# Patient Record
Sex: MALE | Race: WHITE | NOT HISPANIC OR LATINO | Employment: OTHER | URBAN - METROPOLITAN AREA
[De-identification: names, ages, dates, MRNs, and addresses within clinical notes are randomized per-mention and may not be internally consistent; named-entity substitution may affect disease eponyms.]

---

## 2017-12-27 ENCOUNTER — OFFICE VISIT (OUTPATIENT)
Dept: CARDIOLOGY | Facility: CLINIC | Age: 67
End: 2017-12-27
Attending: INTERNAL MEDICINE
Payer: MEDICARE

## 2017-12-27 VITALS
HEART RATE: 62 BPM | WEIGHT: 280 LBS | BODY MASS INDEX: 37.11 KG/M2 | SYSTOLIC BLOOD PRESSURE: 149 MMHG | DIASTOLIC BLOOD PRESSURE: 81 MMHG | HEIGHT: 73 IN

## 2017-12-27 DIAGNOSIS — I48.0 PAF (PAROXYSMAL ATRIAL FIBRILLATION): ICD-10-CM

## 2017-12-27 DIAGNOSIS — R07.89 ATYPICAL CHEST PAIN: ICD-10-CM

## 2017-12-27 DIAGNOSIS — I63.9 CEREBROVASCULAR ACCIDENT (CVA), UNSPECIFIED MECHANISM: ICD-10-CM

## 2017-12-27 DIAGNOSIS — M47.896 OTHER OSTEOARTHRITIS OF SPINE, LUMBAR REGION: Primary | ICD-10-CM

## 2017-12-27 DIAGNOSIS — R07.9 RESTING CHEST PAIN: Primary | ICD-10-CM

## 2017-12-27 DIAGNOSIS — I10 ESSENTIAL HYPERTENSION: Chronic | ICD-10-CM

## 2017-12-27 DIAGNOSIS — E66.01 MORBID OBESITY: ICD-10-CM

## 2017-12-27 DIAGNOSIS — E11.9 NON-INSULIN DEPENDENT TYPE 2 DIABETES MELLITUS: Chronic | ICD-10-CM

## 2017-12-27 DIAGNOSIS — E78.00 HYPERCHOLESTEREMIA: Chronic | ICD-10-CM

## 2017-12-27 DIAGNOSIS — I25.110 CORONARY ARTERY DISEASE INVOLVING NATIVE CORONARY ARTERY OF NATIVE HEART WITH UNSTABLE ANGINA PECTORIS: ICD-10-CM

## 2017-12-27 PROCEDURE — 99214 OFFICE O/P EST MOD 30 MIN: CPT | Mod: S$GLB,,, | Performed by: INTERNAL MEDICINE

## 2017-12-27 NOTE — PROGRESS NOTES
Subjective:    Patient ID:  Zev Castano is a 67 y.o. male     HPI  Here for F/U of CAD, S/P MANJEET proximal LAD in 3/2016, DM, HBP, CVA, PAF.    2 weeks ago I had cramps in the chest x a few minutes. None, since. Sedentary.  I take all my meds once a day. Recent steroids for my shoulders, my sugars are > 350.  In the last year, creat ranges from 1.4 to 1.6. Drinks 8-10 glasses of water daily.    Current Outpatient Prescriptions   Medication Sig    amLODIPine (NORVASC) 10 MG tablet Take 1 tablet (10 mg total) by mouth once daily.    atorvastatin (LIPITOR) 40 MG tablet Take 1 tablet (40 mg total) by mouth once daily.    clopidogrel (PLAVIX) 75 mg tablet Take 1 tablet (75 mg total) by mouth once daily.    CONTOUR NEXT STRIPS Strp     dronedarone (MULTAQ) 400 mg Tab Take 1 tablet (400 mg total) by mouth 2 (two) times daily with meals.    guaifenesin-codeine 100-10 mg/5 ml (TUSSI-ORGANIDIN NR)  mg/5 mL syrup Take 5 mLs by mouth 2 (two) times daily as needed.    INVOKANA 300 mg Tab tablet TAKE 1 TABLET(300 MG) BY MOUTH EVERY DAY    irbesartan (AVAPRO) 300 MG tablet Take 1 tablet (300 mg total) by mouth every evening.    methylPREDNISolone (MEDROL DOSEPACK) 4 mg tablet Take 1 tablet (4 mg total) by mouth once daily. use as directed    metoprolol succinate (TOPROL-XL) 100 MG 24 hr tablet Take 1 tablet (100 mg total) by mouth once daily.    metoprolol succinate (TOPROL-XL) 100 MG 24 hr tablet Take 1 tablet (100 mg total) by mouth once daily.    neomycin-polymyxin-gramicidin (NEOSPORIN) ophthalmic solution Place 1 drop into both eyes 4 (four) times daily.    SITagliptan-metformin (JANUMET) 50-1,000 mg per tablet Take 1 tablet by mouth 2 (two) times daily with meals.     No current facility-administered medications for this visit.          Review of Systems   Constitution: Negative for chills, decreased appetite, fever, weight gain and weight loss.   HENT: Negative for congestion, hearing loss and sore  "throat.    Eyes: Negative for blurred vision, double vision and visual disturbance.   Cardiovascular: Positive for chest pain and leg swelling. Negative for claudication, dyspnea on exertion, palpitations and syncope.   Respiratory: Negative for cough, hemoptysis, shortness of breath, sputum production and wheezing.    Endocrine: Negative for cold intolerance and heat intolerance.   Hematologic/Lymphatic: Negative for bleeding problem. Does not bruise/bleed easily.   Skin: Negative for color change, dry skin, flushing and itching.   Musculoskeletal: Negative for back pain, joint pain and myalgias.   Gastrointestinal: Negative for abdominal pain, anorexia, constipation, diarrhea, dysphagia, nausea and vomiting.        No bleeding per rectum   Genitourinary: Negative for dysuria, flank pain, frequency, hematuria and nocturia.   Neurological: Positive for focal weakness. Negative for dizziness, headaches, light-headedness, loss of balance, seizures and tremors.        Left leg weakness.   Psychiatric/Behavioral: Negative for altered mental status and depression.         Vitals:    12/27/17 1526   BP: (!) 149/81   Pulse: 62   Weight: 127 kg (280 lb)   Height: 6' 1" (1.854 m)     Objective:    Physical Exam   Constitutional: He is oriented to person, place, and time. He appears well-developed and well-nourished.   HENT:   Head: Normocephalic and atraumatic.   Right Ear: External ear normal.   Left Ear: External ear normal.   Nose: Nose normal.   Eyes: Conjunctivae and EOM are normal. Pupils are equal, round, and reactive to light. No scleral icterus.   Neck: Normal range of motion. Neck supple. No JVD present. No tracheal deviation present. No thyromegaly present.   Cardiovascular: Normal rate, regular rhythm and normal heart sounds.  Exam reveals no gallop and no friction rub.    No murmur heard.  Pulmonary/Chest: Effort normal and breath sounds normal. No respiratory distress. He has no rales. He exhibits no " tenderness.   Abdominal: Soft. Bowel sounds are normal. He exhibits no distension and no mass. There is no tenderness.   Musculoskeletal: Normal range of motion. He exhibits edema. He exhibits no tenderness.   Lymphadenopathy:     He has no cervical adenopathy.   Neurological: He is alert and oriented to person, place, and time. He has normal reflexes. No cranial nerve deficit. Coordination normal.   Skin: Skin is warm and dry. No rash noted.   Psychiatric: He has a normal mood and affect. His behavior is normal.         Assessment:           2. Coronary artery disease involving native coronary artery of native heart with unstable angina pectoris    3. Essential hypertension    4. Cerebrovascular accident (CVA), unspecified mechanism    5. PAF (paroxysmal atrial fibrillation)    6. Non-insulin dependent type 2 diabetes mellitus    7. Morbid obesity    8. Hypercholesteremia    9. Atypical chest pain         Plan:       Stop HCTZ  Lexiscan CL test.  F/U BMP after 2 weeks off HCTZ  Continue to increase oral fluid intake.

## 2018-01-29 ENCOUNTER — OFFICE VISIT (OUTPATIENT)
Dept: CARDIOLOGY | Facility: CLINIC | Age: 68
End: 2018-01-29
Attending: INTERNAL MEDICINE
Payer: MEDICARE

## 2018-01-29 VITALS
WEIGHT: 270 LBS | HEIGHT: 73 IN | SYSTOLIC BLOOD PRESSURE: 146 MMHG | BODY MASS INDEX: 35.78 KG/M2 | DIASTOLIC BLOOD PRESSURE: 83 MMHG | HEART RATE: 59 BPM

## 2018-01-29 DIAGNOSIS — I10 ESSENTIAL HYPERTENSION: Chronic | ICD-10-CM

## 2018-01-29 DIAGNOSIS — I48.0 PAF (PAROXYSMAL ATRIAL FIBRILLATION): ICD-10-CM

## 2018-01-29 DIAGNOSIS — I25.110 CORONARY ARTERY DISEASE INVOLVING NATIVE CORONARY ARTERY OF NATIVE HEART WITH UNSTABLE ANGINA PECTORIS: Primary | ICD-10-CM

## 2018-01-29 DIAGNOSIS — E78.00 HYPERCHOLESTEREMIA: Chronic | ICD-10-CM

## 2018-01-29 DIAGNOSIS — E66.01 MORBID OBESITY: ICD-10-CM

## 2018-01-29 DIAGNOSIS — I63.9 CEREBROVASCULAR ACCIDENT (CVA), UNSPECIFIED MECHANISM: ICD-10-CM

## 2018-01-29 DIAGNOSIS — E11.9 NON-INSULIN DEPENDENT TYPE 2 DIABETES MELLITUS: Chronic | ICD-10-CM

## 2018-01-29 DIAGNOSIS — M47.12 OSTEOARTHRITIS OF CERVICAL SPINE WITH MYELOPATHY: ICD-10-CM

## 2018-01-29 PROCEDURE — 99214 OFFICE O/P EST MOD 30 MIN: CPT | Mod: S$GLB,,, | Performed by: INTERNAL MEDICINE

## 2018-01-29 PROCEDURE — 1159F MED LIST DOCD IN RCRD: CPT | Mod: S$GLB,,, | Performed by: INTERNAL MEDICINE

## 2018-02-04 PROBLEM — M47.12 OSTEOARTHRITIS OF CERVICAL SPINE WITH MYELOPATHY: Status: ACTIVE | Noted: 2018-02-04

## 2018-02-04 NOTE — PROGRESS NOTES
Subjective:    Patient ID:  Zev Castano is a 68 y.o. male     HPI  Here for F/U of DJD Cervical spine, DM, CAD, CVA, HBP, PAF    Recent lack of coordination of both hands, R>L, neck pain and pains going down each arm. Saw a neurologist who recommended a neurosurgeon.    Current Outpatient Prescriptions   Medication Sig    amLODIPine (NORVASC) 10 MG tablet Take 1 tablet (10 mg total) by mouth once daily.    atorvastatin (LIPITOR) 40 MG tablet TAKE 1 TABLET(40 MG) BY MOUTH EVERY DAY    clopidogrel (PLAVIX) 75 mg tablet TAKE 1 TABLET(75 MG) BY MOUTH EVERY DAY    CONTOUR NEXT STRIPS Strp     dronedarone (MULTAQ) 400 mg Tab Take 1 tablet (400 mg total) by mouth 2 (two) times daily with meals.    guaifenesin-codeine 100-10 mg/5 ml (TUSSI-ORGANIDIN NR)  mg/5 mL syrup Take 5 mLs by mouth 2 (two) times daily as needed.    INVOKANA 300 mg Tab tablet TAKE 1 TABLET(300 MG) BY MOUTH EVERY DAY    irbesartan (AVAPRO) 300 MG tablet Take 1 tablet (300 mg total) by mouth every evening.    methylPREDNISolone (MEDROL DOSEPACK) 4 mg tablet Take 1 tablet (4 mg total) by mouth once daily. use as directed    metoprolol succinate (TOPROL-XL) 100 MG 24 hr tablet Take 1 tablet (100 mg total) by mouth once daily.    metoprolol succinate (TOPROL-XL) 100 MG 24 hr tablet Take 1 tablet (100 mg total) by mouth once daily.    neomycin-polymyxin-gramicidin (NEOSPORIN) ophthalmic solution Place 1 drop into both eyes 4 (four) times daily.    SITagliptan-metformin (JANUMET) 50-1,000 mg per tablet Take 1 tablet by mouth 2 (two) times daily with meals.     No current facility-administered medications for this visit.          Review of Systems   Constitution: Negative for chills, decreased appetite, fever, weight gain and weight loss.   HENT: Negative for congestion, hearing loss and sore throat.    Eyes: Negative for blurred vision, double vision and visual disturbance.   Cardiovascular: Negative for chest pain, claudication,  "dyspnea on exertion, leg swelling, palpitations and syncope.   Respiratory: Negative for cough, hemoptysis, shortness of breath, sputum production and wheezing.    Endocrine: Negative for cold intolerance and heat intolerance.   Hematologic/Lymphatic: Negative for bleeding problem. Does not bruise/bleed easily.   Skin: Negative for color change, dry skin, flushing and itching.   Musculoskeletal: Positive for myalgias and neck pain. Negative for back pain and joint pain.   Gastrointestinal: Negative for abdominal pain, anorexia, constipation, diarrhea, dysphagia, nausea and vomiting.        No bleeding per rectum   Genitourinary: Negative for dysuria, flank pain, frequency, hematuria and nocturia.   Neurological: Positive for focal weakness. Negative for dizziness, headaches, light-headedness, loss of balance, seizures and tremors.   Psychiatric/Behavioral: Negative for altered mental status and depression.         Vitals:    01/29/18 1145   BP: (!) 146/83   Pulse: (!) 59   Weight: 122.5 kg (270 lb)   Height: 6' 1" (1.854 m)     Objective:    Physical Exam   Constitutional: He is oriented to person, place, and time. He appears well-developed and well-nourished.   HENT:   Head: Normocephalic and atraumatic.   Right Ear: External ear normal.   Left Ear: External ear normal.   Nose: Nose normal.   Eyes: Conjunctivae and EOM are normal. Pupils are equal, round, and reactive to light. No scleral icterus.   Neck: Normal range of motion. Neck supple. No JVD present. No tracheal deviation present. No thyromegaly present.   Cardiovascular: Normal rate, regular rhythm and normal heart sounds.  Exam reveals no gallop and no friction rub.    No murmur heard.  Pulmonary/Chest: Effort normal and breath sounds normal. No respiratory distress. He has no rales. He exhibits no tenderness.   Abdominal: Soft. Bowel sounds are normal. He exhibits no distension and no mass. There is no tenderness.   Musculoskeletal: Normal range of " motion. He exhibits no edema or tenderness.   Lymphadenopathy:     He has no cervical adenopathy.   Neurological: He is alert and oriented to person, place, and time. He has normal reflexes. No cranial nerve deficit. Coordination normal.   Skin: Skin is warm and dry. No rash noted.   Psychiatric: He has a normal mood and affect. His behavior is normal.       MRI C-Spine reviewed.      Assessment:       1. Coronary artery disease involving native coronary artery of native heart with unstable angina pectoris    2. Cerebrovascular accident (CVA), unspecified mechanism    3. Non-insulin dependent type 2 diabetes mellitus    4. Essential hypertension    5. PAF (paroxysmal atrial fibrillation)    6. Hypercholesteremia    7. Morbid obesity    8. Osteoarthritis of cervical spine with myelopathy         Plan:       See Dr Ish Norman

## 2018-02-14 PROBLEM — M51.36 DDD (DEGENERATIVE DISC DISEASE), LUMBAR: Status: ACTIVE | Noted: 2018-02-14

## 2018-02-14 PROBLEM — M51.36 BULGING LUMBAR DISC: Status: ACTIVE | Noted: 2018-02-14

## 2018-02-14 PROBLEM — M48.02 CERVICAL STENOSIS OF SPINAL CANAL: Status: ACTIVE | Noted: 2018-02-14

## 2018-02-14 PROBLEM — M47.816 LUMBAR SPONDYLOSIS: Status: ACTIVE | Noted: 2018-02-14

## 2018-02-14 PROBLEM — M48.061 LUMBAR SPINAL STENOSIS: Status: ACTIVE | Noted: 2018-02-14

## 2018-02-14 PROBLEM — M70.71 BILATERAL HIP BURSITIS: Status: ACTIVE | Noted: 2018-02-14

## 2018-02-14 PROBLEM — M47.812 CERVICAL SPONDYLOSIS: Status: ACTIVE | Noted: 2018-02-14

## 2018-02-14 PROBLEM — M75.52 ACUTE BURSITIS OF LEFT SHOULDER: Status: ACTIVE | Noted: 2018-02-14

## 2018-02-14 PROBLEM — M54.12 CERVICAL RADICULOPATHY: Status: ACTIVE | Noted: 2018-02-14

## 2018-02-14 PROBLEM — M54.16 LUMBAR RADICULOPATHY: Status: ACTIVE | Noted: 2018-02-14

## 2018-02-14 PROBLEM — Z98.890 HX OF REPAIR OF RIGHT ROTATOR CUFF: Status: ACTIVE | Noted: 2018-02-14

## 2018-02-14 PROBLEM — M70.72 BILATERAL HIP BURSITIS: Status: ACTIVE | Noted: 2018-02-14

## 2018-02-14 PROBLEM — M75.51 BILATERAL SHOULDER BURSITIS: Status: ACTIVE | Noted: 2018-02-14

## 2018-02-14 PROBLEM — M75.52 BILATERAL SHOULDER BURSITIS: Status: ACTIVE | Noted: 2018-02-14

## 2018-02-14 PROBLEM — M50.30 DDD (DEGENERATIVE DISC DISEASE), CERVICAL: Status: ACTIVE | Noted: 2018-02-14

## 2018-02-14 PROBLEM — M50.20 HERNIATED CERVICAL DISC: Status: ACTIVE | Noted: 2018-02-14

## 2018-02-16 ENCOUNTER — HOSPITAL ENCOUNTER (INPATIENT)
Facility: OTHER | Age: 68
LOS: 3 days | Discharge: HOME | DRG: 310 | End: 2018-02-19
Attending: EMERGENCY MEDICINE | Admitting: INTERNAL MEDICINE
Payer: MEDICARE

## 2018-02-16 ENCOUNTER — CLINICAL SUPPORT (OUTPATIENT)
Dept: CARDIOLOGY | Facility: CLINIC | Age: 68
End: 2018-02-16
Payer: MEDICARE

## 2018-02-16 DIAGNOSIS — I48.92 ATRIAL FLUTTER, UNSPECIFIED TYPE: Primary | ICD-10-CM

## 2018-02-16 DIAGNOSIS — R06.82 TACHYPNEA: ICD-10-CM

## 2018-02-16 DIAGNOSIS — I48.92 ATRIAL FLUTTER: ICD-10-CM

## 2018-02-16 DIAGNOSIS — I48.3 TYPICAL ATRIAL FLUTTER: Primary | ICD-10-CM

## 2018-02-16 LAB
ANION GAP SERPL CALC-SCNC: 15 MMOL/L
BASOPHILS # BLD AUTO: 0.02 K/UL
BASOPHILS NFR BLD: 0.2 %
BUN SERPL-MCNC: 23 MG/DL
CALCIUM SERPL-MCNC: 9.2 MG/DL
CHLORIDE SERPL-SCNC: 103 MMOL/L
CO2 SERPL-SCNC: 20 MMOL/L
CREAT SERPL-MCNC: 1.5 MG/DL
DIFFERENTIAL METHOD: ABNORMAL
EOSINOPHIL # BLD AUTO: 0.3 K/UL
EOSINOPHIL NFR BLD: 3.4 %
ERYTHROCYTE [DISTWIDTH] IN BLOOD BY AUTOMATED COUNT: 13.7 %
EST. GFR  (AFRICAN AMERICAN): 54 ML/MIN/1.73 M^2
EST. GFR  (NON AFRICAN AMERICAN): 47 ML/MIN/1.73 M^2
GLUCOSE SERPL-MCNC: 296 MG/DL
HCT VFR BLD AUTO: 40.7 %
HGB BLD-MCNC: 13.7 G/DL
LYMPHOCYTES # BLD AUTO: 1.6 K/UL
LYMPHOCYTES NFR BLD: 16.1 %
MCH RBC QN AUTO: 29.5 PG
MCHC RBC AUTO-ENTMCNC: 33.7 G/DL
MCV RBC AUTO: 88 FL
MONOCYTES # BLD AUTO: 0.9 K/UL
MONOCYTES NFR BLD: 9.1 %
NEUTROPHILS # BLD AUTO: 7 K/UL
NEUTROPHILS NFR BLD: 70.7 %
PLATELET # BLD AUTO: 372 K/UL
PMV BLD AUTO: 10.3 FL
POCT GLUCOSE: 150 MG/DL (ref 70–110)
POTASSIUM SERPL-SCNC: 3.9 MMOL/L
RBC # BLD AUTO: 4.64 M/UL
SODIUM SERPL-SCNC: 138 MMOL/L
WBC # BLD AUTO: 9.97 K/UL

## 2018-02-16 PROCEDURE — 96375 TX/PRO/DX INJ NEW DRUG ADDON: CPT

## 2018-02-16 PROCEDURE — 93306 TTE W/DOPPLER COMPLETE: CPT

## 2018-02-16 PROCEDURE — 96376 TX/PRO/DX INJ SAME DRUG ADON: CPT

## 2018-02-16 PROCEDURE — 96366 THER/PROPH/DIAG IV INF ADDON: CPT

## 2018-02-16 PROCEDURE — 93005 ELECTROCARDIOGRAM TRACING: CPT

## 2018-02-16 PROCEDURE — 99285 EMERGENCY DEPT VISIT HI MDM: CPT | Mod: 25

## 2018-02-16 PROCEDURE — 63600175 PHARM REV CODE 636 W HCPCS: Performed by: INTERNAL MEDICINE

## 2018-02-16 PROCEDURE — 93000 ELECTROCARDIOGRAM COMPLETE: CPT | Mod: S$GLB,,, | Performed by: INTERNAL MEDICINE

## 2018-02-16 PROCEDURE — 63600175 PHARM REV CODE 636 W HCPCS: Performed by: EMERGENCY MEDICINE

## 2018-02-16 PROCEDURE — 25000003 PHARM REV CODE 250: Performed by: INTERNAL MEDICINE

## 2018-02-16 PROCEDURE — 20000000 HC ICU ROOM

## 2018-02-16 PROCEDURE — 25000003 PHARM REV CODE 250: Performed by: EMERGENCY MEDICINE

## 2018-02-16 PROCEDURE — 80048 BASIC METABOLIC PNL TOTAL CA: CPT

## 2018-02-16 PROCEDURE — 96365 THER/PROPH/DIAG IV INF INIT: CPT

## 2018-02-16 PROCEDURE — 85025 COMPLETE CBC W/AUTO DIFF WBC: CPT

## 2018-02-16 PROCEDURE — 93010 ELECTROCARDIOGRAM REPORT: CPT | Mod: 76,,, | Performed by: INTERNAL MEDICINE

## 2018-02-16 RX ORDER — METOPROLOL TARTRATE 1 MG/ML
5 INJECTION, SOLUTION INTRAVENOUS
Status: COMPLETED | OUTPATIENT
Start: 2018-02-16 | End: 2018-02-16

## 2018-02-16 RX ORDER — ADENOSINE 3 MG/ML
6 INJECTION, SOLUTION INTRAVENOUS
Status: COMPLETED | OUTPATIENT
Start: 2018-02-16 | End: 2018-02-16

## 2018-02-16 RX ORDER — AMLODIPINE BESYLATE 5 MG/1
10 TABLET ORAL DAILY
Status: DISCONTINUED | OUTPATIENT
Start: 2018-02-16 | End: 2018-02-19 | Stop reason: HOSPADM

## 2018-02-16 RX ORDER — OXYCODONE AND ACETAMINOPHEN 10; 325 MG/1; MG/1
1 TABLET ORAL DAILY PRN
COMMUNITY
End: 2018-03-10 | Stop reason: ALTCHOICE

## 2018-02-16 RX ORDER — HEPARIN SODIUM 5000 [USP'U]/ML
5000 INJECTION, SOLUTION INTRAVENOUS; SUBCUTANEOUS EVERY 8 HOURS
Status: DISCONTINUED | OUTPATIENT
Start: 2018-02-16 | End: 2018-02-17

## 2018-02-16 RX ORDER — IRBESARTAN 150 MG/1
300 TABLET ORAL NIGHTLY
Status: DISCONTINUED | OUTPATIENT
Start: 2018-02-16 | End: 2018-02-19 | Stop reason: HOSPADM

## 2018-02-16 RX ORDER — ATORVASTATIN CALCIUM 20 MG/1
40 TABLET, FILM COATED ORAL DAILY
Status: DISCONTINUED | OUTPATIENT
Start: 2018-02-16 | End: 2018-02-19 | Stop reason: HOSPADM

## 2018-02-16 RX ORDER — IBUPROFEN 200 MG
400 TABLET ORAL EVERY 6 HOURS PRN
Status: ON HOLD | COMMUNITY
End: 2018-02-19 | Stop reason: HOSPADM

## 2018-02-16 RX ORDER — METOPROLOL SUCCINATE 50 MG/1
100 TABLET, EXTENDED RELEASE ORAL DAILY
Status: DISCONTINUED | OUTPATIENT
Start: 2018-02-16 | End: 2018-02-19 | Stop reason: HOSPADM

## 2018-02-16 RX ORDER — ADENOSINE 3 MG/ML
12 INJECTION, SOLUTION INTRAVENOUS
Status: COMPLETED | OUTPATIENT
Start: 2018-02-16 | End: 2018-02-16

## 2018-02-16 RX ORDER — AMIODARONE HYDROCHLORIDE 200 MG/1
400 TABLET ORAL DAILY
Status: DISCONTINUED | OUTPATIENT
Start: 2018-02-16 | End: 2018-02-19 | Stop reason: HOSPADM

## 2018-02-16 RX ORDER — METFORMIN HYDROCHLORIDE 500 MG/1
1000 TABLET ORAL 2 TIMES DAILY WITH MEALS
Status: DISCONTINUED | OUTPATIENT
Start: 2018-02-16 | End: 2018-02-19 | Stop reason: HOSPADM

## 2018-02-16 RX ADMIN — AMIODARONE HYDROCHLORIDE 0.5 MG/MIN: 1.8 INJECTION, SOLUTION INTRAVENOUS at 06:02

## 2018-02-16 RX ADMIN — ADENOSINE 12 MG: 3 INJECTION, SOLUTION INTRAVENOUS at 09:02

## 2018-02-16 RX ADMIN — ADENOSINE 6 MG: 3 INJECTION, SOLUTION INTRAVENOUS at 09:02

## 2018-02-16 RX ADMIN — ATORVASTATIN CALCIUM 40 MG: 20 TABLET, FILM COATED ORAL at 10:02

## 2018-02-16 RX ADMIN — METOPROLOL SUCCINATE 100 MG: 50 TABLET, EXTENDED RELEASE ORAL at 10:02

## 2018-02-16 RX ADMIN — METFORMIN HYDROCHLORIDE 1000 MG: 500 TABLET, FILM COATED ORAL at 10:02

## 2018-02-16 RX ADMIN — AMIODARONE HYDROCHLORIDE 1 MG/MIN: 1.8 INJECTION, SOLUTION INTRAVENOUS at 11:02

## 2018-02-16 RX ADMIN — AMLODIPINE BESYLATE 10 MG: 5 TABLET ORAL at 10:02

## 2018-02-16 RX ADMIN — AMIODARONE HYDROCHLORIDE 400 MG: 200 TABLET ORAL at 02:02

## 2018-02-16 RX ADMIN — METOPROLOL TARTRATE 5 MG: 5 INJECTION INTRAVENOUS at 08:02

## 2018-02-16 RX ADMIN — IRBESARTAN 300 MG: 150 TABLET ORAL at 09:02

## 2018-02-16 RX ADMIN — AMIODARONE HYDROCHLORIDE 150 MG: 1.5 INJECTION, SOLUTION INTRAVENOUS at 09:02

## 2018-02-16 RX ADMIN — HEPARIN SODIUM 5000 UNITS: 5000 INJECTION, SOLUTION INTRAVENOUS; SUBCUTANEOUS at 09:02

## 2018-02-16 RX ADMIN — SITAGLIPTIN 50 MG: 25 TABLET, FILM COATED ORAL at 10:02

## 2018-02-16 RX ADMIN — HEPARIN SODIUM 5000 UNITS: 5000 INJECTION, SOLUTION INTRAVENOUS; SUBCUTANEOUS at 01:02

## 2018-02-16 NOTE — ED NOTES
Patient is still alert and oriented x4. Able to stand and ambulate. Uses urinal at bedside for continuous monitoring. Current heart rate 101. Skin is warm, and non diaphoretic. Patient denies chest pain and states a relief from shortness of breath at this time.   Pulses equal and regular.     Call light in reach and educated on use. Wife is still at bedside.

## 2018-02-16 NOTE — ED TRIAGE NOTES
Patient came from clinic where he went for a clearance for sx exam. Upon ekg, they found patient to be in atrial flutter. Patient was sent here with complaints of shortness of breath. Denies chest pain or nausea. Skin is warm, dry, and pink.

## 2018-02-16 NOTE — ED PROVIDER NOTES
Encounter Date: 2/16/2018    SCRIBE #1 NOTE: Luis LOONEY, am scribing for, and in the presence of, Dr. Haque.       History     Chief Complaint   Patient presents with    Shortness of Breath     Was seen by a doctor for a bone spur earlier this week and that office did an EKG.  He followed up with Dr. Livingston this morning and was sent to ED secondary to a flutter.  Denies chest pain, + shortness of breath, denies n/v     8:33 AM    Patient is a 68 y.o. male who presents to the ED with complaint of shortness of breath. Patient was being seen in Dr. Livingston's office this morning to obtain medical clearance for neck surgery to have a bone spur removed, when he was found to have a heartrate in the 140's and was referred to the ED for further evaluation. Patient currently complains of shortness of breath, which began prior to his visit to the office. He denies chest pain, dizziness, lightheadedness, or any other pain besides his chronic neck pain. He reports taking his daily AM medications this morning, including multaq. Per wife, patient has been taking multaq for several years, and has a history of a rapid heart rate. She also reports pt has a history of poorly controlled Dm, with sugars running in the 240-250's. She also states the pt has kidney problems and is in the process of establishing care with nephrology.       The history is provided by the patient and the spouse.     Review of patient's allergies indicates:   Allergen Reactions    Doxycycline Rash     Past Medical History:   Diagnosis Date    Coronary artery disease     Diabetes mellitus type II     Hyperlipidemia     Hypertension     Paroxysmal atrial fibrillation     Stroke     nov 2012     Past Surgical History:   Procedure Laterality Date    KNEE SURGERY Left     SHOULDER SURGERY Right      Family History   Problem Relation Age of Onset    Hypertension Mother      Social History   Substance Use Topics    Smoking status: Former Smoker      Types: Cigars    Smokeless tobacco: Never Used      Comment: occasional, when at the [employer]    Alcohol use 0.6 oz/week     1 Cans of beer per week      Comment: occasional when at the [email or web address]     Review of Systems   Constitutional: Negative for chills, diaphoresis and fever.   HENT: Negative for congestion.    Respiratory: Positive for shortness of breath.    Cardiovascular: Negative for chest pain.   Gastrointestinal: Negative for abdominal pain and nausea.   Genitourinary: Negative for dysuria.   Musculoskeletal: Positive for neck pain.   Skin: Negative for rash.   Neurological: Negative for dizziness, weakness, light-headedness and headaches.   Psychiatric/Behavioral: Negative for confusion.       Physical Exam     Initial Vitals [02/16/18 0828]   BP Pulse Resp Temp SpO2   128/68 (!) 144 (!) 22 97.5 °F (36.4 °C) 97 %      MAP       88         Physical Exam    Nursing note and vitals reviewed.  Constitutional: He appears well-developed and well-nourished. He is not diaphoretic. No distress.   HENT:   Head: Normocephalic and atraumatic.   Eyes: Conjunctivae and EOM are normal.   Neck: Normal range of motion. Neck supple. JVD present.   Cardiovascular: Regular rhythm and normal heart sounds. Tachycardia present.    Pulmonary/Chest: Breath sounds normal. No respiratory distress. He has no wheezes. He has no rhonchi. He has no rales.   Abdominal: Soft. There is no tenderness.   Musculoskeletal: Normal range of motion. He exhibits edema (trace pitting edema).   Neurological: He is alert and oriented to person, place, and time.   Skin: Skin is warm and dry.   Psychiatric: He has a normal mood and affect. His behavior is normal. Judgment and thought content normal.         ED Course   Procedures  Labs Reviewed   CBC W/ AUTO DIFFERENTIAL - Abnormal; Notable for the following:        Result Value    Hemoglobin 13.7 (*)     Platelets 372 (*)     Lymph% 16.1 (*)     All other components within normal limits   BASIC  METABOLIC PANEL - Abnormal; Notable for the following:     CO2 20 (*)     Glucose 296 (*)     Creatinine 1.5 (*)     eGFR if  54 (*)     eGFR if non  47 (*)     All other components within normal limits     EKG Readings: (Independently Interpreted)   Sinus tachycardia at a rate of 143. Right bundle branch block. No STEMI.          Medical Decision Making:   Independently Interpreted Test(s):   I have ordered and independently interpreted EKG Reading(s) - see prior notes  Clinical Tests:   Lab Tests: Ordered and Reviewed  Medical Tests: Ordered and Reviewed  ED Management:  8:42 AM - Dr. Livingston made aware his patient is here in the ED. He would like the patient to receive 5 mg metoprolol and to be admitted.  9:00 AM - Dr. Livingston at bedside.    Additional MDM:   Comments: 68-year-old male sent from cardiology clinic by Dr. Livingston for evaluation of tachycardia.  The patient was there for medical clearance only.  He denied any symptoms except for dyspnea which is not a new complaint.  On exam he appeared in no respiratory distress.  He did arrive tachycardic with a heart rate in the 140s.  Dr. Livingston was consult immediate bleeding upon patient's arrival into the emergency department.  Further care was dictated by him.  He received metoprolol 5 mg in the emergency department and I did order labs which showed no significant abnormalities.  The patient was ultimately admitted to Dr. Livingston's service..          Scribe Attestation:   Scribe #1: I performed the above scribed service and the documentation accurately describes the services I performed. I attest to the accuracy of the note.    Attending Attestation:           Physician Attestation for Scribe:  Physician Attestation Statement for Scribe #1: I, Dr. Haque, reviewed documentation, as scribed by Luis Ennis in my presence, and it is both accurate and complete.                    Clinical Impression:     1. Typical atrial  flutter    2. Tachypnea    3. Atrial flutter                               Chloe Haque MD  02/16/18 4427

## 2018-02-16 NOTE — ED NOTES
Patient resting comfortably. He has been educated on admission to the ICU and has no complaints at this time. Wife is at bedside. Call light in reach. Warm blankets provided.

## 2018-02-17 LAB
ESTIMATED PA SYSTOLIC PRESSURE: 16
POCT GLUCOSE: 153 MG/DL (ref 70–110)
POCT GLUCOSE: 162 MG/DL (ref 70–110)
POCT GLUCOSE: 169 MG/DL (ref 70–110)
POCT GLUCOSE: 173 MG/DL (ref 70–110)
POCT GLUCOSE: 180 MG/DL (ref 70–110)
POCT GLUCOSE: 189 MG/DL (ref 70–110)
RETIRED EF AND QEF - SEE NOTES: 71 (ref 55–65)

## 2018-02-17 PROCEDURE — 25000003 PHARM REV CODE 250: Performed by: INTERNAL MEDICINE

## 2018-02-17 PROCEDURE — 63600175 PHARM REV CODE 636 W HCPCS: Performed by: INTERNAL MEDICINE

## 2018-02-17 PROCEDURE — 20000000 HC ICU ROOM

## 2018-02-17 RX ADMIN — METFORMIN HYDROCHLORIDE 1000 MG: 500 TABLET, FILM COATED ORAL at 06:02

## 2018-02-17 RX ADMIN — SITAGLIPTIN 50 MG: 25 TABLET, FILM COATED ORAL at 06:02

## 2018-02-17 RX ADMIN — LIRAGLUTIDE 1.2 MG: 6 INJECTION SUBCUTANEOUS at 11:02

## 2018-02-17 RX ADMIN — IRBESARTAN 300 MG: 150 TABLET ORAL at 08:02

## 2018-02-17 RX ADMIN — METOPROLOL SUCCINATE 100 MG: 50 TABLET, EXTENDED RELEASE ORAL at 08:02

## 2018-02-17 RX ADMIN — AMIODARONE HYDROCHLORIDE 0.5 MG/MIN: 1.8 INJECTION, SOLUTION INTRAVENOUS at 07:02

## 2018-02-17 RX ADMIN — ATORVASTATIN CALCIUM 40 MG: 20 TABLET, FILM COATED ORAL at 08:02

## 2018-02-17 RX ADMIN — AMLODIPINE BESYLATE 10 MG: 5 TABLET ORAL at 08:02

## 2018-02-17 RX ADMIN — AMIODARONE HYDROCHLORIDE 400 MG: 200 TABLET ORAL at 08:02

## 2018-02-17 RX ADMIN — APIXABAN 5 MG: 2.5 TABLET, FILM COATED ORAL at 11:02

## 2018-02-17 RX ADMIN — APIXABAN 5 MG: 2.5 TABLET, FILM COATED ORAL at 08:02

## 2018-02-17 RX ADMIN — METFORMIN HYDROCHLORIDE 1000 MG: 500 TABLET, FILM COATED ORAL at 07:02

## 2018-02-17 RX ADMIN — HEPARIN SODIUM 5000 UNITS: 5000 INJECTION, SOLUTION INTRAVENOUS; SUBCUTANEOUS at 05:02

## 2018-02-17 RX ADMIN — SITAGLIPTIN 50 MG: 25 TABLET, FILM COATED ORAL at 07:02

## 2018-02-17 NOTE — NURSING
Spoke with MD Livingston about pts BP being 160-170s/90s and general update on pt. MD Livingston not concerned with these numbers stating that sometimes the pt comes to the office with SBP in the 200s. Only order received if accuchecks ACHS.

## 2018-02-17 NOTE — PROGRESS NOTES
Feels better. Denies palpitation or breathlessness.    Vitals:    02/17/18 0615 02/17/18 0630 02/17/18 0705 02/17/18 0800   BP:    (!) 145/84   Pulse: 108 108  (!) 116   Resp: 11 14  (!) 23   Temp:   98.8 °F (37.1 °C)    TempSrc:   Axillary    SpO2: (!) 93% 95%  97%   Weight:       Height:         Chest clear  Cor: Irregular.  No gallop.  Neuro: intact.    Plan: Rx Eliquis  Stop heparin  Continue to load with Amiodarone  Resume Victoza  No NSAIDs  Will switch from Invokana to Jardiance  F/U BMP in am.  CV on Monday

## 2018-02-17 NOTE — PLAN OF CARE
Problem: Patient Care Overview  Goal: Plan of Care Review  Outcome: Ongoing (interventions implemented as appropriate)  Patient has had no NAEON. Patient BP has been as high as 180s/100s with improvement after no interventions. Dr. Livingston aware. Patient has been in Afib 90s-100s all night on Amio 0.5mg/min. Patient denies SOB or Cp. Patient has limited fine motor skill to BUE, this is prior to admit 2/2 bone spur that he needs sx for.

## 2018-02-17 NOTE — PLAN OF CARE
02/17/18 1746   Discharge Assessment   Assessment Type Discharge Planning Assessment   Assessment information obtained from? Patient;Caregiver;Medical Record   Prior to hospitilization cognitive status: Alert/Oriented   Prior to hospitalization functional status: Assistive Equipment;Independent   Current cognitive status: Alert/Oriented   Current Functional Status: Assistive Equipment;Needs Assistance   Able to Return to Prior Arrangements yes   Is patient able to care for self after discharge? Yes  (CV on Monday per Dr Livingston)   Patient currently being followed by outpatient case management? Unable to determine (comments)   Do you have any problems affording any of your prescribed medications? No   Is the patient taking medications as prescribed? yes   Does the patient have transportation home? Yes   Transportation Available family or friend will provide;car   Discharge Plan A Home   Patient/Family In Agreement With Plan yes

## 2018-02-18 LAB
ANION GAP SERPL CALC-SCNC: 13 MMOL/L
BUN SERPL-MCNC: 21 MG/DL
CALCIUM SERPL-MCNC: 8.8 MG/DL
CHLORIDE SERPL-SCNC: 104 MMOL/L
CO2 SERPL-SCNC: 21 MMOL/L
CREAT SERPL-MCNC: 1.1 MG/DL
EST. GFR  (AFRICAN AMERICAN): >60 ML/MIN/1.73 M^2
EST. GFR  (NON AFRICAN AMERICAN): >60 ML/MIN/1.73 M^2
GLUCOSE SERPL-MCNC: 144 MG/DL
POCT GLUCOSE: 133 MG/DL (ref 70–110)
POCT GLUCOSE: 135 MG/DL (ref 70–110)
POCT GLUCOSE: 154 MG/DL (ref 70–110)
POCT GLUCOSE: 171 MG/DL (ref 70–110)
POTASSIUM SERPL-SCNC: 3.9 MMOL/L
SODIUM SERPL-SCNC: 138 MMOL/L

## 2018-02-18 PROCEDURE — 63600175 PHARM REV CODE 636 W HCPCS: Performed by: INTERNAL MEDICINE

## 2018-02-18 PROCEDURE — 20000000 HC ICU ROOM

## 2018-02-18 PROCEDURE — 36415 COLL VENOUS BLD VENIPUNCTURE: CPT

## 2018-02-18 PROCEDURE — 25000003 PHARM REV CODE 250: Performed by: INTERNAL MEDICINE

## 2018-02-18 PROCEDURE — 93010 ELECTROCARDIOGRAM REPORT: CPT | Mod: ,,, | Performed by: INTERNAL MEDICINE

## 2018-02-18 PROCEDURE — 93005 ELECTROCARDIOGRAM TRACING: CPT

## 2018-02-18 PROCEDURE — 80048 BASIC METABOLIC PNL TOTAL CA: CPT

## 2018-02-18 RX ADMIN — LIRAGLUTIDE 1.2 MG: 6 INJECTION SUBCUTANEOUS at 08:02

## 2018-02-18 RX ADMIN — SITAGLIPTIN 50 MG: 25 TABLET, FILM COATED ORAL at 08:02

## 2018-02-18 RX ADMIN — AMIODARONE HYDROCHLORIDE 0.5 MG/MIN: 1.8 INJECTION, SOLUTION INTRAVENOUS at 06:02

## 2018-02-18 RX ADMIN — APIXABAN 5 MG: 2.5 TABLET, FILM COATED ORAL at 08:02

## 2018-02-18 RX ADMIN — IRBESARTAN 300 MG: 150 TABLET ORAL at 08:02

## 2018-02-18 RX ADMIN — AMIODARONE HYDROCHLORIDE 400 MG: 200 TABLET ORAL at 08:02

## 2018-02-18 RX ADMIN — METOPROLOL SUCCINATE 100 MG: 50 TABLET, EXTENDED RELEASE ORAL at 08:02

## 2018-02-18 RX ADMIN — AMLODIPINE BESYLATE 10 MG: 5 TABLET ORAL at 08:02

## 2018-02-18 RX ADMIN — METFORMIN HYDROCHLORIDE 1000 MG: 500 TABLET, FILM COATED ORAL at 05:02

## 2018-02-18 RX ADMIN — AMIODARONE HYDROCHLORIDE 0.5 MG/MIN: 1.8 INJECTION, SOLUTION INTRAVENOUS at 07:02

## 2018-02-18 RX ADMIN — ATORVASTATIN CALCIUM 40 MG: 20 TABLET, FILM COATED ORAL at 08:02

## 2018-02-18 RX ADMIN — METFORMIN HYDROCHLORIDE 1000 MG: 500 TABLET, FILM COATED ORAL at 08:02

## 2018-02-18 RX ADMIN — SITAGLIPTIN 50 MG: 25 TABLET, FILM COATED ORAL at 05:02

## 2018-02-18 NOTE — PROGRESS NOTES
""I feel weak"  Denies palpitations/ breathlessness.    Vitals:    02/18/18 0715 02/18/18 0800 02/18/18 0900 02/18/18 1000   BP: 139/82 (!) 153/91 (!) 161/100 (!) 175/94   Pulse:  (!) 126 (!) 130 (!) 128   Resp:  14 13 (!) 24   Temp: 98 °F (36.7 °C)      TempSrc: Oral      SpO2:  95% (!) 94% (!) 94%   Weight:       Height:         Chest clear  Cor: irregularly Irregular. No gallop  Neuro: intact.    Plan: CV in am.  "

## 2018-02-18 NOTE — H&P
"HISTORY OF PRESENT ILLNESS:  Mr. Castano is a 68-year-old gentleman who has   complained of feeling "bad" for the last 4 to 5 days.  He says he went to see   his primary care doctor, Dr. Arzola for a clearance for a cervical spine surgery   and had an electrocardiogram done, this was reportedly abnormal and showed an   irregular heart rhythm, was advised to come and see me for further evaluation.    He was seen in the office on the morning of admission, noted to be in atrial   flutter with a 2:1 block, and a ventricular rate of 140 beats per minute.  He   was sent to the Emergency Room for further evaluation and treatment, was   admitted to the hospital.    PAST HISTORY:  He has had longstanding history of diabetes mellitus and   hypertension.  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  Two years ago, he was hospitalized here for   unstable angina and had two stents placed in the left anterior descending   coronary artery in March 2016.  He has had paroxysmal atrial fibrillation, was   placed 3 or 4 years ago on Multaq and has been stable since then in a regular   sinus rhythm.  He has had morbid obesity, weighed over 300 pounds for the last   20+ years.  When placed on Invokana and in the past Victoza, he had a   significant weight loss.  He has had degenerative joint disease of his   lumbosacral spine, and of the cervical spine.  Over the last month or so, he   says he has been clumsy in both hands, and his recent MRI has suggested a   herniated disk in the neck for which he has been recommended surgery.    SOCIAL HISTORY:  He used to smoke cigars, he used to binge drink.  He now seldom   drinks and does not smoke cigars.    PRESENT MEDICATIONS:  His present home medications include ibuprofen, oxycodone,   amlodipine, atorvastatin, clopidogrel, Multaq, Invokana, irbesartan, metoprolol   succinate, Janumet.    PHYSICAL EXAMINATION:  GENERAL:  Reveals a " heavyset man, in no apparent acute distress.  VITAL SIGNS:  Blood pressure of 136/76 and a pulse of 140 beats per minute, HEENT:  The sclerae are nonicteric.  The conjunctivae are pink.  The ENT exam is   unremarkable.  NECK:  Supple.  There is no jugular venous distention.  The carotid upstroke is   brisk.  There is no carotid bruit.  CHEST:  Clear.  HEART:  Size is difficult to ascertain, although appears to be grossly normal.    S1 and S2 are normal.  There is no audible murmur or gallop.  ABDOMEN:  Soft and nontender.  The bowel sounds are normal.  EXTREMITIES:  There is no clubbing, cyanosis or edema of feet.  NEUROLOGIC:  Grossly, the neurological exam is intact.    IMPRESSION ON ADMISSION:  1.  Atrial flutter with a 2:1 block.  2.  Essential hypertension.  3.  Diabetes mellitus.  4.  Stable coronary artery disease.  5.  History of paroxysmal atrial fibrillation.  6.  Previous stroke.  7.  Degenerative joint disease of the cervical spine.  8.  Degenerative joint disease of the lumbosacral spine.      SB/HN  dd: 02/17/2018 09:45:18 (CST)  td: 02/17/2018 18:39:36 (CST)  Doc ID   #[id number]  Job ID #[id number]    CC:

## 2018-02-18 NOTE — PROGRESS NOTES
Informed consent placed in pt folder. House supervisor notified of AM procedure. Voice mail left on surgery answering service. Will pass along to next shift to call Anesthesiology 2/19 for AM CV.

## 2018-02-19 ENCOUNTER — ANESTHESIA EVENT (OUTPATIENT)
Dept: CARDIOLOGY | Facility: OTHER | Age: 68
DRG: 310 | End: 2018-02-19
Payer: MEDICARE

## 2018-02-19 ENCOUNTER — SURGERY (OUTPATIENT)
Age: 68
End: 2018-02-19

## 2018-02-19 ENCOUNTER — ANESTHESIA (OUTPATIENT)
Dept: CARDIOLOGY | Facility: OTHER | Age: 68
DRG: 310 | End: 2018-02-19
Payer: MEDICARE

## 2018-02-19 VITALS
WEIGHT: 260.81 LBS | HEART RATE: 82 BPM | BODY MASS INDEX: 34.57 KG/M2 | TEMPERATURE: 98 F | DIASTOLIC BLOOD PRESSURE: 86 MMHG | OXYGEN SATURATION: 94 % | SYSTOLIC BLOOD PRESSURE: 156 MMHG | HEIGHT: 73 IN | RESPIRATION RATE: 18 BRPM

## 2018-02-19 DIAGNOSIS — E11.9 DIABETES MELLITUS WITHOUT COMPLICATION: Primary | ICD-10-CM

## 2018-02-19 LAB
ANION GAP SERPL CALC-SCNC: 13 MMOL/L
BUN SERPL-MCNC: 24 MG/DL
CALCIUM SERPL-MCNC: 8.7 MG/DL
CHLORIDE SERPL-SCNC: 104 MMOL/L
CO2 SERPL-SCNC: 20 MMOL/L
CREAT SERPL-MCNC: 1.2 MG/DL
EST. GFR  (AFRICAN AMERICAN): >60 ML/MIN/1.73 M^2
EST. GFR  (NON AFRICAN AMERICAN): >60 ML/MIN/1.73 M^2
GLUCOSE SERPL-MCNC: 136 MG/DL
POCT GLUCOSE: 144 MG/DL (ref 70–110)
POTASSIUM SERPL-SCNC: 3.9 MMOL/L
SODIUM SERPL-SCNC: 137 MMOL/L

## 2018-02-19 PROCEDURE — 93010 ELECTROCARDIOGRAM REPORT: CPT | Mod: ,,, | Performed by: INTERNAL MEDICINE

## 2018-02-19 PROCEDURE — 63600175 PHARM REV CODE 636 W HCPCS: Performed by: INTERNAL MEDICINE

## 2018-02-19 PROCEDURE — 93005 ELECTROCARDIOGRAM TRACING: CPT

## 2018-02-19 PROCEDURE — 36415 COLL VENOUS BLD VENIPUNCTURE: CPT

## 2018-02-19 PROCEDURE — 80048 BASIC METABOLIC PNL TOTAL CA: CPT

## 2018-02-19 PROCEDURE — 63600175 PHARM REV CODE 636 W HCPCS: Performed by: NURSE ANESTHETIST, CERTIFIED REGISTERED

## 2018-02-19 PROCEDURE — 37000008 HC ANESTHESIA 1ST 15 MINUTES: Performed by: INTERNAL MEDICINE

## 2018-02-19 PROCEDURE — 25000003 PHARM REV CODE 250: Performed by: INTERNAL MEDICINE

## 2018-02-19 PROCEDURE — 5A2204Z RESTORATION OF CARDIAC RHYTHM, SINGLE: ICD-10-PCS | Performed by: INTERNAL MEDICINE

## 2018-02-19 RX ORDER — LIDOCAINE HCL/PF 100 MG/5ML
SYRINGE (ML) INTRAVENOUS
Status: DISCONTINUED | OUTPATIENT
Start: 2018-02-19 | End: 2018-02-19

## 2018-02-19 RX ORDER — METFORMIN HYDROCHLORIDE 1000 MG/1
1000 TABLET ORAL 2 TIMES DAILY WITH MEALS
Qty: 180 TABLET | Refills: 3 | Status: SHIPPED | OUTPATIENT
Start: 2018-02-19 | End: 2018-03-10 | Stop reason: ALTCHOICE

## 2018-02-19 RX ORDER — PROPOFOL 10 MG/ML
VIAL (ML) INTRAVENOUS
Status: DISCONTINUED | OUTPATIENT
Start: 2018-02-19 | End: 2018-02-19

## 2018-02-19 RX ORDER — AMIODARONE HYDROCHLORIDE 200 MG/1
200 TABLET ORAL 2 TIMES DAILY
Qty: 60 TABLET | Refills: 11 | Status: SHIPPED | OUTPATIENT
Start: 2018-02-19 | End: 2018-03-20

## 2018-02-19 RX ADMIN — AMLODIPINE BESYLATE 10 MG: 5 TABLET ORAL at 09:02

## 2018-02-19 RX ADMIN — LIDOCAINE HYDROCHLORIDE 100 MG: 20 INJECTION, SOLUTION INTRAVENOUS at 08:02

## 2018-02-19 RX ADMIN — AMIODARONE HYDROCHLORIDE 400 MG: 200 TABLET ORAL at 09:02

## 2018-02-19 RX ADMIN — ATORVASTATIN CALCIUM 40 MG: 20 TABLET, FILM COATED ORAL at 09:02

## 2018-02-19 RX ADMIN — SITAGLIPTIN 50 MG: 25 TABLET, FILM COATED ORAL at 09:02

## 2018-02-19 RX ADMIN — METFORMIN HYDROCHLORIDE 1000 MG: 500 TABLET, FILM COATED ORAL at 09:02

## 2018-02-19 RX ADMIN — APIXABAN 5 MG: 2.5 TABLET, FILM COATED ORAL at 09:02

## 2018-02-19 RX ADMIN — METOPROLOL SUCCINATE 100 MG: 50 TABLET, EXTENDED RELEASE ORAL at 09:02

## 2018-02-19 RX ADMIN — AMIODARONE HYDROCHLORIDE 0.5 MG/MIN: 1.8 INJECTION, SOLUTION INTRAVENOUS at 07:02

## 2018-02-19 RX ADMIN — PROPOFOL 40 MG: 10 INJECTION, EMULSION INTRAVENOUS at 08:02

## 2018-02-19 RX ADMIN — LIRAGLUTIDE 1.2 MG: 6 INJECTION SUBCUTANEOUS at 09:02

## 2018-02-19 NOTE — PHYSICIAN QUERY
PT Name: Zev Castano  MR #: [id number]  Physician Query Form - Renal Clarification     CDS/: Veronica Jain               Contact information: [email or web address]     This form is a permanent document in the medical record.     QueryDate: February 19, 2018    By submitting this query, we are merely seeking further clarification of documentation. Please utilize your independent clinical judgment when addressing the question(s) below.    The Medical record contains the following:   Indicator Supporting Clinical Findings Location in Medical Record    Kidney (Renal) Insufficiency     Xx Kidney (Renal) Failure / Injury  pt has kidney problems and is in the process of establishing care with nephrology.  ED Provider Note 2/16    Nephrotoxic Agents     X BUN/Creatinine GFR CR 1.5-1.2  BUN 23-21-24  GFR 47->60 Labs 2/16-2/19    Urine: Casts         Eosinophils      Dehydration      Nausea/Vomiting      Dialysis/CRRT      Treatment:      Other:          Provider, please specify the diagnosis or diagnoses associated with above clinical findings.    [ ] Unspecified Acute Kidney Failure/Injury    [ ] Acute on Chronic Renal Failure (please specify stage*): _____________    [ ] Chronic Kidney Disease (CKD) (please specify stage* below):      *[employer] Definitions:   Stage Description      eGFR (mL/min)   [ ] I  Slight kidney damage with normal or increased filtration  90+   [x ] II  Mildly reduced kidney function     60-89   [ ] III  Moderately reduced kidney function    30-59   [ ] IV  Severly reduced kidney function     15-29   [ ] V  Kidney failure, requiring transplant or dialysis   < 15    [ ] Other (please specify): _______________________________    [ ] Clinically Undetermined    Please document in your progress notes daily for the duration of treatment, until resolved, and include in your discharge summary.

## 2018-02-19 NOTE — NURSING TRANSFER
Nursing DISCHARGE Note      2/19/2018     Transfer To: Home    Transfer via wheelchair    Transported by RN    Notified: Significant other at bedside    Cardioversion performed at bedside with MD and anesthesiology present.  Pt required one shock to return to NSR.  EKG performed.  Pt without complaint, remained in stable condition.    Pt monitored for 2 hours post cardioversion, remained in NSR.  Discharge orders followed per MD.  Pt educated on treatment course, plan of care and medications.  All questions answered, pt remained safe and comfortable throughout shift.      Transported to private vehicle, driven by spouse.  All belongings sent with pt.

## 2018-02-19 NOTE — TRANSFER OF CARE
"Anesthesia Transfer of Care Note    Patient: Zev Castano    Procedure(s) Performed: Procedure(s) (LRB):  CARDIOVERSION (N/A)    Patient location: ICU    Anesthesia Type: general    Post pain: adequate analgesia    Post assessment: no apparent anesthetic complications and tolerated procedure well    Post vital signs: stable    Level of consciousness: awake, alert and oriented    Nausea/Vomiting: no nausea/vomiting    Complications: none    Transfer of care protocol was followed      Last vitals:   Visit Vitals  BP (!) 146/95   Pulse (!) 122   Temp 36.6 °C (97.8 °F) (Oral)   Resp 11   Ht 6' 1" (1.854 m)   Wt 118.3 kg (260 lb 12.9 oz)   SpO2 95%   BMI 34.41 kg/m²     "

## 2018-02-19 NOTE — ANESTHESIA PREPROCEDURE EVALUATION
02/19/2018  Zev Castano is a 68 y.o., male.    Anesthesia Evaluation    I have reviewed the Patient Summary Reports.    I have reviewed the Nursing Notes.   I have reviewed the Medications.     Review of Systems  Anesthesia Hx:  No problems with previous Anesthesia  Denies Family Hx of Anesthesia complications.   Denies Personal Hx of Anesthesia complications.   Social:  Non-Smoker, Former Smoker    Hematology/Oncology:  Hematology Normal   Oncology Normal     Cardiovascular:   Hypertension CAD asymptomatic Dysrhythmias atrial fibrillation    Pulmonary:  Pulmonary Normal    Renal/:  Renal/ Normal     Hepatic/GI:  Hepatic/GI Normal    Musculoskeletal:   Arthritis  Cervical spur with left arm and hand weakness. Denies neck pain and good ROM Spine Disorders: cervical    Neurological:   CVA, no residual symptoms Several CVAs in past more than 4 yr ago. Etiology unknown   Endocrine:   Diabetes, poorly controlled, type 2        Physical Exam  General:  Well nourished    Airway/Jaw/Neck:  Airway Findings: Mouth Opening: Normal Tongue: Normal  General Airway Assessment: Adult  Mallampati: I         Dental:  Dental Findings: In tact             Anesthesia Plan  Type of Anesthesia, risks & benefits discussed:  Anesthesia Type:  general  Patient's Preference:   Intra-op Monitoring Plan: standard ASA monitors  Intra-op Monitoring Plan Comments:   Post Op Pain Control Plan:   Post Op Pain Control Plan Comments:   Induction:    Beta Blocker:         Informed Consent: Patient understands risks and agrees with Anesthesia plan.  Questions answered. Anesthesia consent signed with patient.  ASA Score: 3     Day of Surgery Review of History & Physical:    H&P update referred to the surgeon.     Anesthesia Plan Notes: IV propofol for cardioversion        Ready For Surgery From Anesthesia Perspective.

## 2018-02-19 NOTE — PLAN OF CARE
02/19/18 1031   Medicare Message   Important Message from Medicare regarding Discharge Appeal Rights Given to patient/caregiver;Explained to patient/caregiver;Signed/date by patient/caregiver   Date IMM was signed 02/18/18   Time IMM was signed 1200

## 2018-02-19 NOTE — PLAN OF CARE
02/19/18 1018   Final Note   Assessment Type Final Discharge Note   Discharge Disposition Home   What phone number can be called within the next 1-3 days to see how you are doing after discharge? [phone]   Right Care Referral Info   Post Acute Recommendation No Care

## 2018-02-19 NOTE — ANESTHESIA POSTPROCEDURE EVALUATION
"Anesthesia Post Evaluation    Patient: Zev Castano    Procedure(s) Performed: Procedure(s) (LRB):  CARDIOVERSION (N/A)    Final Anesthesia Type: general  Patient location during evaluation: ICU  Patient participation: Yes- Able to Participate  Level of consciousness: awake and alert and oriented  Post-procedure vital signs: reviewed and stable  Pain management: adequate  Airway patency: patent  PONV status at discharge: No PONV  Anesthetic complications: no      Cardiovascular status: hemodynamically stable  Respiratory status: unassisted, spontaneous ventilation and room air  Hydration status: euvolemic  Follow-up not needed.        Visit Vitals  BP (!) 146/95   Pulse (!) 122   Temp 36.6 °C (97.8 °F) (Oral)   Resp 11   Ht 6' 1" (1.854 m)   Wt 118.3 kg (260 lb 12.9 oz)   SpO2 95%   BMI 34.41 kg/m²       Pain/Gerry Score: Pain Assessment Performed: Yes (2/19/2018  7:00 AM)  Presence of Pain: denies (2/19/2018  7:00 AM)      "

## 2018-02-19 NOTE — PLAN OF CARE
Problem: Patient Care Overview  Goal: Plan of Care Review  Outcome: Ongoing (interventions implemented as appropriate)  Pt awake and alert. No complaints of pain. VSS. Will continue to monitor.

## 2018-02-20 NOTE — DISCHARGE SUMMARY
"DATE OF DISCHARGE:  02/19/2018    HISTORY OF PRESENT ILLNESS:  Mr. Castano is a 68-year-old gentleman who presented   to the office with the complaints of "feeling bad for about 4 or 5 days.  When   he went to see his primary care doctor he was noted to have an irregular rapid   rhythm, was referred to my office for further evaluation.  In the office, he was   noted to be in atrial flutter with a 2:1 block with a ventricular rate of 140   beats per minute.  He was sent to the Emergency Room for further treatment and   was admitted to the Intensive Care Unit on IV amiodarone drip.    PAST HISTORY:  Longstanding history of hypertension and diabetes, he has been   morbidly obese, weighing in excess of 300 pounds.  He had two successive strokes   in 2012, had unstable angina and had placement of 2 stents in the proximal and   mid left anterior descending coronary artery in March 2016.  He has had   paroxysmal atrial fibrillation, has been on Multaq and in stable sinus rhythm   these last three or four years.  In the past, he was on Invokana and Victoza.    He had a dramatic weight loss, however, about a year ago, stopped taking   Victoza.  In the last month or so, he has had weakness in both arms, poor   coordination and neck and shoulder and arm pain.  He was seen by Dr. Monte and   he has been scheduled for a cervical disk laminectomy on 02/28.      His home medications were continued, with the exception of Invokana, which was   changed to Jardiance 25 mg daily.  Victoza was reinstated in the dose of 1.2 mg   daily.  The IV amiodarone drip was continued, and he was placed on oral   amiodarone 400 mg twice a day as well.  The Xarelto was discontinued and he was   placed on apixaban 5 mg twice a day and at the time of discharge was reduced to   2.5 mg twice a day.      His renal function, which on admission showed a BUN of 23 and a creatinine of   1.5 on followup showed a BUN of 21 with a creatinine of 1.1, and at the " day of   discharge, the blood sugar 136, a BUN of 24 and a creatinine of 1.2.  He has   been advised to discontinue his nonsteroidal anti-inflammatory medications and   his Invokana.  He has been advised to drink lots of water, the hydration, we   will ensure no Jardiance-induced nephropathy.  For 48 hours prior to his neck   surgery.  He will stop the apixaban.  The clopidogrel that he was on prior to   this admission has been discontinued.  He will continue his baby aspirin.  On   the morning of 02/19 under propofol anesthesia, a single countershock of 200   joules restored regular sinus rhythm.  At the time of discharge, he says he   feels better he can the rhythm is now in regular, followup electrocardiogram   showed stable sinus rhythm.  He was ambulated, and sent home to be seen in the   office for followup in a couple of weeks.      SUKHDEV/IN  dd: 02/19/2018 09:16:58 (CST)  td: 02/19/2018 23:52:35 (CST)  Doc ID   #[id number]  Job ID #[id number]    CC:

## 2018-02-20 NOTE — PROCEDURES
DATE OF PROCEDURE:  02/19/2018    NAME OF PROCEDURE:  Cardioversion.    PREPROCEDURE DIAGNOSIS:  Atrial flutter.    POSTPROCEDURE DIAGNOSES:  Atrial flutter.    TECHNIQUE:  Under propofol anesthesia, a single 200 joule countershock was   delivered.  This restored regular sinus rhythm.  Upon awakening, the patient was   stable and symptom free.    CONCLUSIONS:  Successful cardioversion from atrial flutter to a regular sinus   rhythm.      SUKHDEV/ED  dd: 02/19/2018 08:26:40 (CST)  td: 02/19/2018 18:16:32 (CST)  Doc ID   #[id number]  Job ID #[id number]    CC:

## 2018-03-10 ENCOUNTER — HOSPITAL ENCOUNTER (OUTPATIENT)
Facility: OTHER | Age: 68
Discharge: HOME | End: 2018-03-13
Attending: EMERGENCY MEDICINE | Admitting: EMERGENCY MEDICINE
Payer: MEDICARE

## 2018-03-10 DIAGNOSIS — I63.9 CEREBROVASCULAR ACCIDENT (CVA), UNSPECIFIED MECHANISM: ICD-10-CM

## 2018-03-10 DIAGNOSIS — I49.9 IRREGULAR HEART RHYTHM: ICD-10-CM

## 2018-03-10 DIAGNOSIS — R00.1 BRADYCARDIA: Primary | ICD-10-CM

## 2018-03-10 DIAGNOSIS — R06.09 EXERTIONAL DYSPNEA: ICD-10-CM

## 2018-03-10 DIAGNOSIS — M47.12 OSTEOARTHRITIS OF CERVICAL SPINE WITH MYELOPATHY: ICD-10-CM

## 2018-03-10 DIAGNOSIS — R06.02 SOB (SHORTNESS OF BREATH): ICD-10-CM

## 2018-03-10 DIAGNOSIS — I10 ESSENTIAL (PRIMARY) HYPERTENSION: ICD-10-CM

## 2018-03-10 DIAGNOSIS — R06.00 ACUTE DYSPNEA: ICD-10-CM

## 2018-03-10 LAB
ANION GAP SERPL CALC-SCNC: 12 MMOL/L
BASOPHILS # BLD AUTO: 0.02 K/UL
BASOPHILS NFR BLD: 0.2 %
BNP SERPL-MCNC: 140 PG/ML
BUN SERPL-MCNC: 26 MG/DL
CALCIUM SERPL-MCNC: 8.7 MG/DL
CHLORIDE SERPL-SCNC: 107 MMOL/L
CO2 SERPL-SCNC: 21 MMOL/L
CREAT SERPL-MCNC: 1.2 MG/DL
DIFFERENTIAL METHOD: ABNORMAL
EOSINOPHIL # BLD AUTO: 0.5 K/UL
EOSINOPHIL NFR BLD: 5.5 %
ERYTHROCYTE [DISTWIDTH] IN BLOOD BY AUTOMATED COUNT: 14.2 %
EST. GFR  (AFRICAN AMERICAN): >60 ML/MIN/1.73 M^2
EST. GFR  (NON AFRICAN AMERICAN): >60 ML/MIN/1.73 M^2
GLUCOSE SERPL-MCNC: 242 MG/DL
HCT VFR BLD AUTO: 37.5 %
HGB BLD-MCNC: 12.4 G/DL
LYMPHOCYTES # BLD AUTO: 1.6 K/UL
LYMPHOCYTES NFR BLD: 18.6 %
MCH RBC QN AUTO: 29.3 PG
MCHC RBC AUTO-ENTMCNC: 33.1 G/DL
MCV RBC AUTO: 89 FL
MONOCYTES # BLD AUTO: 1 K/UL
MONOCYTES NFR BLD: 11.1 %
NEUTROPHILS # BLD AUTO: 5.4 K/UL
NEUTROPHILS NFR BLD: 63.9 %
PLATELET # BLD AUTO: 292 K/UL
PMV BLD AUTO: 10.4 FL
POTASSIUM SERPL-SCNC: 4 MMOL/L
RBC # BLD AUTO: 4.23 M/UL
SODIUM SERPL-SCNC: 140 MMOL/L
WBC # BLD AUTO: 8.53 K/UL

## 2018-03-10 PROCEDURE — 80048 BASIC METABOLIC PNL TOTAL CA: CPT

## 2018-03-10 PROCEDURE — 93010 ELECTROCARDIOGRAM REPORT: CPT | Mod: ,,, | Performed by: INTERNAL MEDICINE

## 2018-03-10 PROCEDURE — 93005 ELECTROCARDIOGRAM TRACING: CPT

## 2018-03-10 PROCEDURE — 99285 EMERGENCY DEPT VISIT HI MDM: CPT | Mod: 25

## 2018-03-10 PROCEDURE — 85025 COMPLETE CBC W/AUTO DIFF WBC: CPT

## 2018-03-10 PROCEDURE — 84484 ASSAY OF TROPONIN QUANT: CPT

## 2018-03-10 PROCEDURE — 83880 ASSAY OF NATRIURETIC PEPTIDE: CPT

## 2018-03-11 PROBLEM — R06.09 EXERTIONAL DYSPNEA: Status: ACTIVE | Noted: 2018-03-11

## 2018-03-11 LAB
ANION GAP SERPL CALC-SCNC: 12 MMOL/L
BASOPHILS # BLD AUTO: 0.03 K/UL
BASOPHILS NFR BLD: 0.3 %
BUN SERPL-MCNC: 29 MG/DL
CALCIUM SERPL-MCNC: 8.6 MG/DL
CHLORIDE SERPL-SCNC: 106 MMOL/L
CO2 SERPL-SCNC: 23 MMOL/L
CREAT SERPL-MCNC: 1.2 MG/DL
D DIMER PPP IA.FEU-MCNC: 0.47 MG/L FEU
DIFFERENTIAL METHOD: ABNORMAL
EOSINOPHIL # BLD AUTO: 0.4 K/UL
EOSINOPHIL NFR BLD: 4.1 %
ERYTHROCYTE [DISTWIDTH] IN BLOOD BY AUTOMATED COUNT: 14.4 %
EST. GFR  (AFRICAN AMERICAN): >60 ML/MIN/1.73 M^2
EST. GFR  (NON AFRICAN AMERICAN): >60 ML/MIN/1.73 M^2
GLUCOSE SERPL-MCNC: 152 MG/DL
HCT VFR BLD AUTO: 33.8 %
HGB BLD-MCNC: 11.2 G/DL
LYMPHOCYTES # BLD AUTO: 1.8 K/UL
LYMPHOCYTES NFR BLD: 20.9 %
MCH RBC QN AUTO: 29.7 PG
MCHC RBC AUTO-ENTMCNC: 33.1 G/DL
MCV RBC AUTO: 90 FL
MONOCYTES # BLD AUTO: 0.9 K/UL
MONOCYTES NFR BLD: 10.6 %
NEUTROPHILS # BLD AUTO: 5.5 K/UL
NEUTROPHILS NFR BLD: 64.1 %
PLATELET # BLD AUTO: 272 K/UL
PMV BLD AUTO: 10.7 FL
POCT GLUCOSE: 131 MG/DL (ref 70–110)
POCT GLUCOSE: 142 MG/DL (ref 70–110)
POCT GLUCOSE: 152 MG/DL (ref 70–110)
POCT GLUCOSE: 166 MG/DL (ref 70–110)
POTASSIUM SERPL-SCNC: 3.8 MMOL/L
RBC # BLD AUTO: 3.77 M/UL
SODIUM SERPL-SCNC: 141 MMOL/L
TROPONIN I SERPL DL<=0.01 NG/ML-MCNC: 0.01 NG/ML
WBC # BLD AUTO: 8.61 K/UL

## 2018-03-11 PROCEDURE — 36415 COLL VENOUS BLD VENIPUNCTURE: CPT

## 2018-03-11 PROCEDURE — G0378 HOSPITAL OBSERVATION PER HR: HCPCS

## 2018-03-11 PROCEDURE — 93010 ELECTROCARDIOGRAM REPORT: CPT | Mod: ,,, | Performed by: INTERNAL MEDICINE

## 2018-03-11 PROCEDURE — 80048 BASIC METABOLIC PNL TOTAL CA: CPT

## 2018-03-11 PROCEDURE — 25000003 PHARM REV CODE 250: Performed by: INTERNAL MEDICINE

## 2018-03-11 PROCEDURE — 25000003 PHARM REV CODE 250: Performed by: EMERGENCY MEDICINE

## 2018-03-11 PROCEDURE — 27000221 HC OXYGEN, UP TO 24 HOURS

## 2018-03-11 PROCEDURE — 93005 ELECTROCARDIOGRAM TRACING: CPT

## 2018-03-11 PROCEDURE — 85379 FIBRIN DEGRADATION QUANT: CPT

## 2018-03-11 PROCEDURE — 85025 COMPLETE CBC W/AUTO DIFF WBC: CPT

## 2018-03-11 PROCEDURE — 99900035 HC TECH TIME PER 15 MIN (STAT)

## 2018-03-11 PROCEDURE — 94761 N-INVAS EAR/PLS OXIMETRY MLT: CPT

## 2018-03-11 PROCEDURE — 83036 HEMOGLOBIN GLYCOSYLATED A1C: CPT

## 2018-03-11 RX ORDER — AMIODARONE HYDROCHLORIDE 200 MG/1
200 TABLET ORAL DAILY
Status: DISCONTINUED | OUTPATIENT
Start: 2018-03-11 | End: 2018-03-13 | Stop reason: HOSPADM

## 2018-03-11 RX ORDER — IBUPROFEN 200 MG
24 TABLET ORAL
Status: DISCONTINUED | OUTPATIENT
Start: 2018-03-11 | End: 2018-03-13 | Stop reason: HOSPADM

## 2018-03-11 RX ORDER — GLUCAGON 1 MG
1 KIT INJECTION
Status: DISCONTINUED | OUTPATIENT
Start: 2018-03-11 | End: 2018-03-13 | Stop reason: HOSPADM

## 2018-03-11 RX ORDER — INSULIN ASPART 100 [IU]/ML
1-10 INJECTION, SOLUTION INTRAVENOUS; SUBCUTANEOUS
Status: DISCONTINUED | OUTPATIENT
Start: 2018-03-11 | End: 2018-03-13 | Stop reason: HOSPADM

## 2018-03-11 RX ORDER — METOPROLOL SUCCINATE 50 MG/1
50 TABLET, EXTENDED RELEASE ORAL DAILY
Status: DISCONTINUED | OUTPATIENT
Start: 2018-03-11 | End: 2018-03-12

## 2018-03-11 RX ORDER — ATORVASTATIN CALCIUM 20 MG/1
40 TABLET, FILM COATED ORAL DAILY
Status: DISCONTINUED | OUTPATIENT
Start: 2018-03-11 | End: 2018-03-13 | Stop reason: HOSPADM

## 2018-03-11 RX ORDER — IBUPROFEN 200 MG
16 TABLET ORAL
Status: DISCONTINUED | OUTPATIENT
Start: 2018-03-11 | End: 2018-03-13 | Stop reason: HOSPADM

## 2018-03-11 RX ORDER — IRBESARTAN 150 MG/1
300 TABLET ORAL NIGHTLY
Status: DISCONTINUED | OUTPATIENT
Start: 2018-03-11 | End: 2018-03-13 | Stop reason: HOSPADM

## 2018-03-11 RX ORDER — NAPROXEN SODIUM 220 MG/1
81 TABLET, FILM COATED ORAL DAILY
Status: DISCONTINUED | OUTPATIENT
Start: 2018-03-11 | End: 2018-03-13 | Stop reason: HOSPADM

## 2018-03-11 RX ORDER — AMLODIPINE BESYLATE 5 MG/1
10 TABLET ORAL DAILY
Status: DISCONTINUED | OUTPATIENT
Start: 2018-03-11 | End: 2018-03-13 | Stop reason: HOSPADM

## 2018-03-11 RX ADMIN — ATORVASTATIN CALCIUM 40 MG: 20 TABLET, FILM COATED ORAL at 11:03

## 2018-03-11 RX ADMIN — ASPIRIN 81 MG CHEWABLE TABLET 81 MG: 81 TABLET CHEWABLE at 04:03

## 2018-03-11 RX ADMIN — APIXABAN 2.5 MG: 2.5 TABLET, FILM COATED ORAL at 11:03

## 2018-03-11 RX ADMIN — LIRAGLUTIDE 1.2 MG: 6 INJECTION SUBCUTANEOUS at 05:03

## 2018-03-11 RX ADMIN — IRBESARTAN 300 MG: 150 TABLET ORAL at 08:03

## 2018-03-11 RX ADMIN — APIXABAN 2.5 MG: 2.5 TABLET, FILM COATED ORAL at 08:03

## 2018-03-11 NOTE — PLAN OF CARE
03/11/18 0922   MENDOZA Message   Medicare Outpatient and Observation Notification regarding financial responsibility Given to patient/caregiver;Explained to patient/caregiver;Signed/date by patient/caregiver   Date MENDOZA was signed 03/11/18   Time MENDOZA was signed 0845

## 2018-03-11 NOTE — ED NOTES
Pt ambulated to the restroom to have a BM. Wife was w/ him. Pt states he did not have SOB on the way to the restroom, but he did get SOB on the way back to the stretcher.

## 2018-03-11 NOTE — ED NOTES
Pt sitting up in bed resting w/ eyes closed and in no obvious distress. Wife remains at the BS, states he is resting right now. Respirations are even and unlabored. No nasal flaring and no use of accessory muscles. Skin is warm, dry and pink. VSS. Will continue to monitor closely.

## 2018-03-11 NOTE — CONSULTS
[facility] Neurology Consult Note    Reason for Consult - arm weakness      History of Present Illness -   68 y.o. male, with history of DM, stoke, Afib, and CAD here with SOB x 3 days. Neurology consulted for arm weakness. Patient endorses history of stroke in  that resulted in aphasia and bilateral UE/LE weakness that has since resolved. Patient states that he has LUE weakness and bilateral hand numbness for the past few weeks. On examination, he is limited in ROM of LUE at shoulder but otherwise displays no weakness. He later clarifies that he has pain on movement of his LUE and has been for the past few weeks.  He also has a history of cervical spur that is being followed by a spine surgeon.  He has no other neurological complaints. Denies dizziness, blurry vision, headache.      Past Medical History -  DM, stoke, Afib, and CAD  Allergies - NKDA    Vitals -     Vitals:    16 0815 16 1225 16 1227 16 1456   BP: 130/74 118/82     BP Location: Right arm Right arm     Patient Position: Lying Lying     BP Method: Automatic Automatic     Pulse: 60 60     Resp: 18 20     Temp: 99 °F (37.2 °C) 98.5 °F (36.9 °C)     TempSrc: Oral Oral     SpO2:   97% 97%   Weight:       Height:           Neurological Exam -     Gen: Alert and oriented to state/city/place name, year/month/date/day, full name/, situation    CN: PERRLA. EOMI. No hemianopsia/quandrantopia appreciated. Tactile sensation subjectively intact and equal bilaterally in V1-3 distribution.  Smile symmetric; no nasolabial fold flattening.  Uvual midline.  Testing of trapezius and sternocleidomastoid resulted with adequate strength to resistance.  Tongue protrusion midline.    Strength: flexion/extension at elbow and wrist and abduction of fingers 5/5 bilaterally but ROM at L shoulder limited due to pain. flexion/extension of hip/knee and plantarflexion/dorsiflexion 5/5 bilaterally.    Sensation: tactile sensation grossly and subjectively  intact in upper and lower extremities bilaterally.    Reflexes: triceps, biceps, brachioradialis, patellar 2+ and symmetric bilaterally.    Cerebellar: finger-nose nonconcerning bilaterally.      Labs -      Recent Labs  Lab 07/08/16  2103 07/09/16  0537 07/09/16  0538   WBC 13.68* 12.89*  --    HGB 16.8 15.5  --    HCT 47.8 44.7  --     253  --    * 133*  --    K 4.1 4.1  --     100  --    CO2 23 25  --    BUN 15 15  --    * 84  --    PROT 8.4 7.2  --    ALBUMIN 4.0 3.4*  --    BILITOT 0.9 0.7  --    AST 25 22  --    ALKPHOS 77 68  --    ALT 13 10  --    INR 1.1  --   --    TROPONINI <0.006  --   --    TSH 1.066  --  0.801   [id number]  --   --  637   FOLATE  --   --  7.5       Assessment and Plan -     68 y.o. male, with history of DM, stoke, Afib, and CAD with L shoulder limited ROM and numbness/tingling of bilateral hands  - do not appreciate arm weakness on examination; continue to pursue imaging of L shoulder for MSK issue  - numbness/tingling of bilateral hands to be assessed by outpatient EMG/NCS    Above discussed with attending provider Dr. Xuan Pina MD  Neurology PGY-III

## 2018-03-11 NOTE — ED NOTES
Pt sitting up in bed resting w/ his eyes closed and in no obvious respiratory distress. No nasal flaring and no use of accessory muscles. Pt is easily arousable and O x 3. Pt denies SOB. Respirations are even and unlabored. Skin is warm, dry and pink. VSS. Pain assessed and bathroom needs addressed. Will continue to monitor closely.

## 2018-03-11 NOTE — ED NOTES
Pt c/o sudden onset of SOB today, states he cannot lie flat. Pts wife states he was taken off lasix a couple of months ago by  for decreased kidney function. Pt is A & O x 3, states his SOB is worse w/ exertion. Respirations are even and unlabored. No nasal flaring and no use of accessory muscles. Skin is warm, dry and pink. VS. HR is SB @ 40 bpm. SHEREEN x 3mm. BBS- distant and CTA. Abd- SNT, pt is obese. Edema to the lower exts, +1 pitting edema. PSM x 4 exts. Pt is connected to the pulse ox, B/P cuff and EKG monitor. Bed is locked and in the low position w/ the side rails up and locked for pt safety. Call bell @ BS. Wife @ BS. Will continue to monitor closely.

## 2018-03-11 NOTE — PLAN OF CARE
Met with patient & significant other at bedside to complete discharge planning assessment. Patient is current with Dr Mehul Livingston PCP & his pharmacy of choice is [facility] in [place]. Patient was recently discharged from [facility] for similar symptoms. Patient requires minimal assist at times due to a bone spur in back limiting function of hands. Assist provided by significant other as needed. Patient denies any anticipated DC needs      03/11/18 1015   Discharge Assessment   Assessment Type Discharge Planning Assessment   Confirmed/corrected address and phone number on facesheet? Yes   Assessment information obtained from? Patient   Communicated expected length of stay with patient/caregiver no   Prior to hospitilization cognitive status: Alert/Oriented   Prior to hospitalization functional status: Needs Assistance   Current cognitive status: Alert/Oriented   Current Functional Status: Needs Assistance   Lives With significant other   Able to Return to Prior Arrangements yes   Is patient able to care for self after discharge? Yes   Patient's perception of discharge disposition home or selfcare   Readmission Within The Last 30 Days previous discharge plan unsuccessful   If yes, most recent facility name: [facility]   Patient currently being followed by outpatient case management? No   Patient currently receives any other outside agency services? No   Equipment Currently Used at Home none   Do you have any problems affording any of your prescribed medications? No   Is the patient taking medications as prescribed? yes   Does the patient have transportation home? Yes   Transportation Available family or friend will provide   Does the patient receive services at the [facility]? No   Discharge Plan A Home with family   Patient/Family In Agreement With Plan yes   Readmission Questionnaire   At the time of your discharge, did someone talk to you about what your health problems were? Yes   At the time of  discharge, did someone talk to you about what to watch out for regarding worsening of your health problem? Yes   At the time of discharge, did someone talk to you about what to do if you experienced worsening of your health problem? Yes   At the time of discharge, did someone talk to you about which medication to take when you left the hospital and which ones to stop taking? Yes   At the time of discharge, did someone talk to you about when and where to follow up with a doctor after you left the hospital? Yes   What do you believe caused you to be sick enough to be re-admitted? SOB   How often do you need to have someone help you when you read instructions, pamphlets, or other written material from your doctor or pharmacy? Rarely   Do you have problems taking your medications as prescribed? No   Do you have any problems affording any of  your prescribed medications? No   Do you have problems obtaining/receiving your medications? No   Does the patient have transportation to healthcare appointments? Yes   Living Arrangements house   Does the patient have family/friends to help with healtcare needs after discharge? yes   Does your caregiver provide all the help you need? Yes   Are you currently feeling confused? No   Are you currently having problems thinking? No   Are you currently having memory problems? No   Have you felt down, depressed, or hopeless? 0   Have you felt little interest or pleasure in doing things? 0   In the last 7 days, my sleep quality was: good

## 2018-03-11 NOTE — PLAN OF CARE
Problem: Diabetes, Type 2 (Adult)  Goal: Signs and Symptoms of Listed Potential Problems Will be Absent, Minimized or Managed (Diabetes, Type 2)  Signs and symptoms of listed potential problems will be absent, minimized or managed by discharge/transition of care (reference Diabetes, Type 2 (Adult) CPG).   Outcome: Ongoing (interventions implemented as appropriate)  Patient with a diagnosis of Diabetes, type 2.  Currently taking Jardiance PO but did not bring it with them.  Pharmacy does NOT carry this medication.  Will see if either family can bring in patient's dosage or will have the MD change the current order.    Problem: Fall Risk (Adult)  Goal: Absence of Falls  Patient will demonstrate the desired outcomes by discharge/transition of care.   Outcome: Ongoing (interventions implemented as appropriate)  Patient is a fall risk who wears O2 per nasal cannula.  Yellow bracelet and socks in place. Bed alarm not currently on per patient preference.  Instructed patient and his partner to call for any desires to get out of bed.  Both stated that they would.    Problem: Patient Care Overview  Goal: Plan of Care Review  Outcome: Ongoing (interventions implemented as appropriate)  Plan of care reviewed upon admission with both the patient and his significant other (girlfriend).  Medications reviewed and lab results noted.  Safety protocols also covered.  All questions answered to their satisfaction.

## 2018-03-11 NOTE — ED PROVIDER NOTES
Encounter Date: 3/10/2018    SCRIBE #1 NOTE: I, Desi Caicedo, am scribing for, and in the presence of, Dr. Haque.       History     Chief Complaint   Patient presents with    Shortness of Breath     upon exertion X 2-3 days, with bilateral leg swelling     Time seen by provider: 10:45 PM    This is a 68 y.o. male, with history of DM, stoke, Afib, and CAD, who presents with complaint of SOB with exertion that began 3 days ago. He initially experienced SOB after walking 15 to 20 ft. Symptom worsened tonight, and notes SOB occurs after walking across shorter distances. He denies experiencing SOB in the past. He reports leg swelling and dizziness approximately 2 hours ago. He denies chest pain, abdominal pain, nausea, vomiting, back pain, headache, numbness, or extremity weakness. Dr. Livingston told him the leg swelling is due to his most recent stroke, though he did not experience leg swelling after his other strokes. He takes Eliquis for stent placement. He took Metoprolol this morning. HR has been in the 70-80s, though it dropped to 40s tonight.  He sleeps on 2 pillows, and denies recently having to prop himself up more when sleeping.      The history is provided by the patient and the spouse.     Review of patient's allergies indicates:   Allergen Reactions    Doxycycline Rash     Past Medical History:   Diagnosis Date    Coronary artery disease     Diabetes mellitus type II     Hyperlipidemia     Hypertension     Paroxysmal atrial fibrillation     Stroke     nov 2012     Past Surgical History:   Procedure Laterality Date    KNEE SURGERY Left     SHOULDER SURGERY Right      Family History   Problem Relation Age of Onset    Hypertension Mother      Social History   Substance Use Topics    Smoking status: Former Smoker     Types: Cigars    Smokeless tobacco: Never Used      Comment: occasional, when at the casino    Alcohol use 0.6 oz/week     1 Cans of beer per week      Comment: occasional when at the  [employer]     Review of Systems   Constitutional: Negative for chills and fever.   HENT: Negative for sore throat.    Respiratory: Positive for shortness of breath.    Cardiovascular: Positive for leg swelling. Negative for chest pain and palpitations.   Gastrointestinal: Negative for abdominal pain, nausea and vomiting.   Genitourinary: Negative for dysuria.   Musculoskeletal: Negative for back pain.   Skin: Negative for rash.   Neurological: Positive for dizziness. Negative for weakness, numbness and headaches.   Hematological: Does not bruise/bleed easily.       Physical Exam     Initial Vitals [03/10/18 2207]   BP Pulse Resp Temp SpO2   (!) 146/71 (!) 50 20 97.6 °F (36.4 °C) 98 %      MAP       96         Physical Exam    Nursing note and vitals reviewed.  Constitutional: He appears well-developed and well-nourished. He is not diaphoretic. No distress.   HENT:   Head: Normocephalic and atraumatic.   Eyes: Conjunctivae and EOM are normal. Right eye exhibits no discharge. Left eye exhibits no discharge. No scleral icterus.   Neck: No JVD present.   Cardiovascular: Regular rhythm and normal heart sounds. Bradycardia present.  Exam reveals no gallop and no friction rub.    No murmur heard.  Pulses:       Dorsalis pedis pulses are 1+ on the right side, and 1+ on the left side.   Pulmonary/Chest: Breath sounds normal. No respiratory distress. He has no wheezes. He has no rhonchi. He has no rales.   Abdominal: Soft. Bowel sounds are normal. He exhibits no distension. There is no tenderness. There is no rebound and no guarding.   Musculoskeletal: Normal range of motion. He exhibits no edema or tenderness (calf bilaterally).   LLE: 1+ pitting edema below the knee.  RLE: Trace pitting edema below the knee.   Neurological: He is alert and oriented to person, place, and time. No sensory deficit.   Skin: Skin is warm and dry. No rash noted. No pallor.   Psychiatric: He has a normal mood and affect. His behavior is normal.          ED Course   Procedures  Labs Reviewed   CBC W/ AUTO DIFFERENTIAL - Abnormal; Notable for the following:        Result Value    RBC 4.23 (*)     Hemoglobin 12.4 (*)     Hematocrit 37.5 (*)     All other components within normal limits   B-TYPE NATRIURETIC PEPTIDE - Abnormal; Notable for the following:      (*)     All other components within normal limits   BASIC METABOLIC PANEL - Abnormal; Notable for the following:     CO2 21 (*)     Glucose 242 (*)     BUN, Bld 26 (*)     All other components within normal limits   TROPONIN I     Imaging Results          X-Ray Chest 1 View (Final result)  Result time 03/10/18 23:10:29    Final result by Ameya Villalobos MD (03/10/18 23:10:29)                 Impression:        Elevated left hemidiaphragm with compressive atelectasis of the adjacent lung base.      Electronically signed by: AMEYA VILLALOBOS MD  Date:     03/10/18  Time:    23:10              Narrative:    Technique: AP chest radiograph.    Comparison: Radiograph 10/15/2012.    Findings:    Mediastinal structures are midline.  There is calcification of the aortic arch.  There is an elevated left knee diaphragm.  Lung volumes are low but symmetric.  There is left basilar subsegmental atelectasis.  No pneumothorax or large pleural effusion.  No free air beneath the diaphragm.  Degenerative changes of the spine and shoulders noted.                            EKG Readings: (Independently Interpreted)   Initial Reading: No STEMI.   Sinus bradycardia at a rate of 46 bpm. Normal MS intervals. RBBB, which is present on previous EKG on 2/19. No ST/T wave changes compared to previous EKG.       X-Rays:   Independently Interpreted Readings:   Chest X-Ray: No large effusion or consolidation.     Medical Decision Making:   Clinical Tests:   Lab Tests: Ordered and Reviewed  Radiological Study: Ordered and Reviewed  Medical Tests: Ordered and Reviewed  ED Management:  12:31 AM - Paged Cardiology.    12:35 AM -  Discussed case with Dr. Livingston, and he would like to reduce Amiodarone dose to 200 mg qd and metoprolol to 50 mg qd, and for patient to be admitted to his service.    Additional MDM:   Comments: 68-year-old male with history of coronary artery disease and atrial flutter presents complaining of exertional dyspnea that has been present for the past 3 days and worsened this evening.  He denies any associated chest pain or other symptoms.  Vital signs significant for bradycardia on arrival.  Patient asymptomatic while sitting in bed.  However, he did become dyspneic when returning from the restroom.  Oxygen saturation upon returning was in the low 90s.  Chest x-ray shows no acute process.  Labs are significant for a normal troponin and a BNP of less than 200.  I have discussed the case with Dr. Livingston, the patient's cardiologist, who agrees with admission and would like his metoprolol and amiodarone dose is reduced.  Patient has been admitted to his service..          Scribe Attestation:   Scribe #1: I performed the above scribed service and the documentation accurately describes the services I performed. I attest to the accuracy of the note.    Attending Attestation:           Physician Attestation for Scribe:  Physician Attestation Statement for Scribe #1: I, Dr. Haque, reviewed documentation, as scribed by Desi Caicedo in my presence, and it is both accurate and complete.                    Clinical Impression:     1. Acute dyspnea    2. SOB (shortness of breath)                               Chloe Haque MD  03/11/18 0605

## 2018-03-11 NOTE — NURSING
Nursing states we do NOT carry the Jardiance that the patient takes PO for his Diabetic therapy.  Patient states his daughter will bring in the medication today.  Pharmacy informed that we will bring it to them to be scanned when it arrives.

## 2018-03-11 NOTE — PLAN OF CARE
Problem: Patient Care Overview  Goal: Plan of Care Review  Outcome: Ongoing (interventions implemented as appropriate)  Pt on 2LNC sat's 92%.

## 2018-03-11 NOTE — ED NOTES
Bed: 01  Expected date:   Expected time:   Means of arrival:   Comments:  Room is cleaned awaiting for bed from icu

## 2018-03-11 NOTE — PLAN OF CARE
Problem: Patient Care Overview  Goal: Plan of Care Review  Outcome: Ongoing (interventions implemented as appropriate)  Pt lying in bed. AAOx3. Denies pain/discomfort. POC followed and pt agrees with POC at this time. Call light in reach. All needs met. No s/s of distress noted at this time.

## 2018-03-12 LAB
ANION GAP SERPL CALC-SCNC: 8 MMOL/L
BASOPHILS # BLD AUTO: 0.02 K/UL
BASOPHILS NFR BLD: 0.3 %
BUN SERPL-MCNC: 19 MG/DL
CALCIUM SERPL-MCNC: 9 MG/DL
CHLORIDE SERPL-SCNC: 107 MMOL/L
CO2 SERPL-SCNC: 25 MMOL/L
CREAT SERPL-MCNC: 0.9 MG/DL
DIFFERENTIAL METHOD: ABNORMAL
EOSINOPHIL # BLD AUTO: 0.3 K/UL
EOSINOPHIL NFR BLD: 4.2 %
ERYTHROCYTE [DISTWIDTH] IN BLOOD BY AUTOMATED COUNT: 14 %
EST. GFR  (AFRICAN AMERICAN): >60 ML/MIN/1.73 M^2
EST. GFR  (NON AFRICAN AMERICAN): >60 ML/MIN/1.73 M^2
ESTIMATED AVG GLUCOSE: 154 MG/DL
GLUCOSE SERPL-MCNC: 126 MG/DL
HBA1C MFR BLD HPLC: 7 %
HCT VFR BLD AUTO: 39 %
HGB BLD-MCNC: 12.7 G/DL
LYMPHOCYTES # BLD AUTO: 1.1 K/UL
LYMPHOCYTES NFR BLD: 14.4 %
MCH RBC QN AUTO: 28.7 PG
MCHC RBC AUTO-ENTMCNC: 32.6 G/DL
MCV RBC AUTO: 88 FL
MONOCYTES # BLD AUTO: 0.7 K/UL
MONOCYTES NFR BLD: 8.5 %
NEUTROPHILS # BLD AUTO: 5.7 K/UL
NEUTROPHILS NFR BLD: 72.1 %
PLATELET # BLD AUTO: 238 K/UL
PMV BLD AUTO: 9.9 FL
POCT GLUCOSE: 114 MG/DL (ref 70–110)
POCT GLUCOSE: 119 MG/DL (ref 70–110)
POCT GLUCOSE: 131 MG/DL (ref 70–110)
POCT GLUCOSE: 157 MG/DL (ref 70–110)
POCT GLUCOSE: 170 MG/DL (ref 70–110)
POTASSIUM SERPL-SCNC: 3.7 MMOL/L
RBC # BLD AUTO: 4.43 M/UL
SODIUM SERPL-SCNC: 140 MMOL/L
WBC # BLD AUTO: 7.91 K/UL

## 2018-03-12 PROCEDURE — 80048 BASIC METABOLIC PNL TOTAL CA: CPT

## 2018-03-12 PROCEDURE — 27000221 HC OXYGEN, UP TO 24 HOURS

## 2018-03-12 PROCEDURE — 63600175 PHARM REV CODE 636 W HCPCS: Performed by: EMERGENCY MEDICINE

## 2018-03-12 PROCEDURE — 25500020 PHARM REV CODE 255

## 2018-03-12 PROCEDURE — 93458 L HRT ARTERY/VENTRICLE ANGIO: CPT

## 2018-03-12 PROCEDURE — 99900035 HC TECH TIME PER 15 MIN (STAT)

## 2018-03-12 PROCEDURE — 25000003 PHARM REV CODE 250

## 2018-03-12 PROCEDURE — 25000003 PHARM REV CODE 250: Performed by: EMERGENCY MEDICINE

## 2018-03-12 PROCEDURE — 27201224 CATH LAB PROCEDURE

## 2018-03-12 PROCEDURE — 36415 COLL VENOUS BLD VENIPUNCTURE: CPT

## 2018-03-12 PROCEDURE — 94761 N-INVAS EAR/PLS OXIMETRY MLT: CPT

## 2018-03-12 PROCEDURE — G0378 HOSPITAL OBSERVATION PER HR: HCPCS

## 2018-03-12 PROCEDURE — 25000003 PHARM REV CODE 250: Performed by: INTERNAL MEDICINE

## 2018-03-12 PROCEDURE — 63600175 PHARM REV CODE 636 W HCPCS

## 2018-03-12 PROCEDURE — 85025 COMPLETE CBC W/AUTO DIFF WBC: CPT

## 2018-03-12 RX ORDER — NITROGLYCERIN 0.4 MG/1
0.4 TABLET SUBLINGUAL EVERY 5 MIN PRN
Status: DISCONTINUED | OUTPATIENT
Start: 2018-03-12 | End: 2018-03-13 | Stop reason: HOSPADM

## 2018-03-12 RX ORDER — SODIUM CHLORIDE 9 MG/ML
50 INJECTION, SOLUTION INTRAVENOUS CONTINUOUS
Status: DISPENSED | OUTPATIENT
Start: 2018-03-12 | End: 2018-03-12

## 2018-03-12 RX ORDER — ACETAMINOPHEN 325 MG/1
650 TABLET ORAL EVERY 4 HOURS PRN
Status: DISCONTINUED | OUTPATIENT
Start: 2018-03-12 | End: 2018-03-13 | Stop reason: HOSPADM

## 2018-03-12 RX ORDER — HYDROCODONE BITARTRATE AND ACETAMINOPHEN 10; 325 MG/1; MG/1
1 TABLET ORAL EVERY 4 HOURS PRN
Status: DISCONTINUED | OUTPATIENT
Start: 2018-03-12 | End: 2018-03-13 | Stop reason: HOSPADM

## 2018-03-12 RX ORDER — HYDROCODONE BITARTRATE AND ACETAMINOPHEN 5; 325 MG/1; MG/1
1 TABLET ORAL EVERY 4 HOURS PRN
Status: DISCONTINUED | OUTPATIENT
Start: 2018-03-12 | End: 2018-03-13 | Stop reason: HOSPADM

## 2018-03-12 RX ORDER — DIPHENHYDRAMINE HYDROCHLORIDE 50 MG/ML
25 INJECTION INTRAMUSCULAR; INTRAVENOUS EVERY 6 HOURS PRN
Status: DISCONTINUED | OUTPATIENT
Start: 2018-03-12 | End: 2018-03-13 | Stop reason: HOSPADM

## 2018-03-12 RX ORDER — MAG HYDROX/ALUMINUM HYD/SIMETH 200-200-20
30 SUSPENSION, ORAL (FINAL DOSE FORM) ORAL
Status: DISCONTINUED | OUTPATIENT
Start: 2018-03-12 | End: 2018-03-13 | Stop reason: HOSPADM

## 2018-03-12 RX ORDER — CARVEDILOL 12.5 MG/1
12.5 TABLET ORAL 2 TIMES DAILY
Status: DISCONTINUED | OUTPATIENT
Start: 2018-03-12 | End: 2018-03-13 | Stop reason: HOSPADM

## 2018-03-12 RX ORDER — ONDANSETRON 2 MG/ML
4 INJECTION INTRAMUSCULAR; INTRAVENOUS EVERY 12 HOURS PRN
Status: DISCONTINUED | OUTPATIENT
Start: 2018-03-12 | End: 2018-03-13 | Stop reason: HOSPADM

## 2018-03-12 RX ADMIN — IRBESARTAN 300 MG: 150 TABLET ORAL at 08:03

## 2018-03-12 RX ADMIN — APIXABAN 2.5 MG: 2.5 TABLET, FILM COATED ORAL at 08:03

## 2018-03-12 RX ADMIN — AMIODARONE HYDROCHLORIDE 200 MG: 200 TABLET ORAL at 10:03

## 2018-03-12 RX ADMIN — METOPROLOL SUCCINATE 50 MG: 50 TABLET, EXTENDED RELEASE ORAL at 10:03

## 2018-03-12 RX ADMIN — AMLODIPINE BESYLATE 10 MG: 5 TABLET ORAL at 10:03

## 2018-03-12 RX ADMIN — INSULIN ASPART 1 UNITS: 100 INJECTION, SOLUTION INTRAVENOUS; SUBCUTANEOUS at 09:03

## 2018-03-12 RX ADMIN — LIRAGLUTIDE 1.2 MG: 6 INJECTION SUBCUTANEOUS at 10:03

## 2018-03-12 RX ADMIN — ASPIRIN 81 MG CHEWABLE TABLET 81 MG: 81 TABLET CHEWABLE at 10:03

## 2018-03-12 RX ADMIN — ATORVASTATIN CALCIUM 40 MG: 20 TABLET, FILM COATED ORAL at 10:03

## 2018-03-12 RX ADMIN — CARVEDILOL 12.5 MG: 12.5 TABLET, FILM COATED ORAL at 08:03

## 2018-03-12 RX ADMIN — APIXABAN 2.5 MG: 2.5 TABLET, FILM COATED ORAL at 10:03

## 2018-03-12 NOTE — PLAN OF CARE
Problem: Diabetes, Type 2 (Adult)  Goal: Signs and Symptoms of Listed Potential Problems Will be Absent, Minimized or Managed (Diabetes, Type 2)  Signs and symptoms of listed potential problems will be absent, minimized or managed by discharge/transition of care (reference Diabetes, Type 2 (Adult) CPG).   Outcome: Ongoing (interventions implemented as appropriate)  accu checks ac and hs

## 2018-03-12 NOTE — NURSING
Pt returned to unit from cath lab rt groin with transparent dsg dry intact. No bleeding or hematoma noted to site. Pedal pulses 2 plus and palpable. Pt and family instructed to keep rt leg straight and bedrest until 1430. Verbalized understanding. Ice pack to rt groin maintained.

## 2018-03-12 NOTE — PLAN OF CARE
Problem: Diabetes, Type 2 (Adult)  Goal: Signs and Symptoms of Listed Potential Problems Will be Absent, Minimized or Managed (Diabetes, Type 2)  Signs and symptoms of listed potential problems will be absent, minimized or managed by discharge/transition of care (reference Diabetes, Type 2 (Adult) CPG).   HS CBG noted at 152.  Zero sliding scale given.    Problem: Fall Risk (Adult)  Goal: Absence of Falls  Patient will demonstrate the desired outcomes by discharge/transition of care.   Outcome: Ongoing (interventions implemented as appropriate)  Patient is a fall risk.  He can become short of breath with any movement out of bed..so he is reluctant to get up.  O2 per nasal canula titrated down to 1Litre while maintaining sats of 96% or better.    Problem: Patient Care Overview  Goal: Plan of Care Review  Outcome: Ongoing (interventions implemented as appropriate)  Plan of care updated and reviewed with the patient.  Patient states Dr. Livingston to do an angiogram in the AM.  NPO status explained to patient so he will know what to expect.  #18 jelco to the right AC flushes without issue.    Problem: Pressure Ulcer Risk (Dudley Scale) (Adult,Obstetrics,Pediatric)  Goal: Skin Integrity  Patient will demonstrate the desired outcomes by discharge/transition of care.   Outcome: Ongoing (interventions implemented as appropriate)  No breakdown observed.  Instructed patient to remember to turn frequently in bed or get up to chair to prevent any breakdown.

## 2018-03-12 NOTE — INTERVAL H&P NOTE
The patient has been examined and the H&P has been reviewed:    I concur with the findings and no changes have occurred since H&P was written.    Anesthesia/Surgery risks, benefits and alternative options discussed and understood by patient/family.          Active Hospital Problems    Diagnosis  POA    Exertional dyspnea [R06.09]  Yes      Resolved Hospital Problems    Diagnosis Date Resolved POA   No resolved problems to display.

## 2018-03-12 NOTE — H&P
Dictation #1  MRN:[id number]  CSN:[id number]  Dictation #2  MRN:[id number]  CSN:[id number]  Dictation #3  MRN:[id number]  CSN:[id number]  Dictation #4  MRN:[id number]  CSN:[id number]

## 2018-03-13 VITALS
HEIGHT: 73 IN | WEIGHT: 263.88 LBS | BODY MASS INDEX: 34.97 KG/M2 | RESPIRATION RATE: 17 BRPM | HEART RATE: 78 BPM | DIASTOLIC BLOOD PRESSURE: 86 MMHG | OXYGEN SATURATION: 96 % | TEMPERATURE: 97 F | SYSTOLIC BLOOD PRESSURE: 181 MMHG

## 2018-03-13 LAB
ANION GAP SERPL CALC-SCNC: 10 MMOL/L
BASOPHILS # BLD AUTO: 0.02 K/UL
BASOPHILS NFR BLD: 0.3 %
BUN SERPL-MCNC: 17 MG/DL
CALCIUM SERPL-MCNC: 8.9 MG/DL
CHLORIDE SERPL-SCNC: 103 MMOL/L
CO2 SERPL-SCNC: 24 MMOL/L
CREAT SERPL-MCNC: 1 MG/DL
DIFFERENTIAL METHOD: ABNORMAL
EOSINOPHIL # BLD AUTO: 0.2 K/UL
EOSINOPHIL NFR BLD: 3.2 %
ERYTHROCYTE [DISTWIDTH] IN BLOOD BY AUTOMATED COUNT: 14.2 %
EST. GFR  (AFRICAN AMERICAN): >60 ML/MIN/1.73 M^2
EST. GFR  (NON AFRICAN AMERICAN): >60 ML/MIN/1.73 M^2
GLUCOSE SERPL-MCNC: 136 MG/DL
HCT VFR BLD AUTO: 38.4 %
HGB BLD-MCNC: 12.6 G/DL
LYMPHOCYTES # BLD AUTO: 1.1 K/UL
LYMPHOCYTES NFR BLD: 15.3 %
MCH RBC QN AUTO: 28.9 PG
MCHC RBC AUTO-ENTMCNC: 32.8 G/DL
MCV RBC AUTO: 88 FL
MONOCYTES # BLD AUTO: 0.9 K/UL
MONOCYTES NFR BLD: 12.9 %
NEUTROPHILS # BLD AUTO: 4.7 K/UL
NEUTROPHILS NFR BLD: 67.9 %
PLATELET # BLD AUTO: 263 K/UL
PMV BLD AUTO: 10.1 FL
POCT GLUCOSE: 173 MG/DL (ref 70–110)
POTASSIUM SERPL-SCNC: 3.7 MMOL/L
RBC # BLD AUTO: 4.36 M/UL
SODIUM SERPL-SCNC: 137 MMOL/L
WBC # BLD AUTO: 6.98 K/UL

## 2018-03-13 PROCEDURE — 36415 COLL VENOUS BLD VENIPUNCTURE: CPT

## 2018-03-13 PROCEDURE — 85025 COMPLETE CBC W/AUTO DIFF WBC: CPT

## 2018-03-13 PROCEDURE — 25000003 PHARM REV CODE 250: Performed by: INTERNAL MEDICINE

## 2018-03-13 PROCEDURE — 25000003 PHARM REV CODE 250: Performed by: EMERGENCY MEDICINE

## 2018-03-13 PROCEDURE — G0378 HOSPITAL OBSERVATION PER HR: HCPCS

## 2018-03-13 PROCEDURE — 80048 BASIC METABOLIC PNL TOTAL CA: CPT

## 2018-03-13 RX ORDER — CARVEDILOL 25 MG/1
25 TABLET ORAL 2 TIMES DAILY
Qty: 60 TABLET | Refills: 11 | Status: SHIPPED | OUTPATIENT
Start: 2018-03-13 | End: 2018-03-13

## 2018-03-13 RX ORDER — CARVEDILOL 25 MG/1
25 TABLET ORAL 2 TIMES DAILY
Qty: 60 TABLET | Refills: 11 | Status: ON HOLD | OUTPATIENT
Start: 2018-03-13 | End: 2018-05-17

## 2018-03-13 RX ORDER — NAPROXEN SODIUM 220 MG/1
81 TABLET, FILM COATED ORAL DAILY
Qty: 100 TABLET | Refills: 6 | Status: ON HOLD | OUTPATIENT
Start: 2018-03-14 | End: 2018-06-22 | Stop reason: HOSPADM

## 2018-03-13 RX ADMIN — ATORVASTATIN CALCIUM 40 MG: 20 TABLET, FILM COATED ORAL at 09:03

## 2018-03-13 RX ADMIN — LIRAGLUTIDE 1.2 MG: 6 INJECTION SUBCUTANEOUS at 09:03

## 2018-03-13 RX ADMIN — AMIODARONE HYDROCHLORIDE 200 MG: 200 TABLET ORAL at 09:03

## 2018-03-13 RX ADMIN — CARVEDILOL 12.5 MG: 12.5 TABLET, FILM COATED ORAL at 09:03

## 2018-03-13 RX ADMIN — AMLODIPINE BESYLATE 10 MG: 5 TABLET ORAL at 09:03

## 2018-03-13 RX ADMIN — APIXABAN 2.5 MG: 2.5 TABLET, FILM COATED ORAL at 09:03

## 2018-03-13 RX ADMIN — ASPIRIN 81 MG CHEWABLE TABLET 81 MG: 81 TABLET CHEWABLE at 09:03

## 2018-03-13 RX ADMIN — ACETAMINOPHEN 650 MG: 325 TABLET ORAL at 09:03

## 2018-03-13 NOTE — DISCHARGE SUMMARY
ADDENDUM    Upon discharge, the diet is an 1800-calorie ADA low-salt diet.  Activities is up   ad marly.      SB/ED  dd: 03/14/2018 17:16:39 (CDT)  td: 03/15/2018 05:28:37 (CDT)  Doc ID   #[id number]  Job ID #[id number]    CC:

## 2018-03-13 NOTE — H&P
HISTORY OF PRESENT ILLNESS:  Mr. Castano is a 68-year-old gentleman who presented   to the Emergency Room with the complaints of exertional breathlessness.  He   said about 3 days ago, he was doing quite well, getting around, feeling well.    He started noticing exertional breathlessness, that over the last couple of days   have been increasing in severity.  He says he now cannot walk 10 steps without   panting for breath.  He has not had any chest pains.  There has been no cough,   no sputum production.  No fever.  He had a similar exertional breathlessness   prior to placement of 2 stents in the left anterior descending coronary artery   for ulcerated stenosis.  He has a past history.  He has a longstanding history   of diabetes mellitus and hypertension.  He has had two strokes in 2012 in the   mid brain.  He was hospitalized here with unstable angina and had two stents   placed to the left anterior descending coronary artery in its proximal and mid   portion in March of 2016.  He has had paroxysmal atrial fibrillation, has been   on Multaq for the last three years or so.  Recently, he developed atrial   flutter, the Multaq was discontinued.  He was placed on amiodarone and he was   shocked back to a regular sinus rhythm.  He has had morbid obesity weighing in   the 350 pound range for the last 20+ years.  When placed on Invokana and   Victoza, he had a significant weight loss.  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  Over the last   couple of months, he says he has had pain in both shoulders and arms, has been   incoordinated, has been clumsy in both arms.  He also has not been able to make   a fist in both hands because the swelling in his hands.  Apparently, a recent   MRI suggested herniated disk disease in the neck for which he has been   recommended surgery.  He was told that he was to have a myelogram to make a   decision of whether or not he should  have surgery.  He is to be a cigar smoker   and used to binge drink.  He now seldom drinks and does not smoke cigars, he   says.    HOME MEDICATIONS:  Include Invokana, metoprolol succinate, Janumet, amiodarone,   amlodipine, apixaban, atorvastatin, Avapro and metoprolol succinate.  For other   details, refer to the notes of the recent admissions.    PHYSICAL EXAMINATION:  GENERAL:  Reveals an alert, pleasant, overweight man, in no acute distress.  VITAL SIGNS:  Blood pressure of 146/76 and a pulse of 44 beats per minute.  HEENT:  The sclerae are nonicteric.  The conjunctivae are pink.  The ENT exam is   unremarkable.  NECK:  Supple.  There is no jugular venous distention.  The carotid upstroke is   brisk.  There is no carotid bruit.  CHEST:  Clear.  HEART:  Size is grossly normal.  S1 and S2 are normal.  There is no audible   murmur or gallop.  ABDOMEN:  Soft and nontender.  Bowel sounds are normal.  EXTREMITIES:  There is no clubbing or cyanosis.  There is 2+ ankle edema of both   lower legs.  There is trace edema of both hands.    IMPRESSION ON ADMISSION:  1.  Exertional breathlessness, possibly angina pectoris.  2.  Diabetes mellitus.  3.  Essential hypertension.  4.  Coronary artery disease with previous stents placed in the left anterior   descending coronary artery.  5.  Previous midbrain strokes.  6.  Morbid obesity.  7.  Paroxysmal atrial flutter, presently in sinus bradycardia.  8.  Ankle edema from amlodipine.      SUKHDEV/ED  dd: 03/11/2018 13:29:03 (CDT)  td: 03/11/2018 22:04:49 (CDT)  Doc ID   #[id number]  Job ID #[id number]    CC:

## 2018-03-13 NOTE — PLAN OF CARE
Patient resting in no apparent distress. Rt groin incision site c/d/i. Bleeding risk assessed. Bilateral radial and pedal pulses 2+. Pt without complaints of pain. Safety measures maintained. Pt using call light for assistance. Will continue to monitor.

## 2018-03-14 NOTE — DISCHARGE SUMMARY
HISTORY OF PRESENT ILLNESS:  Mr. Castano is a 68-year-old gentleman who   complained of a three-day history of exertional breathlessness.  He says that he   was well until about 3 days ago, when he started noticing exertional   breathlessness.  Prior to admission, he says he could not walk 10 steps without   panting for breath, had no chest pains, no cough, no sputum production.  He had   a similar exertional breathlessness prior to placement of 2 stents in the left   anterior descending coronary artery a couple of years ago.  He has a   longstanding history of diabetes, hypertension, has had two strokes in 2012.  He   has had 2 stents placed in the proximal and mid left anterior descending   coronary artery in 2016, has had paroxysmal atrial fibrillation, was recently   admitted to the hospital with atrial flutter.  The Multaq was discontinued and   he was placed on amiodarone, and underwent cardioversion to restore regular   sinus rhythm.  In the past, he used to be 350+ pounds, and after regimen of   Invokana and Victoza, he has had a substantial weight loss, of in excess of 70   pounds.  He has advanced degenerative joint disease of the lumbosacral spine and   of the cervical spine.  Over the last few months, he has had intractable pain   in both shoulders and arm.  He says he has been clumsy in both hands, he is in   the process of being evaluated for possible surgery of his cervical spine.  He   says over the last couple of weeks; however, the arm strength and the pain seems   to have decreased.  His neurosurgeon wants to perform a myelography to look   into this further.  On examination, aside from trace edema of the ankles and the   edema of the lower legs, was unremarkable.    IMPRESSION ON ADMISSION:  1.  Exertional breathlessness  1.  Possible angina pectoris.  2.  Diabetes mellitus.  3.  Essential hypertension.  4.  Coronary artery disease with previous stents.  5.  Previous mid brain stroke.  6.  Morbid  obesity.  7.  Paroxysmal atrial flutter, presently in sinus bradycardia.  8.  Ankle edema from amlodipine.    His amiodarone dose was decreased from three times a day to twice a day, the   metoprolol was reduced to 50 mg daily.  He was noted to be considerably   hypertensive.  The metoprolol was changed to carvedilol in the dose to 12.5 mg   twice a day.  He was taken to the cardiac catheterization laboratory where   angiography showed patent stents.  There was mild in-stent restenosis of the   distal, mid portion stent.  There was also a 50% stenosis of the distal right   coronary artery, which was a large vessel.  It was felt that the coronary artery   disease did not warrant balloon catheter intervention, merely medical   treatment.  He started feeling much better, upon reduction on the amiodarone,   changing from metoprolol to carvedilol, had a dramatic diuresis of over 5 liters   on 03/12/2018 and 1-1/2 liters on 03/13/2018.  At the time of discharge, he was   able to tolerate a fair degree of ambulation without any symptoms of   breathlessness.  He was given instructions on his medications upon discharge, I   will see him back in the office for followup in 2 weeks.      SUKHDEV/ED  dd: 03/13/2018 12:30:12 (CDT)  td: 03/14/2018 01:54:47 (CDT)  Doc ID   #[id number]  Job ID #[id number]    CC:

## 2018-03-16 LAB — CORONARY STENOSIS: ABNORMAL

## 2018-03-18 ENCOUNTER — HOSPITAL ENCOUNTER (EMERGENCY)
Facility: OTHER | Age: 68
Discharge: HOME | End: 2018-03-18
Attending: EMERGENCY MEDICINE
Payer: MEDICARE

## 2018-03-18 VITALS
TEMPERATURE: 97 F | BODY MASS INDEX: 34.46 KG/M2 | RESPIRATION RATE: 19 BRPM | WEIGHT: 260 LBS | DIASTOLIC BLOOD PRESSURE: 76 MMHG | HEART RATE: 76 BPM | HEIGHT: 73 IN | OXYGEN SATURATION: 98 % | SYSTOLIC BLOOD PRESSURE: 166 MMHG

## 2018-03-18 DIAGNOSIS — N28.9 ACUTE RENAL INSUFFICIENCY: Primary | ICD-10-CM

## 2018-03-18 DIAGNOSIS — R06.00 DYSPNEA: ICD-10-CM

## 2018-03-18 DIAGNOSIS — R00.1 BRADYCARDIA: ICD-10-CM

## 2018-03-18 LAB
ANION GAP SERPL CALC-SCNC: 12 MMOL/L
BASOPHILS # BLD AUTO: 0.03 K/UL
BASOPHILS NFR BLD: 0.4 %
BNP SERPL-MCNC: 234 PG/ML
BUN SERPL-MCNC: 34 MG/DL
CALCIUM SERPL-MCNC: 9.1 MG/DL
CHLORIDE SERPL-SCNC: 103 MMOL/L
CO2 SERPL-SCNC: 23 MMOL/L
CREAT SERPL-MCNC: 1.6 MG/DL
DIFFERENTIAL METHOD: ABNORMAL
EOSINOPHIL # BLD AUTO: 0.4 K/UL
EOSINOPHIL NFR BLD: 5 %
ERYTHROCYTE [DISTWIDTH] IN BLOOD BY AUTOMATED COUNT: 14.4 %
EST. GFR  (AFRICAN AMERICAN): 50 ML/MIN/1.73 M^2
EST. GFR  (NON AFRICAN AMERICAN): 43 ML/MIN/1.73 M^2
GLUCOSE SERPL-MCNC: 195 MG/DL
HCT VFR BLD AUTO: 37.3 %
HGB BLD-MCNC: 12.3 G/DL
LYMPHOCYTES # BLD AUTO: 1.3 K/UL
LYMPHOCYTES NFR BLD: 16.6 %
MAGNESIUM SERPL-MCNC: 2.3 MG/DL
MCH RBC QN AUTO: 29.9 PG
MCHC RBC AUTO-ENTMCNC: 33 G/DL
MCV RBC AUTO: 91 FL
MONOCYTES # BLD AUTO: 0.5 K/UL
MONOCYTES NFR BLD: 6.5 %
NEUTROPHILS # BLD AUTO: 5.4 K/UL
NEUTROPHILS NFR BLD: 71 %
PLATELET # BLD AUTO: 301 K/UL
PMV BLD AUTO: 9.8 FL
POTASSIUM SERPL-SCNC: 4.8 MMOL/L
RBC # BLD AUTO: 4.12 M/UL
SODIUM SERPL-SCNC: 138 MMOL/L
TROPONIN I SERPL DL<=0.01 NG/ML-MCNC: 0.01 NG/ML
WBC # BLD AUTO: 7.65 K/UL

## 2018-03-18 PROCEDURE — 85025 COMPLETE CBC W/AUTO DIFF WBC: CPT

## 2018-03-18 PROCEDURE — 84484 ASSAY OF TROPONIN QUANT: CPT

## 2018-03-18 PROCEDURE — 99284 EMERGENCY DEPT VISIT MOD MDM: CPT | Mod: 25

## 2018-03-18 PROCEDURE — 83735 ASSAY OF MAGNESIUM: CPT

## 2018-03-18 PROCEDURE — 93010 ELECTROCARDIOGRAM REPORT: CPT | Mod: ,,, | Performed by: INTERNAL MEDICINE

## 2018-03-18 PROCEDURE — 83880 ASSAY OF NATRIURETIC PEPTIDE: CPT

## 2018-03-18 PROCEDURE — 80048 BASIC METABOLIC PNL TOTAL CA: CPT

## 2018-03-18 PROCEDURE — 93005 ELECTROCARDIOGRAM TRACING: CPT

## 2018-03-18 NOTE — ED TRIAGE NOTES
"Pt c/o " a HR of 44 and SOB that started last night" pt states I was here last week for the same thing. Pt does not appear to be in respiratory distress. MD at bedside. Pt changed into gown, connected to heart monitor. Pt denies n/v, abdominal pain, dizziness, cp. Pt states " they changed my medication. I think my amiodarone is making my heart rate drop"   "

## 2018-03-18 NOTE — ED PROVIDER NOTES
Encounter Date: 3/18/2018    SCRIBE #1 NOTE: Peyton LOONEY am scribing for, and in the presence of, Dr. Haque.       History     Chief Complaint   Patient presents with    Bradycardia     Pt wife reports HR of 44 at home, with associated SOB.      Time seen by provider: 12:24 AM    This is a 68 y.o. male with a hx of HTN, HLD, type II diabetes, Afib, and CAD who presents with complaint of bradycardia since yesterday evening. Heart rate was in the 40s at home. There has been associated SOB, increasing in severity today and exacerbated with exertion. Pt was admitted on 3/11-3/13 for similar symptoms, but current SOB is less severe. He had chest pains and palpitations yesterday around 2AM, since resolved. Pt has had change in medication with admission, decreased dosage of amiodarone (took one dose yesterday, but none today), and new medication carvedilol. Pt underwent a heart cath with admission. He reports resolution of SOB upon discharge. Pt denies any fever, chills, diaphoresis, N/V, cough, leg swelling, lightheadedness, weakness, and numbness. Pt is not on any fluid pills.      The history is provided by the patient and the spouse.     Review of patient's allergies indicates:   Allergen Reactions    Doxycycline Rash     Past Medical History:   Diagnosis Date    Coronary artery disease     Diabetes mellitus type II     Hyperlipidemia     Hypertension     Paroxysmal atrial fibrillation     Stroke     nov 2012     Past Surgical History:   Procedure Laterality Date    KNEE SURGERY Left     SHOULDER SURGERY Right      Family History   Problem Relation Age of Onset    Hypertension Mother      Social History   Substance Use Topics    Smoking status: Former Smoker     Types: Cigars    Smokeless tobacco: Never Used      Comment: occasional, when at the [employer]    Alcohol use 0.6 oz/week     1 Cans of beer per week      Comment: occasional when at the [employer]     Review of Systems   Constitutional:  Negative for chills, diaphoresis and fever.   HENT: Negative for congestion, rhinorrhea and sore throat.    Respiratory: Positive for shortness of breath. Negative for cough.    Cardiovascular: Negative for chest pain, palpitations and leg swelling.   Gastrointestinal: Negative for abdominal pain, diarrhea, nausea and vomiting.   Endocrine: Negative for polyuria.   Genitourinary: Negative for decreased urine volume and dysuria.   Musculoskeletal: Negative for back pain.   Skin: Negative for rash.   Allergic/Immunologic: Negative for immunocompromised state.   Neurological: Negative for dizziness, weakness, light-headedness, numbness and headaches.   Hematological: Does not bruise/bleed easily.   Psychiatric/Behavioral: Negative for confusion.       Physical Exam     Initial Vitals [03/18/18 0018]   BP Pulse Resp Temp SpO2   (!) 186/81 (!) 51 16 97.1 °F (36.2 °C) 98 %      MAP       116         Physical Exam    Nursing note and vitals reviewed.  Constitutional: He appears well-developed and well-nourished. No distress.   HENT:   Head: Normocephalic and atraumatic.   Eyes: Conjunctivae and EOM are normal. Pupils are equal, round, and reactive to light.   Neck: Normal range of motion. Neck supple. No JVD present.   Cardiovascular: Normal rate, regular rhythm and normal heart sounds.   Pulmonary/Chest: Effort normal and breath sounds normal. No respiratory distress. He has no wheezes. He has no rhonchi. He has no rales.   Abdominal: Soft. Normal appearance and bowel sounds are normal. There is no tenderness.   Musculoskeletal: Normal range of motion.   No BLE edema.   Neurological: He is alert and oriented to person, place, and time. He has normal strength. No cranial nerve deficit or sensory deficit.   Skin: Skin is warm and dry.   Psychiatric: He has a normal mood and affect. His behavior is normal. Thought content normal.         ED Course   Procedures  Labs Reviewed   CBC W/ AUTO DIFFERENTIAL - Abnormal; Notable for  the following:        Result Value    RBC 4.12 (*)     Hemoglobin 12.3 (*)     Hematocrit 37.3 (*)     Lymph% 16.6 (*)     All other components within normal limits   BASIC METABOLIC PANEL - Abnormal; Notable for the following:     Glucose 195 (*)     BUN, Bld 34 (*)     Creatinine 1.6 (*)     eGFR if  50 (*)     eGFR if non  43 (*)     All other components within normal limits   B-TYPE NATRIURETIC PEPTIDE - Abnormal; Notable for the following:      (*)     All other components within normal limits   MAGNESIUM   TROPONIN I     EKG Readings: (Independently Interpreted)   Initial Reading: No STEMI.   SB at a rate of 49. No ST changes compared to 3/11/18.       X-Rays:   Independently Interpreted Readings:   Chest X-Ray: Elevated L hemidiaphragm but no large effusion or consolidation.      Imaging Results          X-Ray Chest 1 View (Final result)  Result time 03/18/18 02:41:36    Final result by Kimber Sarkar MD (03/18/18 02:41:36)                 Impression:    Prominent elevation of the left hemidiaphragm resulting in volume loss and atelectasis within the left lower lobe.  Electronically signed by: Kimber Sarkar MD  Date:    03/18/2018  Time:    02:41             Narrative:    EXAMINATION:  XR CHEST 1 VIEW  CLINICAL HISTORY:  Dyspnea, unspecified  TECHNIQUE:  Single frontal view of the chest was performed.  COMPARISON:  03/10/2018.  FINDINGS:  Heart is upper limits of normal but stable in size.  There is prominent elevation of the left hemidiaphragm resulting in volume loss and atelectasis within the left lower lobe.  Left upper lobe and right lung are relatively clear.  No evidence of new focal consolidative process.  No evidence of pneumothorax or significant effusion.                              Medical Decision Making:   Independently Interpreted Test(s):   I have ordered and independently interpreted X-rays - see prior notes.  I have ordered and independently  interpreted EKG Reading(s) - see prior notes  Clinical Tests:   Lab Tests: Ordered and Reviewed  Radiological Study: Ordered and Reviewed  Medical Tests: Ordered and Reviewed  Other:   I have discussed this case with another health care provider.       <> Summary of the Discussion: 1:11 AM - Paged cardiology.    Additional MDM:   Comments: Dyspnea and bradycardia - these are the same symptoms patient presented with last week for which she was admitted.  Patient reports that the bradycardia has been present since yesterday.  He did not take his amiodarone today at the suggestion of Dr. Crisostomo.  Per the wife, she spoke with him in the morning and he recommended that he decrease his amiodarone from twice a day to daily.  Labs were significant for acute renal insufficiency compared to his previous and a mildly elevated BNP.  Chest x-ray showed no significant pulmonary edema.  While awaiting the results of his lab work the patient's heart rate increased to upper 70s.  The patient felt that this is related to the supplemental oxygen he was given, therefore, it was taken off.  His heart rate remained in the upper 70s despite being on room air.  We subsequently had him walk down the hallway to assess for any exertional dyspnea.  Patient reported only minimal dyspnea which he stated did not bother him.  His heart rate remained in the 70s to 80s while walking. He was d/c'ed home with plan to continue taking the amiodarone daily as instructed by dr. Crisostomo and to f/u with Dr. Livingston for further adjustment of his medications..          Scribe Attestation:   Scribe #1: I performed the above scribed service and the documentation accurately describes the services I performed. I attest to the accuracy of the note.    Attending Attestation:           Physician Attestation for Scribe:  Physician Attestation Statement for Scribe #1: I, Dr. Haque, reviewed documentation, as scribed by Peyton Chacko in my presence, and it is  both accurate and complete.                    Clinical Impression:     1. Acute renal insufficiency    2. Bradycardia    3. Dyspnea                               Chloe Haque MD  03/18/18 [id number]

## 2018-03-18 NOTE — ED NOTES
"Pt lying down, respirations even/unlabored. Pt states " I am feeling better with the oxygen on". Pt denies any other needs at this time. Side rails up x2, call bell within reach, will continue to monitor.   "

## 2018-03-18 NOTE — ED NOTES
"Pt ambulated independently in [place] on continuous pulse ox. Pt O2 sat stayed between 93-95% RA and HR 78-85. Pt states " I still feel SOB but I am feeling better". No respiratory distress noted upon exertion . MD aware  "

## 2018-03-20 RX ORDER — AMIODARONE HYDROCHLORIDE 200 MG/1
200 TABLET ORAL DAILY
Status: ON HOLD | COMMUNITY
End: 2018-05-11 | Stop reason: HOSPADM

## 2018-03-26 ENCOUNTER — OFFICE VISIT (OUTPATIENT)
Dept: CARDIOLOGY | Facility: CLINIC | Age: 68
End: 2018-03-26
Attending: INTERNAL MEDICINE
Payer: MEDICARE

## 2018-03-26 VITALS
HEART RATE: 69 BPM | BODY MASS INDEX: 35.52 KG/M2 | WEIGHT: 268 LBS | DIASTOLIC BLOOD PRESSURE: 76 MMHG | HEIGHT: 73 IN | SYSTOLIC BLOOD PRESSURE: 147 MMHG

## 2018-03-26 DIAGNOSIS — M47.12 OSTEOARTHRITIS OF CERVICAL SPINE WITH MYELOPATHY: ICD-10-CM

## 2018-03-26 DIAGNOSIS — R09.89 BRUIT OF LEFT CAROTID ARTERY: Primary | ICD-10-CM

## 2018-03-26 DIAGNOSIS — I48.0 PAF (PAROXYSMAL ATRIAL FIBRILLATION): ICD-10-CM

## 2018-03-26 DIAGNOSIS — E11.9 NON-INSULIN DEPENDENT TYPE 2 DIABETES MELLITUS: Chronic | ICD-10-CM

## 2018-03-26 DIAGNOSIS — I63.9 CEREBROVASCULAR ACCIDENT (CVA), UNSPECIFIED MECHANISM: ICD-10-CM

## 2018-03-26 DIAGNOSIS — I48.3 TYPICAL ATRIAL FLUTTER: ICD-10-CM

## 2018-03-26 DIAGNOSIS — I10 ESSENTIAL HYPERTENSION: Chronic | ICD-10-CM

## 2018-03-26 DIAGNOSIS — E66.01 MORBID OBESITY: ICD-10-CM

## 2018-03-26 DIAGNOSIS — I25.110 CORONARY ARTERY DISEASE INVOLVING NATIVE CORONARY ARTERY OF NATIVE HEART WITH UNSTABLE ANGINA PECTORIS: ICD-10-CM

## 2018-03-26 DIAGNOSIS — M54.12 CERVICAL RADICULOPATHY: ICD-10-CM

## 2018-03-26 PROCEDURE — 99214 OFFICE O/P EST MOD 30 MIN: CPT | Mod: S$GLB,,, | Performed by: INTERNAL MEDICINE

## 2018-04-02 NOTE — PROGRESS NOTES
Subjective:    Patient ID:  Zev Castano is a 68 y.o. male     HPI  Here for F/U of PAF, CAD, HBP, obesity, CVA, DM,    I'm doing well. I still can't make a fist with my right hand, but I think it's getting better.    Current Outpatient Prescriptions   Medication Sig    amiodarone (PACERONE) 200 MG Tab Take 200 mg by mouth once daily.    amLODIPine (NORVASC) 10 MG tablet Take 1 tablet (10 mg total) by mouth once daily.    apixaban 2.5 mg Tab Take 1 tablet (2.5 mg total) by mouth 2 (two) times daily.    aspirin 81 MG Chew Take 1 tablet (81 mg total) by mouth once daily.    atorvastatin (LIPITOR) 40 MG tablet TAKE 1 TABLET(40 MG) BY MOUTH EVERY DAY    carvedilol (COREG) 25 MG tablet Take 1 tablet (25 mg total) by mouth 2 (two) times daily.    CONTOUR NEXT STRIPS Strp Use one strip each time to check blood glucose daily as directed    empagliflozin 25 mg Tab Take 25 mg by mouth once daily.    irbesartan (AVAPRO) 300 MG tablet Take 1 tablet (300 mg total) by mouth every evening.    liraglutide 0.6 mg/0.1 mL, 18 mg/3 mL, subq PNIJ 0.6 mg/0.1 mL (18 mg/3 mL) PnIj Inject 1.2 mg into the skin once daily.    SITagliptan-metformin (JANUMET) 50-1,000 mg per tablet Take 1 tablet by mouth 2 (two) times daily with meals. (Patient taking differently: Take 1 tablet by mouth once daily. )     No current facility-administered medications for this visit.          Review of Systems   Constitution: Negative for chills, decreased appetite, fever, weight gain and weight loss.   HENT: Negative for congestion, hearing loss and sore throat.    Eyes: Negative for blurred vision, double vision and visual disturbance.   Cardiovascular: Negative for chest pain, claudication, dyspnea on exertion, leg swelling, palpitations and syncope.   Respiratory: Negative for cough, hemoptysis, shortness of breath, sputum production and wheezing.    Endocrine: Negative for cold intolerance and heat intolerance.   Hematologic/Lymphatic: Negative  "for bleeding problem. Does not bruise/bleed easily.   Skin: Negative for color change, dry skin, flushing and itching.   Musculoskeletal: Positive for muscle weakness and neck pain. Negative for back pain, joint pain and myalgias.   Gastrointestinal: Negative for abdominal pain, anorexia, constipation, diarrhea, dysphagia, nausea and vomiting.        No bleeding per rectum   Genitourinary: Negative for dysuria, flank pain, frequency, hematuria and nocturia.   Neurological: Negative for dizziness, headaches, light-headedness, loss of balance, seizures and tremors.   Psychiatric/Behavioral: Negative for altered mental status and depression.         Vitals:    03/26/18 1313   BP: (!) 147/76   Pulse: 69   Weight: 121.6 kg (268 lb)   Height: 6' 1" (1.854 m)     Objective:    Physical Exam   Constitutional: He is oriented to person, place, and time. He appears well-developed.   HENT:   Head: Normocephalic and atraumatic.   Right Ear: External ear normal.   Left Ear: External ear normal.   Nose: Nose normal.   Eyes: Conjunctivae and EOM are normal. Pupils are equal, round, and reactive to light. No scleral icterus.   Neck: Normal range of motion. Neck supple. No JVD present. No tracheal deviation present. No thyromegaly present.   Cardiovascular: Normal rate and regular rhythm.  Exam reveals no gallop and no friction rub.    Murmur heard.  Left carotid bruit.   Pulmonary/Chest: Effort normal and breath sounds normal. No respiratory distress. He has no rales. He exhibits no tenderness.   Abdominal: Soft. Bowel sounds are normal. He exhibits no distension and no mass. There is no tenderness.   Musculoskeletal: Normal range of motion. He exhibits no edema or tenderness.   Lymphadenopathy:     He has no cervical adenopathy.   Neurological: He is alert and oriented to person, place, and time. He has normal reflexes. No cranial nerve deficit. Coordination normal.   Skin: Skin is warm and dry. No rash noted.   Psychiatric: He has " a normal mood and affect. His behavior is normal.         Assessment:       1. Bruit of left carotid artery    2. PAF (paroxysmal atrial fibrillation)    3. Typical atrial flutter    4. Coronary artery disease involving native coronary artery of native heart with unstable angina pectoris    5. Essential hypertension    6. Non-insulin dependent type 2 diabetes mellitus    7. Morbid obesity    8. Cerebrovascular accident (CVA), unspecified mechanism    9. Osteoarthritis of cervical spine with myelopathy    10. Cervical radiculopathy         Plan:       Carotid U/S  Same meds.

## 2018-04-18 ENCOUNTER — CLINICAL SUPPORT (OUTPATIENT)
Dept: CARDIOLOGY | Facility: CLINIC | Age: 68
End: 2018-04-18
Payer: MEDICARE

## 2018-04-18 ENCOUNTER — OFFICE VISIT (OUTPATIENT)
Dept: CARDIOLOGY | Facility: CLINIC | Age: 68
End: 2018-04-18
Attending: INTERNAL MEDICINE
Payer: MEDICARE

## 2018-04-18 VITALS
WEIGHT: 274 LBS | BODY MASS INDEX: 36.31 KG/M2 | HEART RATE: 61 BPM | DIASTOLIC BLOOD PRESSURE: 69 MMHG | HEIGHT: 73 IN | SYSTOLIC BLOOD PRESSURE: 158 MMHG

## 2018-04-18 DIAGNOSIS — M54.16 LUMBAR RADICULOPATHY: ICD-10-CM

## 2018-04-18 DIAGNOSIS — M50.30 DDD (DEGENERATIVE DISC DISEASE), CERVICAL: ICD-10-CM

## 2018-04-18 DIAGNOSIS — I48.0 PAF (PAROXYSMAL ATRIAL FIBRILLATION): ICD-10-CM

## 2018-04-18 DIAGNOSIS — E11.9 NON-INSULIN DEPENDENT TYPE 2 DIABETES MELLITUS: Chronic | ICD-10-CM

## 2018-04-18 DIAGNOSIS — E66.01 MORBID OBESITY: ICD-10-CM

## 2018-04-18 DIAGNOSIS — R09.89 LEFT CAROTID BRUIT: ICD-10-CM

## 2018-04-18 DIAGNOSIS — I25.110 CORONARY ARTERY DISEASE INVOLVING NATIVE CORONARY ARTERY OF NATIVE HEART WITH UNSTABLE ANGINA PECTORIS: ICD-10-CM

## 2018-04-18 DIAGNOSIS — I63.9 CEREBROVASCULAR ACCIDENT (CVA), UNSPECIFIED MECHANISM: Primary | ICD-10-CM

## 2018-04-18 PROCEDURE — 93880 EXTRACRANIAL BILAT STUDY: CPT | Mod: S$GLB,,, | Performed by: INTERNAL MEDICINE

## 2018-04-18 PROCEDURE — 99213 OFFICE O/P EST LOW 20 MIN: CPT | Mod: S$GLB,,, | Performed by: INTERNAL MEDICINE

## 2018-04-22 NOTE — PROGRESS NOTES
Subjective:    Patient ID:  Zev Castnao is a 68 y.o. male     HPI  Here for F/U of CVA, PAF, CAD, HBP, DM, obesity, DJD os neck and lower back.    I feel well. My sugars are perfect! And I'm losing weight.    Current Outpatient Prescriptions   Medication Sig    amiodarone (PACERONE) 200 MG Tab Take 200 mg by mouth once daily.    apixaban 2.5 mg Tab Take 1 tablet (2.5 mg total) by mouth 2 (two) times daily.    aspirin 81 MG Chew Take 1 tablet (81 mg total) by mouth once daily.    atorvastatin (LIPITOR) 40 MG tablet TAKE 1 TABLET(40 MG) BY MOUTH EVERY DAY    carvedilol (COREG) 25 MG tablet Take 1 tablet (25 mg total) by mouth 2 (two) times daily.    CONTOUR NEXT STRIPS Strp Use one strip each time to check blood glucose daily as directed    empagliflozin 25 mg Tab Take 25 mg by mouth once daily.    irbesartan (AVAPRO) 300 MG tablet Take 1 tablet (300 mg total) by mouth every evening.    irbesartan (AVAPRO) 300 MG tablet TAKE 1 TABLET(300 MG) BY MOUTH EVERY EVENING    liraglutide 0.6 mg/0.1 mL, 18 mg/3 mL, subq PNIJ 0.6 mg/0.1 mL (18 mg/3 mL) PnIj Inject 1.2 mg into the skin once daily.    SITagliptan-metformin (JANUMET) 50-1,000 mg per tablet Take 1 tablet by mouth 2 (two) times daily with meals. (Patient taking differently: Take 1 tablet by mouth once daily. )     No current facility-administered medications for this visit.          Review of Systems   Constitution: Negative for chills, decreased appetite, fever, weight gain and weight loss.   HENT: Negative for congestion, hearing loss and sore throat.    Eyes: Negative for blurred vision, double vision and visual disturbance.   Cardiovascular: Negative for chest pain, claudication, dyspnea on exertion, leg swelling, palpitations and syncope.   Respiratory: Negative for cough, hemoptysis, shortness of breath, sputum production and wheezing.    Endocrine: Negative for cold intolerance and heat intolerance.   Hematologic/Lymphatic: Negative for bleeding  "problem. Does not bruise/bleed easily.   Skin: Negative for color change, dry skin, flushing and itching.   Musculoskeletal: Negative for back pain, joint pain and myalgias.   Gastrointestinal: Negative for abdominal pain, anorexia, constipation, diarrhea, dysphagia, nausea and vomiting.        No bleeding per rectum   Genitourinary: Negative for dysuria, flank pain, frequency, hematuria and nocturia.   Neurological: Negative for dizziness, headaches, light-headedness, loss of balance, seizures and tremors.   Psychiatric/Behavioral: Negative for altered mental status and depression.         Vitals:    04/18/18 1340   BP: (!) 158/69   Pulse: 61   Weight: 124.3 kg (274 lb)   Height: 6' 1" (1.854 m)     Objective:    Physical Exam   Constitutional: He is oriented to person, place, and time. He appears well-developed and well-nourished.   HENT:   Head: Normocephalic and atraumatic.   Right Ear: External ear normal.   Left Ear: External ear normal.   Nose: Nose normal.   Eyes: Conjunctivae and EOM are normal. Pupils are equal, round, and reactive to light. No scleral icterus.   Neck: Normal range of motion. Neck supple. No JVD present. No tracheal deviation present. No thyromegaly present.   Cardiovascular: Normal rate, regular rhythm and normal heart sounds.  Exam reveals no gallop and no friction rub.    No murmur heard.  Pulmonary/Chest: Effort normal and breath sounds normal. No respiratory distress. He has no rales. He exhibits no tenderness.   Abdominal: Soft. Bowel sounds are normal. He exhibits no distension and no mass. There is no tenderness.   Musculoskeletal: Normal range of motion. He exhibits no edema or tenderness.   Lymphadenopathy:     He has no cervical adenopathy.   Neurological: He is alert and oriented to person, place, and time. He has normal reflexes. No cranial nerve deficit. Coordination normal.   Skin: Skin is warm and dry. No rash noted.   Psychiatric: He has a normal mood and affect. His " behavior is normal.       Carotid U/S discussed with patient.    Assessment:       1. Cerebrovascular accident (CVA), unspecified mechanism    2. Coronary artery disease involving native coronary artery of native heart with unstable angina pectoris    3. PAF (paroxysmal atrial fibrillation)    4. DDD (degenerative disc disease), cervical    5. Lumbar radiculopathy    6. Non-insulin dependent type 2 diabetes mellitus    7. Morbid obesity         Plan:       Stable  Continue the same

## 2018-04-25 DIAGNOSIS — I10 ESSENTIAL HYPERTENSION: Primary | ICD-10-CM

## 2018-04-25 RX ORDER — AMLODIPINE BESYLATE 5 MG/1
TABLET ORAL
Qty: 90 TABLET | Refills: 0 | Status: SHIPPED | OUTPATIENT
Start: 2018-04-25 | End: 2018-05-09

## 2018-05-02 ENCOUNTER — HOSPITAL ENCOUNTER (OUTPATIENT)
Dept: RADIOLOGY | Facility: OTHER | Age: 68
Discharge: HOME | End: 2018-05-02
Attending: INTERNAL MEDICINE
Payer: MEDICARE

## 2018-05-02 ENCOUNTER — OFFICE VISIT (OUTPATIENT)
Dept: CARDIOLOGY | Facility: CLINIC | Age: 68
End: 2018-05-02
Attending: INTERNAL MEDICINE
Payer: MEDICARE

## 2018-05-02 VITALS
WEIGHT: 273 LBS | HEIGHT: 73 IN | HEART RATE: 47 BPM | BODY MASS INDEX: 36.18 KG/M2 | SYSTOLIC BLOOD PRESSURE: 145 MMHG | DIASTOLIC BLOOD PRESSURE: 72 MMHG

## 2018-05-02 DIAGNOSIS — I63.9 CEREBROVASCULAR ACCIDENT (CVA), UNSPECIFIED MECHANISM: ICD-10-CM

## 2018-05-02 DIAGNOSIS — E66.01 MORBID OBESITY: ICD-10-CM

## 2018-05-02 DIAGNOSIS — R06.02 SOB (SHORTNESS OF BREATH): Primary | ICD-10-CM

## 2018-05-02 DIAGNOSIS — I48.92 ATRIAL FLUTTER, UNSPECIFIED TYPE: ICD-10-CM

## 2018-05-02 DIAGNOSIS — I25.110 CORONARY ARTERY DISEASE INVOLVING NATIVE CORONARY ARTERY OF NATIVE HEART WITH UNSTABLE ANGINA PECTORIS: ICD-10-CM

## 2018-05-02 DIAGNOSIS — E11.9 NON-INSULIN DEPENDENT TYPE 2 DIABETES MELLITUS: Chronic | ICD-10-CM

## 2018-05-02 DIAGNOSIS — I10 ESSENTIAL HYPERTENSION: Chronic | ICD-10-CM

## 2018-05-02 DIAGNOSIS — M50.30 DDD (DEGENERATIVE DISC DISEASE), CERVICAL: ICD-10-CM

## 2018-05-02 DIAGNOSIS — M51.36 DDD (DEGENERATIVE DISC DISEASE), LUMBAR: ICD-10-CM

## 2018-05-02 DIAGNOSIS — R06.02 SHORTNESS OF BREATH: ICD-10-CM

## 2018-05-02 DIAGNOSIS — R06.02 SOB (SHORTNESS OF BREATH): ICD-10-CM

## 2018-05-02 PROCEDURE — 71046 X-RAY EXAM CHEST 2 VIEWS: CPT | Mod: 26,,, | Performed by: RADIOLOGY

## 2018-05-02 PROCEDURE — 93000 ELECTROCARDIOGRAM COMPLETE: CPT | Mod: S$GLB,,, | Performed by: INTERNAL MEDICINE

## 2018-05-02 PROCEDURE — 99214 OFFICE O/P EST MOD 30 MIN: CPT | Mod: S$GLB,,, | Performed by: INTERNAL MEDICINE

## 2018-05-02 PROCEDURE — 71046 X-RAY EXAM CHEST 2 VIEWS: CPT | Mod: TC,FY

## 2018-05-02 RX ORDER — POTASSIUM CHLORIDE 20 MEQ/1
20 TABLET, EXTENDED RELEASE ORAL 2 TIMES DAILY
Status: ON HOLD | COMMUNITY
End: 2018-05-11

## 2018-05-02 RX ORDER — FUROSEMIDE 40 MG/1
40 TABLET ORAL 2 TIMES DAILY
Status: ON HOLD | COMMUNITY
End: 2018-05-11

## 2018-05-09 ENCOUNTER — HOSPITAL ENCOUNTER (OUTPATIENT)
Facility: OTHER | Age: 68
Discharge: HOME | End: 2018-05-11
Attending: EMERGENCY MEDICINE | Admitting: EMERGENCY MEDICINE
Payer: MEDICARE

## 2018-05-09 DIAGNOSIS — R06.02 SHORTNESS OF BREATH: ICD-10-CM

## 2018-05-09 DIAGNOSIS — I10 ESSENTIAL HYPERTENSION: Primary | Chronic | ICD-10-CM

## 2018-05-09 LAB
ALBUMIN SERPL BCP-MCNC: 3.6 G/DL
ALLENS TEST: ABNORMAL
ALP SERPL-CCNC: 106 U/L
ALT SERPL W/O P-5'-P-CCNC: 16 U/L
ANION GAP SERPL CALC-SCNC: 10 MMOL/L
AST SERPL-CCNC: 16 U/L
BACTERIA #/AREA URNS HPF: NORMAL /HPF
BASOPHILS # BLD AUTO: 0.03 K/UL
BASOPHILS NFR BLD: 0.4 %
BILIRUB SERPL-MCNC: 0.5 MG/DL
BILIRUB UR QL STRIP: NEGATIVE
BNP SERPL-MCNC: 98 PG/ML
BUN SERPL-MCNC: 39 MG/DL
CALCIUM SERPL-MCNC: 9.1 MG/DL
CHLORIDE SERPL-SCNC: 101 MMOL/L
CLARITY UR: CLEAR
CO2 SERPL-SCNC: 28 MMOL/L
COLOR UR: YELLOW
CREAT SERPL-MCNC: 2.2 MG/DL
CREAT UR-MCNC: 15.5 MG/DL
CREAT UR-MCNC: 15.5 MG/DL
D DIMER PPP IA.FEU-MCNC: 1.09 MG/L FEU
DELSYS: ABNORMAL
DIFFERENTIAL METHOD: ABNORMAL
EOSINOPHIL # BLD AUTO: 0.4 K/UL
EOSINOPHIL NFR BLD: 5.8 %
ERYTHROCYTE [DISTWIDTH] IN BLOOD BY AUTOMATED COUNT: 15.1 %
EST. GFR  (AFRICAN AMERICAN): 34 ML/MIN/1.73 M^2
EST. GFR  (NON AFRICAN AMERICAN): 29 ML/MIN/1.73 M^2
GLUCOSE SERPL-MCNC: 208 MG/DL
GLUCOSE UR QL STRIP: ABNORMAL
HCO3 UR-SCNC: 30 MMOL/L (ref 24–28)
HCT VFR BLD AUTO: 38.8 %
HGB BLD-MCNC: 12.5 G/DL
HGB UR QL STRIP: NEGATIVE
HYALINE CASTS #/AREA URNS LPF: 1 /LPF
INR PPP: 0.9
KETONES UR QL STRIP: NEGATIVE
LEUKOCYTE ESTERASE UR QL STRIP: NEGATIVE
LYMPHOCYTES # BLD AUTO: 1.4 K/UL
LYMPHOCYTES NFR BLD: 20.1 %
MAGNESIUM SERPL-MCNC: 2.6 MG/DL
MCH RBC QN AUTO: 29.4 PG
MCHC RBC AUTO-ENTMCNC: 32.2 G/DL
MCV RBC AUTO: 91 FL
MICROSCOPIC COMMENT: NORMAL
MODE: ABNORMAL
MONOCYTES # BLD AUTO: 0.7 K/UL
MONOCYTES NFR BLD: 9.1 %
NEUTROPHILS # BLD AUTO: 4.6 K/UL
NEUTROPHILS NFR BLD: 64.2 %
NITRITE UR QL STRIP: NEGATIVE
PCO2 BLDA: 42.2 MMHG (ref 35–45)
PH SMN: 7.46 [PH] (ref 7.35–7.45)
PH UR STRIP: 6 [PH] (ref 5–8)
PHOSPHATE SERPL-MCNC: 5.2 MG/DL
PLATELET # BLD AUTO: 248 K/UL
PMV BLD AUTO: 10.5 FL
PO2 BLDA: 71 MMHG (ref 80–100)
POC BE: 6 MMOL/L
POC SATURATED O2: 95 % (ref 95–100)
POCT GLUCOSE: 173 MG/DL (ref 70–110)
POCT GLUCOSE: 196 MG/DL (ref 70–110)
POTASSIUM SERPL-SCNC: 4.5 MMOL/L
PROT SERPL-MCNC: 7 G/DL
PROT UR QL STRIP: ABNORMAL
PROT UR-MCNC: 44 MG/DL
PROT/CREAT RATIO, UR: 2.84
PROTHROMBIN TIME: 10.4 SEC
RBC # BLD AUTO: 4.25 M/UL
RBC #/AREA URNS HPF: 1 /HPF (ref 0–4)
SAMPLE: ABNORMAL
SITE: ABNORMAL
SODIUM SERPL-SCNC: 139 MMOL/L
SODIUM UR-SCNC: 126 MMOL/L
SP GR UR STRIP: 1.01 (ref 1–1.03)
SP02: 95
SQUAMOUS #/AREA URNS HPF: 3 /HPF
T4 FREE SERPL-MCNC: 1.31 NG/DL
TROPONIN I SERPL DL<=0.01 NG/ML-MCNC: 0.01 NG/ML
TSH SERPL DL<=0.005 MIU/L-ACNC: 5.13 UIU/ML
URATE SERPL-MCNC: 9.3 MG/DL
URN SPEC COLLECT METH UR: ABNORMAL
UROBILINOGEN UR STRIP-ACNC: NEGATIVE EU/DL
WBC # BLD AUTO: 7.13 K/UL
WBC #/AREA URNS HPF: 0 /HPF (ref 0–5)
YEAST URNS QL MICRO: NORMAL

## 2018-05-09 PROCEDURE — 84100 ASSAY OF PHOSPHORUS: CPT

## 2018-05-09 PROCEDURE — 84300 ASSAY OF URINE SODIUM: CPT

## 2018-05-09 PROCEDURE — 80053 COMPREHEN METABOLIC PANEL: CPT

## 2018-05-09 PROCEDURE — 25000003 PHARM REV CODE 250: Performed by: EMERGENCY MEDICINE

## 2018-05-09 PROCEDURE — 84484 ASSAY OF TROPONIN QUANT: CPT

## 2018-05-09 PROCEDURE — 84439 ASSAY OF FREE THYROXINE: CPT

## 2018-05-09 PROCEDURE — G0378 HOSPITAL OBSERVATION PER HR: HCPCS

## 2018-05-09 PROCEDURE — 83036 HEMOGLOBIN GLYCOSYLATED A1C: CPT

## 2018-05-09 PROCEDURE — 84443 ASSAY THYROID STIM HORMONE: CPT

## 2018-05-09 PROCEDURE — 82962 GLUCOSE BLOOD TEST: CPT

## 2018-05-09 PROCEDURE — 83735 ASSAY OF MAGNESIUM: CPT

## 2018-05-09 PROCEDURE — 99285 EMERGENCY DEPT VISIT HI MDM: CPT | Mod: 25

## 2018-05-09 PROCEDURE — 83880 ASSAY OF NATRIURETIC PEPTIDE: CPT

## 2018-05-09 PROCEDURE — 84156 ASSAY OF PROTEIN URINE: CPT

## 2018-05-09 PROCEDURE — 87205 SMEAR GRAM STAIN: CPT

## 2018-05-09 PROCEDURE — 85610 PROTHROMBIN TIME: CPT

## 2018-05-09 PROCEDURE — 36415 COLL VENOUS BLD VENIPUNCTURE: CPT

## 2018-05-09 PROCEDURE — 96374 THER/PROPH/DIAG INJ IV PUSH: CPT

## 2018-05-09 PROCEDURE — 99900035 HC TECH TIME PER 15 MIN (STAT)

## 2018-05-09 PROCEDURE — 96375 TX/PRO/DX INJ NEW DRUG ADDON: CPT

## 2018-05-09 PROCEDURE — 93010 ELECTROCARDIOGRAM REPORT: CPT | Mod: ,,, | Performed by: INTERNAL MEDICINE

## 2018-05-09 PROCEDURE — 84550 ASSAY OF BLOOD/URIC ACID: CPT

## 2018-05-09 PROCEDURE — 36600 WITHDRAWAL OF ARTERIAL BLOOD: CPT

## 2018-05-09 PROCEDURE — 63600175 PHARM REV CODE 636 W HCPCS: Performed by: EMERGENCY MEDICINE

## 2018-05-09 PROCEDURE — 94761 N-INVAS EAR/PLS OXIMETRY MLT: CPT

## 2018-05-09 PROCEDURE — 82803 BLOOD GASES ANY COMBINATION: CPT

## 2018-05-09 PROCEDURE — 85379 FIBRIN DEGRADATION QUANT: CPT

## 2018-05-09 PROCEDURE — 81000 URINALYSIS NONAUTO W/SCOPE: CPT

## 2018-05-09 PROCEDURE — 85025 COMPLETE CBC W/AUTO DIFF WBC: CPT

## 2018-05-09 RX ORDER — AMLODIPINE BESYLATE 5 MG/1
10 TABLET ORAL DAILY
Status: DISCONTINUED | OUTPATIENT
Start: 2018-05-10 | End: 2018-05-11 | Stop reason: HOSPADM

## 2018-05-09 RX ORDER — IBUPROFEN 200 MG
16 TABLET ORAL
Status: DISCONTINUED | OUTPATIENT
Start: 2018-05-09 | End: 2018-05-11 | Stop reason: HOSPADM

## 2018-05-09 RX ORDER — FUROSEMIDE 10 MG/ML
80 INJECTION INTRAMUSCULAR; INTRAVENOUS
Status: COMPLETED | OUTPATIENT
Start: 2018-05-09 | End: 2018-05-09

## 2018-05-09 RX ORDER — IBUPROFEN 200 MG
24 TABLET ORAL
Status: DISCONTINUED | OUTPATIENT
Start: 2018-05-09 | End: 2018-05-11 | Stop reason: HOSPADM

## 2018-05-09 RX ORDER — NAPROXEN SODIUM 220 MG/1
81 TABLET, FILM COATED ORAL
Status: COMPLETED | OUTPATIENT
Start: 2018-05-09 | End: 2018-05-09

## 2018-05-09 RX ORDER — SODIUM CHLORIDE 9 MG/ML
INJECTION, SOLUTION INTRAVENOUS CONTINUOUS
Status: DISCONTINUED | OUTPATIENT
Start: 2018-05-09 | End: 2018-05-11 | Stop reason: HOSPADM

## 2018-05-09 RX ORDER — CARVEDILOL 12.5 MG/1
25 TABLET ORAL
Status: COMPLETED | OUTPATIENT
Start: 2018-05-09 | End: 2018-05-09

## 2018-05-09 RX ORDER — AMIODARONE HYDROCHLORIDE 200 MG/1
200 TABLET ORAL DAILY
Status: DISCONTINUED | OUTPATIENT
Start: 2018-05-10 | End: 2018-05-10

## 2018-05-09 RX ORDER — CARVEDILOL 12.5 MG/1
25 TABLET ORAL 2 TIMES DAILY
Status: DISCONTINUED | OUTPATIENT
Start: 2018-05-09 | End: 2018-05-11 | Stop reason: HOSPADM

## 2018-05-09 RX ORDER — ATORVASTATIN CALCIUM 20 MG/1
40 TABLET, FILM COATED ORAL DAILY
Status: DISCONTINUED | OUTPATIENT
Start: 2018-05-10 | End: 2018-05-11 | Stop reason: HOSPADM

## 2018-05-09 RX ORDER — NAPROXEN SODIUM 220 MG/1
81 TABLET, FILM COATED ORAL DAILY
Status: DISCONTINUED | OUTPATIENT
Start: 2018-05-10 | End: 2018-05-11 | Stop reason: HOSPADM

## 2018-05-09 RX ORDER — AMLODIPINE BESYLATE 10 MG/1
10 TABLET ORAL DAILY
Status: ON HOLD | COMMUNITY
End: 2018-06-22 | Stop reason: HOSPADM

## 2018-05-09 RX ORDER — GLUCAGON 1 MG
1 KIT INJECTION
Status: DISCONTINUED | OUTPATIENT
Start: 2018-05-09 | End: 2018-05-11 | Stop reason: HOSPADM

## 2018-05-09 RX ORDER — AMIODARONE HYDROCHLORIDE 200 MG/1
200 TABLET ORAL
Status: COMPLETED | OUTPATIENT
Start: 2018-05-09 | End: 2018-05-09

## 2018-05-09 RX ORDER — ENALAPRILAT 1.25 MG/ML
1.25 INJECTION INTRAVENOUS
Status: COMPLETED | OUTPATIENT
Start: 2018-05-09 | End: 2018-05-09

## 2018-05-09 RX ORDER — INSULIN ASPART 100 [IU]/ML
0-5 INJECTION, SOLUTION INTRAVENOUS; SUBCUTANEOUS
Status: DISCONTINUED | OUTPATIENT
Start: 2018-05-09 | End: 2018-05-11 | Stop reason: HOSPADM

## 2018-05-09 RX ORDER — POTASSIUM CHLORIDE 20 MEQ/1
20 TABLET, EXTENDED RELEASE ORAL 2 TIMES DAILY
Status: DISCONTINUED | OUTPATIENT
Start: 2018-05-09 | End: 2018-05-10

## 2018-05-09 RX ADMIN — APIXABAN 2.5 MG: 2.5 TABLET, FILM COATED ORAL at 08:05

## 2018-05-09 RX ADMIN — POTASSIUM CHLORIDE 20 MEQ: 1500 TABLET, EXTENDED RELEASE ORAL at 08:05

## 2018-05-09 RX ADMIN — CARVEDILOL 12.5 MG: 12.5 TABLET, FILM COATED ORAL at 08:05

## 2018-05-09 RX ADMIN — ASPIRIN 81 MG CHEWABLE TABLET 81 MG: 81 TABLET CHEWABLE at 08:05

## 2018-05-09 RX ADMIN — CARVEDILOL 25 MG: 12.5 TABLET, FILM COATED ORAL at 08:05

## 2018-05-09 RX ADMIN — NITROGLYCERIN 1 INCH: 20 OINTMENT TOPICAL at 07:05

## 2018-05-09 RX ADMIN — FUROSEMIDE 80 MG: 10 INJECTION, SOLUTION INTRAVENOUS at 07:05

## 2018-05-09 RX ADMIN — ENALAPRILAT 1.25 MG: 1.25 INJECTION, SOLUTION INTRAVENOUS at 07:05

## 2018-05-09 RX ADMIN — AMIODARONE HYDROCHLORIDE 200 MG: 200 TABLET ORAL at 08:05

## 2018-05-09 RX ADMIN — SODIUM CHLORIDE: 0.9 INJECTION, SOLUTION INTRAVENOUS at 09:05

## 2018-05-09 NOTE — PLAN OF CARE
PCP:   Zafar Livingston treats him for everything    Pharmacy:  [facility]  [email or web address]    Significant other trey [phone] will pick patient up.    Report No mental health or substance abuse hx.      05/09/18 0914   Discharge Assessment   Assessment Type Discharge Planning Assessment   Confirmed/corrected address and phone number on facesheet? Yes   Assessment information obtained from? Patient   Prior to hospitilization cognitive status: Alert/Oriented   Prior to hospitalization functional status: Partially Dependent  (significant other  Trey  [phone])   Current cognitive status: Alert/Oriented   Current Functional Status: Partially Dependent  (needs help bathing and dressing and significant other does that. )   Lives With significant other   Able to Return to Prior Arrangements yes   Is patient able to care for self after discharge? No   Who are your caregiver(s) and their phone number(s)? Trey [phone]   Readmission Within The Last 30 Days no previous admission in last 30 days   Patient currently being followed by outpatient case management? No   Patient currently receives any other outside agency services? No   Equipment Currently Used at Home walker, rolling;bath bench   Do you have any problems affording any of your prescribed medications? No   Is the patient taking medications as prescribed? yes   Does the patient have transportation home? Yes   Transportation Available family or friend will provide   Discharge Plan A Home with family   Patient/Family In Agreement With Plan yes

## 2018-05-09 NOTE — ED NOTES
Pt rounding complete. Pt updated on POC and pt verbalized understanding. Pt denies any pain or needs at the moment. Pt does not appear to be in acute distress. Respirations are even and unlabored. VSS. Bed is locked and in lowest position with side rails up x2. Call light is within reach. Will continue to monitor.

## 2018-05-09 NOTE — PLAN OF CARE
Problem: Patient Care Overview  Goal: Plan of Care Review  Outcome: Ongoing (interventions implemented as appropriate)  Recieved patient on room air saturations 95% with complaints of SOB placed on 2L NC; ABG done results within limits.

## 2018-05-09 NOTE — ED NOTES
Informed by radiology tech that pt was not able to tolerate lying flat for the ct scan. Pt had c/op SOB. Pt returned to bed and placed back  on monitors. MD notified. CT will be cancelled.

## 2018-05-09 NOTE — ED PROVIDER NOTES
"Encounter Date: 5/9/2018    SCRIBE #1 NOTE: I, Valerie Williamson, am scribing for, and in the presence of, Dr. Perkins.       History     Chief Complaint   Patient presents with    Shortness of Breath     SOB and decrease urine output x 1 wk     Time seen by provider: 7:21 AM    This is a 68 y.o. male who presents with complaint of SOB that has been intermittent for the past week. The patient reports associated leg swelling for the past three days that is worse at night, non-productive cough, trouble swallowing, and abdomen distention. The patient denies chest pain, fever, chills, abdominal pain, nausea, or vomiting. The patient notes that his SOB is exacerbated by exertion and lying flat. He reports that he can only walk about ten feet before getting SOB. The patient's partner notes that his heart rate dropped from 50 bpm to 46 bpm last night, and is usually 65 bpm at baseline. She says he took a double dose of lasix last night but has not urinated since. The patient notes that this morning, he bent over to  his dog and "almost passed out." The patient notes that he saw Dr. Livingston last week and had an echocardiogram done four weeks ago that "looked okay." Two weeks ago he was seen in an ED in [place] due to SOB. He denies a history of CHF. He denies using anything to relieve his SOB at home. He has no additional complaints.     He notes that he has not taken his morning medications. Additional past medical, surgical, and social history as outlined in the nursing assessment was reviewed by me.        The history is provided by the patient and a significant other.     Review of patient's allergies indicates:   Allergen Reactions    Doxycycline Rash     Past Medical History:   Diagnosis Date    Coronary artery disease     Diabetes mellitus type II     Hyperlipidemia     Hypertension     Paroxysmal atrial fibrillation     Stroke     nov 2012     Past Surgical History:   Procedure Laterality Date    KNEE " SURGERY Left     SHOULDER SURGERY Right      Family History   Problem Relation Age of Onset    Hypertension Mother      Social History   Substance Use Topics    Smoking status: Former Smoker     Types: Cigars    Smokeless tobacco: Never Used      Comment: occasional, when at the [employer]    Alcohol use 0.6 oz/week     1 Cans of beer per week      Comment: occasional when at the [employer]     Review of Systems   Constitutional: Negative for chills and fever.   HENT: Positive for trouble swallowing. Negative for drooling and sore throat.    Respiratory: Positive for cough and shortness of breath.    Cardiovascular: Positive for leg swelling. Negative for chest pain.   Gastrointestinal: Positive for abdominal distention. Negative for abdominal pain and nausea.   Genitourinary: Positive for decreased urine volume. Negative for dysuria.   Musculoskeletal: Negative for back pain.   Skin: Negative for rash.   Allergic/Immunologic: Negative for immunocompromised state.   Neurological: Negative for weakness and headaches.   Hematological: Does not bruise/bleed easily.   Psychiatric/Behavioral: Negative for confusion.       Physical Exam     Initial Vitals [05/09/18 0702]   BP Pulse Resp Temp SpO2   (!) 182/88 (!) 52 20 98.5 °F (36.9 °C) 96 %      MAP       119.33         Physical Exam    Nursing note and vitals reviewed.  Constitutional: He appears well-developed and well-nourished. No distress.   Sitting in upright position.    HENT:   Head: Normocephalic and atraumatic.   Right Ear: External ear normal.   Left Ear: External ear normal.   Eyes: Conjunctivae and EOM are normal.   Neck: Normal range of motion. Neck supple. No tracheal deviation present.   Cardiovascular: Regular rhythm, normal heart sounds and intact distal pulses. Exam reveals no gallop and no friction rub.    No murmur heard.  Bradycardia.   Pulmonary/Chest: Effort normal. No stridor. No respiratory distress. He has no wheezes. He has no rhonchi. He has no  rales.   Decreased breath sounds in the lower lung fields, left worse than right.   Abdominal: Soft. Normal appearance and bowel sounds are normal. There is no tenderness. There is no rebound and no guarding.   Mild distention.   Musculoskeletal: Normal range of motion. He exhibits edema.   2+ pitting edema to the bilateral lower extremities.   Neurological: He is alert and oriented to person, place, and time. He has normal strength. No cranial nerve deficit or sensory deficit.   Skin: Skin is warm and dry. Capillary refill takes less than 2 seconds. No rash noted. No pallor.   Flaking skin to the bilateral lower extremities, but no skin breakdown.   Psychiatric: He has a normal mood and affect. His behavior is normal. Thought content normal.         ED Course   Procedures  Labs Reviewed   CBC W/ AUTO DIFFERENTIAL - Abnormal; Notable for the following:        Result Value    RBC 4.25 (*)     Hemoglobin 12.5 (*)     Hematocrit 38.8 (*)     RDW 15.1 (*)     All other components within normal limits   COMPREHENSIVE METABOLIC PANEL - Abnormal; Notable for the following:     Glucose 208 (*)     BUN, Bld 39 (*)     Creatinine 2.2 (*)     eGFR if  34 (*)     eGFR if non  29 (*)     All other components within normal limits   URINALYSIS - Abnormal; Notable for the following:     Protein, UA 1+ (*)     Glucose, UA 3+ (*)     All other components within normal limits   TSH - Abnormal; Notable for the following:     TSH 5.131 (*)     All other components within normal limits   PHOSPHORUS - Abnormal; Notable for the following:     Phosphorus 5.2 (*)     All other components within normal limits   B-TYPE NATRIURETIC PEPTIDE   PROTIME-INR   TROPONIN I   TSH   MAGNESIUM   PHOSPHORUS   MAGNESIUM   URINALYSIS MICROSCOPIC   T4, FREE     Imaging Results          CT Chest Without Contrast (No Result on File)                X-Ray Chest AP Portable (Final result)  Result time 05/09/18 07:49:05    Final  result by Johan Villalobos MD (05/09/18 07:49:05)                 Impression:      1. No interval detrimental change.      Electronically signed by: Johan Villalobos MD  Date:    05/09/2018  Time:    07:49             Narrative:    EXAMINATION:  XR CHEST AP PORTABLE    CLINICAL HISTORY:  Shortness of breath    TECHNIQUE:  Single frontal view of the chest was performed.    COMPARISON:  Radiograph 05/02/2018.    FINDINGS:  Mediastinal structures are midline.  Cardiac silhouette is stable in size.  There is an elevated left hemidiaphragm with persistent subsegmental atelectasis within the adjacent lung base.  No pneumothorax or pleural effusions.  No free air beneath the diaphragm.                              EKG Readings: (Independently Interpreted)   Sinus bradycardia at a rate of 53 bpm. P-mitrale. Atrioventricular block. No STEMI.       X-Rays:   Independently Interpreted Readings:   Chest X-Ray: Consolidation versus atelectasis in the left lung fields. Perihilar thickening bilaterally.     Medical Decision Making:   Initial Assessment:   Patient presents with swelling, dyspnea, and orthopnea. He clinically appears fluid overloaded but is in no acute respiratory distress. I will send a BNP and obtain a CXR. I will give enalaprilat, nitroglycerin, and lasix for relief. EKG shows no acute ischemia, but I will send a troponin given his new decompensation. I will discuss the case with his cardiologist, Dr. Livingston. I will reassess.  ED Management:  08:45 - patient's labs show a normal BNP. His CXR is unchanged from last week, with suspected atelectasis in LLL. I will obtain a CT of the chest to evaluate this area further. Patient more comfortable appearing and able to rely in a more reclined position. I will continue to monitor.     10:17 AM - Patient was unable to lie supine long enough to obtain a CT. Will admit for further workup and nephrology evaluation. Case discussed with Dr. Livingston who recommends gentle  hydration overnight, hold Lasix and Jardiance. He will assume care of patient at this time.             Scribe Attestation:   Scribe #1: I performed the above scribed service and the documentation accurately describes the services I performed. I attest to the accuracy of the note.    Attending Attestation:           Physician Attestation for Scribe:  Physician Attestation Statement for Scribe #1: I, Dr. Perkins, reviewed documentation, as scribed by Valerie Williamson in my presence, and it is both accurate and complete.                    Clinical Impression:     1. Shortness of breath                             Veronika Perkins MD  05/09/18 3750

## 2018-05-09 NOTE — ED NOTES
Report received from NEHAL Mcgraw. Pt updated on POC. Pt verbalized understanding. Pt is AAOx4. Pt assisted with elevation of head. RR are even and unlabored. Bed is locked and in lowest position with side rails up x2. Call light is within reach. Pt is visible from the nurses station. Will continue to monitor.

## 2018-05-09 NOTE — ED NOTES
"Patient arrived to the ED with girlfriend. He reports swelling to his lower extremities for 3 days. States he has been "short of breath when walking" for a week. Patient states when he lays down it is "so much worse". Reports minimal sleep last night and is unable to walk at home due to swelling and SOB.    Upon assessment patient has 3+ pitting edema bilaterally in lower extremities. Pt skin is red and dry, no breaks.   RR is even and labored when ambulating or laying down. O2 sat above 90%. Lungs sound clear.   Pt is bradycardic      "

## 2018-05-09 NOTE — ED NOTES
Pt rounding complete. Pt denies any pain or needs at the moment. Pt does not appear to be in acute distress. Respirations are even and unlabored. VSS. Bed is locked and in lowest position with side rails up x2. Call light is within reach. Will continue to monitor. Visitor at the bedside.

## 2018-05-09 NOTE — ED NOTES
Patient has been using bedside urinal with no difficulty.   Call light in reach and he is well educated on the use. Girlfriend has stepped out of the ED but states she will return shortly.

## 2018-05-09 NOTE — CONSULTS
Consult Note  Nephrology    Consult Requested By: Veronika Perkins MD  Reason for Consult: NATALIIA    SUBJECTIVE:     History of Present Illness:  Patient is a 68 y.o. male presents with worsening SOB/LE edema over the past couple of weeks.  Followed by Dr. Livingston as outpt.  Recently traveled to [place] to visit daughter and went to ED and Dx with CHF.  Has been self adjusting his lasix at home and recently was taking 80 mg with minimal UOP.  Baseline Creat 1.2 and ER found to have 2.2.  Was unable to lie flat for CT chest and given lasix by ER staff.  Being admitted for further evaluation.      Consulted for evaluation.  Pt seen and examined in ED.  Feeling better but still with some SOB.  No CP/N/V/D/F/C.  + Leg swelling.      EPIC reviewed.     Past Medical History:   Diagnosis Date    Coronary artery disease     Diabetes mellitus type II     Hyperlipidemia     Hypertension     Paroxysmal atrial fibrillation     Stroke     nov 2012     Past Surgical History:   Procedure Laterality Date    KNEE SURGERY Left     SHOULDER SURGERY Right      Family History   Problem Relation Age of Onset    Hypertension Mother      Social History   Substance Use Topics    Smoking status: Former Smoker     Types: Cigars    Smokeless tobacco: Never Used      Comment: occasional, when at the [employer]    Alcohol use 0.6 oz/week     1 Cans of beer per week      Comment: occasional when at the [employer]       Review of patient's allergies indicates:   Allergen Reactions    Doxycycline Rash        Review of Systems:  Constitutional: No fever or chills  Respiratory:  shortness of breath  Cardiovascular: No chest pain or palpitations  Gastrointestinal: No nausea or vomiting  Neurological: No confusion or weakness    OBJECTIVE:     Vital Signs (Most Recent)  Temp: 98.5 °F (36.9 °C) (05/09/18 0702)  Pulse: (!) 54 (05/09/18 1329)  Resp: 13 (05/09/18 1329)  BP: (!) 163/76 (05/09/18 1329)  SpO2: 95 % (05/09/18 1329)    Vital Signs Range  (Last 24H):  Temp:  [98.5 °F (36.9 °C)]   Pulse:  [52-58]   Resp:  [11-31]   BP: (150-194)/(69-92)   SpO2:  [93 %-97 %]       Intake/Output Summary (Last 24 hours) at 05/09/18 1435  Last data filed at 05/09/18 0940   Gross per 24 hour   Intake                0 ml   Output              400 ml   Net             -400 ml       Physical Exam:  General appearance: Well developed, well nourished, obese  Eyes:  Conjunctivae/corneas clear. PERRL.  Lungs: Normal respiratory effort,   clear to auscultation bilaterally   Heart: Ervin/Regular rate and rhythm, S1, S2 normal, no murmur, rub or pino.  Abdomen: Soft, non-tender non-distended; bowel sounds normal; no masses,  no organomegaly, obese  Extremities: No cyanosis or clubbing. 2+ edema.    Skin: Skin color, texture, turgor normal. No rashes or lesions  Neurologic: Normal strength and tone. No focal numbness or weakness       Laboratory:    Recent Labs  Lab 05/09/18 0718   WBC 7.13   RBC 4.25*   HGB 12.5*   HCT 38.8*      MCV 91   MCH 29.4   MCHC 32.2     BMP:   Recent Labs  Lab 05/09/18 0718   *      K 4.5      CO2 28   BUN 39*   CREATININE 2.2*   CALCIUM 9.1   MG 2.6     Lab Results   Component Value Date    CALCIUM 9.1 05/09/2018    PHOS 5.2 (H) 05/09/2018     BNP    Recent Labs  Lab 05/09/18  0718   BNP 98   No results found for: URICACIDNo results found for: IRON, TIBC, FERRITIN, SATURATEDIRO  Lab Results   Component Value Date    CALCIUM 9.1 05/09/2018    PHOS 5.2 (H) 05/09/2018       Diagnostic Results:  X-Ray Chest AP Portable   Final Result      1. No interval detrimental change.         Electronically signed by: Johan Villalobos MD   Date:    05/09/2018   Time:    07:49      CT Chest Without Contrast    (Results Pending)   US Retroperitoneal Complete (Kidney and    (Results Pending)       ASSESSMENT/PLAN:     1. NATALIIA on CKD III (Baseline Creat 1.2) secondary to over-diuresis, HTN,  plus other?  (N17.9, N18.3):  Agree with holding  ARB/lasix for now.  Gentle hydration as CXR/BNP wnl.  Check Renal U/S to r/o obstruction, uric acid, and urine lytes.  Follow trends Q12.  Await further w/up.  Renally dose meds, avoid nephrotoxins, and monitor I/O's closely.  2. SOB secondary to ?  (R06.02):  Bnp/cxr wnl.  Awaiting CT chest.  Check D-dimer and LE U/S.  Pt already on eliquis as outpt.  Amio vs other drug rxn?  3. DM (E11.65):  Recommend holding DDP4 while on GLP-1.  No Metformin or jardiance for now.  SSI.  Defer to PCP.   4. Normocytic anemia (D64.9):  Check B12/folate/iron.        Thanks for consult  See above  Will follow for renal needs.

## 2018-05-10 LAB
25(OH)D3+25(OH)D2 SERPL-MCNC: 8 NG/ML
ALLENS TEST: ABNORMAL
ANION GAP SERPL CALC-SCNC: 10 MMOL/L
ANION GAP SERPL CALC-SCNC: 11 MMOL/L
ANION GAP SERPL CALC-SCNC: 12 MMOL/L
BASOPHILS # BLD AUTO: 0.02 K/UL
BASOPHILS NFR BLD: 0.3 %
BUN SERPL-MCNC: 32 MG/DL
BUN SERPL-MCNC: 32 MG/DL
BUN SERPL-MCNC: 33 MG/DL
C3 SERPL-MCNC: 122 MG/DL
C4 SERPL-MCNC: 29 MG/DL
CALCIUM SERPL-MCNC: 8.7 MG/DL
CHLORIDE SERPL-SCNC: 103 MMOL/L
CHLORIDE SERPL-SCNC: 103 MMOL/L
CHLORIDE SERPL-SCNC: 104 MMOL/L
CK SERPL-CCNC: 42 U/L
CO2 SERPL-SCNC: 24 MMOL/L
CO2 SERPL-SCNC: 26 MMOL/L
CO2 SERPL-SCNC: 26 MMOL/L
CREAT SERPL-MCNC: 1.6 MG/DL
CREAT SERPL-MCNC: 1.8 MG/DL
CREAT SERPL-MCNC: 1.8 MG/DL
DELSYS: ABNORMAL
DIFFERENTIAL METHOD: ABNORMAL
EOSINOPHIL # BLD AUTO: 0.5 K/UL
EOSINOPHIL NFR BLD: 6.6 %
EOSINOPHIL URNS QL WRIGHT STN: NORMAL
ERYTHROCYTE [DISTWIDTH] IN BLOOD BY AUTOMATED COUNT: 14.8 %
ERYTHROCYTE [SEDIMENTATION RATE] IN BLOOD BY WESTERGREN METHOD: 20 MM/H
EST. GFR  (AFRICAN AMERICAN): 43 ML/MIN/1.73 M^2
EST. GFR  (AFRICAN AMERICAN): 43 ML/MIN/1.73 M^2
EST. GFR  (AFRICAN AMERICAN): 50 ML/MIN/1.73 M^2
EST. GFR  (NON AFRICAN AMERICAN): 38 ML/MIN/1.73 M^2
EST. GFR  (NON AFRICAN AMERICAN): 38 ML/MIN/1.73 M^2
EST. GFR  (NON AFRICAN AMERICAN): 43 ML/MIN/1.73 M^2
ESTIMATED AVG GLUCOSE: 137 MG/DL
FERRITIN SERPL-MCNC: 117 NG/ML
FIO2: 21
FLOW: 0
FOLATE SERPL-MCNC: 4.9 NG/ML
GLUCOSE SERPL-MCNC: 150 MG/DL
GLUCOSE SERPL-MCNC: 150 MG/DL
GLUCOSE SERPL-MCNC: 151 MG/DL
HBA1C MFR BLD HPLC: 6.4 %
HCO3 UR-SCNC: 26.8 MMOL/L (ref 24–28)
HCT VFR BLD AUTO: 37.6 %
HGB BLD-MCNC: 12.3 G/DL
IRON SERPL-MCNC: 51 UG/DL
LYMPHOCYTES # BLD AUTO: 1.5 K/UL
LYMPHOCYTES NFR BLD: 22 %
MCH RBC QN AUTO: 29.5 PG
MCHC RBC AUTO-ENTMCNC: 32.7 G/DL
MCV RBC AUTO: 90 FL
MODE: ABNORMAL
MONOCYTES # BLD AUTO: 0.6 K/UL
MONOCYTES NFR BLD: 9.2 %
NEUTROPHILS # BLD AUTO: 4.3 K/UL
NEUTROPHILS NFR BLD: 61.8 %
PCO2 BLDA: 43 MMHG (ref 35–45)
PH SMN: 7.4 [PH] (ref 7.35–7.45)
PHOSPHATE SERPL-MCNC: 4.6 MG/DL
PLATELET # BLD AUTO: 243 K/UL
PMV BLD AUTO: 10.6 FL
PO2 BLDA: 61 MMHG (ref 80–100)
POC BE: 2 MMOL/L
POC SATURATED O2: 91 % (ref 95–100)
POCT GLUCOSE: 140 MG/DL (ref 70–110)
POCT GLUCOSE: 165 MG/DL (ref 70–110)
POCT GLUCOSE: 165 MG/DL (ref 70–110)
POCT GLUCOSE: 175 MG/DL (ref 70–110)
POTASSIUM SERPL-SCNC: 4 MMOL/L
POTASSIUM SERPL-SCNC: 4 MMOL/L
POTASSIUM SERPL-SCNC: 4.1 MMOL/L
PTH-INTACT SERPL-MCNC: 64.7 PG/ML
RBC # BLD AUTO: 4.17 M/UL
RPR SER QL: NORMAL
SAMPLE: ABNORMAL
SATURATED IRON: 16 %
SITE: ABNORMAL
SODIUM SERPL-SCNC: 139 MMOL/L
SODIUM SERPL-SCNC: 140 MMOL/L
SODIUM SERPL-SCNC: 140 MMOL/L
SP02: 93
TOTAL IRON BINDING CAPACITY: 318 UG/DL
TRANSFERRIN SERPL-MCNC: 215 MG/DL
VIT B12 SERPL-MCNC: 226 PG/ML
WBC # BLD AUTO: 6.96 K/UL

## 2018-05-10 PROCEDURE — 86160 COMPLEMENT ANTIGEN: CPT

## 2018-05-10 PROCEDURE — 82728 ASSAY OF FERRITIN: CPT

## 2018-05-10 PROCEDURE — 25000003 PHARM REV CODE 250: Performed by: EMERGENCY MEDICINE

## 2018-05-10 PROCEDURE — 36415 COLL VENOUS BLD VENIPUNCTURE: CPT

## 2018-05-10 PROCEDURE — 86255 FLUORESCENT ANTIBODY SCREEN: CPT

## 2018-05-10 PROCEDURE — 82607 VITAMIN B-12: CPT

## 2018-05-10 PROCEDURE — 83970 ASSAY OF PARATHORMONE: CPT

## 2018-05-10 PROCEDURE — 86703 HIV-1/HIV-2 1 RESULT ANTBDY: CPT

## 2018-05-10 PROCEDURE — 86592 SYPHILIS TEST NON-TREP QUAL: CPT

## 2018-05-10 PROCEDURE — 86038 ANTINUCLEAR ANTIBODIES: CPT

## 2018-05-10 PROCEDURE — 80074 ACUTE HEPATITIS PANEL: CPT

## 2018-05-10 PROCEDURE — 84165 PROTEIN E-PHORESIS SERUM: CPT

## 2018-05-10 PROCEDURE — 82550 ASSAY OF CK (CPK): CPT

## 2018-05-10 PROCEDURE — 36600 WITHDRAWAL OF ARTERIAL BLOOD: CPT

## 2018-05-10 PROCEDURE — 80048 BASIC METABOLIC PNL TOTAL CA: CPT

## 2018-05-10 PROCEDURE — 80048 BASIC METABOLIC PNL TOTAL CA: CPT | Mod: 91

## 2018-05-10 PROCEDURE — 84165 PROTEIN E-PHORESIS SERUM: CPT | Mod: 26,,, | Performed by: PATHOLOGY

## 2018-05-10 PROCEDURE — 82746 ASSAY OF FOLIC ACID SERUM: CPT

## 2018-05-10 PROCEDURE — 84100 ASSAY OF PHOSPHORUS: CPT

## 2018-05-10 PROCEDURE — 94761 N-INVAS EAR/PLS OXIMETRY MLT: CPT

## 2018-05-10 PROCEDURE — 86160 COMPLEMENT ANTIGEN: CPT | Mod: 59

## 2018-05-10 PROCEDURE — 82803 BLOOD GASES ANY COMBINATION: CPT

## 2018-05-10 PROCEDURE — 25000003 PHARM REV CODE 250: Performed by: NURSE PRACTITIONER

## 2018-05-10 PROCEDURE — 86060 ANTISTREPTOLYSIN O TITER: CPT

## 2018-05-10 PROCEDURE — 82306 VITAMIN D 25 HYDROXY: CPT

## 2018-05-10 PROCEDURE — 25000003 PHARM REV CODE 250: Performed by: INTERNAL MEDICINE

## 2018-05-10 PROCEDURE — G0378 HOSPITAL OBSERVATION PER HR: HCPCS

## 2018-05-10 PROCEDURE — 83540 ASSAY OF IRON: CPT

## 2018-05-10 PROCEDURE — 85025 COMPLETE CBC W/AUTO DIFF WBC: CPT

## 2018-05-10 PROCEDURE — 83520 IMMUNOASSAY QUANT NOS NONAB: CPT | Mod: 59

## 2018-05-10 RX ORDER — IRBESARTAN 150 MG/1
150 TABLET ORAL DAILY
Status: DISCONTINUED | OUTPATIENT
Start: 2018-05-10 | End: 2018-05-10 | Stop reason: DRUGHIGH

## 2018-05-10 RX ORDER — IRBESARTAN 150 MG/1
300 TABLET ORAL NIGHTLY
Status: DISCONTINUED | OUTPATIENT
Start: 2018-05-10 | End: 2018-05-10

## 2018-05-10 RX ORDER — METFORMIN HYDROCHLORIDE 500 MG/1
1000 TABLET ORAL 2 TIMES DAILY WITH MEALS
Status: DISCONTINUED | OUTPATIENT
Start: 2018-05-10 | End: 2018-05-10

## 2018-05-10 RX ORDER — FERROUS SULFATE 325(65) MG
325 TABLET, DELAYED RELEASE (ENTERIC COATED) ORAL 2 TIMES DAILY
Status: DISCONTINUED | OUTPATIENT
Start: 2018-05-10 | End: 2018-05-11 | Stop reason: HOSPADM

## 2018-05-10 RX ADMIN — APIXABAN 2.5 MG: 2.5 TABLET, FILM COATED ORAL at 08:05

## 2018-05-10 RX ADMIN — FERROUS SULFATE TAB EC 325 MG (65 MG FE EQUIVALENT) 325 MG: 325 (65 FE) TABLET DELAYED RESPONSE at 08:05

## 2018-05-10 RX ADMIN — ASPIRIN 81 MG 81 MG: 81 TABLET ORAL at 09:05

## 2018-05-10 RX ADMIN — AMLODIPINE BESYLATE 10 MG: 5 TABLET ORAL at 09:05

## 2018-05-10 RX ADMIN — AMIODARONE HYDROCHLORIDE 200 MG: 200 TABLET ORAL at 09:05

## 2018-05-10 RX ADMIN — Medication 1 TABLET: at 01:05

## 2018-05-10 RX ADMIN — SITAGLIPTIN 50 MG: 25 TABLET, FILM COATED ORAL at 05:05

## 2018-05-10 RX ADMIN — FERROUS SULFATE TAB EC 325 MG (65 MG FE EQUIVALENT) 325 MG: 325 (65 FE) TABLET DELAYED RESPONSE at 01:05

## 2018-05-10 RX ADMIN — CARVEDILOL 12.5 MG: 12.5 TABLET, FILM COATED ORAL at 08:05

## 2018-05-10 RX ADMIN — CARVEDILOL 25 MG: 12.5 TABLET, FILM COATED ORAL at 09:05

## 2018-05-10 RX ADMIN — DRONEDARONE 400 MG: 400 TABLET, FILM COATED ORAL at 06:05

## 2018-05-10 RX ADMIN — ATORVASTATIN CALCIUM 40 MG: 20 TABLET, FILM COATED ORAL at 09:05

## 2018-05-10 RX ADMIN — APIXABAN 2.5 MG: 2.5 TABLET, FILM COATED ORAL at 09:05

## 2018-05-10 RX ADMIN — SODIUM CHLORIDE: 0.9 INJECTION, SOLUTION INTRAVENOUS at 05:05

## 2018-05-10 RX ADMIN — IRBESARTAN 150 MG: 150 TABLET ORAL at 01:05

## 2018-05-10 NOTE — PLAN OF CARE
Problem: Patient Care Overview  Goal: Individualization & Mutuality  Outcome: Ongoing (interventions implemented as appropriate)  Pt free of falls/trauma/injuries this shift.VSS. NS infusing, medication administered as perscribed. CBG :196 no SS coverage required. Patient tolerating POC well. Pt verbalizes understanding of care. Pt denies any chest pain, SOB, or any other discomforts at this time. Will continue to monitor.

## 2018-05-10 NOTE — H&P
Mr. Castano is a 68-year-old gentleman presented to the emergency room with complaints of breathlessness.  He was recently in [place] when a he became short of breath, and went to the emergency room, was given a prescription for Lasix, and was told that he had heart failure.  He recently increased his dose of Lasix from 40-80 mg daily because of persistent ankle swelling.  He now says that when he leti over he gets very short of breath.  He also gets short of breath when he tries to lie flat.  He has an occasional dry cough.  He has no chest pains, no fever or chills.    Past history of longstanding hypertension and diabetes mellitus.  He has had coronary artery disease having undergone placement of 2 intracoronary stents in the proximal left anterior descending coronary artery in 2016.  He has had paroxysmal atrial flutter, has been placed 1st on Multaq and then on amiodarone.  He has had previous strokes for which he had hospitalization at [facility], and spent a couple of months in the rehab unit at [facility].    His home medications include amlodipine, amiodarone, apixaban, aspirin, atorvastatin, carvedilol, Gloria ends, furosemide, Avapro, Victoza, potassium chloride, and Janumet.  Site of the details of the past history etc refer to the notes in the past admissions.      Physical examination reveals an alert pleasant heavy-set man in no apparent acute distress with a blood pressure of 140/70 and a pulse of 70 beats per min the sclera is nonicteric conjunctiva was pink the ENT exam is unremarkable neck is supple there is no jugular venous distention the carotid upstroke is brisk as no carotid bruits chest clear the heart size is grossly normal S1 and S2 are normal there is no audible murmur or gallop. The abdomen is soft and nontender the bowel sounds are normal.  Extremities there is 2+ ankle edema. Grossly the neurological exam is intact.  .   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    2. Azotemia, the result of intravascular volume depletion from Lasix, and from Jardiance.  3.  Essential hypertension  4.  Diabetes mellitus  5.  Stable coronary artery disease  6.  Morbid obesity  7.  Previous strokes  8.  Paroxysmal atrial flutter and atrial fibrillation

## 2018-05-10 NOTE — PLAN OF CARE
05/10/18 1246   MENDOZA Message   Medicare Outpatient and Observation Notification regarding financial responsibility Given to patient/caregiver;Explained to patient/caregiver;Signed/date by patient/caregiver   Date MENDOZA was signed 05/10/18   Time MENDOZA was signed [id number]

## 2018-05-10 NOTE — PROGRESS NOTES
"Nephrology  Progress Note    Admit Date: 5/9/2018   LOS: 0 days     SUBJECTIVE:     Follow-up For:  NATALIIA    Interval History:     Uneventful night.  No CP/SOB at present.  Still feels breathless when lying flat.  Renal fxn improved.  Extensive discussion with pt/significant other at bedside.      Review of Systems:  Constitutional: No fever or chills  Respiratory: No cough or shortness of breath  Cardiovascular: No chest pain or palpitations  Gastrointestinal: No nausea or vomiting  Neurological: No confusion or weakness    OBJECTIVE:     Vital Signs Range (Last 24H):  BP (!) 197/91   Pulse (!) 54   Temp 97.8 °F (36.6 °C)   Resp 18   Ht 6' 0.99" (1.854 m)   Wt 117.9 kg (260 lb)   SpO2 (!) 93%   BMI 34.31 kg/m²     Temp:  [97.6 °F (36.4 °C)-98 °F (36.7 °C)]   Pulse:  [53-85]   Resp:  [11-20]   BP: (152-197)/(70-91)   SpO2:  [93 %-98 %]     I & O (Last 24H):  Intake/Output Summary (Last 24 hours) at 05/10/18 1108  Last data filed at 05/10/18 0500   Gross per 24 hour   Intake                0 ml   Output             1500 ml   Net            -1500 ml       Physical Exam:  General appearance: Well developed, well nourished, obese  Eyes:  Conjunctivae/corneas clear. PERRL.  Lungs: Normal respiratory effort,   clear to auscultation bilaterally   Heart: Ervin/Regular rate and rhythm, S1, S2 normal, no murmur, rub or pino.  Abdomen: Soft, non-tender non-distended; bowel sounds normal; no masses,  no organomegaly, obese  Extremities: No cyanosis or clubbing. 1+ edema.    Skin: Skin color, texture, turgor normal. No rashes or lesions  Neurologic: Normal strength and tone. No focal numbness or weakness       Laboratory Data:    Recent Labs  Lab 05/10/18  0515   WBC 6.96   RBC 4.17*   HGB 12.3*   HCT 37.6*      MCV 90   MCH 29.5   MCHC 32.7       BMP:   Recent Labs  Lab 05/09/18  0718 05/10/18  0515   * 150*  150*    140  140   K 4.5 4.0  4.0    104  103   CO2 28 26  26   BUN 39* 32*  32* "   CREATININE 2.2* 1.8*  1.8*   CALCIUM 9.1 8.7  8.7   MG 2.6  --      Lab Results   Component Value Date    CALCIUM 8.7 05/10/2018    CALCIUM 8.7 05/10/2018    PHOS 4.6 (H) 05/10/2018       Lab Results   Component Value Date    CALCIUM 8.7 05/10/2018    CALCIUM 8.7 05/10/2018    PHOS 4.6 (H) 05/10/2018       Lab Results   Component Value Date    URICACID 9.3 (H) 05/09/2018       BNP    Recent Labs  Lab 05/09/18  0718   BNP 98       Medications:  Medication list was reviewed and changes noted under Assessment/Plan.    Diagnostic Results:    US Lower Extremity Veins Bilateral   Final Result      No evidence of deep venous thrombosis in either lower extremity.  Slow flow through the left greater saphenous vein which is a superficial vein.         Electronically signed by: Rivka Shipman MD   Date:    05/09/2018   Time:    17:32      US Retroperitoneal Complete (Kidney and   Final Result      Normal renal morphology bilaterally without hydronephrosis.         Electronically signed by: Fernando Bonner MD   Date:    05/09/2018   Time:    17:25      X-Ray Chest AP Portable   Final Result      1. No interval detrimental change.         Electronically signed by: Johan Villalobos MD   Date:    05/09/2018   Time:    07:49      CT Chest Without Contrast    (Results Pending)       ASSESSMENT/PLAN:     1. Resolving non-oliguric NATALIIA on CKD III (Baseline Creat 1.2) secondary to over-diuresis, HTN.  Now with non-nephrotic proteinuria (2.8gm)  (N17.9, N18.3, R80.9): Hold diuretics.  Gentle hydration as CXR/BNP wnl.  Renal U/S consistent with DM Neph.  With 2.8 gr/protein on spot will check proteinuric labs but likely consistent with DM/HTN Nephropathy.  restart low dose ARB.  Follow trends.  Will not be able to diurese edema due to proteinuria.  Place teds.  tolerateRenally dose meds, avoid nephrotoxins, and monitor I/O's closely.  2. SOB secondary to ?  (R06.02):  Bnp/cxr wnl.  Awaiting CT chest.  D-dimer/LE US negative.  Pt already  on eliquis as outpt.  Amio vs other.  Needs outpt sleep study as showing signs of RENU.   3. DM (E11.65): Recommend holding DDP4 while on GLP-1.  No Metformin or jardiance for now.  SSI.  Defer to PCP.   4. Normocytic anemia with mild SABRINA/Folate/B12 (D64.9, D50.9):  Start oral replacements.  No IV iron for now.           See above  Will need to f/u with us in clinic at [place].

## 2018-05-10 NOTE — PLAN OF CARE
Problem: Patient Care Overview  Goal: Plan of Care Review  Outcome: Ongoing (interventions implemented as appropriate)  Patient on room air saturations 96% with reports of mild SOB at rest.

## 2018-05-11 VITALS
HEART RATE: 54 BPM | TEMPERATURE: 98 F | WEIGHT: 260 LBS | BODY MASS INDEX: 34.46 KG/M2 | HEIGHT: 73 IN | OXYGEN SATURATION: 96 % | RESPIRATION RATE: 18 BRPM | DIASTOLIC BLOOD PRESSURE: 82 MMHG | SYSTOLIC BLOOD PRESSURE: 164 MMHG

## 2018-05-11 LAB
ALBUMIN SERPL ELPH-MCNC: 3.08 G/DL
ALPHA1 GLOB SERPL ELPH-MCNC: 0.3 G/DL
ALPHA2 GLOB SERPL ELPH-MCNC: 0.99 G/DL
ANA SER QL IF: NORMAL
ANION GAP SERPL CALC-SCNC: 11 MMOL/L
ANION GAP SERPL CALC-SCNC: 11 MMOL/L
B-GLOBULIN SERPL ELPH-MCNC: 0.66 G/DL
BASOPHILS # BLD AUTO: 0.04 K/UL
BASOPHILS NFR BLD: 0.5 %
BUN SERPL-MCNC: 26 MG/DL
BUN SERPL-MCNC: 27 MG/DL
CALCIUM SERPL-MCNC: 8.6 MG/DL
CALCIUM SERPL-MCNC: 8.6 MG/DL
CHLORIDE SERPL-SCNC: 105 MMOL/L
CHLORIDE SERPL-SCNC: 106 MMOL/L
CO2 SERPL-SCNC: 23 MMOL/L
CO2 SERPL-SCNC: 23 MMOL/L
CREAT SERPL-MCNC: 1.4 MG/DL
CREAT SERPL-MCNC: 1.4 MG/DL
DIFFERENTIAL METHOD: ABNORMAL
EOSINOPHIL # BLD AUTO: 0.4 K/UL
EOSINOPHIL NFR BLD: 6 %
ERYTHROCYTE [DISTWIDTH] IN BLOOD BY AUTOMATED COUNT: 14.6 %
EST. GFR  (AFRICAN AMERICAN): 59 ML/MIN/1.73 M^2
EST. GFR  (AFRICAN AMERICAN): 59 ML/MIN/1.73 M^2
EST. GFR  (NON AFRICAN AMERICAN): 51 ML/MIN/1.73 M^2
EST. GFR  (NON AFRICAN AMERICAN): 51 ML/MIN/1.73 M^2
GAMMA GLOB SERPL ELPH-MCNC: 0.88 G/DL
GLUCOSE SERPL-MCNC: 153 MG/DL
GLUCOSE SERPL-MCNC: 153 MG/DL
HAV IGM SERPL QL IA: NEGATIVE
HBV CORE IGM SERPL QL IA: NEGATIVE
HBV SURFACE AG SERPL QL IA: NEGATIVE
HCT VFR BLD AUTO: 38.5 %
HCV AB SERPL QL IA: NEGATIVE
HGB BLD-MCNC: 12.5 G/DL
HIV 1+2 AB+HIV1 P24 AG SERPL QL IA: NEGATIVE
KAPPA LC SER QL IA: 6.11 MG/DL
KAPPA LC/LAMBDA SER IA: 1.81
LAMBDA LC SER QL IA: 3.38 MG/DL
LYMPHOCYTES # BLD AUTO: 1.4 K/UL
LYMPHOCYTES NFR BLD: 19.1 %
MCH RBC QN AUTO: 29.5 PG
MCHC RBC AUTO-ENTMCNC: 32.5 G/DL
MCV RBC AUTO: 91 FL
MONOCYTES # BLD AUTO: 0.7 K/UL
MONOCYTES NFR BLD: 9.8 %
NEUTROPHILS # BLD AUTO: 4.7 K/UL
NEUTROPHILS NFR BLD: 64.3 %
PATHOLOGIST INTERPRETATION SPE: NORMAL
PHOSPHATE SERPL-MCNC: 3.9 MG/DL
PLATELET # BLD AUTO: 252 K/UL
PMV BLD AUTO: 11 FL
POCT GLUCOSE: 146 MG/DL (ref 70–110)
POCT GLUCOSE: 215 MG/DL (ref 70–110)
POTASSIUM SERPL-SCNC: 4.4 MMOL/L
POTASSIUM SERPL-SCNC: 4.4 MMOL/L
PROT SERPL-MCNC: 5.9 G/DL
RBC # BLD AUTO: 4.24 M/UL
SODIUM SERPL-SCNC: 139 MMOL/L
SODIUM SERPL-SCNC: 140 MMOL/L
WBC # BLD AUTO: 7.28 K/UL

## 2018-05-11 PROCEDURE — 94729 DIFFUSING CAPACITY: CPT

## 2018-05-11 PROCEDURE — 94761 N-INVAS EAR/PLS OXIMETRY MLT: CPT

## 2018-05-11 PROCEDURE — 36415 COLL VENOUS BLD VENIPUNCTURE: CPT

## 2018-05-11 PROCEDURE — 94618 PULMONARY STRESS TESTING: CPT

## 2018-05-11 PROCEDURE — 25000003 PHARM REV CODE 250: Performed by: NURSE PRACTITIONER

## 2018-05-11 PROCEDURE — 85025 COMPLETE CBC W/AUTO DIFF WBC: CPT

## 2018-05-11 PROCEDURE — 80048 BASIC METABOLIC PNL TOTAL CA: CPT | Mod: 91

## 2018-05-11 PROCEDURE — 25000003 PHARM REV CODE 250: Performed by: INTERNAL MEDICINE

## 2018-05-11 PROCEDURE — 80048 BASIC METABOLIC PNL TOTAL CA: CPT

## 2018-05-11 PROCEDURE — 94727 GAS DIL/WSHOT DETER LNG VOL: CPT

## 2018-05-11 PROCEDURE — 94060 EVALUATION OF WHEEZING: CPT

## 2018-05-11 PROCEDURE — 84100 ASSAY OF PHOSPHORUS: CPT

## 2018-05-11 PROCEDURE — 25000003 PHARM REV CODE 250: Performed by: EMERGENCY MEDICINE

## 2018-05-11 PROCEDURE — G0378 HOSPITAL OBSERVATION PER HR: HCPCS

## 2018-05-11 RX ORDER — POTASSIUM CHLORIDE 20 MEQ/1
20 TABLET, EXTENDED RELEASE ORAL EVERY OTHER DAY
Qty: 30 TABLET | Refills: 6 | Status: ON HOLD | OUTPATIENT
Start: 2018-05-11 | End: 2018-05-30 | Stop reason: HOSPADM

## 2018-05-11 RX ORDER — TIOTROPIUM BROMIDE 18 UG/1
1 CAPSULE ORAL; RESPIRATORY (INHALATION) DAILY
Status: DISCONTINUED | OUTPATIENT
Start: 2018-05-11 | End: 2018-05-11 | Stop reason: HOSPADM

## 2018-05-11 RX ORDER — IRBESARTAN 150 MG/1
300 TABLET ORAL DAILY
Status: DISCONTINUED | OUTPATIENT
Start: 2018-05-11 | End: 2018-05-11 | Stop reason: HOSPADM

## 2018-05-11 RX ORDER — TIOTROPIUM BROMIDE 18 UG/1
1 CAPSULE ORAL; RESPIRATORY (INHALATION) DAILY
Qty: 30 CAPSULE | Refills: 6 | Status: ON HOLD | OUTPATIENT
Start: 2018-05-12 | End: 2018-06-22 | Stop reason: HOSPADM

## 2018-05-11 RX ORDER — IRBESARTAN 300 MG/1
300 TABLET ORAL DAILY
Qty: 90 TABLET | Refills: 3 | Status: ON HOLD | OUTPATIENT
Start: 2018-05-12 | End: 2018-05-17 | Stop reason: HOSPADM

## 2018-05-11 RX ORDER — FUROSEMIDE 40 MG/1
40 TABLET ORAL EVERY OTHER DAY
Qty: 30 TABLET | Refills: 6 | Status: ON HOLD | OUTPATIENT
Start: 2018-05-11 | End: 2018-05-30 | Stop reason: HOSPADM

## 2018-05-11 RX ADMIN — Medication 1 TABLET: at 08:05

## 2018-05-11 RX ADMIN — IRBESARTAN 300 MG: 150 TABLET ORAL at 10:05

## 2018-05-11 RX ADMIN — DRONEDARONE 400 MG: 400 TABLET, FILM COATED ORAL at 10:05

## 2018-05-11 RX ADMIN — CARVEDILOL 25 MG: 12.5 TABLET, FILM COATED ORAL at 08:05

## 2018-05-11 RX ADMIN — APIXABAN 2.5 MG: 2.5 TABLET, FILM COATED ORAL at 08:05

## 2018-05-11 RX ADMIN — ASPIRIN 81 MG 81 MG: 81 TABLET ORAL at 08:05

## 2018-05-11 RX ADMIN — FERROUS SULFATE TAB EC 325 MG (65 MG FE EQUIVALENT) 325 MG: 325 (65 FE) TABLET DELAYED RESPONSE at 08:05

## 2018-05-11 RX ADMIN — AMLODIPINE BESYLATE 10 MG: 5 TABLET ORAL at 08:05

## 2018-05-11 RX ADMIN — ATORVASTATIN CALCIUM 40 MG: 20 TABLET, FILM COATED ORAL at 08:05

## 2018-05-11 NOTE — CONSULTS
Admit Date: 5/9/2018   LOS: 0 days     SUBJECTIVE:     Continuous Infusions:   sodium chloride 0.9% 50 mL/hr at 05/10/18 1738       Scheduled Meds:   amLODIPine  10 mg Oral Daily    apixaban  2.5 mg Oral BID    aspirin  81 mg Oral Daily    atorvastatin  40 mg Oral Daily    carvedilol  25 mg Oral BID    dronedarone  400 mg Oral BID WM    ferrous sulfate  325 mg Oral BID    folic acid-vit B6-vit B12 2.5-25-2 mg  1 tablet Oral Daily    liraglutide 0.6 mg/0.1 mL (18 mg/3 mL) subq PNIJ  1.2 mg Subcutaneous Daily    SITagliptin  50 mg Oral BID WM       Review of Systems:  Constitutional: negative  Eyes: negative  Ears, nose, mouth, throat, and face: negative  Respiratory: positive for dyspnea on exertion, negative for cough and hemoptysis  Cardiovascular: positive for dyspnea, fatigue, lower extremity edema, orthopnea and tachypnea  Gastrointestinal: negative  Genitourinary:negative  Integument/breast: negative  Hematologic/lymphatic: negative  Musculoskeletal:negative  Neurological: positive for gait problems  Behavioral/Psych: negative     OBJECTIVE:     Vital Signs Range (Last 24H):  Temp:  [97.3 °F (36.3 °C)-98.4 °F (36.9 °C)]   Pulse:  [52-85]   Resp:  [18]   BP: (141-197)/(69-91)   SpO2:  [93 %-98 %]     I & O (Last 24H):  Intake/Output Summary (Last 24 hours) at 05/10/18 1950  Last data filed at 05/10/18 0500   Gross per 24 hour   Intake                0 ml   Output             1500 ml   Net            -1500 ml       Physical Exam:  General: mild distress, appears stated age, fatigued, mildly obese  Neck: no jugular venous distention  Lungs:  diminished breath sounds bibasilar  Chest Wall: no tenderness  Heart: regular rate and rhythm  Abdomen: soft, non-tender non-distended; bowel sounds normal  Extremities: edema trace  Neurologic: alert, oriented, thought content appropriate  Motor:weak l leg muscle    Laboratory:  CBC:   Recent Labs  Lab 05/10/18  0515   WBC 6.96   RBC 4.17*   HGB 12.3*   HCT 37.6*       MCV 90   MCH 29.5   MCHC 32.7     CMP:   Recent Labs  Lab 05/09/18  0718  05/10/18  1310   *  < > 151*   CALCIUM 9.1  < > 8.7   ALBUMIN 3.6  --   --    PROT 7.0  --   --      < > 139   K 4.5  < > 4.1   CO2 28  < > 24     < > 103   BUN 39*  < > 33*   CREATININE 2.2*  < > 1.6*   ALKPHOS 106  --   --    ALT 16  --   --    AST 16  --   --    BILITOT 0.5  --   --    < > = values in this interval not displayed.  Coagulation:   Recent Labs  Lab 05/09/18 0718   LABPROT 10.4   INR 0.9     Cardiac markers:   Recent Labs  Lab 05/09/18 0718   TROPONINI 0.011     ABGs:   Recent Labs  Lab 05/09/18  1752   PH 7.461*   PCO2 42.2   PO2 71*   HCO3 30.0*   POCSATURATED 95   BE 6     Microbiology Results (last 7 days)     ** No results found for the last 168 hours. **        Past Surgical History:   Procedure Laterality Date    KNEE SURGERY Left     SHOULDER SURGERY Right      Family History   Problem Relation Age of Onset    Hypertension Mother      Past Medical History:   Diagnosis Date    Coronary artery disease     Diabetes mellitus type II     Hyperlipidemia     Hypertension     Paroxysmal atrial fibrillation     Stroke     nov 2012     Diagnostic Results:  CT: Reviewed  X-Ray: Reviewed  Social History     Social History    Marital status: Single     Spouse name: N/A    Number of children: N/A    Years of education: N/A     Occupational History    Not on file.     Social History Main Topics    Smoking status: Former Smoker     Types: Cigars    Smokeless tobacco: Never Used      Comment: occasional, when at the casino    Alcohol use 0.6 oz/week     1 Cans of beer per week      Comment: occasional when at the casino    Drug use: No    Sexual activity: Yes     Partners: Female     Birth control/ protection: None     Other Topics Concern    Not on file     Social History Narrative    No narrative on file     ASSESSMENT/PLAN:     Active Hospital Problems    Diagnosis  POA     Shortness of breath [R06.02]  Yes      Resolved Hospital Problems    Diagnosis Date Resolved POA   No resolved problems to display.   dyspnea- nonsmoker- neg venous doppler  Hyperdynamic heart-?diastolic dysfunction   hypertension severe  Acute on chronic renal failure  Pos d dimer may relate torenal insuf- would check vq due to multiple admits   elevated left aníbal-diaphraghm-check fluoro for function  Hx amio- check pft and ct    Case Discussed With:

## 2018-05-11 NOTE — PROGRESS NOTES
"Nephrology  Progress Note    Admit Date: 5/9/2018   LOS: 0 days     SUBJECTIVE:     Follow-up For:  NATALIIA    Interval History:     Creat/UOP improved.  Discussed with .  DC'ing home today.  No CP/SOB.       Review of Systems:  Constitutional: No fever or chills  Respiratory: No cough or shortness of breath. Still breathless when lying flat.   Cardiovascular: No chest pain or palpitations  Gastrointestinal: No nausea or vomiting  Neurological: No confusion or weakness    OBJECTIVE:     Vital Signs Range (Last 24H):  BP (!) 164/82   Pulse (!) 54   Temp 97.8 °F (36.6 °C) (Oral)   Resp 18   Ht 6' 0.99" (1.854 m)   Wt 117.9 kg (260 lb)   SpO2 96%   BMI 34.31 kg/m²     Temp:  [97.3 °F (36.3 °C)-98.4 °F (36.9 °C)]   Pulse:  [52-63]   Resp:  [18]   BP: (141-191)/(69-87)   SpO2:  [92 %-98 %]     I & O (Last 24H):    Intake/Output Summary (Last 24 hours) at 05/11/18 1044  Last data filed at 05/11/18 0545   Gross per 24 hour   Intake              750 ml   Output             1600 ml   Net             -850 ml       Physical Exam:  General appearance: Well developed, well nourished, obese  Eyes:  Conjunctivae/corneas clear. PERRL.  Lungs: Normal respiratory effort,   clear to auscultation bilaterally   Heart: Ervin/Regular rate and rhythm, S1, S2 normal, no murmur, rub or pino.  Abdomen: Soft, non-tender non-distended; bowel sounds normal; no masses,  no organomegaly, obese  Extremities: No cyanosis or clubbing. 1+ edema.    Skin: Skin color, texture, turgor normal. No rashes or lesions  Neurologic: Normal strength and tone. No focal numbness or weakness       Laboratory Data:    Recent Labs  Lab 05/11/18  0517   WBC 7.28   RBC 4.24*   HGB 12.5*   HCT 38.5*      MCV 91   MCH 29.5   MCHC 32.5       BMP:   Recent Labs  Lab 05/09/18  0718  05/11/18  0518   *  < > 153*     < > 139   K 4.5  < > 4.4     < > 105   CO2 28  < > 23   BUN 39*  < > 27*   CREATININE 2.2*  < > 1.4   CALCIUM 9.1  < > " 8.6*   MG 2.6  --   --    < > = values in this interval not displayed.  Lab Results   Component Value Date    CALCIUM 8.6 (L) 05/11/2018    PHOS 3.9 05/11/2018       Lab Results   Component Value Date    PTH 64.7 05/10/2018    CALCIUM 8.6 (L) 05/11/2018    PHOS 3.9 05/11/2018       Lab Results   Component Value Date    URICACID 9.3 (H) 05/09/2018       BNP    Recent Labs  Lab 05/09/18  0718   BNP 98       Medications:  Medication list was reviewed and changes noted under Assessment/Plan.    Diagnostic Results:    US Lower Extremity Veins Bilateral   Final Result      No evidence of deep venous thrombosis in either lower extremity.  Slow flow through the left greater saphenous vein which is a superficial vein.         Electronically signed by: Rivka Shipman MD   Date:    05/09/2018   Time:    17:32      US Retroperitoneal Complete (Kidney and   Final Result      Normal renal morphology bilaterally without hydronephrosis.         Electronically signed by: Fernando Bonner MD   Date:    05/09/2018   Time:    17:25      X-Ray Chest AP Portable   Final Result      1. No interval detrimental change.         Electronically signed by: Johan Villalobos MD   Date:    05/09/2018   Time:    07:49      CT Chest Without Contrast    (Results Pending)   NM Lung Ventilation Perfusion Imaging    (Results Pending)       ASSESSMENT/PLAN:     1. Resolving non-oliguric NATALIIA on CKD III (Baseline Creat 1.2) secondary to over-diuresis, HTN.  Now with non-nephrotic proteinuria (2.8gm)  (N17.9, N18.3, R80.9): Hold diuretics.  Gentle hydration as CXR/BNP wnl.  Renal U/S consistent with DM Neph.  With 2.8 gr/protein on spot checking proteinuric labs but likely consistent with DM/HTN Nephropathy.  Restart low dose ARB today since Creat down to 1.4.  Follow trends.  Will not be able to diurese edema due to proteinuria.  Placed teds. Considering renal biopsy as outpt depending serology.  Renally dose meds, avoid nephrotoxins, and monitor I/O's  closely.  2. SOB secondary to ?  (R06.02):  Bnp/cxr wnl.   D-dimer/LE US negative.  Pt already on eliquis as outpt.  Amio vs other.  Needs outpt sleep study as showing signs of RENU.   3. DM (E11.65): Recommend holding DDP4 while on GLP-1.  SSI.  Defer to PCP.   4. Normocytic anemia with mild SABRINA/Folate/B12 (D64.9, D50.9):  Started oral replacements.  No IV iron for now.           See above  Ok to DC from Renal  F/U with Dr. Ortiz 2 weeks.

## 2018-05-11 NOTE — PROGRESS NOTES
Progress Note  Pulmonology    Admit Date: 5/9/2018   LOS: 0 days     SUBJECTIVE:   Comfortable night- no cough- rr 24-unable to do ct due to tachypnea flat-was previously on amiodarone  Continuous Infusions:   sodium chloride 0.9% 50 mL/hr at 05/10/18 1738       Scheduled Meds:   amLODIPine  10 mg Oral Daily    apixaban  2.5 mg Oral BID    aspirin  81 mg Oral Daily    atorvastatin  40 mg Oral Daily    carvedilol  25 mg Oral BID    dronedarone  400 mg Oral BID WM    ferrous sulfate  325 mg Oral BID    folic acid-vit B6-vit B12 2.5-25-2 mg  1 tablet Oral Daily    liraglutide 0.6 mg/0.1 mL (18 mg/3 mL) subq PNIJ  1.2 mg Subcutaneous Daily    SITagliptin  50 mg Oral BID WM       Review of Systems:  Constitutional: no fever or chills  Respiratory: positive for dyspnea on exertion, negative for hemoptysis and pleurisy/chest pain  Cardiovascular: positive for dyspnea      OBJECTIVE:     Vital Signs Range (Last 24H):  Temp:  [97.3 °F (36.3 °C)-98.4 °F (36.9 °C)]   Pulse:  [52-63]   Resp:  [18]   BP: (141-197)/(69-91)   SpO2:  [92 %-98 %]     I & O (Last 24H):  Intake/Output Summary (Last 24 hours) at 05/11/18 0636  Last data filed at 05/11/18 0545   Gross per 24 hour   Intake              750 ml   Output             1600 ml   Net             -850 ml       Physical Exam:  General: mild distress, well developed, well nourished, appears stated age, moderately obese  Neck: jugular venous distention - 6 cm above sternal notch  Lungs:  diminished breath sounds bibasilar  Chest Wall: no tenderness  Heart: regular rate and rhythm-mild hjrAbdomen: normal findings: bowel sounds normal and no masses palpable  Extremities: edema 1 plus  Neurologic: alert, oriented, thought content appropriate  Motor:l hemiparesis    Laboratory:  CBC:   Recent Labs  Lab 05/10/18  0515   WBC 6.96   RBC 4.17*   HGB 12.3*   HCT 37.6*      MCV 90   MCH 29.5   MCHC 32.7     CMP:   Recent Labs  Lab 05/09/18  0718  05/10/18  1310   *   < > 151*   CALCIUM 9.1  < > 8.7   ALBUMIN 3.6  --   --    PROT 7.0  --   --      < > 139   K 4.5  < > 4.1   CO2 28  < > 24     < > 103   BUN 39*  < > 33*   CREATININE 2.2*  < > 1.6*   ALKPHOS 106  --   --    ALT 16  --   --    AST 16  --   --    BILITOT 0.5  --   --    < > = values in this interval not displayed.  Coagulation:   Recent Labs  Lab 05/09/18  0718   LABPROT 10.4   INR 0.9     ABGs:   Recent Labs  Lab 05/10/18  2213   PH 7.403   PCO2 43.0   PO2 61*   HCO3 26.8   POCSATURATED 91*   BE 2     Microbiology Results (last 7 days)     ** No results found for the last 168 hours. **              Diagnostic Results:  X-Ray: Reviewed    ASSESSMENT/PLAN:     Patient Active Problem List   Diagnosis    HTN (hypertension)    Hypercholesteremia    Non-insulin dependent type 2 diabetes mellitus    CVA (cerebral vascular accident)    Morbid obesity    PAF (paroxysmal atrial fibrillation)    Coronary artery disease involving native coronary artery of native heart with unstable angina pectoris    Atypical chest pain    Osteoarthritis of cervical spine with myelopathy    Cervical radiculopathy    Cervical spondylosis    DDD (degenerative disc disease), cervical    Bilateral shoulder bursitis    Hx of repair of right rotator cuff    Acute bursitis of left shoulder    Cervical stenosis of spinal canal    Lumbar spondylosis    DDD (degenerative disc disease), lumbar    Lumbar radiculopathy    Lumbar spinal stenosis    Bulging lumbar disc    Bilateral hip bursitis    Herniated cervical disc    Atrial flutter    Exertional dyspnea    Shortness of breath     .   dyspnea- nonsmoker- neg venous doppler  Hyperdynamic heart-?diastolic dysfunction   hypertension severe  Acute on chronic renal failure  Pos d dimer may relate to renal insuf- would check vq due to multiple admits but unlikely to have thromboembolic disease   elevated left aníbal-diaphraghm-check fluoro for function-  Suspect  hypoxemia--relates to decrecruiting through atelectatic base below elevATED HEMIDIAPHRAGHM  Hx amio- check pft and ct(unable to do )-do not appreciate any classic fibrotic crackles      Case Discussed With:    Discharge Needs and Plans: IS   will try bronchodilator to try to recruit l base after pft-add inhaled steroid if anything suggests amio effect but doubt  Assess for home o2

## 2018-05-11 NOTE — PLAN OF CARE
Problem: Patient Care Overview  Goal: Individualization & Mutuality  Outcome: Ongoing (interventions implemented as appropriate)  Pt free of falls/trauma/injuries this shift.VSS. NS infusing, medication administered as perscribed. BG controllled no SS needed. Pt on RA sating 93%. PFTs' VQ scan and CT scan to be completed tomorrow. Patient tolerating POC well. Pt verbalizes understanding of care. Pt denies any chest pain, SOB, or any other discomforts at this time. Will continue to monitor.

## 2018-05-12 LAB — ASO AB SERPL-ACNC: 17 IU/ML

## 2018-05-13 NOTE — PROGRESS NOTES
Subjective:    Patient ID:  Zev Castano is a 68 y.o. male     HPI  Here for F/U after recent trip to the ER in [place].    Was short of breath, and had a high BP when on his trip to [place]. Went to the ER and was placed on Amlodipine and Lasix 80 mg po qd. Now less breathless, less swelling of lower legs.    Current Outpatient Prescriptions   Medication Sig    amLODIPine (NORVASC) 10 MG tablet Take 10 mg by mouth once daily.    apixaban 2.5 mg Tab Take 1 tablet (2.5 mg total) by mouth 2 (two) times daily.    aspirin 81 MG Chew Take 1 tablet (81 mg total) by mouth once daily.    atorvastatin (LIPITOR) 40 MG tablet TAKE 1 TABLET(40 MG) BY MOUTH EVERY DAY    carvedilol (COREG) 25 MG tablet Take 1 tablet (25 mg total) by mouth 2 (two) times daily.    CONTOUR NEXT STRIPS Strp Use one strip each time to check blood glucose daily as directed    dronedarone (MULTAQ) 400 mg Tab Take 1 tablet (400 mg total) by mouth 2 (two) times daily with meals.    furosemide (LASIX) 40 MG tablet Take 1 tablet (40 mg total) by mouth every other day.    irbesartan (AVAPRO) 300 MG tablet Take 1 tablet (300 mg total) by mouth every evening.    irbesartan (AVAPRO) 300 MG tablet Take 1 tablet (300 mg total) by mouth once daily.    liraglutide 0.6 mg/0.1 mL, 18 mg/3 mL, subq PNIJ 0.6 mg/0.1 mL (18 mg/3 mL) PnIj Inject 1.2 mg into the skin once daily.    potassium chloride SA (K-DUR,KLOR-CON) 20 MEQ tablet Take 1 tablet (20 mEq total) by mouth every other day.    SITagliptan-metformin (JANUMET) 50-1,000 mg per tablet Take 1 tablet by mouth 2 (two) times daily with meals. (Patient taking differently: Take 1 tablet by mouth once daily. )    tiotropium (SPIRIVA) 18 mcg inhalation capsule Inhale 1 capsule (18 mcg total) into the lungs once daily. Controller     No current facility-administered medications for this visit.    (Taking Amiodarone 200 mg po tid)      Review of Systems   Constitution: Negative for chills, decreased  "appetite, fever, weight gain and weight loss.   HENT: Negative for congestion, hearing loss and sore throat.    Eyes: Negative for blurred vision, double vision and visual disturbance.   Cardiovascular: Positive for dyspnea on exertion and leg swelling. Negative for chest pain, claudication, palpitations and syncope.   Respiratory: Negative for cough, hemoptysis, shortness of breath, sputum production and wheezing.    Endocrine: Negative for cold intolerance and heat intolerance.   Hematologic/Lymphatic: Negative for bleeding problem. Does not bruise/bleed easily.   Skin: Negative for color change, dry skin, flushing and itching.   Musculoskeletal: Negative for back pain, joint pain and myalgias.   Gastrointestinal: Negative for abdominal pain, anorexia, constipation, diarrhea, dysphagia, nausea and vomiting.        No bleeding per rectum   Genitourinary: Negative for dysuria, flank pain, frequency, hematuria and nocturia.   Neurological: Negative for dizziness, headaches, light-headedness, loss of balance, seizures and tremors.   Psychiatric/Behavioral: Negative for altered mental status and depression.         Vitals:    05/02/18 1045   BP: (!) 145/72   Pulse: (!) 47   Weight: 123.8 kg (273 lb)   Height: 6' 1" (1.854 m)     Objective:    Physical Exam   Constitutional: He is oriented to person, place, and time. He appears well-developed and well-nourished.   HENT:   Head: Normocephalic and atraumatic.   Right Ear: External ear normal.   Left Ear: External ear normal.   Nose: Nose normal.   Eyes: Conjunctivae and EOM are normal. Pupils are equal, round, and reactive to light. No scleral icterus.   Neck: Normal range of motion. Neck supple. No JVD present. No tracheal deviation present. No thyromegaly present.   Cardiovascular: Normal rate, regular rhythm and normal heart sounds.  Exam reveals no gallop and no friction rub.    No murmur heard.  Pulmonary/Chest: Effort normal and breath sounds normal. No respiratory " distress. He has no rales. He exhibits no tenderness.   Abdominal: Soft. Bowel sounds are normal. He exhibits no distension and no mass. There is no tenderness.   Musculoskeletal: Normal range of motion. He exhibits edema. He exhibits no tenderness.   Lymphadenopathy:     He has no cervical adenopathy.   Neurological: He is alert and oriented to person, place, and time. He has normal reflexes. No cranial nerve deficit. Coordination normal.   Skin: Skin is warm and dry. No rash noted.   Psychiatric: He has a normal mood and affect. His behavior is normal.         Assessment:       1. SOB (shortness of breath)    2. Atrial flutter, unspecified type    3. Coronary artery disease involving native coronary artery of native heart with unstable angina pectoris    4. Essential hypertension    5. Non-insulin dependent type 2 diabetes mellitus    6. Morbid obesity    7. Cerebrovascular accident (CVA), unspecified mechanism    8. DDD (degenerative disc disease), cervical    9. DDD (degenerative disc disease), lumbar             Plan:       Reduce Amiodarone to 200 mg po qd  Check lab

## 2018-05-14 ENCOUNTER — HOSPITAL ENCOUNTER (INPATIENT)
Facility: OTHER | Age: 68
LOS: 3 days | Discharge: HOME | DRG: 309 | End: 2018-05-17
Attending: EMERGENCY MEDICINE | Admitting: INTERNAL MEDICINE
Payer: MEDICARE

## 2018-05-14 DIAGNOSIS — R00.1 SYMPTOMATIC BRADYCARDIA: Primary | ICD-10-CM

## 2018-05-14 DIAGNOSIS — E11.9 NON-INSULIN DEPENDENT TYPE 2 DIABETES MELLITUS: Chronic | ICD-10-CM

## 2018-05-14 DIAGNOSIS — R06.02 SHORTNESS OF BREATH: ICD-10-CM

## 2018-05-14 DIAGNOSIS — N28.9 RENAL INSUFFICIENCY: ICD-10-CM

## 2018-05-14 DIAGNOSIS — R06.02 SOB (SHORTNESS OF BREATH): ICD-10-CM

## 2018-05-14 LAB
ALBUMIN SERPL BCP-MCNC: 3.4 G/DL
ALP SERPL-CCNC: 103 U/L
ALT SERPL W/O P-5'-P-CCNC: 19 U/L
ANCA AB TITR SER IF: NORMAL TITER
ANION GAP SERPL CALC-SCNC: 7 MMOL/L
AST SERPL-CCNC: 17 U/L
BASOPHILS # BLD AUTO: 0.04 K/UL
BASOPHILS NFR BLD: 0.5 %
BILIRUB SERPL-MCNC: 0.4 MG/DL
BNP SERPL-MCNC: 74 PG/ML
BUN SERPL-MCNC: 34 MG/DL
CALCIUM SERPL-MCNC: 8.6 MG/DL
CHLORIDE SERPL-SCNC: 103 MMOL/L
CO2 SERPL-SCNC: 27 MMOL/L
CREAT SERPL-MCNC: 1.9 MG/DL
DIFFERENTIAL METHOD: ABNORMAL
EOSINOPHIL # BLD AUTO: 0.4 K/UL
EOSINOPHIL NFR BLD: 4.7 %
ERYTHROCYTE [DISTWIDTH] IN BLOOD BY AUTOMATED COUNT: 14.9 %
EST. GFR  (AFRICAN AMERICAN): 41 ML/MIN/1.73 M^2
EST. GFR  (NON AFRICAN AMERICAN): 35 ML/MIN/1.73 M^2
GLUCOSE SERPL-MCNC: 308 MG/DL
HCT VFR BLD AUTO: 39.4 %
HGB BLD-MCNC: 12.6 G/DL
LYMPHOCYTES # BLD AUTO: 1.4 K/UL
LYMPHOCYTES NFR BLD: 18.2 %
MAGNESIUM SERPL-MCNC: 2.8 MG/DL
MCH RBC QN AUTO: 29.4 PG
MCHC RBC AUTO-ENTMCNC: 32 G/DL
MCV RBC AUTO: 92 FL
MONOCYTES # BLD AUTO: 0.7 K/UL
MONOCYTES NFR BLD: 9.4 %
NEUTROPHILS # BLD AUTO: 5.1 K/UL
NEUTROPHILS NFR BLD: 66.8 %
P-ANCA TITR SER IF: NORMAL TITER
PLATELET # BLD AUTO: 245 K/UL
PMV BLD AUTO: 10.5 FL
POCT GLUCOSE: 210 MG/DL (ref 70–110)
POTASSIUM SERPL-SCNC: 4.7 MMOL/L
PROT SERPL-MCNC: 6.8 G/DL
RBC # BLD AUTO: 4.28 M/UL
SODIUM SERPL-SCNC: 137 MMOL/L
TROPONIN I SERPL DL<=0.01 NG/ML-MCNC: <0.006 NG/ML
WBC # BLD AUTO: 7.59 K/UL

## 2018-05-14 PROCEDURE — 80053 COMPREHEN METABOLIC PANEL: CPT

## 2018-05-14 PROCEDURE — 93010 ELECTROCARDIOGRAM REPORT: CPT | Mod: ,,, | Performed by: INTERNAL MEDICINE

## 2018-05-14 PROCEDURE — 20000000 HC ICU ROOM

## 2018-05-14 PROCEDURE — 93005 ELECTROCARDIOGRAM TRACING: CPT

## 2018-05-14 PROCEDURE — 25000003 PHARM REV CODE 250: Performed by: INTERNAL MEDICINE

## 2018-05-14 PROCEDURE — 25000003 PHARM REV CODE 250: Performed by: EMERGENCY MEDICINE

## 2018-05-14 PROCEDURE — 99285 EMERGENCY DEPT VISIT HI MDM: CPT | Mod: 25

## 2018-05-14 PROCEDURE — 85025 COMPLETE CBC W/AUTO DIFF WBC: CPT

## 2018-05-14 PROCEDURE — 99284 EMERGENCY DEPT VISIT MOD MDM: CPT

## 2018-05-14 PROCEDURE — 63600175 PHARM REV CODE 636 W HCPCS

## 2018-05-14 PROCEDURE — 83735 ASSAY OF MAGNESIUM: CPT

## 2018-05-14 PROCEDURE — 63600175 PHARM REV CODE 636 W HCPCS: Performed by: EMERGENCY MEDICINE

## 2018-05-14 PROCEDURE — 94761 N-INVAS EAR/PLS OXIMETRY MLT: CPT

## 2018-05-14 PROCEDURE — 83880 ASSAY OF NATRIURETIC PEPTIDE: CPT

## 2018-05-14 PROCEDURE — 84484 ASSAY OF TROPONIN QUANT: CPT

## 2018-05-14 RX ORDER — HYDRALAZINE HYDROCHLORIDE 10 MG/1
5 TABLET, FILM COATED ORAL EVERY 4 HOURS PRN
Status: DISCONTINUED | OUTPATIENT
Start: 2018-05-14 | End: 2018-05-14

## 2018-05-14 RX ORDER — IBUPROFEN 200 MG
16 TABLET ORAL
Status: DISCONTINUED | OUTPATIENT
Start: 2018-05-14 | End: 2018-05-17 | Stop reason: HOSPADM

## 2018-05-14 RX ORDER — ONDANSETRON 8 MG/1
8 TABLET, ORALLY DISINTEGRATING ORAL EVERY 8 HOURS PRN
Status: DISCONTINUED | OUTPATIENT
Start: 2018-05-14 | End: 2018-05-17 | Stop reason: HOSPADM

## 2018-05-14 RX ORDER — ATROPINE SULFATE 0.1 MG/ML
INJECTION INTRAVENOUS
Status: COMPLETED
Start: 2018-05-14 | End: 2018-05-14

## 2018-05-14 RX ORDER — GLUCAGON 1 MG
1 KIT INJECTION
Status: DISCONTINUED | OUTPATIENT
Start: 2018-05-14 | End: 2018-05-17 | Stop reason: HOSPADM

## 2018-05-14 RX ORDER — ATROPINE SULFATE 0.1 MG/ML
INJECTION INTRAVENOUS
Status: DISPENSED
Start: 2018-05-14 | End: 2018-05-15

## 2018-05-14 RX ORDER — ACETAMINOPHEN 325 MG/1
650 TABLET ORAL EVERY 8 HOURS PRN
Status: DISCONTINUED | OUTPATIENT
Start: 2018-05-14 | End: 2018-05-17 | Stop reason: HOSPADM

## 2018-05-14 RX ORDER — IBUPROFEN 200 MG
24 TABLET ORAL
Status: DISCONTINUED | OUTPATIENT
Start: 2018-05-14 | End: 2018-05-17 | Stop reason: HOSPADM

## 2018-05-14 RX ORDER — CARVEDILOL 12.5 MG/1
12.5 TABLET ORAL 2 TIMES DAILY
Status: DISCONTINUED | OUTPATIENT
Start: 2018-05-14 | End: 2018-05-15

## 2018-05-14 RX ORDER — INSULIN ASPART 100 [IU]/ML
0-5 INJECTION, SOLUTION INTRAVENOUS; SUBCUTANEOUS
Status: DISCONTINUED | OUTPATIENT
Start: 2018-05-14 | End: 2018-05-17 | Stop reason: HOSPADM

## 2018-05-14 RX ORDER — ASPIRIN 81 MG/1
81 TABLET ORAL DAILY
Status: DISCONTINUED | OUTPATIENT
Start: 2018-05-14 | End: 2018-05-17 | Stop reason: HOSPADM

## 2018-05-14 RX ORDER — HYDRALAZINE HYDROCHLORIDE 20 MG/ML
5 INJECTION INTRAMUSCULAR; INTRAVENOUS EVERY 4 HOURS PRN
Status: DISCONTINUED | OUTPATIENT
Start: 2018-05-15 | End: 2018-05-17 | Stop reason: HOSPADM

## 2018-05-14 RX ADMIN — ASPIRIN 81 MG: 81 TABLET, COATED ORAL at 11:05

## 2018-05-14 RX ADMIN — INSULIN ASPART 1 UNITS: 100 INJECTION, SOLUTION INTRAVENOUS; SUBCUTANEOUS at 10:05

## 2018-05-14 RX ADMIN — APIXABAN 2.5 MG: 2.5 TABLET, FILM COATED ORAL at 11:05

## 2018-05-14 RX ADMIN — DRONEDARONE 400 MG: 400 TABLET, FILM COATED ORAL at 11:05

## 2018-05-14 RX ADMIN — ATROPINE SULFATE 0.5 MG: 0.1 INJECTION PARENTERAL at 06:05

## 2018-05-14 RX ADMIN — CARVEDILOL 12.5 MG: 12.5 TABLET, FILM COATED ORAL at 11:05

## 2018-05-14 NOTE — ED PROVIDER NOTES
Encounter Date: 5/14/2018    SCRIBE #1 NOTE: I, Luis Ennis, am scribing for, and in the presence of, Dr. Sanchez.       History     Chief Complaint   Patient presents with    Shortness of Breath     pt with sob      Seen by provider: 6:15 PM    Patient is a 68 y.o. male with a history of CAD, DM, HTN, HLD, AFIB, and CHF who presents to the ED with complaint of shortness of breath, which began yesterday and has progressively worsened since. Shortness of breath is worse with exertion, even with walking short distances. He is currently short of breath. Patient was discharged from here three days ago and states he felt well two days ago, with onset of SOB yesterday. Patient reports his heart rate was 32 prior to arrival, and state he called Dr. Livingston who told him to come to the ED. He reports being taken off amiodarone, restarted on Multaq, and prescribed Spiriva when he was discharged three days ago. He reports using spiriva yesterday and today without relief to SOB. He reports chronic bilateral leg swelling which is unchanged from baseline. Per wife he is taking lasix 40 mg every other day. Patient denies chest pain or dizziness.       The history is provided by the patient.     Review of patient's allergies indicates:   Allergen Reactions    Doxycycline Rash     Past Medical History:   Diagnosis Date    Coronary artery disease     Diabetes mellitus type II     Hyperlipidemia     Hypertension     Paroxysmal atrial fibrillation     Stroke     nov 2012     Past Surgical History:   Procedure Laterality Date    KNEE SURGERY Left     SHOULDER SURGERY Right      Family History   Problem Relation Age of Onset    Hypertension Mother      Social History   Substance Use Topics    Smoking status: Former Smoker     Types: Cigars    Smokeless tobacco: Never Used      Comment: occasional, when at the [employer]    Alcohol use 0.6 oz/week     1 Cans of beer per week      Comment: occasional when at the [employer]      Review of Systems   Constitutional: Negative for fever.   HENT: Negative for congestion.    Respiratory: Positive for shortness of breath. Negative for cough.    Cardiovascular: Positive for leg swelling (bilateral, chronic). Negative for chest pain and palpitations.   Gastrointestinal: Negative for nausea and vomiting.   Genitourinary: Negative for difficulty urinating.   Musculoskeletal: Negative for back pain.   Skin: Negative for rash.   Neurological: Negative for dizziness.   Psychiatric/Behavioral: Negative for confusion.       Physical Exam     Initial Vitals   BP Pulse Resp Temp SpO2   05/14/18 1917 05/14/18 1809 05/14/18 1928 -- 05/14/18 1809   135/65 (!) 40 (!) 22  96 %      MAP       05/14/18 1917       88.33         Vitals:    05/14/18 1814 05/14/18 1817 05/14/18 1917 05/14/18 1928   BP:   135/65    Pulse:  (!) 38 (!) 40 (!) 40   Resp:    (!) 22   SpO2:  97% 98% 99%   Weight: 117.9 kg (259 lb 14.8 oz)      Height: 6' (1.829 m)       05/14/18 1947   BP: (!) 153/72   Pulse: (!) 40   Resp: (!) 24   SpO2: 98%   Weight:    Height:       Physical Exam    Nursing note and vitals reviewed.  Constitutional: He appears well-developed and well-nourished. He appears distressed.   HENT:   Head: Normocephalic and atraumatic.   Eyes: EOM are normal. Pupils are equal, round, and reactive to light.   Mild conjunctival pallor.   Neck: Normal range of motion. Neck supple.   Cardiovascular: Normal heart sounds. Bradycardia present.    Pulmonary/Chest: He has no wheezes.   Mild increased work of breathing.    Abdominal: Soft. There is no tenderness.   Musculoskeletal: Normal range of motion. He exhibits edema (dense 2+ pitting edema, symmetric to bilateral knees).   Neurological: He is alert and oriented to person, place, and time.   Skin: Skin is warm and dry.   Psychiatric: He has a normal mood and affect. His behavior is normal.         ED Course   Critical Care  Date/Time: 5/14/2018 7:50 PM  Performed by: KRISTIE  CLARISA  Authorized by: CLARISA FLOWERS   Direct patient critical care time: 25 minutes  Additional history critical care time: 5 minutes  Ordering / reviewing critical care time: 5 minutes  Documentation critical care time: 5 minutes  Consulting other physicians critical care time: 5 minutes  Total critical care time (exclusive of procedural time) : 45 minutes  Critical care was necessary to treat or prevent imminent or life-threatening deterioration of the following conditions: cardiac failure.        Labs Reviewed   CBC W/ AUTO DIFFERENTIAL - Abnormal; Notable for the following:        Result Value    RBC 4.28 (*)     Hemoglobin 12.6 (*)     Hematocrit 39.4 (*)     RDW 14.9 (*)     All other components within normal limits   COMPREHENSIVE METABOLIC PANEL - Abnormal; Notable for the following:     Glucose 308 (*)     BUN, Bld 34 (*)     Creatinine 1.9 (*)     Calcium 8.6 (*)     Albumin 3.4 (*)     Anion Gap 7 (*)     eGFR if  41 (*)     eGFR if non  35 (*)     All other components within normal limits   MAGNESIUM - Abnormal; Notable for the following:     Magnesium 2.8 (*)     All other components within normal limits   B-TYPE NATRIURETIC PEPTIDE   MAGNESIUM   TROPONIN I   TROPONIN I     Imaging Results          X-Ray Chest AP Portable (Final result)  Result time 05/14/18 19:10:57    Final result by Kimber Sarkar MD (05/14/18 19:10:57)                 Impression:      No significant change.      Electronically signed by: Kimber Sarkar MD  Date:    05/14/2018  Time:    19:10             Narrative:    EXAMINATION:  XR CHEST AP PORTABLE    CLINICAL HISTORY:  shortness of breath;    TECHNIQUE:  Single frontal view of the chest was performed.    COMPARISON:  05/09/2018.    FINDINGS:  Support device overlies the left chest wall.  Lungs are hypoinflated which accentuates cardiomediastinal silhouette and pulmonary vascular markings.  Heart appears stable in size.  Difficult to exclude  mild pulmonary edema.  There is mild elevation of the left hemidiaphragm with stable left basilar atelectasis.  No evidence of pneumothorax or large effusion.                                EKG Readings: (Independently Interpreted)   Junctional rhythm. Bradycardia at a rate of 37. Wide QRS complex. No STEMI. Unclear P waves.       X-Rays:   Independently Interpreted Readings:   Chest X-Ray: Pacer pads in place. Diffuse increased interstitial markings.     Medical Decision Making:   Initial Assessment:   Urgent evaluation of a 68-year-old gentleman with hypertension, diabetes here with complaint of shortness of breath.  The patient has new diagnosis of CHF, discharged 3 days ago after admission for same complaint, requiring diuresis for lower extremity swelling. Patient had left heart catheterization, which showed patent LAD stent with good EF, recently stopped amiodarone for paroxysmal AF flutter,- now on Multaq, Eliquis, Lasix, carvedilol. During admission, seen by renal and pulm- thought to have nephrotic syndrome and obstructive lung dz.   Independently Interpreted Test(s):   I have ordered and independently interpreted X-rays - see prior notes.  I have ordered and independently interpreted EKG Reading(s) - see prior notes  Clinical Tests:   Lab Tests: Reviewed and Ordered  Radiological Study: Reviewed and Ordered  Medical Tests: Reviewed and Ordered  ED Management:  Patient given atropine with improvement in heart rate.  Discussed case with Dr. Livingston, agrees the patient needs admission for likely definitive pacemaker placement given concern for sick sinus Syndrome.            Scribe Attestation:   Scribe #1: I performed the above scribed service and the documentation accurately describes the services I performed. I attest to the accuracy of the note.    Attending Attestation:           Physician Attestation for Scribe:  Physician Attestation Statement for Scribe #1: I, Dr. Sanchez, reviewed documentation,  as scribed by Luis Ennis in my presence, and it is both accurate and complete.                    Clinical Impression:     1. Symptomatic bradycardia    2. SOB (shortness of breath)    3. Non-insulin dependent type 2 diabetes mellitus    4. Renal insufficiency         Disposition:   Disposition: Admitted  Condition: Serious                        Vale Sanchez MD  05/14/18 7728

## 2018-05-14 NOTE — ED TRIAGE NOTES
"Patient presents with girlfriend to ED doors complaining of shortness of breath. Denies any pain or new symptoms. States he was discharged on Friday and "felt great" until this afternoon. Pt reports trying to walk from living room to bathroom which he states is about 12 ft and became increasingly short of breath and had to sit down.   "

## 2018-05-14 NOTE — DISCHARGE SUMMARY
Dictation #2  MRN:[id number]  [facility]:[id number]  Mr. Castano is a 68-year-old gentleman presented to the emergency room with complaints of progressively increasing breathlessness.  He was recently in [place] when he went to the emergency room there with complaints of shortness of breath.  He was told he had congestive heart failure, was given a prescription for Lasix.  He has been taking 80 mg of Lasix daily, and says that both the shortness of breath and the leg swelling has progressively increased.  Note that when in the emergency room in [place] his systolic blood pressure was greater than 200, and the amlodipine was resumed.  Use recently hospitalized at [facility] am for heart catheterization which showed patent left anterior descending coronary artery stents, and mild nonocclusive coronary artery disease.  He had good left ventricular systolic function echocardiographic evidence of diastolic left ventricular dysfunction.  He has a history of paroxysmally atrial of flutter, was initially on Multaq, and more recently Multaq was discontinued and he was placed on amiodarone.  He has a past history of having had previous strokes.  His home medications on admission included amlodipine amiodarone apixaban aspirin and atorvastatin carvedilol jardiance furosemide Avapro Victoza potassium chloride and Janumet.    The physical examination showed a heavy-set man in no apparent acute distress his blood pressure was on 40/70 with a pulse 70 beats per min neck out upstroke was brisk there was no carotid bruit the heart size was normal S1 and S2 are normal with no audible murmur or gallop there was 2+ ankle edema and grossly the neurological exam was intact.  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  He was seen by Nephrology, Dr. Sharon Ortiz so felt that he had a component of nephrotic syndrome with the azotemia and high-level of albumin in the urine.  She suggested consideration of a renal biopsy in future.    He was seen by Dr. Sam Perez in pulmonology, who felt that although amiodarone pulmonary toxicity with extremely unlikely, that he had a component of obstructive lung disease.  He recommended discharge on bronchodilator inhalers, and to have a pulmonary function study performed as an outpatient.  At the time of discharge the Jardiance and the amiodarone were discontinued, the remaining medications were continued and I will see him for follow-up in the office next week.

## 2018-05-14 NOTE — ED NOTES
Cardiac monitor applied, defib pads applied per bradycardia at 32 bpm,   Continuous pulse ox applied.    Bed in lowest position, call light in reach, GCS 15    Pt has pale skin but still warm and non diaphoretic. Capillary refill of less than 2 seconds.

## 2018-05-15 LAB
POCT GLUCOSE: 167 MG/DL (ref 70–110)
POCT GLUCOSE: 173 MG/DL (ref 70–110)
POCT GLUCOSE: 189 MG/DL (ref 70–110)
POCT GLUCOSE: 195 MG/DL (ref 70–110)

## 2018-05-15 PROCEDURE — 25000003 PHARM REV CODE 250: Performed by: INTERNAL MEDICINE

## 2018-05-15 PROCEDURE — 25000242 PHARM REV CODE 250 ALT 637 W/ HCPCS: Performed by: INTERNAL MEDICINE

## 2018-05-15 PROCEDURE — 63600175 PHARM REV CODE 636 W HCPCS: Performed by: INTERNAL MEDICINE

## 2018-05-15 PROCEDURE — 27000221 HC OXYGEN, UP TO 24 HOURS

## 2018-05-15 PROCEDURE — 97802 MEDICAL NUTRITION INDIV IN: CPT

## 2018-05-15 PROCEDURE — 25000003 PHARM REV CODE 250: Performed by: EMERGENCY MEDICINE

## 2018-05-15 PROCEDURE — 94761 N-INVAS EAR/PLS OXIMETRY MLT: CPT

## 2018-05-15 PROCEDURE — 94640 AIRWAY INHALATION TREATMENT: CPT

## 2018-05-15 PROCEDURE — 11000001 HC ACUTE MED/SURG PRIVATE ROOM

## 2018-05-15 RX ORDER — METFORMIN HYDROCHLORIDE 500 MG/1
1000 TABLET ORAL 2 TIMES DAILY WITH MEALS
Status: DISCONTINUED | OUTPATIENT
Start: 2018-05-15 | End: 2018-05-15

## 2018-05-15 RX ORDER — CARVEDILOL 12.5 MG/1
25 TABLET ORAL 2 TIMES DAILY
Status: DISCONTINUED | OUTPATIENT
Start: 2018-05-15 | End: 2018-05-16

## 2018-05-15 RX ORDER — AMLODIPINE BESYLATE 5 MG/1
10 TABLET ORAL DAILY
Status: DISCONTINUED | OUTPATIENT
Start: 2018-05-15 | End: 2018-05-17 | Stop reason: HOSPADM

## 2018-05-15 RX ORDER — TIOTROPIUM BROMIDE 18 UG/1
1 CAPSULE ORAL; RESPIRATORY (INHALATION) DAILY
Status: DISCONTINUED | OUTPATIENT
Start: 2018-05-15 | End: 2018-05-17 | Stop reason: HOSPADM

## 2018-05-15 RX ORDER — FUROSEMIDE 40 MG/1
40 TABLET ORAL EVERY OTHER DAY
Status: DISCONTINUED | OUTPATIENT
Start: 2018-05-16 | End: 2018-05-16

## 2018-05-15 RX ORDER — ATORVASTATIN CALCIUM 20 MG/1
40 TABLET, FILM COATED ORAL DAILY
Status: DISCONTINUED | OUTPATIENT
Start: 2018-05-15 | End: 2018-05-17 | Stop reason: HOSPADM

## 2018-05-15 RX ORDER — POTASSIUM CHLORIDE 20 MEQ/1
20 TABLET, EXTENDED RELEASE ORAL EVERY OTHER DAY
Status: DISCONTINUED | OUTPATIENT
Start: 2018-05-16 | End: 2018-05-16

## 2018-05-15 RX ORDER — METFORMIN HYDROCHLORIDE 500 MG/1
1000 TABLET ORAL
Status: DISCONTINUED | OUTPATIENT
Start: 2018-05-16 | End: 2018-05-17 | Stop reason: HOSPADM

## 2018-05-15 RX ORDER — IRBESARTAN 150 MG/1
300 TABLET ORAL NIGHTLY
Status: DISCONTINUED | OUTPATIENT
Start: 2018-05-15 | End: 2018-05-17 | Stop reason: HOSPADM

## 2018-05-15 RX ADMIN — APIXABAN 2.5 MG: 2.5 TABLET, FILM COATED ORAL at 09:05

## 2018-05-15 RX ADMIN — ASPIRIN 81 MG: 81 TABLET, COATED ORAL at 02:05

## 2018-05-15 RX ADMIN — DRONEDARONE 400 MG: 400 TABLET, FILM COATED ORAL at 09:05

## 2018-05-15 RX ADMIN — DRONEDARONE 400 MG: 400 TABLET, FILM COATED ORAL at 02:05

## 2018-05-15 RX ADMIN — CARVEDILOL 25 MG: 12.5 TABLET, FILM COATED ORAL at 09:05

## 2018-05-15 RX ADMIN — HYDRALAZINE HYDROCHLORIDE 5 MG: 20 INJECTION INTRAMUSCULAR; INTRAVENOUS at 11:05

## 2018-05-15 RX ADMIN — APIXABAN 2.5 MG: 2.5 TABLET, FILM COATED ORAL at 02:05

## 2018-05-15 RX ADMIN — CARVEDILOL 12.5 MG: 12.5 TABLET, FILM COATED ORAL at 12:05

## 2018-05-15 RX ADMIN — IRBESARTAN 300 MG: 150 TABLET ORAL at 09:05

## 2018-05-15 RX ADMIN — HYDRALAZINE HYDROCHLORIDE 5 MG: 20 INJECTION INTRAMUSCULAR; INTRAVENOUS at 10:05

## 2018-05-15 RX ADMIN — AMLODIPINE BESYLATE 10 MG: 5 TABLET ORAL at 12:05

## 2018-05-15 RX ADMIN — TIOTROPIUM BROMIDE 18 MCG: 18 CAPSULE ORAL; RESPIRATORY (INHALATION) at 04:05

## 2018-05-15 RX ADMIN — ATORVASTATIN CALCIUM 40 MG: 20 TABLET, FILM COATED ORAL at 09:05

## 2018-05-15 NOTE — ED NOTES
Per Dr Sanchez, only admin atropine if pt is symptomatic with low HR. Verbal orders to run EKG if pt becomes symptomatic.

## 2018-05-15 NOTE — PLAN OF CARE
Pt states his PCP is  and his pharmacy of choice is [facility] on [address].    Pt recently discharged. Returns now with low heart rate.    CM to follow for discharge plans.     05/15/18 1034   Discharge Assessment   Assessment Type Discharge Planning Assessment   Confirmed/corrected address and phone number on facesheet? Yes   Assessment information obtained from? Patient;Medical Record   Communicated expected length of stay with patient/caregiver yes   Prior to hospitilization cognitive status: Alert/Oriented   Prior to hospitalization functional status: Independent   Current cognitive status: Alert/Oriented   Current Functional Status: Independent   Lives With significant other   Is patient able to care for self after discharge? Yes   Patient's perception of discharge disposition home or selfcare   Patient currently being followed by outpatient case management? No   Patient currently receives any other outside agency services? No   Equipment Currently Used at Home walker, rolling;bath bench   Do you have any problems affording any of your prescribed medications? No   Is the patient taking medications as prescribed? yes   Does the patient have transportation home? Yes   Transportation Available family or friend will provide   Does the patient receive services at the [facility]? No   Discharge Plan A Home   Discharge Plan B Home   Patient/Family In Agreement With Plan yes

## 2018-05-15 NOTE — NURSING
Patient and wife waiting to speak with MD about plan of care  Heart rate remains in the 50-60 range some hypertension noted   hydralazine given to maintain parameters  Call bell within reach

## 2018-05-15 NOTE — NURSING
Called Dr Livingston office called to clarify medications since patient continues to be bradycardic and slightly hypertensive , spoke with Francoise awaiting on orders.

## 2018-05-15 NOTE — NURSING
Dr Livingston here and spoke with the patient and his wife   Medications clarified   Will transfer to the floor once bed available

## 2018-05-15 NOTE — ED NOTES
Pt updated on room assignment. pt states pain to right neck. MD informed, verbal order to run EKG prior to transport to ICU

## 2018-05-15 NOTE — NURSING
Pt has assigned tele bed 308. NEHAL Dale from [facility], received pt report. Will transfer pt.

## 2018-05-15 NOTE — PLAN OF CARE
Problem: Patient Care Overview  Goal: Plan of Care Review  Outcome: Ongoing (interventions implemented as appropriate)  Pt has tele orders; pt will remain in ICU until tele bed becomes available. Pt remains w even, unlabored resp, NAD w O2 sat 94% via rm air. PIVs remain intact and without redness or swelling. Pt denies any complaint at this time. HOB elevated, side rails up x3, bed in lowest position, and call bell at pt side. Will cont to monitor.

## 2018-05-15 NOTE — NURSING TRANSFER
Nursing Transfer Note      5/15/2018     Transfer To: TELE 308   From: ICU11    Transfer via wheelchair    Transfer with cardiac monitoring    Transported by AREN Metcalf RN     Medicines sent: yes     Chart send with patient: Yes    Notified: spouse    Patient reassessed at: 05/15/2018 5:45 PM     Upon arrival to floor: cardiac monitor applied, patient oriented to room, call bell in reach and bed in lowest position    Pt remains w even, unlabored resp, NAD. Pt AAOx3 PIVs remain intact and without redness or swelling. Pt denies any complaint at this time.

## 2018-05-15 NOTE — NURSING
Patient care assumed , patient lying in bed in no acute distress   Some shortness of breath with exertion oxygen on at 2l/nc  Lungs with few crackles and diminished   Abdomen soft   Voiding per urinal   Patient states some limitation to the left hand due to spurs in the neck but hand grasps are strong  Bilateral lower leg edema noted   Patient does state that he has chronic leg swelling   Feet warm to the touch and pulses present   In no acute distress

## 2018-05-15 NOTE — PLAN OF CARE
Problem: Patient Care Overview  Goal: Plan of Care Review  Outcome: Ongoing (interventions implemented as appropriate)  Recommendation/Intervention:   1. Recommend 2000kcal DM and cardiac diet to better meet needs   2. Assess diet education needs when pt transfers to floor     Goals:   1. Meet >85% EEN and EPN   2. Prevent wt loss of >2% per week   3. Promote nutrition related labs wnl

## 2018-05-15 NOTE — PROGRESS NOTES
" [facility]  Adult Nutrition  Progress Note     SUMMARY       Recommendations    Recommendation/Intervention:   1. Recommend 2000kcal DM and cardiac diet to better meet needs   2. Assess diet education needs when pt transfers to floor    Goals:   1. Meet >85% EEN and EPN   2. Prevent wt loss of >2% per week   3. Promote nutrition related labs wnl    Nutrition Goal Status: new  Communication of RD Recs: reviewed with RN    Comments: Pt endorses good appetite currently, but endorses unintentional wt loss of 45lbs over last 5-6 months. Per chart, -8.6% wt loss x 6 months, but noted with fluctuations likely due to fluid status. Pt states he is only eating 1-2 meals/day, limited by SOB. Per RN, breakfast held due to possible procedure later.    Reason for Assessment    Reason for Assessment: identified at risk by screening criteria (MST)    Diagnosis:   1. Symptomatic bradycardia    2. SOB (shortness of breath)    3. Non-insulin dependent type 2 diabetes mellitus    4. Renal insufficiency      Past Medical History:   Diagnosis Date    Coronary artery disease     Diabetes mellitus type II     Hyperlipidemia     Hypertension     Paroxysmal atrial fibrillation     Stroke     nov 2012      Interdisciplinary Rounds: attended  Nutrition Discharge Planning: d/c on DM and cardiac diet    Nutrition Risk Screen    Nutrition Risk Screen: no indicators present    Nutrition/Diet History    Do you have any cultural, spiritual, [employer]al conflicts, given your current situation?: no    Anthropometrics    Temp: 98.4 °F (36.9 °C)  Height: 6' 1" (185.4 cm)  Height (inches): 73 in  Weight Method: Bed Scale  Weight: 120.4 kg (265 lb 6.9 oz)  Weight (lb): 265.44 lb  Ideal Body Weight (IBW), Male: 184 lb  % Ideal Body Weight, Male (lb): 144.26 lb  BMI (Calculated): 35.1  BMI Grade: 35 - 39.9 - obesity - grade II     Lab/Procedures/Meds    Labs: Reviewed  Lab Results   Component Value Date     05/14/2018    K 4.7 " 05/14/2018     05/14/2018    CO2 27 05/14/2018    BUN 34 (H) 05/14/2018    CREATININE 1.9 (H) 05/14/2018    CALCIUM 8.6 (L) 05/14/2018    MG 2.8 (H) 05/14/2018    ESTGFRAFRICA 41 (A) 05/14/2018    EGFRNONAA 35 (A) 05/14/2018    ALBUMIN 3.4 (L) 05/14/2018   *corrected Ca = 9.08    POCT Glucose   Date Value Ref Range Status   05/15/2018 167 (H) 70 - 110 mg/dL Final   05/15/2018 189 (H) 70 - 110 mg/dL Final   05/14/2018 210 (H) 70 - 110 mg/dL Final     Lab Results   Component Value Date    HGBA1C 6.4 (H) 05/09/2018     Meds: Reviewed  Scheduled Meds:   amLODIPine  10 mg Oral Daily    apixaban  2.5 mg Oral BID    aspirin  81 mg Oral Daily    atorvastatin  40 mg Oral Daily    carvedilol  25 mg Oral BID    dronedarone  400 mg Oral BID WM    [START ON 5/16/2018] furosemide  40 mg Oral Every other day    irbesartan  300 mg Oral QHS    liraglutide 0.6 mg/0.1 mL (18 mg/3 mL) subq PNIJ  1.2 mg Subcutaneous Daily    metFORMIN  1,000 mg Oral BID WM    [START ON 5/16/2018] potassium chloride SA  20 mEq Oral Every other day    SITagliptin  50 mg Oral BID WM    tiotropium  1 capsule Inhalation Daily     Physical Findings/Assessment    Overall Physical Appearance: nourished, edematous  Oral/Mouth Cavity: tooth/teeth missing  Skin: intact    Estimated/Assessed Needs    Weight Used For Calorie Calculations: 120.4 kg (265 lb 6.9 oz)  Energy Calorie Requirements (kcal): 2530  Energy Need Method: [place]-St Jeor (stress factor 1.25)  Protein Requirements: 125-142 gm/d (1.5-1.7 gm/kg IBW)  Weight Used For Protein Calculations: 83.5 kg (184 lb) (IBW)  Fluid Requirements (mL): 2530 (or per team)    Nutrition Prescription Ordered    Current Diet Order: 1500kcal DM + cardiac    Evaluation of Received Nutrient/Fluid Intake in last 24h    Intake/Output Summary (Last 24 hours) at 05/15/18 1526  Last data filed at 05/15/18 1400   Gross per 24 hour   Intake              440 ml   Outpu             2050 ml   Net            -1610  ml     % Intake of Estimated Energy Needs: 50 - 75 %  % Meal Intake: 50 - 75 %    Nutrition Risk    Level of Risk/Frequency of Follow-up: low     Assessment and Plan    Nutrition Problem  Inadequate oral intake    Related to (etiology):   Decreased appetite in the setting of SOB    Signs and Symptoms (as evidenced by):   Pt report of decreased PO intake and recent wt loss     Interventions/Recommendations (treatment strategy):  See recs above    Nutrition Diagnosis Status:   New     Monitor and Evaluation    Food and Nutrient Intake: energy intake, food and beverage intake  Food and Nutrient Adminstration: diet order  Physical Activity and Function: nutrition-related ADLs and IADLs  Anthropometric Measurements: weight, weight change  Biochemical Data, Medical Tests and Procedures: electrolyte and renal panel, gastrointestinal profile, glucose/endocrine profile, inflammatory profile, lipid profile  Nutrition-Focused Physical Findings: overall appearance, extremities, muscles and bones, skin     Nutrition Follow-Up    RD Follow-up?: Yes    Tahmina Freeman, MS, RD, LDN   Dietitian, [facility]  [phone]

## 2018-05-15 NOTE — NURSING
Ana charge nurse called cath lab for nurse to see if Dr Livingston was there to clarify orders, not in cath lab

## 2018-05-16 DIAGNOSIS — R09.89 BRUIT OF LEFT CAROTID ARTERY: ICD-10-CM

## 2018-05-16 LAB
ANION GAP SERPL CALC-SCNC: 11 MMOL/L
BUN SERPL-MCNC: 28 MG/DL
CALCIUM SERPL-MCNC: 8.9 MG/DL
CHLORIDE SERPL-SCNC: 106 MMOL/L
CO2 SERPL-SCNC: 22 MMOL/L
CREAT SERPL-MCNC: 1.6 MG/DL
EST. GFR  (AFRICAN AMERICAN): 50 ML/MIN/1.73 M^2
EST. GFR  (NON AFRICAN AMERICAN): 43 ML/MIN/1.73 M^2
GLUCOSE SERPL-MCNC: 140 MG/DL
POCT GLUCOSE: 127 MG/DL (ref 70–110)
POCT GLUCOSE: 131 MG/DL (ref 70–110)
POCT GLUCOSE: 145 MG/DL (ref 70–110)
POCT GLUCOSE: 171 MG/DL (ref 70–110)
POTASSIUM SERPL-SCNC: 4.3 MMOL/L
SODIUM SERPL-SCNC: 139 MMOL/L

## 2018-05-16 PROCEDURE — 27000221 HC OXYGEN, UP TO 24 HOURS

## 2018-05-16 PROCEDURE — 94640 AIRWAY INHALATION TREATMENT: CPT

## 2018-05-16 PROCEDURE — 25000003 PHARM REV CODE 250: Performed by: INTERNAL MEDICINE

## 2018-05-16 PROCEDURE — 63600175 PHARM REV CODE 636 W HCPCS: Performed by: INTERNAL MEDICINE

## 2018-05-16 PROCEDURE — 11000001 HC ACUTE MED/SURG PRIVATE ROOM

## 2018-05-16 PROCEDURE — 25000242 PHARM REV CODE 250 ALT 637 W/ HCPCS: Performed by: INTERNAL MEDICINE

## 2018-05-16 PROCEDURE — 36415 COLL VENOUS BLD VENIPUNCTURE: CPT

## 2018-05-16 PROCEDURE — 94761 N-INVAS EAR/PLS OXIMETRY MLT: CPT

## 2018-05-16 PROCEDURE — 80048 BASIC METABOLIC PNL TOTAL CA: CPT

## 2018-05-16 PROCEDURE — 25000003 PHARM REV CODE 250: Performed by: EMERGENCY MEDICINE

## 2018-05-16 PROCEDURE — 99900035 HC TECH TIME PER 15 MIN (STAT)

## 2018-05-16 PROCEDURE — 94618 PULMONARY STRESS TESTING: CPT

## 2018-05-16 RX ORDER — HYDRALAZINE HYDROCHLORIDE 25 MG/1
25 TABLET, FILM COATED ORAL EVERY 8 HOURS
Status: DISCONTINUED | OUTPATIENT
Start: 2018-05-16 | End: 2018-05-17 | Stop reason: HOSPADM

## 2018-05-16 RX ORDER — CARVEDILOL 12.5 MG/1
12.5 TABLET ORAL 2 TIMES DAILY
Status: DISCONTINUED | OUTPATIENT
Start: 2018-05-16 | End: 2018-05-17 | Stop reason: HOSPADM

## 2018-05-16 RX ADMIN — CARVEDILOL 25 MG: 12.5 TABLET, FILM COATED ORAL at 09:05

## 2018-05-16 RX ADMIN — METFORMIN HYDROCHLORIDE 1000 MG: 500 TABLET, FILM COATED ORAL at 09:05

## 2018-05-16 RX ADMIN — TIOTROPIUM BROMIDE 18 MCG: 18 CAPSULE ORAL; RESPIRATORY (INHALATION) at 07:05

## 2018-05-16 RX ADMIN — AMLODIPINE BESYLATE 10 MG: 5 TABLET ORAL at 09:05

## 2018-05-16 RX ADMIN — SITAGLIPTIN 50 MG: 25 TABLET, FILM COATED ORAL at 09:05

## 2018-05-16 RX ADMIN — POTASSIUM CHLORIDE 20 MEQ: 1500 TABLET, EXTENDED RELEASE ORAL at 09:05

## 2018-05-16 RX ADMIN — DRONEDARONE 400 MG: 400 TABLET, FILM COATED ORAL at 09:05

## 2018-05-16 RX ADMIN — HYDRALAZINE HYDROCHLORIDE 5 MG: 20 INJECTION INTRAMUSCULAR; INTRAVENOUS at 04:05

## 2018-05-16 RX ADMIN — LIRAGLUTIDE 1.2 MG: 6 INJECTION SUBCUTANEOUS at 09:05

## 2018-05-16 RX ADMIN — APIXABAN 2.5 MG: 2.5 TABLET, FILM COATED ORAL at 09:05

## 2018-05-16 RX ADMIN — DRONEDARONE 400 MG: 400 TABLET, FILM COATED ORAL at 05:05

## 2018-05-16 RX ADMIN — FUROSEMIDE 40 MG: 40 TABLET ORAL at 09:05

## 2018-05-16 RX ADMIN — HYDRALAZINE HYDROCHLORIDE 25 MG: 25 TABLET, FILM COATED ORAL at 09:05

## 2018-05-16 RX ADMIN — ATORVASTATIN CALCIUM 40 MG: 20 TABLET, FILM COATED ORAL at 09:05

## 2018-05-16 RX ADMIN — ASPIRIN 81 MG: 81 TABLET, COATED ORAL at 09:05

## 2018-05-16 RX ADMIN — IRBESARTAN 300 MG: 150 TABLET ORAL at 09:05

## 2018-05-16 NOTE — CONSULTS
Admit Date: 5/14/2018   LOS: 2 days     SUBJECTIVE:      Patient is a 68 y.o. male with a history of CAD, DM, HTN, HLD, AFIB, and CHF who presents to the ED with complaint of shortness of breath, which began yesterday and has progressively worsened since. Shortness of breath is worse with exertion, even with walking short distances. He is currently short of breath. Patient was discharged from here three days ago and states he felt well two days ago, with onset of SOB yesterday. Patient reports his heart rate was 32 prior to arrival, and state he called Dr. Livingston who told him to come to the ED. He reports being taken off amiodarone, restarted on Multaq, and prescribed Spiriva when he was discharged three days ago. He reports using spiriva yesterday and today without relief to SOB. He reports chronic bilateral leg swelling which is unchanged from baseline. Per wife he is taking lasix 40 mg every other day. Patient denies chest pain or dizziness.     ekg showed rate 39- now 62 on exam-feels much better post diuresis-edema now down- felt well when left hospital but lasted 48 hours- had negative vq and lower extremity venous doppler- l leg alwys larger- echo had showed hyperdynamic ventricle- has chronic elevated l  Savage diaphraghm  Continuous Infusions:    Scheduled Meds:   amLODIPine  10 mg Oral Daily    apixaban  2.5 mg Oral BID    aspirin  81 mg Oral Daily    atorvastatin  40 mg Oral Daily    carvedilol  25 mg Oral BID    dronedarone  400 mg Oral BID WM    furosemide  40 mg Oral Every other day    irbesartan  300 mg Oral QHS    liraglutide 0.6 mg/0.1 mL (18 mg/3 mL) subq PNIJ  1.2 mg Subcutaneous Daily    metFORMIN  1,000 mg Oral Daily with breakfast    potassium chloride SA  20 mEq Oral Every other day    SITagliptin  50 mg Oral Daily    tiotropium  1 capsule Inhalation Daily        Review of Systems:  Constitutional: positive for malaise, negative for anorexia, chills, fatigue, fevers and  malaise  Eyes: negative  Ears, nose, mouth, throat, and face: negative  Respiratory: positive for dyspnea on exertion, negative for hemoptysis and sputum  Cardiovascular: positive for bradycardia, negative for chest pain  Gastrointestinal: negative  Genitourinary:negative  Integument/breast: negative  Hematologic/lymphatic: negative  Musculoskeletal:negative  Neurological: positive for gait problems and hx cva  Behavioral/Psych: negative  Endocrine: negative  Allergic/Immunologic: negative     OBJECTIVE:     Vital Signs Range (Last 24H):  Temp:  [97.7 °F (36.5 °C)-98.5 °F (36.9 °C)]   Pulse:  [56-61]   Resp:  [16-30]   BP: (164-185)/(73-86)   SpO2:  [91 %-95 %]     I & O (Last 24H):  Intake/Output Summary (Last 24 hours) at 05/16/18 1459  Last data filed at 05/16/18 0607   Gross per 24 hour   Intake              720 ml   Output              875 ml   Net             -155 ml       Physical Exam:  General: no distress, appears stated age  Neck: no jugular venous distention and thyroid not enlarged, symmetric, no tenderness/mass/nodules, trachea midline  Lungs:  diminished breath sounds base - left  Chest Wall: no tenderness, not examined  Heart: irregularly irregular rhythm  Abdomen: soft, non-tender non-distended; bowel sounds normal  Extremities: edema trace in ankles  Neurologic: alert, oriented, thought content appropriate  Motor:left hemiparesis    Laboratory:  CBC:   Recent Labs  Lab 05/14/18 1819   WBC 7.59   RBC 4.28*   HGB 12.6*   HCT 39.4*      MCV 92   MCH 29.4   MCHC 32.0     BMP:   Recent Labs  Lab 05/14/18 1819   *      K 4.7      CO2 27   BUN 34*   CREATININE 1.9*   CALCIUM 8.6*   MG 2.8*     CMP:   Recent Labs  Lab 05/14/18 1819   *   CALCIUM 8.6*   ALBUMIN 3.4*   PROT 6.8      K 4.7   CO2 27      BUN 34*   CREATININE 1.9*   ALKPHOS 103   ALT 19   AST 17   BILITOT 0.4     LFTs:   Recent Labs  Lab 05/14/18 1819   ALT 19   AST 17   ALKPHOS 103   BILITOT 0.4    PROT 6.8   ALBUMIN 3.4*     Coagulation: No results for input(s): LABPROT, INR, APTT in the last 168 hours.  Cardiac markers:   Recent Labs  Lab 05/14/18  1819   TROPONINI <0.006     ABGs:   Recent Labs  Lab 05/10/18  2213   PH 7.403   PCO2 43.0   PO2 61*   HCO3 26.8   POCSATURATED 91*   BE 2     Microbiology Results (last 7 days)     ** No results found for the last 168 hours. **        Specimen (12h ago through future)    None        No results for input(s): COLORU, CLARITYU, SPECGRAV, PHUR, PROTEINUA, GLUCOSEU, BILIRUBINCON, BLOODU, WBCU, RBCU, BACTERIA, MUCUS, NITRITE, LEUKOCYTESUR, UROBILINOGEN, HYALINECASTS in the last 168 hours.  Past Surgical History:   Procedure Laterality Date    KNEE SURGERY Left     SHOULDER SURGERY Right      Family History   Problem Relation Age of Onset    Hypertension Mother      Past Medical History:   Diagnosis Date    Coronary artery disease     Diabetes mellitus type II     Hyperlipidemia     Hypertension     Paroxysmal atrial fibrillation     Stroke     nov 2012     Diagnostic Results:  V:Q Scan: Reviewed  X-Ray: Reviewed  Social History     Social History    Marital status: Single     Spouse name: N/A    Number of children: N/A    Years of education: N/A     Occupational History    Not on file.     Social History Main Topics    Smoking status: Former Smoker     Types: Cigars    Smokeless tobacco: Never Used      Comment: occasional, when at the casino    Alcohol use 0.6 oz/week     1 Cans of beer per week      Comment: occasional when at the casino    Drug use: No    Sexual activity: Yes     Partners: Female     Birth control/ protection: None     Other Topics Concern    Not on file     Social History Narrative    No narrative on file     ASSESSMENT/PLAN:     Active Hospital Problems    Diagnosis  POA    Symptomatic bradycardia [R00.1]  Yes      Resolved Hospital Problems    Diagnosis Date Resolved POA   No resolved problems to display.     Patient Active  Problem List   Diagnosis    HTN (hypertension)    Hypercholesteremia    Non-insulin dependent type 2 diabetes mellitus    CVA (cerebral vascular accident)    Morbid obesity    PAF (paroxysmal atrial fibrillation)    Coronary artery disease involving native coronary artery of native heart with unstable angina pectoris    Atypical chest pain    Osteoarthritis of cervical spine with myelopathy    Cervical radiculopathy    Cervical spondylosis    DDD (degenerative disc disease), cervical    Bilateral shoulder bursitis    Hx of repair of right rotator cuff    Acute bursitis of left shoulder    Cervical stenosis of spinal canal    Lumbar spondylosis    DDD (degenerative disc disease), lumbar    Lumbar radiculopathy    Lumbar spinal stenosis    Bulging lumbar disc    Bilateral hip bursitis    Herniated cervical disc    Atrial flutter    Exertional dyspnea    Shortness of breath    Symptomatic bradycardia     Case Discussed With:he is much better after diuresis and improvement in pulse-  He had a pft done which shows moderate restriction but no diffusion defect-would continue spiriva and assess for home o2-does not seem to have amio toxicity- lack rales or fibrotic suggestion on cxr(was unable to lie down for ct)-

## 2018-05-16 NOTE — PROGRESS NOTES
Says he feels a little better now, not breathless upon walking. Was breathless with drop in O2 sats last evening.    Vitals:    05/16/18 1000 05/16/18 1200 05/16/18 1236 05/16/18 1400   BP:   (!) 179/80    BP Location:       Patient Position:       Pulse: (!) 58 (!) 56 61 (!) 58   Resp:       Temp:   98 °F (36.7 °C)    TempSrc:       SpO2:   (!) 92%    Weight:       Height:         Chest clear  Cor: No gallop  Trace ankle edema.    Creat on 5/14 was up to 1.9    Plan: Reduce Carvedilol to 12.5 bid  Add Hydralazine 25 tid  Continue Amlodipine  BMP in am.

## 2018-05-16 NOTE — PLAN OF CARE
Problem: Patient Care Overview  Goal: Plan of Care Review  Outcome: Ongoing (interventions implemented as appropriate)  Plan of care reviewed with Pt, purposeful hourly rounding performed. VSS on on 1L NC, except BP. BP mildly controlled with PRN medication. Tele monitor maintained. NAD during shift. No c/o at this time. Bed locked and lowered, call light in reach. Will continue to monitor.

## 2018-05-16 NOTE — PLAN OF CARE
05/16/18 0900   Medicare Message   Important Message from Medicare regarding Discharge Appeal Rights Given to patient/caregiver;Explained to patient/caregiver;Signed/date by patient/caregiver   Date IMM was signed 05/16/18   Time IMM was signed 0900

## 2018-05-16 NOTE — PLAN OF CARE
Problem: Patient Care Overview  Goal: Plan of Care Review  Outcome: Ongoing (interventions implemented as appropriate)  No changes at this time.  NC @ 1lpm in use.  Will continue to monitor

## 2018-05-16 NOTE — PROGRESS NOTES
Pulse Oximetry:    94% SpO2 on room air at rest on 5/16/2018                               Pulse Oximetry:   96%SpO2  on room air with activity/exercise on 5/16/2018

## 2018-05-17 VITALS
OXYGEN SATURATION: 94 % | BODY MASS INDEX: 35.18 KG/M2 | SYSTOLIC BLOOD PRESSURE: 176 MMHG | WEIGHT: 265.44 LBS | RESPIRATION RATE: 20 BRPM | HEART RATE: 75 BPM | HEIGHT: 73 IN | DIASTOLIC BLOOD PRESSURE: 80 MMHG | TEMPERATURE: 98 F

## 2018-05-17 LAB — POCT GLUCOSE: 104 MG/DL (ref 70–110)

## 2018-05-17 PROCEDURE — 25000242 PHARM REV CODE 250 ALT 637 W/ HCPCS: Performed by: INTERNAL MEDICINE

## 2018-05-17 PROCEDURE — 99900035 HC TECH TIME PER 15 MIN (STAT)

## 2018-05-17 PROCEDURE — 94761 N-INVAS EAR/PLS OXIMETRY MLT: CPT

## 2018-05-17 PROCEDURE — 63600175 PHARM REV CODE 636 W HCPCS: Performed by: INTERNAL MEDICINE

## 2018-05-17 PROCEDURE — 25000003 PHARM REV CODE 250: Performed by: INTERNAL MEDICINE

## 2018-05-17 PROCEDURE — 94640 AIRWAY INHALATION TREATMENT: CPT

## 2018-05-17 RX ORDER — CARVEDILOL 12.5 MG/1
25 TABLET ORAL 2 TIMES DAILY
Qty: 120 TABLET | Refills: 11 | Status: ON HOLD | OUTPATIENT
Start: 2018-05-17 | End: 2018-05-30

## 2018-05-17 RX ORDER — HYDRALAZINE HYDROCHLORIDE 25 MG/1
25 TABLET, FILM COATED ORAL EVERY 8 HOURS
Qty: 90 TABLET | Refills: 11 | Status: ON HOLD | OUTPATIENT
Start: 2018-05-17 | End: 2018-05-30

## 2018-05-17 RX ADMIN — ASPIRIN 81 MG: 81 TABLET, COATED ORAL at 11:05

## 2018-05-17 RX ADMIN — APIXABAN 2.5 MG: 2.5 TABLET, FILM COATED ORAL at 09:05

## 2018-05-17 RX ADMIN — SITAGLIPTIN 50 MG: 25 TABLET, FILM COATED ORAL at 09:05

## 2018-05-17 RX ADMIN — ATORVASTATIN CALCIUM 40 MG: 20 TABLET, FILM COATED ORAL at 09:05

## 2018-05-17 RX ADMIN — DRONEDARONE 400 MG: 400 TABLET, FILM COATED ORAL at 09:05

## 2018-05-17 RX ADMIN — HYDRALAZINE HYDROCHLORIDE 5 MG: 20 INJECTION INTRAMUSCULAR; INTRAVENOUS at 04:05

## 2018-05-17 RX ADMIN — LIRAGLUTIDE 1.2 MG: 6 INJECTION SUBCUTANEOUS at 10:05

## 2018-05-17 RX ADMIN — AMLODIPINE BESYLATE 10 MG: 5 TABLET ORAL at 09:05

## 2018-05-17 RX ADMIN — METFORMIN HYDROCHLORIDE 1000 MG: 500 TABLET, FILM COATED ORAL at 09:05

## 2018-05-17 RX ADMIN — HYDRALAZINE HYDROCHLORIDE 25 MG: 25 TABLET, FILM COATED ORAL at 05:05

## 2018-05-17 RX ADMIN — TIOTROPIUM BROMIDE 18 MCG: 18 CAPSULE ORAL; RESPIRATORY (INHALATION) at 08:05

## 2018-05-17 RX ADMIN — HYDRALAZINE HYDROCHLORIDE 5 MG: 20 INJECTION INTRAMUSCULAR; INTRAVENOUS at 12:05

## 2018-05-17 RX ADMIN — CARVEDILOL 12.5 MG: 12.5 TABLET, FILM COATED ORAL at 09:05

## 2018-05-17 NOTE — NURSING
Pt discharged per MD order. IV removed from RAC and LAC. Both catheters intact.VSS. No s/s of distress. All handouts and new medications orders reviewed. Pt voiced understanding at this time. Pt discharged via w/c with hospital transport.

## 2018-05-17 NOTE — PLAN OF CARE
Family will provide ride home.    No further needs from CM perspective.       05/17/18 1105   Final Note   Assessment Type Final Discharge Note   Discharge Disposition Home   What phone number can be called within the next 1-3 days to see how you are doing after discharge? [phone]   Hospital Follow Up  Appt(s) scheduled? Yes   Discharge plans and expectations educations in teach back method with documentation complete? Yes   Right Care Referral Info   Post Acute Recommendation No Care

## 2018-05-17 NOTE — NURSING
Plan of care reviewed with Pt, purposeful hourly rounding performed. VSS on on RA, except BP. BP mildly controlled with PRN medication. Tele monitor maintained, SR. NAD during shift. No c/o at this time. Bed locked and lowered, call light in reach. Will continue to monitor.

## 2018-05-17 NOTE — PROGRESS NOTES
Progress Note  Pulmonology    Admit Date: 5/14/2018   LOS: 3 days     SUBJECTIVE:   Feels much better- no desaturation with exercise- maintained sats 95-96%  Continuous Infusions:    Scheduled Meds:   amLODIPine  10 mg Oral Daily    apixaban  2.5 mg Oral BID    aspirin  81 mg Oral Daily    atorvastatin  40 mg Oral Daily    carvedilol  12.5 mg Oral BID    dronedarone  400 mg Oral BID WM    hydrALAZINE  25 mg Oral Q8H    irbesartan  300 mg Oral QHS    liraglutide 0.6 mg/0.1 mL (18 mg/3 mL) subq PNIJ  1.2 mg Subcutaneous Daily    metFORMIN  1,000 mg Oral Daily with breakfast    SITagliptin  50 mg Oral Daily    tiotropium  1 capsule Inhalation Daily       Review of Systems:  Constitutional: no fever or chills  Respiratory: positive for dyspnea on exertion  Cardiovascular: no chest pain or palpitations      OBJECTIVE:     Vital Signs Range (Last 24H):  Temp:  [97.6 °F (36.4 °C)-98.9 °F (37.2 °C)]   Pulse:  [56-85]   Resp:  [18-20]   BP: (167-228)/(74-98)   SpO2:  [91 %-95 %]     I & O (Last 24H):  Intake/Output Summary (Last 24 hours) at 05/17/18 0807  Last data filed at 05/17/18 0400   Gross per 24 hour   Intake              500 ml   Output              625 ml   Net             -125 ml       Physical Exam:  General: no distress, appears stated age  Neck: no jugular venous distention and thyroid not enlarged, symmetric, no tenderness/mass/nodules, trachea midline  Lungs:  normal respiratory effort and diminished breath sounds base - left  Chest Wall: no tenderness, good expansion  Heart: regularly irregular rhythm and no click  Abdomen: soft, non-tender non-distended; bowel sounds normal  Extremities: Homans sign is negative, no sign of DVT and l calf slightly larger than right  Neurologic: alert, oriented, thought content appropriate  mild dysarthria    Laboratory:  CBC:   Recent Labs  Lab 05/14/18  1819   WBC 7.59   RBC 4.28*   HGB 12.6*   HCT 39.4*      MCV 92   MCH 29.4   MCHC 32.0     CMP:    Recent Labs  Lab 05/14/18  1819 05/16/18  1635   * 140*   CALCIUM 8.6* 8.9   ALBUMIN 3.4*  --    PROT 6.8  --     139   K 4.7 4.3   CO2 27 22*    106   BUN 34* 28*   CREATININE 1.9* 1.6*   ALKPHOS 103  --    ALT 19  --    AST 17  --    BILITOT 0.4  --      LFTs:   Recent Labs  Lab 05/14/18  1819   ALT 19   AST 17   ALKPHOS 103   BILITOT 0.4   PROT 6.8   ALBUMIN 3.4*     Coagulation: No results for input(s): LABPROT, INR, APTT in the last 168 hours.  ABGs:   Recent Labs  Lab 05/10/18  2213   PH 7.403   PCO2 43.0   PO2 61*   HCO3 26.8   POCSATURATED 91*   BE 2           Diagnostic Results:  X-Ray: Reviewed    ASSESSMENT/PLAN:     Patient Active Problem List   Diagnosis    HTN (hypertension)    Hypercholesteremia    Non-insulin dependent type 2 diabetes mellitus    CVA (cerebral vascular accident)    Morbid obesity    PAF (paroxysmal atrial fibrillation)    Coronary artery disease involving native coronary artery of native heart with unstable angina pectoris    Atypical chest pain    Osteoarthritis of cervical spine with myelopathy    Cervical radiculopathy    Cervical spondylosis    DDD (degenerative disc disease), cervical    Bilateral shoulder bursitis    Hx of repair of right rotator cuff    Acute bursitis of left shoulder    Cervical stenosis of spinal canal    Lumbar spondylosis    DDD (degenerative disc disease), lumbar    Lumbar radiculopathy    Lumbar spinal stenosis    Bulging lumbar disc    Bilateral hip bursitis    Herniated cervical disc    Atrial flutter    Exertional dyspnea    Shortness of breath    Symptomatic bradycardia       Case Discussed With:    Discharge Needs and Plans: will check l hemidiaphraghm function as elevated on cxr- does not need home o2

## 2018-05-22 ENCOUNTER — HOSPITAL ENCOUNTER (INPATIENT)
Facility: OTHER | Age: 68
LOS: 8 days | Discharge: HOME | DRG: 982 | End: 2018-05-30
Attending: EMERGENCY MEDICINE | Admitting: INTERNAL MEDICINE
Payer: MEDICARE

## 2018-05-22 DIAGNOSIS — R06.02 SHORTNESS OF BREATH: ICD-10-CM

## 2018-05-22 DIAGNOSIS — G47.33 OSA (OBSTRUCTIVE SLEEP APNEA): ICD-10-CM

## 2018-05-22 DIAGNOSIS — R06.02 SOB (SHORTNESS OF BREATH): ICD-10-CM

## 2018-05-22 DIAGNOSIS — N28.9 RENAL INSUFFICIENCY: ICD-10-CM

## 2018-05-22 DIAGNOSIS — R00.1 BRADYCARDIA: Primary | ICD-10-CM

## 2018-05-22 DIAGNOSIS — R05.9 COUGH: ICD-10-CM

## 2018-05-22 DIAGNOSIS — E87.5 HYPERKALEMIA: ICD-10-CM

## 2018-05-22 LAB
ALBUMIN SERPL BCP-MCNC: 3.4 G/DL
ALP SERPL-CCNC: 84 U/L
ALT SERPL W/O P-5'-P-CCNC: 13 U/L
ANION GAP SERPL CALC-SCNC: 11 MMOL/L
AST SERPL-CCNC: 11 U/L
BASOPHILS # BLD AUTO: 0.02 K/UL
BASOPHILS NFR BLD: 0.2 %
BILIRUB SERPL-MCNC: 0.7 MG/DL
BNP SERPL-MCNC: 185 PG/ML
BUN SERPL-MCNC: 54 MG/DL
CALCIUM SERPL-MCNC: 8.5 MG/DL
CHLORIDE SERPL-SCNC: 107 MMOL/L
CO2 SERPL-SCNC: 18 MMOL/L
CREAT SERPL-MCNC: 3.2 MG/DL
DIFFERENTIAL METHOD: ABNORMAL
EOSINOPHIL # BLD AUTO: 0.4 K/UL
EOSINOPHIL NFR BLD: 4.7 %
ERYTHROCYTE [DISTWIDTH] IN BLOOD BY AUTOMATED COUNT: 14.8 %
EST. GFR  (AFRICAN AMERICAN): 22 ML/MIN/1.73 M^2
EST. GFR  (NON AFRICAN AMERICAN): 19 ML/MIN/1.73 M^2
GLUCOSE SERPL-MCNC: 170 MG/DL
HCT VFR BLD AUTO: 40.2 %
HGB BLD-MCNC: 13 G/DL
LYMPHOCYTES # BLD AUTO: 1.5 K/UL
LYMPHOCYTES NFR BLD: 17.5 %
MCH RBC QN AUTO: 29.4 PG
MCHC RBC AUTO-ENTMCNC: 32.3 G/DL
MCV RBC AUTO: 91 FL
MONOCYTES # BLD AUTO: 0.6 K/UL
MONOCYTES NFR BLD: 7.2 %
NEUTROPHILS # BLD AUTO: 5.9 K/UL
NEUTROPHILS NFR BLD: 70.3 %
PLATELET # BLD AUTO: 235 K/UL
PMV BLD AUTO: 10.5 FL
POTASSIUM SERPL-SCNC: 5.2 MMOL/L
PROT SERPL-MCNC: 6.7 G/DL
RBC # BLD AUTO: 4.42 M/UL
SODIUM SERPL-SCNC: 136 MMOL/L
WBC # BLD AUTO: 8.34 K/UL

## 2018-05-22 PROCEDURE — 99284 EMERGENCY DEPT VISIT MOD MDM: CPT | Mod: 25

## 2018-05-22 PROCEDURE — 25000003 PHARM REV CODE 250: Performed by: EMERGENCY MEDICINE

## 2018-05-22 PROCEDURE — 93005 ELECTROCARDIOGRAM TRACING: CPT

## 2018-05-22 PROCEDURE — 93010 ELECTROCARDIOGRAM REPORT: CPT | Mod: ,,, | Performed by: INTERNAL MEDICINE

## 2018-05-22 PROCEDURE — 83880 ASSAY OF NATRIURETIC PEPTIDE: CPT

## 2018-05-22 PROCEDURE — 11000001 HC ACUTE MED/SURG PRIVATE ROOM

## 2018-05-22 PROCEDURE — 80053 COMPREHEN METABOLIC PANEL: CPT

## 2018-05-22 PROCEDURE — 85025 COMPLETE CBC W/AUTO DIFF WBC: CPT

## 2018-05-22 RX ORDER — OXYCODONE HYDROCHLORIDE 5 MG/1
10 TABLET ORAL EVERY 4 HOURS PRN
Status: DISCONTINUED | OUTPATIENT
Start: 2018-05-22 | End: 2018-05-30 | Stop reason: HOSPADM

## 2018-05-22 RX ORDER — ATORVASTATIN CALCIUM 20 MG/1
40 TABLET, FILM COATED ORAL DAILY
Status: DISCONTINUED | OUTPATIENT
Start: 2018-05-23 | End: 2018-05-30 | Stop reason: HOSPADM

## 2018-05-22 RX ORDER — ACETAMINOPHEN 325 MG/1
650 TABLET ORAL EVERY 8 HOURS PRN
Status: DISCONTINUED | OUTPATIENT
Start: 2018-05-22 | End: 2018-05-30 | Stop reason: HOSPADM

## 2018-05-22 RX ORDER — TIOTROPIUM BROMIDE 18 UG/1
1 CAPSULE ORAL; RESPIRATORY (INHALATION) DAILY
Status: DISCONTINUED | OUTPATIENT
Start: 2018-05-23 | End: 2018-05-23 | Stop reason: SDUPTHER

## 2018-05-22 RX ORDER — NAPROXEN SODIUM 220 MG/1
81 TABLET, FILM COATED ORAL DAILY
Status: DISCONTINUED | OUTPATIENT
Start: 2018-05-23 | End: 2018-05-27

## 2018-05-22 RX ORDER — AMLODIPINE BESYLATE 5 MG/1
10 TABLET ORAL DAILY
Status: DISCONTINUED | OUTPATIENT
Start: 2018-05-23 | End: 2018-05-30 | Stop reason: HOSPADM

## 2018-05-22 RX ORDER — METFORMIN HYDROCHLORIDE 500 MG/1
1000 TABLET ORAL 2 TIMES DAILY WITH MEALS
Status: DISCONTINUED | OUTPATIENT
Start: 2018-05-22 | End: 2018-05-23

## 2018-05-22 RX ORDER — SODIUM CHLORIDE 9 MG/ML
INJECTION, SOLUTION INTRAVENOUS CONTINUOUS
Status: DISCONTINUED | OUTPATIENT
Start: 2018-05-22 | End: 2018-05-24

## 2018-05-22 RX ORDER — TIOTROPIUM BROMIDE 18 UG/1
1 CAPSULE ORAL; RESPIRATORY (INHALATION) DAILY
Status: DISCONTINUED | OUTPATIENT
Start: 2018-05-23 | End: 2018-05-22 | Stop reason: CLARIF

## 2018-05-22 RX ORDER — HYDRALAZINE HYDROCHLORIDE 25 MG/1
25 TABLET, FILM COATED ORAL EVERY 8 HOURS
Status: DISCONTINUED | OUTPATIENT
Start: 2018-05-22 | End: 2018-05-24

## 2018-05-22 RX ORDER — HYDROCODONE BITARTRATE AND ACETAMINOPHEN 5; 325 MG/1; MG/1
1 TABLET ORAL EVERY 4 HOURS PRN
Status: DISCONTINUED | OUTPATIENT
Start: 2018-05-22 | End: 2018-05-30 | Stop reason: HOSPADM

## 2018-05-22 RX ORDER — CARVEDILOL 12.5 MG/1
25 TABLET ORAL 2 TIMES DAILY
Status: DISCONTINUED | OUTPATIENT
Start: 2018-05-22 | End: 2018-05-23

## 2018-05-22 RX ORDER — IRBESARTAN 150 MG/1
300 TABLET ORAL NIGHTLY
Status: DISCONTINUED | OUTPATIENT
Start: 2018-05-22 | End: 2018-05-23

## 2018-05-22 RX ORDER — RAMELTEON 8 MG/1
8 TABLET ORAL NIGHTLY PRN
Status: DISCONTINUED | OUTPATIENT
Start: 2018-05-22 | End: 2018-05-30 | Stop reason: HOSPADM

## 2018-05-22 RX ADMIN — HYDRALAZINE HYDROCHLORIDE 25 MG: 25 TABLET, FILM COATED ORAL at 10:05

## 2018-05-22 RX ADMIN — CARVEDILOL 25 MG: 12.5 TABLET, FILM COATED ORAL at 10:05

## 2018-05-22 RX ADMIN — DRONEDARONE 400 MG: 400 TABLET, FILM COATED ORAL at 10:05

## 2018-05-22 RX ADMIN — IRBESARTAN 300 MG: 150 TABLET ORAL at 10:05

## 2018-05-22 RX ADMIN — APIXABAN 2.5 MG: 2.5 TABLET, FILM COATED ORAL at 10:05

## 2018-05-22 RX ADMIN — SODIUM CHLORIDE: 0.9 INJECTION, SOLUTION INTRAVENOUS at 08:05

## 2018-05-22 RX ADMIN — SODIUM CHLORIDE 500 ML: 0.9 INJECTION, SOLUTION INTRAVENOUS at 08:05

## 2018-05-22 NOTE — ED NOTES
Pt c/o SOB and weakness since February 2018. Pt states he has been hospitalized 8 times since then for the same thing, low HR. P states he is only able to walk short distances. No trauma. Pt is A & O x 3, denies fever, chills and V/D, states he had a little nausea. Skin is warm, dry and pink. VS. MD aware of low HR. SHEREEN x 3mm. BBS- CTA. Abd- SNT. PSM x 4 exts. PSM x 4 exts. Pt is connected to the pulse ox, B/P cuff and EKG monitor. Bed is locked and in the low position w/ the side rails up and locked for pt safety. Call bell @ BS. Will continue to monitor closely.

## 2018-05-22 NOTE — ED PROVIDER NOTES
"Encounter Date: 5/22/2018    SCRIBE #1 NOTE: I, Luis Ennis, am scribing for, and in the presence of, Dr. Sanchez.       History     Chief Complaint   Patient presents with    Shortness of Breath     Patient presents to the Ed with a complaint of Shortness of breath that has been on and off since Feb      Seen by provider: 7:20 PM    Patient is a 68 y.o. male with a history of CAD, DM, HTN, HLD, AFIB, and CHF who presents to the ED with complaint of shortness of breath. He was discharged from the hospital five days ago and reports onset of shortness of breath two days ago, which has progressively worsened since. His wife states "he never really recovered." Shortness of breath is worse with walking even short distances. He also states he is unable to lay flat "even for a second" without becoming severly short of breath. He reports associated fatigue, decreased appetite, and worsening bilateral leg swelling. He reports his heart rate was 39 bpm today, and states he called Dr. Livingston who told him to come to the ED. He denies chest pain or palpitations. He reports multiple recent medication changes. He states Dr. Livingston's plan was to change his medications before proceeding to a pacemaker.       The history is provided by the patient.     Review of patient's allergies indicates:   Allergen Reactions    Doxycycline Rash     Past Medical History:   Diagnosis Date    Coronary artery disease     Diabetes mellitus type II     Hyperlipidemia     Hypertension     Paroxysmal atrial fibrillation     Stroke     nov 2012     Past Surgical History:   Procedure Laterality Date    KNEE SURGERY Left     SHOULDER SURGERY Right      Family History   Problem Relation Age of Onset    Hypertension Mother      Social History   Substance Use Topics    Smoking status: Former Smoker     Types: Cigars    Smokeless tobacco: Never Used      Comment: occasional, when at the casino    Alcohol use 0.6 oz/week     1 Cans of " beer per week      Comment: occasional when at the [employer]     Review of Systems   Constitutional: Positive for appetite change (decreased appetite). Negative for chills and fever.   HENT: Negative for congestion.    Respiratory: Positive for shortness of breath. Negative for cough.    Cardiovascular: Positive for leg swelling (bilaterally). Negative for chest pain.   Gastrointestinal: Negative for abdominal pain, diarrhea, nausea and vomiting.   Genitourinary: Negative for difficulty urinating and frequency.   Musculoskeletal: Negative for back pain.   Skin: Negative for rash.   Neurological: Negative for headaches.   Psychiatric/Behavioral: Negative for confusion.       Physical Exam     Initial Vitals   BP Pulse Resp Temp SpO2   05/22/18 1844 05/22/18 1844 05/22/18 1844 05/22/18 1845 05/22/18 1844   138/64 (!) 40 (!) 22 97.6 °F (36.4 °C) 96 %      MAP       05/22/18 1844       88.67         Physical Exam    Nursing note and vitals reviewed.  Constitutional: He appears well-developed and well-nourished. He is not diaphoretic. He is Obese . No distress.   HENT:   Head: Normocephalic and atraumatic.   Eyes: Conjunctivae and EOM are normal. Pupils are equal, round, and reactive to light.   Neck: Normal range of motion. Neck supple.   Obese neck, unable to visualize JVD.     Cardiovascular: Regular rhythm and normal heart sounds. Bradycardia present.    Pulmonary/Chest: Breath sounds normal. No respiratory distress.   Abdominal: Soft. There is no tenderness.   Musculoskeletal: Normal range of motion. He exhibits edema.   3+ pitting edema to left calf. 2+ pitting edema to right calf.   Skin: Skin is warm and dry.   Psychiatric: He has a normal mood and affect. His behavior is normal.         ED Course   Procedures  Labs Reviewed   CBC W/ AUTO DIFFERENTIAL - Abnormal; Notable for the following:        Result Value    RBC 4.42 (*)     Hemoglobin 13.0 (*)     RDW 14.8 (*)     Lymph% 17.5 (*)     All other components within  normal limits   COMPREHENSIVE METABOLIC PANEL - Abnormal; Notable for the following:     Potassium 5.2 (*)     CO2 18 (*)     Glucose 170 (*)     BUN, Bld 54 (*)     Creatinine 3.2 (*)     Calcium 8.5 (*)     Albumin 3.4 (*)     eGFR if  22 (*)     eGFR if non  19 (*)     All other components within normal limits   B-TYPE NATRIURETIC PEPTIDE - Abnormal; Notable for the following:      (*)     All other components within normal limits     Imaging Results          X-Ray Chest 1 View (Final result)  Result time 05/22/18 19:05:48    Final result by Kimber Sarkar MD (05/22/18 19:05:48)                 Impression:      As above.      Electronically signed by: Kimber Sarkar MD  Date:    05/22/2018  Time:    19:05             Narrative:    EXAMINATION:  XR CHEST 1 VIEW    CLINICAL HISTORY:  Cough    TECHNIQUE:  Single frontal view of the chest was performed.    COMPARISON:  05/14/2018.    FINDINGS:  Heart is stable in size.  Lungs are hypoinflated which accentuates pulmonary vascular markings.  Difficult to exclude mild pulmonary edema.  Atelectasis is seen at the left lung base.  No evidence of pneumothorax or large effusion.                                EKG Readings: (Independently Interpreted)   Junctional bradycardia. Heart rate of 40. Wide complex QRS. PVC present. No STEMI. Unchanged from 5/14/18.       X-Rays:   Independently Interpreted Readings:   Chest X-Ray: Unable to visualize left costophrenic angle. Increased pulmonary vascular markings. No pneumothorax.     Medical Decision Making:   Initial Assessment:   Urgent evaluation of 68-year-old gentleman with known history of symptomatic bradycardia here with concern for worsening shortness of breath on exertion.  Patient reports inability to lay flat, has needed to sit upright, can barely ambulate to the bathroom, worsened over the last 3 days.  Patient was discharged from the hospital 5/14, for similar symptomatic  bradycardia, with multiple medication changes recently.  On discharge, patient sent home with resuming Multaq, hydralazine, Spiriva, Lasix, Norvasc, Eliquis, victoza, lasix every other day.  On exam patient is nontoxic-appearing, obese, able speak full sentences, notably bradycardic heart rate 40-46.   Independently Interpreted Test(s):   I have ordered and independently interpreted X-rays - see prior notes.  I have ordered and independently interpreted EKG Reading(s) - see prior notes  Clinical Tests:   Lab Tests: Reviewed and Ordered  Radiological Study: Ordered and Reviewed  Medical Tests: Ordered and Reviewed  ED Management:  Labs obtained, notable for acute renal insufficiency, with creatinine elevated from baseline, currently 3.2, k 5.2.  Discussedw  Dr Livingston, will admit for continued symptomatic bradycardia and now new elier requiring IV hydration.             Scribe Attestation:   Scribe #1: I performed the above scribed service and the documentation accurately describes the services I performed. I attest to the accuracy of the note.    Attending Attestation:           Physician Attestation for Scribe:  Physician Attestation Statement for Scribe #1: I, Dr. Sanchez, reviewed documentation, as scribed by Luis Ennis in my presence, and it is both accurate and complete.                    Clinical Impression:     1. Bradycardia    2. SOB (shortness of breath)    3. Cough    4. Renal insufficiency    5. Hyperkalemia       Disposition:   Disposition: Admitted  Condition: Uzma Sanchez MD  05/22/18 2122

## 2018-05-23 LAB
ANION GAP SERPL CALC-SCNC: 8 MMOL/L
BUN SERPL-MCNC: 44 MG/DL
CALCIUM SERPL-MCNC: 8.6 MG/DL
CHLORIDE SERPL-SCNC: 109 MMOL/L
CO2 SERPL-SCNC: 22 MMOL/L
CREAT SERPL-MCNC: 2.3 MG/DL
EST. GFR  (AFRICAN AMERICAN): 32 ML/MIN/1.73 M^2
EST. GFR  (NON AFRICAN AMERICAN): 28 ML/MIN/1.73 M^2
GLUCOSE SERPL-MCNC: 114 MG/DL
POCT GLUCOSE: 101 MG/DL (ref 70–110)
POCT GLUCOSE: 101 MG/DL (ref 70–110)
POCT GLUCOSE: 153 MG/DL (ref 70–110)
POCT GLUCOSE: 98 MG/DL (ref 70–110)
POTASSIUM SERPL-SCNC: 4.4 MMOL/L
SODIUM SERPL-SCNC: 139 MMOL/L

## 2018-05-23 PROCEDURE — 11000001 HC ACUTE MED/SURG PRIVATE ROOM

## 2018-05-23 PROCEDURE — 25000003 PHARM REV CODE 250: Performed by: NURSE PRACTITIONER

## 2018-05-23 PROCEDURE — 25000003 PHARM REV CODE 250: Performed by: EMERGENCY MEDICINE

## 2018-05-23 PROCEDURE — 93005 ELECTROCARDIOGRAM TRACING: CPT

## 2018-05-23 PROCEDURE — 93010 ELECTROCARDIOGRAM REPORT: CPT | Mod: ,,, | Performed by: INTERNAL MEDICINE

## 2018-05-23 PROCEDURE — 80048 BASIC METABOLIC PNL TOTAL CA: CPT

## 2018-05-23 PROCEDURE — 27000221 HC OXYGEN, UP TO 24 HOURS

## 2018-05-23 PROCEDURE — 36415 COLL VENOUS BLD VENIPUNCTURE: CPT

## 2018-05-23 PROCEDURE — 94761 N-INVAS EAR/PLS OXIMETRY MLT: CPT

## 2018-05-23 PROCEDURE — 25000003 PHARM REV CODE 250: Performed by: INTERNAL MEDICINE

## 2018-05-23 PROCEDURE — 25000242 PHARM REV CODE 250 ALT 637 W/ HCPCS: Performed by: INTERNAL MEDICINE

## 2018-05-23 PROCEDURE — 99900035 HC TECH TIME PER 15 MIN (STAT)

## 2018-05-23 RX ORDER — GLUCAGON 1 MG
1 KIT INJECTION
Status: DISCONTINUED | OUTPATIENT
Start: 2018-05-23 | End: 2018-05-30 | Stop reason: HOSPADM

## 2018-05-23 RX ORDER — INSULIN ASPART 100 [IU]/ML
0-5 INJECTION, SOLUTION INTRAVENOUS; SUBCUTANEOUS
Status: DISCONTINUED | OUTPATIENT
Start: 2018-05-23 | End: 2018-05-30 | Stop reason: HOSPADM

## 2018-05-23 RX ORDER — IBUPROFEN 200 MG
16 TABLET ORAL
Status: DISCONTINUED | OUTPATIENT
Start: 2018-05-23 | End: 2018-05-30 | Stop reason: HOSPADM

## 2018-05-23 RX ORDER — CARVEDILOL 6.25 MG/1
6.25 TABLET ORAL 2 TIMES DAILY
Status: DISCONTINUED | OUTPATIENT
Start: 2018-05-23 | End: 2018-05-30 | Stop reason: HOSPADM

## 2018-05-23 RX ORDER — CARVEDILOL 12.5 MG/1
12.5 TABLET ORAL 2 TIMES DAILY
Status: DISCONTINUED | OUTPATIENT
Start: 2018-05-23 | End: 2018-05-23

## 2018-05-23 RX ORDER — IBUPROFEN 200 MG
24 TABLET ORAL
Status: DISCONTINUED | OUTPATIENT
Start: 2018-05-23 | End: 2018-05-30 | Stop reason: HOSPADM

## 2018-05-23 RX ORDER — TIOTROPIUM BROMIDE 18 UG/1
1 CAPSULE ORAL; RESPIRATORY (INHALATION) DAILY
Status: DISCONTINUED | OUTPATIENT
Start: 2018-05-23 | End: 2018-05-30 | Stop reason: HOSPADM

## 2018-05-23 RX ORDER — SODIUM BICARBONATE 650 MG/1
1 TABLET ORAL 2 TIMES DAILY
Status: DISCONTINUED | OUTPATIENT
Start: 2018-05-23 | End: 2018-05-30 | Stop reason: HOSPADM

## 2018-05-23 RX ADMIN — DRONEDARONE 400 MG: 400 TABLET, FILM COATED ORAL at 09:05

## 2018-05-23 RX ADMIN — HYDRALAZINE HYDROCHLORIDE 25 MG: 25 TABLET, FILM COATED ORAL at 09:05

## 2018-05-23 RX ADMIN — APIXABAN 2.5 MG: 2.5 TABLET, FILM COATED ORAL at 09:05

## 2018-05-23 RX ADMIN — AMLODIPINE BESYLATE 10 MG: 5 TABLET ORAL at 09:05

## 2018-05-23 RX ADMIN — SODIUM BICARBONATE 650 MG TABLET 650 MG: at 02:05

## 2018-05-23 RX ADMIN — SODIUM CHLORIDE: 0.9 INJECTION, SOLUTION INTRAVENOUS at 05:05

## 2018-05-23 RX ADMIN — ATORVASTATIN CALCIUM 40 MG: 20 TABLET, FILM COATED ORAL at 09:05

## 2018-05-23 RX ADMIN — TIOTROPIUM BROMIDE 18 MCG: 18 CAPSULE ORAL; RESPIRATORY (INHALATION) at 07:05

## 2018-05-23 RX ADMIN — DRONEDARONE 400 MG: 400 TABLET, FILM COATED ORAL at 05:05

## 2018-05-23 RX ADMIN — CARVEDILOL 6.25 MG: 6.25 TABLET, FILM COATED ORAL at 09:05

## 2018-05-23 RX ADMIN — ASPIRIN 81 MG CHEWABLE TABLET 81 MG: 81 TABLET CHEWABLE at 09:05

## 2018-05-23 RX ADMIN — CARVEDILOL 12.5 MG: 12.5 TABLET, FILM COATED ORAL at 09:05

## 2018-05-23 RX ADMIN — HYDRALAZINE HYDROCHLORIDE 25 MG: 25 TABLET, FILM COATED ORAL at 02:05

## 2018-05-23 RX ADMIN — SODIUM BICARBONATE 650 MG TABLET 650 MG: at 09:05

## 2018-05-23 RX ADMIN — LIRAGLUTIDE 1.2 MG: 6 INJECTION SUBCUTANEOUS at 10:05

## 2018-05-23 NOTE — NURSING
Patient HR sinus hannah upon admit at 2100hrs.  At 0158hrs patient's HR increased to the 60s and now its consistently a sinus rhythm 60-70s.  Patient without any complaints.

## 2018-05-23 NOTE — PLAN OF CARE
Problem: Diabetes, Type 2 (Adult)  Goal: Signs and Symptoms of Listed Potential Problems Will be Absent, Minimized or Managed (Diabetes, Type 2)  Signs and symptoms of listed potential problems will be absent, minimized or managed by discharge/transition of care (reference Diabetes, Type 2 (Adult) CPG).   Outcome: Ongoing (interventions implemented as appropriate)  MD called for orders to reorder patients' antiglycerides per wife request. Metformin and Sitagliptin noted.      Problem: Fall Risk (Adult)  Goal: Absence of Falls  Patient will demonstrate the desired outcomes by discharge/transition of care.   Outcome: Ongoing (interventions implemented as appropriate)  Patient is a fall risk.  Yellow socks and bracelet applied.  Bed alarm noted in use.  Instructed patient to call for any assist OOB.  He verbalized understanding.    Problem: Patient Care Overview  Goal: Plan of Care Review  Outcome: Ongoing (interventions implemented as appropriate)  Plan of care reviewed with the patient and his wife.  All questions answered to their satisfaction.

## 2018-05-23 NOTE — ED NOTES
Pt in semi- auguste's position, A & O x 3, denies SOB while at rest. Pt has no additional complaint. Pt denies N/V/D, fever and chills. Skin is warm, dry and pink. VS. Pt remains bradycardic. Respirations are even and unlabored. Will continue to monitor closely.

## 2018-05-23 NOTE — H&P
HISTORY OF PRESENT ILLNESS:  Mr. Castano is a 68-year-old gentleman who says he   checked his pulse on the afternoon of admission, noted it was in the 60s.  As   the day went on, the pulse dropped, it was 39 per minutes and he started getting   short of breath.  His fiancee called me on the phone, says that he had trouble   with shortness of breath with the slow pulse.  I advised him to come to the   Emergency Room again.  He says that when he left the hospital last week, he was   doing quite well, was up and about without shortness of breath.  He has been   concentrating on drinking lots of fluids, and has been taking his Lasix on an   every other day basis.  He says the shortness of breath came on with the slow   pulse, somewhat rapidly.  For details of past history, etc., refer to the notes   of the recent admission.    PHYSICAL EXAMINATION:  GENERAL:  Reveals an alert, pleasant man, in no apparent acute distress.  VITAL SIGNS:  Blood pressure of 140/70 and a pulse of 60 beats per minute.  HEENT:  The sclerae is nonicteric.  The conjunctivae are pink.  The ENT exam is   unremarkable.  NECK:  Supple.  There is no jugular venous distention.  The carotid upstroke is   brisk.  There is no carotid bruit.  CHEST:  Clear.  HEART:  Size is grossly normal.  S1 and S2 are normal.  There is no audible   murmur or gallop.  ABDOMEN:  Soft and nontender.  The bowel sounds are normal.  EXTREMITIES:  There is no clubbing, cyanosis or edema of feet.  NEUROLOGIC:  Grossly, the neurological exam is intact.    IMPRESSION ON ADMISSION:  1.  Bradycardia and concurrent breathlessness, sick sinus syndrome.  2.  Paroxysmal atrial flutter.  3.  Diabetes mellitus.  4.  Hypertension.  5.  Stable coronary artery disease.  6.  Nephrotic syndrome.  7.  Elevated left hemidiaphragm.  8.  Obstructive lung disease.  9.  Obesity.      SB/IN  dd: 05/23/2018 10:13:26 (CDT)  td: 05/23/2018 13:02:08 (CDT)  Doc ID   #[id number]  Job ID #[id number]    CC:

## 2018-05-23 NOTE — CONSULTS
Consult Note  Nephrology    Consult Requested By: Mehul Livingston MD  Reason for Consult: NATALIIA    SUBJECTIVE:     History of Present Illness:  Patient is a 68 y.o. male presents with shortness of breath and found to be severely bradycardic with heart rate in the upper 30s and low 40s.  Admitted for further evaluation.  Further findings revealed acute kidney injury.  Known to us from previous hospitalization and has not followed up with Dr. Ortiz yet since has been in and out of the hospital over the past month.    Case discussed with Dr. Livingston.  Medication reviewed with patient and he was back on metformin, Januvia, Avapro, victoza, and Lasix.    Patient seen and examined.  Epic reviewed.  See previous consult from last hospitalization.    No chest pain, shortness of breath, nausea, vomiting, diarrhea, fever, chills.  Only complains of shortness of breath when lying flat.      Past Medical History:   Diagnosis Date    CKD (chronic kidney disease), stage III     Coronary artery disease     Diabetes mellitus type II     Hyperlipidemia     Hypertension     Nephrotic range proteinuria     Paroxysmal atrial fibrillation     Stroke     nov 2012     Past Surgical History:   Procedure Laterality Date    KNEE SURGERY Left     SHOULDER SURGERY Right      Family History   Problem Relation Age of Onset    Hypertension Mother      Social History   Substance Use Topics    Smoking status: Former Smoker     Types: Cigars    Smokeless tobacco: Never Used      Comment: occasional, when at the [employer]    Alcohol use 0.6 oz/week     1 Cans of beer per week      Comment: occasional when at the [employer]       Review of patient's allergies indicates:   Allergen Reactions    Doxycycline Rash        Review of Systems:  Constitutional: No fever or chills  Respiratory:  shortness of breath  Cardiovascular: No chest pain or palpitations  Gastrointestinal: No nausea or vomiting  Neurological: No confusion or  weakness    OBJECTIVE:     Vital Signs (Most Recent)  Temp: 97.4 °F (36.3 °C) (05/23/18 0745)  Pulse: 66 (05/23/18 0800)  Resp: 18 (05/23/18 0758)  BP: (!) 174/80 (05/23/18 0745)  SpO2: (!) 93 % (05/23/18 0758)    Vital Signs Range (Last 24H):  Temp:  [97.4 °F (36.3 °C)-98.4 °F (36.9 °C)]   Pulse:  [40-73]   Resp:  [18-24]   BP: (136-174)/(59-80)   SpO2:  [89 %-96 %]       Intake/Output Summary (Last 24 hours) at 05/23/18 1002  Last data filed at 05/23/18 0627   Gross per 24 hour   Intake              606 ml   Output             1000 ml   Net             -394 ml       Physical Exam:  General appearance: Well developed, well nourished  Eyes:  Conjunctivae/corneas clear. PERRL.  Lungs: Normal respiratory effort,   clear to auscultation bilaterally   Heart: Regular rate and rhythm, S1, S2 normal, no murmur, rub or pino.  Abdomen: Soft, non-tender non-distended; bowel sounds normal; no masses,  no organomegaly  Extremities: No cyanosis or clubbing. 1+ edema.    Skin: Skin color, texture, turgor normal. No rashes or lesions  Neurologic: Normal strength and tone. No focal numbness or weakness       Laboratory:    Recent Labs  Lab 05/22/18 1856   WBC 8.34   RBC 4.42*   HGB 13.0*   HCT 40.2      MCV 91   MCH 29.4   MCHC 32.3     BMP:   Recent Labs  Lab 05/22/18 1856   *      K 5.2*      CO2 18*   BUN 54*   CREATININE 3.2*   CALCIUM 8.5*     Lab Results   Component Value Date    CALCIUM 8.5 (L) 05/22/2018    PHOS 3.9 05/11/2018     BNP    Recent Labs  Lab 05/22/18  1856   *     Lab Results   Component Value Date    URICACID 9.3 (H) 05/09/2018     Lab Results   Component Value Date    IRON 51 05/10/2018    TIBC 318 05/10/2018    FERRITIN 117 05/10/2018     Lab Results   Component Value Date    PTH 64.7 05/10/2018    CALCIUM 8.5 (L) 05/22/2018    PHOS 3.9 05/11/2018       Diagnostic Results:  X-Ray Chest 1 View   Final Result      As above.         Electronically signed by: Kimber Sarkar,  MD   Date:    05/22/2018   Time:    19:05          ASSESSMENT/PLAN:     1. Non-oliguric NATALIIA on CKD III (Baseline Creat 1.2) secondary to poor CO from Bradycardia and ARB.   (N17.9, N18.3, R80.9): Hold diuretics/ARB.  Gentle hydration as CXR/BNP wnl.  Renal U/S consistent with DM Neph.  2.8 gr/protein on spot urine.  All proteinuric labs negative but likely consistent with DM/HTN Nephropathy.  Baseline Creat ~1.8.  Will not be able to diurese edema due to proteinuria.  Placed teds. Considering renal biopsy as outpt.  Has appt with Dr. Ortiz.   Renally dose meds, avoid nephrotoxins, and monitor I/O's closely.  2. Mild NAGMA with pseudohyperkalemia (E87.5, E87.2):  Gentle Bicarb tabs.  Low K diet.  No ARB....  3. SOB secondary to Bradycardia  (R06.02, R00.1):  Bnp/cxr wnl.   D-dimer/LE US negative.  Pt already on eliquis as outpt.  defer to cards/pulm.   Still on BB/multaq.  I recommend to stop BB and reassess need after HR above 60.  4. DM (E11.65): Recommend holding DDP4 while on GLP-1 as they have similar mechanism of action.  SSI.  Defer to PCP.  No Metformin for now.  Will likely need insulin.   5. Normocytic anemia with mild SABRINA/Folate/B12 (D64.9, D50.9):  Started oral replacements.       Thanks for consult  See above  Will follow.

## 2018-05-23 NOTE — PLAN OF CARE
Pt confirms his PCP is correct in EPIC.    Pt's pharmacy of choice is [facility] on [address].    Pt is a readmit for shortness of breath. Discharged approximately 5 days ago.    Pt states he discharged with medication changes and has now returned with same issues.    CM to follow for discharge plans and needs.     05/23/18 0921   Discharge Assessment   Assessment Type Discharge Planning Assessment   Confirmed/corrected address and phone number on facesheet? Yes   Assessment information obtained from? Patient;Medical Record   Expected Length of Stay (days) 3   Communicated expected length of stay with patient/caregiver yes   Prior to hospitilization cognitive status: Alert/Oriented   Prior to hospitalization functional status: Needs Assistance   Current cognitive status: Alert/Oriented   Current Functional Status: Needs Assistance   Facility Arrived From: home   Lives With significant other   Able to Return to Prior Arrangements yes   Is patient able to care for self after discharge? Yes   Patient's perception of discharge disposition home or selfcare   Readmission Within The Last 30 Days previous discharge plan unsuccessful   If yes, most recent facility name: [facility]   Patient currently being followed by outpatient case management? No   Patient currently receives any other outside agency services? No   Equipment Currently Used at Home walker, rolling;bath bench   Do you have any problems affording any of your prescribed medications? No   Is the patient taking medications as prescribed? yes   Does the patient have transportation home? Yes   Transportation Available family or friend will provide   Does the patient receive services at the [facility]? No   Discharge Plan A Home   Discharge Plan B Home   Patient/Family In Agreement With Plan yes

## 2018-05-23 NOTE — CONSULTS
Admit Date: 5/22/2018   LOS: 1 day     SUBJECTIVE:    dyspneic when bradycardic-no sputum-dyspneic for 48 hours worse with ambulation better when hr higher  Continuous Infusions:   sodium chloride 0.9% 50 mL/hr at 05/23/18 1748       Scheduled Meds:   amLODIPine  10 mg Oral Daily    apixaban  2.5 mg Oral BID    aspirin  81 mg Oral Daily    atorvastatin  40 mg Oral Daily    carvedilol  6.25 mg Oral BID    dronedarone  400 mg Oral BID WM    hydrALAZINE  25 mg Oral Q8H    liraglutide 0.6 mg/0.1 mL (18 mg/3 mL) subq PNIJ  1.2 mg Subcutaneous Daily    sodium bicarbonate  1 tablet Oral BID    tiotropium  1 capsule Inhalation Daily       Review of Systems:  Constitutional: negative  Eyes: negative  Ears, nose, mouth, throat, and face: negative  Respiratory: positive for dyspnea on exertion  Cardiovascular: positive for palpitations  Gastrointestinal: negative  Genitourinary:negative  Integument/breast: negative  Hematologic/lymphatic: negative  Musculoskeletal:positive for neck pain  Neurological: positive for gait problems  Behavioral/Psych: negative  Endocrine: negative  Allergic/Immunologic: negative     OBJECTIVE:     Vital Signs Range (Last 24H):  Temp:  [97.3 °F (36.3 °C)-98.4 °F (36.9 °C)]   Pulse:  [40-73]   Resp:  [18-24]   BP: (136-176)/(59-82)   SpO2:  [89 %-96 %]     I & O (Last 24H):  Intake/Output Summary (Last 24 hours) at 05/23/18 1829  Last data filed at 05/23/18 1800   Gross per 24 hour   Intake              606 ml   Output             3150 ml   Net            -2544 ml       Physical Exam:  General: mild distress, appears stated age, fatigued, moderately obese  Neck: no jugular venous distention and no adenopathy  Lungs:  diminished breath sounds base - left  Chest Wall: no tenderness  Heart: irregularly irregular rhythm  Abdomen: soft, non-tender non-distended; bowel sounds normal  Extremities: edema left greater than right  Neurologic: Motor:left hemiparesis    Laboratory:  CBC:   Recent  Labs  Lab 05/22/18 1856   WBC 8.34   RBC 4.42*   HGB 13.0*   HCT 40.2      MCV 91   MCH 29.4   MCHC 32.3     LFTs:   Recent Labs  Lab 05/22/18 1856   ALT 13   AST 11   ALKPHOS 84   BILITOT 0.7   PROT 6.7   ALBUMIN 3.4*     Cardiac markers: No results for input(s): CKMB, CPKMB, TROPONINT, TROPONINI, MYOGLOBIN in the last 168 hours.  ABGs: No results for input(s): PH, PCO2, PO2, HCO3, POCSATURATED, BE in the last 168 hours.  Microbiology Results (last 7 days)     ** No results found for the last 168 hours. **        Past Surgical History:   Procedure Laterality Date    KNEE SURGERY Left     SHOULDER SURGERY Right      Family History   Problem Relation Age of Onset    Hypertension Mother      Past Medical History:   Diagnosis Date    CKD (chronic kidney disease), stage III     Coronary artery disease     Diabetes mellitus type II     Hyperlipidemia     Hypertension     Nephrotic range proteinuria     Paroxysmal atrial fibrillation     Stroke     nov 2012     Diagnostic Results:  X-Ray: Reviewed  Social History     Social History    Marital status: Single     Spouse name: N/A    Number of children: N/A    Years of education: N/A     Occupational History    Not on file.     Social History Main Topics    Smoking status: Former Smoker     Types: Cigars    Smokeless tobacco: Never Used      Comment[place], [place]    Alcohol use 0.6 oz/week     1 Cans of beer per week      Comment: occasional [place] the [employer]    Drug use: No    Sexual activity: Yes     Partners: Female     Birth control/ protection: None     Other Topics Concern    Not on file     Social History Narrative    No narrative on file     ASSESSMENT/PLAN:     Active Hospital Problems    Diagnosis  POA    Bradycardia [R00.1]  Yes      Resolved Hospital Problems    Diagnosis Date Resolved POA   No resolved problems to display.   pt has pablo and restyrictive lung disease with elevated l aníbal diaphraghm- does not find  relief from neb treatments-do not believe he received sniff test for l hemidiaphraghm assessment    Case Discussed With:

## 2018-05-24 LAB
ANION GAP SERPL CALC-SCNC: 9 MMOL/L
BUN SERPL-MCNC: 25 MG/DL
CALCIUM SERPL-MCNC: 8.9 MG/DL
CHLORIDE SERPL-SCNC: 108 MMOL/L
CO2 SERPL-SCNC: 20 MMOL/L
CREAT SERPL-MCNC: 1.6 MG/DL
EST. GFR  (AFRICAN AMERICAN): 50 ML/MIN/1.73 M^2
EST. GFR  (NON AFRICAN AMERICAN): 43 ML/MIN/1.73 M^2
GLUCOSE SERPL-MCNC: 161 MG/DL
POCT GLUCOSE: 103 MG/DL (ref 70–110)
POCT GLUCOSE: 129 MG/DL (ref 70–110)
POCT GLUCOSE: 182 MG/DL (ref 70–110)
POTASSIUM SERPL-SCNC: 4.4 MMOL/L
SODIUM SERPL-SCNC: 137 MMOL/L

## 2018-05-24 PROCEDURE — 80048 BASIC METABOLIC PNL TOTAL CA: CPT

## 2018-05-24 PROCEDURE — 25000242 PHARM REV CODE 250 ALT 637 W/ HCPCS: Performed by: INTERNAL MEDICINE

## 2018-05-24 PROCEDURE — 25000003 PHARM REV CODE 250: Performed by: EMERGENCY MEDICINE

## 2018-05-24 PROCEDURE — 11000001 HC ACUTE MED/SURG PRIVATE ROOM

## 2018-05-24 PROCEDURE — 36415 COLL VENOUS BLD VENIPUNCTURE: CPT

## 2018-05-24 PROCEDURE — 99900035 HC TECH TIME PER 15 MIN (STAT)

## 2018-05-24 PROCEDURE — 25000003 PHARM REV CODE 250: Performed by: NURSE PRACTITIONER

## 2018-05-24 PROCEDURE — 94640 AIRWAY INHALATION TREATMENT: CPT

## 2018-05-24 PROCEDURE — 94761 N-INVAS EAR/PLS OXIMETRY MLT: CPT

## 2018-05-24 PROCEDURE — 25000003 PHARM REV CODE 250: Performed by: INTERNAL MEDICINE

## 2018-05-24 RX ORDER — HYDRALAZINE HYDROCHLORIDE 25 MG/1
50 TABLET, FILM COATED ORAL EVERY 8 HOURS
Status: DISCONTINUED | OUTPATIENT
Start: 2018-05-24 | End: 2018-05-30 | Stop reason: HOSPADM

## 2018-05-24 RX ADMIN — DRONEDARONE 400 MG: 400 TABLET, FILM COATED ORAL at 08:05

## 2018-05-24 RX ADMIN — HYDRALAZINE HYDROCHLORIDE 25 MG: 25 TABLET, FILM COATED ORAL at 05:05

## 2018-05-24 RX ADMIN — ACETAMINOPHEN 650 MG: 325 TABLET ORAL at 08:05

## 2018-05-24 RX ADMIN — LIRAGLUTIDE 1.2 MG: 6 INJECTION SUBCUTANEOUS at 08:05

## 2018-05-24 RX ADMIN — RAMELTEON 8 MG: 8 TABLET, FILM COATED ORAL at 11:05

## 2018-05-24 RX ADMIN — DRONEDARONE 400 MG: 400 TABLET, FILM COATED ORAL at 05:05

## 2018-05-24 RX ADMIN — APIXABAN 2.5 MG: 2.5 TABLET, FILM COATED ORAL at 09:05

## 2018-05-24 RX ADMIN — ATORVASTATIN CALCIUM 40 MG: 20 TABLET, FILM COATED ORAL at 08:05

## 2018-05-24 RX ADMIN — TIOTROPIUM BROMIDE 18 MCG: 18 CAPSULE ORAL; RESPIRATORY (INHALATION) at 07:05

## 2018-05-24 RX ADMIN — AMLODIPINE BESYLATE 10 MG: 5 TABLET ORAL at 08:05

## 2018-05-24 RX ADMIN — SODIUM BICARBONATE 650 MG TABLET 650 MG: at 08:05

## 2018-05-24 RX ADMIN — CARVEDILOL 6.25 MG: 6.25 TABLET, FILM COATED ORAL at 08:05

## 2018-05-24 RX ADMIN — HYDRALAZINE HYDROCHLORIDE 50 MG: 25 TABLET, FILM COATED ORAL at 11:05

## 2018-05-24 RX ADMIN — CARVEDILOL 6.25 MG: 6.25 TABLET, FILM COATED ORAL at 09:05

## 2018-05-24 RX ADMIN — HYDRALAZINE HYDROCHLORIDE 50 MG: 25 TABLET, FILM COATED ORAL at 09:05

## 2018-05-24 RX ADMIN — SODIUM BICARBONATE 650 MG TABLET 650 MG: at 09:05

## 2018-05-24 RX ADMIN — ASPIRIN 81 MG CHEWABLE TABLET 81 MG: 81 TABLET CHEWABLE at 08:05

## 2018-05-24 RX ADMIN — APIXABAN 2.5 MG: 2.5 TABLET, FILM COATED ORAL at 08:05

## 2018-05-24 NOTE — PROGRESS NOTES
"Nephrology  Progress Note    Admit Date: 5/22/2018   LOS: 2 days     SUBJECTIVE:     Follow-up For:  Bradycardia/NATALIIA    Interval History:     Still getting breathless when lying flat.  No chest pain or shortness of breath when sitting upright.  Renal function improved and urine output adequate.    Review of Systems:  Constitutional: No fever or chills  Respiratory: No cough or shortness of breath  Cardiovascular: No chest pain or palpitations  Gastrointestinal: No nausea or vomiting  Neurological: No confusion or weakness    OBJECTIVE:     Vital Signs Range (Last 24H):  BP (!) 181/83 (Patient Position: Sitting)   Pulse 71   Temp 98 °F (36.7 °C) (Oral)   Resp 18   Ht 6' 1" (1.854 m)   Wt 121 kg (266 lb 12.1 oz)   SpO2 96%   BMI 35.19 kg/m²     Temp:  [97.3 °F (36.3 °C)-98.6 °F (37 °C)]   Pulse:  [68-79]   Resp:  [18-20]   BP: (155-181)/(73-83)   SpO2:  [92 %-96 %]     I & O (Last 24H):  Intake/Output Summary (Last 24 hours) at 05/24/18 1119  Last data filed at 05/24/18 0552   Gross per 24 hour   Intake             1145 ml   Output             3375 ml   Net            -2230 ml       Physical Exam:  General appearance: Well developed, well nourished  Eyes:  Conjunctivae/corneas clear. PERRL.  Lungs: Normal respiratory effort,   clear to auscultation bilaterally   Heart: Regular rate and rhythm, S1, S2 normal, no murmur, rub or pino.  Abdomen: Soft, non-tender non-distended; bowel sounds normal; no masses,  no organomegaly, obese  Extremities: No cyanosis or clubbing. trace edema.    Skin: Skin color, texture, turgor normal. No rashes or lesions  Neurologic: Normal strength and tone. No focal numbness or weakness     Laboratory Data:  No results for input(s): WBC, RBC, HGB, HCT, PLT, MCV, MCH, MCHC in the last 24 hours.    BMP:   Recent Labs  Lab 05/24/18  0947   *      K 4.4      CO2 20*   BUN 25*   CREATININE 1.6*   CALCIUM 8.9     Lab Results   Component Value Date    CALCIUM 8.9 " 05/24/2018    PHOS 3.9 05/11/2018       Lab Results   Component Value Date    PTH 64.7 05/10/2018    CALCIUM 8.9 05/24/2018    PHOS 3.9 05/11/2018       Lab Results   Component Value Date    URICACID 9.3 (H) 05/09/2018       BNP    Recent Labs  Lab 05/22/18  1856   *       Medications:  Medication list was reviewed and changes noted under Assessment/Plan.    Diagnostic Results:        ASSESSMENT/PLAN:     1. Resolving Non-oliguric NATALIIA on CKD III (Baseline Creat 1.2) secondary to poor CO from Bradycardia and ARB.   (N17.9, N18.3, R80.9): Hold diuretics/ARB.  Gentle hydration as CXR/BNP wnl but DC after current bag.  Renal U/S consistent with DM Neph.  2.8 gr/protein on spot urine.  All proteinuric labs negative but likely consistent with DM/HTN Nephropathy.  Baseline Creat ~1.8.  Will not be able to diurese edema due to proteinuria.  Placed teds. Considering renal biopsy as outpt.  Has appt with Dr. Ortiz.   Renally dose meds, avoid nephrotoxins, and monitor I/O's closely.  2. Mild NAGMA with pseudohyperkalemia (E87.5, E87.2):  Gentle Bicarb tabs.  Low K diet.  No ARB....  3. SOB secondary to Bradycardia vs other.  (R06.02, R00.1):  Bnp/cxr wnl.   D-dimer/LE US negative.  Pt already on eliquis as outpt.  defer to cards/pulm.   Still on BB/multaq.  I recommend to stop BB and reassess need after HR above 60.  Suspect he has RENU as only getting breathless when lying flat.  Defer to pulm.    4. DM (E11.65): Recommend holding DDP4 while on GLP-1 as they have similar mechanism of action.  SSI.  Defer to PCP.  No Metformin for now.  Will likely need insulin.   5. Normocytic anemia with mild SABRINA/Folate/B12 (D64.9, D50.9):  Started oral replacements.          Will follow from afar.  See above

## 2018-05-24 NOTE — PROGRESS NOTES
""I was short of breath last evening."  Better this am.    Vitals:    05/24/18 0600 05/24/18 0736 05/24/18 0752 05/24/18 0800   BP:  (!) 181/83     BP Location:       Patient Position:  Sitting     Pulse: 76 73 69 73   Resp:  18 18    Temp:  98 °F (36.7 °C)     TempSrc:  Oral     SpO2:  (!) 92% 96%    Weight:       Height:         Chest clear  Cor: No gallop    Await am lab.    Plan: Increase Apresoline.  Ambulate  "

## 2018-05-24 NOTE — PROGRESS NOTES
Pt remains on RA with adequate saturation. DPI was given. No changes made. Will continue to monitor.

## 2018-05-24 NOTE — DISCHARGE SUMMARY
HISTORY OF PRESENT ILLNESS:  Mr. Castano is a 68-year-old gentleman recently   discharged from the hospital 3 days prior to this admission, when he noted a   pulse in the 35 range, and felt associated breathlessness.  He recently was   noted to have atrial flutter, was placed on amiodarone.  The amiodarone was   discontinued, he was switched over to Multaq, and progressively increasing doses   of carvedilol.  Most recently, he was on carvedilol 25 mg twice a day.  Note   that recently due to azotemia, the Jardiance was discontinued, as was the Lasix.    After discontinuation of Jardiance and Lasix recently the renal function had   normalized.  The week prior to this admission, he had complained of wheezing and   more shortness of breath, was seen by Dr. Sam Perez who felt that he had   obstructive lung disease and was placed on Spiriva.  Pulmonary function studies   were performed.  He also was noted to have an elevated left hemidiaphragm on   chest x-ray.    IMPRESSION ON ADMISSION:  1.  Breathlessness with associated profound bradycardia.  2.  Sick sinus syndrome with paroxysmal atrial flutter, paroxysmal atrial   fibrillation, and inappropriate sinus bradycardia on beta-blockers.  3.  Uncontrolled hypertension.  4.  Stable coronary artery disease.  5.  Diabetes mellitus.  6.  Morbid obesity.  7.  Recent azotemia now resolved, and 8 previous strokes.    He was noted to be breathless with a drop in O2 saturations during the night,   the amlodipine was continued, the carvedilol dose was dropped to 12.5 mg twice a   day and hydralazine was added in the dose of 25 mg p.o. t.i.d.  The creatinine   on 05/14/2018 was noted to have gone up to 1.9.  On reducing the dose of   carvedilol, his lowest pulse rate was 60 beats per minute.  He tolerated   ambulation well, and Dr. Sam Perez felt that the oxygen saturations were in   the 95-96 range, that he was fairly well compensated.  He wanted to check the    hemidiaphragm function as it was elevated on the chest x-ray, he did not feel he   needed home oxygen.  At the time of discharge, he was ambulatory and free of   any symptoms of breathlessness.  At the time of discharge, the only change in   his medications would be decrease in Carvedilol to 12.5 mg p.o. b.i.d.  He was   encouraged to increase his oral fluid intake, and we will get a followup kidney   function study in the office next week.  He was given instructions on his   medications and activities etc., and I will see him for followup in the office   next week.      SB/HN  dd: 05/23/2018 13:03:04 (CDT)  td: 05/24/2018 03:06:55 (CDT)  Doc ID   #[id number]  Job ID #[id number]    CC:

## 2018-05-24 NOTE — PLAN OF CARE
Patient complains of shortness of breath. Head of bed raised 30 degrees. O2 saturation > 92%. Clear breath sounds auscultated. Frequent turning and deep breathing encouraged. Will continue to monitor.

## 2018-05-25 LAB
ANION GAP SERPL CALC-SCNC: 9 MMOL/L
BUN SERPL-MCNC: 21 MG/DL
CALCIUM SERPL-MCNC: 8.5 MG/DL
CHLORIDE SERPL-SCNC: 108 MMOL/L
CO2 SERPL-SCNC: 22 MMOL/L
CREAT SERPL-MCNC: 1.4 MG/DL
EST. GFR  (AFRICAN AMERICAN): 59 ML/MIN/1.73 M^2
EST. GFR  (NON AFRICAN AMERICAN): 51 ML/MIN/1.73 M^2
GLUCOSE SERPL-MCNC: 115 MG/DL
POCT GLUCOSE: 107 MG/DL (ref 70–110)
POCT GLUCOSE: 114 MG/DL (ref 70–110)
POCT GLUCOSE: 127 MG/DL (ref 70–110)
POCT GLUCOSE: 143 MG/DL (ref 70–110)
POTASSIUM SERPL-SCNC: 4.1 MMOL/L
SODIUM SERPL-SCNC: 139 MMOL/L

## 2018-05-25 PROCEDURE — 94640 AIRWAY INHALATION TREATMENT: CPT

## 2018-05-25 PROCEDURE — 25000003 PHARM REV CODE 250: Performed by: NURSE PRACTITIONER

## 2018-05-25 PROCEDURE — 94761 N-INVAS EAR/PLS OXIMETRY MLT: CPT

## 2018-05-25 PROCEDURE — 80048 BASIC METABOLIC PNL TOTAL CA: CPT

## 2018-05-25 PROCEDURE — 25000242 PHARM REV CODE 250 ALT 637 W/ HCPCS: Performed by: INTERNAL MEDICINE

## 2018-05-25 PROCEDURE — 25000003 PHARM REV CODE 250: Performed by: INTERNAL MEDICINE

## 2018-05-25 PROCEDURE — 36415 COLL VENOUS BLD VENIPUNCTURE: CPT

## 2018-05-25 PROCEDURE — 11000001 HC ACUTE MED/SURG PRIVATE ROOM

## 2018-05-25 PROCEDURE — 25000003 PHARM REV CODE 250: Performed by: EMERGENCY MEDICINE

## 2018-05-25 RX ADMIN — ASPIRIN 81 MG CHEWABLE TABLET 81 MG: 81 TABLET CHEWABLE at 08:05

## 2018-05-25 RX ADMIN — RAMELTEON 8 MG: 8 TABLET, FILM COATED ORAL at 09:05

## 2018-05-25 RX ADMIN — HYDRALAZINE HYDROCHLORIDE 50 MG: 25 TABLET, FILM COATED ORAL at 09:05

## 2018-05-25 RX ADMIN — TIOTROPIUM BROMIDE 18 MCG: 18 CAPSULE ORAL; RESPIRATORY (INHALATION) at 07:05

## 2018-05-25 RX ADMIN — DRONEDARONE 400 MG: 400 TABLET, FILM COATED ORAL at 06:05

## 2018-05-25 RX ADMIN — SODIUM BICARBONATE 650 MG TABLET 650 MG: at 09:05

## 2018-05-25 RX ADMIN — SODIUM BICARBONATE 650 MG TABLET 650 MG: at 08:05

## 2018-05-25 RX ADMIN — CARVEDILOL 6.25 MG: 6.25 TABLET, FILM COATED ORAL at 09:05

## 2018-05-25 RX ADMIN — HYDRALAZINE HYDROCHLORIDE 50 MG: 25 TABLET, FILM COATED ORAL at 06:05

## 2018-05-25 RX ADMIN — DRONEDARONE 400 MG: 400 TABLET, FILM COATED ORAL at 08:05

## 2018-05-25 RX ADMIN — CARVEDILOL 6.25 MG: 6.25 TABLET, FILM COATED ORAL at 08:05

## 2018-05-25 RX ADMIN — APIXABAN 2.5 MG: 2.5 TABLET, FILM COATED ORAL at 08:05

## 2018-05-25 RX ADMIN — ATORVASTATIN CALCIUM 40 MG: 20 TABLET, FILM COATED ORAL at 08:05

## 2018-05-25 RX ADMIN — HYDRALAZINE HYDROCHLORIDE 50 MG: 25 TABLET, FILM COATED ORAL at 01:05

## 2018-05-25 RX ADMIN — LIRAGLUTIDE 1.2 MG: 6 INJECTION SUBCUTANEOUS at 08:05

## 2018-05-25 RX ADMIN — AMLODIPINE BESYLATE 10 MG: 5 TABLET ORAL at 08:05

## 2018-05-25 RX ADMIN — APIXABAN 2.5 MG: 2.5 TABLET, FILM COATED ORAL at 09:05

## 2018-05-25 NOTE — PLAN OF CARE
Problem: Patient Care Overview  Goal: Plan of Care Review  Outcome: Ongoing (interventions implemented as appropriate)  No changes at this time.  Will continue to  monitor

## 2018-05-25 NOTE — PLAN OF CARE
Problem: Patient Care Overview  Goal: Plan of Care Review  Outcome: Ongoing (interventions implemented as appropriate)  Patient in no apparent distress. Sat's 98  % on room air. Will continue to monitor.

## 2018-05-25 NOTE — PROGRESS NOTES
Renal Progress Note    Admit Date: 5/22/2018   LOS: 3 days     SUBJECTIVE:     Patient is without new complaint.    Scheduled Meds:   amLODIPine  10 mg Oral Daily    apixaban  2.5 mg Oral BID    aspirin  81 mg Oral Daily    atorvastatin  40 mg Oral Daily    carvedilol  6.25 mg Oral BID    dronedarone  400 mg Oral BID WM    hydrALAZINE  50 mg Oral Q8H    liraglutide 0.6 mg/0.1 mL (18 mg/3 mL) subq PNIJ  1.2 mg Subcutaneous Daily    sodium bicarbonate  1 tablet Oral BID    tiotropium  1 capsule Inhalation Daily       OBJECTIVE:     Vital Signs Range (Last 24H):  Temp:  [97.1 °F (36.2 °C)-98.6 °F (37 °C)]   Pulse:  [59-76]   Resp:  [18-22]   BP: (145-174)/(72-83)   SpO2:  [91 %-98 %]     I & O (Last 24H):  Intake/Output Summary (Last 24 hours) at 05/25/18 1337  Last data filed at 05/25/18 0700   Gross per 24 hour   Intake              250 ml   Output             1200 ml   Net             -950 ml       Physical Exam:  General appearance: Well developed, well nourished  Eyes:  Conjunctivae/corneas clear. PERRL.  Lungs: Normal respiratory effort,   clear to auscultation bilaterally   Heart: Regular rate and rhythm, S1, S2 normal, no murmur, rub or pino.  Abdomen: Soft, non-tender non-distended; bowel sounds normal; no masses,  no organomegaly, obese  Extremities: No cyanosis or clubbing. trace edema.    Skin: Skin color, texture, turgor normal. No rashes or lesions  Neurologic: Normal strength and tone. No focal numbness or weakness       Laboratory:  CBC:   Recent Labs  Lab 05/22/18  1856   WBC 8.34   RBC 4.42*   HGB 13.0*   HCT 40.2      MCV 91   MCH 29.4   MCHC 32.3     BMP:   Recent Labs  Lab 05/25/18  0443   *      K 4.1      CO2 22*   BUN 21   CREATININE 1.4   CALCIUM 8.5*       ASSESSMENT/PLAN:   1. Resolved Non-oliguric NATALIIA on CKD III (Baseline Creat 1.4) secondary to poor CO from Bradycardia and ARB.   (N17.9, N18.3, R80.9): Hold diuretics/ARB.  Gentle hydration as CXR/BNP  wnl but DC after current bag.  Renal U/S consistent with DM Neph.  2.8 gr/protein on spot urine.  All proteinuric labs negative but likely consistent with DM/HTN Nephropathy.  Difficult to diurese given degree of proteinuria. Still think a  renal biopsy as outpt is indicated.  Has appt with me as outpatient to make arrangements. Renally dose meds, avoid nephrotoxins, and monitor I/O's closely.  2. Mild NAGMA with pseudohyperkalemia (E87.5, E87.2):  Bicarb tabs.  Low K diet.  No ARB.  3. SOB secondary to Bradycardia vs other.  (R06.02, R00.1):  Bnp/cxr wnl.   D-dimer/LE US negative.  Pt already on eliquis as outpt.  defer to cards/pulm.   Still on BB/multaq.  Defer to pulm.    4. DM (E11.65): Recommend holding DDP4 while on GLP-1 as they have similar mechanism of action.  SSI.  Defer to PCP.  No Metformin for now.  Will likely need insulin.   5. Normocytic anemia with mild SABRINA/Folate/B12 (D64.9, D50.9):  Started oral replacements.

## 2018-05-25 NOTE — PROGRESS NOTES
Progress Note  Pulmonology    Admit Date: 5/22/2018   LOS: 3 days     SUBJECTIVE:   Pt better today-kidneys better-passed sniff test-has restrictive disease on pft-pe workup negative  Continuous Infusions:    Scheduled Meds:   amLODIPine  10 mg Oral Daily    apixaban  2.5 mg Oral BID    aspirin  81 mg Oral Daily    atorvastatin  40 mg Oral Daily    carvedilol  6.25 mg Oral BID    dronedarone  400 mg Oral BID WM    hydrALAZINE  50 mg Oral Q8H    liraglutide 0.6 mg/0.1 mL (18 mg/3 mL) subq PNIJ  1.2 mg Subcutaneous Daily    sodium bicarbonate  1 tablet Oral BID    tiotropium  1 capsule Inhalation Daily       Review of Systems:  Constitutional: no fever or chills  Respiratory: positive for dyspnea on exertion  Cardiovascular: positive for palpitations  Otherwise negative    OBJECTIVE:     Vital Signs Range (Last 24H):  Temp:  [97.1 °F (36.2 °C)-98.6 °F (37 °C)]   Pulse:  [59-76]   Resp:  [18-22]   BP: (145-174)/(72-83)   SpO2:  [93 %-98 %]     I & O (Last 24H):    Intake/Output Summary (Last 24 hours) at 05/25/18 1710  Last data filed at 05/25/18 0700   Gross per 24 hour   Intake              250 ml   Output             1200 ml   Net             -950 ml       Physical Exam:  General: no distress, appears stated age  Neck: no jugular venous distention and no adenopathy  Lungs:  diminished breath sounds base - left  Chest Wall: no tenderness  Heart: irregularly irregular rhythm  Abdomen: soft, non-tender non-distended; bowel sounds normal  Extremities: no cyanosis or edema, or clubbing and edema bilateral  Neurologic: alert, oriented, thought content appropriate    Laboratory:  CBC:     Recent Labs  Lab 05/22/18  1856   WBC 8.34   RBC 4.42*   HGB 13.0*   HCT 40.2      MCV 91   MCH 29.4   MCHC 32.3     CMP:   Recent Labs  Lab 05/22/18  1856  05/25/18  0443   *  < > 115*   CALCIUM 8.5*  < > 8.5*   ALBUMIN 3.4*  --   --    PROT 6.7  --   --      < > 139   K 5.2*  < > 4.1   CO2 18*  < > 22*   CL  107  < > 108   BUN 54*  < > 21   CREATININE 3.2*  < > 1.4   ALKPHOS 84  --   --    ALT 13  --   --    AST 11  --   --    BILITOT 0.7  --   --    < > = values in this interval not displayed.  Coagulation: No results for input(s): LABPROT, INR, APTT in the last 168 hours.  Cardiac markers: No results for input(s): CKMB, CPKMB, TROPONINT, TROPONINI, MYOGLOBIN in the last 168 hours.  ABGs: No results for input(s): PH, PCO2, PO2, HCO3, POCSATURATED, BE in the last 168 hours.  Microbiology Results (last 7 days)     ** No results found for the last 168 hours. **              Diagnostic Results:  X-Ray: Reviewed  Elevated aníbal diaphragghm  ASSESSMENT/PLAN:   pt has pablo and restrictive lung disease with elevated l aníbal diaphraghm- does not find relief from neb treatments  Dm  elier- better  Sniff test normal    Case Discussed With:dr mendez  Consider outpt sleep eval although he denies much in way symptoms    Discharge Needs and Plans:

## 2018-05-25 NOTE — PROGRESS NOTES
Progress Note  Pulmonology    Admit Date: 5/22/2018   LOS: 2 days     SUBJECTIVE:   Pt became very dyspneic last night- better today-not much improvement with breathing treatments  Continuous Infusions:    Scheduled Meds:   amLODIPine  10 mg Oral Daily    apixaban  2.5 mg Oral BID    aspirin  81 mg Oral Daily    atorvastatin  40 mg Oral Daily    carvedilol  6.25 mg Oral BID    dronedarone  400 mg Oral BID WM    hydrALAZINE  50 mg Oral Q8H    liraglutide 0.6 mg/0.1 mL (18 mg/3 mL) subq PNIJ  1.2 mg Subcutaneous Daily    sodium bicarbonate  1 tablet Oral BID    tiotropium  1 capsule Inhalation Daily       Review of Systems:  Constitutional: no fever or chills  Respiratory: positive for dyspnea on exertion  Cardiovascular: positive for palpitations  Otherwise negative    OBJECTIVE:     Vital Signs Range (Last 24H):  Temp:  [97.7 °F (36.5 °C)-98.6 °F (37 °C)]   Pulse:  [68-79]   Resp:  [18-20]   BP: (169-181)/(73-83)   SpO2:  [91 %-96 %]     I & O (Last 24H):  Intake/Output Summary (Last 24 hours) at 05/24/18 1907  Last data filed at 05/24/18 1100   Gross per 24 hour   Intake             1345 ml   Output             1625 ml   Net             -280 ml       Physical Exam:  General: no distress, appears stated age  Neck: no jugular venous distention and no adenopathy  Lungs:  diminished breath sounds base - left  Chest Wall: no tenderness  Heart: irregularly irregular rhythm  Abdomen: soft, non-tender non-distended; bowel sounds normal  Extremities: no cyanosis or edema, or clubbing and edema bilateral  Neurologic: alert, oriented, thought content appropriate    Laboratory:  CBC:   Recent Labs  Lab 05/22/18 1856   WBC 8.34   RBC 4.42*   HGB 13.0*   HCT 40.2      MCV 91   MCH 29.4   MCHC 32.3     CMP:   Recent Labs  Lab 05/22/18  1856  05/24/18  0947   *  < > 161*   CALCIUM 8.5*  < > 8.9   ALBUMIN 3.4*  --   --    PROT 6.7  --   --      < > 137   K 5.2*  < > 4.4   CO2 18*  < > 20*     <  > 108   BUN 54*  < > 25*   CREATININE 3.2*  < > 1.6*   ALKPHOS 84  --   --    ALT 13  --   --    AST 11  --   --    BILITOT 0.7  --   --    < > = values in this interval not displayed.  Coagulation: No results for input(s): LABPROT, INR, APTT in the last 168 hours.  Cardiac markers: No results for input(s): CKMB, CPKMB, TROPONINT, TROPONINI, MYOGLOBIN in the last 168 hours.  ABGs: No results for input(s): PH, PCO2, PO2, HCO3, POCSATURATED, BE in the last 168 hours.  Microbiology Results (last 7 days)     ** No results found for the last 168 hours. **              Diagnostic Results:  X-Ray: Reviewed  Elevated aníbal diaphragghm  ASSESSMENT/PLAN:   pt has pablo and restrictive lung disease with elevated l aníbal diaphraghm- does not find relief from neb treatments-do not believe he received sniff test for l hemidiaphraghm assessment-ordered  Dm  elier- better  Sniff test ordered    Case Discussed With:    Discharge Needs and Plans:

## 2018-05-25 NOTE — PLAN OF CARE
Problem: Diabetes, Type 2 (Adult)  Goal: Signs and Symptoms of Listed Potential Problems Will be Absent, Minimized or Managed (Diabetes, Type 2)  Signs and symptoms of listed potential problems will be absent, minimized or managed by discharge/transition of care (reference Diabetes, Type 2 (Adult) CPG).   Outcome: Ongoing (interventions implemented as appropriate)  HS CBG at 182.  No sliding scale given.    Problem: Fall Risk (Adult)  Goal: Absence of Falls  Patient will demonstrate the desired outcomes by discharge/transition of care.   Outcome: Ongoing (interventions implemented as appropriate)  Patient is a fall risk but is ambulatory.  Yellow bracelet and yellow socks are both worn.  Bed alarm noted in use.  No falls/injury noted, voiced or observed.  Patient gets very short of breath when he ambulates and his gait becomes unsteady.  Instructed to call for all assists OOB...patient verbalized understanding.    Problem: Patient Care Overview  Goal: Plan of Care Review  Outcome: Ongoing (interventions implemented as appropriate)  Plan of care reviewed with patient.  No new questions asked.  Medications reviewed as administered without problem/doubts.

## 2018-05-25 NOTE — PROGRESS NOTES
Still breathless on lying flat.  No breathlessness on ambulation.    Vitals:    05/25/18 0800 05/25/18 1000 05/25/18 1227 05/25/18 1400   BP:   (!) 154/72    BP Location:   Left arm    Patient Position:   Lying    Pulse: 66 64 (!) 59 60   Resp:   18    Temp:   98.6 °F (37 °C)    TempSrc:   Oral    SpO2:   (!) 93%    Weight:       Height:         Chest clear  Cor: No murmur or gallop    Reviewed lab.    Imp: Now bradycardia has resolved.  BP under almost reasonable control  Kidney function has normalized.  Sniff test: negative.    Plan: D/C IV fluids  Ambulate.  Will discuss with Dr Perez.

## 2018-05-26 LAB
ANION GAP SERPL CALC-SCNC: 9 MMOL/L
BUN SERPL-MCNC: 20 MG/DL
CALCIUM SERPL-MCNC: 8.7 MG/DL
CHLORIDE SERPL-SCNC: 107 MMOL/L
CO2 SERPL-SCNC: 23 MMOL/L
CREAT SERPL-MCNC: 1.4 MG/DL
EST. GFR  (AFRICAN AMERICAN): 59 ML/MIN/1.73 M^2
EST. GFR  (NON AFRICAN AMERICAN): 51 ML/MIN/1.73 M^2
GLUCOSE SERPL-MCNC: 105 MG/DL
POCT GLUCOSE: 111 MG/DL (ref 70–110)
POCT GLUCOSE: 120 MG/DL (ref 70–110)
POCT GLUCOSE: 122 MG/DL (ref 70–110)
POCT GLUCOSE: 98 MG/DL (ref 70–110)
POTASSIUM SERPL-SCNC: 4.1 MMOL/L
SODIUM SERPL-SCNC: 139 MMOL/L

## 2018-05-26 PROCEDURE — 80048 BASIC METABOLIC PNL TOTAL CA: CPT

## 2018-05-26 PROCEDURE — 94640 AIRWAY INHALATION TREATMENT: CPT

## 2018-05-26 PROCEDURE — 25000003 PHARM REV CODE 250: Performed by: EMERGENCY MEDICINE

## 2018-05-26 PROCEDURE — 25000003 PHARM REV CODE 250: Performed by: INTERNAL MEDICINE

## 2018-05-26 PROCEDURE — 25000003 PHARM REV CODE 250: Performed by: NURSE PRACTITIONER

## 2018-05-26 PROCEDURE — 25000242 PHARM REV CODE 250 ALT 637 W/ HCPCS: Performed by: INTERNAL MEDICINE

## 2018-05-26 PROCEDURE — 11000001 HC ACUTE MED/SURG PRIVATE ROOM

## 2018-05-26 PROCEDURE — 36415 COLL VENOUS BLD VENIPUNCTURE: CPT

## 2018-05-26 RX ADMIN — SODIUM BICARBONATE 650 MG TABLET 650 MG: at 08:05

## 2018-05-26 RX ADMIN — HYDRALAZINE HYDROCHLORIDE 50 MG: 25 TABLET, FILM COATED ORAL at 01:05

## 2018-05-26 RX ADMIN — ASPIRIN 81 MG CHEWABLE TABLET 81 MG: 81 TABLET CHEWABLE at 08:05

## 2018-05-26 RX ADMIN — TIOTROPIUM BROMIDE 18 MCG: 18 CAPSULE ORAL; RESPIRATORY (INHALATION) at 08:05

## 2018-05-26 RX ADMIN — APIXABAN 2.5 MG: 2.5 TABLET, FILM COATED ORAL at 08:05

## 2018-05-26 RX ADMIN — SODIUM BICARBONATE 650 MG TABLET 650 MG: at 09:05

## 2018-05-26 RX ADMIN — HYDRALAZINE HYDROCHLORIDE 50 MG: 25 TABLET, FILM COATED ORAL at 09:05

## 2018-05-26 RX ADMIN — CARVEDILOL 6.25 MG: 6.25 TABLET, FILM COATED ORAL at 09:05

## 2018-05-26 RX ADMIN — HYDRALAZINE HYDROCHLORIDE 50 MG: 25 TABLET, FILM COATED ORAL at 05:05

## 2018-05-26 RX ADMIN — LIRAGLUTIDE 1.2 MG: 6 INJECTION SUBCUTANEOUS at 08:05

## 2018-05-26 RX ADMIN — DRONEDARONE 400 MG: 400 TABLET, FILM COATED ORAL at 04:05

## 2018-05-26 RX ADMIN — RAMELTEON 8 MG: 8 TABLET, FILM COATED ORAL at 09:05

## 2018-05-26 RX ADMIN — AMLODIPINE BESYLATE 10 MG: 5 TABLET ORAL at 08:05

## 2018-05-26 RX ADMIN — CARVEDILOL 6.25 MG: 6.25 TABLET, FILM COATED ORAL at 08:05

## 2018-05-26 RX ADMIN — DRONEDARONE 400 MG: 400 TABLET, FILM COATED ORAL at 08:05

## 2018-05-26 RX ADMIN — ATORVASTATIN CALCIUM 40 MG: 20 TABLET, FILM COATED ORAL at 08:05

## 2018-05-26 RX ADMIN — APIXABAN 2.5 MG: 2.5 TABLET, FILM COATED ORAL at 09:05

## 2018-05-26 NOTE — PROGRESS NOTES
Renal Progress Note    Admit Date: 5/22/2018   LOS: 4 days     SUBJECTIVE:     Patient is without new complaint.    Scheduled Meds:   amLODIPine  10 mg Oral Daily    apixaban  2.5 mg Oral BID    aspirin  81 mg Oral Daily    atorvastatin  40 mg Oral Daily    carvedilol  6.25 mg Oral BID    dronedarone  400 mg Oral BID WM    hydrALAZINE  50 mg Oral Q8H    liraglutide 0.6 mg/0.1 mL (18 mg/3 mL) subq PNIJ  1.2 mg Subcutaneous Daily    sodium bicarbonate  1 tablet Oral BID    tiotropium  1 capsule Inhalation Daily       OBJECTIVE:     Vital Signs Range (Last 24H):  Temp:  [97.4 °F (36.3 °C)-98.6 °F (37 °C)]   Pulse:  [59-76]   Resp:  [16-21]   BP: (125-172)/(60-79)   SpO2:  [92 %-96 %]     I & O (Last 24H):  Intake/Output Summary (Last 24 hours) at 05/26/18 1058  Last data filed at 05/26/18 0456   Gross per 24 hour   Intake                0 ml   Output              425 ml   Net             -425 ml       Physical Exam:      Laboratory:  CBC:   Recent Labs  Lab 05/22/18  1856   WBC 8.34   RBC 4.42*   HGB 13.0*   HCT 40.2      MCV 91   MCH 29.4   MCHC 32.3     BMP:   Recent Labs  Lab 05/26/18  0431         K 4.1      CO2 23   BUN 20   CREATININE 1.4   CALCIUM 8.7       ASSESSMENT/PLAN:     1. Resolved Non-oliguric NATALIIA on CKD III (Baseline Creat 1.4) secondary to poor CO from Bradycardia and ARB.   (N17.9, N18.3, R80.9): Hold diuretics/ARB.  Gentle hydration as CXR/BNP wnl but DC after current bag.  Renal U/S consistent with DM Neph.  2.8 gr/protein on spot urine.  All proteinuria labs negative so suggestive of  DM/HTN Nephropathy.  Difficult to diurese given degree of proteinuria. Still think a  renal biopsy indicated.  Has appt with me as outpatient to make arrangements. Renally dose meds, avoid nephrotoxins, and monitor I/O's closely.  If he is still here on Monday, would like to do the biopsy at that time if Dr. Livingston agrees.  2. Mild NAGMA with pseudohyperkalemia (E87.5, E87.2):   Bicarb tabs.  Low K diet.  No ARB.  3. SOB secondary to Bradycardia vs other.  (R06.02, R00.1):  Bnp/cxr wnl.   D-dimer/LE US negative.  Pt already on eliquis as outpt.  defer to cards/pulm.   Still on BB/multaq.  Defer to pulm.    4. DM (E11.65): Recommend holding DDP4 while on GLP-1 as they have similar mechanism of action.  SSI.  Defer to PCP.  No Metformin for now.  Will likely need insulin.   5. Normocytic anemia with mild SABRINA/Folate/B12 (D64.9, D50.9):  Started oral replacements.

## 2018-05-26 NOTE — PROGRESS NOTES
Pulmonary  Progress Note        Patient Name: Zev Castano  : 1950  MRN: [id number]  Consulting Physician: Mehul Livingston MD      Subjective: short of breath    Allergies:   Review of patient's allergies indicates:   Allergen Reactions    Doxycycline Rash       Review of Systems         Review of Systems   Constitutional: Negative for chills, fatigue and fever.   HENT: Negative for nosebleeds, sore throat and trouble swallowing.    Eyes: Negative for pain.   Respiratory: Positive for shortness of breath. Negative for cough and wheezing.    Cardiovascular: Positive for leg swelling. Negative for chest pain.   Gastrointestinal: Negative for constipation, diarrhea, nausea and vomiting.   Genitourinary: Negative for dysuria and hematuria.   Musculoskeletal: Negative for arthralgias and neck pain.   Skin: Negative for rash.   Neurological: Negative for weakness and headaches.       Medications            Current Facility-Administered Medications:     acetaminophen tablet 650 mg, 650 mg, Oral, Q8H PRN, Vale Sanchez MD    acetaminophen tablet 650 mg, 650 mg, Oral, Q8H PRN, Vale Sanchez MD, 650 mg at 18 0851    amLODIPine tablet 10 mg, 10 mg, Oral, Daily, Vale Sanchez MD, 10 mg at 18 0845    apixaban tablet 2.5 mg, 2.5 mg, Oral, BID, Vale Sanchez MD, 2.5 mg at 18 0844    aspirin chewable tablet 81 mg, 81 mg, Oral, Daily, Vale Sanchez MD, 81 mg at 18 0844    atorvastatin tablet 40 mg, 40 mg, Oral, Daily, Vale Sanchez MD, 40 mg at 18 0844    carvedilol tablet 6.25 mg, 6.25 mg, Oral, BID, Mehul Livingston MD, 6.25 mg at 18 0845    dextrose 50% injection 12.5 g, 12.5 g, Intravenous, PRN, Sam Alejandra NP    dextrose 50% injection 25 g, 25 g, Intravenous, PRN, Sam Alejandra NP    dronedarone tablet 400 mg, 400 mg, Oral, BID WM, Vale Sanchez MD, 400 mg at 18 0845    glucagon (human recombinant) injection 1 mg, 1 mg,  Intramuscular, PRN, Sam Alejandra NP    glucose chewable tablet 16 g, 16 g, Oral, PRN, Sam Alejandra NP    glucose chewable tablet 24 g, 24 g, Oral, PRN, Sam Alejandra NP    hydrALAZINE tablet 50 mg, 50 mg, Oral, Q8H, Mehul Livingston MD, 50 mg at 18 1355    HYDROcodone-acetaminophen 5-325 mg per tablet 1 tablet, 1 tablet, Oral, Q4H PRN, Vale Sanchez MD    insulin aspart U-100 pen 0-5 Units, 0-5 Units, Subcutaneous, QID (AC + HS) PRN, Sam Alejandra NP    liraglutide 0.6 mg/0.1 mL (18 mg/3 mL) subq PNIJ 1.2 mg, 1.2 mg, Subcutaneous, Daily, Mehul Livingston MD, 1.2 mg at 18 0845    oxyCODONE immediate release tablet 10 mg, 10 mg, Oral, Q4H PRN, Vale Sanchez MD    ramelteon tablet 8 mg, 8 mg, Oral, Nightly PRN, Vale Sanchez MD, 8 mg at 18 2101    sodium bicarbonate tablet 650 mg, 1 tablet, Oral, BID, Sam Alejandra NP, 650 mg at 18 0844    tiotropium inhalation capsule 18 mcg, 1 capsule, Inhalation, Daily, 18 mcg at 18 0835 **AND** MDI Daily, , , Daily, Mehul Livingston MD    Vital signs last 24 hours:      BP  Min: 125/60  Max: 172/79  Temp  Av.4 °F (36.3 °C)  Min: 96.2 °F (35.7 °C)  Max: 97.9 °F (36.6 °C)  Pulse  Av.9  Min: 60  Max: 76  Resp  Av.5  Min: 16  Max: 21  SpO2  Av.6 %  Min: 91 %  Max: 96 %    Intake/Output        I/O last 3 completed shifts:  In: 250 [P.O.:250]  Out: 1625 [Urine:1625]    Physical Exam      Physical Exam   Constitutional: He is oriented to person, place, and time. He appears well-developed and well-nourished.   HENT:   Head: Normocephalic and atraumatic.   Eyes: EOM are normal. Pupils are equal, round, and reactive to light.   Neck: Normal range of motion. Neck supple.   Cardiovascular: Normal rate, regular rhythm and normal heart sounds.    Pulmonary/Chest: Effort normal and breath sounds normal.   Abdominal: Soft. Bowel sounds are normal.   Musculoskeletal: Normal range of motion.    Neurological: He is alert and oriented to person, place, and time.   Skin: Skin is warm and dry.   Vitals reviewed.      Labs:    Recent Results (from the past 24 hour(s))   POCT glucose    Collection Time: 05/25/18  4:58 PM   Result Value Ref Range    POCT Glucose 107 70 - 110 mg/dL   POCT glucose    Collection Time: 05/25/18  9:35 PM   Result Value Ref Range    POCT Glucose 127 (H) 70 - 110 mg/dL   Basic metabolic panel    Collection Time: 05/26/18  4:31 AM   Result Value Ref Range    Sodium 139 136 - 145 mmol/L    Potassium 4.1 3.5 - 5.1 mmol/L    Chloride 107 95 - 110 mmol/L    CO2 23 23 - 29 mmol/L    Glucose 105 70 - 110 mg/dL    BUN, Bld 20 8 - 23 mg/dL    Creatinine 1.4 0.5 - 1.4 mg/dL    Calcium 8.7 8.7 - 10.5 mg/dL    Anion Gap 9 8 - 16 mmol/L    eGFR if African American 59 (A) >60 mL/min/1.73 m^2    eGFR if non African American 51 (A) >60 mL/min/1.73 m^2   POCT glucose    Collection Time: 05/26/18  7:26 AM   Result Value Ref Range    POCT Glucose 98 70 - 110 mg/dL   POCT glucose    Collection Time: 05/26/18 12:32 PM   Result Value Ref Range    POCT Glucose 122 (H) 70 - 110 mg/dL       Radiology          Cardiology           Zev Castano is a 68 y.o. year old male with   Patient Active Problem List   Diagnosis    HTN (hypertension)    Hypercholesteremia    Non-insulin dependent type 2 diabetes mellitus    CVA (cerebral vascular accident)    Morbid obesity    PAF (paroxysmal atrial fibrillation)    Coronary artery disease involving native coronary artery of native heart with unstable angina pectoris    Atypical chest pain    Osteoarthritis of cervical spine with myelopathy    Cervical radiculopathy    Cervical spondylosis    DDD (degenerative disc disease), cervical    Bilateral shoulder bursitis    Hx of repair of right rotator cuff    Acute bursitis of left shoulder    Cervical stenosis of spinal canal    Lumbar spondylosis    DDD (degenerative disc disease), lumbar    Lumbar  radiculopathy    Lumbar spinal stenosis    Bulging lumbar disc    Bilateral hip bursitis    Herniated cervical disc    Atrial flutter    Exertional dyspnea    Shortness of breath    Symptomatic bradycardia    Bradycardia          Assessment and Plan:    1. Dyspnea  2 Bradycardia  3. Diabetes  4. Hypertension  5. Acute kidney injury    Patient has restrictive lung disease with normal Sniff test. May want to check jamison to rule out lupus and ct of the abdomen to rule out abdomen as source of restriction.    Rivera Meeks MD  5/26/2018  3:02 PM

## 2018-05-26 NOTE — PLAN OF CARE
Problem: Patient Care Overview  Goal: Individualization & Mutuality  Outcome: Ongoing (interventions implemented as appropriate)  Bed in low and locked position and able to reposition and amb per self.  Up in chair most of shift.  Remains free of injury during shift.

## 2018-05-26 NOTE — PLAN OF CARE
Problem: Patient Care Overview  Goal: Plan of Care Review  Outcome: Ongoing (interventions implemented as appropriate)  Pt remained free of falls and injuries. No complaints of SOB throughout the day. Ambulates with assistance. Bed low and locked, call light within reach, side rails up X2. Will continue to monitor.

## 2018-05-26 NOTE — PLAN OF CARE
Problem: Patient Care Overview  Goal: Plan of Care Review  Outcome: Ongoing (interventions implemented as appropriate)  Pt remains on RA. MDI given and tolerated well.

## 2018-05-26 NOTE — PROGRESS NOTES
[facility]  Cardiology  Progress Note    Patient Name: Zev Castano  MRN: [id number]  Admission Date: 5/22/2018  Hospital Length of Stay: 4 days  Code Status: Full Code   Attending Physician: Mehul Livingston MD   Primary Care Physician: Mehul Livingston MD  Expected Discharge Date:   Principal Problem:Bradycardia    Subjective:     Brief HPI:    69 yo man with restrictive lung disease.    Hospital Course:     Adjustment of medical regimen.    Interval History:     SOB with minimal activities.    Review of Systems   Cardiovascular: Positive for leg swelling. Negative for chest pain and palpitations.   Respiratory: Positive for shortness of breath.      Objective:     Vital Signs (Most Recent):  Temp: 97.8 °F (36.6 °C) (05/26/18 0741)  Pulse: 66 (05/26/18 1200)  Resp: (!) 21 (05/26/18 0835)  BP: (!) 172/79 (05/26/18 0741)  SpO2: (!) 92 % (05/26/18 0835) Vital Signs (24h Range):  Temp:  [97.4 °F (36.3 °C)-98.6 °F (37 °C)] 97.8 °F (36.6 °C)  Pulse:  [59-76] 66  Resp:  [16-21] 21  SpO2:  [92 %-96 %] 92 %  BP: (125-172)/(60-79) 172/79     Weight: 121 kg (266 lb 12.1 oz)  Body mass index is 35.19 kg/m².    SpO2: (!) 92 %  O2 Device (Oxygen Therapy): room air      Intake/Output Summary (Last 24 hours) at 05/26/18 1223  Last data filed at 05/26/18 0456   Gross per 24 hour   Intake                0 ml   Output              425 ml   Net             -425 ml       Lines/Drains/Airways     Peripheral Intravenous Line                 Peripheral IV - Single Lumen 05/22/18 1840 Left Forearm 3 days                Physical Exam   Constitutional: He appears well-developed and well-nourished.   Cardiovascular: Normal rate and regular rhythm.    Pulmonary/Chest: He has no decreased breath sounds. He has no wheezes. He has rhonchi. He has no rales.   Abdominal: Soft. Normal appearance.   Skin: No rash noted.     Current Medications:     amLODIPine  10 mg Oral Daily    apixaban  2.5 mg Oral BID    aspirin  81  mg Oral Daily    atorvastatin  40 mg Oral Daily    carvedilol  6.25 mg Oral BID    dronedarone  400 mg Oral BID WM    hydrALAZINE  50 mg Oral Q8H    liraglutide 0.6 mg/0.1 mL (18 mg/3 mL) subq PNIJ  1.2 mg Subcutaneous Daily    sodium bicarbonate  1 tablet Oral BID    tiotropium  1 capsule Inhalation Daily     Current Laboratory Results:    Recent Results (from the past 24 hour(s))   POCT glucose    Collection Time: 05/25/18  2:01 PM   Result Value Ref Range    POCT Glucose 143 (H) 70 - 110 mg/dL   POCT glucose    Collection Time: 05/25/18  4:58 PM   Result Value Ref Range    POCT Glucose 107 70 - 110 mg/dL   POCT glucose    Collection Time: 05/25/18  9:35 PM   Result Value Ref Range    POCT Glucose 127 (H) 70 - 110 mg/dL   Basic metabolic panel    Collection Time: 05/26/18  4:31 AM   Result Value Ref Range    Sodium 139 136 - 145 mmol/L    Potassium 4.1 3.5 - 5.1 mmol/L    Chloride 107 95 - 110 mmol/L    CO2 23 23 - 29 mmol/L    Glucose 105 70 - 110 mg/dL    BUN, Bld 20 8 - 23 mg/dL    Creatinine 1.4 0.5 - 1.4 mg/dL    Calcium 8.7 8.7 - 10.5 mg/dL    Anion Gap 9 8 - 16 mmol/L    eGFR if African American 59 (A) >60 mL/min/1.73 m^2    eGFR if non African American 51 (A) >60 mL/min/1.73 m^2   POCT glucose    Collection Time: 05/26/18  7:26 AM   Result Value Ref Range    POCT Glucose 98 70 - 110 mg/dL     Current Imaging Results:    Imaging Results          X-Ray Chest 1 View (Final result)  Result time 05/22/18 19:05:48    Final result by Kimber Sarkar MD (05/22/18 19:05:48)                 Impression:      As above.      Electronically signed by: Kimber Sarkar MD  Date:    05/22/2018  Time:    19:05             Narrative:    EXAMINATION:  XR CHEST 1 VIEW    CLINICAL HISTORY:  Cough    TECHNIQUE:  Single frontal view of the chest was performed.    COMPARISON:  05/14/2018.    FINDINGS:  Heart is stable in size.  Lungs are hypoinflated which accentuates pulmonary vascular markings.  Difficult to exclude mild  pulmonary edema.  Atelectasis is seen at the left lung base.  No evidence of pneumothorax or large effusion.                                Assessment and Plan:     Problem List:    Active Diagnoses:    Diagnosis Date Noted POA    PRINCIPAL PROBLEM:  Bradycardia [R00.1] 05/22/2018 Yes      Problems Resolved During this Admission:    Diagnosis Date Noted Date Resolved POA     Assessment and Plan:    Appears to be slowly improving.    If renal biopsy is to be performed the apixiban will need to be held.     VTE Risk Mitigation         Ordered     apixaban tablet 2.5 mg  2 times daily      05/22/18 2119          Alex Crisostomo MD  Cardiology  [facility]

## 2018-05-27 LAB
ALBUMIN SERPL BCP-MCNC: 3.2 G/DL
ANION GAP SERPL CALC-SCNC: 10 MMOL/L
ANION GAP SERPL CALC-SCNC: 10 MMOL/L
BUN SERPL-MCNC: 20 MG/DL
BUN SERPL-MCNC: 24 MG/DL
CALCIUM SERPL-MCNC: 8.8 MG/DL
CALCIUM SERPL-MCNC: 9 MG/DL
CHLORIDE SERPL-SCNC: 105 MMOL/L
CHLORIDE SERPL-SCNC: 108 MMOL/L
CO2 SERPL-SCNC: 21 MMOL/L
CO2 SERPL-SCNC: 21 MMOL/L
CREAT SERPL-MCNC: 1.5 MG/DL
CREAT SERPL-MCNC: 2 MG/DL
EST. GFR  (AFRICAN AMERICAN): 38 ML/MIN/1.73 M^2
EST. GFR  (AFRICAN AMERICAN): 54 ML/MIN/1.73 M^2
EST. GFR  (NON AFRICAN AMERICAN): 33 ML/MIN/1.73 M^2
EST. GFR  (NON AFRICAN AMERICAN): 47 ML/MIN/1.73 M^2
GLUCOSE SERPL-MCNC: 133 MG/DL
GLUCOSE SERPL-MCNC: 154 MG/DL
PHOSPHATE SERPL-MCNC: 4.3 MG/DL
POCT GLUCOSE: 125 MG/DL (ref 70–110)
POCT GLUCOSE: 129 MG/DL (ref 70–110)
POCT GLUCOSE: 133 MG/DL (ref 70–110)
POCT GLUCOSE: 175 MG/DL (ref 70–110)
POTASSIUM SERPL-SCNC: 3.8 MMOL/L
POTASSIUM SERPL-SCNC: 3.9 MMOL/L
SODIUM SERPL-SCNC: 136 MMOL/L
SODIUM SERPL-SCNC: 139 MMOL/L

## 2018-05-27 PROCEDURE — 99900035 HC TECH TIME PER 15 MIN (STAT)

## 2018-05-27 PROCEDURE — 94761 N-INVAS EAR/PLS OXIMETRY MLT: CPT

## 2018-05-27 PROCEDURE — 11000001 HC ACUTE MED/SURG PRIVATE ROOM

## 2018-05-27 PROCEDURE — 25000242 PHARM REV CODE 250 ALT 637 W/ HCPCS: Performed by: INTERNAL MEDICINE

## 2018-05-27 PROCEDURE — 25000003 PHARM REV CODE 250: Performed by: INTERNAL MEDICINE

## 2018-05-27 PROCEDURE — 36415 COLL VENOUS BLD VENIPUNCTURE: CPT

## 2018-05-27 PROCEDURE — 80069 RENAL FUNCTION PANEL: CPT

## 2018-05-27 PROCEDURE — 25000003 PHARM REV CODE 250: Performed by: EMERGENCY MEDICINE

## 2018-05-27 PROCEDURE — 94640 AIRWAY INHALATION TREATMENT: CPT

## 2018-05-27 PROCEDURE — 80048 BASIC METABOLIC PNL TOTAL CA: CPT

## 2018-05-27 PROCEDURE — 25000003 PHARM REV CODE 250: Performed by: NURSE PRACTITIONER

## 2018-05-27 RX ADMIN — TIOTROPIUM BROMIDE 18 MCG: 18 CAPSULE ORAL; RESPIRATORY (INHALATION) at 07:05

## 2018-05-27 RX ADMIN — HYDRALAZINE HYDROCHLORIDE 50 MG: 25 TABLET, FILM COATED ORAL at 02:05

## 2018-05-27 RX ADMIN — DRONEDARONE 400 MG: 400 TABLET, FILM COATED ORAL at 09:05

## 2018-05-27 RX ADMIN — SODIUM BICARBONATE 650 MG TABLET 650 MG: at 09:05

## 2018-05-27 RX ADMIN — DRONEDARONE 400 MG: 400 TABLET, FILM COATED ORAL at 05:05

## 2018-05-27 RX ADMIN — AMLODIPINE BESYLATE 10 MG: 5 TABLET ORAL at 09:05

## 2018-05-27 RX ADMIN — CARVEDILOL 6.25 MG: 6.25 TABLET, FILM COATED ORAL at 09:05

## 2018-05-27 RX ADMIN — ATORVASTATIN CALCIUM 40 MG: 20 TABLET, FILM COATED ORAL at 09:05

## 2018-05-27 RX ADMIN — HYDRALAZINE HYDROCHLORIDE 50 MG: 25 TABLET, FILM COATED ORAL at 05:05

## 2018-05-27 RX ADMIN — LIRAGLUTIDE 1.2 MG: 6 INJECTION SUBCUTANEOUS at 09:05

## 2018-05-27 RX ADMIN — HYDRALAZINE HYDROCHLORIDE 50 MG: 25 TABLET, FILM COATED ORAL at 09:05

## 2018-05-27 NOTE — PLAN OF CARE
Problem: Hypertensive Disease/Crisis (Arterial) (Adult)  Intervention: Gradually Decrease Blood Pressure  Blood pressure adequately controlled with scheduled BP medications, vital signs will be checked every 4 hours through discharge.

## 2018-05-27 NOTE — PROGRESS NOTES
Dr. Ortiz called and clarified to dc apixaban and asprin and she states she will reorder when needed.

## 2018-05-27 NOTE — PROGRESS NOTES
Pulmonary  Progress Note        Patient Name: Zev Castano  : 1950  MRN: [id number]  Consulting Physician: Mehul Livingston MD      Subjective: short of breath    Allergies:   Review of patient's allergies indicates:   Allergen Reactions    Doxycycline Rash       Review of Systems         Review of Systems   Constitutional: Negative for chills, fatigue and fever.   HENT: Negative for nosebleeds, sore throat and trouble swallowing.    Eyes: Negative for pain.   Respiratory: Positive for shortness of breath. Negative for cough and wheezing.    Cardiovascular: Positive for leg swelling. Negative for chest pain.   Gastrointestinal: Negative for constipation, diarrhea, nausea and vomiting.   Genitourinary: Negative for dysuria and hematuria.   Musculoskeletal: Negative for arthralgias and neck pain.   Skin: Negative for rash.   Neurological: Negative for weakness and headaches.       Medications            Current Facility-Administered Medications:     acetaminophen tablet 650 mg, 650 mg, Oral, Q8H PRN, Vale Sanchez MD    acetaminophen tablet 650 mg, 650 mg, Oral, Q8H PRN, Vale Sanchez MD, 650 mg at 18 0851    amLODIPine tablet 10 mg, 10 mg, Oral, Daily, Vale Sanchez MD, 10 mg at 18 0959    apixaban tablet 2.5 mg, 2.5 mg, Oral, BID, Vale Sanchez MD, Stopped at 18 0900    aspirin chewable tablet 81 mg, 81 mg, Oral, Daily, Vale Sanchez MD, Stopped at 18 0900    atorvastatin tablet 40 mg, 40 mg, Oral, Daily, Vale Sanchez MD, 40 mg at 1859    carvedilol tablet 6.25 mg, 6.25 mg, Oral, BID, Mehul Livingston MD, 6.25 mg at 18 0959    dextrose 50% injection 12.5 g, 12.5 g, Intravenous, PRN, Sam Alejandra NP    dextrose 50% injection 25 g, 25 g, Intravenous, PRN, Sam Alejandra NP    dronedarone tablet 400 mg, 400 mg, Oral, BID WM, Vale Sanchez MD, 400 mg at 1859    glucagon (human recombinant) injection 1 mg, 1 mg,  Intramuscular, PRN, Sam Alejandra NP    glucose chewable tablet 16 g, 16 g, Oral, PRN, Sam Alejandra NP    glucose chewable tablet 24 g, 24 g, Oral, PRN, Sam Alejandra NP    hydrALAZINE tablet 50 mg, 50 mg, Oral, Q8H, Mehul Livingston MD, 50 mg at 18 1420    HYDROcodone-acetaminophen 5-325 mg per tablet 1 tablet, 1 tablet, Oral, Q4H PRN, Vale Sanchez MD    insulin aspart U-100 pen 0-5 Units, 0-5 Units, Subcutaneous, QID (AC + HS) PRN, Sam Alejandra NP    liraglutide 0.6 mg/0.1 mL (18 mg/3 mL) subq PNIJ 1.2 mg, 1.2 mg, Subcutaneous, Daily, Mehul Livingston MD, 1.2 mg at 18 0959    oxyCODONE immediate release tablet 10 mg, 10 mg, Oral, Q4H PRN, Vale Sanchez MD    ramelteon tablet 8 mg, 8 mg, Oral, Nightly PRN, Vale Sanchez MD, 8 mg at 18 2109    sodium bicarbonate tablet 650 mg, 1 tablet, Oral, BID, Sam Alejandra NP, 650 mg at 18 0959    tiotropium inhalation capsule 18 mcg, 1 capsule, Inhalation, Daily, 18 mcg at 18 0747 **AND** MDI Daily, , , Daily, Mehul Livingston MD    Vital signs last 24 hours:      BP  Min: 98/42  Max: 167/79  Temp  Av.5 °F (36.4 °C)  Min: 96.7 °F (35.9 °C)  Max: 98.1 °F (36.7 °C)  Pulse  Av.6  Min: 61  Max: 76  Resp  Av.6  Min: 16  Max: 20  SpO2  Av.5 %  Min: 92 %  Max: 94 %    Intake/Output        I/O last 3 completed shifts:  In: -   Out: 425 [Urine:425]    Physical Exam      Physical Exam   Constitutional: He is oriented to person, place, and time. He appears well-developed and well-nourished.   HENT:   Head: Normocephalic and atraumatic.   Eyes: EOM are normal. Pupils are equal, round, and reactive to light.   Neck: Normal range of motion. Neck supple.   Cardiovascular: Normal rate, regular rhythm and normal heart sounds.    Pulmonary/Chest: Effort normal and breath sounds normal.   Abdominal: Soft. Bowel sounds are normal.   Musculoskeletal: Normal range of motion.   Neurological: He is alert  and oriented to person, place, and time.   Skin: Skin is warm and dry.   Vitals reviewed.      Labs:    Recent Results (from the past 24 hour(s))   POCT glucose    Collection Time: 05/26/18  9:07 PM   Result Value Ref Range    POCT Glucose 111 (H) 70 - 110 mg/dL   Basic metabolic panel    Collection Time: 05/27/18  6:23 AM   Result Value Ref Range    Sodium 139 136 - 145 mmol/L    Potassium 3.8 3.5 - 5.1 mmol/L    Chloride 108 95 - 110 mmol/L    CO2 21 (L) 23 - 29 mmol/L    Glucose 133 (H) 70 - 110 mg/dL    BUN, Bld 20 8 - 23 mg/dL    Creatinine 1.5 (H) 0.5 - 1.4 mg/dL    Calcium 9.0 8.7 - 10.5 mg/dL    Anion Gap 10 8 - 16 mmol/L    eGFR if African American 54 (A) >60 mL/min/1.73 m^2    eGFR if non African American 47 (A) >60 mL/min/1.73 m^2   POCT glucose    Collection Time: 05/27/18  7:23 AM   Result Value Ref Range    POCT Glucose 133 (H) 70 - 110 mg/dL   POCT glucose    Collection Time: 05/27/18 11:25 AM   Result Value Ref Range    POCT Glucose 175 (H) 70 - 110 mg/dL   POCT glucose    Collection Time: 05/27/18  3:58 PM   Result Value Ref Range    POCT Glucose 125 (H) 70 - 110 mg/dL       Radiology          Cardiology           Zev Castano is a 68 y.o. year old male with   Patient Active Problem List   Diagnosis    HTN (hypertension)    Hypercholesteremia    Non-insulin dependent type 2 diabetes mellitus    CVA (cerebral vascular accident)    Morbid obesity    PAF (paroxysmal atrial fibrillation)    Coronary artery disease involving native coronary artery of native heart with unstable angina pectoris    Atypical chest pain    Osteoarthritis of cervical spine with myelopathy    Cervical radiculopathy    Cervical spondylosis    DDD (degenerative disc disease), cervical    Bilateral shoulder bursitis    Hx of repair of right rotator cuff    Acute bursitis of left shoulder    Cervical stenosis of spinal canal    Lumbar spondylosis    DDD (degenerative disc disease), lumbar    Lumbar  radiculopathy    Lumbar spinal stenosis    Bulging lumbar disc    Bilateral hip bursitis    Herniated cervical disc    Atrial flutter    Exertional dyspnea    Shortness of breath    Symptomatic bradycardia    Bradycardia          Assessment and Plan:    1. Dyspnea  2 Bradycardia  3. Diabetes  4. Hypertension  5. Acute kidney injury    Patient has restrictive lung disease with normal Sniff test. May want to check jamison to rule out lupus and ct of the abdomen to rule out abdomen as source of restriction.   Less short of breath today     Increase activivty   Rivera Meeks MD  5/27/2018  4:37 PM

## 2018-05-27 NOTE — PROGRESS NOTES
[facility]  Cardiology  Progress Note    Patient Name: Zev Castano  MRN: [id number]  Admission Date: 5/22/2018  Hospital Length of Stay: 5 days  Code Status: Full Code   Attending Physician: Mehul Livingston MD   Primary Care Physician: Mehul Livingston MD  Expected Discharge Date:   Principal Problem:Bradycardia    Subjective:     Brief HPI:    67 yo man with restrictive lung disease.    Hospital Course:     Adjustment of medical regimen.    Interval History:     SOB with minimal activities.    Review of Systems   Cardiovascular: Positive for leg swelling. Negative for chest pain and palpitations.   Respiratory: Positive for shortness of breath. Negative for hemoptysis and wheezing.      Objective:     Vital Signs (Most Recent):  Temp: 96.7 °F (35.9 °C) (05/27/18 1130)  Pulse: 61 (05/27/18 1400)  Resp: 18 (05/27/18 0803)  BP: (!) 142/67 (05/27/18 1130)  SpO2: (!) 93 % (05/27/18 1130) Vital Signs (24h Range):  Temp:  [96.7 °F (35.9 °C)-98.1 °F (36.7 °C)] 96.7 °F (35.9 °C)  Pulse:  [61-76] 61  Resp:  [16-20] 18  SpO2:  [92 %-94 %] 93 %  BP: ()/(42-80) 142/67     Weight: 121 kg (266 lb 12.1 oz)  Body mass index is 35.19 kg/m².    SpO2: (!) 93 %  O2 Device (Oxygen Therapy): room air    No intake or output data in the 24 hours ending 05/27/18 1535    Lines/Drains/Airways     Peripheral Intravenous Line                 Peripheral IV - Single Lumen 05/22/18 1840 Left Forearm 4 days                Physical Exam   Constitutional: He appears well-developed and well-nourished.   Cardiovascular: Normal rate and regular rhythm.    Pulmonary/Chest: He has no decreased breath sounds. He has no wheezes. He has rhonchi. He has no rales.   Abdominal: Soft. Normal appearance.   Skin: No rash noted.     Current Medications:     amLODIPine  10 mg Oral Daily    apixaban  2.5 mg Oral BID    aspirin  81 mg Oral Daily    atorvastatin  40 mg Oral Daily    carvedilol  6.25 mg Oral BID    dronedarone   400 mg Oral BID WM    hydrALAZINE  50 mg Oral Q8H    liraglutide 0.6 mg/0.1 mL (18 mg/3 mL) subq PNIJ  1.2 mg Subcutaneous Daily    sodium bicarbonate  1 tablet Oral BID    tiotropium  1 capsule Inhalation Daily     Current Laboratory Results:    Recent Results (from the past 24 hour(s))   POCT glucose    Collection Time: 05/26/18  4:19 PM   Result Value Ref Range    POCT Glucose 120 (H) 70 - 110 mg/dL   POCT glucose    Collection Time: 05/26/18  9:07 PM   Result Value Ref Range    POCT Glucose 111 (H) 70 - 110 mg/dL   Basic metabolic panel    Collection Time: 05/27/18  6:23 AM   Result Value Ref Range    Sodium 139 136 - 145 mmol/L    Potassium 3.8 3.5 - 5.1 mmol/L    Chloride 108 95 - 110 mmol/L    CO2 21 (L) 23 - 29 mmol/L    Glucose 133 (H) 70 - 110 mg/dL    BUN, Bld 20 8 - 23 mg/dL    Creatinine 1.5 (H) 0.5 - 1.4 mg/dL    Calcium 9.0 8.7 - 10.5 mg/dL    Anion Gap 10 8 - 16 mmol/L    eGFR if African American 54 (A) >60 mL/min/1.73 m^2    eGFR if non African American 47 (A) >60 mL/min/1.73 m^2   POCT glucose    Collection Time: 05/27/18  7:23 AM   Result Value Ref Range    POCT Glucose 133 (H) 70 - 110 mg/dL   POCT glucose    Collection Time: 05/27/18 11:25 AM   Result Value Ref Range    POCT Glucose 175 (H) 70 - 110 mg/dL     Current Imaging Results:    Imaging Results          X-Ray Chest 1 View (Final result)  Result time 05/22/18 19:05:48    Final result by Kimber Sarkar MD (05/22/18 19:05:48)                 Impression:      As above.      Electronically signed by: Kimber Sarkar MD  Date:    05/22/2018  Time:    19:05             Narrative:    EXAMINATION:  XR CHEST 1 VIEW    CLINICAL HISTORY:  Cough    TECHNIQUE:  Single frontal view of the chest was performed.    COMPARISON:  05/14/2018.    FINDINGS:  Heart is stable in size.  Lungs are hypoinflated which accentuates pulmonary vascular markings.  Difficult to exclude mild pulmonary edema.  Atelectasis is seen at the left lung base.  No evidence of  pneumothorax or large effusion.                                Assessment and Plan:     Problem List:    Active Diagnoses:    Diagnosis Date Noted POA    PRINCIPAL PROBLEM:  Bradycardia [R00.1] 05/22/2018 Yes      Problems Resolved During this Admission:    Diagnosis Date Noted Date Resolved POA     Assessment and Plan:    Appears to be slowly improving.    If renal biopsy is to be performed the apixiban will need to be held. Discussed with Dr. Ortiz.    Needs PT.     VTE Risk Mitigation         Ordered     apixaban tablet 2.5 mg  2 times daily      05/22/18 2110          Alex Crisostomo MD  Cardiology  [facility]

## 2018-05-28 LAB
ANION GAP SERPL CALC-SCNC: 9 MMOL/L
BUN SERPL-MCNC: 26 MG/DL
CALCIUM SERPL-MCNC: 8.6 MG/DL
CHLORIDE SERPL-SCNC: 106 MMOL/L
CO2 SERPL-SCNC: 23 MMOL/L
CREAT SERPL-MCNC: 1.8 MG/DL
EST. GFR  (AFRICAN AMERICAN): 43 ML/MIN/1.73 M^2
EST. GFR  (NON AFRICAN AMERICAN): 38 ML/MIN/1.73 M^2
GLUCOSE SERPL-MCNC: 127 MG/DL
POCT GLUCOSE: 111 MG/DL (ref 70–110)
POCT GLUCOSE: 140 MG/DL (ref 70–110)
POCT GLUCOSE: 156 MG/DL (ref 70–110)
POTASSIUM SERPL-SCNC: 3.7 MMOL/L
SODIUM SERPL-SCNC: 138 MMOL/L

## 2018-05-28 PROCEDURE — 25000003 PHARM REV CODE 250: Performed by: NURSE PRACTITIONER

## 2018-05-28 PROCEDURE — 25000003 PHARM REV CODE 250: Performed by: INTERNAL MEDICINE

## 2018-05-28 PROCEDURE — 97530 THERAPEUTIC ACTIVITIES: CPT

## 2018-05-28 PROCEDURE — 25000003 PHARM REV CODE 250: Performed by: EMERGENCY MEDICINE

## 2018-05-28 PROCEDURE — 97162 PT EVAL MOD COMPLEX 30 MIN: CPT

## 2018-05-28 PROCEDURE — 25000242 PHARM REV CODE 250 ALT 637 W/ HCPCS: Performed by: INTERNAL MEDICINE

## 2018-05-28 PROCEDURE — 94761 N-INVAS EAR/PLS OXIMETRY MLT: CPT

## 2018-05-28 PROCEDURE — 94640 AIRWAY INHALATION TREATMENT: CPT

## 2018-05-28 PROCEDURE — 80048 BASIC METABOLIC PNL TOTAL CA: CPT

## 2018-05-28 PROCEDURE — 36415 COLL VENOUS BLD VENIPUNCTURE: CPT

## 2018-05-28 PROCEDURE — 11000001 HC ACUTE MED/SURG PRIVATE ROOM

## 2018-05-28 RX ORDER — DOCUSATE SODIUM 100 MG/1
100 CAPSULE, LIQUID FILLED ORAL DAILY
Status: DISCONTINUED | OUTPATIENT
Start: 2018-05-28 | End: 2018-05-30

## 2018-05-28 RX ORDER — BISACODYL 10 MG
10 SUPPOSITORY, RECTAL RECTAL ONCE
Status: DISCONTINUED | OUTPATIENT
Start: 2018-05-28 | End: 2018-05-30

## 2018-05-28 RX ADMIN — CARVEDILOL 6.25 MG: 6.25 TABLET, FILM COATED ORAL at 09:05

## 2018-05-28 RX ADMIN — CARVEDILOL 6.25 MG: 6.25 TABLET, FILM COATED ORAL at 08:05

## 2018-05-28 RX ADMIN — LIRAGLUTIDE 1.2 MG: 6 INJECTION SUBCUTANEOUS at 09:05

## 2018-05-28 RX ADMIN — DRONEDARONE 400 MG: 400 TABLET, FILM COATED ORAL at 06:05

## 2018-05-28 RX ADMIN — HYDRALAZINE HYDROCHLORIDE 50 MG: 25 TABLET, FILM COATED ORAL at 09:05

## 2018-05-28 RX ADMIN — TIOTROPIUM BROMIDE 18 MCG: 18 CAPSULE ORAL; RESPIRATORY (INHALATION) at 07:05

## 2018-05-28 RX ADMIN — DOCUSATE SODIUM 100 MG: 100 CAPSULE, LIQUID FILLED ORAL at 08:05

## 2018-05-28 RX ADMIN — AMLODIPINE BESYLATE 10 MG: 5 TABLET ORAL at 09:05

## 2018-05-28 RX ADMIN — SODIUM BICARBONATE 650 MG TABLET 650 MG: at 09:05

## 2018-05-28 RX ADMIN — ATORVASTATIN CALCIUM 40 MG: 20 TABLET, FILM COATED ORAL at 09:05

## 2018-05-28 RX ADMIN — SODIUM BICARBONATE 650 MG TABLET 650 MG: at 08:05

## 2018-05-28 RX ADMIN — DRONEDARONE 400 MG: 400 TABLET, FILM COATED ORAL at 09:05

## 2018-05-28 RX ADMIN — HYDRALAZINE HYDROCHLORIDE 50 MG: 25 TABLET, FILM COATED ORAL at 05:05

## 2018-05-28 RX ADMIN — HYDRALAZINE HYDROCHLORIDE 50 MG: 25 TABLET, FILM COATED ORAL at 02:05

## 2018-05-28 NOTE — PLAN OF CARE
Problem: Patient Care Overview  Goal: Plan of Care Review  Outcome: Ongoing (interventions implemented as appropriate)  Pt on RA. Sats 96%. No distress noted. MDI given & tolerated well. Will continue to monitor.

## 2018-05-28 NOTE — PT/OT/SLP EVAL
"Physical Therapy Evaluation and Treatment    Patient Name:  Zev Castano   MRN:  [id number]    Recommendations:     Discharge Recommendations:  home with home health, home health PT, home health OT   Discharge Equipment Recommendations:  (lightweight transport w/c with leg rests (stationary))   Barriers to discharge: None    Assessment:     Zev Castano is a 68 y.o. male admitted with a medical diagnosis of Bradycardia.  He presents with the following impairments/functional limitations:  edema, decreased lower extremity function, gait instability, impaired balance, impaired endurance. PT evaluation completed. Pt requires CGA for gait in halls without AD. Mild path deviation noted. Bilateral LE edema present. Pt also c/o orthopnea. Will continue to follow and progress as tolerated.    Rehab Prognosis:  Good; patient would benefit from acute skilled PT services to address these deficits and reach maximum level of function.      Recent Surgery: Procedure(s) (LRB):  BIOPSY (N/A)      Plan:     During this hospitalization, patient to be seen 5 x/week to address the above listed problems via therapeutic activities, therapeutic exercises, neuromuscular re-education, gait training  · Plan of Care Expires:  06/27/18   Plan of Care Reviewed with: patient, spouse    Subjective     Communicated with nurse prior to session.  Patient found supine in bed with HOB elevated and spouse present upon PT entry to room, agreeable to evaluation.      Chief Complaint: "I Can't lie flat. This has been happening for a few weeks now."  Patient comments/goals: "I used to golf on the weekends. I haven't been able to do that for a year now since I had three strokes." Spouse reports patient and spouse enjoy going on cruises, but they have had to cancel their last few cruises due to patient's health issues.   Pain/Comfort:  · Pain Rating 1: 0/10  · Pain Rating Post-Intervention 1: 0/10    Patients cultural, spiritual, Adventist conflicts " given the current situation: none specified    Living Environment:  Pt lives with spouse in 1 story house with threshold step to enter. He has a walk-in shower and comfort height toilet.   Prior to admission, patients level of function was modified independent with ambulation with occasional use of rolling walker, although he does not prefer to use it. He uses a shower chair for seated bathing and spouse assists with bathing. He mostly watches TV during the daytime. His spouse assists with transportation, cooking, cleaning. They enjoy going on cruises.  Patient has the following equipment: shower chair (owns rolling walker however he does not like to use it).  DME owned (not currently used): none.  Upon discharge, patient will have assistance from spouse 24/7.    Objective:     Patient found with: telemetry     General Precautions: Standard,  (fall risk)   Orthopedic Precautions:N/A   Braces: N/A     Exams:  · Cognition: Patient is oriented to Person, Place, Time and Situation and follows approximately 100% of one step commands.    · Posture:    · -       Rounded shoulders  · Sensation: Denied paresthesias  · Skin Integrity: Areas of darkened discoloration at bilateral distal LEs.   · Edema: Moderate L distal LE, mild R distal LE. Patient reports this improved this week.   · Coordination:  No formal testing performed.  · LE ROM/Strength: 5/5 bilateral hip flexion, knee extension R ankle dorsiflexion. 4+/5 L ankle dorsiflexion.   · Tone: No tone impairments identified during functional mobility.   · Vital signs: SpO2: 95% on room air with activity and supine with bed flat (spouse requesting to assess SpO2 in this supine position due to pt c/o SOB when flat); Heart rate 66 bpm at rest and 76 bpm with activity.    Functional Mobility:  · Bed Mobility:     · Supine to Sit: modified independence and HOB elevated (pt sleeps in recliner chair)  · Sit to Supine: modified independence and HOB elevated  · Transfers:      · Sit to Stand:  supervision with no AD and pt declined use of rolling walker  · Gait: Pt declined option for rolling walker although he reports he uses it occasionally for community distances. Noted grossly symmetrical step length, normalized gait speed. Mild to moderate path deviation noted with head turns during gait without AD with CGA from PT. Increased L step width noted.  · Balance: CGA for dynamic standing with no UE support    AM-PAC 6 CLICK MOBILITY  Total Score:22       Therapeutic Activities and Exercises:   Discussed PT plan of care, safety with OOB mobility, option use of walker (pt reports he has had one fall in the past year where he tripped and at that time he was not using his walker), activity frequency recommendations, positioning for improved breathing      Patient left supine in bed with HOB elevated with all lines intact, call button in reach, nurse notified and spouse present.    GOALS:    Physical Therapy Goals        Problem: Physical Therapy Goal    Goal Priority Disciplines Outcome Goal Variances Interventions   Physical Therapy Goal     PT/OT, PT Ongoing (interventions implemented as appropriate)     Description:  Goals to be met by: 18     Patient will increase functional independence with mobility by performin. Gait > 150 ft with or without single point cane with modified independence.  2. Independent LE exercises.  3. Correct verbalization of edema management techniques (including use of compression, exercise, and elevation).                       History:     Past Medical History:   Diagnosis Date    CKD (chronic kidney disease), stage III     Coronary artery disease     Diabetes mellitus type II     Hyperlipidemia     Hypertension     Nephrotic range proteinuria     Paroxysmal atrial fibrillation     Stroke     2012       Past Surgical History:   Procedure Laterality Date    KNEE SURGERY Left     SHOULDER SURGERY Right        Clinical Decision Making:      History  Co-morbidities and personal factors that may impact the plan of care Examination  Body Structures and Functions, activity limitations and participation restrictions that may impact the plan of care Clinical Presentation   Decision Making/ Complexity Score   Co-morbidities:   [] Time since onset of injury / illness / exacerbation  [] Status of current condition  []Patient's cognitive status and safety concerns    [] Multiple Medical Problems (see med hx)  Personal Factors:   [] Patient's age  [] Prior Level of function   [] Patient's home situation (environment and family support)  [] Patient's level of motivation  [] Expected progression of patient      HISTORY:(criteria)    [] 38473 - no personal factors/history    [] 48539 - has 1-2 personal factor/comorbidity     [] 03994 - has >3 personal factor/comorbidity     Body Regions:  [] Objective examination findings  [] Head     []  Neck  [] Trunk   [] Upper Extremity  [] Lower Extremity    Body Systems:  [] For communication ability, affect, cognition, language, and learning style: the assessment of the ability to make needs known, consciousness, orientation (person, place, and time), expected emotional /behavioral responses, and learning preferences (eg, learning barriers, education  needs)  [] For the neuromuscular system: a general assessment of gross coordinated movement (eg, balance, gait, locomotion, transfers, and transitions) and motor function  (motor control and motor learning)  [] For the musculoskeletal system: the assessment of gross symmetry, gross range of motion, gross strength, height, and weight  [] For the integumentary system: the assessment of pliability(texture), presence of scar formation, skin color, and skin integrity  [] For cardiovascular/pulmonary system: the assessment of heart rate, respiratory rate, blood pressure, and edema     Activity limitations:    [] Patient's cognitive status and saf ety concerns          [] Status of  current condition      [] Weight bearing restriction  [] Cardiopulmunary Restriction    Participation Restrictions:   [] Goals and goal agreement with the patient     [] Rehab potential (prognosis) and probable outcome      Examination of Body System: (criteria)    [] 26108 - addressing 1-2 elements    [] 23072 - addressing a total of 3 or more elements     [] 52244 -  Addressing a total of 4 or more elements         Clinical Presentation: (criteria)  Choose one     On examination of body system using standardized tests and measures patient presents with (CHOOSE ONE) elements from any of the following: body structures and functions, activity limitations, and/or participation restrictions.  Leading to a clinical presentation that is considered (CHOOSE ONE)                              Clinical Decision Making  (Eval Complexity):  Choose One     Time Tracking:     PT Received On: 05/28/18  PT Start Time: 1144     PT Stop Time: 1231  PT Total Time (min): 47 min     Billable Minutes: Evaluation 20 and Therapeutic Activity 25      Kelli Macias, PT  05/28/2018

## 2018-05-28 NOTE — PROGRESS NOTES
Dr. Ortiz made aware that patient is not able to go for renal bx today due to last dose of Eliquis within 48 hours. Diet orders received for patient, will continue to hold all anticoagulants and plan for the procedure to be tomorrow. Patient and his girlfriend updated.

## 2018-05-28 NOTE — PROGRESS NOTES
Pulmonary  Progress Note        Patient Name: Zev Castano  : 1950  MRN: [id number]  Consulting Physician: Mehul Livingston MD      Subjective: short of breath    Allergies:   Review of patient's allergies indicates:   Allergen Reactions    Doxycycline Rash       Review of Systems         Review of Systems   Constitutional: Negative for chills, fatigue and fever.   HENT: Negative for nosebleeds, sore throat and trouble swallowing.    Eyes: Negative for pain.   Respiratory: Positive for shortness of breath. Negative for cough and wheezing.    Cardiovascular: Positive for leg swelling. Negative for chest pain.   Gastrointestinal: Negative for constipation, diarrhea, nausea and vomiting.   Genitourinary: Negative for dysuria and hematuria.   Musculoskeletal: Negative for arthralgias and neck pain.   Skin: Negative for rash.   Neurological: Negative for weakness and headaches.       Medications            Current Facility-Administered Medications:     acetaminophen tablet 650 mg, 650 mg, Oral, Q8H PRN, Vale Sanchez MD    acetaminophen tablet 650 mg, 650 mg, Oral, Q8H PRN, Vale Sanchez MD, 650 mg at 18 0851    amLODIPine tablet 10 mg, 10 mg, Oral, Daily, Vale Sanchez MD, 10 mg at 1819    atorvastatin tablet 40 mg, 40 mg, Oral, Daily, Vale Sanchez MD, 40 mg at 1819    bisacodyl suppository 10 mg, 10 mg, Rectal, Once, Alex Crisostomo MD    carvedilol tablet 6.25 mg, 6.25 mg, Oral, BID, Mehul Livingston MD, 6.25 mg at 18 0919    desmopressin (DDAVP) 36.3 mcg in sodium chloride 0.9% 50 mL IVPB, 0.3 mcg/kg, Intravenous, On Call Procedure, Sharon Ortiz MD    dextrose 50% injection 12.5 g, 12.5 g, Intravenous, PRN, Sam Alejandra NP    dextrose 50% injection 25 g, 25 g, Intravenous, PRN, Sam Alejandra NP    docusate sodium capsule 100 mg, 100 mg, Oral, Daily, Alex Crisostomo MD    dronedarone tablet 400 mg, 400 mg, Oral, BID WM, Vale Sanchez MD,  400 mg at 18 0919    glucagon (human recombinant) injection 1 mg, 1 mg, Intramuscular, PRN, Sam Alejandra NP    glucose chewable tablet 16 g, 16 g, Oral, PRN, Sam Alejandra NP    glucose chewable tablet 24 g, 24 g, Oral, PRN, Sam Alejandra NP    hydrALAZINE tablet 50 mg, 50 mg, Oral, Q8H, Mehul Livingston MD, 50 mg at 18 0526    HYDROcodone-acetaminophen 5-325 mg per tablet 1 tablet, 1 tablet, Oral, Q4H PRN, Vale Sanchez MD    insulin aspart U-100 pen 0-5 Units, 0-5 Units, Subcutaneous, QID (AC + HS) PRN, Sam Alejandra NP    liraglutide 0.6 mg/0.1 mL (18 mg/3 mL) subq PNIJ 1.2 mg, 1.2 mg, Subcutaneous, Daily, Mehul Livingston MD, 1.2 mg at 18 0900    oxyCODONE immediate release tablet 10 mg, 10 mg, Oral, Q4H PRN, Vale Sanchez MD    ramelteon tablet 8 mg, 8 mg, Oral, Nightly PRN, Vale Sanchez MD, 8 mg at 18 210    sodium bicarbonate tablet 650 mg, 1 tablet, Oral, BID, Sam Alejandra NP, 650 mg at 18 0919    tiotropium inhalation capsule 18 mcg, 1 capsule, Inhalation, Daily, 18 mcg at 18 0736 **AND** MDI Daily, , , Daily, Mehul Livingston MD    Vital signs last 24 hours:      BP  Min: 121/57  Max: 163/74  Temp  Av.7 °F (36.5 °C)  Min: 97.5 °F (36.4 °C)  Max: 98 °F (36.7 °C)  Pulse  Av.1  Min: 46  Max: 77  Resp  Av.4  Min: 18  Max: 20  SpO2  Av.6 %  Min: 93 %  Max: 96 %    Intake/Output        I/O last 3 completed shifts:  In: 480 [P.O.:480]  Out: 550 [Urine:550]    Physical Exam      Physical Exam   Constitutional: He is oriented to person, place, and time. He appears well-developed and well-nourished.   HENT:   Head: Normocephalic and atraumatic.   Eyes: EOM are normal. Pupils are equal, round, and reactive to light.   Neck: Normal range of motion. Neck supple.   Cardiovascular: Normal rate, regular rhythm and normal heart sounds.    Pulmonary/Chest: Effort normal and breath sounds normal.   Abdominal: Soft. Bowel  sounds are normal.   Musculoskeletal: Normal range of motion.   Neurological: He is alert and oriented to person, place, and time.   Skin: Skin is warm and dry.   Vitals reviewed.      Labs:    Recent Results (from the past 24 hour(s))   POCT glucose    Collection Time: 05/27/18  3:58 PM   Result Value Ref Range    POCT Glucose 125 (H) 70 - 110 mg/dL   Renal function panel    Collection Time: 05/27/18  8:49 PM   Result Value Ref Range    Glucose 154 (H) 70 - 110 mg/dL    Sodium 136 136 - 145 mmol/L    Potassium 3.9 3.5 - 5.1 mmol/L    Chloride 105 95 - 110 mmol/L    CO2 21 (L) 23 - 29 mmol/L    BUN, Bld 24 (H) 8 - 23 mg/dL    Calcium 8.8 8.7 - 10.5 mg/dL    Creatinine 2.0 (H) 0.5 - 1.4 mg/dL    Albumin 3.2 (L) 3.5 - 5.2 g/dL    Phosphorus 4.3 2.7 - 4.5 mg/dL    eGFR if African American 38 (A) >60 mL/min/1.73 m^2    eGFR if non African American 33 (A) >60 mL/min/1.73 m^2    Anion Gap 10 8 - 16 mmol/L   POCT glucose    Collection Time: 05/27/18  9:02 PM   Result Value Ref Range    POCT Glucose 129 (H) 70 - 110 mg/dL   Basic metabolic panel    Collection Time: 05/28/18  4:35 AM   Result Value Ref Range    Sodium 138 136 - 145 mmol/L    Potassium 3.7 3.5 - 5.1 mmol/L    Chloride 106 95 - 110 mmol/L    CO2 23 23 - 29 mmol/L    Glucose 127 (H) 70 - 110 mg/dL    BUN, Bld 26 (H) 8 - 23 mg/dL    Creatinine 1.8 (H) 0.5 - 1.4 mg/dL    Calcium 8.6 (L) 8.7 - 10.5 mg/dL    Anion Gap 9 8 - 16 mmol/L    eGFR if African American 43 (A) >60 mL/min/1.73 m^2    eGFR if non African American 38 (A) >60 mL/min/1.73 m^2   POCT glucose    Collection Time: 05/28/18  7:40 AM   Result Value Ref Range    POCT Glucose 111 (H) 70 - 110 mg/dL       Radiology          Cardiology           Zev Castano is a 68 y.o. year old male with   Patient Active Problem List   Diagnosis    HTN (hypertension)    Hypercholesteremia    Non-insulin dependent type 2 diabetes mellitus    CVA (cerebral vascular accident)    Morbid obesity    PAF (paroxysmal  atrial fibrillation)    Coronary artery disease involving native coronary artery of native heart with unstable angina pectoris    Atypical chest pain    Osteoarthritis of cervical spine with myelopathy    Cervical radiculopathy    Cervical spondylosis    DDD (degenerative disc disease), cervical    Bilateral shoulder bursitis    Hx of repair of right rotator cuff    Acute bursitis of left shoulder    Cervical stenosis of spinal canal    Lumbar spondylosis    DDD (degenerative disc disease), lumbar    Lumbar radiculopathy    Lumbar spinal stenosis    Bulging lumbar disc    Bilateral hip bursitis    Herniated cervical disc    Atrial flutter    Exertional dyspnea    Shortness of breath    Symptomatic bradycardia    Bradycardia          Assessment and Plan:    1. Dyspnea  2 Bradycardia  3. Diabetes  4. Hypertension  5. Acute kidney injury    Patient has restrictive lung disease with normal Sniff test. Discussed with dr hoyt and she notes that the patient has had a negative jamison in the past. Will check ct of the abdomen   Less short of breath today     Increase activivty   Rivera Meeks MD  5/28/2018  4:37 PM

## 2018-05-28 NOTE — PROGRESS NOTES
[facility]  Cardiology  Progress Note    Patient Name: Zev Castano  MRN: [id number]  Admission Date: 5/22/2018  Hospital Length of Stay: 6 days  Code Status: Full Code   Attending Physician: Mehul Livingston MD   Primary Care Physician: Mehul Livinsgton MD  Expected Discharge Date:   Principal Problem:Bradycardia    Subjective:     Brief HPI:    67 yo man with restrictive lung disease.    Hospital Course:     Adjustment of medical regimen.    Interval History:     Complained of SOB with minimal activities. When I saw him on 5/26/2018 he and his wife said he was SOB at rest and then unable to even make it the reclining chair next to the bed. I consulted PT and he was then able to make a whole lap around 3CC!    Review of Systems   Cardiovascular: Positive for dyspnea on exertion. Negative for chest pain, leg swelling and palpitations.   Respiratory: Positive for shortness of breath. Negative for hemoptysis and wheezing.      Objective:     Vital Signs (Most Recent):  Temp: 97.5 °F (36.4 °C) (05/28/18 1110)  Pulse: 69 (05/28/18 1134)  Resp: 18 (05/28/18 0745)  BP: (!) 121/57 (05/28/18 1110)  SpO2: 96 % (05/28/18 1110) Vital Signs (24h Range):  Temp:  [97.5 °F (36.4 °C)-98 °F (36.7 °C)] 97.5 °F (36.4 °C)  Pulse:  [46-77] 69  Resp:  [18-20] 18  SpO2:  [93 %-96 %] 96 %  BP: (121-163)/(57-77) 121/57     Weight: 121 kg (266 lb 12.1 oz)  Body mass index is 35.19 kg/m².    SpO2: 96 %  O2 Device (Oxygen Therapy): room air      Intake/Output Summary (Last 24 hours) at 05/28/18 1310  Last data filed at 05/28/18 0500   Gross per 24 hour   Intake              480 ml   Output              550 ml   Net              -70 ml       Lines/Drains/Airways     Peripheral Intravenous Line                 Peripheral IV - Single Lumen 05/22/18 1840 Left Forearm 5 days                Physical Exam   Constitutional: He appears well-developed and well-nourished.   Cardiovascular: Normal rate and regular rhythm.     Pulmonary/Chest: He has no decreased breath sounds. He has no wheezes. He has rhonchi. He has no rales.   Abdominal: Soft. Normal appearance.   Skin: No rash noted.     Current Medications:     amLODIPine  10 mg Oral Daily    atorvastatin  40 mg Oral Daily    carvedilol  6.25 mg Oral BID    dronedarone  400 mg Oral BID WM    hydrALAZINE  50 mg Oral Q8H    liraglutide 0.6 mg/0.1 mL (18 mg/3 mL) subq PNIJ  1.2 mg Subcutaneous Daily    sodium bicarbonate  1 tablet Oral BID    tiotropium  1 capsule Inhalation Daily     Current Laboratory Results:    Recent Results (from the past 24 hour(s))   POCT glucose    Collection Time: 05/27/18  3:58 PM   Result Value Ref Range    POCT Glucose 125 (H) 70 - 110 mg/dL   Renal function panel    Collection Time: 05/27/18  8:49 PM   Result Value Ref Range    Glucose 154 (H) 70 - 110 mg/dL    Sodium 136 136 - 145 mmol/L    Potassium 3.9 3.5 - 5.1 mmol/L    Chloride 105 95 - 110 mmol/L    CO2 21 (L) 23 - 29 mmol/L    BUN, Bld 24 (H) 8 - 23 mg/dL    Calcium 8.8 8.7 - 10.5 mg/dL    Creatinine 2.0 (H) 0.5 - 1.4 mg/dL    Albumin 3.2 (L) 3.5 - 5.2 g/dL    Phosphorus 4.3 2.7 - 4.5 mg/dL    eGFR if African American 38 (A) >60 mL/min/1.73 m^2    eGFR if non African American 33 (A) >60 mL/min/1.73 m^2    Anion Gap 10 8 - 16 mmol/L   POCT glucose    Collection Time: 05/27/18  9:02 PM   Result Value Ref Range    POCT Glucose 129 (H) 70 - 110 mg/dL   Basic metabolic panel    Collection Time: 05/28/18  4:35 AM   Result Value Ref Range    Sodium 138 136 - 145 mmol/L    Potassium 3.7 3.5 - 5.1 mmol/L    Chloride 106 95 - 110 mmol/L    CO2 23 23 - 29 mmol/L    Glucose 127 (H) 70 - 110 mg/dL    BUN, Bld 26 (H) 8 - 23 mg/dL    Creatinine 1.8 (H) 0.5 - 1.4 mg/dL    Calcium 8.6 (L) 8.7 - 10.5 mg/dL    Anion Gap 9 8 - 16 mmol/L    eGFR if African American 43 (A) >60 mL/min/1.73 m^2    eGFR if non African American 38 (A) >60 mL/min/1.73 m^2   POCT glucose    Collection Time: 05/28/18  7:40 AM    Result Value Ref Range    POCT Glucose 111 (H) 70 - 110 mg/dL     Current Imaging Results:    Imaging Results          X-Ray Chest 1 View (Final result)  Result time 05/22/18 19:05:48    Final result by Kimber Sarkar MD (05/22/18 19:05:48)                 Impression:      As above.      Electronically signed by: Kimber Sarkar MD  Date:    05/22/2018  Time:    19:05             Narrative:    EXAMINATION:  XR CHEST 1 VIEW    CLINICAL HISTORY:  Cough    TECHNIQUE:  Single frontal view of the chest was performed.    COMPARISON:  05/14/2018.    FINDINGS:  Heart is stable in size.  Lungs are hypoinflated which accentuates pulmonary vascular markings.  Difficult to exclude mild pulmonary edema.  Atelectasis is seen at the left lung base.  No evidence of pneumothorax or large effusion.                                Assessment and Plan:     Problem List:    Active Diagnoses:    Diagnosis Date Noted POA    PRINCIPAL PROBLEM:  Bradycardia [R00.1] 05/22/2018 Yes      Problems Resolved During this Admission:    Diagnosis Date Noted Date Resolved POA     Assessment and Plan:    His dyspnea appears to be slowly improving.    5/28/2018: Able to walk around 3CC.    5/29/2018: Plan is renal biopsy.    Appears he needs a lot of PT.     VTE Risk Mitigation     None          Alex Crisostomo MD  Cardiology  [facility]

## 2018-05-28 NOTE — PROGRESS NOTES
Renal Progress Note    Admit Date: 5/22/2018   LOS: 5 days     SUBJECTIVE:     Patient is without new complaint. Still with baseline SOB. Discussed plan for biopsy tomorrow.  Dr. Higginbothamgren aware and will be holding anticoagulation therapy.  Patient and wife aware of risks and benefits and wish to proceed with the biopsy.    Scheduled Meds:   amLODIPine  10 mg Oral Daily    atorvastatin  40 mg Oral Daily    carvedilol  6.25 mg Oral BID    dronedarone  400 mg Oral BID WM    hydrALAZINE  50 mg Oral Q8H    liraglutide 0.6 mg/0.1 mL (18 mg/3 mL) subq PNIJ  1.2 mg Subcutaneous Daily    sodium bicarbonate  1 tablet Oral BID    tiotropium  1 capsule Inhalation Daily       OBJECTIVE:     Vital Signs Range (Last 24H):  Temp:  [96.7 °F (35.9 °C)-97.8 °F (36.6 °C)]   Pulse:  [46-76]   Resp:  [16-20]   BP: (139-167)/(63-79)   SpO2:  [92 %-94 %]     I & O (Last 24H):No intake or output data in the 24 hours ending 05/27/18 2031    Physical Exam:  General appearance: Well developed, well nourished  Eyes:  Conjunctivae/corneas clear. PERRL.  Lungs: Normal respiratory effort,   clear to auscultation bilaterally   Heart: Regular rate and rhythm, S1, S2 normal, no murmur, rub or pino.  Abdomen: Soft, non-tender non-distended; bowel sounds normal; no masses,  no organomegaly, obese  Extremities: No cyanosis or clubbing. trace edema.    Skin: Skin color, texture, turgor normal. No rashes or lesions  Neurologic: Normal strength and tone. No focal numbness or weakness     Laboratory:  CBC:   Recent Labs  Lab 05/22/18  1856   WBC 8.34   RBC 4.42*   HGB 13.0*   HCT 40.2      MCV 91   MCH 29.4   MCHC 32.3     BMP:   Recent Labs  Lab 05/27/18  0623   *      K 3.8      CO2 21*   BUN 20   CREATININE 1.5*   CALCIUM 9.0       ASSESSMENT/PLAN:   1. Resolved Non-oliguric NATALIIA on CKD III (Baseline Creat 1.4) secondary to poor CO from Bradycardia and ARB.   (N17.9, N18.3, R80.9): Hold diuretics/ARB.  Gentle hydration  as CXR/BNP wnl but DC after current bag.  Renal U/S consistent with DM Neph.  2.8 gr/protein on spot urine.  All proteinuria labs negative so suggestive of  DM/HTN Nephropathy.  Difficult to diurese given degree of proteinuria, oral antibiotics being held for biopsy in the morning.. Renally dose meds, avoid nephrotoxins, and monitor I/O's closely.   2. Mild NAGMA with pseudohyperkalemia (E87.5, E87.2):  Bicarb tabs.  Low K diet.  No ARB.  3. SOB secondary to Bradycardia vs other.  (R06.02, R00.1):  Bnp/cxr wnl.   D-dimer/LE US negative.  Pt already on eliquis as outpt.  defer to cards/pulm.   Still on BB/multaq.  Defer to pulm.    4. DM (E11.65): Recommend holding DDP4 while on GLP-1 as they have similar mechanism of action.  SSI.  Defer to PCP.  No Metformin for now.  Will likely need insulin.   5. Normocytic anemia with mild SABRINA/Folate/B12 (D64.9, D50.9):  Started oral replacements.

## 2018-05-28 NOTE — PLAN OF CARE
CM met with pt for discharge planning reassessment.     Pt is having a renal biopsy today.    Pt states he is unsure of when he will discharge.    CM to follow for plans and arrangements.     05/28/18 1332   Discharge Assessment   Assessment Type Discharge Planning Reassessment   Confirmed/corrected address and phone number on facesheet? Yes   Assessment information obtained from? Patient   Communicated expected length of stay with patient/caregiver yes   Prior to hospitilization cognitive status: Alert/Oriented   Prior to hospitalization functional status: Independent   Current cognitive status: Alert/Oriented   Current Functional Status: Independent;Needs Assistance   Lives With significant other   Able to Return to Prior Arrangements yes   Is patient able to care for self after discharge? Unable to determine at this time (comments)   Patient's perception of discharge disposition home or selfcare;home health   Patient currently being followed by outpatient case management? No   Patient currently receives any other outside agency services? No   Equipment Currently Used at Home walker, rolling;bath bench   Do you have any problems affording any of your prescribed medications? No   Is the patient taking medications as prescribed? yes   Does the patient have transportation home? Yes   Transportation Available family or friend will provide   Does the patient receive services at the [facility]? No   Discharge Plan A Home   Discharge Plan B Home   Patient/Family In Agreement With Plan yes

## 2018-05-28 NOTE — PLAN OF CARE
Problem: Physical Therapy Goal  Goal: Physical Therapy Goal  Goals to be met by: 18     Patient will increase functional independence with mobility by performin. Gait > 150 ft with or without single point cane with modified independence.  2. Independent LE exercises.  3. Correct verbalization of edema management techniques (including use of compression, exercise, and elevation).     Outcome: Ongoing (interventions implemented as appropriate)  PT evaluation completed. Pt requires CGA for gait in halls without AD. Mild path deviation noted. Bilateral LE edema present. Pt also c/o orthopnea. Will continue to follow and progress as tolerated. Please see progress note for detailed plan of care and recommendations.

## 2018-05-29 LAB
ANION GAP SERPL CALC-SCNC: 10 MMOL/L
BUN SERPL-MCNC: 23 MG/DL
CALCIUM SERPL-MCNC: 8.4 MG/DL
CHLORIDE SERPL-SCNC: 107 MMOL/L
CO2 SERPL-SCNC: 22 MMOL/L
CREAT SERPL-MCNC: 1.6 MG/DL
EST. GFR  (AFRICAN AMERICAN): 50 ML/MIN/1.73 M^2
EST. GFR  (NON AFRICAN AMERICAN): 43 ML/MIN/1.73 M^2
GLUCOSE SERPL-MCNC: 121 MG/DL
INR PPP: 0.9
POCT GLUCOSE: 111 MG/DL (ref 70–110)
POCT GLUCOSE: 112 MG/DL (ref 70–110)
POCT GLUCOSE: 113 MG/DL (ref 70–110)
POCT GLUCOSE: 138 MG/DL (ref 70–110)
POTASSIUM SERPL-SCNC: 3.8 MMOL/L
PROTHROMBIN TIME: 10 SEC
SODIUM SERPL-SCNC: 139 MMOL/L

## 2018-05-29 PROCEDURE — 99900035 HC TECH TIME PER 15 MIN (STAT)

## 2018-05-29 PROCEDURE — 88333 PATH CONSLTJ SURG CYTO XM 1: CPT | Mod: 26,,, | Performed by: PATHOLOGY

## 2018-05-29 PROCEDURE — 94761 N-INVAS EAR/PLS OXIMETRY MLT: CPT

## 2018-05-29 PROCEDURE — 88300 SURGICAL PATH GROSS: CPT | Mod: 26,,, | Performed by: PATHOLOGY

## 2018-05-29 PROCEDURE — 27201068 HC S MONOPTY BIOPSY SET

## 2018-05-29 PROCEDURE — 63600175 PHARM REV CODE 636 W HCPCS: Mod: JG | Performed by: INTERNAL MEDICINE

## 2018-05-29 PROCEDURE — 25500020 PHARM REV CODE 255: Performed by: INTERNAL MEDICINE

## 2018-05-29 PROCEDURE — 11000001 HC ACUTE MED/SURG PRIVATE ROOM

## 2018-05-29 PROCEDURE — 99152 MOD SED SAME PHYS/QHP 5/>YRS: CPT

## 2018-05-29 PROCEDURE — 25000003 PHARM REV CODE 250: Performed by: INTERNAL MEDICINE

## 2018-05-29 PROCEDURE — 25000003 PHARM REV CODE 250: Performed by: EMERGENCY MEDICINE

## 2018-05-29 PROCEDURE — 0TB00ZX EXCISION OF RIGHT KIDNEY, OPEN APPROACH, DIAGNOSTIC: ICD-10-PCS | Performed by: RADIOLOGY

## 2018-05-29 PROCEDURE — 36415 COLL VENOUS BLD VENIPUNCTURE: CPT

## 2018-05-29 PROCEDURE — 88300 SURGICAL PATH GROSS: CPT | Performed by: PATHOLOGY

## 2018-05-29 PROCEDURE — 80048 BASIC METABOLIC PNL TOTAL CA: CPT

## 2018-05-29 PROCEDURE — 25000003 PHARM REV CODE 250: Performed by: NURSE PRACTITIONER

## 2018-05-29 PROCEDURE — 94640 AIRWAY INHALATION TREATMENT: CPT

## 2018-05-29 PROCEDURE — 88333 PATH CONSLTJ SURG CYTO XM 1: CPT | Performed by: PATHOLOGY

## 2018-05-29 PROCEDURE — 63600175 PHARM REV CODE 636 W HCPCS: Performed by: RADIOLOGY

## 2018-05-29 PROCEDURE — 99153 MOD SED SAME PHYS/QHP EA: CPT

## 2018-05-29 PROCEDURE — 25000242 PHARM REV CODE 250 ALT 637 W/ HCPCS: Performed by: INTERNAL MEDICINE

## 2018-05-29 PROCEDURE — 85610 PROTHROMBIN TIME: CPT

## 2018-05-29 RX ORDER — HYDROCODONE BITARTRATE AND ACETAMINOPHEN 5; 325 MG/1; MG/1
1 TABLET ORAL EVERY 4 HOURS PRN
Status: DISCONTINUED | OUTPATIENT
Start: 2018-05-29 | End: 2018-05-29 | Stop reason: SDUPTHER

## 2018-05-29 RX ORDER — FENTANYL CITRATE 50 UG/ML
INJECTION, SOLUTION INTRAMUSCULAR; INTRAVENOUS
Status: DISCONTINUED | OUTPATIENT
Start: 2018-05-29 | End: 2018-05-30 | Stop reason: HOSPADM

## 2018-05-29 RX ORDER — SYRING-NEEDL,DISP,INSUL,0.3 ML 29 G X1/2"
296 SYRINGE, EMPTY DISPOSABLE MISCELLANEOUS ONCE
Status: COMPLETED | OUTPATIENT
Start: 2018-05-29 | End: 2018-05-29

## 2018-05-29 RX ORDER — LUBIPROSTONE 24 UG/1
24 CAPSULE ORAL 2 TIMES DAILY WITH MEALS
Status: DISCONTINUED | OUTPATIENT
Start: 2018-05-29 | End: 2018-05-30 | Stop reason: HOSPADM

## 2018-05-29 RX ORDER — MIDAZOLAM HYDROCHLORIDE 1 MG/ML
INJECTION, SOLUTION INTRAMUSCULAR; INTRAVENOUS
Status: DISCONTINUED | OUTPATIENT
Start: 2018-05-29 | End: 2018-05-30 | Stop reason: HOSPADM

## 2018-05-29 RX ADMIN — CARVEDILOL 6.25 MG: 6.25 TABLET, FILM COATED ORAL at 10:05

## 2018-05-29 RX ADMIN — LIRAGLUTIDE 1.2 MG: 6 INJECTION SUBCUTANEOUS at 10:05

## 2018-05-29 RX ADMIN — DRONEDARONE 400 MG: 400 TABLET, FILM COATED ORAL at 05:05

## 2018-05-29 RX ADMIN — ATORVASTATIN CALCIUM 40 MG: 20 TABLET, FILM COATED ORAL at 10:05

## 2018-05-29 RX ADMIN — DESMOPRESSIN ACETATE 36.3 MCG: 4 SOLUTION INTRAVENOUS at 09:05

## 2018-05-29 RX ADMIN — TIOTROPIUM BROMIDE 18 MCG: 18 CAPSULE ORAL; RESPIRATORY (INHALATION) at 07:05

## 2018-05-29 RX ADMIN — HYDRALAZINE HYDROCHLORIDE 50 MG: 25 TABLET, FILM COATED ORAL at 10:05

## 2018-05-29 RX ADMIN — SODIUM BICARBONATE 650 MG TABLET 650 MG: at 10:05

## 2018-05-29 RX ADMIN — HYDRALAZINE HYDROCHLORIDE 50 MG: 25 TABLET, FILM COATED ORAL at 05:05

## 2018-05-29 RX ADMIN — IOHEXOL 30 ML: 350 INJECTION, SOLUTION INTRAVENOUS at 01:05

## 2018-05-29 RX ADMIN — MAGNESIUM CITRATE 296 ML: 1.75 LIQUID ORAL at 03:05

## 2018-05-29 RX ADMIN — AMLODIPINE BESYLATE 10 MG: 5 TABLET ORAL at 10:05

## 2018-05-29 RX ADMIN — DOCUSATE SODIUM 100 MG: 100 CAPSULE, LIQUID FILLED ORAL at 10:05

## 2018-05-29 RX ADMIN — LUBIPROSTONE 24 MCG: 24 CAPSULE, GELATIN COATED ORAL at 05:05

## 2018-05-29 RX ADMIN — IOHEXOL 30 ML: 350 INJECTION, SOLUTION INTRAVENOUS at 12:05

## 2018-05-29 RX ADMIN — HYDRALAZINE HYDROCHLORIDE 50 MG: 25 TABLET, FILM COATED ORAL at 03:05

## 2018-05-29 NOTE — PLAN OF CARE
Problem: Patient Care Overview  Goal: Plan of Care Review  Outcome: Ongoing (interventions implemented as appropriate)  Patient on room air.  No distress noted.

## 2018-05-29 NOTE — PROGRESS NOTES
Renal Progress Note    Admit Date: 5/22/2018   LOS: 7 days     SUBJECTIVE:     Patient is without new complaint.    Scheduled Meds:   amLODIPine  10 mg Oral Daily    atorvastatin  40 mg Oral Daily    bisacodyl  10 mg Rectal Once    carvedilol  6.25 mg Oral BID    docusate sodium  100 mg Oral Daily    dronedarone  400 mg Oral BID WM    hydrALAZINE  50 mg Oral Q8H    liraglutide 0.6 mg/0.1 mL (18 mg/3 mL) subq PNIJ  1.2 mg Subcutaneous Daily    sodium bicarbonate  1 tablet Oral BID    tiotropium  1 capsule Inhalation Daily       OBJECTIVE:     Vital Signs Range (Last 24H):  Temp:  [97.5 °F (36.4 °C)-99.1 °F (37.3 °C)]   Pulse:  [61-80]   Resp:  [18-20]   BP: (121-165)/(57-78)   SpO2:  [91 %-96 %]     I & O (Last 24H):  Intake/Output Summary (Last 24 hours) at 05/29/18 0854  Last data filed at 05/29/18 0500   Gross per 24 hour   Intake              360 ml   Output              975 ml   Net             -615 ml       Physical Exam:  General appearance: Well developed, well nourished  Eyes:  Conjunctivae/corneas clear. PERRL.  Lungs: Normal respiratory effort,   clear to auscultation bilaterally   Heart: Regular rate and rhythm, S1, S2 normal, no murmur, rub or pino.  Abdomen: Soft, non-tender non-distended; bowel sounds normal; no masses,  no organomegaly, obese  Extremities: No cyanosis or clubbing. trace edema.    Skin: Skin color, texture, turgor normal. No rashes or lesions  Neurologic: Normal strength and tone. No focal numbness or weakness     Laboratory:  CBC:   Recent Labs  Lab 05/22/18  1856   WBC 8.34   RBC 4.42*   HGB 13.0*   HCT 40.2      MCV 91   MCH 29.4   MCHC 32.3         ASSESSMENT/PLAN:     1. Resolved Non-oliguric NATALIIA on CKD III (Baseline Creat 1.4) secondary to poor CO from Bradycardia and ARB.   (N17.9, N18.3, R80.9): Hold diuretics/ARB. Renal U/S consistent with DM Neph.  2.8 gr/protein on spot urine.  All proteinuria labs negative so suggestive of  DM/HTN Nephropathy.  Difficult  to diurese given degree of proteinuria, oral anticoag being held for biopsy this morning but apparently cannot do biopsy until off Eliquis for 48 hours.. Renally dose meds, avoid nephrotoxins, and monitor I/O's closely.   2. Mild NAGMA with pseudohyperkalemia (E87.5, E87.2):  Bicarb tabs.  Low K diet.  No ARB.  3. SOB secondary to Bradycardia vs other.  (R06.02, R00.1):  Bnp/cxr wnl.   D-dimer/LE US negative.  Pt already on eliquis as outpt.  defer to cards/pulm.   Still on BB/multaq.  Defer to pulm.    4. DM (E11.65): Recommend holding DDP4 while on GLP-1 as they have similar mechanism of action.  SSI.  Defer to PCP.  No Metformin for now.  Will likely need insulin.   5. Normocytic anemia with mild SABRINA/Folate/B12 (D64.9, D50.9):  Started oral replacements.

## 2018-05-29 NOTE — PROGRESS NOTES
Progress Note  Pulmonology    Admit Date: 5/22/2018   LOS: 7 days     SUBJECTIVE:   Able to walk around unit without difficulty but feels dyspneic when flat--passed sniff test-has restrictive disease on pft-pe workup negative-for renal biopsy-ct abdomen to rule out subdiaphragmatic process leading to restriction  Continuous Infusions:    Scheduled Meds:   amLODIPine  10 mg Oral Daily    atorvastatin  40 mg Oral Daily    bisacodyl  10 mg Rectal Once    carvedilol  6.25 mg Oral BID    docusate sodium  100 mg Oral Daily    dronedarone  400 mg Oral BID WM    hydrALAZINE  50 mg Oral Q8H    liraglutide 0.6 mg/0.1 mL (18 mg/3 mL) subq PNIJ  1.2 mg Subcutaneous Daily    sodium bicarbonate  1 tablet Oral BID    tiotropium  1 capsule Inhalation Daily       Review of Systems:  Constitutional: no fever or chills  Respiratory: positive for dyspnea on exertion  Cardiovascular: positive for palpitations  Otherwise negative    OBJECTIVE:     Vital Signs Range (Last 24H):  Temp:  [97.5 °F (36.4 °C)-99.1 °F (37.3 °C)]   Pulse:  [61-80]   Resp:  [18-20]   BP: (121-165)/(57-78)   SpO2:  [91 %-96 %]     I & O (Last 24H):    Intake/Output Summary (Last 24 hours) at 05/29/18 0813  Last data filed at 05/29/18 0500   Gross per 24 hour   Intake              360 ml   Output              975 ml   Net             -615 ml       Physical Exam:  General: no distress, appears stated age  Neck: no jugular venous distention and no adenopathy  Lungs:  diminished breath sounds base - left  Chest Wall: no tenderness  Heart: irregularly irregular rhythm  Abdomen: soft, non-tender non-distended; bowel sounds normal  Extremities: no cyanosis or edema, or clubbing and edema bilateral  Neurologic: alert, oriented, thought content appropriate  Comfortable flat  Laboratory:  CBC:     Recent Labs  Lab 05/22/18 1856   WBC 8.34   RBC 4.42*   HGB 13.0*   HCT 40.2      MCV 91   MCH 29.4   MCHC 32.3     CMP:   Recent Labs  Lab 05/22/18 1856   05/27/18 2049 05/29/18  0419   *  < > 154*  < > 121*   CALCIUM 8.5*  < > 8.8  < > 8.4*   ALBUMIN 3.4*  --  3.2*  --   --    PROT 6.7  --   --   --   --      < > 136  < > 139   K 5.2*  < > 3.9  < > 3.8   CO2 18*  < > 21*  < > 22*     < > 105  < > 107   BUN 54*  < > 24*  < > 23   CREATININE 3.2*  < > 2.0*  < > 1.6*   ALKPHOS 84  --   --   --   --    ALT 13  --   --   --   --    AST 11  --   --   --   --    BILITOT 0.7  --   --   --   --    < > = values in this interval not displayed.  Coagulation: No results for input(s): LABPROT, INR, APTT in the last 168 hours.  Cardiac markers: No results for input(s): CKMB, CPKMB, TROPONINT, TROPONINI, MYOGLOBIN in the last 168 hours.  ABGs: No results for input(s): PH, PCO2, PO2, HCO3, POCSATURATED, BE in the last 168 hours.  Microbiology Results (last 7 days)     ** No results found for the last 168 hours. **              Diagnostic Results:  X-Ray: Reviewed  Elevated aníbal diaphragghm  ASSESSMENT/PLAN:   pt has pablo and restrictive lung disease with elevated l aníbal diaphraghm- does not find relief from neb treatments-for ct abdomen this pm  Dm  elier- better  Sniff test normal    Case Discussed With:dr mendez  Consider outpt sleep eval although he denies much in way symptoms    Discharge Needs and Plans: per above

## 2018-05-29 NOTE — PROCEDURES
Radiology Post-Procedure Note    Pre Op Diagnosis: acute on chronic kidney disease  Post Op Diagnosis: Same    Procedure: R renal biospy    Procedure performed by: Heriberto Stahl MD    Written Informed Consent Obtained: Yes  Specimen Removed: YES 5 core specimens  Estimated Blood Loss: Minimal    Findings:   Successful R renal biopsy.    Patient tolerated procedure well.    @SIG@

## 2018-05-29 NOTE — PT/OT/SLP PROGRESS
Physical Therapy      Patient Name:  Zev Castano   MRN:  [id number]    Patient away for kidney biopsy this morning. Will follow up as tolerated.     Kelli Macias, PT

## 2018-05-29 NOTE — H&P
Consult/H&P Note  Interventional Radiology    Consult Requested By: nephrology    Reason for Consult: acute on chronic kidney disease    SUBJECTIVE:     Chief Complaint: renal dysfunction    History of Present Illness: 69 yo M with acute on chronic kidney injury, proteinurea.    Past Medical History:   Diagnosis Date    CKD (chronic kidney disease), stage III     Coronary artery disease     Diabetes mellitus type II     Hyperlipidemia     Hypertension     Nephrotic range proteinuria     Paroxysmal atrial fibrillation     Stroke     nov 2012     Past Surgical History:   Procedure Laterality Date    KNEE SURGERY Left     SHOULDER SURGERY Right      Family History   Problem Relation Age of Onset    Hypertension Mother      Social History   Substance Use Topics    Smoking status: Former Smoker     Types: Cigars    Smokeless tobacco: Never Used      Comment: occasional, when at the casino    Alcohol use 0.6 oz/week     1 Cans of beer per week      Comment: occasional when at the [employer]       Review of Systems:  As per primary team      OBJECTIVE:     Vital Signs Range (Last 24H):  Temp:  [97.5 °F (36.4 °C)-99.1 °F (37.3 °C)]   Pulse:  [61-80]   Resp:  [18-20]   BP: (121-165)/(57-78)   SpO2:  [91 %-96 %]     Physical Exam:  General- Patient alert and oriented x3 in NAD  CV- Regular rate and rhythm  Resp-  No increased WOB  GI- Non tender/non-distended  Neuro-  No focal deficits noted.     Physical Exam  Body mass index is 35.19 kg/m².    Scheduled Meds:    amLODIPine  10 mg Oral Daily    atorvastatin  40 mg Oral Daily    bisacodyl  10 mg Rectal Once    carvedilol  6.25 mg Oral BID    docusate sodium  100 mg Oral Daily    dronedarone  400 mg Oral BID WM    hydrALAZINE  50 mg Oral Q8H    liraglutide 0.6 mg/0.1 mL (18 mg/3 mL) subq PNIJ  1.2 mg Subcutaneous Daily    sodium bicarbonate  1 tablet Oral BID    tiotropium  1 capsule Inhalation Daily     Continuous Infusions:   PRN Meds:acetaminophen,  acetaminophen, desmopressin (DDAVP) IVPB, dextrose 50%, dextrose 50%, glucagon (human recombinant), glucose, glucose, HYDROcodone-acetaminophen, insulin aspart U-100, oxyCODONE, ramelteon    Allergies:   Review of patient's allergies indicates:   Allergen Reactions    Doxycycline Rash       Labs:  No results for input(s): INR in the last 168 hours.    Invalid input(s):  PT,  PTT    Recent Labs  Lab 05/22/18  1856   WBC 8.34   HGB 13.0*   HCT 40.2   MCV 91         Recent Labs  Lab 05/22/18  1856  05/27/18  2049  05/29/18  0419   *  < > 154*  < > 121*     < > 136  < > 139   K 5.2*  < > 3.9  < > 3.8     < > 105  < > 107   CO2 18*  < > 21*  < > 22*   BUN 54*  < > 24*  < > 23   CREATININE 3.2*  < > 2.0*  < > 1.6*   CALCIUM 8.5*  < > 8.8  < > 8.4*   ALT 13  --   --   --   --    AST 11  --   --   --   --    ALBUMIN 3.4*  --  3.2*  --   --    BILITOT 0.7  --   --   --   --    < > = values in this interval not displayed.    Vitals (Most Recent):  Temp: 99.1 °F (37.3 °C) (05/29/18 0721)  Pulse: 80 (05/29/18 0739)  Resp: 18 (05/29/18 0739)  BP: (!) 151/70 (05/29/18 0721)  SpO2: (!) 93 % (05/29/18 0739)    ASA: 3  Mallampati: 2    Consent obtained    ASSESSMENT/PLAN:     Kidney biopsy.  Moderate sedation.    Active Hospital Problems    Diagnosis  POA    *Bradycardia [R00.1]  Yes      Resolved Hospital Problems    Diagnosis Date Resolved POA   No resolved problems to display.           Heriberto Stahl MD

## 2018-05-29 NOTE — PROGRESS NOTES
I do well getting around. I'm breathless lying down.    Vitals:    05/29/18 0940 05/29/18 0945 05/29/18 0950 05/29/18 0955   BP: (!) 156/72 (!) 162/87 (!) 158/74 (!) 150/68   BP Location: Right arm Right arm Right arm Right arm   Patient Position: Lying Lying Lying Lying   Pulse: 78 79 77 77   Resp: (!) 23 (!) 24 19 18   Temp:       TempSrc:       SpO2: 95% 95% (!) 94% 95%   Weight:       Height:         Chest clear  Cor: No gallop. No murmur.  No ankle edema.    Labs reviewed (creat 1.6)    Plan: CT abdomen           Renal Bx           Consult Dr Stokes.

## 2018-05-29 NOTE — NURSING
Patient dyspneic upon lying down after ambulating in room. Patient reports minimal relief after repositioning and deep breathing. Patient encouraged to use call light for assistance.

## 2018-05-29 NOTE — PROGRESS NOTES
Renal Progress Note    Admit Date: 5/22/2018   LOS: 7 days     SUBJECTIVE:     Seen after renal biopsy.  C/o constipation and still SOB when lying flat.  No CP.     Scheduled Meds:   amLODIPine  10 mg Oral Daily    atorvastatin  40 mg Oral Daily    bisacodyl  10 mg Rectal Once    carvedilol  6.25 mg Oral BID    docusate sodium  100 mg Oral Daily    dronedarone  400 mg Oral BID WM    hydrALAZINE  50 mg Oral Q8H    liraglutide 0.6 mg/0.1 mL (18 mg/3 mL) subq PNIJ  1.2 mg Subcutaneous Daily    lubiprostone  24 mcg Oral BID WM    magnesium citrate  296 mL Oral Once    sodium bicarbonate  1 tablet Oral BID    tiotropium  1 capsule Inhalation Daily       OBJECTIVE:     Vital Signs Range (Last 24H):  Temp:  [97.5 °F (36.4 °C)-99.1 °F (37.3 °C)]   Pulse:  [61-81]   Resp:  [14-24]   BP: (121-190)/(57-91)   SpO2:  [91 %-98 %]     I & O (Last 24H):    Intake/Output Summary (Last 24 hours) at 05/29/18 1024  Last data filed at 05/29/18 0500   Gross per 24 hour   Intake              360 ml   Output              975 ml   Net             -615 ml       Physical Exam:  General appearance: Well developed, well nourished  Eyes:  Conjunctivae/corneas clear. PERRL.  Lungs: Normal respiratory effort,   clear to auscultation bilaterally   Heart: Regular rate and rhythm, S1, S2 normal, no murmur, rub or pino.  Abdomen: Soft, non-tender non-distended; bowel sounds normal; no masses,  no organomegaly, obese  Extremities: No cyanosis or clubbing. trace edema.    Skin: Skin color, texture, turgor normal. No rashes or lesions  Neurologic: Normal strength and tone. No focal numbness or weakness     Laboratory:  CBC:     Recent Labs  Lab 05/22/18  1856   WBC 8.34   RBC 4.42*   HGB 13.0*   HCT 40.2      MCV 91   MCH 29.4   MCHC 32.3     BMP:   Recent Labs  Lab 05/29/18  0419   *      K 3.8      CO2 22*   BUN 23   CREATININE 1.6*   CALCIUM 8.4*     Lab Results   Component Value Date    CALCIUM 8.4 (L)  05/29/2018    PHOS 4.3 05/27/2018       ASSESSMENT/PLAN:     1.  Non-oliguric NATALIIA on CKD III (Baseline Creat 1.4) secondary to poor CO from Bradycardia and ARB.   (N17.9, N18.3, R80.9): Hold diuretics/ARB. Renal U/S consistent with DM Neph.  2.8 gr/protein on spot urine.  All proteinuria labs negative so suggestive of  DM/HTN Nephropathy.  Difficult to diurese given degree of proteinuria, oral anticoag being held for biopsy this morning  Renally dose meds, avoid nephrotoxins, and monitor I/O's closely.   2. Mild NAGMA with pseudohyperkalemia (E87.5, E87.2):  Bicarb tabs.  Low K diet.  No ARB and no further metformin.  3. SOB secondary to Bradycardia vs other.  (R06.02, R00.1):  Bnp/cxr wnl.   D-dimer/LE US negative.  Pt already on eliquis as outpt.  defer to cards/pulm.   Still on BB/multaq.  Defer to pulm.  CT today to r/o diaphragm issues.   4. DM (E11.65): Recommend holding DDP4 while on GLP-1 as they have similar mechanism of action.  SSI.  Defer to PCP.  No Metformin for now.  Will likely need insulin.   5. Normocytic anemia with mild SABRINA/Folate/B12 (D64.9, D50.9):  Started oral replacements.   6. Constipation (K59.00):  See MAR.     See above

## 2018-05-29 NOTE — PLAN OF CARE
Problem: Patient Care Overview  Goal: Plan of Care Review  Outcome: Ongoing (interventions implemented as appropriate)  Plan of care reviewed . VSS. Telemetry NSR. Kidney biopsy done this morning . Mag Citrate given to help with constipation . Wife at bedside . Patient had no complaints of pain this shift . Patient c/o shortness of breath when lying flat . HOB must be elevated . Ambulates with assistance . No needs at this time . Bed locked and in lowest position , call bell within reach . Will continue to monitor .

## 2018-05-29 NOTE — PLAN OF CARE
Patient NPO since midnight except for sips with medication. VSS on RA. Pt continues to complain of shortness of breath while laying down. Pt instructed to use call light for assistance when ambulating. Plan of care reviewed with patient, pt verbalized understanding. Safety measures maintained. Will continue to monitor.

## 2018-05-30 VITALS
HEART RATE: 44 BPM | BODY MASS INDEX: 34.06 KG/M2 | WEIGHT: 257 LBS | DIASTOLIC BLOOD PRESSURE: 61 MMHG | HEIGHT: 73 IN | OXYGEN SATURATION: 95 % | TEMPERATURE: 99 F | RESPIRATION RATE: 18 BRPM | SYSTOLIC BLOOD PRESSURE: 132 MMHG

## 2018-05-30 LAB
ANION GAP SERPL CALC-SCNC: 6 MMOL/L
BUN SERPL-MCNC: 21 MG/DL
CALCIUM SERPL-MCNC: 8.6 MG/DL
CHLORIDE SERPL-SCNC: 106 MMOL/L
CO2 SERPL-SCNC: 24 MMOL/L
CREAT SERPL-MCNC: 1.2 MG/DL
EST. GFR  (AFRICAN AMERICAN): >60 ML/MIN/1.73 M^2
EST. GFR  (NON AFRICAN AMERICAN): >60 ML/MIN/1.73 M^2
GLUCOSE SERPL-MCNC: 97 MG/DL
POCT GLUCOSE: 94 MG/DL (ref 70–110)
POTASSIUM SERPL-SCNC: 4.1 MMOL/L
SODIUM SERPL-SCNC: 136 MMOL/L

## 2018-05-30 PROCEDURE — 25000003 PHARM REV CODE 250: Performed by: NURSE PRACTITIONER

## 2018-05-30 PROCEDURE — 25000003 PHARM REV CODE 250: Performed by: INTERNAL MEDICINE

## 2018-05-30 PROCEDURE — 94640 AIRWAY INHALATION TREATMENT: CPT

## 2018-05-30 PROCEDURE — 25000242 PHARM REV CODE 250 ALT 637 W/ HCPCS: Performed by: INTERNAL MEDICINE

## 2018-05-30 PROCEDURE — 63600175 PHARM REV CODE 636 W HCPCS: Performed by: NURSE PRACTITIONER

## 2018-05-30 PROCEDURE — 36415 COLL VENOUS BLD VENIPUNCTURE: CPT

## 2018-05-30 PROCEDURE — 25000003 PHARM REV CODE 250: Performed by: EMERGENCY MEDICINE

## 2018-05-30 PROCEDURE — 80048 BASIC METABOLIC PNL TOTAL CA: CPT

## 2018-05-30 RX ORDER — CARVEDILOL 6.25 MG/1
6.25 TABLET ORAL 2 TIMES DAILY
Qty: 60 TABLET | Refills: 11 | Status: SHIPPED | OUTPATIENT
Start: 2018-05-30 | End: 2018-07-17

## 2018-05-30 RX ORDER — DOCUSATE SODIUM 100 MG/1
200 CAPSULE, LIQUID FILLED ORAL 2 TIMES DAILY
Status: DISCONTINUED | OUTPATIENT
Start: 2018-05-30 | End: 2018-05-30 | Stop reason: HOSPADM

## 2018-05-30 RX ORDER — POLYETHYLENE GLYCOL 3350, SODIUM SULFATE ANHYDROUS, SODIUM BICARBONATE, SODIUM CHLORIDE, POTASSIUM CHLORIDE 236; 22.74; 6.74; 5.86; 2.97 G/4L; G/4L; G/4L; G/4L; G/4L
500 POWDER, FOR SOLUTION ORAL ONCE
Status: DISCONTINUED | OUTPATIENT
Start: 2018-05-30 | End: 2018-05-30 | Stop reason: HOSPADM

## 2018-05-30 RX ORDER — METOCLOPRAMIDE HYDROCHLORIDE 5 MG/ML
10 INJECTION INTRAMUSCULAR; INTRAVENOUS ONCE
Status: COMPLETED | OUTPATIENT
Start: 2018-05-30 | End: 2018-05-30

## 2018-05-30 RX ORDER — POLYETHYLENE GLYCOL 3350 17 G/17G
17 POWDER, FOR SOLUTION ORAL DAILY
Status: DISCONTINUED | OUTPATIENT
Start: 2018-05-30 | End: 2018-05-30 | Stop reason: HOSPADM

## 2018-05-30 RX ORDER — HYDRALAZINE HYDROCHLORIDE 50 MG/1
50 TABLET, FILM COATED ORAL EVERY 8 HOURS
Qty: 90 TABLET | Refills: 11 | Status: ON HOLD | OUTPATIENT
Start: 2018-05-30 | End: 2018-06-22 | Stop reason: HOSPADM

## 2018-05-30 RX ADMIN — LIRAGLUTIDE: 6 INJECTION SUBCUTANEOUS at 09:05

## 2018-05-30 RX ADMIN — SODIUM BICARBONATE 650 MG TABLET 650 MG: at 09:05

## 2018-05-30 RX ADMIN — POLYETHYLENE GLYCOL 3350 17 G: 17 POWDER, FOR SOLUTION ORAL at 10:05

## 2018-05-30 RX ADMIN — LUBIPROSTONE 24 MCG: 24 CAPSULE, GELATIN COATED ORAL at 07:05

## 2018-05-30 RX ADMIN — DOCUSATE SODIUM 100 MG: 100 CAPSULE, LIQUID FILLED ORAL at 09:05

## 2018-05-30 RX ADMIN — AMLODIPINE BESYLATE 10 MG: 5 TABLET ORAL at 09:05

## 2018-05-30 RX ADMIN — ATORVASTATIN CALCIUM 40 MG: 20 TABLET, FILM COATED ORAL at 09:05

## 2018-05-30 RX ADMIN — HYDRALAZINE HYDROCHLORIDE 50 MG: 25 TABLET, FILM COATED ORAL at 06:05

## 2018-05-30 RX ADMIN — CARVEDILOL 6.25 MG: 6.25 TABLET, FILM COATED ORAL at 09:05

## 2018-05-30 RX ADMIN — TIOTROPIUM BROMIDE 18 MCG: 18 CAPSULE ORAL; RESPIRATORY (INHALATION) at 08:05

## 2018-05-30 RX ADMIN — DRONEDARONE 400 MG: 400 TABLET, FILM COATED ORAL at 09:05

## 2018-05-30 RX ADMIN — APIXABAN 5 MG: 2.5 TABLET, FILM COATED ORAL at 09:05

## 2018-05-30 RX ADMIN — METOCLOPRAMIDE 10 MG: 5 INJECTION, SOLUTION INTRAMUSCULAR; INTRAVENOUS at 11:05

## 2018-05-30 NOTE — NURSING
Received form from lab regarding biopsy.  Filled out portion attached face sheet and faxed to [email or web address].

## 2018-05-30 NOTE — PLAN OF CARE
Problem: Patient Care Overview  Goal: Plan of Care Review  Outcome: Ongoing (interventions implemented as appropriate)  Patient in no apparent distress. Sat's  92 % on room air . Will continue to monitor.

## 2018-05-30 NOTE — PROGRESS NOTES
Renal Progress Note    Admit Date: 5/22/2018   LOS: 8 days     SUBJECTIVE:     Still constipated despite mag citrate.  Remains dyspneic when lying flat.  Renal fxn stable.  No CP.  Discussed with [facility].     Scheduled Meds:   amLODIPine  10 mg Oral Daily    apixaban  5 mg Oral BID    atorvastatin  40 mg Oral Daily    carvedilol  6.25 mg Oral BID    docusate sodium  100 mg Oral Daily    dronedarone  400 mg Oral BID WM    hydrALAZINE  50 mg Oral Q8H    liraglutide 0.6 mg/0.1 mL (18 mg/3 mL) subq PNIJ  1.2 mg Subcutaneous Daily    lubiprostone  24 mcg Oral BID WM    polyethylene glycol  500 mL Oral Once    polyethylene glycol  17 g Oral Daily    sodium bicarbonate  1 tablet Oral BID    sodium polystyrene  15 g Oral Once    tiotropium  1 capsule Inhalation Daily       OBJECTIVE:     Vital Signs Range (Last 24H):  Temp:  [97.2 °F (36.2 °C)-99 °F (37.2 °C)]   Pulse:  [67-82]   Resp:  [16-20]   BP: (134-171)/(65-77)   SpO2:  [90 %-95 %]     I & O (Last 24H):    Intake/Output Summary (Last 24 hours) at 05/30/18 1003  Last data filed at 05/30/18 0600   Gross per 24 hour   Intake              720 ml   Output              475 ml   Net              245 ml       Physical Exam:  General appearance: Well developed, well nourished  Eyes:  Conjunctivae/corneas clear. PERRL.  Lungs: Normal respiratory effort,   clear to auscultation bilaterally   Heart: Regular rate and rhythm, S1, S2 normal, no murmur, rub or pino.  Abdomen: Soft, non-tender non-distended; bowel sounds normal; no masses,  no organomegaly, obese  Extremities: No cyanosis or clubbing. trace edema.    Skin: Skin color, texture, turgor normal. No rashes or lesions  Neurologic: Normal strength and tone. No focal numbness or weakness     Laboratory:  CBC:   No results for input(s): WBC, RBC, HGB, HCT, PLT, MCV, MCH, MCHC in the last 168 hours.  BMP:     Recent Labs  Lab 05/30/18  0550   GLU 97      K 4.1      CO2 24   BUN 21   CREATININE  1.2   CALCIUM 8.6*     Lab Results   Component Value Date    CALCIUM 8.6 (L) 05/30/2018    PHOS 4.3 05/27/2018     POCT Glucose   Date Value Ref Range Status   05/30/2018 94 70 - 110 mg/dL Final   05/29/2018 112 (H) 70 - 110 mg/dL Final   05/29/2018 111 (H) 70 - 110 mg/dL Final   05/29/2018 138 (H) 70 - 110 mg/dL Final   05/29/2018 113 (H) 70 - 110 mg/dL Final   05/28/2018 156 (H) 70 - 110 mg/dL Final   05/28/2018 140 (H) 70 - 110 mg/dL Final   05/28/2018 111 (H) 70 - 110 mg/dL Final   05/27/2018 129 (H) 70 - 110 mg/dL Final   05/27/2018 125 (H) 70 - 110 mg/dL Final   05/27/2018 175 (H) 70 - 110 mg/dL Final     IR Biopsy Kidney   Final Result      Successful right kidney biopsy.         Electronically signed by: Heriberto Stahl MD   Date:    05/30/2018   Time:    09:26      CT Abdomen Pelvis  Without Contrast   Final Result      1. Opacity at the tips of both the lower lobes, likely atelectasis.  Airspace consolidation from pneumonia or edema also possible.   2. Retroperitoneal air bubbles near right kidney consistent with recent needle biopsy.   3. Cholelithiasis.   4. Atherosclerosis including aorta and coronary arteries.   5. No neoplastic process identified in the abdomen or pelvis..   6. Other findings as above.         Electronically signed by: Sam Delcid MD   Date:    05/30/2018   Time:    07:56      FL Less Than 1 Hour   Final Result      Elevation of the hemidiaphragm which is been present on multiple prior exams.  The diaphragm excursion is symmetric with no fluoroscopic evidence to suggest paralysis.         Electronically signed by: Chris Avalos MD   Date:    05/25/2018   Time:    12:03      X-Ray Chest 1 View   Final Result      As above.         Electronically signed by: Kimber Sarkar MD   Date:    05/22/2018   Time:    19:05        ASSESSMENT/PLAN:     1. Resolved Non-oliguric NATALIIA on CKD III (Baseline Creat 1.4) secondary to poor CO from Bradycardia and ARB.   (N17.9, N18.3, R80.9): Hold  diuretics/ARB. Renal U/S consistent with DM Neph.  2.8 gr/protein on spot urine.  All proteinuria labs negative so suggestive of  DM/HTN Nephropathy.  Renal Biopsy 5/29.  Delay with [employer] but gave clinical info to Dr. Ozuna at [facility].  no need to stay until results.  F/u with Dr. Ortiz 1 week.  Renally dose meds, avoid nephrotoxins, and monitor I/O's closely.   2. Mild NAGMA with pseudohyperkalemia (E87.5, E87.2):  Bicarb tabs.  Low K diet.    3. SOB secondary to Bradycardia vs other.  (R06.02, R00.1):  Bnp/cxr wnl.   D-dimer/LE US negative.  Pt already on eliquis as outpt.  defer to cards/pulm.   Still on BB/multaq.  Defer to pulm.  CT to r/o diaphragm issues wnl.  Suspect component of RENU.    4. DM (E11.65): Recommend holding DDP4 while on GLP-1 as they have similar mechanism of action.  SSI.  Defer to PCP.  No Metformin for now.  use victoza for now.  May need insulin eventually.    5. Normocytic anemia with mild SABRINA/Folate/B12 (D64.9, D50.9):  Started oral replacements.   6. Constipation (K59.00):  See MAR. Refuses enema.     See above  Cleared for DC from Renal  F/U with Dr. Ortiz 1 week.

## 2018-05-30 NOTE — PLAN OF CARE
05/30/18 1404   Medicare Message   Important Message from Medicare regarding Discharge Appeal Rights Given to patient/caregiver;Explained to patient/caregiver;Signed/date by patient/caregiver   Date IMM was signed 05/29/18   Time IMM was signed [id number]

## 2018-05-30 NOTE — CONSULTS
Consult requested by Dr. Livingston, cardiology.    REASON FOR CONSULTATION:  To rule out obstructive sleep apnea, underlying his   cardiac issues.    The patient is a 68-year-old male came into the hospital because of shortness of   breath and slow heart rate.  Apparently, it dropped to 30-40 beats per minute.    He mentioned about hypertension, diabetes and kidney problems.    His previous diagnoses include chronic renal disease, coronary artery disease,   diabetes type 2, hypertension, hyperlipidemia, paroxysmal atrial fibrillation,   three episodes of stroke.    He apparently suffered three episodes of stroke in 2012.  The first stroke   symptoms occurred at home with significant impairment of speech.  He apparently   was brought to the hospital and discharged.  AFib was not discovered at that   time.  The second stroke occurred while he was in the hospital and noted to have   sudden loss of speech and weakness on the right side of the body and that   sounded like a left hemispheric ischemic event.  He apparently was on the Rehab   and the third episode occurred and apparently was milder than the second one.    He stated it took about three to four months for him to recover his speech   function.  Also, mentioned that a rehab treatment  apparently helped his mobility.  His   strength in the legs improved to the degree that it is difficult to notice   weakness in the left leg, but he apparently feels the weakness in left leg when walking fast.    He apparently fell in December and sustained some neck injury and weakness in   the left upper limb.  He was getting prepared for surgical treatment of the spur   in the neck.  At that time, cardiac eval showed significant arrhythmia.  He has   been under investigations for his cardiac condition    He apparently sleeps on his back.  He is not aware of snoring, but his   girlfriend apparently did not mention anything about his snoring.  He is aware   of irregular heartbeat  in his sleep.  He apparently gets up twice at night to   use the bathroom.  He goes to bed around 11:30 and wakes up around 8 a.m.  He   apparently does not do any significant work in his back yard and he spends his   time sitting and watching TV.  Dozes off in the recliner.  He states he cannot   sleep on his back and tends to spend time in the recliner due to his breathing   difficulty.  He denied passing out.  He denied convulsions.    He apparently takes medications for diabetes that would include metformin,   Januvia, also takes Avapro, Victoza and Lasix.    He appears very comfortable lying flat.  His leg swelling apparently has   improved a lot.  He is able to use his right upper limb without any difficulty.    There is significant limitation in the left upper limb around his shoulder with   limitation.  There is sensation of frozen shoulder on the left side.  There is   some swelling on the left hand.  He stated that making a  is weaker on the   left side.  He is able to move his lower limbs without difficulty.  He is able   to feel the sensations without any difficult.  Ankle reflexes are absent.    Plantars are downgoing.  There is no swelling in the legs.    I discussed with the patient about the possibility of sleep related breathing   disorder, underlying his cardiovascular issues.  He understood the need for a   sleep study.  I did  mention to him about getting the sleep study as an   outpatient mostly likely at [facility].  This information was   given to the attending nurse.  Please inform us at the time of the discharge and   we will pass it on to the Sleep Lab.  The Sleep Lab will contact the patient   and arrange a sleep test.    The overall impression is possible obstructive sleep apnea, atrial fibrillation,   multiple ischemic events with a significant recovery, hypertension, diabetes   and kidney dysfunction.    Thank you for the referral.      DONNA  dd: 05/29/2018 13:31:59  (CDT)  td: 05/29/2018 20:07:42 (CDT)  Doc ID   #[id number]  Job ID #[id number]    CC:

## 2018-05-30 NOTE — PLAN OF CARE
Met with patient at bedside prior to discharge - patient denied the need for any home health or DME - patient plans to contact Dr Stokes for follow up appt      05/30/18 9256   Final Note   Assessment Type Final Discharge Note   Discharge Disposition Home   What phone number can be called within the next 1-3 days to see how you are doing after discharge? [phone]   Discharge plans and expectations educations in teach back method with documentation complete? Yes   Right Care Referral Info   Post Acute Recommendation No Care

## 2018-05-30 NOTE — PT/OT/SLP DISCHARGE
Physical Therapy Discharge Summary    Name: Zev Castano  MRN: [id number]   Principal Problem: Shortness of breath     Patient Discharged from acute Physical Therapy on 2018 .  Please refer to prior PT noted date on 18 for functional status.     Assessment:     Patient has not met goals.    Objective:     GOALS:    Physical Therapy Goals     Not on file          Multidisciplinary Problems (Resolved)        Problem: Physical Therapy Goal    Goal Priority Disciplines Outcome Goal Variances Interventions   Physical Therapy Goal   (Resolved)     PT/OT, PT Outcome(s) achieved     Description:  Goals to be met by: 18     Patient will increase functional independence with mobility by performin. Gait > 150 ft with or without single point cane with modified independence.  2. Independent LE exercises.  3. Correct verbalization of edema management techniques (including use of compression, exercise, and elevation).                       Reasons for Discontinuation of Therapy Services  Transfer to alternate level of care.      Plan:     Patient Discharged to: Home with Home Health Service recommended.    Stacy Woodard, PT  2018   PT of record not available for documentation.

## 2018-06-03 NOTE — DISCHARGE SUMMARY
HOSPITAL COURSE:  Mr. Castano is a 68-year-old gentleman who was discharged from   the hospital just 3 or 4 days prior to this admission.  His fiance called me on   the telephone telling me that he had trouble breathing and had a slow pulse.  He   apparently had a pulse rate of 39 beats a minute, was short of breath on trying   to lie down, and I advised him to come back to the Emergency Room for further   evaluation.  Note that prior to this admission, he was taking his Lasix on an   every other day basis, and the carvedilol dose was decreased from 25 to 12.5 mg   twice a day, the amiodarone dose was decreased to 200 mg once a day.    On examination, he was noted to have a pulse in the 40s, and a systolic blood   pressure in the 180/100 range, and was noted to have no ankle edema, and the   creatinine was noted to be 2.3 with a BUN of 44.  He was admitted to the   hospital, and the carvedilol dose was reduced to 6.125 mg twice a day, the   amiodarone was continued on 200 once a day, he was given IV fluids at 50 mL an   hour, Dr. Sharon Ortiz and Dr. Sam Perez was consulted.  Dr. Perez did not feel   he had much evidence of heart failure or obstructive lung disease.  The   following day, the creatinine came down to 1.6 with a BUN of 25, the following   day down to 21 and 1.4.  On 05/28/2018, the BUN again climbed to 26 with a   creatinine of 1.8.  Dr. Ortiz felt strongly that he should have a renal biopsy   considering the amount of protein he was spilling in the urine.  The day prior   to discharge, he had a renal biopsy, which was done uneventfully after   withholding the Eliquis for 3 days prior to the procedure.  With increasing the   dose of Apresoline from 25 t.i.d. to 50 t.i.d., the blood pressure came under   better range.  He was eventually discharged in a stable and satisfactory   condition in a sinus rhythm in the 60s, with a blood pressure in the 140/80   range, on amlodipine, Apixaban, aspirin,  atorvastatin, Multaq, Avapro, Janumet,   Victoza, Spiriva, carvedilol, and hydralazine.  At the time of discharge, he   tolerated ambulation well without shortness of breath and had a little bit of   shortness of breath on lying flat.  He was seen in consultation by Dr. Stokes who   has planned an outpatient sleep study the following week.  He was discharged in   a stable and satisfactory condition.  I went over his list of medications with   both him and his fiancee, and I have advised him about diet, fluid intake   activities and followup visit.      SB/ED  dd: 06/02/2018 19:31:27 (CDT)  td: 06/03/2018 00:55:44 (CDT)  Doc ID   #[id number]  Job ID #[id number]    CC:

## 2018-06-05 ENCOUNTER — OFFICE VISIT (OUTPATIENT)
Dept: CARDIOLOGY | Facility: CLINIC | Age: 68
End: 2018-06-05
Attending: INTERNAL MEDICINE
Payer: MEDICARE

## 2018-06-05 VITALS
SYSTOLIC BLOOD PRESSURE: 144 MMHG | HEART RATE: 71 BPM | BODY MASS INDEX: 35.92 KG/M2 | HEIGHT: 73 IN | WEIGHT: 271 LBS | DIASTOLIC BLOOD PRESSURE: 74 MMHG

## 2018-06-05 DIAGNOSIS — E11.9 NON-INSULIN DEPENDENT TYPE 2 DIABETES MELLITUS: Chronic | ICD-10-CM

## 2018-06-05 DIAGNOSIS — G47.33 OSA (OBSTRUCTIVE SLEEP APNEA): ICD-10-CM

## 2018-06-05 DIAGNOSIS — I10 ESSENTIAL HYPERTENSION: Chronic | ICD-10-CM

## 2018-06-05 DIAGNOSIS — M51.36 DDD (DEGENERATIVE DISC DISEASE), LUMBAR: ICD-10-CM

## 2018-06-05 DIAGNOSIS — I48.0 PAROXYSMAL ATRIAL FIBRILLATION: Primary | ICD-10-CM

## 2018-06-05 DIAGNOSIS — I25.110 CORONARY ARTERY DISEASE INVOLVING NATIVE CORONARY ARTERY OF NATIVE HEART WITH UNSTABLE ANGINA PECTORIS: ICD-10-CM

## 2018-06-05 DIAGNOSIS — M50.30 DDD (DEGENERATIVE DISC DISEASE), CERVICAL: ICD-10-CM

## 2018-06-05 DIAGNOSIS — E66.01 MORBID OBESITY: ICD-10-CM

## 2018-06-05 DIAGNOSIS — I48.0 PAF (PAROXYSMAL ATRIAL FIBRILLATION): ICD-10-CM

## 2018-06-05 DIAGNOSIS — R06.02 SHORTNESS OF BREATH: ICD-10-CM

## 2018-06-05 DIAGNOSIS — I63.9 CEREBROVASCULAR ACCIDENT (CVA), UNSPECIFIED MECHANISM: ICD-10-CM

## 2018-06-05 DIAGNOSIS — I48.92 ATRIAL FLUTTER, UNSPECIFIED TYPE: ICD-10-CM

## 2018-06-05 PROCEDURE — 99214 OFFICE O/P EST MOD 30 MIN: CPT | Mod: S$GLB,,, | Performed by: INTERNAL MEDICINE

## 2018-06-14 PROBLEM — G47.33 OSA (OBSTRUCTIVE SLEEP APNEA): Status: ACTIVE | Noted: 2018-06-14

## 2018-06-14 NOTE — PROGRESS NOTES
Subjective:    Patient ID:  Zev Castano is a 68 y.o. male     HPI  here for follow-up of coronary artery disease, paroxysmally atrial fibrillation, paroxysmally atrial flutter, essential hypertension, diastolic left ventricular dysfunction, diabetes mellitus, morbid obesity, previous cerebrovascular accidents, obstructive sleep apnea, degenerative joint disease of the lumbosacral spine and of the cervical spine.    When I get around a seem to do our right, I still get shortness of breath when I lie flat.  I am due to see Dr. Stokes and have a sleep study performed soon.  Discussed the findings on recent kidney biopsy.    Current Outpatient Prescriptions   Medication Sig    amLODIPine (NORVASC) 10 MG tablet Take 10 mg by mouth once daily.    apixaban 2.5 mg Tab Take 1 tablet (2.5 mg total) by mouth 2 (two) times daily.    aspirin 81 MG Chew Take 1 tablet (81 mg total) by mouth once daily.    atorvastatin (LIPITOR) 40 MG tablet TAKE 1 TABLET(40 MG) BY MOUTH EVERY DAY    carvedilol (COREG) 6.25 MG tablet Take 1 tablet (6.25 mg total) by mouth 2 (two) times daily.    CONTOUR NEXT STRIPS Strp Use one strip each time to check blood glucose daily as directed    dronedarone (MULTAQ) 400 mg Tab Take 1 tablet (400 mg total) by mouth 2 (two) times daily with meals.    hydrALAZINE (APRESOLINE) 50 MG tablet Take 1 tablet (50 mg total) by mouth every 8 (eight) hours.    irbesartan (AVAPRO) 300 MG tablet Take 1 tablet (300 mg total) by mouth every evening.    JANUMET 50-1,000 mg per tablet TAKE 1 TABLET BY MOUTH TWICE DAILY WITH MEALS    liraglutide 0.6 mg/0.1 mL, 18 mg/3 mL, subq PNIJ 0.6 mg/0.1 mL (18 mg/3 mL) PnIj Inject 1.2 mg into the skin once daily.    tiotropium (SPIRIVA) 18 mcg inhalation capsule Inhale 1 capsule (18 mcg total) into the lungs once daily. Controller     No current facility-administered medications for this visit.          Review of Systems   Constitution: Negative for chills, decreased  "appetite, fever, weight gain and weight loss.   HENT: Negative for congestion, hearing loss and sore throat.    Eyes: Negative for blurred vision, double vision and visual disturbance.   Cardiovascular: Negative for chest pain, claudication, dyspnea on exertion, leg swelling, palpitations and syncope.   Respiratory: Positive for shortness of breath and snoring. Negative for cough, hemoptysis, sputum production and wheezing.    Endocrine: Negative for cold intolerance and heat intolerance.   Hematologic/Lymphatic: Negative for bleeding problem. Does not bruise/bleed easily.   Skin: Negative for color change, dry skin, flushing and itching.   Musculoskeletal: Negative for back pain, joint pain and myalgias.   Gastrointestinal: Negative for abdominal pain, anorexia, constipation, diarrhea, dysphagia, nausea and vomiting.        No bleeding per rectum   Genitourinary: Negative for dysuria, flank pain, frequency, hematuria and nocturia.   Neurological: Negative for dizziness, headaches, light-headedness, loss of balance, seizures and tremors.   Psychiatric/Behavioral: Negative for altered mental status and depression.         Vitals:    06/05/18 1321   BP: (!) 144/74   Pulse: 71   Weight: 122.9 kg (271 lb)   Height: 6' 1" (1.854 m)     Objective:    Physical Exam   Constitutional: He is oriented to person, place, and time. He appears well-developed and well-nourished.   HENT:   Head: Normocephalic and atraumatic.   Right Ear: External ear normal.   Left Ear: External ear normal.   Nose: Nose normal.   Eyes: Conjunctivae and EOM are normal. Pupils are equal, round, and reactive to light. No scleral icterus.   Neck: Normal range of motion. Neck supple. No JVD present. No tracheal deviation present. No thyromegaly present.   Cardiovascular: Normal rate, regular rhythm and normal heart sounds.  Exam reveals no gallop and no friction rub.    No murmur heard.  Pulmonary/Chest: Effort normal and breath sounds normal. No " respiratory distress. He has no rales. He exhibits no tenderness.   Abdominal: Soft. Bowel sounds are normal. He exhibits no distension and no mass. There is no tenderness.   Musculoskeletal: Normal range of motion. He exhibits edema. He exhibits no tenderness.   Lymphadenopathy:     He has no cervical adenopathy.   Neurological: He is alert and oriented to person, place, and time. He has normal reflexes. No cranial nerve deficit. Coordination normal.   Skin: Skin is warm and dry. No rash noted.   Psychiatric: He has a normal mood and affect. His behavior is normal.         Assessment:           2. Coronary artery disease involving native coronary artery of native heart with unstable angina pectoris    3. PAF (paroxysmal atrial fibrillation)    4. Atrial flutter, unspecified type    5. Essential hypertension    6. Non-insulin dependent type 2 diabetes mellitus    7. Morbid obesity    8. Cerebrovascular accident (CVA), unspecified mechanism    9. DDD (degenerative disc disease), lumbar    10. DDD (degenerative disc disease), cervical    11. Shortness of breath    12. RENU (obstructive sleep apnea)    13     nephrotic syndrome, diabetic nephrosclerosis.     Plan:       Stable from a cardiovascular standpoint.  Await sleep study.

## 2018-06-19 ENCOUNTER — HOSPITAL ENCOUNTER (INPATIENT)
Facility: OTHER | Age: 68
LOS: 3 days | Discharge: HOME | DRG: 291 | End: 2018-06-22
Attending: EMERGENCY MEDICINE | Admitting: EMERGENCY MEDICINE
Payer: MEDICARE

## 2018-06-19 DIAGNOSIS — N04.9 NEPHROTIC SYNDROME: ICD-10-CM

## 2018-06-19 DIAGNOSIS — M47.22 OSTEOARTHRITIS OF SPINE WITH RADICULOPATHY, CERVICAL REGION: ICD-10-CM

## 2018-06-19 DIAGNOSIS — I10 ESSENTIAL HYPERTENSION: Chronic | ICD-10-CM

## 2018-06-19 DIAGNOSIS — E11.21 TYPE 2 DIABETES MELLITUS WITH DIABETIC NEPHROPATHY, WITHOUT LONG-TERM CURRENT USE OF INSULIN: ICD-10-CM

## 2018-06-19 DIAGNOSIS — R79.89 ELEVATED SERUM CREATININE: ICD-10-CM

## 2018-06-19 DIAGNOSIS — I48.0 PAF (PAROXYSMAL ATRIAL FIBRILLATION): ICD-10-CM

## 2018-06-19 DIAGNOSIS — R60.0 FLUID RETENTION IN LEGS: Primary | ICD-10-CM

## 2018-06-19 DIAGNOSIS — M70.61 TROCHANTERIC BURSITIS OF BOTH HIPS: ICD-10-CM

## 2018-06-19 DIAGNOSIS — R06.09 EXERTIONAL DYSPNEA: ICD-10-CM

## 2018-06-19 DIAGNOSIS — E66.9 RESTRICTIVE LUNG DISEASE SECONDARY TO OBESITY: ICD-10-CM

## 2018-06-19 DIAGNOSIS — M54.12 CERVICAL RADICULOPATHY: ICD-10-CM

## 2018-06-19 DIAGNOSIS — D63.1 ANEMIA IN STAGE 3 CHRONIC KIDNEY DISEASE: ICD-10-CM

## 2018-06-19 DIAGNOSIS — I50.9 CHF (CONGESTIVE HEART FAILURE): ICD-10-CM

## 2018-06-19 DIAGNOSIS — M47.12 OSTEOARTHRITIS OF CERVICAL SPINE WITH MYELOPATHY: ICD-10-CM

## 2018-06-19 DIAGNOSIS — E66.01 MORBID OBESITY: ICD-10-CM

## 2018-06-19 DIAGNOSIS — M75.51 BILATERAL SHOULDER BURSITIS: ICD-10-CM

## 2018-06-19 DIAGNOSIS — I50.30 DIASTOLIC HEART FAILURE: ICD-10-CM

## 2018-06-19 DIAGNOSIS — J98.4 RESTRICTIVE LUNG DISEASE SECONDARY TO OBESITY: ICD-10-CM

## 2018-06-19 DIAGNOSIS — M70.62 TROCHANTERIC BURSITIS OF BOTH HIPS: ICD-10-CM

## 2018-06-19 DIAGNOSIS — N18.30 ANEMIA IN STAGE 3 CHRONIC KIDNEY DISEASE: ICD-10-CM

## 2018-06-19 DIAGNOSIS — R06.02 SOB (SHORTNESS OF BREATH): ICD-10-CM

## 2018-06-19 DIAGNOSIS — R06.02 SHORTNESS OF BREATH: ICD-10-CM

## 2018-06-19 DIAGNOSIS — M75.52 BILATERAL SHOULDER BURSITIS: ICD-10-CM

## 2018-06-19 DIAGNOSIS — I50.33 HEART FAILURE, DIASTOLIC, ACUTE ON CHRONIC: ICD-10-CM

## 2018-06-19 DIAGNOSIS — M50.20 HERNIATED CERVICAL DISC: ICD-10-CM

## 2018-06-19 PROBLEM — R80.8 OTHER PROTEINURIA: Status: ACTIVE | Noted: 2018-06-19

## 2018-06-19 LAB
ANION GAP SERPL CALC-SCNC: 11 MMOL/L
BACTERIA #/AREA URNS HPF: NORMAL /HPF
BASOPHILS # BLD AUTO: 0.04 K/UL
BASOPHILS NFR BLD: 0.7 %
BILIRUB UR QL STRIP: NEGATIVE
BNP SERPL-MCNC: 129 PG/ML
BUN SERPL-MCNC: 35 MG/DL
CALCIUM SERPL-MCNC: 8.7 MG/DL
CHLORIDE SERPL-SCNC: 114 MMOL/L
CLARITY UR: CLEAR
CO2 SERPL-SCNC: 17 MMOL/L
COLOR UR: YELLOW
CREAT SERPL-MCNC: 1.7 MG/DL
DIFFERENTIAL METHOD: ABNORMAL
EOSINOPHIL # BLD AUTO: 0.5 K/UL
EOSINOPHIL NFR BLD: 7.8 %
ERYTHROCYTE [DISTWIDTH] IN BLOOD BY AUTOMATED COUNT: 15 %
EST. GFR  (AFRICAN AMERICAN): 47 ML/MIN/1.73 M^2
EST. GFR  (NON AFRICAN AMERICAN): 40 ML/MIN/1.73 M^2
GLUCOSE SERPL-MCNC: 130 MG/DL
GLUCOSE UR QL STRIP: NEGATIVE
HCT VFR BLD AUTO: 32.8 %
HGB BLD-MCNC: 10.7 G/DL
HGB UR QL STRIP: NEGATIVE
HYALINE CASTS #/AREA URNS LPF: 0 /LPF
INR PPP: 0.9
KETONES UR QL STRIP: NEGATIVE
LEUKOCYTE ESTERASE UR QL STRIP: NEGATIVE
LYMPHOCYTES # BLD AUTO: 1 K/UL
LYMPHOCYTES NFR BLD: 16.7 %
MCH RBC QN AUTO: 29.5 PG
MCHC RBC AUTO-ENTMCNC: 32.6 G/DL
MCV RBC AUTO: 90 FL
MICROSCOPIC COMMENT: NORMAL
MONOCYTES # BLD AUTO: 0.5 K/UL
MONOCYTES NFR BLD: 8.2 %
NEUTROPHILS # BLD AUTO: 3.8 K/UL
NEUTROPHILS NFR BLD: 66.1 %
NITRITE UR QL STRIP: NEGATIVE
PH UR STRIP: 5 [PH] (ref 5–8)
PLATELET # BLD AUTO: 211 K/UL
PMV BLD AUTO: 10.4 FL
POCT GLUCOSE: 126 MG/DL (ref 70–110)
POCT GLUCOSE: 147 MG/DL (ref 70–110)
POTASSIUM SERPL-SCNC: 4.8 MMOL/L
PROT UR QL STRIP: ABNORMAL
PROTHROMBIN TIME: 10.6 SEC
RBC # BLD AUTO: 3.63 M/UL
RBC #/AREA URNS HPF: 0 /HPF (ref 0–4)
SODIUM SERPL-SCNC: 142 MMOL/L
SP GR UR STRIP: >=1.03 (ref 1–1.03)
TROPONIN I SERPL DL<=0.01 NG/ML-MCNC: 0.01 NG/ML
URN SPEC COLLECT METH UR: ABNORMAL
UROBILINOGEN UR STRIP-ACNC: NEGATIVE EU/DL
WBC # BLD AUTO: 5.74 K/UL
WBC #/AREA URNS HPF: 0 /HPF (ref 0–5)
WBC CLUMPS URNS QL MICRO: NORMAL

## 2018-06-19 PROCEDURE — 94761 N-INVAS EAR/PLS OXIMETRY MLT: CPT

## 2018-06-19 PROCEDURE — A4216 STERILE WATER/SALINE, 10 ML: HCPCS | Performed by: HOSPITALIST

## 2018-06-19 PROCEDURE — 93005 ELECTROCARDIOGRAM TRACING: CPT

## 2018-06-19 PROCEDURE — 25000003 PHARM REV CODE 250: Performed by: HOSPITALIST

## 2018-06-19 PROCEDURE — 80048 BASIC METABOLIC PNL TOTAL CA: CPT

## 2018-06-19 PROCEDURE — 84484 ASSAY OF TROPONIN QUANT: CPT

## 2018-06-19 PROCEDURE — 85025 COMPLETE CBC W/AUTO DIFF WBC: CPT

## 2018-06-19 PROCEDURE — 83880 ASSAY OF NATRIURETIC PEPTIDE: CPT

## 2018-06-19 PROCEDURE — 63600175 PHARM REV CODE 636 W HCPCS: Performed by: HOSPITALIST

## 2018-06-19 PROCEDURE — 81000 URINALYSIS NONAUTO W/SCOPE: CPT

## 2018-06-19 PROCEDURE — 85610 PROTHROMBIN TIME: CPT

## 2018-06-19 PROCEDURE — 93010 ELECTROCARDIOGRAM REPORT: CPT | Mod: ,,, | Performed by: INTERNAL MEDICINE

## 2018-06-19 PROCEDURE — 11000001 HC ACUTE MED/SURG PRIVATE ROOM

## 2018-06-19 PROCEDURE — 99220 PR INITIAL OBSERVATION CARE,LEVL III: CPT | Mod: ,,, | Performed by: HOSPITALIST

## 2018-06-19 PROCEDURE — 99284 EMERGENCY DEPT VISIT MOD MDM: CPT

## 2018-06-19 RX ORDER — HYDRALAZINE HYDROCHLORIDE 25 MG/1
50 TABLET, FILM COATED ORAL EVERY 8 HOURS
Status: DISCONTINUED | OUTPATIENT
Start: 2018-06-19 | End: 2018-06-21

## 2018-06-19 RX ORDER — FUROSEMIDE 10 MG/ML
40 INJECTION INTRAMUSCULAR; INTRAVENOUS 3 TIMES DAILY
Status: DISCONTINUED | OUTPATIENT
Start: 2018-06-19 | End: 2018-06-20

## 2018-06-19 RX ORDER — RAMELTEON 8 MG/1
8 TABLET ORAL NIGHTLY PRN
Status: DISCONTINUED | OUTPATIENT
Start: 2018-06-19 | End: 2018-06-22 | Stop reason: HOSPADM

## 2018-06-19 RX ORDER — POLYETHYLENE GLYCOL 3350 17 G/17G
17 POWDER, FOR SOLUTION ORAL 2 TIMES DAILY PRN
Status: DISCONTINUED | OUTPATIENT
Start: 2018-06-19 | End: 2018-06-22 | Stop reason: HOSPADM

## 2018-06-19 RX ORDER — IBUPROFEN 200 MG
24 TABLET ORAL
Status: DISCONTINUED | OUTPATIENT
Start: 2018-06-19 | End: 2018-06-22 | Stop reason: HOSPADM

## 2018-06-19 RX ORDER — ATORVASTATIN CALCIUM 20 MG/1
40 TABLET, FILM COATED ORAL DAILY
Status: DISCONTINUED | OUTPATIENT
Start: 2018-06-20 | End: 2018-06-22 | Stop reason: HOSPADM

## 2018-06-19 RX ORDER — IBUPROFEN 200 MG
16 TABLET ORAL
Status: DISCONTINUED | OUTPATIENT
Start: 2018-06-19 | End: 2018-06-22 | Stop reason: HOSPADM

## 2018-06-19 RX ORDER — METOCLOPRAMIDE HYDROCHLORIDE 5 MG/5ML
10 SOLUTION ORAL
Status: DISCONTINUED | OUTPATIENT
Start: 2018-06-19 | End: 2018-06-20

## 2018-06-19 RX ORDER — INSULIN ASPART 100 [IU]/ML
0-5 INJECTION, SOLUTION INTRAVENOUS; SUBCUTANEOUS
Status: DISCONTINUED | OUTPATIENT
Start: 2018-06-19 | End: 2018-06-22 | Stop reason: HOSPADM

## 2018-06-19 RX ORDER — ACETAMINOPHEN 325 MG/1
650 TABLET ORAL EVERY 4 HOURS PRN
Status: DISCONTINUED | OUTPATIENT
Start: 2018-06-19 | End: 2018-06-22 | Stop reason: HOSPADM

## 2018-06-19 RX ORDER — SODIUM CHLORIDE 0.9 % (FLUSH) 0.9 %
3 SYRINGE (ML) INJECTION EVERY 8 HOURS
Status: DISCONTINUED | OUTPATIENT
Start: 2018-06-19 | End: 2018-06-22 | Stop reason: HOSPADM

## 2018-06-19 RX ORDER — CARVEDILOL 6.25 MG/1
6.25 TABLET ORAL 2 TIMES DAILY
Status: DISCONTINUED | OUTPATIENT
Start: 2018-06-19 | End: 2018-06-22 | Stop reason: HOSPADM

## 2018-06-19 RX ORDER — GLUCAGON 1 MG
1 KIT INJECTION
Status: DISCONTINUED | OUTPATIENT
Start: 2018-06-19 | End: 2018-06-22 | Stop reason: HOSPADM

## 2018-06-19 RX ORDER — NAPROXEN SODIUM 220 MG/1
81 TABLET, FILM COATED ORAL DAILY
Status: DISCONTINUED | OUTPATIENT
Start: 2018-06-20 | End: 2018-06-20

## 2018-06-19 RX ORDER — SPIRONOLACTONE 25 MG/1
25 TABLET ORAL DAILY
Status: DISCONTINUED | OUTPATIENT
Start: 2018-06-20 | End: 2018-06-20

## 2018-06-19 RX ADMIN — FUROSEMIDE 40 MG: 10 INJECTION, SOLUTION INTRAVENOUS at 08:06

## 2018-06-19 RX ADMIN — DRONEDARONE 400 MG: 400 TABLET, FILM COATED ORAL at 05:06

## 2018-06-19 RX ADMIN — HYDRALAZINE HYDROCHLORIDE 50 MG: 25 TABLET, FILM COATED ORAL at 09:06

## 2018-06-19 RX ADMIN — APIXABAN 2.5 MG: 2.5 TABLET, FILM COATED ORAL at 08:06

## 2018-06-19 RX ADMIN — SODIUM CHLORIDE, PRESERVATIVE FREE 3 ML: 5 INJECTION INTRAVENOUS at 09:06

## 2018-06-19 RX ADMIN — CARVEDILOL 6.25 MG: 6.25 TABLET, FILM COATED ORAL at 08:06

## 2018-06-19 NOTE — ED PROVIDER NOTES
"Encounter Date: 6/19/2018    SCRIBE #1 NOTE: I, Garrisondayron Chacko, am scribing for, and in the presence of, Dr. Pérez.       History     Chief Complaint   Patient presents with    Shortness of Breath     + increased SOB and WOB increasing x 8 weeks. " I have so much fluid on me I can't even lay flat. I need to sit up all the time". Denies chest pains     Time seen by provider: 12:52 PM    This is a 68 y.o. male with hx of HTN, HLD, type II diabetes mellitus, A-Fib, CAD with stent, and CKD who presents with complaint of SOB x 8 weeks. It is exacerbated with laying flat. There is associated increasing leg swelling and abdominal distension (40" waist at baseline to 50") in the past two weeks. Per significant other, pt also had blood in the toilet. Pt has had multiple ED visits and admissions for SOB in the past 4 months; with last admission (5/22 - 5/30), pt's lasix was discontinued. He was on lasix for possible CHF, which was ruled out. He denies any fever, chills, fatigue, N/V/D, abdominal pain, flank pain, urinary sx, cough, dizziness, and lightheadedness. Pt has been evaluated by cardiology and pulmonology; he has appointment with GI tomorrow and is following up with nephrology.       The history is provided by the patient and a significant other.     Review of patient's allergies indicates:   Allergen Reactions    Doxycycline Rash     Past Medical History:   Diagnosis Date    CKD (chronic kidney disease), stage III     Coronary artery disease     Diabetes mellitus type II     Hyperlipidemia     Hypertension     Nephrotic range proteinuria     Paroxysmal atrial fibrillation     Stroke     nov 2012     Past Surgical History:   Procedure Laterality Date    BIOPSY N/A 5/29/2018    Procedure: BIOPSY;  Surgeon: Neeta Diagnostic Provider;  Location: [facility] CATH LAB;  Service: Radiology;  Laterality: N/A;  ct guided      KNEE SURGERY Left     SHOULDER SURGERY Right      Family History   Problem Relation Age of " Onset    Hypertension Mother      Social History   Substance Use Topics    Smoking status: Former Smoker     Types: Cigars    Smokeless tobacco: Never Used      Comment: occasional, when at the [employer]    Alcohol use 0.6 oz/week     1 Cans of beer per week      Comment: occasional when at the [employer]     Review of Systems   Constitutional: Negative for chills, fatigue and fever.   HENT: Negative for congestion, rhinorrhea and sore throat.    Respiratory: Positive for shortness of breath. Negative for cough and chest tightness.    Cardiovascular: Positive for leg swelling. Negative for chest pain and palpitations.   Gastrointestinal: Positive for abdominal distention and anal bleeding. Negative for abdominal pain, diarrhea, nausea and vomiting.   Endocrine: Negative for polyuria.   Genitourinary: Negative for decreased urine volume, difficulty urinating, dysuria, frequency, hematuria and urgency.   Musculoskeletal: Negative for back pain.   Skin: Negative for rash.   Allergic/Immunologic: Negative for immunocompromised state.   Neurological: Negative for dizziness, syncope, weakness, light-headedness, numbness and headaches.   Hematological: Does not bruise/bleed easily.   Psychiatric/Behavioral: Negative for confusion.       Physical Exam     Initial Vitals [06/19/18 1139]   BP Pulse Resp Temp SpO2   136/65 (!) 56 (!) 22 97.8 °F (36.6 °C) 97 %      MAP       --         Physical Exam    Nursing note and vitals reviewed.  Constitutional: He appears well-developed and well-nourished. He is not diaphoretic. No distress.   HENT:   Head: Normocephalic and atraumatic.   Right Ear: External ear normal.   Left Ear: External ear normal.   Eyes: Right eye exhibits no discharge. Left eye exhibits no discharge.   Neck: Normal range of motion. Neck supple.   Cardiovascular: Normal rate, regular rhythm, normal heart sounds and intact distal pulses. Exam reveals no gallop and no friction rub.    No murmur heard.  Pulmonary/Chest:  No respiratory distress. He has no wheezes. He has no rhonchi. He has rales (bibasilar). He exhibits no tenderness.   Abdominal: Soft. Bowel sounds are normal. He exhibits no distension. There is no tenderness. There is no rebound and no guarding.   Musculoskeletal: Normal range of motion. He exhibits no edema.   1+ pitting edema, ankles to thighs, to BLE.   Lymphadenopathy:     He has no cervical adenopathy.   Neurological: He is alert and oriented to person, place, and time. He has normal strength. No sensory deficit.   Skin: Skin is warm and dry. No rash noted. No erythema.   Psychiatric: He has a normal mood and affect. His behavior is normal. Judgment and thought content normal.         ED Course   Procedures  Labs Reviewed   CBC W/ AUTO DIFFERENTIAL - Abnormal; Notable for the following:        Result Value    RBC 3.63 (*)     Hemoglobin 10.7 (*)     Hematocrit 32.8 (*)     RDW 15.0 (*)     Lymph% 16.7 (*)     All other components within normal limits   B-TYPE NATRIURETIC PEPTIDE - Abnormal; Notable for the following:      (*)     All other components within normal limits   BASIC METABOLIC PANEL - Abnormal; Notable for the following:     Chloride 114 (*)     CO2 17 (*)     Glucose 130 (*)     BUN, Bld 35 (*)     Creatinine 1.7 (*)     eGFR if  47 (*)     eGFR if non  40 (*)     All other components within normal limits   TROPONIN I   PROTIME-INR     EKG Readings: (Independently Interpreted)   SB at a rate of 57. No STEMI. Nonspecific intraventricular conduction delay. Compared to EKG performed on 5/23/18, bradycardia is new, intraventricular delay is old.        Imaging Results          X-Ray Chest AP Portable (Final result)  Result time 06/19/18 12:43:34    Final result by Felipe Reyes MD (06/19/18 12:43:34)                 Impression:      Mild increased density within the lung bases likely due to mild bibasilar atelectatic changes.    Overall, there is little  interval change when compared to 05/22/2018.      Electronically signed by: Felipe Reyes MD  Date:    06/19/2018  Time:    12:43             Narrative:    EXAMINATION:  XR CHEST AP PORTABLE    CLINICAL HISTORY:  CHF;    TECHNIQUE:  Single frontal view of the chest was performed.    COMPARISON:  05/22/2018.    FINDINGS:  The heart and mediastinal structures appear unchanged.  There is a poor depth of inspiration accentuating the pulmonary vascular markings.  There is mild increased density within the lung bases likely due to mild bibasilar atelectasis.  There is no evidence for pneumothorax or large pleural effusions.  Bony structures appear intact.  Overall, findings appear similar to the prior examination.                              X-Rays:   Independently Interpreted Readings:   Chest X-Ray: No effusions or opacities.     Medical Decision Making:   Independently Interpreted Test(s):   I have ordered and independently interpreted X-rays - see prior notes.  I have ordered and independently interpreted EKG Reading(s) - see prior notes  Clinical Tests:   Lab Tests: Ordered and Reviewed  Radiological Study: Ordered and Reviewed  Medical Tests: Ordered and Reviewed  ED Management:  Patient presents complaining of excessive fluid.  Does have a recent admission for similar complaints.  I reviewed his chart.  On his last admission he was found to be fluid overloaded, but it was thought not to be related to heart failure, in fact his Lasix was stopped because there was concern for some sort of kidney injury, possibly nephrotic syndrome.  His creatinine improved off the Lasix during his last hospitalization and he was discharged.  He does have pending renal biopsy this time.  He reports that he has filled up with fluid again.  He is short of breath when lying flat, orthopnea.  X-ray does demonstrate pulmonary edema no effusions.  BNP is not elevated giving credence to the supposition this is not heart failure.  There is  no respiratory distress here.  His legs are very large with edema. His kidney function is declining again.  He has also dropped 3 points on his hemoglobin.  His Hemoccult is negative.  Unclear the etiology of this anemia.  Could be related to his unknown nephrotic syndrome.  Discussed with Cardiology who recommends admission to the hospital service and renal and cardiology consultation.      1:01 PM - Case discussed with Dr. Crisostomo, who believes this is more a renal issue than cardiac.  1:09 PM - Discussed pt with Dr. Thomas. Will admit pt to Dr. Aaron.  Other:   I have discussed this case with another health care provider.            Scribe Attestation:   Scribe #1: I performed the above scribed service and the documentation accurately describes the services I performed. I attest to the accuracy of the note.    Attending Attestation:           Physician Attestation for Scribe:  Physician Attestation Statement for Scribe #1: I, Dr. Pérez, reviewed documentation, as scribed by Peyton Chacko in my presence, and it is both accurate and complete.                    Clinical Impression:     1. Fluid retention in legs    2. SOB (shortness of breath)    3. Shortness of breath    4. Elevated serum creatinine    5. Diastolic heart failure                                 Noel Pérez MD  06/19/18 7937

## 2018-06-19 NOTE — SUBJECTIVE & OBJECTIVE
Past Medical History:   Diagnosis Date    CKD (chronic kidney disease), stage III     Coronary artery disease     Diabetes mellitus type II     Hyperlipidemia     Hypertension     Nephrotic range proteinuria     Paroxysmal atrial fibrillation     Stroke     nov 2012       Past Surgical History:   Procedure Laterality Date    BIOPSY N/A 5/29/2018    Procedure: BIOPSY;  Surgeon: Neeta Diagnostic Provider;  Location: [facility] CATH LAB;  Service: Radiology;  Laterality: N/A;  ct guided      KNEE SURGERY Left     SHOULDER SURGERY Right        Review of patient's allergies indicates:   Allergen Reactions    Doxycycline Rash       No current facility-administered medications on file prior to encounter.      Current Outpatient Prescriptions on File Prior to Encounter   Medication Sig    amLODIPine (NORVASC) 10 MG tablet Take 10 mg by mouth once daily.    apixaban 2.5 mg Tab Take 1 tablet (2.5 mg total) by mouth 2 (two) times daily.    aspirin 81 MG Chew Take 1 tablet (81 mg total) by mouth once daily.    atorvastatin (LIPITOR) 40 MG tablet TAKE 1 TABLET(40 MG) BY MOUTH EVERY DAY    carvedilol (COREG) 6.25 MG tablet Take 1 tablet (6.25 mg total) by mouth 2 (two) times daily.    dronedarone (MULTAQ) 400 mg Tab Take 1 tablet (400 mg total) by mouth 2 (two) times daily with meals.    hydrALAZINE (APRESOLINE) 50 MG tablet Take 1 tablet (50 mg total) by mouth every 8 (eight) hours.    irbesartan (AVAPRO) 300 MG tablet Take 1 tablet (300 mg total) by mouth every evening.    liraglutide 0.6 mg/0.1 mL, 18 mg/3 mL, subq PNIJ 0.6 mg/0.1 mL (18 mg/3 mL) PnIj Inject 1.2 mg into the skin once daily.    tiotropium (SPIRIVA) 18 mcg inhalation capsule Inhale 1 capsule (18 mcg total) into the lungs once daily. Controller    CONTOUR NEXT STRIPS Strp Use one strip each time to check blood glucose daily as directed    JANUMET 50-1,000 mg per tablet TAKE 1 TABLET BY MOUTH TWICE DAILY WITH MEALS     Family History     Problem  Relation (Age of Onset)    Esophageal cancer Brother    Heart attack Father (59)    Hypertension Mother    Stroke Sister        Social History Main Topics    Smoking status: Former Smoker     Types: Cigars    Smokeless tobacco: Never Used      Comment[place]    Alcohol use 0.6 oz/week     1 Cans of beer per week      Comment: [place] [employer]    Drug use: No    Sexual activity: Yes     Partners: Female     Birth control/ protection: None     Review of Systems   Constitutional: Positive for appetite change and fatigue. Negative for chills and fever.   HENT: Negative for rhinorrhea and sore throat.    Eyes: Negative for photophobia and visual disturbance.   Respiratory: Positive for chest tightness, shortness of breath and wheezing. Negative for cough.    Cardiovascular: Positive for leg swelling. Negative for chest pain and palpitations.   Gastrointestinal: Negative for diarrhea and nausea.   Genitourinary: Negative for dysuria and frequency.   Musculoskeletal: Negative for back pain and gait problem.   Neurological: Negative for weakness and headaches.   Psychiatric/Behavioral: Negative for confusion and dysphoric mood.     Objective:     Vital Signs (Most Recent):  Temp: 96.2 °F (35.7 °C) (06/19/18 1524)  Pulse: 61 (06/19/18 1524)  Resp: (!) 24 (06/19/18 1524)  BP: (!) 176/83 (06/19/18 1524)  SpO2: (!) 92 % (06/19/18 1524) Vital Signs (24h Range):  Temp:  [96.2 °F (35.7 °C)-97.8 °F (36.6 °C)] 96.2 °F (35.7 °C)  Pulse:  [54-61] 61  Resp:  [19-28] 24  SpO2:  [91 %-98 %] 92 %  BP: (136-178)/(65-83) 176/83     Weight: 115.7 kg (255 lb)  Body mass index is 33.64 kg/m².    Physical Exam   Constitutional: He is oriented to person, place, and time. He appears well-developed. He appears distressed.   Overweight.   HENT:   Head: Normocephalic.   Eyes: Conjunctivae are normal. Pupils are equal, round, and reactive to light.   Neck: Neck supple. No thyromegaly present.    Cardiovascular: Regular rhythm and intact distal pulses.  Exam reveals gallop. Exam reveals no friction rub.    Murmur heard.  HR 55, S1 S2.   Pulmonary/Chest: He is in respiratory distress. He has rales.   Fair effort, markedly decreased breath sounds bilaterally.   Abdominal: Soft. Bowel sounds are normal. He exhibits distension. There is no tenderness.   Musculoskeletal: Normal range of motion. He exhibits edema.   Bilateral pitting edema to thighs.   Lymphadenopathy:     He has no cervical adenopathy.   Neurological: He is alert and oriented to person, place, and time.   Strength equal and symmetric   Skin: Skin is warm and dry. No rash noted. There is erythema.   Multiple weeping lesions with clear fluid seeping.         CRANIAL NERVES     CN III, IV, VI   Pupils are equal, round, and reactive to light.       Significant Labs:   CBC:   Recent Labs  Lab 06/19/18  1156   WBC 5.74   HGB 10.7*   HCT 32.8*        CMP:   Recent Labs  Lab 06/19/18  1156      K 4.8   *   CO2 17*   *   BUN 35*   CREATININE 1.7*   CALCIUM 8.7   ANIONGAP 11   EGFRNONAA 40*       Significant Imaging: I have reviewed all pertinent imaging results/findings within the past 24 hours.

## 2018-06-19 NOTE — HPI
Mr. Castano is a 68 year old man who presented with dyspnea and leg swelling that have been bothering him for 2 months.  He also has associated abdominal swelling and orthopnea.  Shortness of breath is much worse with lying down.  He cannot walk more than a few feet and gets short-winded just holding a conversation.  CXR showed bibasilar atelectasis and BNP was 129.  Other lab work included BUN/creatinine 35/1.7, bicarb 17, H/H 10.7/32.8.    Patient was discharged from here earlier this month after a 10 day hospital stay for dyspnea that seemed to be multifactorial, with contributors of diastolic heart failure, bradycardia, nephrotic syndrome and restrictive lung disease.  He was diuresed during his hospital stay but the lasix was discontinued on discharge.  He had a kidney biopsy that showed diabetic nephropathy.  His medical history includes type II diabetes, paroxysmal atrial fibrillation and CKD III.  He has had 3 strokes with resulting LLE weakness.  He had CAD and a stent.  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  His physician is Dr. Livingston.

## 2018-06-19 NOTE — ASSESSMENT & PLAN NOTE
- Chart shows history of nephrotic syndrome, but I am not seeing nephrotic range proteinuria and his last albumin level was 3.2.  - Renal biopsy showed diabetic nephropathy.    - Consult nephrology to follow.

## 2018-06-19 NOTE — ED NOTES
Dr. Aaron at bedside speaking with pt. Pt lying in bed with HOB at 30 degrees, tachypneic at 26 bpm, SpO2 90-92% on RA. Pt repositioned by RN x 2, placed in high fowlers, O2 via NC at 2L/min applied for comfort. Will reassess.

## 2018-06-19 NOTE — H&P
"[facility] Medicine  History & Physical    Patient Name: Zev Castano  MRN: [id number]  Admission Date: 6/19/2018  Attending Physician: Kayleen Aaron MD   Primary Care Provider: Mehul Livingston MD         Patient information was obtained from patient, spouse/SO, past medical records and ER records.     Subjective:     Principal Problem:Heart failure, diastolic, acute on chronic    Chief Complaint:   Chief Complaint   Patient presents with    Shortness of Breath     + increased SOB and WOB increasing x 8 weeks. " I have so much fluid on me I can't even lay flat. I need to sit up all the time". Denies chest pains        HPI: Mr. Castano is a 68 year old man who presented with dyspnea and leg swelling that have been bothering him for 2 months.  He also has associated abdominal swelling and orthopnea.  Shortness of breath is much worse with lying down.  He cannot walk more than a few feet and gets short-winded just holding a conversation.  CXR showed bibasilar atelectasis and BNP was 129.  Other lab work included BUN/creatinine 35/1.7, bicarb 17, BUN/creatinine 10.7/32.8.    Patient was discharged from here earlier this month after a 10 day hospital stay for dyspnea that seemed to be multifactorial, with contributors of diastolic heart failure, bradycardia, nephrotic syndrome and restrictive lung disease.  He was diuresed during his hospital stay but the lasix was discontinued on discharge.  He had a kidney biopsy that showed diabetic nephropathy.  His medical history includes type II diabetes, paroxysmal atrial fibrillation and CKD III.  He has had 3 strokes with resulting LLE weakness.  He had CAD and a stent.  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  His physician is Dr. Livingston.    Past Medical History:   Diagnosis Date    CKD (chronic kidney disease), stage III     Coronary artery disease     Diabetes mellitus type II     Hyperlipidemia "     Hypertension     Nephrotic range proteinuria     Paroxysmal atrial fibrillation     Stroke     nov 2012       Past Surgical History:   Procedure Laterality Date    BIOPSY N/A 5/29/2018    Procedure: BIOPSY;  Surgeon: Neeta Diagnostic Provider;  [place] [facility], [place] by [facility] CATH LAB;  Service: Radiology;  Laterality: N/A;  ct guided      KNEE SURGERY Left     SHOULDER SURGERY Right        Review of patient's allergies indicates:   Allergen Reactions    Doxycycline Rash       No current facility-administered medications on file prior to encounter.      Current Outpatient Prescriptions on File Prior to Encounter   Medication Sig    amLODIPine (NORVASC) 10 MG tablet Take 10 mg by mouth once daily.    apixaban 2.5 mg Tab Take 1 tablet (2.5 mg total) by mouth 2 (two) times daily.    aspirin 81 MG Chew Take 1 tablet (81 mg total) by mouth once daily.    atorvastatin (LIPITOR) 40 MG tablet TAKE 1 TABLET(40 MG) BY MOUTH EVERY DAY    carvedilol (COREG) 6.25 MG tablet Take 1 tablet (6.25 mg total) by mouth 2 (two) times daily.    dronedarone (MULTAQ) 400 mg Tab Take 1 tablet (400 mg total) by mouth 2 (two) times daily with meals.    hydrALAZINE (APRESOLINE) 50 MG tablet Take 1 tablet (50 mg total) by mouth every 8 (eight) hours.    irbesartan (AVAPRO) 300 MG tablet Take 1 tablet (300 mg total) by mouth every evening.    liraglutide 0.6 mg/0.1 mL, 18 mg/3 mL, subq PNIJ 0.6 mg/0.1 mL (18 mg/3 mL) PnIj Inject 1.2 mg into the skin once daily.    tiotropium (SPIRIVA) 18 mcg inhalation capsule Inhale 1 capsule (18 mcg total) into the lungs once daily. Controller    CONTOUR NEXT STRIPS Strp Use one strip each time to check blood glucose daily as directed    JANUMET 50-1,000 mg per tablet TAKE 1 TABLET BY MOUTH TWICE DAILY WITH MEALS     Family History     Problem Relation (Age of Onset)    Esophageal cancer Brother    Heart attack Father (59)    Hypertension Mother    Stroke Sister        Social History Main Topics     Smoking status: Former Smoker     Types: Cigars    Smokeless tobacco: Never Used      Comment: occasional, when at the [employer]    Alcohol use 0.6 oz/week     1 Cans of beer per week      Comment: occasional when at the [employer]    Drug use: No    Sexual activity: Yes     Partners: Female     Birth control/ protection: None     Review of Systems   Constitutional: Positive for appetite change and fatigue. Negative for chills and fever.   HENT: Negative for rhinorrhea and sore throat.    Eyes: Negative for photophobia and visual disturbance.   Respiratory: Positive for chest tightness, shortness of breath and wheezing. Negative for cough.    Cardiovascular: Positive for leg swelling. Negative for chest pain and palpitations.   Gastrointestinal: Negative for diarrhea and nausea.   Genitourinary: Negative for dysuria and frequency.   Musculoskeletal: Negative for back pain and gait problem.   Neurological: Negative for weakness and headaches.   Psychiatric/Behavioral: Negative for confusion and dysphoric mood.     Objective:     Vital Signs (Most Recent):  Temp: 96.2 °F (35.7 °C) (06/19/18 1524)  Pulse: 61 (06/19/18 1524)  Resp: (!) 24 (06/19/18 1524)  BP: (!) 176/83 (06/19/18 1524)  SpO2: (!) 92 % (06/19/18 1524) Vital Signs (24h Range):  Temp:  [96.2 °F (35.7 °C)-97.8 °F (36.6 °C)] 96.2 °F (35.7 °C)  Pulse:  [54-61] 61  Resp:  [19-28] 24  SpO2:  [91 %-98 %] 92 %  BP: (136-178)/(65-83) 176/83     Weight: 115.7 kg (255 lb)  Body mass index is 33.64 kg/m².    Physical Exam   Constitutional: He is oriented to person, place, and time. He appears well-developed. He appears distressed.   Overweight.   HENT:   Head: Normocephalic.   Eyes: Conjunctivae are normal. Pupils are equal, round, and reactive to light.   Neck: Neck supple. No thyromegaly present.   Cardiovascular: Regular rhythm and intact distal pulses.  Exam reveals gallop. Exam reveals no friction rub.    Murmur heard.  HR 55, S1 S2.   Pulmonary/Chest: He is in  respiratory distress. He has rales.   Fair effort, markedly decreased breath sounds bilaterally.   Abdominal: Soft. Bowel sounds are normal. He exhibits distension. There is no tenderness.   Musculoskeletal: Normal range of motion. He exhibits edema.   Bilateral pitting edema to thighs.   Lymphadenopathy:     He has no cervical adenopathy.   Neurological: He is alert and oriented to person, place, and time.   Strength equal and symmetric   Skin: Skin is warm and dry. No rash noted. There is erythema.   Multiple weeping lesions with clear fluid seeping.         CRANIAL NERVES     CN III, IV, VI   Pupils are equal, round, and reactive to light.       Significant Labs:   CBC:   Recent Labs  Lab 06/19/18  1156   WBC 5.74   HGB 10.7*   HCT 32.8*        CMP:   Recent Labs  Lab 06/19/18  1156      K 4.8   *   CO2 17*   *   BUN 35*   CREATININE 1.7*   CALCIUM 8.7   ANIONGAP 11   EGFRNONAA 40*       Significant Imaging: I have reviewed all pertinent imaging results/findings within the past 24 hours.    Assessment/Plan:     * Heart failure, diastolic, acute on chronic    - Patient with classic heart failure symptoms including dyspnea with exertion, severe orthopnea and dramatic lower extremity edema.  - Other factors - mild bradycardia with HR in the 50's on Multaq he is taking for atrial fibrillation.  - Reportedly also has restrictive lung disease.  Would get CT of chest but he is unable to lie flat.  - Exam out of proportion to labs and 2D echo showing elevated EF and probable diastolic dysfunction.  PA pressure normal as well.  Will repeat echo.  - Typically he feels better with diuresis so will start IV Lasix.          Other proteinuria    - Chart shows history of nephrotic syndrome, but I am not seeing nephrotic range proteinuria and his last albumin level was 3.2.  - Renal biopsy showed diabetic nephropathy.    - Consult nephrology to follow.            Type 2 diabetes mellitus with diabetic  "nephropathy, without long-term current use of insulin    - As above  - He is on metformin, Januvia and Victoza - held  - Hold Avapro for now given CKD - will probably need to resume but will await input from nephrology.  - SSI for now        Anemia in stage 3 chronic kidney disease    - Monitor  - No need for transfusion at present          Fluid retention in legs    - Severe edema.  Diurese.  Hold calcium channel blocker just to see if that helps.          PAF (paroxysmal atrial fibrillation)    - NSR with mild bradycardia on beta blocker and Multaq.          Essential hypertension    - He reports BP usually 180/80 ("That's normal for me.").  Compliant with home medications.  - Wide pulse pressure - ?consistent with high output heart failure.            VTE Risk Mitigation         Ordered     apixaban tablet 2.5 mg  2 times daily      06/19/18 5637             Kayleen Sylvester MD  Department of Hospital Medicine   [facility]  "

## 2018-06-19 NOTE — ASSESSMENT & PLAN NOTE
- Patient with classic heart failure symptoms including dyspnea with exertion, severe orthopnea and dramatic lower extremity edema.  - Other factors - mild bradycardia with HR in the 50's on Multaq he is taking for atrial fibrillation.  - Reportedly also has restrictive lung disease.  Would get CT of chest but he is unable to lie flat.  - Exam out of proportion to labs and 2D echo showing elevated EF and probable diastolic dysfunction.  PA pressure normal as well.  Will repeat echo.  - Typically he feels better with diuresis so will start IV Lasix.

## 2018-06-19 NOTE — ASSESSMENT & PLAN NOTE
- As above  - He is on metformin, Januvia and Victoza - held  - Hold Avapro for now given CKD - will probably need to resume but will await input from nephrology.  - SSI for now

## 2018-06-19 NOTE — PLAN OF CARE
Problem: Patient Care Overview  Goal: Plan of Care Review  Outcome: Ongoing (interventions implemented as appropriate)  Plan of care reviewed with patient and significant other, VSS. Telemetry monitor applied.  No complaints of pain this shift.  Patient remains SOB.  Patient ambulates independently, repositions self.  Urinal at bedside, need for urinalysis reinforced.  Bed lowered and locked, call bell within reach.  No needs at this time.  Will continue to monitor.

## 2018-06-19 NOTE — ED TRIAGE NOTES
"Pt reports to ED c/o SOB x 8 weeks worsening today with swelling to lower extremities and abd. Pt instructed to come to ED by Dr. Ortiz, pt's nephrologist. Pt reports Dr. Livingston ruled out fluid buildup related to heart. Pt denies chest pain. Pt unable to ly supine while sleep, "feels like i'm smothering". Pt able to speak a few words at a time. Denies fevers, chills, N/V/D, cough, dysuria/hematuria. Pt placed on cardiac monitor, BP cycling and cont pulse ox. Bed in lowest, locked position, side rails up x 2. Call light within reach. GF at bedside.   "

## 2018-06-20 PROBLEM — E66.9 RESTRICTIVE LUNG DISEASE SECONDARY TO OBESITY: Status: ACTIVE | Noted: 2018-06-20

## 2018-06-20 PROBLEM — J98.6 ELEVATED DIAPHRAGM: Status: ACTIVE | Noted: 2018-06-20

## 2018-06-20 PROBLEM — R06.01 ORTHOPNEA: Status: ACTIVE | Noted: 2018-06-20

## 2018-06-20 PROBLEM — J98.4 RESTRICTIVE LUNG DISEASE SECONDARY TO OBESITY: Status: ACTIVE | Noted: 2018-06-20

## 2018-06-20 LAB
ANION GAP SERPL CALC-SCNC: 10 MMOL/L
BASOPHILS # BLD AUTO: 0.04 K/UL
BASOPHILS NFR BLD: 0.6 %
BUN SERPL-MCNC: 31 MG/DL
CALCIUM SERPL-MCNC: 8.6 MG/DL
CHLORIDE SERPL-SCNC: 112 MMOL/L
CO2 SERPL-SCNC: 20 MMOL/L
CREAT SERPL-MCNC: 1.6 MG/DL
DIASTOLIC DYSFUNCTION: YES
DIFFERENTIAL METHOD: ABNORMAL
EOSINOPHIL # BLD AUTO: 0.5 K/UL
EOSINOPHIL NFR BLD: 7.2 %
ERYTHROCYTE [DISTWIDTH] IN BLOOD BY AUTOMATED COUNT: 15.1 %
EST. GFR  (AFRICAN AMERICAN): 50 ML/MIN/1.73 M^2
EST. GFR  (NON AFRICAN AMERICAN): 43 ML/MIN/1.73 M^2
ESTIMATED AVG GLUCOSE: 140 MG/DL
GLOBAL PERICARDIAL EFFUSION: ABNORMAL
GLUCOSE SERPL-MCNC: 103 MG/DL
HBA1C MFR BLD HPLC: 6.5 %
HCT VFR BLD AUTO: 31.8 %
HGB BLD-MCNC: 10.3 G/DL
LYMPHOCYTES # BLD AUTO: 1.1 K/UL
LYMPHOCYTES NFR BLD: 17.8 %
MAGNESIUM SERPL-MCNC: 1.9 MG/DL
MCH RBC QN AUTO: 29.3 PG
MCHC RBC AUTO-ENTMCNC: 32.4 G/DL
MCV RBC AUTO: 91 FL
MITRAL VALVE REGURGITATION: ABNORMAL
MONOCYTES # BLD AUTO: 0.6 K/UL
MONOCYTES NFR BLD: 9 %
NEUTROPHILS # BLD AUTO: 4.1 K/UL
NEUTROPHILS NFR BLD: 65.1 %
PHOSPHATE SERPL-MCNC: 3.8 MG/DL
PLATELET # BLD AUTO: 199 K/UL
PMV BLD AUTO: 10.6 FL
POCT GLUCOSE: 161 MG/DL (ref 70–110)
POCT GLUCOSE: 98 MG/DL (ref 70–110)
POTASSIUM SERPL-SCNC: 4.4 MMOL/L
RBC # BLD AUTO: 3.51 M/UL
RETIRED EF AND QEF - SEE NOTES: 75 (ref 55–65)
SODIUM SERPL-SCNC: 142 MMOL/L
WBC # BLD AUTO: 6.35 K/UL

## 2018-06-20 PROCEDURE — 63600175 PHARM REV CODE 636 W HCPCS: Performed by: NURSE PRACTITIONER

## 2018-06-20 PROCEDURE — A4216 STERILE WATER/SALINE, 10 ML: HCPCS | Performed by: HOSPITALIST

## 2018-06-20 PROCEDURE — 99223 1ST HOSP IP/OBS HIGH 75: CPT | Mod: GC,,, | Performed by: INTERNAL MEDICINE

## 2018-06-20 PROCEDURE — 94761 N-INVAS EAR/PLS OXIMETRY MLT: CPT

## 2018-06-20 PROCEDURE — 80048 BASIC METABOLIC PNL TOTAL CA: CPT

## 2018-06-20 PROCEDURE — 93005 ELECTROCARDIOGRAM TRACING: CPT

## 2018-06-20 PROCEDURE — 93306 TTE W/DOPPLER COMPLETE: CPT

## 2018-06-20 PROCEDURE — 99233 SBSQ HOSP IP/OBS HIGH 50: CPT | Mod: ,,, | Performed by: HOSPITALIST

## 2018-06-20 PROCEDURE — 27000221 HC OXYGEN, UP TO 24 HOURS

## 2018-06-20 PROCEDURE — 85025 COMPLETE CBC W/AUTO DIFF WBC: CPT

## 2018-06-20 PROCEDURE — 97802 MEDICAL NUTRITION INDIV IN: CPT

## 2018-06-20 PROCEDURE — 25000003 PHARM REV CODE 250: Performed by: HOSPITALIST

## 2018-06-20 PROCEDURE — 63600175 PHARM REV CODE 636 W HCPCS: Performed by: HOSPITALIST

## 2018-06-20 PROCEDURE — 99900035 HC TECH TIME PER 15 MIN (STAT)

## 2018-06-20 PROCEDURE — 84100 ASSAY OF PHOSPHORUS: CPT

## 2018-06-20 PROCEDURE — 36415 COLL VENOUS BLD VENIPUNCTURE: CPT

## 2018-06-20 PROCEDURE — 83735 ASSAY OF MAGNESIUM: CPT

## 2018-06-20 PROCEDURE — 11000001 HC ACUTE MED/SURG PRIVATE ROOM

## 2018-06-20 PROCEDURE — 83036 HEMOGLOBIN GLYCOSYLATED A1C: CPT

## 2018-06-20 PROCEDURE — 93010 ELECTROCARDIOGRAM REPORT: CPT | Mod: ,,, | Performed by: INTERNAL MEDICINE

## 2018-06-20 PROCEDURE — 25000003 PHARM REV CODE 250: Performed by: NURSE PRACTITIONER

## 2018-06-20 RX ORDER — FUROSEMIDE 10 MG/ML
60 INJECTION INTRAMUSCULAR; INTRAVENOUS 2 TIMES DAILY
Status: DISCONTINUED | OUTPATIENT
Start: 2018-06-20 | End: 2018-06-21

## 2018-06-20 RX ORDER — METOLAZONE 5 MG/1
5 TABLET ORAL ONCE
Status: COMPLETED | OUTPATIENT
Start: 2018-06-20 | End: 2018-06-20

## 2018-06-20 RX ORDER — IRBESARTAN 75 MG/1
75 TABLET ORAL DAILY
Status: DISCONTINUED | OUTPATIENT
Start: 2018-06-20 | End: 2018-06-21

## 2018-06-20 RX ORDER — ACETAMINOPHEN 325 MG/1
650 TABLET ORAL EVERY 6 HOURS PRN
Status: DISCONTINUED | OUTPATIENT
Start: 2018-06-20 | End: 2018-06-20

## 2018-06-20 RX ORDER — ALPRAZOLAM 0.25 MG/1
0.25 TABLET ORAL ONCE
Status: COMPLETED | OUTPATIENT
Start: 2018-06-20 | End: 2018-06-20

## 2018-06-20 RX ADMIN — APIXABAN 2.5 MG: 2.5 TABLET, FILM COATED ORAL at 09:06

## 2018-06-20 RX ADMIN — SODIUM CHLORIDE, PRESERVATIVE FREE 3 ML: 5 INJECTION INTRAVENOUS at 02:06

## 2018-06-20 RX ADMIN — HYDRALAZINE HYDROCHLORIDE 50 MG: 25 TABLET, FILM COATED ORAL at 09:06

## 2018-06-20 RX ADMIN — HYDRALAZINE HYDROCHLORIDE 50 MG: 25 TABLET, FILM COATED ORAL at 01:06

## 2018-06-20 RX ADMIN — SPIRONOLACTONE 25 MG: 25 TABLET, FILM COATED ORAL at 09:06

## 2018-06-20 RX ADMIN — IRBESARTAN 75 MG: 75 TABLET ORAL at 10:06

## 2018-06-20 RX ADMIN — FUROSEMIDE 40 MG: 10 INJECTION, SOLUTION INTRAVENOUS at 09:06

## 2018-06-20 RX ADMIN — SODIUM CHLORIDE, PRESERVATIVE FREE 3 ML: 5 INJECTION INTRAVENOUS at 10:06

## 2018-06-20 RX ADMIN — APIXABAN 2.5 MG: 2.5 TABLET, FILM COATED ORAL at 08:06

## 2018-06-20 RX ADMIN — FUROSEMIDE 60 MG: 10 INJECTION, SOLUTION INTRAMUSCULAR; INTRAVENOUS at 06:06

## 2018-06-20 RX ADMIN — ASPIRIN 81 MG 81 MG: 81 TABLET ORAL at 09:06

## 2018-06-20 RX ADMIN — HYDRALAZINE HYDROCHLORIDE 50 MG: 25 TABLET, FILM COATED ORAL at 05:06

## 2018-06-20 RX ADMIN — ALPRAZOLAM 0.25 MG: 0.25 TABLET ORAL at 06:06

## 2018-06-20 RX ADMIN — CARVEDILOL 6.25 MG: 6.25 TABLET, FILM COATED ORAL at 08:06

## 2018-06-20 RX ADMIN — CARVEDILOL 6.25 MG: 6.25 TABLET, FILM COATED ORAL at 09:06

## 2018-06-20 RX ADMIN — ATORVASTATIN CALCIUM 40 MG: 20 TABLET, FILM COATED ORAL at 09:06

## 2018-06-20 RX ADMIN — LIRAGLUTIDE 0.6 MG: 6 INJECTION SUBCUTANEOUS at 09:06

## 2018-06-20 RX ADMIN — METOLAZONE 5 MG: 5 TABLET ORAL at 10:06

## 2018-06-20 NOTE — PROGRESS NOTES
Upon assessment, patient is sitting up in the chair on room air with no complaints of shortness of breath, O2 sat 98%. Patient does not want to replace nasal cannula at this time. When patient laid back down, he became immediately short of breath, O2 sat dropped to 91% on room air. Educated patient on the need to stay sitting up at at least a 45 degree angle or up in the recliner, the need to keep legs elevated. Ace wraps applied to bilateral lower extremities. Wife remains at the bedside. Will continue to reassess.

## 2018-06-20 NOTE — CONSULTS
[facility]  Cardiology  Consult Note    Patient Name: Zev Castano  MRN: [id number]  Admission Date: 6/19/2018  Hospital Length of Stay: 0 days  Code Status: Full Code   Attending Provider: Olvia Aaron MD   Consulting Provider: Alex Reich MD  Primary Care Physician: Mehul Livingston MD  Principal Problem:Heart failure, diastolic, acute on chronic    Patient information was obtained from patient and past medical records.     Inpatient consult to Cardiology  Consult performed by: ALEX REICH  Consult ordered by: OLIVA AARON  Reason for consult: Heart failure        Subjective:     Chief Complaint:  Shortness of breath and edema of legs.     HPI:  67 yo male with coronary artery disease, paroxysmally atrial fibrillation, paroxysmally atrial flutter, essential hypertension, diastolic left ventricular dysfunction, diabetes mellitus, morbid obesity, previous cerebrovascular accidents, obstructive sleep apnea, degenerative joint disease of the lumbosacral spine and of the cervical spine well known to Dr. Mehul Livingston.     He presents with increasing shortness of breath and with leg edema after he has been off furosemide.    Past Medical History:   Diagnosis Date    CKD (chronic kidney disease), stage III     Coronary artery disease     Diabetes mellitus type II     Hyperlipidemia     Hypertension     Nephrotic range proteinuria     Paroxysmal atrial fibrillation     Stroke     nov 2012       Past Surgical History:   Procedure Laterality Date    BIOPSY N/A 5/29/2018    Procedure: BIOPSY;  Surgeon: [facility] Diagnostic Provider;  Location: [facility] CATH LAB;  Service: Radiology;  Laterality: N/A;  ct guided      KNEE SURGERY Left     SHOULDER SURGERY Right        Review of patient's allergies indicates:   Allergen Reactions    Doxycycline Rash       No current facility-administered medications on file prior to encounter.      Current Outpatient Prescriptions on File  Prior to Encounter   Medication Sig    amLODIPine (NORVASC) 10 MG tablet Take 10 mg by mouth once daily.    apixaban 2.5 mg Tab Take 1 tablet (2.5 mg total) by mouth 2 (two) times daily.    aspirin 81 MG Chew Take 1 tablet (81 mg total) by mouth once daily.    atorvastatin (LIPITOR) 40 MG tablet TAKE 1 TABLET(40 MG) BY MOUTH EVERY DAY    carvedilol (COREG) 6.25 MG tablet Take 1 tablet (6.25 mg total) by mouth 2 (two) times daily.    dronedarone (MULTAQ) 400 mg Tab Take 1 tablet (400 mg total) by mouth 2 (two) times daily with meals.    hydrALAZINE (APRESOLINE) 50 MG tablet Take 1 tablet (50 mg total) by mouth every 8 (eight) hours.    irbesartan (AVAPRO) 300 MG tablet Take 1 tablet (300 mg total) by mouth every evening.    liraglutide 0.6 mg/0.1 mL, 18 mg/3 mL, subq PNIJ 0.6 mg/0.1 mL (18 mg/3 mL) PnIj Inject 1.2 mg into the skin once daily.    tiotropium (SPIRIVA) 18 mcg inhalation capsule Inhale 1 capsule (18 mcg total) into the lungs once daily. Controller    CONTOUR NEXT STRIPS Strp Use one strip each time to check blood glucose daily as directed    JANUMET 50-1,000 mg per tablet TAKE 1 TABLET BY MOUTH TWICE DAILY WITH MEALS     Family History     Problem Relation (Age of Onset)    Esophageal cancer Brother    Heart attack Father (59)    Hypertension Mother    Stroke Sister        Social History Main Topics    Smoking status: Former Smoker     Types: Cigars    Smokeless tobacco: Never Used      Comment: occasional, when at the [employer]    Alcohol use 0.6 oz/week     1 Cans of beer per week      Comment: occasional when at the [employer]    Drug use: No    Sexual activity: Yes     Partners: Female     Birth control/ protection: None     Review of Systems   Constitution: Positive for weakness and malaise/fatigue. Negative for chills and fever.   HENT: Negative for nosebleeds.    Eyes: Negative for double vision, vision loss in left eye and vision loss in right eye.   Cardiovascular: Positive for dyspnea  on exertion, leg swelling, orthopnea and paroxysmal nocturnal dyspnea. Negative for chest pain, claudication, irregular heartbeat, near-syncope, palpitations and syncope.   Respiratory: Positive for cough and shortness of breath. Negative for hemoptysis and wheezing.    Endocrine: Negative for cold intolerance and heat intolerance.   Hematologic/Lymphatic: Negative for bleeding problem. Does not bruise/bleed easily.   Skin: Negative for color change and rash.   Musculoskeletal: Negative for back pain, falls, muscle weakness and myalgias.   Gastrointestinal: Negative for heartburn, hematemesis, hematochezia, hemorrhoids, jaundice, melena, nausea and vomiting.   Genitourinary: Negative for dysuria and hematuria.   Neurological: Negative for dizziness, focal weakness, headaches, light-headedness, loss of balance, numbness and vertigo.   Psychiatric/Behavioral: Negative for altered mental status, depression and memory loss. The patient is not nervous/anxious.    Allergic/Immunologic: Negative for hives and persistent infections.     Objective:     Vital Signs (Most Recent):  Temp: 97.9 °F (36.6 °C) (06/19/18 1950)  Pulse: (!) 49 (06/19/18 2000)  Resp: 18 (06/19/18 1616)  BP: (!) 171/79 (06/19/18 1950)  SpO2: 95 % (06/19/18 1950) Vital Signs (24h Range):  Temp:  [96.2 °F (35.7 °C)-97.9 °F (36.6 °C)] 97.9 °F (36.6 °C)  Pulse:  [48-62] 49  Resp:  [18-28] 18  SpO2:  [91 %-98 %] 95 %  BP: (136-178)/(65-83) 171/79     Weight: 115.7 kg (255 lb)  Body mass index is 33.64 kg/m².    SpO2: 95 %  O2 Device (Oxygen Therapy): nasal cannula      Intake/Output Summary (Last 24 hours) at 06/19/18 2007  Last data filed at 06/19/18 2002   Gross per 24 hour   Intake              450 ml   Output                1 ml   Net              449 ml       Lines/Drains/Airways     Peripheral Intravenous Line                 Peripheral IV - Single Lumen 06/19/18 1156 Left Antecubital less than 1 day                Physical Exam   Constitutional: He is  oriented to person, place, and time. He appears well-developed and well-nourished. He appears ill. He appears distressed.   HENT:   Head: Normocephalic and atraumatic.   Nose: Nose normal.   Eyes: Right eye exhibits no discharge. Left eye exhibits no discharge. Right conjunctiva is not injected. Left conjunctiva is not injected. Right pupil is round. Left pupil is round. Pupils are equal.   Neck: Neck supple. No JVD present. Carotid bruit is not present. No thyromegaly present.   Cardiovascular: Normal rate, regular rhythm, S1 normal and S2 normal.   No extrasystoles are present. PMI is not displaced.  Exam reveals gallop and S4.    Murmur heard.  Pulses:       Radial pulses are 2+ on the right side, and 2+ on the left side.        Femoral pulses are 2+ on the right side, and 2+ on the left side.       Dorsalis pedis pulses are 2+ on the right side, and 2+ on the left side.        Posterior tibial pulses are 2+ on the right side, and 2+ on the left side.   Pulmonary/Chest: Effort normal. He has no decreased breath sounds. He has no wheezes. He has rhonchi. He has no rales.   Abdominal: Soft. Normal appearance. There is no hepatosplenomegaly. There is no tenderness.   Musculoskeletal:        Right ankle: He exhibits swelling.        Left ankle: He exhibits swelling.   Lymphadenopathy:        Head (right side): No submandibular adenopathy present.        Head (left side): No submandibular adenopathy present.     He has no cervical adenopathy.   Neurological: He is alert and oriented to person, place, and time. He is not disoriented. No cranial nerve deficit.   Skin: Skin is warm, dry and intact. No rash noted. He is not diaphoretic. No cyanosis. Nails show no clubbing.   Psychiatric: He has a normal mood and affect. His speech is normal and behavior is normal. Judgment and thought content normal. Cognition and memory are normal.       Current Medications:     apixaban  2.5 mg Oral BID    [START ON 6/20/2018] aspirin   81 mg Oral Daily    [START ON 6/20/2018] atorvastatin  40 mg Oral Daily    carvedilol  6.25 mg Oral BID    dronedarone  400 mg Oral BID WM    furosemide  40 mg Intravenous TID    hydrALAZINE  50 mg Oral Q8H    metoclopramide HCl  10 mg Oral QID (AC & HS)    sodium chloride 0.9%  3 mL Intravenous Q8H    [START ON 6/20/2018] spironolactone  25 mg Oral Daily     Current Laboratory Results:    Recent Results (from the past 24 hour(s))   CBC auto differential    Collection Time: 06/19/18 11:56 AM   Result Value Ref Range    WBC 5.74 3.90 - 12.70 K/uL    RBC 3.63 (L) 4.60 - 6.20 M/uL    Hemoglobin 10.7 (L) 14.0 - 18.0 g/dL    Hematocrit 32.8 (L) 40.0 - 54.0 %    MCV 90 82 - 98 fL    MCH 29.5 27.0 - 31.0 pg    MCHC 32.6 32.0 - 36.0 g/dL    RDW 15.0 (H) 11.5 - 14.5 %    Platelets 211 150 - 350 K/uL    MPV 10.4 9.2 - 12.9 fL    Gran # (ANC) 3.8 1.8 - 7.7 K/uL    Lymph # 1.0 1.0 - 4.8 K/uL    Mono # 0.5 0.3 - 1.0 K/uL    Eos # 0.5 0.0 - 0.5 K/uL    Baso # 0.04 0.00 - 0.20 K/uL    Gran% 66.1 38.0 - 73.0 %    Lymph% 16.7 (L) 18.0 - 48.0 %    Mono% 8.2 4.0 - 15.0 %    Eosinophil% 7.8 0.0 - 8.0 %    Basophil% 0.7 0.0 - 1.9 %    Differential Method Automated    Troponin I    Collection Time: 06/19/18 11:56 AM   Result Value Ref Range    Troponin I 0.006 0.000 - 0.026 ng/mL   Brain natriuretic peptide    Collection Time: 06/19/18 11:56 AM   Result Value Ref Range     (H) 0 - 99 pg/mL   Protime-INR    Collection Time: 06/19/18 11:56 AM   Result Value Ref Range    Prothrombin Time 10.6 9.0 - 12.5 sec    INR 0.9 0.8 - 1.2   Basic metabolic panel    Collection Time: 06/19/18 11:56 AM   Result Value Ref Range    Sodium 142 136 - 145 mmol/L    Potassium 4.8 3.5 - 5.1 mmol/L    Chloride 114 (H) 95 - 110 mmol/L    CO2 17 (L) 23 - 29 mmol/L    Glucose 130 (H) 70 - 110 mg/dL    BUN, Bld 35 (H) 8 - 23 mg/dL    Creatinine 1.7 (H) 0.5 - 1.4 mg/dL    Calcium 8.7 8.7 - 10.5 mg/dL    Anion Gap 11 8 - 16 mmol/L    eGFR if African  American 47 (A) >60 mL/min/1.73 m^2    eGFR if non African American 40 (A) >60 mL/min/1.73 m^2   POCT glucose    Collection Time: 06/19/18  5:23 PM   Result Value Ref Range    POCT Glucose 126 (H) 70 - 110 mg/dL     Current Imaging Results:    Imaging Results          X-Ray Chest AP Portable (Final result)  Result time 06/19/18 12:43:34    Final result by Felipe Reyes MD (06/19/18 12:43:34)                 Impression:      Mild increased density within the lung bases likely due to mild bibasilar atelectatic changes.    Overall, there is little interval change when compared to 05/22/2018.      Electronically signed by: Felipe Reyes MD  Date:    06/19/2018  Time:    12:43             Narrative:    EXAMINATION:  XR CHEST AP PORTABLE    CLINICAL HISTORY:  CHF;    TECHNIQUE:  Single frontal view of the chest was performed.    COMPARISON:  05/22/2018.    FINDINGS:  The heart and mediastinal structures appear unchanged.  There is a poor depth of inspiration accentuating the pulmonary vascular markings.  There is mild increased density within the lung bases likely due to mild bibasilar atelectasis.  There is no evidence for pneumothorax or large pleural effusions.  Bony structures appear intact.  Overall, findings appear similar to the prior examination.                              3/2018: Cath:    D. Hemodynamic Results     LVEDP (Pre): 9 mmHg  LVEDP (Post): 9 mmHg  Ejection Fraction: 70%    E. Angiographic Results     Diagnostic:          Patient has a right dominant coronary artery.        - Left Main Coronary Artery:             The LM is normal. There is EZRA 3 flow.     - Left Anterior Descending Artery:             The proximal LAD is normal. There is EZRA 3 flow.             The mid LAD has a 40% stenosis. There is EZRA 3 flow.     - D1:             The D1 has a 40% stenosis. There is EZRA 3 flow.     - Left Circumflex Artery:             The LCX is normal. There is EZRA 3 flow.     - Right Coronary Artery:              The distal RCA has a 50% stenosis. There is EZRA 3 flow.     - Common Femoral Artery:             The right CFA is normal.      Assessment and Plan:     Active Diagnoses:    Diagnosis Date Noted POA    PRINCIPAL PROBLEM:  Heart failure, diastolic, acute on chronic [I50.33] 06/19/2018 Yes    Fluid retention in legs [R60.0] 06/19/2018 Yes    Type 2 diabetes mellitus with diabetic nephropathy, without long-term current use of insulin [E11.21] 06/19/2018 Yes    Other proteinuria [R80.8] 06/19/2018 Yes    Anemia in stage 3 chronic kidney disease [N18.3, D63.1] 06/19/2018 Yes    PAF (paroxysmal atrial fibrillation) [I48.0] 06/27/2013 Yes    Essential hypertension [I10] 10/02/2012 Yes      Problems Resolved During this Admission:    Diagnosis Date Noted Date Resolved POA     Assessment and Plan:    1. Fluid overload   Been off furosemide.   Diuresis.   Appears reasonable to hold dronedarone and amlodipine.    2. Heart Failure, Diastolic, Acute on Chronic   Doubt that this is primary cause for fluid overload.    3. Coronary Artery Disease   3/2018: Cath: Mild disease.    4. Shortness of Breath   Unclear cause.   Suggest f/u with Dr. Sam Perez.        VTE Risk Mitigation         Ordered     apixaban tablet 2.5 mg  2 times daily      06/19/18 8924          Thank you for your consult.    I will follow-up with patient. Please contact us if you have any additional questions.    Alex Crisostomo MD  Cardiology   [facility]

## 2018-06-20 NOTE — CONSULTS
Consult Note  Nephrology    Consult Requested By: Kayleen Aaron MD  Reason for Consult: CKD    SUBJECTIVE:     History of Present Illness:  Patient is a 68 y.o. male presents with worsening shortness of breath and edema.  Patient well known to us from multiple frequent admissions.  Had kidney biopsy last admission showing diabetic nephropathy.  Has had multiple workups for shortness of breath and all indicating restrictive lung disease from diaphragmatic weakness.  Case discussed with Dr. Aaron and critical care team.      Patient seen and examined.  Sitting in chair with gross lower extremity edema noted.  Discussed with significant other at bedside.  Still having shortness of breath when lying flat, denies chest pain, nausea, vomiting, diarrhea, fever, chills.    Epic reviewed.    Past Medical History:   Diagnosis Date    CKD (chronic kidney disease), stage III     Coronary artery disease     Diabetes mellitus type II     DM due to underlying condition with diabetic nephropathy     Hyperlipidemia     Hypertension     Nephrotic range proteinuria     Paroxysmal atrial fibrillation     Stroke     nov 2012     Past Surgical History:   Procedure Laterality Date    BIOPSY N/A 5/29/2018    Procedure: BIOPSY;  Surgeon: [facility] Diagnostic Provider;  Location: [facility] C[facility] LAB;  Service: Radiology;  Laterality: N/A;  ct guided      KNEE SURGERY Left     SHOULDER SURGERY Right      Family History   Problem Relation Age of Onset    Hypertension Mother     Heart attack Father 59    Stroke Sister     Esophageal cancer Brother      Social History   Substance Use Topics    Smoking status: Former Smoker     Types: Cigars    Smokeless tobacco: Never Used      Comment: occasional, when at the [employer]    Alcohol use 0.6 oz/week     1 Cans of beer per week      Comment: occasional when at the [employer]       Review of patient's allergies indicates:   Allergen Reactions    Doxycycline Rash        Review of  Systems:  Constitutional: No fever or chills  Respiratory:  shortness of breath  Cardiovascular: No chest pain or palpitations  Gastrointestinal: No nausea or vomiting  Neurological: No confusion or weakness    OBJECTIVE:     Vital Signs (Most Recent)  Temp: 96.5 °F (35.8 °C) (06/20/18 0752)  Pulse: 67 (06/20/18 0800)  Resp: (!) 28 (06/20/18 0752)  BP: (!) 174/80 (06/20/18 0752)  SpO2: (!) 94 % (06/20/18 0752)    Vital Signs Range (Last 24H):  Temp:  [96.2 °F (35.7 °C)-98.7 °F (37.1 °C)]   Pulse:  [44-70]   Resp:  [18-28]   BP: (136-187)/(65-85)   SpO2:  [91 %-98 %]       Intake/Output Summary (Last 24 hours) at 06/20/18 0959  Last data filed at 06/20/18 0651   Gross per 24 hour   Intake             1540 ml   Output             1351 ml   Net              189 ml       Physical Exam:  General appearance: Well developed, well nourished, obese  Eyes:  Conjunctivae/corneas clear. PERRL.  Lungs: Normal respiratory effort,   Few crackles   Heart: Regular rate and rhythm, S1, S2 normal, no murmur, rub or pino.  Abdomen: Soft, non-tender non-distended; bowel sounds normal; no masses,  no organomegaly, obese  Extremities: No cyanosis or clubbing. 3+ edema.    Skin: Skin color, texture, turgor normal. No rashes or lesions  Neurologic: Normal strength and tone. No focal numbness or weakness       Laboratory:    Recent Labs  Lab 06/20/18  0527   WBC 6.35   RBC 3.51*   HGB 10.3*   HCT 31.8*      MCV 91   MCH 29.3   MCHC 32.4     BMP:   Recent Labs  Lab 06/20/18  0527         K 4.4   *   CO2 20*   BUN 31*   CREATININE 1.6*   CALCIUM 8.6*   MG 1.9     Lab Results   Component Value Date    CALCIUM 8.6 (L) 06/20/2018    PHOS 3.8 06/20/2018     BNP    Recent Labs  Lab 06/19/18  1156   *     Lab Results   Component Value Date    URICACID 9.3 (H) 05/09/2018     Lab Results   Component Value Date    IRON 51 05/10/2018    TIBC 318 05/10/2018    FERRITIN 117 05/10/2018     Lab Results   Component Value  Date    PTH 64.7 05/10/2018    CALCIUM 8.6 (L) 06/20/2018    PHOS 3.8 06/20/2018       Diagnostic Results:  X-Ray Chest AP Portable   Final Result      Mild increased density within the lung bases likely due to mild bibasilar atelectatic changes.      Overall, there is little interval change when compared to 05/22/2018.         Electronically signed by: Felipe Reyes MD   Date:    06/19/2018   Time:    12:43      X-Ray Chest AP Portable    (Results Pending)       ASSESSMENT/PLAN:     1. CKD III with biopsy-proven diabetic nephropathy with nephrotic proteinuria (N 18.3, E 11.22, R80.9):  Creatinine has been labile over the past few months with frequent NATALIIA's.  Okay for low-dose ARB, spironolactone for proteinuric effects.  Continue with diuresis for now but will restrict fluids and apply Ace wraps to lower extremities.  No NSAIDS.  Follow trends with you.  Renally dose meds, avoid nephrotoxins, and monitor I/O's closely.  2. Shortness of breath secondary to restrictive lung disease and weak diaphragm (J 98.4):  Pulmonary critical care consulted.  Needs aggressive weight loss and likely CPAP at home.  Defer.  3. Diabetic nephropathy (E 11.65, E 11.22):  Hold metformin and Januvia for now.  Has failed Jardiance in the past.  Continue with Victoza but great candidate for Ozempic as outpt.  Will likely need insulin in future since oral meds are limited.  Defer .        Thanks for consult  See above  Will follow for renal needs.

## 2018-06-20 NOTE — PLAN OF CARE
Initial Discharge Planning Assesment:  Patient admitted on 6/19/2018    Chart reviewed, Care plan discussed. Discussed care plan with treatment team,  attending Dr Aaron.  Consults following are: Nephrology. Cardiology. PCC.    PCP updated in [facility]: Dr. Livingston  Pharmacy, updated in [facility]: [facility] [address]    DME at home: Straight cane, Rollator, Glucometer, BP cuff.   Pt requesting shower chair, explained insurance does not cover shower chair. Information on private purchase placed AVS.    Current dispo Home.   Transportation: Significant other to provide ride home.     Pt lives independently at home with significant other, good support at home.   Pt has had 9 admissions to the [facility] this year, all with similar fluid retention, SOB symptoms.  Pt discouraged about frequent admissions and hopeful to find reason for symptom returning.  Readmit questionnaire completed.     Pt states he was working with outpatient physical therapy prior to admission, does not recall name of therapy location.  CM await PT/OT recs during admission if necessary to resume.     Case management  to follow.       06/20/18 0905   Discharge Assessment   Assessment Type Discharge Planning Assessment   Confirmed/corrected address and phone number on facesheet? Yes   Assessment information obtained from? Patient   Communicated expected length of stay with patient/caregiver yes   Prior to hospitilization cognitive status: Alert/Oriented   Prior to hospitalization functional status: Assistive Equipment   Current cognitive status: Alert/Oriented   Current Functional Status: Assistive Equipment   Lives With significant other   Able to Return to Prior Arrangements yes   Is patient able to care for self after discharge? Yes   Who are your caregiver(s) and their phone number(s)? Tala (significant Other) [phone]   Patient's perception of discharge disposition home or selfcare   Readmission Within The Last 30 Days previous  discharge plan unsuccessful  (pt with repeated SOB episodes )   Patient currently being followed by outpatient case management? No   Patient currently receives any other outside agency services? No   Equipment Currently Used at Home cane, straight;rollator;glucometer  (BP cuff)   Do you have any problems affording any of your prescribed medications? No   Is the patient taking medications as prescribed? yes   Does the patient have transportation home? Yes   Transportation Available family or friend will provide   Does the patient receive services at the [facility]? No   Discharge Plan A Home   Patient/Family In Agreement With Plan yes   Readmission Questionnaire   At the time of your discharge, did someone talk to you about what your health problems were? Yes   At the time of discharge, did someone talk to you about what to watch out for regarding worsening of your health problem? Yes   At the time of discharge, did someone talk to you about what to do if you experienced worsening of your health problem? Yes   At the time of discharge, did someone talk to you about which medication to take when you left the hospital and which ones to stop taking? Yes   At the time of discharge, did someone talk to you about when and where to follow up with a doctor after you left the hospital? Yes   What do you believe caused you to be sick enough to be re-admitted? shortness of breath   How often do you need to have someone help you when you read instructions, pamphlets, or other written material from your doctor or pharmacy? Never   Do you have problems taking your medications as prescribed? No   Do you have any problems affording any of  your prescribed medications? No   Do you have problems obtaining/receiving your medications? No   Does the patient have transportation to healthcare appointments? Yes   Living Arrangements house   Does the patient have family/friends to help with healtcare needs after discharge? yes   Does  your caregiver provide all the help you need? Yes   Are you currently feeling confused? No   Are you currently having problems thinking? No   Are you currently having memory problems? No   Have you felt down, depressed, or hopeless? 0   Have you felt little interest or pleasure in doing things? 0   In the last 7 days, my sleep quality was: good

## 2018-06-20 NOTE — NURSING
Dr. Aaron notified of pts c/o of progressing SOB this am. Pt tachypnea rate of 32-36. Abdominal muscles in use.  Pt BBS posterior bases very decreased and pt does not seem to be moving air in RLL.   MD notified of findings, EKG performed and no acute changes noted.  MD gave new orders for CXR and Xanax 0.25mg PO x1 dose.

## 2018-06-20 NOTE — PROGRESS NOTES
On telemetry monitor, patient had bradycardic episode (45-47).  Patient asymptomatic, remains SOB.  Dr. Crisostomo on unit, notified.  Stated would speak to patient.  Will continue to monitor.

## 2018-06-20 NOTE — CONSULTS
[facility]  Pulmonology  Consult Note    Patient Name: Zev Castano  MRN: [id number]  Admission Date: 6/19/2018  Hospital Length of Stay: 1 days  Code Status: Full Code  Attending Physician: Oliva Aaron MD  Primary Care Provider: Mehul Livingston MD   Principal Problem: Heart failure, diastolic, acute on chronic    Inpatient consult to Pulmonary Critical Care  Consult performed by: ALECIA JETER  Consult ordered by: OLIVA AARON  Reason for consult: Dyspnea        Subjective:     HPI:  68 year old male with a history of diabetic nephropathy and multiple admissions over the past year for dyspnea.  He has dyspnea only when he is laying flat and as such has been sleeping in a chair.  He is currently followed at [facility] by the pulmonary group there.  He had comprehensive evaluation from a pulmonary perspective which included PFt that demonstrated restriction with a reduction of DLCO that normalized when indexed for the restriction.  He has had a Ct of his abdomen that showed high diaphragms and atelectasis but no other intrinsix lung pathology on the limited study.          He presented for worsening dyspnea and a 20 lb weight gain after his discharge.  He had been discharged at a weight around 255.  He continues to have shortness of breath and diffuse swelling in his extremities.  He does not adhere to a low salt diet, although he does claim that he gets full early.  He had a rapid response this morning when he was laying flat and felt acutely dyspneic.  He is comfortable with his breathing when he is seated upright.      Past Medical History:   Diagnosis Date    CKD (chronic kidney disease), stage III     Coronary artery disease     Diabetes mellitus type II     DM due to underlying condition with diabetic nephropathy     Hyperlipidemia     Hypertension     Nephrotic range proteinuria     Paroxysmal atrial fibrillation     Stroke     nov 2012       Past Surgical History:    Procedure Laterality Date    BIOPSY N/A 5/29/2018    Procedure: BIOPSY;  Surgeon: Neeta Diagnostic Provider;  Location: [facility] CATH LAB;  Service: Radiology;  Laterality: N/A;  ct guided      KNEE SURGERY Left     SHOULDER SURGERY Right        Review of patient's allergies indicates:   Allergen Reactions    Doxycycline Rash       Family History     Problem Relation (Age of Onset)    Esophageal cancer Brother    Heart attack Father (59)    Hypertension Mother    Stroke Sister        Social History Main Topics    Smoking status: Former Smoker     Types: Cigars    Smokeless tobacco: Never Used      Comment: occasional, when at the casino    Alcohol use 0.6 oz/week     1 Cans of beer per week      Comment: occasional when at the casino    Drug use: No    Sexual activity: Yes     Partners: Female     Birth control/ protection: None         Review of Systems   Constitutional: Positive for unexpected weight change. Negative for appetite change and fatigue.   Respiratory: Positive for shortness of breath. Negative for cough, chest tightness and wheezing.    Cardiovascular: Positive for leg swelling. Negative for chest pain.   Gastrointestinal: Positive for abdominal distention. Negative for abdominal pain.     Objective:     Vital Signs (Most Recent):  Temp: 97.5 °F (36.4 °C) (06/20/18 1140)  Pulse: 66 (06/20/18 1140)  Resp: (!) 28 (06/20/18 0752)  BP: (!) 168/81 (06/20/18 1140)  SpO2: 95 % (06/20/18 1140) Vital Signs (24h Range):  Temp:  [96.2 °F (35.7 °C)-98.7 °F (37.1 °C)] 97.5 °F (36.4 °C)  Pulse:  [44-70] 66  Resp:  [18-28] 28  SpO2:  [91 %-98 %] 95 %  BP: (145-187)/(66-85) 168/81     Weight: 116.1 kg (255 lb 15.3 oz)  Body mass index is 33.77 kg/m².      Intake/Output Summary (Last 24 hours) at 06/20/18 1144  Last data filed at 06/20/18 0651   Gross per 24 hour   Intake             1540 ml   Output             1351 ml   Net              189 ml       Physical Exam   Constitutional: He is oriented to person,  place, and time. No distress.   HENT:   Head: Normocephalic and atraumatic.   Eyes: EOM are normal. Pupils are equal, round, and reactive to light.   Neck: Neck supple. No JVD present. No tracheal deviation present.   Cardiovascular: Normal rate, regular rhythm, normal heart sounds and intact distal pulses.  Exam reveals no friction rub.    No murmur heard.  Pulmonary/Chest: Effort normal and breath sounds normal. No respiratory distress. He has no wheezes. He has no rales.   Musculoskeletal: Normal range of motion. He exhibits edema.   Neurological: He is alert and oriented to person, place, and time.   Skin: He is not diaphoretic.       Vents:  Oxygen Concentration (%): 36 (06/20/18 0610)    Lines/Drains/Airways     Peripheral Intravenous Line                 Peripheral IV - Single Lumen 06/19/18 1156 Left Antecubital less than 1 day                Significant Labs:    CBC/Anemia Profile:    Recent Labs  Lab 06/19/18  1156 06/20/18  0527   WBC 5.74 6.35   HGB 10.7* 10.3*   HCT 32.8* 31.8*    199   MCV 90 91   RDW 15.0* 15.1*        Chemistries:    Recent Labs  Lab 06/19/18  1156 06/20/18  0527    142   K 4.8 4.4   * 112*   CO2 17* 20*   BUN 35* 31*   CREATININE 1.7* 1.6*   CALCIUM 8.7 8.6*   MG  --  1.9   PHOS  --  3.8       A1C:   Recent Labs  Lab 06/20/18  0527   HGBA1C 6.5*     ABGs: No results for input(s): PH, PCO2, HCO3, POCSATURATED, BE in the last 48 hours.  Cardiac Markers: No results for input(s): CKMB, TROPONINT, MYOGLOBIN in the last 48 hours.  Lactic Acid: No results for input(s): LACTATE in the last 48 hours.  All pertinent labs within the past 24 hours have been reviewed.    Significant Imaging:   I have reviewed all pertinent imaging results/findings within the past 24 hours.    Assessment/Plan:     Orthopnea    Noted to have dyspnea that occurs when he is laying flat.  He has a large pannus as well swelling and an elevated diaphragm, that is likely due to abdominal obesity and  swelling.       He should have a sleep study done, as some of the compression caused by his obesity may be offset by positive pressure.     He needs to continue to lose weight, evaluate his diet to be low salt, as this is vital to not gaining water weight.  As well as general weight loss.            Elevated diaphragm    Noted on CT scan, and a fluor eval of his function was normal, he likely has high diaphragms at baseline due to his obesity and as a result there is compression and atelectasis that is causing some mild hypoxia when he lays flat. HIs hypxia could be improved if he has RENU, as well and is placed on CPAP.  He does not qualify for BIPAP due to a normal ABG on his last hospitalization.          Restrictive lung disease secondary to obesity    Due to obesity and swelling with his chronic volume overload.  To that end he needs to lose weight, diet low in salt, and diuretics.  Followed by Nephro and defer diuretic choices to them.          Fluid retention in legs    While he does not have systolic heart failure he may have diastolic pathology.  However his swelling is more likely due to his dietary indiscretions as well as needing continued diuresis related to his nephrotic syndrome.                Thank you for your consult. I will sign off. Please contact us if you have any additional questions.  He should follow up with his outpatient pulmonologist.       Louann Fuller MD  Pulmonology  [facility]

## 2018-06-20 NOTE — ASSESSMENT & PLAN NOTE
While he does not have systolic heart failure he may have diastolic pathology.  However his swelling is more likely due to his dietary indiscretions as well as needing continued diuresis related to his nephrotic syndrome.

## 2018-06-20 NOTE — NURSING
Patient oriented, but having difficulty breathing upon entering room. Respiratory rate in the mid 20's-30's with abdominal muscle use noted/retractions. Patient not moving much air at all. Expiratory wheezes auscultated as well. Charge nurse notified, as well as hospitalist and house supervisor. Rapid response called at 0607am. See flowsheet for vital signs and EKG by rapid response team. Will contact attending physician to notify of situation.

## 2018-06-20 NOTE — ASSESSMENT & PLAN NOTE
Noted to have dyspnea that occurs when he is laying flat.  He has a large pannus as well swelling and an elevated diaphragm, that is likely due to abdominal obesity and swelling.       He should have a sleep study done, as some of the compression caused by his obesity may be offset by positive pressure.     He needs to continue to lose weight, evaluate his diet to be low salt, as this is vital to not gaining water weight.  As well as general weight loss.

## 2018-06-20 NOTE — ASSESSMENT & PLAN NOTE
Due to obesity and swelling with his chronic volume overload.  To that end he needs to lose weight, diet low in salt, and diuretics.  Followed by Nephro and defer diuretic choices to them.

## 2018-06-20 NOTE — PLAN OF CARE
Problem: Patient Care Overview  Goal: Plan of Care Review  Outcome: Ongoing (interventions implemented as appropriate)  Recommendation/Intervention:   1. Recommend 2000 kcal DM/low na Diet to meet kcal needs.   2. If PO intake continues to be <50%, consider Boost Glucose Control supplementation  3. Monitor nutrition related labs.      Goals:   1. Pt meets >85% EEN, EPN.   2. Prevent further wt loss.   3. Nutrition related labs WNL.

## 2018-06-20 NOTE — HPI
68 year old male with a history of diabetic nephropathy and multiple admissions over the past year for dyspnea.  He has dyspnea only when he is laying flat and as such has been sleeping in a chair.  He is currently followed at [facility] by the pulmonary group there.  He had comprehensive evaluation from a pulmonary perspective which included PFt that demonstrated restriction with a reduction of DLCO that normalized when indexed for the restriction.  He has had a Ct of his abdomen that showed high diaphragms and atelectasis but no other intrinsix lung pathology on the limited study.          He presented for worsening dyspnea and a 20 lb weight gain after his discharge.  He had been discharged at a weight around 255.  He continues to have shortness of breath and diffuse swelling in his extremities.  He does not adhere to a low salt diet, although he does claim that he gets full early.  He had a rapid response this morning when he was laying flat and felt acutely dyspneic.  He is comfortable with his breathing when he is seated upright.

## 2018-06-20 NOTE — ASSESSMENT & PLAN NOTE
Noted on CT scan, and a fluor eval of his function was normal, he likely has high diaphragms at baseline due to his obesity and as a result there is compression and atelectasis that is causing some mild hypoxia when he lays flat. HIs hypxia could be improved if he has RENU, as well and is placed on CPAP.  He does not qualify for BIPAP due to a normal ABG on his last hospitalization.

## 2018-06-20 NOTE — SUBJECTIVE & OBJECTIVE
Past Medical History:   Diagnosis Date    CKD (chronic kidney disease), stage III     Coronary artery disease     Diabetes mellitus type II     DM due to underlying condition with diabetic nephropathy     Hyperlipidemia     Hypertension     Nephrotic range proteinuria     Paroxysmal atrial fibrillation     Stroke     nov 2012       Past Surgical History:   Procedure Laterality Date    BIOPSY N/A 5/29/2018    Procedure: BIOPSY;  Surgeon: Neeta Diagnostic Provider;  Location: [facility] CATH LAB;  Service: Radiology;  Laterality: N/A;  ct guided      KNEE SURGERY Left     SHOULDER SURGERY Right        Review of patient's allergies indicates:   Allergen Reactions    Doxycycline Rash       Family History     Problem Relation (Age of Onset)    Esophageal cancer Brother    Heart attack Father (59)    Hypertension Mother    Stroke Sister        Social History Main Topics    Smoking status: Former Smoker     Types: Cigars    Smokeless tobacco: Never Used      Comment: occasional, when at the casino    Alcohol use 0.6 oz/week     1 Cans of beer per week      Comment: occasional when at the casino    Drug use: No    Sexual activity: Yes     Partners: Female     Birth control/ protection: None         Review of Systems   Constitutional: Positive for unexpected weight change. Negative for appetite change and fatigue.   Respiratory: Positive for shortness of breath. Negative for cough, chest tightness and wheezing.    Cardiovascular: Positive for leg swelling. Negative for chest pain.   Gastrointestinal: Positive for abdominal distention. Negative for abdominal pain.     Objective:     Vital Signs (Most Recent):  Temp: 97.5 °F (36.4 °C) (06/20/18 1140)  Pulse: 66 (06/20/18 1140)  Resp: (!) 28 (06/20/18 0752)  BP: (!) 168/81 (06/20/18 1140)  SpO2: 95 % (06/20/18 1140) Vital Signs (24h Range):  Temp:  [96.2 °F (35.7 °C)-98.7 °F (37.1 °C)] 97.5 °F (36.4 °C)  Pulse:  [44-70] 66  Resp:  [18-28] 28  SpO2:  [91 %-98 %] 95  %  BP: (145-187)/(66-85) 168/81     Weight: 116.1 kg (255 lb 15.3 oz)  Body mass index is 33.77 kg/m².      Intake/Output Summary (Last 24 hours) at 06/20/18 1144  Last data filed at 06/20/18 0651   Gross per 24 hour   Intake             1540 ml   Output             1351 ml   Net              189 ml       Physical Exam   Constitutional: He is oriented to person, place, and time. No distress.   HENT:   Head: Normocephalic and atraumatic.   Eyes: EOM are normal. Pupils are equal, round, and reactive to light.   Neck: Neck supple. No JVD present. No tracheal deviation present.   Cardiovascular: Normal rate, regular rhythm, normal heart sounds and intact distal pulses.  Exam reveals no friction rub.    No murmur heard.  Pulmonary/Chest: Effort normal and breath sounds normal. No respiratory distress. He has no wheezes. He has no rales.   Musculoskeletal: Normal range of motion. He exhibits edema.   Neurological: He is alert and oriented to person, place, and time.   Skin: He is not diaphoretic.       Vents:  Oxygen Concentration (%): 36 (06/20/18 0610)    Lines/Drains/Airways     Peripheral Intravenous Line                 Peripheral IV - Single Lumen 06/19/18 1156 Left Antecubital less than 1 day                Significant Labs:    CBC/Anemia Profile:    Recent Labs  Lab 06/19/18  1156 06/20/18  0527   WBC 5.74 6.35   HGB 10.7* 10.3*   HCT 32.8* 31.8*    199   MCV 90 91   RDW 15.0* 15.1*        Chemistries:    Recent Labs  Lab 06/19/18  1156 06/20/18  0527    142   K 4.8 4.4   * 112*   CO2 17* 20*   BUN 35* 31*   CREATININE 1.7* 1.6*   CALCIUM 8.7 8.6*   MG  --  1.9   PHOS  --  3.8       A1C:   Recent Labs  Lab 06/20/18  0527   HGBA1C 6.5*     ABGs: No results for input(s): PH, PCO2, HCO3, POCSATURATED, BE in the last 48 hours.  Cardiac Markers: No results for input(s): CKMB, TROPONINT, MYOGLOBIN in the last 48 hours.  Lactic Acid: No results for input(s): LACTATE in the last 48 hours.  All pertinent  labs within the past 24 hours have been reviewed.    Significant Imaging:   I have reviewed all pertinent imaging results/findings within the past 24 hours.

## 2018-06-20 NOTE — PROGRESS NOTES
Rapid response called. Patient short of breath using abdominal muscles. Patient 94% on 4 lpm. EKG done.

## 2018-06-20 NOTE — PROGRESS NOTES
[facility]  Adult Nutrition  Progress Note    SUMMARY       Recommendations    Recommendation/Intervention:   1. Recommend 2000 kcal DM/low na Diet to meet kcal needs.   2. If PO intake continues to be <50%, consider Boost Glucose Control supplementation  3. Monitor nutrition related labs.     Goals:   1. Pt meets >85% EEN, EPN.   2. Prevent further wt loss.   3. Nutrition related labs WNL.    Nutrition Goal Status: new  Communication of RD Recs: other (comment) (POC)    Reason for Assessment    Reason for Assessment: identified at risk by screening criteria  Interdisciplinary Rounds: attended    Diagnosis:  1. Fluid retention in legs    2. SOB (shortness of breath)    3. Shortness of breath    4. Elevated serum creatinine    5. Diastolic heart failure    6. CHF (congestive heart failure)    7. Heart failure, diastolic, acute on chronic      Past Medical History:   Diagnosis Date    CKD (chronic kidney disease), stage III     Coronary artery disease     Diabetes mellitus type II     DM due to underlying condition with diabetic nephropathy     Hyperlipidemia     Hypertension     Nephrotic range proteinuria     Paroxysmal atrial fibrillation     Stroke     nov 2012     General Information Comments: Pt endorses poor appetite since February, and has been eating less than half of meals for the past 5 months. Pt also reports unintentional wt loss of 40 lbs since around February. Pt appeared nourished with no muscle/fat depletion. Noted with severe edema. No complaints of N/V/C/D. Pt checks blood sugar 2x/d, once in the morning and once in the evening.  Nutrition Discharge Planning: discharge on diabetic/cardiac diet    Nutrition Risk Screen    Nutrition Risk Screen: no indicators present    Nutrition/Diet History    Do you have any cultural, spiritual, Congregation conflicts, given your current situation?: yes  Food Allergies: NKFA  Factors Affecting Nutritional Intake: decreased  "appetite    Anthropometrics    Temp: 97.5 °F (36.4 °C)  Height Method: Stated  Height: 6' 1" (185.4 cm)  Height (inches): 73 in  Weight Method: Bed Scale  Weight: 116.1 kg (255 lb 15.3 oz)  Weight (lb): 255.96 lb  Ideal Body Weight (IBW), Male: 184 lb  % Ideal Body Weight, Male (lb): 138.59 lb  BMI (Calculated): 33.7  BMI Grade: 30 - 34.9- obesity - grade I  Weight Loss: unintentional  Weight Change Amount: 40 lb       Lab/Procedures/Meds    Pertinent Labs Reviewed: reviewed  Lab Results   Component Value Date     06/20/2018    K 4.4 06/20/2018     (H) 06/20/2018    CO2 20 (L) 06/20/2018    BUN 31 (H) 06/20/2018    CREATININE 1.6 (H) 06/20/2018    CALCIUM 8.6 (L) 06/20/2018    PHOS 3.8 06/20/2018    MG 1.9 06/20/2018    ESTGFRAFRICA 50 (A) 06/20/2018    EGFRNONAA 43 (A) 06/20/2018    ALBUMIN 3.2 (L) 05/27/2018     POCT Glucose   Date Value Ref Range Status   06/20/2018 98 70 - 110 mg/dL Final   06/19/2018 147 (H) 70 - 110 mg/dL Final   06/19/2018 126 (H) 70 - 110 mg/dL Final     Lab Results   Component Value Date    HGBA1C 6.5 (H) 06/20/2018     Pertinent Medications Reviewed: reviewed  Scheduled Meds:   apixaban  2.5 mg Oral BID    aspirin  81 mg Oral Daily    atorvastatin  40 mg Oral Daily    carvedilol  6.25 mg Oral BID    furosemide  60 mg Intravenous BID    hydrALAZINE  50 mg Oral Q8H    irbesartan  75 mg Oral Daily    liraglutide 0.6 mg/0.1 mL (18 mg/3 mL) subq PNIJ  0.6 mg Subcutaneous Daily    sodium chloride 0.9%  3 mL Intravenous Q8H     Physical Findings/Assessment    Overall Physical Appearance: nourished, lethargic, shortness of breath  Oral/Mouth Cavity: WDL  Skin: other (see comments) (blisters)    Estimated/Assessed Needs    Weight Used For Calorie Calculations: 116.1 kg (255 lb 15.3 oz)  Energy Calorie Requirements (kcal): 6686-4277  Energy Need Method: [place] (1.2-1.3 activity)  Protein Requirements: 116-140 (1-1.2 g/kg)  Weight Used For Protein Calculations: 116.1 kg " (255 lb 15.3 oz)  Fluid Requirements (mL): 1000 mL (fluid restriction per MD)  Fluid Need Method: other (see comments) (Per MD)  RDA Method (mL): 2370    Nutrition Prescription Ordered    Current Diet Order: 1500 DM, Low Na     Evaluation of Received Nutrient/Fluid Intake    % Intake of Estimated Energy Needs: 25 - 50 %  % Meal Intake: 25 - 50 %    Nutrition Risk    Level of Risk/Frequency of Follow-up: moderate     Assessment and Plan    Diagnosis: Unintentional wt loss  Etiology: Decreased appetite  Related to: Pt reporting wt loss of 40 lbs in last 5 months    Monitor and Evaluation    Food and Nutrient Intake: energy intake, food and beverage intake  Food and Nutrient Adminstration: diet order  Knowledge/Beliefs/Attitudes: food and nutrition knowledge/skill  Physical Activity and Function: nutrition-related ADLs and IADLs  Anthropometric Measurements: weight, weight change, body mass index  Biochemical Data, Medical Tests and Procedures: electrolyte and renal panel, gastrointestinal profile, glucose/endocrine profile, inflammatory profile, lipid profile  Nutrition-Focused Physical Findings: overall appearance     Nutrition Follow-Up    RD Follow-up?: Yes    This note was written by Baylee Agarwal a Dietetic Intern

## 2018-06-21 LAB
ANION GAP SERPL CALC-SCNC: 11 MMOL/L
BUN SERPL-MCNC: 22 MG/DL
CALCIUM SERPL-MCNC: 9.2 MG/DL
CHLORIDE SERPL-SCNC: 103 MMOL/L
CO2 SERPL-SCNC: 26 MMOL/L
CREAT SERPL-MCNC: 1.5 MG/DL
EST. GFR  (AFRICAN AMERICAN): 54 ML/MIN/1.73 M^2
EST. GFR  (NON AFRICAN AMERICAN): 47 ML/MIN/1.73 M^2
GLUCOSE SERPL-MCNC: 107 MG/DL
POCT GLUCOSE: 111 MG/DL (ref 70–110)
POCT GLUCOSE: 147 MG/DL (ref 70–110)
POCT GLUCOSE: 154 MG/DL (ref 70–110)
POCT GLUCOSE: 157 MG/DL (ref 70–110)
POTASSIUM SERPL-SCNC: 3.8 MMOL/L
SODIUM SERPL-SCNC: 140 MMOL/L

## 2018-06-21 PROCEDURE — 97161 PT EVAL LOW COMPLEX 20 MIN: CPT

## 2018-06-21 PROCEDURE — 94761 N-INVAS EAR/PLS OXIMETRY MLT: CPT

## 2018-06-21 PROCEDURE — 25000003 PHARM REV CODE 250: Performed by: NURSE PRACTITIONER

## 2018-06-21 PROCEDURE — 80048 BASIC METABOLIC PNL TOTAL CA: CPT

## 2018-06-21 PROCEDURE — A4216 STERILE WATER/SALINE, 10 ML: HCPCS | Performed by: HOSPITALIST

## 2018-06-21 PROCEDURE — 11000001 HC ACUTE MED/SURG PRIVATE ROOM

## 2018-06-21 PROCEDURE — 97530 THERAPEUTIC ACTIVITIES: CPT

## 2018-06-21 PROCEDURE — 99900035 HC TECH TIME PER 15 MIN (STAT)

## 2018-06-21 PROCEDURE — 36415 COLL VENOUS BLD VENIPUNCTURE: CPT

## 2018-06-21 PROCEDURE — 25000003 PHARM REV CODE 250: Performed by: HOSPITALIST

## 2018-06-21 PROCEDURE — 99233 SBSQ HOSP IP/OBS HIGH 50: CPT | Mod: ,,, | Performed by: HOSPITALIST

## 2018-06-21 PROCEDURE — 63600175 PHARM REV CODE 636 W HCPCS: Performed by: NURSE PRACTITIONER

## 2018-06-21 RX ORDER — IRBESARTAN 150 MG/1
150 TABLET ORAL DAILY
Status: DISCONTINUED | OUTPATIENT
Start: 2018-06-22 | End: 2018-06-22 | Stop reason: HOSPADM

## 2018-06-21 RX ORDER — SPIRONOLACTONE 25 MG/1
25 TABLET ORAL DAILY
Status: DISCONTINUED | OUTPATIENT
Start: 2018-06-21 | End: 2018-06-22 | Stop reason: HOSPADM

## 2018-06-21 RX ORDER — FUROSEMIDE 40 MG/1
40 TABLET ORAL 2 TIMES DAILY
Status: DISCONTINUED | OUTPATIENT
Start: 2018-06-21 | End: 2018-06-22 | Stop reason: HOSPADM

## 2018-06-21 RX ORDER — METOLAZONE 5 MG/1
5 TABLET ORAL ONCE
Status: COMPLETED | OUTPATIENT
Start: 2018-06-21 | End: 2018-06-21

## 2018-06-21 RX ADMIN — IRBESARTAN 75 MG: 75 TABLET ORAL at 09:06

## 2018-06-21 RX ADMIN — CARVEDILOL 6.25 MG: 6.25 TABLET, FILM COATED ORAL at 09:06

## 2018-06-21 RX ADMIN — FUROSEMIDE 60 MG: 10 INJECTION, SOLUTION INTRAMUSCULAR; INTRAVENOUS at 09:06

## 2018-06-21 RX ADMIN — ATORVASTATIN CALCIUM 40 MG: 20 TABLET, FILM COATED ORAL at 09:06

## 2018-06-21 RX ADMIN — LIRAGLUTIDE 0.6 MG: 6 INJECTION SUBCUTANEOUS at 09:06

## 2018-06-21 RX ADMIN — HYDRALAZINE HYDROCHLORIDE 50 MG: 25 TABLET, FILM COATED ORAL at 05:06

## 2018-06-21 RX ADMIN — SODIUM CHLORIDE, PRESERVATIVE FREE 3 ML: 5 INJECTION INTRAVENOUS at 05:06

## 2018-06-21 RX ADMIN — CARVEDILOL 6.25 MG: 6.25 TABLET, FILM COATED ORAL at 10:06

## 2018-06-21 RX ADMIN — APIXABAN 2.5 MG: 2.5 TABLET, FILM COATED ORAL at 09:06

## 2018-06-21 RX ADMIN — METOLAZONE 5 MG: 5 TABLET ORAL at 09:06

## 2018-06-21 RX ADMIN — APIXABAN 2.5 MG: 2.5 TABLET, FILM COATED ORAL at 10:06

## 2018-06-21 RX ADMIN — SODIUM CHLORIDE, PRESERVATIVE FREE 3 ML: 5 INJECTION INTRAVENOUS at 10:06

## 2018-06-21 RX ADMIN — FUROSEMIDE 40 MG: 40 TABLET ORAL at 05:06

## 2018-06-21 RX ADMIN — SPIRONOLACTONE 25 MG: 25 TABLET, FILM COATED ORAL at 11:06

## 2018-06-21 NOTE — PROGRESS NOTES
"Nephrology  Progress Note    Admit Date: 6/19/2018   LOS: 2 days     SUBJECTIVE:     Follow-up For:  Heart failure, diastolic, acute on chronic    Interval History:     Much improved overnight.  Diuresed ~6L.  No CP/SOB.  Renal fxn stable.  Requesting to go home.  Discussed with team.  Still dyspneic when lying flat.     Review of Systems:  Constitutional: No fever or chills  Respiratory: No cough or shortness of breath  Cardiovascular: No chest pain or palpitations  Gastrointestinal: No nausea or vomiting  Neurological: No confusion or weakness    OBJECTIVE:     Vital Signs Range (Last 24H):  BP (!) 153/72 (BP Location: Right arm, Patient Position: Sitting)   Pulse 68   Temp 97.8 °F (36.6 °C) (Oral)   Resp 18   Ht 6' 1" (1.854 m)   Wt 118.2 kg (260 lb 9.3 oz)   SpO2 95%   BMI 34.38 kg/m²     Temp:  [96.7 °F (35.9 °C)-98.5 °F (36.9 °C)]   Pulse:  [60-76]   Resp:  [16-18]   BP: (152-188)/(71-83)   SpO2:  [93 %-97 %]     I & O (Last 24H):  Intake/Output Summary (Last 24 hours) at 06/21/18 0934  Last data filed at 06/21/18 0545   Gross per 24 hour   Intake              360 ml   Output             5175 ml   Net            -4815 ml       Physical Exam:  General appearance: Well developed, well nourished, obese  Eyes:  Conjunctivae/corneas clear. PERRL.  Lungs: Normal respiratory effort,  diminished.   Heart: Regular rate and rhythm, S1, S2 normal, no murmur, rub or pino.  Abdomen: Soft, non-tender non-distended; bowel sounds normal; no masses,  no organomegaly, obese  Extremities: No cyanosis or clubbing. 2+ edema.    Skin: Skin color, texture, turgor normal. No rashes or lesions  Neurologic: Normal strength and tone. No focal numbness or weakness       Laboratory Data:  No results for input(s): WBC, RBC, HGB, HCT, PLT, MCV, MCH, MCHC in the last 24 hours.    BMP:   Recent Labs  Lab 06/20/18  0527 06/21/18  0522    107    140   K 4.4 3.8   * 103   CO2 20* 26   BUN 31* 22   CREATININE 1.6* 1.5* "   CALCIUM 8.6* 9.2   MG 1.9  --      Lab Results   Component Value Date    CALCIUM 9.2 06/21/2018    PHOS 3.8 06/20/2018       Lab Results   Component Value Date    PTH 64.7 05/10/2018    CALCIUM 9.2 06/21/2018    PHOS 3.8 06/20/2018       Lab Results   Component Value Date    URICACID 9.3 (H) 05/09/2018       BNP    Recent Labs  Lab 06/19/18  1156   *       Medications:  Medication list was reviewed and changes noted under Assessment/Plan.    Diagnostic Results:        ASSESSMENT/PLAN:     1. Stable CKD III with biopsy-proven diabetic nephropathy with nephrotic proteinuria (N 18.3, E 11.22, R80.9):  Creatinine has been labile over the past few months with frequent NATALIIA's. Tolerated aggressive diuresis and restricting fluids and applied Ace wraps to lower extremities. Continue ARB/[place]rence/Coreg for antiprotenuric effects.  No NSAIDS.  Renally dose meds, avoid nephrotoxins, and monitor I/O's closely.  2. Shortness of breath secondary to restrictive lung disease and weak diaphragm (J 98.4):  Pulmonary critical care consulted.  Needs aggressive weight loss and CPAP at home.  Defer.  3. Diabetic nephropathy (E 11.65, E 11.22): Metformin ok to restart.  Has failed Jardiance in the past.  Continue with Victoza but great candidate for Ozempic as outpt.          DC soon.   F/U with Dr. Ortiz as previously scheduled.

## 2018-06-21 NOTE — SUBJECTIVE & OBJECTIVE
Interval History:  Reports he had a terrible night.  Rapid response was called this morning when he was moved to a flat position and became tachypneic and gasping for air.  Recovered with sitting up.    Review of Systems   Constitutional: Negative for chills and fever.   Respiratory: Positive for chest tightness and shortness of breath.    Cardiovascular: Negative for chest pain and palpitations.     Objective:     Vital Signs (Most Recent):  Temp: 97.5 °F (36.4 °C) (06/20/18 1140)  Pulse: 64 (06/20/18 1800)  Resp: (!) 28 (06/20/18 0752)  BP: (!) 168/81 (06/20/18 1140)  SpO2: 95 % (06/20/18 1140) Vital Signs (24h Range):  Temp:  [96.5 °F (35.8 °C)-98.7 °F (37.1 °C)] 97.5 °F (36.4 °C)  Pulse:  [44-70] 64  Resp:  [24-28] 28  SpO2:  [91 %-96 %] 95 %  BP: (168-187)/(79-85) 168/81     Weight: 121.8 kg (268 lb 8 oz)  Body mass index is 35.42 kg/m².    Intake/Output Summary (Last 24 hours) at 06/20/18 1919  Last data filed at 06/20/18 1801   Gross per 24 hour   Intake             1190 ml   Output             4650 ml   Net            -3460 ml      Physical Exam   Constitutional: He is oriented to person, place, and time. He appears well-developed.   Overweight.   HENT:   Head: Normocephalic.   Eyes: Conjunctivae are normal. Pupils are equal, round, and reactive to light.   Neck: Neck supple. No thyromegaly present.   Cardiovascular: Regular rhythm and intact distal pulses.  Exam reveals no gallop and no friction rub.    Murmur heard.  HR 60, S1 S2.   Pulmonary/Chest: Effort normal. No respiratory distress. He has no rales.   Fair effort, markedly decreased breath sounds bilaterally.   Abdominal: Soft. Bowel sounds are normal. He exhibits distension. There is no tenderness.   Musculoskeletal: Normal range of motion. He exhibits edema.   Bilateral pitting edema to thighs.   Lymphadenopathy:     He has no cervical adenopathy.   Neurological: He is alert and oriented to person, place, and time.   Strength equal and symmetric    Skin: Skin is warm and dry. No rash noted. There is erythema.   Multiple weeping lesions with clear fluid seeping.       Significant Labs:   BMP:   Recent Labs  Lab 06/20/18  0527         K 4.4   *   CO2 20*   BUN 31*   CREATININE 1.6*   CALCIUM 8.6*   MG 1.9     CBC:   Recent Labs  Lab 06/19/18  1156 06/20/18  0527   WBC 5.74 6.35   HGB 10.7* 10.3*   HCT 32.8* 31.8*    199       Significant Imaging: I have reviewed all pertinent imaging results/findings within the past 24 hours.

## 2018-06-21 NOTE — PLAN OF CARE
Problem: Patient Care Overview  Goal: Plan of Care Review  Outcome: Ongoing (interventions implemented as appropriate)  Plan of care reviewed with the patient. No complaints of pain or discomfort. VSS, a&o, daughter at the bedside. Appropriate safety precautions are in place.

## 2018-06-21 NOTE — PT/OT/SLP EVAL
Physical Therapy Evaluation  And treatment    Patient Name:  Zev Castano   MRN:  [id number]    Recommendations:     Discharge Recommendations:  home with home health, home health PT   Discharge Equipment Recommendations: none   Barriers to discharge: None    Assessment:     Zev Castano is a 68 y.o. male admitted with a medical diagnosis of Heart failure, diastolic, acute on chronic.  He presents with the following impairments/functional limitations:  weakness, impaired endurance, impaired cardiopulmonary response to activity, impaired balance, impaired functional mobilty, gait instability .  Pt would benefit from HH to return to [place].  Safer for him to use RW    Rehab Prognosis:  good; patient would benefit from acute skilled PT services to address these deficits and reach maximum level of function.      Recent Surgery: * No surgery found *      Plan:     During this hospitalization, patient to be seen 5 x/week to address the above listed problems via gait training, therapeutic activities, therapeutic exercises  · Plan of Care Expires:  07/21/18   Plan of Care Reviewed with: patient    Subjective     Communicated with nursing  prior to session.  Patient found in bathroom upon PT entry to room, agreeable to evaluation.      Chief Complaint: feels weak  Patient comments/goals: want to stop coming in the hospital (has had 9 admits since beginning of year) daughter states he has lost 12# in past day, needs to lose 5 more before going home  Pain/Comfort:  · Pain Rating 1: 0/10  · Pain Rating Post-Intervention 1: 0/10    Patients cultural, spiritual, Restoration conflicts given the current situation: none stated    Living Environment:  Pt lives with S.O. In 1 story house with threshold to enter.  Has WIS with grab bar and SC, handicap toilet and grab bar.  Daughter lives next door.    Prior to admission, patients level of function was I, drives,  Daughter or S.O does shopping, cooking, cleaning .  Patient has the  following equipment: glucometer, shower chair, grab bar, rollator, raised toilet.  DME owned (not currently used): single point cane, RW  Upon discharge, patient will have assistance from S.O and dtr.    Objective:     Patient found with: peripheral IV     General Precautions: Standard, fall   Orthopedic Precautions:N/A   Braces: N/A     Exams:  · Cognitive Exam:  Patient is oriented to Person, Place and Time and follows 100% of tow step commands   · Gross Motor Coordination:  WFL  · Postural Exam:  Patient presented with the following abnormalities:    · -       Rounded shoulders  · -       Forward head  · Sensation: pt denies paraesthesias in BLE but chart states neuropathy  · Skin Integrity/Edema:      · -       Skin integrity: Visible skin intact  · -       Edema: Moderate BLE with both wrapped with ace bandages  · RLE ROM: WFL  · RLE Strength: WFL  · LLE ROM: WFL  · LLE Strength: WFL    Functional Mobility:  · Bed Mobility:     · Supine to Sit: modified independence  · Sit to Supine: modified independence  · Transfers:     · Sit to Stand:  supervision with no AD  · Gait: pt amb 200' with CGA without AD-increased side to side sway. amb 100' with RW and SBA.   · Balance: fair in standing with RW    AM-PAC 6 CLICK MOBILITY  Total Score:20       Therapeutic Activities and Exercises:   PT eval.  amb with and without AD.  Instructed in ankle pumps and instructed pt and dtr in energy conservation, safety, use of RW for now.  Also benefits of HH.  Initially refused but agreed after discussiom    Patient left sitting EOB with all lines intact, call button in reach, nursing notified and daughter present.    GOALS:    Physical Therapy Goals        Problem: Physical Therapy Goal    Goal Priority Disciplines Outcome Goal Variances Interventions   Physical Therapy Goal     PT/OT, PT Ongoing (interventions implemented as appropriate)     Description:  Goals to be met by: 7/21/18     Patient will increase functional  independence with mobility by performin. Sit to stand transfer with Modified [place]  2. Bed to chair transfer with Modified [place]   3. Gait  x >150 feet with S using Rolling Walker.   4. Ascend/Descend 2 inch curb step with Supervision using Rolling Walker.                      History:     Past Medical History:   Diagnosis Date    CKD (chronic kidney disease), stage III     Coronary artery disease     Diabetes mellitus type II     DM due to underlying condition with diabetic nephropathy     Hyperlipidemia     Hypertension     Nephrotic range proteinuria     Paroxysmal atrial fibrillation     Stroke     2012       Past Surgical History:   Procedure Laterality Date    BIOPSY N/A 2018    Procedure: BIOPSY;  Surgeon: Neeta Diagnostic Provider;  Location: [facility] CATH LAB;  Service: Radiology;  Laterality: N/A;  ct guided      KNEE SURGERY Left     SHOULDER SURGERY Right        Clinical Decision Making:     History  Co-morbidities and personal factors that may impact the plan of care Examination  Body Structures and Functions, activity limitations and participation restrictions that may impact the plan of care Clinical Presentation   Decision Making/ Complexity Score   Co-morbidities:   [] Time since onset of injury / illness / exacerbation  [] Status of current condition  []Patient's cognitive status and safety concerns    [] Multiple Medical Problems (see med hx)  Personal Factors:   [] Patient's age  [] Prior Level of function   [] Patient's home situation (environment and family support)  [] Patient's level of motivation  [] Expected progression of patient      HISTORY:(criteria)    [] 78554 - no personal factors/history    [] 29472 - has 1-2 personal factor/comorbidity     [] 52590 - has >3 personal factor/comorbidity     Body Regions:  [] Objective examination findings  [] Head     []  Neck  [] Trunk   [] Upper Extremity  [] Lower Extremity    Body Systems:  [] For  communication ability, affect, cognition, language, and learning style: the assessment of the ability to make needs known, consciousness, orientation (person, place, and time), expected emotional /behavioral responses, and learning preferences (eg, learning barriers, education  needs)  [] For the neuromuscular system: a general assessment of gross coordinated movement (eg, balance, gait, locomotion, transfers, and transitions) and motor function  (motor control and motor learning)  [] For the musculoskeletal system: the assessment of gross symmetry, gross range of motion, gross strength, height, and weight  [] For the integumentary system: the assessment of pliability(texture), presence of scar formation, skin color, and skin integrity  [] For cardiovascular/pulmonary system: the assessment of heart rate, respiratory rate, blood pressure, and edema     Activity limitations:    [] Patient's cognitive status and saf ety concerns          [] Status of current condition      [] Weight bearing restriction  [] Cardiopulmunary Restriction    Participation Restrictions:   [] Goals and goal agreement with the patient     [] Rehab potential (prognosis) and probable outcome      Examination of Body System: (criteria)    [] 72070 - addressing 1-2 elements    [] 22724 - addressing a total of 3 or more elements     [] 57507 -  Addressing a total of 4 or more elements         Clinical Presentation: (criteria)  Choose one     On examination of body system using standardized tests and measures patient presents with (CHOOSE ONE) elements from any of the following: body structures and functions, activity limitations, and/or participation restrictions.  Leading to a clinical presentation that is considered (CHOOSE ONE)                              Clinical Decision Making  (Eval Complexity):  Choose One     Time Tracking:     PT Received On: 06/21/18  PT Start Time: 1139     PT Stop Time: 1215  PT Total Time (min): 36 min     Billable  Minutes: Evaluation 15 and Therapeutic Activity 21      Sole Zavala, PT  06/21/2018

## 2018-06-21 NOTE — PROGRESS NOTES
[facility]  Cardiology  Progress Note    Patient Name: Zev Castano  MRN: [id number]  Admission Date: 6/19/2018  Hospital Length of Stay: 1 days  Code Status: Full Code   Attending Physician: Kayleen Aaron MD   Primary Care Physician: Mehul Livingston MD  Expected Discharge Date:   Principal Problem:Heart failure, diastolic, acute on chronic    Subjective:     Brief HPI:    69 yo male with coronary artery disease, paroxysmally atrial fibrillation, paroxysmally atrial flutter, essential hypertension, diastolic left ventricular dysfunction, diabetes mellitus, morbid obesity, previous cerebrovascular accidents, obstructive sleep apnea, degenerative joint disease of the lumbosacral spine and of the cervical spine well known to Dr. Mehul Livingston.     He presents with increasing shortness of breath and with leg edema after he has been off furosemide.      Hospital Course:     6/20/2018: Echo: Normal left ventricular size with hyperdynamic systolic function. Severe LVH. Moderately dilated LA. Moderate diastolic dysfunction. Mild MR.    Diuresis.    Interval History:    Breathing much easier sitting up than when supine. No CP.    Review of Systems   Constitution: Negative for chills, fever, weakness and malaise/fatigue.   HENT: Negative for nosebleeds.    Eyes: Negative for vision loss in left eye and vision loss in right eye.   Cardiovascular: Positive for dyspnea on exertion, leg swelling and orthopnea. Negative for chest pain, palpitations and paroxysmal nocturnal dyspnea.   Respiratory: Positive for shortness of breath. Negative for cough, hemoptysis, sputum production and wheezing.    Hematologic/Lymphatic: Negative for bleeding problem.   Skin: Negative for rash.   Musculoskeletal: Negative for myalgias.   Gastrointestinal: Negative for abdominal pain, heartburn, hematemesis, hematochezia, jaundice, melena, nausea and vomiting.   Genitourinary: Negative for hematuria.   Neurological:  Negative for dizziness, headaches, light-headedness and vertigo.   Psychiatric/Behavioral: Negative for altered mental status. The patient is not nervous/anxious.    Allergic/Immunologic: Negative for persistent infections.     Objective:     Vital Signs (Most Recent):  Temp: 97.5 °F (36.4 °C) (06/20/18 1140)  Pulse: 64 (06/20/18 1800)  Resp: (!) 28 (06/20/18 0752)  BP: (!) 168/81 (06/20/18 1140)  SpO2: 95 % (06/20/18 1140) Vital Signs (24h Range):  Temp:  [96.5 °F (35.8 °C)-98.7 °F (37.1 °C)] 97.5 °F (36.4 °C)  Pulse:  [44-70] 64  Resp:  [24-28] 28  SpO2:  [91 %-96 %] 95 %  BP: (168-187)/(79-85) 168/81     Weight: 121.8 kg (268 lb 8 oz)  Body mass index is 35.42 kg/m².    SpO2: 95 %  O2 Device (Oxygen Therapy): nasal cannula      Intake/Output Summary (Last 24 hours) at 06/20/18 1914  Last data filed at 06/20/18 1801   Gross per 24 hour   Intake             1190 ml   Output             4650 ml   Net            -3460 ml       Lines/Drains/Airways     Peripheral Intravenous Line                 Peripheral IV - Single Lumen 06/19/18 1156 Left Antecubital 1 day                Physical Exam   Constitutional: He is oriented to person, place, and time. He appears well-developed and well-nourished. He does not appear ill. No distress.   Eyes: Right conjunctiva is not injected. Right conjunctiva has no hemorrhage. Left conjunctiva is not injected. Left conjunctiva has no hemorrhage. Right pupil is round. Left pupil is round.   Neck: Neck supple. No JVD present.   Cardiovascular: Normal rate, regular rhythm, S1 normal and S2 normal.  Exam reveals gallop and S4.    Pulmonary/Chest: Effort normal. He has decreased breath sounds in the right lower field and the left lower field.   Abdominal: Soft. Normal appearance. He exhibits no distension. There is no tenderness.   Musculoskeletal: He exhibits no edema.        Right ankle: He exhibits swelling.        Left ankle: He exhibits swelling.   Neurological: He is alert and oriented  to person, place, and time. He is not disoriented.   Skin: Skin is warm and dry. No rash noted.   Psychiatric: He has a normal mood and affect. His speech is normal and behavior is normal. Judgment and thought content normal. Cognition and memory are normal.       Current Medications:     apixaban  2.5 mg Oral BID    aspirin  81 mg Oral Daily    atorvastatin  40 mg Oral Daily    carvedilol  6.25 mg Oral BID    furosemide  60 mg Intravenous BID    hydrALAZINE  50 mg Oral Q8H    irbesartan  75 mg Oral Daily    liraglutide 0.6 mg/0.1 mL (18 mg/3 mL) subq PNIJ  0.6 mg Subcutaneous Daily    sodium chloride 0.9%  3 mL Intravenous Q8H     Current Laboratory Results:    Recent Results (from the past 24 hour(s))   POCT glucose    Collection Time: 06/19/18  9:10 PM   Result Value Ref Range    POCT Glucose 147 (H) 70 - 110 mg/dL   Urinalysis    Collection Time: 06/19/18  9:40 PM   Result Value Ref Range    Specimen UA Urine, Clean Catch     Color, UA Yellow Yellow, Straw, [place]    Appearance, UA Clear Clear    pH, UA 5.0 5.0 - 8.0    Specific Gravity, UA >=1.030 (A) 1.005 - 1.030    Protein, UA 2+ (A) Negative    Glucose, UA Negative Negative    Ketones, UA Negative Negative    Bilirubin (UA) Negative Negative    Occult Blood UA Negative Negative    Nitrite, UA Negative Negative    Urobilinogen, UA Negative <2.0 EU/dL    Leukocytes, UA Negative Negative   Urinalysis Microscopic    Collection Time: 06/19/18  9:40 PM   Result Value Ref Range    RBC, UA 0 0 - 4 /hpf    WBC, UA 0 0 - 5 /hpf    WBC Clumps, UA None None-Rare    Bacteria, UA None None-Occ /hpf    Hyaline Casts, UA 0 0-1/lpf /lpf    Microscopic Comment SEE COMMENT    CBC auto differential    Collection Time: 06/20/18  5:27 AM   Result Value Ref Range    WBC 6.35 3.90 - 12.70 K/uL    RBC 3.51 (L) 4.60 - 6.20 M/uL    Hemoglobin 10.3 (L) 14.0 - 18.0 g/dL    Hematocrit 31.8 (L) 40.0 - 54.0 %    MCV 91 82 - 98 fL    MCH 29.3 27.0 - 31.0 pg    MCHC 32.4 32.0 - 36.0  g/dL    RDW 15.1 (H) 11.5 - 14.5 %    Platelets 199 150 - 350 K/uL    MPV 10.6 9.2 - 12.9 fL    Gran # (ANC) 4.1 1.8 - 7.7 K/uL    Lymph # 1.1 1.0 - 4.8 K/uL    Mono # 0.6 0.3 - 1.0 K/uL    Eos # 0.5 0.0 - 0.5 K/uL    Baso # 0.04 0.00 - 0.20 K/uL    Gran% 65.1 38.0 - 73.0 %    Lymph% 17.8 (L) 18.0 - 48.0 %    Mono% 9.0 4.0 - 15.0 %    Eosinophil% 7.2 0.0 - 8.0 %    Basophil% 0.6 0.0 - 1.9 %    Differential Method Automated    Magnesium - if not done in ED    Collection Time: 06/20/18  5:27 AM   Result Value Ref Range    Magnesium 1.9 1.6 - 2.6 mg/dL   Phosphorus - if not done in ED    Collection Time: 06/20/18  5:27 AM   Result Value Ref Range    Phosphorus 3.8 2.7 - 4.5 mg/dL   Basic metabolic panel     Collection Time: 06/20/18  5:27 AM   Result Value Ref Range    Sodium 142 136 - 145 mmol/L    Potassium 4.4 3.5 - 5.1 mmol/L    Chloride 112 (H) 95 - 110 mmol/L    CO2 20 (L) 23 - 29 mmol/L    Glucose 103 70 - 110 mg/dL    BUN, Bld 31 (H) 8 - 23 mg/dL    Creatinine 1.6 (H) 0.5 - 1.4 mg/dL    Calcium 8.6 (L) 8.7 - 10.5 mg/dL    Anion Gap 10 8 - 16 mmol/L    eGFR if African American 50 (A) >60 mL/min/1.73 m^2    eGFR if non African American 43 (A) >60 mL/min/1.73 m^2   Hemoglobin A1c    Collection Time: 06/20/18  5:27 AM   Result Value Ref Range    Hemoglobin A1C 6.5 (H) 4.0 - 5.6 %    Estimated Avg Glucose 140 (H) 68 - 131 mg/dL   POCT glucose    Collection Time: 06/20/18  7:45 AM   Result Value Ref Range    POCT Glucose 98 70 - 110 mg/dL   2D echo with color flow doppler    Collection Time: 06/20/18  8:45 AM   Result Value Ref Range    EF 75 55 - 65    Mitral Valve Regurgitation MILD     Diastolic Dysfunction Yes (A)     Pericardial Effusion NONE      Current Imaging Results:    X-Ray Chest AP Portable   Final Result      Mild cardiomegaly with pulmonary vascular engorgement suggesting some component of CHF.      Low lung volumes with bibasilar atelectasis.         Electronically signed by: Ish Salmeron MD    Date:    06/20/2018   Time:    13:20      X-Ray Chest AP Portable   Final Result      Mild increased density within the lung bases likely due to mild bibasilar atelectatic changes.      Overall, there is little interval change when compared to 05/22/2018.         Electronically signed by: Felipe Reyes MD   Date:    06/19/2018   Time:    12:43        Date of Procedure: 06/20/2018    TEST DESCRIPTION   Technical Quality: This is a technically challenging study.     Aorta: The aortic root is normal in size, measuring 2.8 cm at sinotubular junction and 3.4 cm at Sinuses of Valsalva.     Left Atrium: The left atrial volume index is moderately enlarged, measuring 46.96 cc/m2.     Left Ventricle: The left ventricle is normal in size, with an end-diastolic diameter of 3.9 cm, and an end-systolic diameter of 2.9 cm. Wall thickness is markedly increased, with the septum and the posterior wall each measuring 1.9 cm across. Relative   wall thickness was increased at 0.97, and the LV mass index was increased at 167.2 g/m2 consistent with concentric left ventricular hypertrophy. There are no regional wall motion abnormalities. Left ventricular systolic function appears hyperdynamic.   Visually estimated ejection fraction is >70%. The LV Doppler derived stroke volume equals 115.0 ccs.     Diastolic indices: E wave velocity 0.9 m/s, E/A ratio 1.3,  msec., E/e' ratio(avg) 9. There is pseudonormalization of mitral inflow pattern consistent with significant diastolic dysfunction.     Right Atrium: The right atrium is normal in size, measuring 5.4 cm in length and 4.1 cm in width in the apical view.     Right Ventricle: The right ventricle is normal in size. Global right ventricular systolic function appears normal.     Aortic Valve:  The aortic valve is mildly sclerotic with mildly restricted leaflet mobility.     Mitral Valve:  The mitral valve is mildly sclerotic. There is mild mitral regurgitation.     Tricuspid Valve:   The tricuspid valve is normal in structure.     Pulmonary Valve:  The pulmonic valve is normal in structure.     Pericardium: There is no evidence of pericardial effusion.      IVC: IVC is normal in size and collapses > 50% with a sniff, suggesting normal right atrial pressure of 3 mmHg.     Intracavitary: There is no evidence of intracavitary mass or thrombus. There is no evidence of vegetation.     CONCLUSIONS     1 - Concentric hypertrophy.     2 - Hyperdynamic left ventricular systolic function (EF >70%).     3 - No wall motion abnormalities.     4 - Impaired LV relaxation, elevated LAP (grade 2 diastolic dysfunction).     5 - Moderate left atrial enlargement.     6 - Mild mitral regurgitation.     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 Fluid overload              Been off furosemide.              Diuresis.              Appears reasonable to hold dronedarone and amlodipine.     2. Heart Failure, Diastolic, Acute on Chronic   6/20/2018: Echo: Normal left ventricular size with hyperdynamic  systolic function. Severe LVH. Moderately dilated LA. Moderate diastolic dysfunction. Mild MR.              Doubt that this is primary cause for fluid overload.     3. Coronary Artery Disease              3/2018: Cath: Mild disease.     4. Shortness of Breath              Appears due to restrictive lung disease with obesity and palsy of diaphragm..    5. Chronic Anticoagulation   On apixiban 2.5 mg Q12.   Will stop aspirin.                    VTE Risk Mitigation         Ordered     apixaban tablet 2.5 mg  2 times daily      06/19/18 3723          Alex Crisostomo MD  Cardiology  [facility]

## 2018-06-21 NOTE — PROGRESS NOTES
[facility] Hospital Medicine  Progress Note    Patient Name: Zev Castano  MRN: [id number]  Patient Class: IP- Inpatient   Admission Date: 6/19/2018  Length of Stay: 1 days  Attending Physician: Kayleen Aaron MD  Primary Care Provider: Mehul Livingston MD        Subjective:     Principal Problem:Heart failure, diastolic, acute on chronic    HPI:  Mr. Castano is a 68 year old man who presented with dyspnea and leg swelling that have been bothering him for 2 months.  He also has associated abdominal swelling and orthopnea.  Shortness of breath is much worse with lying down.  He cannot walk more than a few feet and gets short-winded just holding a conversation.  CXR showed bibasilar atelectasis and BNP was 129.  Other lab work included BUN/creatinine 35/1.7, bicarb 17, BUN/creatinine 10.7/32.8.    Patient was discharged from here earlier this month after a 10 day hospital stay for dyspnea that seemed to be multifactorial, with contributors of diastolic heart failure, bradycardia, nephrotic syndrome and restrictive lung disease.  He was diuresed during his hospital stay but the lasix was discontinued on discharge.  He had a kidney biopsy that showed diabetic nephropathy.  His medical history includes type II diabetes, paroxysmal atrial fibrillation and CKD III.  He has had 3 strokes with resulting LLE weakness.  He had CAD and a stent.  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  His physician is Dr. Livingston.    Hospital Course:  Patient was admitted on IV diuresis.  He continued to be very short of breath and anxious with movement or reclining in bed.  Pulmonary and nephrology were consulted along with cardiology.  It seems he has very small lung volumes and a restrictive pattern due to abdomen pushing against his diaphragm.  In addition he has episodes where his HR dips into the 40's.  Multaq was held by the cardiologist, and the amlodipine was held due to  the severe lower extremity swelling.    He had a CT in the past that showed nothing abnormal other than atelectasis.    Interval History:  Reports he had a terrible night.  Rapid response was called this morning when he was moved to a flat position and became tachypneic and gasping for air.  Recovered with sitting up.    Review of Systems   Constitutional: Negative for chills and fever.   Respiratory: Positive for chest tightness and shortness of breath.    Cardiovascular: Negative for chest pain and palpitations.     Objective:     Vital Signs (Most Recent):  Temp: 97.5 °F (36.4 °C) (06/20/18 1140)  Pulse: 64 (06/20/18 1800)  Resp: (!) 28 (06/20/18 0752)  BP: (!) 168/81 (06/20/18 1140)  SpO2: 95 % (06/20/18 1140) Vital Signs (24h Range):  Temp:  [96.5 °F (35.8 °C)-98.7 °F (37.1 °C)] 97.5 °F (36.4 °C)  Pulse:  [44-70] 64  Resp:  [24-28] 28  SpO2:  [91 %-96 %] 95 %  BP: (168-187)/(79-85) 168/81     Weight: 121.8 kg (268 lb 8 oz)  Body mass index is 35.42 kg/m².    Intake/Output Summary (Last 24 hours) at 06/20/18 1919  Last data filed at 06/20/18 1801   Gross per 24 hour   Intake             1190 ml   Output             4650 ml   Net            -3460 ml      Physical Exam   Constitutional: He is oriented to person, place, and time. He appears well-developed.   Overweight.   HENT:   Head: Normocephalic.   Eyes: Conjunctivae are normal. Pupils are equal, round, and reactive to light.   Neck: Neck supple. No thyromegaly present.   Cardiovascular: Regular rhythm and intact distal pulses.  Exam reveals no gallop and no friction rub.    Murmur heard.  HR 60, S1 S2.   Pulmonary/Chest: Effort normal. No respiratory distress. He has no rales.   Fair effort, markedly decreased breath sounds bilaterally.   Abdominal: Soft. Bowel sounds are normal. He exhibits distension. There is no tenderness.   Musculoskeletal: Normal range of motion. He exhibits edema.   Bilateral pitting edema to thighs.   Lymphadenopathy:     He has no  cervical adenopathy.   Neurological: He is alert and oriented to person, place, and time.   Strength equal and symmetric   Skin: Skin is warm and dry. No rash noted. There is erythema.   Multiple weeping lesions with clear fluid seeping.       Significant Labs:   BMP:   Recent Labs  Lab 06/20/18  0527         K 4.4   *   CO2 20*   BUN 31*   CREATININE 1.6*   CALCIUM 8.6*   MG 1.9     CBC:   Recent Labs  Lab 06/19/18  1156 06/20/18  0527   WBC 5.74 6.35   HGB 10.7* 10.3*   HCT 32.8* 31.8*    199       Significant Imaging: I have reviewed all pertinent imaging results/findings within the past 24 hours.    Assessment/Plan:      * Heart failure, diastolic, acute on chronic    - Patient with classic heart failure symptoms including dyspnea with exertion, severe orthopnea and dramatic lower extremity edema.  - Other factors - mild bradycardia with HR in the 50's on Multaq he is taking for atrial fibrillation.  - Reportedly also has restrictive lung disease.  Would get CT of chest but he is unable to lie flat.  - 2D echo showing hyperdynamic EF and grade II diastolic dysfunction.  Severe LVH.  - Typically he feels better with diuresis so will continue IV Lasix.  Has had considerable weight gain since last discharged without diuretics, reportedly consumes a lot of salt.          Nephrotic syndrome    - Nephrotic syndrome due to diabetic nephropathy per biopsy last admission  - Nephrology following.        Type 2 diabetes mellitus with diabetic nephropathy, without long-term current use of insulin    - As above  - He is on metformin, Januvia and Victoza - held.  Victoza should not be given both of the other 2.  - Hold Avapro for now given CKD - will probably need to resume but will await input from nephrology.  - SSI for now        Restrictive lung disease secondary to obesity    - Elevated diaphragm on CT and resulting atelectasis and low lung volumes  - Treatment might be NIPPV in addition to  "weight loss and diuretics.          Anemia in stage 3 chronic kidney disease    - Monitor  - No need for transfusion at present          Fluid retention in legs    - Severe edema.  Diurese.  Hold calcium channel blocker just to see if that helps.          PAF (paroxysmal atrial fibrillation)    - NSR with mild bradycardia on beta blocker and Multaq.  - Multaq held.          Essential hypertension    - He reports BP usually 180/80 ("That's normal for me.").  Compliant with home medications.  - Wide pulse pressure - ?consistent with high output heart failure.            VTE Risk Mitigation         Ordered     apixaban tablet 2.5 mg  2 times daily      06/19/18 9260              Kayleen Sylvester MD  Department of Hospital Medicine   [facility]  "

## 2018-06-21 NOTE — ASSESSMENT & PLAN NOTE
- Nephrotic syndrome due to diabetic nephropathy per biopsy last admission  - Nephrology following.

## 2018-06-21 NOTE — HOSPITAL COURSE
Patient was admitted on IV diuresis.  He continued to be very short of breath and anxious with movement or reclining in bed.  Pulmonary and nephrology were consulted along with cardiology.  It seems he has very small lung volumes and a restrictive pattern due to abdomen pushing against his diaphragm.  In addition he has episodes where his HR dips into the 40's.  Multaq was held by the cardiologist, and the amlodipine was held due to the severe lower extremity swelling.  He had good improvement with aggressive diuresis and glucose control.  A repeat 2D echo showed hyperdynamic LV function and grade II diastolic dysfunction.  At discharge he had lost 18 pounds of fluid and was feeling much better.

## 2018-06-21 NOTE — ASSESSMENT & PLAN NOTE
- Elevated diaphragm on CT and resulting atelectasis and low lung volumes  - Treatment might be NIPPV in addition to weight loss and diuretics.

## 2018-06-21 NOTE — PLAN OF CARE
Problem: Patient Care Overview  Goal: Plan of Care Review  Outcome: Ongoing (interventions implemented as appropriate)  Patient remains hypertensive.  Denies pain and reports sleeping well.  Diuresing well and edema improving.  Patients reports that SBO is improving and O2 not needed while lying in bed.  Repositions and performs ADL's independently.  Safety maintained.  Up to date with plan of care.

## 2018-06-21 NOTE — PLAN OF CARE
Problem: Physical Therapy Goal  Goal: Physical Therapy Goal  Goals to be met by: 18     Patient will increase functional independence with mobility by performin. Sit to stand transfer with Modified [place]  2. Bed to chair transfer with Modified [place]   3. Gait  x >150 feet with S using Rolling Walker.   4. Ascend/Descend 2 inch curb step with Supervision using Rolling Walker.    Outcome: Ongoing (interventions implemented as appropriate)  PT eval.  Pt c/o feeling weak, needs RW to increase safety with amb.  O2 sat on RA at rest 94%, no drop of O2 sats with activity.    REC:  Home with HH  No DME needs

## 2018-06-21 NOTE — ASSESSMENT & PLAN NOTE
- Patient with classic heart failure symptoms including dyspnea with exertion, severe orthopnea and dramatic lower extremity edema.  - Other factors - mild bradycardia with HR in the 50's on Multaq he is taking for atrial fibrillation.  - Reportedly also has restrictive lung disease.  Would get CT of chest but he is unable to lie flat.  - 2D echo showing hyperdynamic EF and grade II diastolic dysfunction.  Severe LVH.  - Typically he feels better with diuresis so will continue IV Lasix.  Has had considerable weight gain since last discharged without diuretics, reportedly consumes a lot of salt.

## 2018-06-21 NOTE — ASSESSMENT & PLAN NOTE
- As above  - He is on metformin, Januvia and Victoza - held.  Victoza should not be given both of the other 2.  - Hold Avapro for now given CKD - will probably need to resume but will await input from nephrology.  - SSI for now

## 2018-06-21 NOTE — ASSESSMENT & PLAN NOTE
"- He reports BP usually 180/80 ("That's normal for me.").  Compliant with home medications.  - Wide pulse pressure - ?consistent with high output heart failure.    "

## 2018-06-22 VITALS
SYSTOLIC BLOOD PRESSURE: 132 MMHG | HEART RATE: 65 BPM | RESPIRATION RATE: 18 BRPM | TEMPERATURE: 97 F | DIASTOLIC BLOOD PRESSURE: 67 MMHG | WEIGHT: 251 LBS | OXYGEN SATURATION: 96 % | HEIGHT: 73 IN | BODY MASS INDEX: 33.27 KG/M2

## 2018-06-22 LAB
ANION GAP SERPL CALC-SCNC: 12 MMOL/L
BUN SERPL-MCNC: 23 MG/DL
CALCIUM SERPL-MCNC: 9 MG/DL
CHLORIDE SERPL-SCNC: 100 MMOL/L
CO2 SERPL-SCNC: 27 MMOL/L
CREAT SERPL-MCNC: 1.7 MG/DL
EST. GFR  (AFRICAN AMERICAN): 47 ML/MIN/1.73 M^2
EST. GFR  (NON AFRICAN AMERICAN): 40 ML/MIN/1.73 M^2
GLUCOSE SERPL-MCNC: 106 MG/DL
POCT GLUCOSE: 132 MG/DL (ref 70–110)
POTASSIUM SERPL-SCNC: 3.7 MMOL/L
SODIUM SERPL-SCNC: 139 MMOL/L

## 2018-06-22 PROCEDURE — 80048 BASIC METABOLIC PNL TOTAL CA: CPT

## 2018-06-22 PROCEDURE — 99238 HOSP IP/OBS DSCHRG MGMT 30/<: CPT | Mod: ,,, | Performed by: HOSPITALIST

## 2018-06-22 PROCEDURE — A4216 STERILE WATER/SALINE, 10 ML: HCPCS | Performed by: HOSPITALIST

## 2018-06-22 PROCEDURE — 25000003 PHARM REV CODE 250: Performed by: NURSE PRACTITIONER

## 2018-06-22 PROCEDURE — 97116 GAIT TRAINING THERAPY: CPT

## 2018-06-22 PROCEDURE — 94150 VITAL CAPACITY TEST: CPT

## 2018-06-22 PROCEDURE — 99233 SBSQ HOSP IP/OBS HIGH 50: CPT | Mod: GC,,, | Performed by: INTERNAL MEDICINE

## 2018-06-22 PROCEDURE — 25000003 PHARM REV CODE 250: Performed by: HOSPITALIST

## 2018-06-22 PROCEDURE — 36415 COLL VENOUS BLD VENIPUNCTURE: CPT

## 2018-06-22 RX ORDER — IRBESARTAN 300 MG/1
150 TABLET ORAL NIGHTLY
Refills: 0
Start: 2018-06-22 | End: 2018-12-11

## 2018-06-22 RX ORDER — FUROSEMIDE 40 MG/1
40 TABLET ORAL 2 TIMES DAILY
Qty: 180 TABLET | Refills: 3 | Status: SHIPPED | OUTPATIENT
Start: 2018-06-22 | End: 2018-12-11

## 2018-06-22 RX ORDER — METOLAZONE 5 MG/1
5 TABLET ORAL ONCE
Status: COMPLETED | OUTPATIENT
Start: 2018-06-22 | End: 2018-06-22

## 2018-06-22 RX ORDER — SPIRONOLACTONE 25 MG/1
25 TABLET ORAL DAILY
Qty: 90 TABLET | Refills: 3 | Status: SHIPPED | OUTPATIENT
Start: 2018-06-23 | End: 2019-07-05 | Stop reason: SDUPTHER

## 2018-06-22 RX ADMIN — APIXABAN 2.5 MG: 2.5 TABLET, FILM COATED ORAL at 09:06

## 2018-06-22 RX ADMIN — ATORVASTATIN CALCIUM 40 MG: 20 TABLET, FILM COATED ORAL at 09:06

## 2018-06-22 RX ADMIN — METOLAZONE 5 MG: 5 TABLET ORAL at 09:06

## 2018-06-22 RX ADMIN — SPIRONOLACTONE 25 MG: 25 TABLET, FILM COATED ORAL at 09:06

## 2018-06-22 RX ADMIN — FUROSEMIDE 40 MG: 40 TABLET ORAL at 09:06

## 2018-06-22 RX ADMIN — IRBESARTAN 150 MG: 150 TABLET ORAL at 09:06

## 2018-06-22 RX ADMIN — SODIUM CHLORIDE, PRESERVATIVE FREE 3 ML: 5 INJECTION INTRAVENOUS at 06:06

## 2018-06-22 RX ADMIN — CARVEDILOL 6.25 MG: 6.25 TABLET, FILM COATED ORAL at 09:06

## 2018-06-22 RX ADMIN — LIRAGLUTIDE 0.6 MG: 6 INJECTION SUBCUTANEOUS at 09:06

## 2018-06-22 NOTE — PLAN OF CARE
Plan of care reviewed with patient, medications explained. Pt currently resting comfortably in bed and appears to be sleeping in between care. Intake of fluid kept within fluid restriction. VSS, bed in lowest, locked position and call light in reach. Hourly rounding performed, will continue to monitor.

## 2018-06-22 NOTE — PT/OT/SLP DISCHARGE
Physical Therapy Discharge Summary    Name: Zev Castano  MRN: [id number]   Principal Problem: Heart failure, diastolic, acute on chronic     Patient Discharged from acute Physical Therapy on 18.  Please refer to prior PT noted date on 18 for functional status.     Assessment:     Patient appropriate for care in another setting.    Objective:     GOALS:    Physical Therapy Goals        Problem: Physical Therapy Goal    Goal Priority Disciplines Outcome Goal Variances Interventions   Physical Therapy Goal     PT/OT, PT      Description:  Goals to be met by: 18     Patient will increase functional independence with mobility by performin. Sit to stand transfer with Modified [place]  2. Bed to chair transfer with Modified [place]   3. Gait  x >150 feet with S using Rolling Walker.   4. Ascend/Descend 2 inch curb step with Supervision using Rolling Walker.                       Reasons for Discontinuation of Therapy Services  Transfer to alternate level of care.      Plan:     Patient Discharged to: PT recommended home with HH but per CM note pt going to OP PT.    Sole Zavala, PT  2018

## 2018-06-22 NOTE — PROGRESS NOTES
AVS reviewed with patient and spouse at the bedside. Education provided on all newly prescribed medications, follow-up appointments, doctor recommendations. All questions answered. Peripheral IV removed, catheter intact. Heart monitor removed, returned to the monitor tech. Transport requested to transfer patient to the lobby, wife will transport home.

## 2018-06-22 NOTE — PROGRESS NOTES
[facility]  Cardiology  Progress Note    Patient Name: Zev Castano  MRN: [id number]  Admission Date: 6/19/2018  Hospital Length of Stay: 2 days  Code Status: Full Code   Attending Physician: Kayleen Aaron MD   Primary Care Physician: Mehul Livingston MD  Expected Discharge Date:   Principal Problem:Heart failure, diastolic, acute on chronic    Subjective:     Brief HPI:    67 yo male with coronary artery disease, paroxysmally atrial fibrillation, paroxysmally atrial flutter, essential hypertension, diastolic left ventricular dysfunction, diabetes mellitus, morbid obesity, previous cerebrovascular accidents, obstructive sleep apnea, degenerative joint disease of the lumbosacral spine and of the cervical spine well known to Dr. Mehul Livingston.     He presents with increasing shortness of breath and with leg edema after he has been off furosemide.      Hospital Course:     6/20/2018: Echo: Normal left ventricular size with hyperdynamic systolic function. Severe LVH. Moderately dilated LA. Moderate diastolic dysfunction. Mild MR.    Diuresis.    Interval History:    Breathing much easier sitting up than when supine. No CP. Dyspnea when supine this am but much better today.    Review of Systems   Constitution: Negative for chills, fever, weakness and malaise/fatigue.   HENT: Negative for nosebleeds.    Eyes: Negative for vision loss in left eye and vision loss in right eye.   Cardiovascular: Positive for dyspnea on exertion, leg swelling and orthopnea. Negative for chest pain, palpitations and paroxysmal nocturnal dyspnea.   Respiratory: Positive for shortness of breath. Negative for cough, hemoptysis, sputum production and wheezing.    Hematologic/Lymphatic: Negative for bleeding problem.   Skin: Negative for rash.   Musculoskeletal: Negative for myalgias.   Gastrointestinal: Negative for abdominal pain, heartburn, hematemesis, hematochezia, jaundice, melena, nausea and vomiting.    Genitourinary: Negative for hematuria.   Neurological: Negative for dizziness, headaches, light-headedness and vertigo.   Psychiatric/Behavioral: Negative for altered mental status. The patient is not nervous/anxious.    Allergic/Immunologic: Negative for persistent infections.     Objective:     Vital Signs (Most Recent):  Temp: 97.6 °F (36.4 °C) (06/21/18 1654)  Pulse: 74 (06/21/18 1800)  Resp: 16 (06/21/18 1654)  BP: (!) 166/77 (06/21/18 1654)  SpO2: (!) 93 % (06/21/18 1654) Vital Signs (24h Range):  Temp:  [96.7 °F (35.9 °C)-98.5 °F (36.9 °C)] 97.6 °F (36.4 °C)  Pulse:  [62-76] 74  Resp:  [16-18] 16  SpO2:  [93 %-97 %] 93 %  BP: (135-188)/(65-83) 166/77     Weight: 118.2 kg (260 lb 9.3 oz)  Body mass index is 34.38 kg/m².    SpO2: (!) 93 %  O2 Device (Oxygen Therapy): room air      Intake/Output Summary (Last 24 hours) at 06/21/18 1911  Last data filed at 06/21/18 1804   Gross per 24 hour   Intake              360 ml   Output             5225 ml   Net            -4865 ml       Lines/Drains/Airways     Peripheral Intravenous Line                 Peripheral IV - Single Lumen 06/19/18 1156 Left Antecubital 2 days                Physical Exam   Constitutional: He is oriented to person, place, and time. He appears well-developed and well-nourished. He does not appear ill. No distress.   Eyes: Right conjunctiva is not injected. Right conjunctiva has no hemorrhage. Left conjunctiva is not injected. Left conjunctiva has no hemorrhage. Right pupil is round. Left pupil is round.   Neck: Neck supple. No JVD present.   Cardiovascular: Normal rate, regular rhythm, S1 normal and S2 normal.  Exam reveals gallop and S4.    Pulmonary/Chest: Effort normal. He has decreased breath sounds in the right lower field and the left lower field.   Abdominal: Soft. Normal appearance. He exhibits no distension. There is no tenderness.   Musculoskeletal: He exhibits no edema.        Right ankle: He exhibits swelling.        Left ankle: He  exhibits swelling.   Neurological: He is alert and oriented to person, place, and time. He is not disoriented.   Skin: Skin is warm and dry. No rash noted.   Psychiatric: He has a normal mood and affect. His speech is normal and behavior is normal. Judgment and thought content normal. Cognition and memory are normal.       Current Medications:     apixaban  2.5 mg Oral BID    atorvastatin  40 mg Oral Daily    carvedilol  6.25 mg Oral BID    furosemide  40 mg Oral BID    [START ON 6/22/2018] irbesartan  150 mg Oral Daily    liraglutide 0.6 mg/0.1 mL (18 mg/3 mL) subq PNIJ  0.6 mg Subcutaneous Daily    sodium chloride 0.9%  3 mL Intravenous Q8H    spironolactone  25 mg Oral Daily     Current Laboratory Results:    Recent Results (from the past 24 hour(s))   POCT glucose    Collection Time: 06/20/18  8:41 PM   Result Value Ref Range    POCT Glucose 161 (H) 70 - 110 mg/dL   Basic metabolic panel     Collection Time: 06/21/18  5:22 AM   Result Value Ref Range    Sodium 140 136 - 145 mmol/L    Potassium 3.8 3.5 - 5.1 mmol/L    Chloride 103 95 - 110 mmol/L    CO2 26 23 - 29 mmol/L    Glucose 107 70 - 110 mg/dL    BUN, Bld 22 8 - 23 mg/dL    Creatinine 1.5 (H) 0.5 - 1.4 mg/dL    Calcium 9.2 8.7 - 10.5 mg/dL    Anion Gap 11 8 - 16 mmol/L    eGFR if African American 54 (A) >60 mL/min/1.73 m^2    eGFR if non African American 47 (A) >60 mL/min/1.73 m^2   POCT glucose    Collection Time: 06/21/18  7:58 AM   Result Value Ref Range    POCT Glucose 111 (H) 70 - 110 mg/dL   POCT glucose    Collection Time: 06/21/18 12:00 PM   Result Value Ref Range    POCT Glucose 157 (H) 70 - 110 mg/dL   POCT glucose    Collection Time: 06/21/18  4:45 PM   Result Value Ref Range    POCT Glucose 154 (H) 70 - 110 mg/dL     Current Imaging Results:    X-Ray Chest AP Portable   Final Result      Mild cardiomegaly with pulmonary vascular engorgement suggesting some component of CHF.      Low lung volumes with bibasilar atelectasis.          Electronically signed by: Ish Salmeron MD   Date:    06/20/2018   Time:    13:20      X-Ray Chest AP Portable   Final Result      Mild increased density within the lung bases likely due to mild bibasilar atelectatic changes.      Overall, there is little interval change when compared to 05/22/2018.         Electronically signed by: Felipe Reyes MD   Date:    06/19/2018   Time:    12:43        6/20/2018: Echo:      1 - Concentric hypertrophy.     2 - Hyperdynamic left ventricular systolic function (EF >70%).     3 - No wall motion abnormalities.     4 - Impaired LV relaxation, elevated LAP (grade 2 diastolic dysfunction).     5 - Moderate left atrial enlargement.     6 - Mild mitral regurgitation.     Assessment and Plan:     Problem List:    Active Diagnoses:    Diagnosis Date Noted POA    PRINCIPAL PROBLEM:  Heart failure, diastolic, acute on chronic [I50.33] 06/19/2018 Yes    Restrictive lung disease secondary to obesity [J98.4, E66.8] 06/20/2018 Yes    Fluid retention in legs [R60.0] 06/19/2018 Yes    Type 2 diabetes mellitus with diabetic nephropathy, without long-term current use of insulin [E11.21] 06/19/2018 Yes    Nephrotic syndrome [N04.9] 06/19/2018 Yes    Anemia in stage 3 chronic kidney disease [N18.3, D63.1] 06/19/2018 Yes    PAF (paroxysmal atrial fibrillation) [I48.0] 06/27/2013 Yes    Essential hypertension [I10] 10/02/2012 Yes      Problems Resolved During this Admission:    Diagnosis Date Noted Date Resolved POA   Assessment and Plan:     1. Fluid Overload              Been off furosemide.   Appears reasonable to hold dronedarone and amlodipine.              Diuresis.   On furosemide 40 mg Q12.                 2. Heart Failure, Diastolic, Acute on Chronic   6/20/2018: Echo: Normal left ventricular size with hyperdynamic systolic function. Severe LVH. Moderately dilated LA. Moderate diastolic dysfunction. Mild MR.              Doubt that this is primary cause for fluid overload.     3.  Coronary Artery Disease              3/2018: Cath: Mild disease.     4. Shortness of Breath              Appears due to restrictive lung disease with obesity and palsy of diaphragm.    5. Chronic Anticoagulation   On apixiban 2.5 mg Q12.   No aspirin.                    VTE Risk Mitigation         Ordered     apixaban tablet 2.5 mg  2 times daily      06/19/18 7189          Alex Crisostomo MD  Cardiology  [facility]

## 2018-06-22 NOTE — ASSESSMENT & PLAN NOTE
- Patient with classic heart failure symptoms including dyspnea with exertion, severe orthopnea and dramatic lower extremity edema.  - Other factors - mild bradycardia with HR in the 50's on Multaq he is taking for atrial fibrillation.  - Reportedly also has restrictive lung disease.  Would get CT of chest but he is unable to lie flat.  - 2D echo showing hyperdynamic EF and grade II diastolic dysfunction.  Severe LVH.  - OK to transition to oral Lasix.  - Has had considerable weight gain since last discharged without diuretics, reportedly consumes a lot of salt.

## 2018-06-22 NOTE — PROGRESS NOTES
"Nephrology  Progress Note    Admit Date: 6/19/2018   LOS: 3 days     SUBJECTIVE:     Follow-up For:  Heart failure, diastolic, acute on chronic    Interval History:     Feels great and ready to go home.  Diuresed well again last night.  Discussed with team.      Review of Systems:  Constitutional: No fever or chills  Respiratory: No cough or shortness of breath  Cardiovascular: No chest pain or palpitations  Gastrointestinal: No nausea or vomiting  Neurological: No confusion or weakness    OBJECTIVE:     Vital Signs Range (Last 24H):  /69 (BP Location: Right arm, Patient Position: Sitting)   Pulse 66   Temp 98 °F (36.7 °C) (Oral)   Resp 18   Ht 6' 1" (1.854 m)   Wt 113.9 kg (250 lb 15.9 oz)   SpO2 95%   BMI 33.11 kg/m²     Temp:  [97.3 °F (36.3 °C)-98.3 °F (36.8 °C)]   Pulse:  [60-82]   Resp:  [16-18]   BP: (135-166)/(65-77)   SpO2:  [93 %-97 %]     I & O (Last 24H):    Intake/Output Summary (Last 24 hours) at 06/22/18 0935  Last data filed at 06/22/18 0800   Gross per 24 hour   Intake              300 ml   Output             4525 ml   Net            -4225 ml       Physical Exam:  General appearance: Well developed, well nourished, obese  Eyes:  Conjunctivae/corneas clear. PERRL.  Lungs: Normal respiratory effort,  diminished.   Heart: Regular rate and rhythm, S1, S2 normal, no murmur, rub or pino.  Abdomen: Soft, non-tender non-distended; bowel sounds normal; no masses,  no organomegaly, obese  Extremities: No cyanosis or clubbing. 2+ edema.    Skin: Skin color, texture, turgor normal. No rashes or lesions  Neurologic: Normal strength and tone. No focal numbness or weakness       Laboratory Data:  No results for input(s): WBC, RBC, HGB, HCT, PLT, MCV, MCH, MCHC in the last 24 hours.    BMP:   Recent Labs  Lab 06/20/18  0527  06/22/18  0456     < > 106     < > 139   K 4.4  < > 3.7   *  < > 100   CO2 20*  < > 27   BUN 31*  < > 23   CREATININE 1.6*  < > 1.7*   CALCIUM 8.6*  < > 9.0 "   MG 1.9  --   --    < > = values in this interval not displayed.  Lab Results   Component Value Date    CALCIUM 9.0 06/22/2018    PHOS 3.8 06/20/2018       Lab Results   Component Value Date    PTH 64.7 05/10/2018    CALCIUM 9.0 06/22/2018    PHOS 3.8 06/20/2018       Lab Results   Component Value Date    URICACID 9.3 (H) 05/09/2018       BNP    Recent Labs  Lab 06/19/18  1156   *       Medications:  Medication list was reviewed and changes noted under Assessment/Plan.    Diagnostic Results:        ASSESSMENT/PLAN:     1. Stable CKD III with biopsy-proven diabetic nephropathy with nephrotic proteinuria (N 18.3, E 11.22, R80.9):  Creatinine has been labile over the past few months with frequent NATALIIA's. Tolerated aggressive diuresis and restricting fluids and applied Ace wraps to lower extremities. Continue ARB/Lawrence/Coreg for antiprotenuric effects.  No NSAIDS.  Renally dose meds, avoid nephrotoxins, and monitor I/O's closely.  2. Shortness of breath secondary to restrictive lung disease and weak diaphragm (J 98.4): Improved with diuresis. Needs to live with a higher cr to keep on dry side. Pulmonary critical care consulted.  Needs aggressive weight loss and CPAP at home.  Defer.  3. Diabetic nephropathy (E 11.65, E 11.22): Metformin ok to restart.  Has failed Jardiance in the past.  Continue with Victoza but great candidate for Ozempic as outpt.          DC today.    F/U with Dr. Ortiz as previously scheduled.

## 2018-06-22 NOTE — PROGRESS NOTES
[facility] Hospital Medicine  Progress Note    Patient Name: Zev Castano  MRN: [id number]  Patient Class: IP- Inpatient   Admission Date: 6/19/2018  Length of Stay: 2 days  Attending Physician: Kayleen Aaron MD  Primary Care Provider: Mehul Livingston MD        Subjective:     Principal Problem:Heart failure, diastolic, acute on chronic    HPI:  Mr. Castano is a 68 year old man who presented with dyspnea and leg swelling that have been bothering him for 2 months.  He also has associated abdominal swelling and orthopnea.  Shortness of breath is much worse with lying down.  He cannot walk more than a few feet and gets short-winded just holding a conversation.  CXR showed bibasilar atelectasis and BNP was 129.  Other lab work included BUN/creatinine 35/1.7, bicarb 17, BUN/creatinine 10.7/32.8.    Patient was discharged from here earlier this month after a 10 day hospital stay for dyspnea that seemed to be multifactorial, with contributors of diastolic heart failure, bradycardia, nephrotic syndrome and restrictive lung disease.  He was diuresed during his hospital stay but the lasix was discontinued on discharge.  He had a kidney biopsy that showed diabetic nephropathy.  His medical history includes type II diabetes, paroxysmal atrial fibrillation and CKD III.  He has had 3 strokes with resulting LLE weakness.  He had CAD and a stent.  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  His physician is Dr. Livingston.    Hospital Course:  Patient was admitted on IV diuresis.  He continued to be very short of breath and anxious with movement or reclining in bed.  Pulmonary and nephrology were consulted along with cardiology.  It seems he has very small lung volumes and a restrictive pattern due to abdomen pushing against his diaphragm.  In addition he has episodes where his HR dips into the 40's.  Multaq was held by the cardiologist, and the amlodipine was held due to  the severe lower extremity swelling.    He had a CT in the past that showed nothing abnormal other than atelectasis.    Interval History:  Doing much better.  Leg swelling better and he is able to recline slightly.    Review of Systems   Constitutional: Negative for chills and fever.   Respiratory: Positive for shortness of breath.         Short of breath with reclining position.   Cardiovascular: Negative for chest pain and palpitations.     Objective:     Vital Signs (Most Recent):  Temp: 97.6 °F (36.4 °C) (06/21/18 1654)  Pulse: 74 (06/21/18 1800)  Resp: 16 (06/21/18 1654)  BP: (!) 166/77 (06/21/18 1654)  SpO2: (!) 93 % (06/21/18 1654) Vital Signs (24h Range):  Temp:  [96.7 °F (35.9 °C)-98.5 °F (36.9 °C)] 97.6 °F (36.4 °C)  Pulse:  [62-76] 74  Resp:  [16-18] 16  SpO2:  [93 %-97 %] 93 %  BP: (135-188)/(65-83) 166/77     Weight: 118.2 kg (260 lb 9.3 oz)  Body mass index is 34.38 kg/m².    Intake/Output Summary (Last 24 hours) at 06/21/18 1942  Last data filed at 06/21/18 1804   Gross per 24 hour   Intake              360 ml   Output             5225 ml   Net            -4865 ml      Physical Exam   Constitutional: He is oriented to person, place, and time. He appears well-developed.   Overweight.   HENT:   Head: Normocephalic.   Eyes: Conjunctivae are normal. Pupils are equal, round, and reactive to light.   Neck: Neck supple. No thyromegaly present.   Cardiovascular: Regular rhythm and intact distal pulses.  Exam reveals no gallop and no friction rub.    Murmur heard.  HR 60, S1 S2.   Pulmonary/Chest: Effort normal. No respiratory distress. He has no rales.   Fair effort, markedly decreased breath sounds bilaterally.   Abdominal: Soft. Bowel sounds are normal. He exhibits distension. There is no tenderness.   Musculoskeletal: Normal range of motion. He exhibits edema.   Bilateral pitting edema to thighs.   Lymphadenopathy:     He has no cervical adenopathy.   Neurological: He is alert and oriented to person,  place, and time.   Strength equal and symmetric   Skin: Skin is warm and dry. No rash noted. There is erythema.       Significant Labs:   BMP:   Recent Labs  Lab 06/20/18  0527 06/21/18  0522    107    140   K 4.4 3.8   * 103   CO2 20* 26   BUN 31* 22   CREATININE 1.6* 1.5*   CALCIUM 8.6* 9.2   MG 1.9  --      CBC:   Recent Labs  Lab 06/20/18  0527   WBC 6.35   HGB 10.3*   HCT 31.8*          Significant Imaging: I have reviewed all pertinent imaging results/findings within the past 24 hours.    Assessment/Plan:      * Heart failure, diastolic, acute on chronic    - Patient with classic heart failure symptoms including dyspnea with exertion, severe orthopnea and dramatic lower extremity edema.  - Other factors - mild bradycardia with HR in the 50's on Multaq he is taking for atrial fibrillation.  - Reportedly also has restrictive lung disease.  Would get CT of chest but he is unable to lie flat.  - 2D echo showing hyperdynamic EF and grade II diastolic dysfunction.  Severe LVH.  - OK to transition to oral Lasix.  - Has had considerable weight gain since last discharged without diuretics, reportedly consumes a lot of salt.          Nephrotic syndrome    - Nephrotic syndrome due to diabetic nephropathy per biopsy last admission  - Nephrology following.        Type 2 diabetes mellitus with diabetic nephropathy, without long-term current use of insulin    - As above  - He is on metformin, Januvia and Victoza - held.  Victoza should not be given both of the other 2.  - Hold Avapro for now given CKD - will probably need to resume but will await input from nephrology.  - SSI for now        Restrictive lung disease secondary to obesity    - Elevated diaphragm on CT and resulting atelectasis and low lung volumes  - Treatment might be NIPPV in addition to weight loss and diuretics.          Anemia in stage 3 chronic kidney disease    - Monitor  - No need for transfusion at present          Fluid  "retention in legs    - Severe edema.  Diurese.  Hold calcium channel blocker just to see if that helps.          PAF (paroxysmal atrial fibrillation)    - NSR with mild bradycardia on beta blocker and Multaq.  - Multaq held.          Essential hypertension    - He reports BP usually 180/80 ("That's normal for me.").  Compliant with home medications.  - Wide pulse pressure - ?consistent with high output heart failure.            VTE Risk Mitigation         Ordered     apixaban tablet 2.5 mg  2 times daily      06/19/18 5900              Kayleen Sylvester MD  Department of Hospital Medicine   [facility]  "

## 2018-06-22 NOTE — PROGRESS NOTES
FVC 1650 ml  Patient sitting up in chair.  States he cannot lay flat in bed for pulse ox but will do his best once he gets back in bed.  Nurse, Veronika, made aware and either she, or I, will do pulse ox check once he gets back in bed.

## 2018-06-22 NOTE — PLAN OF CARE
Problem: Physical Therapy Goal  Goal: Physical Therapy Goal  Goals to be met by: 18     Patient will increase functional independence with mobility by performin. Sit to stand transfer with Modified [place]  2. Bed to chair transfer with Modified [place]   3. Gait  x >150 feet with S using Rolling Walker.   4. Ascend/Descend 2 inch curb step with Supervision using Rolling Walker.     Pt progressing towards goals, sit to stand Supervision, amb'd 500' with RW and SBA/Supervision. Recommend HHPT/OPPT and transition to exercising at a gym.

## 2018-06-22 NOTE — PLAN OF CARE
06/22/18 0800   Medicare Message   Important Message from Medicare regarding Discharge Appeal Rights Given to patient/caregiver;Explained to patient/caregiver;Signed/date by patient/caregiver   Date IMM was signed 06/22/18   Time IMM was signed 0800

## 2018-06-22 NOTE — PLAN OF CARE
Ambulatory referral for outpatient physical therapy placed.    Contacted [facility] per pt request.   Ph: [phone]/ Fax: [phone]    Referral and facesheet faxed to Juan Manuel pt to be called with appointment as outpatient.  Pt notified.    No further requests from .        06/22/18 1054   Final Note   Assessment Type Final Discharge Note   Discharge Disposition Home   What phone number can be called within the next 1-3 days to see how you are doing after discharge? [phone]   Hospital Follow Up  Appt(s) scheduled? Yes   Discharge plans and expectations educations in teach back method with documentation complete? Yes

## 2018-06-22 NOTE — PT/OT/SLP PROGRESS
"Physical Therapy Treatment    Patient Name:  Zev Castano   MRN:  [id number]    Recommendations:     Discharge Recommendations:  home with home health, home health PT   Discharge Equipment Recommendations: none   Barriers to discharge: None    Assessment:     Zev Castano is a 68 y.o. male admitted with a medical diagnosis of Heart failure, diastolic, acute on chronic.  He presents with the following impairments/functional limitations:  weakness, impaired endurance, impaired functional mobilty, impaired cardiopulmonary response to activity, gait instability, impaired balance ; pt with good mobility today, inc amb dist., NO LOB or SOB noted, O2:95% during amb..    Rehab Prognosis:  good; patient would benefit from acute skilled PT services to address these deficits and reach maximum level of function.      Recent Surgery: * No surgery found *      Plan:     During this hospitalization, patient to be seen 5 x/week to address the above listed problems via gait training, therapeutic activities, therapeutic exercises  · Plan of Care Expires:  07/21/18   Plan of Care Reviewed with: patient    Subjective     Communicated with nurse prior to session.  Patient found sitting up in chair upon PT entry to room, agreeable to treatment.      Chief Complaint: none stated  Patient comments/goals: "I haven't had any salt since January", although pt reports eating out Friday at the [employer] , but only ate vegetables.   Pt reports he is going on a cruise to [place] in September and he always loses weight on cruise ships.     Pain/Comfort:  · Pain Rating 1: 0/10  · Pain Rating Post-Intervention 1: 0/10    Patients cultural, spiritual, Yarsani conflicts given the current situation: none stated    Objective:     Patient found with: peripheral IV     General Precautions: Standard, fall, diabetic   Orthopedic Precautions:N/A   Braces: N/A     Functional Mobility:  · Transfers:     · Sit to Stand:  supervision with rolling " walker  · Gait: amb'd 500' with RW and SBA/Supervision, O2:95% on RA      AM-PAC 6 CLICK MOBILITY  Turning over in bed (including adjusting bedclothes, sheets and blankets)?: 4  Sitting down on and standing up from a chair with arms (e.g., wheelchair, bedside commode, etc.): 3  Moving from lying on back to sitting on the side of the bed?: 4  Moving to and from a bed to a chair (including a wheelchair)?: 3  Need to walk in hospital room?: 3  Climbing 3-5 steps with a railing?: 3  Total Score: 20       Therapeutic Activities and Exercises:   pt inst'd in AP's, LAQ's, which he states he does perform on his own.    Patient left up in chair with all lines intact, call button in reach, nurse notified and breakfast tray present..    GOALS:    Physical Therapy Goals     Not on file          Multidisciplinary Problems (Resolved)        Problem: Physical Therapy Goal    Goal Priority Disciplines Outcome Goal Variances Interventions   Physical Therapy Goal   (Resolved)     PT/OT, PT Outcome(s) achieved     Description:  Goals to be met by: 18     Patient will increase functional independence with mobility by performin. Sit to stand transfer with Modified [place]  2. Bed to chair transfer with Modified [place]   3. Gait  x >150 feet with S using Rolling Walker.   4. Ascend/Descend 2 inch curb step with Supervision using Rolling Walker.                      Time Tracking:     PT Received On: 18  PT Start Time: 0940     PT Stop Time: 1000  PT Total Time (min): 20 min     Billable Minutes: Gait Training 20    Treatment Type: Treatment  PT/PTA: PTA     PTA Visit Number: 1     Sherrie Siddiqi PTA  2018

## 2018-06-22 NOTE — PROGRESS NOTES
[facility]  Pulmonology  Progress Note    Patient Name: Zev Castano  MRN: [id number]  Admission Date: 6/19/2018  Hospital Length of Stay: 3 days  Code Status: Full Code  Attending Provider: Kayleen Aaron MD  Primary Care Provider: Mehul Livingston MD   Principal Problem: Heart failure, diastolic, acute on chronic    Subjective:     Interval History: Feeling better today, ready to go home, without complaint.  Still cannot lay flat, and gets panicked when he does.  He has lost 9 Liters of fluid and is 251 lbs today.  Previous discharge at 255.  No complaints.      Objective:     Vital Signs (Most Recent):  Temp: 98 °F (36.7 °C) (06/22/18 0740)  Pulse: 66 (06/22/18 0800)  Resp: 18 (06/22/18 0740)  BP: 135/69 (06/22/18 0740)  SpO2: 95 % (06/22/18 0740) Vital Signs (24h Range):  Temp:  [97.3 °F (36.3 °C)-98.3 °F (36.8 °C)] 98 °F (36.7 °C)  Pulse:  [60-82] 66  Resp:  [16-18] 18  SpO2:  [93 %-97 %] 95 %  BP: (135-166)/(65-77) 135/69     Weight: 113.9 kg (250 lb 15.9 oz)  Body mass index is 33.11 kg/m².      Intake/Output Summary (Last 24 hours) at 06/22/18 1034  Last data filed at 06/22/18 0800   Gross per 24 hour   Intake              300 ml   Output             3525 ml   Net            -3225 ml       Physical Exam   Constitutional: He is oriented to person, place, and time.   Eyes: EOM are normal. Pupils are equal, round, and reactive to light.   Neck: Normal range of motion. No tracheal deviation present.   Pulmonary/Chest: Effort normal and breath sounds normal. He has no rales.   Musculoskeletal: He exhibits edema (trace). He exhibits no deformity.   Neurological: He is alert and oriented to person, place, and time.       Vents:  Oxygen Concentration (%): 36 (06/20/18 0610)    Lines/Drains/Airways          No matching active lines, drains, or airways          Significant Labs:    CBC/Anemia Profile:  No results for input(s): WBC, HGB, HCT, PLT, MCV, RDW, IRON, FERRITIN, RETIC, FOLATE,  [id number], OCCULTBLOOD in the last 48 hours.     Chemistries:    Recent Labs  Lab 06/21/18  0522 06/22/18  0456    139   K 3.8 3.7    100   CO2 26 27   BUN 22 23   CREATININE 1.5* 1.7*   CALCIUM 9.2 9.0       All pertinent labs within the past 24 hours have been reviewed.    Significant Imaging:  I have reviewed all pertinent imaging results/findings within the past 24 hours.    Assessment/Plan:     * Heart failure, diastolic, acute on chronic    Noted on echo.  Will need to remain in a good fluid balance, daily weights and diuretics.          Restrictive lung disease secondary to obesity    Due to obesity and swelling with his chronic volume overload.  To that end he needs to lose weight, diet low in salt, and diuretics.   Has improved with diuresis.  He feels better not requiring oxygen.    Continues to feel short of breath when he lays flat, and this is likely due to large pannus and pressure pushing up on diaphragm.  No specific treatment apart from a sleep study and CPAP may be of benefit.            Improved with volume removal.  Will sign off, has a sleep study pending for next week.       Louann Fuller MD  Pulmonology  [facility]

## 2018-06-22 NOTE — SUBJECTIVE & OBJECTIVE
Interval History: Feeling better today, ready to go home, without complaint.  Still cannot lay flat, and gets panicked when he does.  He has lost 9 Liters of fluid and is 251 lbs today.  Previous discharge at 255.  No complaints.      Objective:     Vital Signs (Most Recent):  Temp: 98 °F (36.7 °C) (06/22/18 0740)  Pulse: 66 (06/22/18 0800)  Resp: 18 (06/22/18 0740)  BP: 135/69 (06/22/18 0740)  SpO2: 95 % (06/22/18 0740) Vital Signs (24h Range):  Temp:  [97.3 °F (36.3 °C)-98.3 °F (36.8 °C)] 98 °F (36.7 °C)  Pulse:  [60-82] 66  Resp:  [16-18] 18  SpO2:  [93 %-97 %] 95 %  BP: (135-166)/(65-77) 135/69     Weight: 113.9 kg (250 lb 15.9 oz)  Body mass index is 33.11 kg/m².      Intake/Output Summary (Last 24 hours) at 06/22/18 1034  Last data filed at 06/22/18 0800   Gross per 24 hour   Intake              300 ml   Output             3525 ml   Net            -3225 ml       Physical Exam   Constitutional: He is oriented to person, place, and time.   Eyes: EOM are normal. Pupils are equal, round, and reactive to light.   Neck: Normal range of motion. No tracheal deviation present.   Pulmonary/Chest: Effort normal and breath sounds normal. He has no rales.   Musculoskeletal: He exhibits edema (trace). He exhibits no deformity.   Neurological: He is alert and oriented to person, place, and time.       Vents:  Oxygen Concentration (%): 36 (06/20/18 0610)    Lines/Drains/Airways          No matching active lines, drains, or airways          Significant Labs:    CBC/Anemia Profile:  No results for input(s): WBC, HGB, HCT, PLT, MCV, RDW, IRON, FERRITIN, RETIC, FOLATE, [id number], OCCULTBLOOD in the last 48 hours.     Chemistries:    Recent Labs  Lab 06/21/18  0522 06/22/18  0456    139   K 3.8 3.7    100   CO2 26 27   BUN 22 23   CREATININE 1.5* 1.7*   CALCIUM 9.2 9.0       All pertinent labs within the past 24 hours have been reviewed.    Significant Imaging:  I have reviewed all pertinent imaging results/findings  within the past 24 hours.

## 2018-06-22 NOTE — SUBJECTIVE & OBJECTIVE
Interval History:  Doing much better.  Leg swelling better and he is able to recline slightly.    Review of Systems   Constitutional: Negative for chills and fever.   Respiratory: Positive for shortness of breath.         Short of breath with reclining position.   Cardiovascular: Negative for chest pain and palpitations.     Objective:     Vital Signs (Most Recent):  Temp: 97.6 °F (36.4 °C) (06/21/18 1654)  Pulse: 74 (06/21/18 1800)  Resp: 16 (06/21/18 1654)  BP: (!) 166/77 (06/21/18 1654)  SpO2: (!) 93 % (06/21/18 1654) Vital Signs (24h Range):  Temp:  [96.7 °F (35.9 °C)-98.5 °F (36.9 °C)] 97.6 °F (36.4 °C)  Pulse:  [62-76] 74  Resp:  [16-18] 16  SpO2:  [93 %-97 %] 93 %  BP: (135-188)/(65-83) 166/77     Weight: 118.2 kg (260 lb 9.3 oz)  Body mass index is 34.38 kg/m².    Intake/Output Summary (Last 24 hours) at 06/21/18 1942  Last data filed at 06/21/18 1804   Gross per 24 hour   Intake              360 ml   Output             5225 ml   Net            -4865 ml      Physical Exam   Constitutional: He is oriented to person, place, and time. He appears well-developed.   Overweight.   HENT:   Head: Normocephalic.   Eyes: Conjunctivae are normal. Pupils are equal, round, and reactive to light.   Neck: Neck supple. No thyromegaly present.   Cardiovascular: Regular rhythm and intact distal pulses.  Exam reveals no gallop and no friction rub.    Murmur heard.  HR 60, S1 S2.   Pulmonary/Chest: Effort normal. No respiratory distress. He has no rales.   Fair effort, markedly decreased breath sounds bilaterally.   Abdominal: Soft. Bowel sounds are normal. He exhibits distension. There is no tenderness.   Musculoskeletal: Normal range of motion. He exhibits edema.   Bilateral pitting edema to thighs.   Lymphadenopathy:     He has no cervical adenopathy.   Neurological: He is alert and oriented to person, place, and time.   Strength equal and symmetric   Skin: Skin is warm and dry. No rash noted. There is erythema.        Significant Labs:   BMP:   Recent Labs  Lab 06/20/18  0527 06/21/18  0522    107    140   K 4.4 3.8   * 103   CO2 20* 26   BUN 31* 22   CREATININE 1.6* 1.5*   CALCIUM 8.6* 9.2   MG 1.9  --      CBC:   Recent Labs  Lab 06/20/18 0527   WBC 6.35   HGB 10.3*   HCT 31.8*          Significant Imaging: I have reviewed all pertinent imaging results/findings within the past 24 hours.

## 2018-06-23 NOTE — DISCHARGE SUMMARY
[facility] Hospital Medicine  Discharge Summary      Patient Name: Zev Castano  MRN: [id number]  Admission Date: 6/19/2018  Hospital Length of Stay: 3 days  Discharge Date and Time: 6/22/2018 12:37 PM  Attending Physician: No att. providers found   Discharging Provider: Kayleen Sylvester MD  Primary Care Provider: Mehul Livingston MD      HPI:   Mr. Castano is a 68 year old man who presented with dyspnea and leg swelling that have been bothering him for 2 months.  He also has associated abdominal swelling and orthopnea.  Shortness of breath is much worse with lying down.  He cannot walk more than a few feet and gets short-winded just holding a conversation.  CXR showed bibasilar atelectasis and BNP was 129.  Other lab work included BUN/creatinine 35/1.7, bicarb 17, H/H 10.7/32.8.    Patient was discharged from here earlier this month after a 10 day hospital stay for dyspnea that seemed to be multifactorial, with contributors of diastolic heart failure, bradycardia, nephrotic syndrome and restrictive lung disease.  He was diuresed during his hospital stay but the lasix was discontinued on discharge.  He had a kidney biopsy that showed diabetic nephropathy.  His medical history includes type II diabetes, paroxysmal atrial fibrillation and CKD III.  He has had 3 strokes with resulting LLE weakness.  He had CAD and a stent.  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  His physician is Dr. Livingston.          Hospital Course:   Patient was admitted on IV diuresis.  He continued to be very short of breath and anxious with movement or reclining in bed.  Pulmonary and nephrology were consulted along with cardiology.  It seems he has very small lung volumes and a restrictive pattern due to abdomen pushing against his diaphragm.  In addition he has episodes where his HR dips into the 40's.  Multaq was held by the cardiologist, and the amlodipine was held due to the  severe lower extremity swelling.  He had good improvement with aggressive diuresis and glucose control.  A repeat 2D echo showed hyperdynamic LV function and grade II diastolic dysfunction.  At discharge he had lost 18 pounds of fluid and was feeling much better.       Consults:   Consults         Status Ordering Provider     Inpatient consult to Cardiology  Once     Provider:  Alex Crisostomo MD    Completed OLIVA DIAS     Inpatient consult to Nephrology-[facility]  Once     Provider:  Sam Alejandra NP    Completed OLIVA DIAS     Inpatient consult to Pulmonary Critical Care  Once     Provider:  Riley Fuller MD    Completed OLIVA DIAS            Final Active Diagnoses:    Diagnosis Date Noted POA    PRINCIPAL PROBLEM:  Heart failure, diastolic, acute on chronic [I50.33] 06/19/2018 Yes    Nephrotic syndrome [N04.9] 06/19/2018 Yes    Type 2 diabetes mellitus with diabetic nephropathy, without long-term current use of insulin [E11.21] 06/19/2018 Yes    Restrictive lung disease secondary to obesity [J98.4, E66.8] 06/20/2018 Yes    Fluid retention in legs [R60.0] 06/19/2018 Yes    Anemia in stage 3 chronic kidney disease [N18.3, D63.1] 06/19/2018 Yes    PAF (paroxysmal atrial fibrillation) [I48.0] 06/27/2013 Yes    Essential hypertension [I10] 10/02/2012 Yes      Problems Resolved During this Admission:    Diagnosis Date Noted Date Resolved POA       Discharged Condition: stable    Disposition: Home or Self Care    Follow Up:  Follow-up Information     Mehul Livingston MD.    Specialty:  Cardiology  Why:  Follow up in 1-2 weeks  Contact information:  [address]  [facility] [id number] [phone]             Sharon Ortiz MD.    Specialty:  Nephrology  Why:  Follow up for the next available appointment  Contact information:  [address] [address] [phone]                 Patient Instructions:     Ambulatory consult to Physical Therapy    Referral Priority: Routine Referral Type: Physical Medicine   Referral Reason: Specialty Services Required    Requested Specialty: Physical Therapy    Number of Visits Requested: 1      Diet diabetic   Order Comments: Low sodium     Activity as tolerated          Medications:  Reconciled Home Medications:      Medication List      START taking these medications    furosemide 40 MG tablet  Commonly known as:  LASIX  Take 1 tablet (40 mg total) by mouth 2 (two) times daily.     spironolactone 25 MG tablet  Commonly known as:  ALDACTONE  Take 1 tablet (25 mg total) by mouth once daily.        CHANGE how you take these medications    irbesartan 300 MG tablet  Commonly known as:  AVAPRO  Take 0.5 tablets (150 mg total) by mouth every evening.  What changed:  how much to take        CONTINUE taking these medications    apixaban 2.5 mg Tab  Take 1 tablet (2.5 mg total) by mouth 2 (two) times daily.     atorvastatin 40 MG tablet  Commonly known as:  LIPITOR  TAKE 1 TABLET(40 MG) BY MOUTH EVERY DAY     carvedilol 6.25 MG tablet  Commonly known as:  COREG  Take 1 tablet (6.25 mg total) by mouth 2 (two) times daily.     CONTOUR NEXT TEST STRIPS Strp  Generic drug:  blood sugar diagnostic  Use one strip each time to check blood glucose daily as directed     liraglutide 0.6 mg/0.1 mL (18 mg/3 mL) subq PNIJ 0.6 mg/0.1 mL (18 mg/3 mL) Pnij  Inject 1.2 mg into the skin once daily.        STOP taking these medications    amLODIPine 10 MG tablet  Commonly known as:  NORVASC     aspirin 81 MG Chew     dronedarone 400 mg Tab  Commonly known as:  MULTAQ     hydrALAZINE 50 MG tablet  Commonly known as:  APRESOLINE     JANUMET 50-1,000 mg per tablet  Generic drug:  SITagliptan-metformin     tiotropium 18 mcg inhalation capsule  Commonly known as:  SPIRIVA            Time spent on the discharge of patient: <30 minutes  Patient was seen and examined on the date of discharge and determined to be suitable for discharge.         Kayleen BRIAN  MD Nga  Department of Hospital Medicine  [facility]

## 2018-06-25 ENCOUNTER — PATIENT OUTREACH (OUTPATIENT)
Dept: ADMINISTRATIVE | Facility: CLINIC | Age: 68
End: 2018-06-25

## 2018-06-25 NOTE — PROGRESS NOTES
C3 nurse attempted to contact patient. No answer. The following message was left for the patient to return the call:  Good morning  I am a nurse calling on behalf of [facility] from the [facility].  This is a Transitional Care Call for Zev ESTRADA When you have a moment please contact us at [phone] or [phone] Monday through Friday, between the hours of 8 am to 4 pm. We look forward to speaking with you. On behalf of [facility] have a nice day.    The patient has a scheduled HOSFU appointment with Mehul Livingston MD on 07/17/18 @ 1130. Message sent to Physician staff.

## 2018-06-26 NOTE — PATIENT INSTRUCTIONS
Left- or Right- Side Congestive Heart Failure (CHF)    The heart is a large muscle. It is a pump that circulates blood throughout the body. Blood carries oxygen to all of the organs, including the brain, muscles, and skin. After your body takes the oxygen out of the blood, the blood returns to the heart. The right side of the heart collects the blood from the body and pumps it to the lungs. In the lungs, it gets fresh oxygen and gives up carbon dioxide. The oxygen-rich blood from the lungs then returns to the left side of the heart, where it is pumped back out to the rest of your body, starting the process all over.  Congestive heart failure (CHF) occurs when the heart muscle is weakened. This affects the pumping action of the heart. Heart failure can affect the right side of the heart or the left side. But heart failure may affect not only the right side of the heart or only the left side. Although it may have started on one side, it can and often eventually does affect both sides.  Right-side heart failure  When the right side of the heart is weakened, it cant handle the blood it is getting from the rest of the body. This blood returns to the heart through veins. When too much pressure builds up in the veins, fluid leaks out into the tissues. Gravity then causes that fluid to move to those parts of the body that are the lowest. So one of the first symptoms of right-side CHF can include swelling in the feet and ankles. If the condition gets worse, the swelling can even go up past the knees. Sometimes it gets so severe, the liver can get congested as well.  Left-side heart failure  When the left side of the heart is weakened, it cant handle the blood it gets from the lungs. Pressure then builds up in the veins of the lungs, causing fluid to leak into the lung tissues. This may cause CHF and pulmonary edema. This causes you to feel short of breath, weak, or dizzy. These symptoms are often worse with exertion,  such as when climbing stairs or walking up hills. Lying with your head flat is uncomfortable and can make your breathing worse. This may make sleeping difficult. You may need to use extra pillows to elevate your upper body to sleep well. The same is true when just resting during the daytime.  There are many causes of heart failure including:  · Coronary artery disease  · Past heart attack (also known as acute myocardial infarction, or AMI)  · High blood pressure  · Damaged heart valve  · Diabetes  · Obesity  · Cigarette smoking  · Alcohol abuse  Heart failure is a chronic condition. There is no cure. The purpose of medical treatment is to improve the pumping action of the heart. The main way to do this is to remove excess water from the body. A number of medicines can help reach this goal, improve symptoms, and prevent the heart from becoming weaker. Sometimes, heart failure can become so severe that a device is placed in the heart to help with pumping. Another major goal is to better treat the causes of heart failure, such as diabetes and high blood pressure, by making changes in your lifestyle and maximizing medical control when needed.  Home care  Follow these guidelines when caring for yourself at home:  · Check your weight every day. This is very important because a sudden increase in weight gain could mean worsening heart failure. Keep these things in mind:  ¨ Use the same scale every day.  ¨ Weigh yourself at the same time every day.  ¨ Make sure the scale is on a hard floor surface, not on a rug or carpet.  ¨ Keep a record of your weight every day so your healthcare provider can see it. If you are not given a log sheet for this, keep a separate journal for this purpose.   · Cut back on the amount of salt (sodium) you eat. Follow your healthcare provider's recommendation on how much salt or sodium you should have each day.  ¨ Avoid high-salt foods. These include olives, pickles, smoked meats, salted potato  chips, and most prepared foods.  ¨ Don't add salt to your food at the table. Use only small amounts of salt when cooking.  ¨ Read the labels carefully on food packages to learn how much salt or sodium is in each serving in the package. Remember, a can or package of food may contain more than 1 serving. So if you eat all the food in the package, you may be getting more salt than you think.  · Follow your healthcare provider's recommendations about how much fluid you should have. Be aware that some foods, such as soup, pudding, and juicy fruits like oranges or melons, contain liquid. You'll need to count the liquid in those foods as part of your daily fluid intake. Your provider can help you with this.  · Stop smoking.  · Cut back on how much alcohol you drink.  · Lose weight if you are overweight. The excess weight adds a lot of stress on the workload of the heart.  · Stay active. Talk with your provider about an exercise program that is safe for your heart.  · Keep your feet elevated to reduce swelling. Ask your provider about support hose as a preventive treatment for daytime leg swelling.  Besides taking your medicine as instructed, an important part of treatment is lifestyle changes. These include diet, physical activity, stopping smoking, and weight control.  Improve your diet by including more fresh foods, cutting back on how much sugar and saturated fat you eat, and eating fewer processed foods and less salt.  Follow-up care  Follow up with your healthcare provider, or as advised.  Make sure to keep any appointments that were made for you. These can help better control your congestive heart failure. You will need to follow up with your provider on a routine basis to make sure your heart failure is well managed.  If an X-ray, ECG, or other tests were done, you will be told of any new findings that may affect your care.  Call 911  Call 911 if you:  · Become severely short of breath  · Feel lightheaded, or feel  like you might pass out or faint  · Have chest pain or discomfort that is different than usual, the medicines your doctor told you to use for this don't help, or the pain lasts longer than 10 to 15 minutes  · You suddenly develop a rapid heart rate  When to seek medical advice  The following may be signs that your heart failure is getting worse. Call your healthcare provider right away if any of these happen:  · Sudden weight gain. This means 3 or more pounds in one day, or 5 or more pounds in 1 week.  · Trouble breathing not related to being active  · New or increased swelling of your legs or ankles  · Swelling or pain in your abdomen  · Breathing trouble at night. This means waking up short of breath or needing more pillows to breathe.  · Frequent coughing that doesnt go away  · Feeling much more tired than usual  Date Last Reviewed: 1/4/2018 © 8712-0338 [employer]. [address] [place] [address], [place] [address]. All rights reserved. This information is not intended as a substitute for professional medical care. Always follow your healthcare professional's instructions.

## 2018-07-03 DIAGNOSIS — R06.83 SNORING: ICD-10-CM

## 2018-07-03 DIAGNOSIS — G47.19 EXCESSIVE DAYTIME SLEEPINESS: ICD-10-CM

## 2018-07-03 DIAGNOSIS — G47.33 OBSTRUCTIVE SLEEP APNEA (ADULT) (PEDIATRIC): Primary | ICD-10-CM

## 2018-07-03 DIAGNOSIS — R06.81 WITNESSED EPISODE OF APNEA: ICD-10-CM

## 2018-07-03 DIAGNOSIS — R53.83 FATIGUE: ICD-10-CM

## 2018-07-05 ENCOUNTER — TELEPHONE (OUTPATIENT)
Dept: SLEEP MEDICINE | Facility: OTHER | Age: 68
End: 2018-07-05

## 2018-07-07 ENCOUNTER — HOSPITAL ENCOUNTER (OUTPATIENT)
Dept: SLEEP MEDICINE | Facility: OTHER | Age: 68
Discharge: HOME | End: 2018-07-07
Attending: PSYCHIATRY & NEUROLOGY
Payer: MEDICARE

## 2018-07-07 DIAGNOSIS — R06.83 SNORING: ICD-10-CM

## 2018-07-07 DIAGNOSIS — G47.19 EXCESSIVE DAYTIME SLEEPINESS: ICD-10-CM

## 2018-07-07 DIAGNOSIS — G47.33 OBSTRUCTIVE SLEEP APNEA (ADULT) (PEDIATRIC): ICD-10-CM

## 2018-07-07 DIAGNOSIS — R53.83 FATIGUE: ICD-10-CM

## 2018-07-07 DIAGNOSIS — R06.81 WITNESSED EPISODE OF APNEA: ICD-10-CM

## 2018-07-07 PROCEDURE — 95810 POLYSOM 6/> YRS 4/> PARAM: CPT

## 2018-07-08 NOTE — PROGRESS NOTES
Zev Castano to [facility] for an overnight sleep study in 07/07/2018.  Pt education, setup and cpap explanations given prior to study.      Pt. did not meet Medicare criteria for cpap.  Most significant events observed with REM sleep.  Occasional soft snore noted.  Leg activity also observed.      EKG- Lead 2 appears with occasional pac, rare atrial couplet and short run of increased HR.  Low saturation observed 79%.      Technical difficulty with flow signals.  Sensors repositioned, trimmed and secured.  Mouth breathing observed.  Belts also trimmed and repositioned to improve signal.      Post study information given in am. 0439- lights on- pt request to end study

## 2018-07-17 ENCOUNTER — OFFICE VISIT (OUTPATIENT)
Dept: CARDIOLOGY | Facility: CLINIC | Age: 68
End: 2018-07-17
Attending: INTERNAL MEDICINE
Payer: MEDICARE

## 2018-07-17 VITALS
BODY MASS INDEX: 33.13 KG/M2 | HEIGHT: 73 IN | SYSTOLIC BLOOD PRESSURE: 166 MMHG | HEART RATE: 57 BPM | WEIGHT: 250 LBS | DIASTOLIC BLOOD PRESSURE: 85 MMHG

## 2018-07-17 DIAGNOSIS — I63.9 CEREBROVASCULAR ACCIDENT (CVA), UNSPECIFIED MECHANISM: ICD-10-CM

## 2018-07-17 DIAGNOSIS — N04.9 NEPHROTIC SYNDROME: ICD-10-CM

## 2018-07-17 DIAGNOSIS — I48.0 PAF (PAROXYSMAL ATRIAL FIBRILLATION): ICD-10-CM

## 2018-07-17 DIAGNOSIS — I10 ESSENTIAL HYPERTENSION: ICD-10-CM

## 2018-07-17 DIAGNOSIS — G47.33 OBSTRUCTIVE SLEEP APNEA (ADULT) (PEDIATRIC): ICD-10-CM

## 2018-07-17 DIAGNOSIS — I50.33 HEART FAILURE, DIASTOLIC, ACUTE ON CHRONIC: ICD-10-CM

## 2018-07-17 DIAGNOSIS — I25.110 CORONARY ARTERY DISEASE INVOLVING NATIVE CORONARY ARTERY OF NATIVE HEART WITH UNSTABLE ANGINA PECTORIS: Primary | ICD-10-CM

## 2018-07-17 DIAGNOSIS — D63.1 ANEMIA IN STAGE 3 CHRONIC KIDNEY DISEASE: ICD-10-CM

## 2018-07-17 DIAGNOSIS — E11.21 TYPE 2 DIABETES MELLITUS WITH DIABETIC NEPHROPATHY, WITHOUT LONG-TERM CURRENT USE OF INSULIN: ICD-10-CM

## 2018-07-17 DIAGNOSIS — E66.01 MORBID OBESITY: ICD-10-CM

## 2018-07-17 DIAGNOSIS — N18.30 ANEMIA IN STAGE 3 CHRONIC KIDNEY DISEASE: ICD-10-CM

## 2018-07-17 PROCEDURE — 99214 OFFICE O/P EST MOD 30 MIN: CPT | Mod: S$GLB,,, | Performed by: INTERNAL MEDICINE

## 2018-07-17 RX ORDER — CARVEDILOL 12.5 MG/1
12.5 TABLET ORAL 2 TIMES DAILY WITH MEALS
COMMUNITY
End: 2019-05-27 | Stop reason: SDUPTHER

## 2018-07-17 NOTE — PROGRESS NOTES
Subjective:    Patient ID:  Zev Castano is a 68 y.o. male     HPI   Here for follow-up of coronary artery disease, hypertension, diabetes mellitus, paroxysmally atrial fibrillation, paroxysmally atrial flutter, obstructive sleep apnea, cerebrovascular accident, chronic diastolic heart failure, nephrotic syndrome, anemia of chronic disease.    I feel well.  My leg swelling is down, I can now  lie flat without getting short of breath .    Current Outpatient Prescriptions   Medication Sig    carvedilol (COREG) 12.5 MG tablet Take 12.5 mg by mouth 2 (two) times daily with meals.    apixaban 2.5 mg Tab Take 1 tablet (2.5 mg total) by mouth 2 (two) times daily.    atorvastatin (LIPITOR) 40 MG tablet TAKE 1 TABLET(40 MG) BY MOUTH EVERY DAY    CONTOUR NEXT STRIPS Strp Use one strip each time to check blood glucose daily as directed    furosemide (LASIX) 40 MG tablet Take 1 tablet (40 mg total) by mouth 2 (two) times daily.    irbesartan (AVAPRO) 300 MG tablet Take 0.5 tablets (150 mg total) by mouth every evening.    liraglutide 0.6 mg/0.1 mL, 18 mg/3 mL, subq PNIJ 0.6 mg/0.1 mL (18 mg/3 mL) PnIj Inject 1.2 mg into the skin once daily.    spironolactone (ALDACTONE) 25 MG tablet Take 1 tablet (25 mg total) by mouth once daily.     No current facility-administered medications for this visit.          Review of Systems   Constitution: Negative for chills, decreased appetite, fever, weight gain and weight loss.   HENT: Negative for congestion, hearing loss and sore throat.    Eyes: Negative for blurred vision, double vision and visual disturbance.   Cardiovascular: Positive for leg swelling. Negative for chest pain, claudication, palpitations and syncope.   Respiratory: Negative for cough, hemoptysis, shortness of breath, sputum production and wheezing.    Endocrine: Negative for cold intolerance and heat intolerance.   Hematologic/Lymphatic: Negative for bleeding problem. Does not bruise/bleed easily.   Skin:  "Negative for color change, dry skin, flushing and itching.   Musculoskeletal: Negative for back pain, joint pain and myalgias.   Gastrointestinal: Negative for abdominal pain, anorexia, constipation, diarrhea, dysphagia, nausea and vomiting.        No bleeding per rectum   Genitourinary: Negative for dysuria, flank pain, frequency, hematuria and nocturia.   Neurological: Negative for dizziness, headaches, light-headedness, loss of balance, seizures and tremors.   Psychiatric/Behavioral: Negative for altered mental status and depression.         Vitals:    07/17/18 1429   BP: (!) 166/85   Pulse: (!) 57   Weight: 113.4 kg (250 lb)   Height: 6' 1" (1.854 m)     Objective:    Physical Exam   Constitutional: He is oriented to person, place, and time. He appears well-developed and well-nourished.   HENT:   Head: Normocephalic and atraumatic.   Right Ear: External ear normal.   Left Ear: External ear normal.   Nose: Nose normal.   Eyes: Conjunctivae and EOM are normal. Pupils are equal, round, and reactive to light. No scleral icterus.   Neck: Normal range of motion. Neck supple. No JVD present. No tracheal deviation present. No thyromegaly present.   Cardiovascular: Normal rate, regular rhythm and normal heart sounds.  Exam reveals no gallop and no friction rub.    No murmur heard.  Pulmonary/Chest: Effort normal and breath sounds normal. No respiratory distress. He has no rales. He exhibits no tenderness.   Abdominal: Soft. Bowel sounds are normal. He exhibits no distension and no mass. There is no tenderness.   Musculoskeletal: Normal range of motion. He exhibits edema. He exhibits no tenderness.   Lymphadenopathy:     He has no cervical adenopathy.   Neurological: He is alert and oriented to person, place, and time. He has normal reflexes. No cranial nerve deficit. Coordination normal.   Skin: Skin is warm and dry. No rash noted.   Psychiatric: He has a normal mood and affect. His behavior is normal.       reviewed " renal function studies  creatinine was 1.65.    Assessment:       1. Coronary artery disease involving native coronary artery of native heart with unstable angina pectoris    2. Type 2 diabetes mellitus with diabetic nephropathy, without long-term current use of insulin    3. PAF (paroxysmal atrial fibrillation)    4. Essential hypertension    5. Obstructive sleep apnea (adult) (pediatric)    6. Morbid obesity    7. Cerebrovascular accident (CVA), unspecified mechanism    8. Heart failure, diastolic, acute on chronic    9. Nephrotic syndrome    10. Anemia in stage 3 chronic kidney disease         Plan:       Increase carvedilol to 12.5 mg twice a day.  Monitor pulse and blood pressure.

## 2018-08-03 DIAGNOSIS — R06.83 SNORING: Primary | ICD-10-CM

## 2018-08-03 DIAGNOSIS — R53.83 FATIGUE: ICD-10-CM

## 2018-08-03 DIAGNOSIS — G47.19 EXCESSIVE DAYTIME SLEEPINESS: ICD-10-CM

## 2018-08-03 DIAGNOSIS — G47.39 OTHER SLEEP APNEA: ICD-10-CM

## 2018-08-10 DIAGNOSIS — E78.00 HYPERCHOLESTEREMIA: ICD-10-CM

## 2018-08-10 RX ORDER — ATORVASTATIN CALCIUM 40 MG/1
TABLET, FILM COATED ORAL
Qty: 90 TABLET | Refills: 3 | Status: SHIPPED | OUTPATIENT
Start: 2018-08-10 | End: 2019-08-17 | Stop reason: SDUPTHER

## 2018-08-16 ENCOUNTER — TELEPHONE (OUTPATIENT)
Dept: SLEEP MEDICINE | Facility: OTHER | Age: 68
End: 2018-08-16

## 2018-08-28 ENCOUNTER — TELEPHONE (OUTPATIENT)
Dept: SLEEP MEDICINE | Facility: OTHER | Age: 68
End: 2018-08-28

## 2018-09-07 DIAGNOSIS — I48.0 PAROXYSMAL ATRIAL FIBRILLATION: Primary | ICD-10-CM

## 2018-09-07 RX ORDER — APIXABAN 2.5 MG/1
TABLET, FILM COATED ORAL
Qty: 60 TABLET | Refills: 6 | Status: SHIPPED | OUTPATIENT
Start: 2018-09-07 | End: 2018-10-05 | Stop reason: SDUPTHER

## 2018-09-17 ENCOUNTER — TELEPHONE (OUTPATIENT)
Dept: SLEEP MEDICINE | Facility: OTHER | Age: 68
End: 2018-09-17

## 2018-09-27 ENCOUNTER — TELEPHONE (OUTPATIENT)
Dept: SLEEP MEDICINE | Facility: OTHER | Age: 68
End: 2018-09-27

## 2018-10-05 DIAGNOSIS — I48.0 PAROXYSMAL ATRIAL FIBRILLATION: ICD-10-CM

## 2018-10-08 RX ORDER — APIXABAN 2.5 MG/1
TABLET, FILM COATED ORAL
Qty: 60 TABLET | Refills: 0 | Status: SHIPPED | OUTPATIENT
Start: 2018-10-08 | End: 2019-08-27 | Stop reason: SDUPTHER

## 2018-10-11 ENCOUNTER — TELEPHONE (OUTPATIENT)
Dept: SLEEP MEDICINE | Facility: OTHER | Age: 68
End: 2018-10-11

## 2018-10-19 ENCOUNTER — TELEPHONE (OUTPATIENT)
Dept: SLEEP MEDICINE | Facility: OTHER | Age: 68
End: 2018-10-19

## 2018-10-23 ENCOUNTER — TELEPHONE (OUTPATIENT)
Dept: SLEEP MEDICINE | Facility: OTHER | Age: 68
End: 2018-10-23

## 2018-10-30 DIAGNOSIS — R06.00 DYSPNEA, UNSPECIFIED TYPE: ICD-10-CM

## 2018-10-30 DIAGNOSIS — I25.10 CORONARY ARTERY DISEASE INVOLVING NATIVE CORONARY ARTERY OF NATIVE HEART WITHOUT ANGINA PECTORIS: Primary | ICD-10-CM

## 2018-10-30 DIAGNOSIS — E11.9 DIABETES MELLITUS WITHOUT COMPLICATION: Primary | ICD-10-CM

## 2018-11-14 DIAGNOSIS — I25.110 CORONARY ARTERY DISEASE INVOLVING NATIVE CORONARY ARTERY OF NATIVE HEART WITH UNSTABLE ANGINA PECTORIS: Primary | ICD-10-CM

## 2018-11-15 ENCOUNTER — HOSPITAL ENCOUNTER (OUTPATIENT)
Dept: RADIOLOGY | Facility: OTHER | Age: 68
Discharge: HOME | End: 2018-11-15
Attending: INTERNAL MEDICINE
Payer: MEDICARE

## 2018-11-15 ENCOUNTER — HOSPITAL ENCOUNTER (OUTPATIENT)
Dept: CARDIOLOGY | Facility: OTHER | Age: 68
Discharge: HOME | End: 2018-11-15
Attending: INTERNAL MEDICINE
Payer: MEDICARE

## 2018-11-15 VITALS
HEART RATE: 69 BPM | BODY MASS INDEX: 33.86 KG/M2 | DIASTOLIC BLOOD PRESSURE: 84 MMHG | WEIGHT: 250 LBS | SYSTOLIC BLOOD PRESSURE: 189 MMHG | HEIGHT: 72 IN

## 2018-11-15 DIAGNOSIS — I25.110 CORONARY ARTERY DISEASE INVOLVING NATIVE CORONARY ARTERY OF NATIVE HEART WITH UNSTABLE ANGINA PECTORIS: ICD-10-CM

## 2018-11-15 LAB
OHS CV CPX 85 PERCENT MAX PREDICTED HEART RATE MALE: 128
OHS CV CPX MAX PREDICTED HEART RATE: 151
OHS CV CPX PATIENT IS FEMALE: 0
OHS CV CPX PATIENT IS MALE: 1

## 2018-11-15 PROCEDURE — 93017 CV STRESS TEST TRACING ONLY: CPT

## 2018-11-15 PROCEDURE — 78452 HT MUSCLE IMAGE SPECT MULT: CPT | Mod: TC

## 2018-11-15 PROCEDURE — 78452 HT MUSCLE IMAGE SPECT MULT: CPT | Mod: 26,,, | Performed by: RADIOLOGY

## 2018-11-28 ENCOUNTER — TELEPHONE (OUTPATIENT)
Dept: SLEEP MEDICINE | Facility: OTHER | Age: 68
End: 2018-11-28

## 2019-05-27 DIAGNOSIS — I25.110 CORONARY ARTERY DISEASE INVOLVING NATIVE CORONARY ARTERY OF NATIVE HEART WITH UNSTABLE ANGINA PECTORIS: Primary | ICD-10-CM

## 2019-05-28 DIAGNOSIS — I25.110 CORONARY ARTERY DISEASE INVOLVING NATIVE CORONARY ARTERY OF NATIVE HEART WITH UNSTABLE ANGINA PECTORIS: ICD-10-CM

## 2019-05-28 RX ORDER — CARVEDILOL 12.5 MG/1
TABLET ORAL
Qty: 180 TABLET | Refills: 0 | Status: SHIPPED | OUTPATIENT
Start: 2019-05-28 | End: 2019-06-04

## 2019-05-28 RX ORDER — CARVEDILOL 12.5 MG/1
TABLET ORAL
Qty: 60 TABLET | Refills: 0 | Status: SHIPPED | OUTPATIENT
Start: 2019-05-28 | End: 2019-05-28 | Stop reason: SDUPTHER

## 2019-05-29 DIAGNOSIS — I10 ESSENTIAL HYPERTENSION: ICD-10-CM

## 2019-05-29 DIAGNOSIS — I10 ESSENTIAL HYPERTENSION: Primary | ICD-10-CM

## 2019-05-29 RX ORDER — FUROSEMIDE 40 MG/1
TABLET ORAL
Qty: 90 TABLET | Refills: 0 | Status: SHIPPED | OUTPATIENT
Start: 2019-05-29 | End: 2019-08-27

## 2019-05-29 RX ORDER — FUROSEMIDE 40 MG/1
TABLET ORAL
Qty: 30 TABLET | Refills: 0 | Status: SHIPPED | OUTPATIENT
Start: 2019-05-29 | End: 2019-05-29 | Stop reason: SDUPTHER

## 2019-06-04 ENCOUNTER — OFFICE VISIT (OUTPATIENT)
Dept: CARDIOLOGY | Facility: CLINIC | Age: 69
End: 2019-06-04
Attending: INTERNAL MEDICINE
Payer: MEDICARE

## 2019-06-04 VITALS
DIASTOLIC BLOOD PRESSURE: 88 MMHG | BODY MASS INDEX: 37.52 KG/M2 | WEIGHT: 277 LBS | HEART RATE: 73 BPM | SYSTOLIC BLOOD PRESSURE: 191 MMHG | HEIGHT: 72 IN

## 2019-06-04 DIAGNOSIS — I25.110 CORONARY ARTERY DISEASE INVOLVING NATIVE CORONARY ARTERY OF NATIVE HEART WITH UNSTABLE ANGINA PECTORIS: ICD-10-CM

## 2019-06-04 DIAGNOSIS — E66.01 MORBID OBESITY: ICD-10-CM

## 2019-06-04 DIAGNOSIS — I50.33 HEART FAILURE, DIASTOLIC, ACUTE ON CHRONIC: ICD-10-CM

## 2019-06-04 DIAGNOSIS — I63.9 CEREBROVASCULAR ACCIDENT (CVA), UNSPECIFIED MECHANISM: ICD-10-CM

## 2019-06-04 DIAGNOSIS — E11.21 TYPE 2 DIABETES MELLITUS WITH DIABETIC NEPHROPATHY, WITHOUT LONG-TERM CURRENT USE OF INSULIN: ICD-10-CM

## 2019-06-04 DIAGNOSIS — I10 ESSENTIAL HYPERTENSION: ICD-10-CM

## 2019-06-04 DIAGNOSIS — N18.30 ANEMIA IN STAGE 3 CHRONIC KIDNEY DISEASE: ICD-10-CM

## 2019-06-04 DIAGNOSIS — I48.0 PAF (PAROXYSMAL ATRIAL FIBRILLATION): Primary | ICD-10-CM

## 2019-06-04 DIAGNOSIS — D63.1 ANEMIA IN STAGE 3 CHRONIC KIDNEY DISEASE: ICD-10-CM

## 2019-06-04 DIAGNOSIS — G47.33 OBSTRUCTIVE SLEEP APNEA (ADULT) (PEDIATRIC): ICD-10-CM

## 2019-06-04 DIAGNOSIS — N04.9 NEPHROTIC SYNDROME: ICD-10-CM

## 2019-06-04 PROCEDURE — 99214 PR OFFICE/OUTPT VISIT, EST, LEVL IV, 30-39 MIN: ICD-10-PCS | Mod: S$GLB,,, | Performed by: INTERNAL MEDICINE

## 2019-06-04 PROCEDURE — 99214 OFFICE O/P EST MOD 30 MIN: CPT | Mod: S$GLB,,, | Performed by: INTERNAL MEDICINE

## 2019-06-04 RX ORDER — AMLODIPINE BESYLATE 5 MG/1
5 TABLET ORAL DAILY
Qty: 90 TABLET | Refills: 3 | Status: SHIPPED | OUTPATIENT
Start: 2019-06-04 | End: 2019-12-10

## 2019-06-04 RX ORDER — CARVEDILOL 25 MG/1
25 TABLET ORAL 2 TIMES DAILY WITH MEALS
Qty: 180 TABLET | Refills: 3 | Status: SHIPPED | OUTPATIENT
Start: 2019-06-04 | End: 2020-04-17

## 2019-06-04 NOTE — PROGRESS NOTES
Subjective:    Patient ID:  Zev Castano is a 69 y.o. male     HPI   Here for follow-up of cerebrovascular accident involving the bertrand x2 in 2012, hypertension, diabetes mellitus, paroxysmal atrial fibrillation, coronary artery disease, status post drug-eluting stents x2 in the left anterior descending coronary artery in March of 2016, nephrotic syndrome, morbid obesity, anemia of chronic kidney disease, obstructive sleep apnea.    I have been under tremendous amount of stress, and that is why my blood pressure is high.  I have not checked my blood sugar in many weeks.  I get short of breath when I bend forwards.    Current Outpatient Medications   Medication Sig    amLODIPine (NORVASC) 5 MG tablet Take 1 tablet (5 mg total) by mouth once daily.    aspirin 81 MG Chew CSW ONE T  ONCE D    atorvastatin (LIPITOR) 40 MG tablet TAKE 1 TABLET(40 MG) BY MOUTH EVERY DAY    carvedilol (COREG) 25 MG tablet Take 1 tablet (25 mg total) by mouth 2 (two) times daily with meals.    CONTOUR NEXT STRIPS Strp Use one strip each time to check blood glucose daily as directed    dulaglutide (TRULICITY) 1.5 mg/0.5 mL PnIj Inject 1.5 mg into the skin every 7 days.    ELIQUIS 2.5 mg Tab TAKE 1 TABLET BY MOUTH TWICE DAILY    ergocalciferol (VITAMIN D2) 50,000 unit Cap Take 50,000 Units by mouth every 7 days.    fluticasone (FLONASE) 50 mcg/actuation nasal spray SHAKE LIQUID AND USE 2 SPRAYS(100 MCG) IN EACH NOSTRIL EVERY DAY    furosemide (LASIX) 20 MG tablet Take 20 mg by mouth.    furosemide (LASIX) 40 MG tablet TAKE 1 TABLET BY MOUTH EVERY DAY    SITagliptan-metformin (JANUMET) 50-1,000 mg per tablet Take 1 tablet by mouth once daily.    spironolactone (ALDACTONE) 25 MG tablet Take 1 tablet (25 mg total) by mouth once daily.    sulfamethoxazole-trimethoprim 800-160mg (BACTRIM DS) 800-160 mg Tab Take 1 tablet by mouth 2 (two) times daily.    telmisartan (MICARDIS) 80 MG Tab Take 40 mg by mouth once daily.     No current  facility-administered medications for this visit.          Review of Systems   Constitution: Negative for chills, decreased appetite, fever, weight gain and weight loss.   HENT: Negative for congestion, hearing loss and sore throat.    Eyes: Negative for blurred vision, double vision and visual disturbance.   Cardiovascular: Negative for chest pain, claudication, dyspnea on exertion, leg swelling, palpitations and syncope.   Respiratory: Positive for shortness of breath. Negative for cough, hemoptysis, sputum production and wheezing.    Endocrine: Negative for cold intolerance and heat intolerance.   Hematologic/Lymphatic: Negative for bleeding problem. Does not bruise/bleed easily.   Skin: Negative for color change, dry skin, flushing and itching.   Musculoskeletal: Negative for back pain, joint pain and myalgias.   Gastrointestinal: Negative for abdominal pain, anorexia, constipation, diarrhea, dysphagia, nausea and vomiting.        No bleeding per rectum   Genitourinary: Negative for dysuria, flank pain, frequency, hematuria and nocturia.   Neurological: Negative for dizziness, headaches, light-headedness, loss of balance, seizures and tremors.   Psychiatric/Behavioral: Negative for altered mental status and depression.         Vitals:    06/04/19 1431   BP: (!) 191/88   Pulse: 73   Weight: 125.6 kg (277 lb)   Height: 6' (1.829 m)     Objective:    Physical Exam   Constitutional: He is oriented to person, place, and time. He appears well-developed and well-nourished.   HENT:   Head: Normocephalic and atraumatic.   Right Ear: External ear normal.   Left Ear: External ear normal.   Nose: Nose normal.   Eyes: Pupils are equal, round, and reactive to light. Conjunctivae and EOM are normal. No scleral icterus.   Neck: Normal range of motion. Neck supple. No JVD present. No tracheal deviation present. No thyromegaly present.   Cardiovascular: Normal rate and regular rhythm. Exam reveals no gallop and no friction rub.    Murmur heard.  Soft bruits in the neck  Soft basal ejection systolic murmur   Pulmonary/Chest: Effort normal and breath sounds normal. No respiratory distress. He has no rales. He exhibits no tenderness.   Abdominal: Soft. Bowel sounds are normal. He exhibits no distension and no mass. There is no tenderness.   Musculoskeletal: Normal range of motion. He exhibits no edema or tenderness.   Lymphadenopathy:     He has no cervical adenopathy.   Neurological: He is alert and oriented to person, place, and time. He has normal reflexes. No cranial nerve deficit. Coordination normal.   Skin: Skin is warm and dry. No rash noted.   Psychiatric: He has a normal mood and affect. His behavior is normal.         Assessment:       1. PAF (paroxysmal atrial fibrillation)    2. Heart failure, diastolic, acute on chronic    3. Essential hypertension    4. Cerebrovascular accident (CVA), unspecified mechanism    5. Coronary artery disease involving native coronary artery of native heart   6. Nephrotic syndrome    7. Type 2 diabetes mellitus with diabetic nephropathy, without long-term current use of insulin    8. Anemia in stage 3 chronic kidney disease    9. Obstructive sleep apnea (adult) (pediatric)    10. Morbid obesity         Plan:       Increase carvedilol to 25 mg p.o. B.i.d.  Add amlodipine 5 mg daily  Monitor blood pressure daily  Monitor fasting blood sugar daily and keep a log  Check echocardiogram and carotid Doppler studies

## 2019-06-08 DIAGNOSIS — I48.0 PAROXYSMAL ATRIAL FIBRILLATION: ICD-10-CM

## 2019-06-10 RX ORDER — APIXABAN 2.5 MG/1
TABLET, FILM COATED ORAL
Qty: 60 TABLET | Refills: 0 | Status: SHIPPED | OUTPATIENT
Start: 2019-06-10 | End: 2019-07-08 | Stop reason: SDUPTHER

## 2019-06-20 ENCOUNTER — OFFICE VISIT (OUTPATIENT)
Dept: CARDIOLOGY | Facility: CLINIC | Age: 69
End: 2019-06-20
Attending: INTERNAL MEDICINE
Payer: MEDICARE

## 2019-06-20 ENCOUNTER — CLINICAL SUPPORT (OUTPATIENT)
Dept: CARDIOLOGY | Facility: CLINIC | Age: 69
End: 2019-06-20
Payer: MEDICARE

## 2019-06-20 VITALS
BODY MASS INDEX: 37.65 KG/M2 | HEIGHT: 72 IN | WEIGHT: 278 LBS | DIASTOLIC BLOOD PRESSURE: 72 MMHG | HEART RATE: 57 BPM | SYSTOLIC BLOOD PRESSURE: 153 MMHG

## 2019-06-20 DIAGNOSIS — I50.33 HEART FAILURE, DIASTOLIC, ACUTE ON CHRONIC: ICD-10-CM

## 2019-06-20 DIAGNOSIS — I48.0 PAF (PAROXYSMAL ATRIAL FIBRILLATION): ICD-10-CM

## 2019-06-20 DIAGNOSIS — I48.3 TYPICAL ATRIAL FLUTTER: ICD-10-CM

## 2019-06-20 DIAGNOSIS — N04.9 NEPHROTIC SYNDROME: ICD-10-CM

## 2019-06-20 DIAGNOSIS — M51.36 DDD (DEGENERATIVE DISC DISEASE), LUMBAR: ICD-10-CM

## 2019-06-20 DIAGNOSIS — E11.9 NON-INSULIN DEPENDENT TYPE 2 DIABETES MELLITUS: Chronic | ICD-10-CM

## 2019-06-20 DIAGNOSIS — I63.9 CEREBROVASCULAR ACCIDENT (CVA), UNSPECIFIED MECHANISM: Primary | ICD-10-CM

## 2019-06-20 DIAGNOSIS — G47.33 OBSTRUCTIVE SLEEP APNEA (ADULT) (PEDIATRIC): ICD-10-CM

## 2019-06-20 DIAGNOSIS — D63.1 ANEMIA IN STAGE 3 CHRONIC KIDNEY DISEASE: ICD-10-CM

## 2019-06-20 DIAGNOSIS — N18.30 ANEMIA IN STAGE 3 CHRONIC KIDNEY DISEASE: ICD-10-CM

## 2019-06-20 DIAGNOSIS — I25.10 CORONARY ARTERY DISEASE INVOLVING NATIVE CORONARY ARTERY OF NATIVE HEART WITHOUT ANGINA PECTORIS: ICD-10-CM

## 2019-06-20 DIAGNOSIS — I63.9 CVA (CEREBRAL VASCULAR ACCIDENT): ICD-10-CM

## 2019-06-20 DIAGNOSIS — I10 ESSENTIAL HYPERTENSION: ICD-10-CM

## 2019-06-20 DIAGNOSIS — E66.9 RESTRICTIVE LUNG DISEASE SECONDARY TO OBESITY: ICD-10-CM

## 2019-06-20 DIAGNOSIS — J98.4 RESTRICTIVE LUNG DISEASE SECONDARY TO OBESITY: ICD-10-CM

## 2019-06-20 DIAGNOSIS — E66.01 MORBID OBESITY: ICD-10-CM

## 2019-06-20 PROCEDURE — 93306 TTE W/DOPPLER COMPLETE: CPT | Mod: S$GLB,,, | Performed by: INTERNAL MEDICINE

## 2019-06-20 PROCEDURE — 93880 EXTRACRANIAL BILAT STUDY: CPT | Mod: S$GLB,,, | Performed by: INTERNAL MEDICINE

## 2019-06-20 PROCEDURE — 93306 PR ECHO HEART XTHORACIC,COMPLETE W DOPPLER: ICD-10-PCS | Mod: S$GLB,,, | Performed by: INTERNAL MEDICINE

## 2019-06-20 PROCEDURE — 99214 OFFICE O/P EST MOD 30 MIN: CPT | Mod: 25,S$GLB,, | Performed by: INTERNAL MEDICINE

## 2019-06-20 PROCEDURE — 99214 PR OFFICE/OUTPT VISIT, EST, LEVL IV, 30-39 MIN: ICD-10-PCS | Mod: 25,S$GLB,, | Performed by: INTERNAL MEDICINE

## 2019-06-20 PROCEDURE — 93880 PR DUPLEX SCAN EXTRACRANIAL,BILAT: ICD-10-PCS | Mod: S$GLB,,, | Performed by: INTERNAL MEDICINE

## 2019-06-21 NOTE — PROGRESS NOTES
Subjective:    Patient ID:  Zev Castano is a 69 y.o. male     HPI   HPI   Here for follow-up of cerebrovascular accident involving the bertrand x2 in 2012, hypertension, diabetes mellitus, paroxysmal atrial fibrillation, coronary artery disease, status post drug-eluting stents x2 in the left anterior descending coronary artery in March of 2016, nephrotic syndrome, morbid obesity, anemia of chronic kidney disease, obstructive sleep apnea.     With the addition of amlodipine and increase in carvedilol, I feel dramatically better.  I can't get around with less shortness of breath.  My sugars and now perfectly controlled, I have been careful with my diet.    Current Outpatient Medications   Medication Sig    amLODIPine (NORVASC) 5 MG tablet Take 1 tablet (5 mg total) by mouth once daily.    aspirin 81 MG Chew CSW ONE T  ONCE D    atorvastatin (LIPITOR) 40 MG tablet TAKE 1 TABLET(40 MG) BY MOUTH EVERY DAY    carvedilol (COREG) 25 MG tablet Take 1 tablet (25 mg total) by mouth 2 (two) times daily with meals.    CONTOUR NEXT STRIPS Strp Use one strip each time to check blood glucose daily as directed    dulaglutide (TRULICITY) 1.5 mg/0.5 mL PnIj Inject 1.5 mg into the skin every 7 days.    ELIQUIS 2.5 mg Tab TAKE 1 TABLET BY MOUTH TWICE DAILY    ELIQUIS 2.5 mg Tab TAKE 1 TABLET BY MOUTH TWICE DAILY    ergocalciferol (VITAMIN D2) 50,000 unit Cap Take 50,000 Units by mouth every 7 days.    fluticasone (FLONASE) 50 mcg/actuation nasal spray SHAKE LIQUID AND USE 2 SPRAYS(100 MCG) IN EACH NOSTRIL EVERY DAY    furosemide (LASIX) 20 MG tablet Take 20 mg by mouth.    furosemide (LASIX) 40 MG tablet TAKE 1 TABLET BY MOUTH EVERY DAY    SITagliptan-metformin (JANUMET) 50-1,000 mg per tablet Take 1 tablet by mouth once daily.    spironolactone (ALDACTONE) 25 MG tablet Take 1 tablet (25 mg total) by mouth once daily.    sulfamethoxazole-trimethoprim 800-160mg (BACTRIM DS) 800-160 mg Tab Take 1 tablet by mouth 2 (two)  times daily.    telmisartan (MICARDIS) 80 MG Tab Take 40 mg by mouth once daily.     No current facility-administered medications for this visit.          Review of Systems   Constitution: Negative for chills, decreased appetite, fever, weight gain and weight loss.   HENT: Negative for congestion, hearing loss and sore throat.    Eyes: Negative for blurred vision, double vision and visual disturbance.   Cardiovascular: Negative for chest pain, claudication, dyspnea on exertion, leg swelling, palpitations and syncope.   Respiratory: Negative for cough, hemoptysis, shortness of breath, sputum production and wheezing.    Endocrine: Negative for cold intolerance and heat intolerance.   Hematologic/Lymphatic: Negative for bleeding problem. Does not bruise/bleed easily.   Skin: Negative for color change, dry skin, flushing and itching.   Musculoskeletal: Negative for back pain, joint pain and myalgias.   Gastrointestinal: Negative for abdominal pain, anorexia, constipation, diarrhea, dysphagia, nausea and vomiting.        No bleeding per rectum   Genitourinary: Negative for dysuria, flank pain, frequency, hematuria and nocturia.   Neurological: Negative for dizziness, headaches, light-headedness, loss of balance, seizures and tremors.   Psychiatric/Behavioral: Negative for altered mental status and depression.         Vitals:    06/20/19 1112   BP: (!) 153/72   Pulse: (!) 57   Weight: 126.1 kg (278 lb)   Height: 6' (1.829 m)     Objective:    Physical Exam   Constitutional: He is oriented to person, place, and time. He appears well-developed and well-nourished.   HENT:   Head: Normocephalic and atraumatic.   Right Ear: External ear normal.   Left Ear: External ear normal.   Nose: Nose normal.   Eyes: Pupils are equal, round, and reactive to light. Conjunctivae and EOM are normal. No scleral icterus.   Neck: Normal range of motion. Neck supple. No JVD present. No tracheal deviation present. No thyromegaly present.    Cardiovascular: Normal rate and regular rhythm. Exam reveals no gallop and no friction rub.   Murmur heard.  Bilateral carotid bruits  Ejection systolic murmur at the base.   Pulmonary/Chest: Effort normal and breath sounds normal. No respiratory distress. He has no rales. He exhibits no tenderness.   Abdominal: Soft. Bowel sounds are normal. He exhibits no distension and no mass. There is no tenderness.   Musculoskeletal: Normal range of motion. He exhibits no edema or tenderness.   Lymphadenopathy:     He has no cervical adenopathy.   Neurological: He is alert and oriented to person, place, and time. He has normal reflexes. No cranial nerve deficit. Coordination normal.   Skin: Skin is warm and dry. No rash noted.   Psychiatric: He has a normal mood and affect. His behavior is normal.         Assessment:       1. Cerebrovascular accident (CVA), unspecified mechanism    2. Coronary artery disease involving native coronary artery of native heart without angina pectoris    3. PAF (paroxysmal atrial fibrillation)    4. Typical atrial flutter    5. Heart failure, diastolic, acute on chronic    6. Essential hypertension    7. Morbid obesity    8. Non-insulin dependent type 2 diabetes mellitus    9. Obstructive sleep apnea (adult) (pediatric)    10. Nephrotic syndrome    11. Anemia in stage 3 chronic kidney disease    12. Restrictive lung disease secondary to obesity    13. DDD (degenerative disc disease), lumbar         Plan:       Carotid Doppler studies and echocardiogram reviewed with the patient.  Continue present pharmacological regimen.

## 2019-07-02 DIAGNOSIS — I10 ESSENTIAL HYPERTENSION: ICD-10-CM

## 2019-07-03 DIAGNOSIS — I10 ESSENTIAL HYPERTENSION: ICD-10-CM

## 2019-07-03 RX ORDER — FUROSEMIDE 40 MG/1
TABLET ORAL
Qty: 30 TABLET | Refills: 0 | Status: SHIPPED | OUTPATIENT
Start: 2019-07-03 | End: 2019-07-03 | Stop reason: SDUPTHER

## 2019-07-03 RX ORDER — FUROSEMIDE 40 MG/1
TABLET ORAL
Qty: 90 TABLET | Refills: 3 | Status: SHIPPED | OUTPATIENT
Start: 2019-07-03 | End: 2019-08-27

## 2019-07-05 DIAGNOSIS — I50.33 HEART FAILURE, DIASTOLIC, ACUTE ON CHRONIC: Primary | ICD-10-CM

## 2019-07-05 RX ORDER — SPIRONOLACTONE 25 MG/1
TABLET ORAL
Qty: 90 TABLET | Refills: 0 | Status: SHIPPED | OUTPATIENT
Start: 2019-07-05 | End: 2019-08-27 | Stop reason: SDUPTHER

## 2019-07-08 DIAGNOSIS — I48.0 PAROXYSMAL ATRIAL FIBRILLATION: ICD-10-CM

## 2019-07-08 RX ORDER — APIXABAN 2.5 MG/1
TABLET, FILM COATED ORAL
Qty: 60 TABLET | Refills: 0 | Status: SHIPPED | OUTPATIENT
Start: 2019-07-08 | End: 2019-08-08 | Stop reason: SDUPTHER

## 2019-08-01 DIAGNOSIS — I10 ESSENTIAL HYPERTENSION: ICD-10-CM

## 2019-08-02 RX ORDER — FUROSEMIDE 40 MG/1
TABLET ORAL
Qty: 30 TABLET | Refills: 0 | Status: SHIPPED | OUTPATIENT
Start: 2019-08-02 | End: 2019-08-27

## 2019-08-08 DIAGNOSIS — I48.0 PAROXYSMAL ATRIAL FIBRILLATION: ICD-10-CM

## 2019-08-08 RX ORDER — APIXABAN 2.5 MG/1
TABLET, FILM COATED ORAL
Qty: 60 TABLET | Refills: 0 | Status: SHIPPED | OUTPATIENT
Start: 2019-08-08 | End: 2019-09-06 | Stop reason: SDUPTHER

## 2019-08-17 DIAGNOSIS — E78.00 HYPERCHOLESTEREMIA: ICD-10-CM

## 2019-08-19 RX ORDER — ATORVASTATIN CALCIUM 40 MG/1
TABLET, FILM COATED ORAL
Qty: 90 TABLET | Refills: 0 | Status: SHIPPED | OUTPATIENT
Start: 2019-08-19 | End: 2019-10-09 | Stop reason: SDUPTHER

## 2019-08-27 ENCOUNTER — OFFICE VISIT (OUTPATIENT)
Dept: CARDIOLOGY | Facility: CLINIC | Age: 69
End: 2019-08-27
Attending: INTERNAL MEDICINE
Payer: MEDICARE

## 2019-08-27 VITALS
HEART RATE: 61 BPM | WEIGHT: 277 LBS | BODY MASS INDEX: 37.52 KG/M2 | SYSTOLIC BLOOD PRESSURE: 176 MMHG | HEIGHT: 72 IN | DIASTOLIC BLOOD PRESSURE: 81 MMHG

## 2019-08-27 DIAGNOSIS — I48.3 TYPICAL ATRIAL FLUTTER: ICD-10-CM

## 2019-08-27 DIAGNOSIS — E11.21 TYPE 2 DIABETES MELLITUS WITH DIABETIC NEPHROPATHY, WITHOUT LONG-TERM CURRENT USE OF INSULIN: ICD-10-CM

## 2019-08-27 DIAGNOSIS — E11.9 NON-INSULIN DEPENDENT TYPE 2 DIABETES MELLITUS: Chronic | ICD-10-CM

## 2019-08-27 DIAGNOSIS — I10 ESSENTIAL HYPERTENSION: ICD-10-CM

## 2019-08-27 DIAGNOSIS — I25.10 CORONARY ARTERY DISEASE INVOLVING NATIVE CORONARY ARTERY OF NATIVE HEART WITHOUT ANGINA PECTORIS: ICD-10-CM

## 2019-08-27 DIAGNOSIS — E66.01 MORBID OBESITY: ICD-10-CM

## 2019-08-27 DIAGNOSIS — I63.9 CEREBROVASCULAR ACCIDENT (CVA), UNSPECIFIED MECHANISM: Primary | ICD-10-CM

## 2019-08-27 DIAGNOSIS — I48.0 PAROXYSMAL ATRIAL FIBRILLATION: ICD-10-CM

## 2019-08-27 DIAGNOSIS — I48.0 PAF (PAROXYSMAL ATRIAL FIBRILLATION): ICD-10-CM

## 2019-08-27 DIAGNOSIS — N04.9 NEPHROTIC SYNDROME: ICD-10-CM

## 2019-08-27 DIAGNOSIS — I50.33 HEART FAILURE, DIASTOLIC, ACUTE ON CHRONIC: ICD-10-CM

## 2019-08-27 DIAGNOSIS — G47.33 OBSTRUCTIVE SLEEP APNEA (ADULT) (PEDIATRIC): ICD-10-CM

## 2019-08-27 PROCEDURE — 99214 PR OFFICE/OUTPT VISIT, EST, LEVL IV, 30-39 MIN: ICD-10-PCS | Mod: S$GLB,,, | Performed by: INTERNAL MEDICINE

## 2019-08-27 PROCEDURE — 99214 OFFICE O/P EST MOD 30 MIN: CPT | Mod: S$GLB,,, | Performed by: INTERNAL MEDICINE

## 2019-08-27 RX ORDER — FUROSEMIDE 40 MG/1
40 TABLET ORAL DAILY
Qty: 90 TABLET | Refills: 3 | Status: SHIPPED | OUTPATIENT
Start: 2019-08-27 | End: 2020-02-27 | Stop reason: SDUPTHER

## 2019-08-27 RX ORDER — SPIRONOLACTONE 25 MG/1
TABLET ORAL
Qty: 90 TABLET | Refills: 3 | Status: SHIPPED | OUTPATIENT
Start: 2019-08-27 | End: 2020-10-06

## 2019-09-06 DIAGNOSIS — I48.0 PAROXYSMAL ATRIAL FIBRILLATION: ICD-10-CM

## 2019-09-09 RX ORDER — APIXABAN 2.5 MG/1
TABLET, FILM COATED ORAL
Qty: 60 TABLET | Refills: 0 | Status: SHIPPED | OUTPATIENT
Start: 2019-09-09 | End: 2020-10-06 | Stop reason: SDUPTHER

## 2019-09-16 NOTE — PROGRESS NOTES
Subjective:    Patient ID:  Zev Castano is a 69 y.o. male     HPI   Here for follow-up of cerebrovascular accident involving the bertrand x2 in 2012, hypertension, diabetes mellitus, paroxysmal atrial fibrillation, coronary artery disease, status post drug-eluting stents x2 in the left anterior descending coronary artery in March of 2016, nephrotic syndrome, morbid obesity, anemia of chronic kidney disease, obstructive sleep apnea.    I feel a little better.  I get around a little more.  I have no shortness of breath or chest discomfort.  My blood pressure is better.  I still sometimes get a little confused.    Current Outpatient Medications   Medication Sig    amLODIPine (NORVASC) 5 MG tablet Take 1 tablet (5 mg total) by mouth once daily.    apixaban (ELIQUIS) 2.5 mg Tab Take 1 tablet (2.5 mg total) by mouth 2 (two) times daily.    aspirin 81 MG Chew CSW ONE T  ONCE D    atorvastatin (LIPITOR) 40 MG tablet TAKE 1 TABLET(40 MG) BY MOUTH EVERY DAY    carvedilol (COREG) 25 MG tablet Take 1 tablet (25 mg total) by mouth 2 (two) times daily with meals.    CONTOUR NEXT STRIPS Strp Use one strip each time to check blood glucose daily as directed    dulaglutide (TRULICITY) 1.5 mg/0.5 mL PnIj Inject 1.5 mg into the skin every 7 days.    ELIQUIS 2.5 mg Tab TAKE 1 TABLET BY MOUTH TWICE DAILY    ergocalciferol (VITAMIN D2) 50,000 unit Cap Take 50,000 Units by mouth every 7 days.    fluticasone (FLONASE) 50 mcg/actuation nasal spray SHAKE LIQUID AND USE 2 SPRAYS(100 MCG) IN EACH NOSTRIL EVERY DAY    furosemide (LASIX) 40 MG tablet Take 1 tablet (40 mg total) by mouth once daily.    SITagliptan-metformin (JANUMET) 50-1,000 mg per tablet Take 1 tablet by mouth once daily.    spironolactone (ALDACTONE) 25 MG tablet TAKE 1 TABLET(25 MG) BY MOUTH EVERY DAY    sulfamethoxazole-trimethoprim 800-160mg (BACTRIM DS) 800-160 mg Tab Take 1 tablet by mouth 2 (two) times daily.    telmisartan (MICARDIS) 80 MG Tab Take 40 mg  by mouth once daily.     No current facility-administered medications for this visit.             Review of Systems   Constitution: Negative for chills, decreased appetite, fever, weight gain and weight loss.   HENT: Negative for congestion, hearing loss and sore throat.    Eyes: Negative for blurred vision, double vision and visual disturbance.   Cardiovascular: Negative for chest pain, claudication, dyspnea on exertion, leg swelling, palpitations and syncope.   Respiratory: Negative for cough, hemoptysis, shortness of breath, sputum production and wheezing.    Endocrine: Negative for cold intolerance and heat intolerance.   Hematologic/Lymphatic: Negative for bleeding problem. Does not bruise/bleed easily.   Skin: Negative for color change, dry skin, flushing and itching.   Musculoskeletal: Negative for back pain, joint pain and myalgias.   Gastrointestinal: Negative for abdominal pain, anorexia, constipation, diarrhea, dysphagia, nausea and vomiting.        No bleeding per rectum   Genitourinary: Negative for dysuria, flank pain, frequency, hematuria and nocturia.   Neurological: Negative for dizziness, headaches, light-headedness, loss of balance, seizures and tremors.   Psychiatric/Behavioral: Negative for altered mental status and depression.         Vitals:    08/27/19 1539   BP: (!) 176/81   Pulse: 61   Weight: 125.6 kg (277 lb)   Height: 6' (1.829 m)    Blood pressure recheck 142/76    Objective:    Physical Exam   Constitutional: He is oriented to person, place, and time. He appears well-developed and well-nourished.   HENT:   Head: Normocephalic and atraumatic.   Right Ear: External ear normal.   Left Ear: External ear normal.   Nose: Nose normal.   Eyes: Pupils are equal, round, and reactive to light. Conjunctivae and EOM are normal. No scleral icterus.   Neck: Normal range of motion. Neck supple. No JVD present. No tracheal deviation present. No thyromegaly present.   Cardiovascular: Normal rate,  regular rhythm and normal heart sounds. Exam reveals no gallop and no friction rub.   No murmur heard.  Pulmonary/Chest: Effort normal and breath sounds normal. No respiratory distress. He has no rales. He exhibits no tenderness.   Abdominal: Soft. Bowel sounds are normal. He exhibits no distension and no mass. There is no tenderness.   Musculoskeletal: Normal range of motion. He exhibits no edema or tenderness.   Lymphadenopathy:     He has no cervical adenopathy.   Neurological: He is alert and oriented to person, place, and time. He has normal reflexes. No cranial nerve deficit. Coordination normal.   Skin: Skin is warm and dry. No rash noted.   Psychiatric: He has a normal mood and affect. His behavior is normal.         Assessment:       1. Cerebrovascular accident (CVA), unspecified mechanism    2. Coronary artery disease involving native coronary artery of native heart without angina pectoris    3. PAF (paroxysmal atrial fibrillation)    4. Typical atrial flutter    5. Essential hypertension    6. Nephrotic syndrome    7. Non-insulin dependent type 2 diabetes mellitus    8. Morbid obesity    9. Type 2 diabetes mellitus with diabetic nephropathy, without long-term current use of insulin    10. Obstructive sleep apnea (adult) (pediatric)         Plan:       Continue present pharmacological regimen.

## 2019-10-08 ENCOUNTER — OFFICE VISIT (OUTPATIENT)
Dept: CARDIOLOGY | Facility: CLINIC | Age: 69
End: 2019-10-08
Attending: INTERNAL MEDICINE
Payer: MEDICARE

## 2019-10-08 VITALS
SYSTOLIC BLOOD PRESSURE: 164 MMHG | BODY MASS INDEX: 37.93 KG/M2 | DIASTOLIC BLOOD PRESSURE: 68 MMHG | HEIGHT: 72 IN | HEART RATE: 61 BPM | WEIGHT: 280 LBS

## 2019-10-08 DIAGNOSIS — I25.10 CORONARY ARTERY DISEASE INVOLVING NATIVE CORONARY ARTERY OF NATIVE HEART WITHOUT ANGINA PECTORIS: Primary | ICD-10-CM

## 2019-10-08 DIAGNOSIS — E66.01 MORBID OBESITY: ICD-10-CM

## 2019-10-08 DIAGNOSIS — I63.9 CEREBROVASCULAR ACCIDENT (CVA), UNSPECIFIED MECHANISM: ICD-10-CM

## 2019-10-08 DIAGNOSIS — N04.9 NEPHROTIC SYNDROME: ICD-10-CM

## 2019-10-08 DIAGNOSIS — I10 ESSENTIAL HYPERTENSION: ICD-10-CM

## 2019-10-08 DIAGNOSIS — I48.3 TYPICAL ATRIAL FLUTTER: ICD-10-CM

## 2019-10-08 DIAGNOSIS — G47.33 OBSTRUCTIVE SLEEP APNEA (ADULT) (PEDIATRIC): ICD-10-CM

## 2019-10-08 DIAGNOSIS — E11.9 NON-INSULIN DEPENDENT TYPE 2 DIABETES MELLITUS: Chronic | ICD-10-CM

## 2019-10-08 DIAGNOSIS — I48.0 PAF (PAROXYSMAL ATRIAL FIBRILLATION): ICD-10-CM

## 2019-10-08 PROCEDURE — 99213 PR OFFICE/OUTPT VISIT, EST, LEVL III, 20-29 MIN: ICD-10-PCS | Mod: S$GLB,,, | Performed by: INTERNAL MEDICINE

## 2019-10-08 PROCEDURE — 99213 OFFICE O/P EST LOW 20 MIN: CPT | Mod: S$GLB,,, | Performed by: INTERNAL MEDICINE

## 2019-10-09 DIAGNOSIS — E11.9 DIABETES MELLITUS WITHOUT COMPLICATION: ICD-10-CM

## 2019-10-09 DIAGNOSIS — E78.00 HYPERCHOLESTEREMIA: ICD-10-CM

## 2019-10-09 NOTE — PROGRESS NOTES
Subjective:    Patient ID:  Zev Castano is a 69 y.o. male     HPI  Here for follow-up of cerebrovascular accident involving the bertrand x2 in 2012, hypertension, diabetes mellitus, paroxysmal atrial fibrillation, coronary artery disease, status post drug-eluting stents x2 in the left anterior descending coronary artery in March of 2016, nephrotic syndrome, morbid obesity, anemia of chronic kidney disease, obstructive sleep apnea.     Lately have been eating salty potato chips, and I may be retaining some fluid.  I am planning a [employer] cruise next month.    Current Outpatient Medications   Medication Sig    amLODIPine (NORVASC) 5 MG tablet Take 1 tablet (5 mg total) by mouth once daily.    apixaban (ELIQUIS) 2.5 mg Tab Take 1 tablet (2.5 mg total) by mouth 2 (two) times daily.    aspirin 81 MG Chew CSW ONE T  ONCE D    atorvastatin (LIPITOR) 40 MG tablet TAKE 1 TABLET(40 MG) BY MOUTH EVERY DAY    carvedilol (COREG) 25 MG tablet Take 1 tablet (25 mg total) by mouth 2 (two) times daily with meals.    CONTOUR NEXT STRIPS Strp Use one strip each time to check blood glucose daily as directed    dulaglutide (TRULICITY) 1.5 mg/0.5 mL PnIj Inject 1.5 mg into the skin every 7 days.    ELIQUIS 2.5 mg Tab TAKE 1 TABLET BY MOUTH TWICE DAILY    ergocalciferol (VITAMIN D2) 50,000 unit Cap Take 50,000 Units by mouth every 7 days.    fluticasone (FLONASE) 50 mcg/actuation nasal spray SHAKE LIQUID AND USE 2 SPRAYS(100 MCG) IN EACH NOSTRIL EVERY DAY    furosemide (LASIX) 40 MG tablet Take 1 tablet (40 mg total) by mouth once daily.    SITagliptan-metformin (JANUMET) 50-1,000 mg per tablet Take 1 tablet by mouth once daily.    spironolactone (ALDACTONE) 25 MG tablet TAKE 1 TABLET(25 MG) BY MOUTH EVERY DAY    sulfamethoxazole-trimethoprim 800-160mg (BACTRIM DS) 800-160 mg Tab Take 1 tablet by mouth 2 (two) times daily.    telmisartan (MICARDIS) 80 MG Tab Take 40 mg by mouth once daily.     No current  facility-administered medications for this visit.        Review of Systems   Constitution: Negative for chills, decreased appetite, fever, weight gain and weight loss.   HENT: Negative for congestion, hearing loss and sore throat.    Eyes: Negative for blurred vision, double vision and visual disturbance.   Cardiovascular: Negative for chest pain, claudication, dyspnea on exertion, leg swelling, palpitations and syncope.   Respiratory: Negative for cough, hemoptysis, shortness of breath, sputum production and wheezing.    Endocrine: Negative for cold intolerance and heat intolerance.   Hematologic/Lymphatic: Negative for bleeding problem. Does not bruise/bleed easily.   Skin: Negative for color change, dry skin, flushing and itching.   Musculoskeletal: Negative for back pain, joint pain and myalgias.   Gastrointestinal: Negative for abdominal pain, anorexia, constipation, diarrhea, dysphagia, nausea and vomiting.        No bleeding per rectum   Genitourinary: Negative for dysuria, flank pain, frequency, hematuria and nocturia.   Neurological: Negative for dizziness, headaches, light-headedness, loss of balance, seizures and tremors.   Psychiatric/Behavioral: Negative for altered mental status and depression.     Vitals:    10/08/19 1300   BP: (!) 164/68   Pulse: 61   Weight: 127 kg (280 lb)   Height: 6' (1.829 m)    Blood pressure recheck 142/66     Objective:    Physical Exam   Constitutional: He is oriented to person, place, and time. He appears well-developed and well-nourished.   Obese   HENT:   Head: Normocephalic and atraumatic.   Right Ear: External ear normal.   Left Ear: External ear normal.   Nose: Nose normal.   Eyes: Pupils are equal, round, and reactive to light. Conjunctivae and EOM are normal. No scleral icterus.   Neck: Normal range of motion. Neck supple. No JVD present. No tracheal deviation present. No thyromegaly present.   Cardiovascular: Normal rate, regular rhythm and normal heart sounds. Exam  reveals no gallop and no friction rub.   No murmur heard.  Pulmonary/Chest: Effort normal and breath sounds normal. No respiratory distress. He has no rales. He exhibits no tenderness.   Abdominal: Soft. Bowel sounds are normal. He exhibits no distension and no mass. There is no tenderness.   Musculoskeletal: Normal range of motion. He exhibits edema. He exhibits no tenderness.   Lymphadenopathy:     He has no cervical adenopathy.   Neurological: He is alert and oriented to person, place, and time. He has normal reflexes. No cranial nerve deficit. Coordination normal.   Skin: Skin is warm and dry. No rash noted.   Psychiatric: He has a normal mood and affect. His behavior is normal.         Assessment:       1. Coronary artery disease involving native coronary artery of native heart without angina pectoris    2. PAF (paroxysmal atrial fibrillation)    3. Typical atrial flutter    4. Essential hypertension    5. Non-insulin dependent type 2 diabetes mellitus    6. Nephrotic syndrome    7. Cerebrovascular accident (CVA), unspecified mechanism    8. Obstructive sleep apnea (adult) (pediatric)    9. Morbid obesity         Plan:       Urged strict control of salt intake.  Continue present pharmacological regimen

## 2019-10-10 RX ORDER — ATORVASTATIN CALCIUM 40 MG/1
TABLET, FILM COATED ORAL
Qty: 90 TABLET | Refills: 0 | Status: SHIPPED | OUTPATIENT
Start: 2019-10-10 | End: 2019-11-17 | Stop reason: SDUPTHER

## 2019-10-10 RX ORDER — SITAGLIPTIN AND METFORMIN HYDROCHLORIDE 1000; 50 MG/1; MG/1
TABLET, FILM COATED ORAL
Qty: 90 TABLET | Refills: 0 | Status: SHIPPED | OUTPATIENT
Start: 2019-10-10 | End: 2020-02-03

## 2019-11-17 DIAGNOSIS — E78.00 HYPERCHOLESTEREMIA: ICD-10-CM

## 2019-11-18 RX ORDER — ATORVASTATIN CALCIUM 40 MG/1
TABLET, FILM COATED ORAL
Qty: 90 TABLET | Refills: 0 | Status: SHIPPED | OUTPATIENT
Start: 2019-11-18 | End: 2020-02-03

## 2019-12-10 ENCOUNTER — OFFICE VISIT (OUTPATIENT)
Dept: CARDIOLOGY | Facility: CLINIC | Age: 69
End: 2019-12-10
Attending: INTERNAL MEDICINE
Payer: MEDICARE

## 2019-12-10 VITALS
HEIGHT: 72 IN | DIASTOLIC BLOOD PRESSURE: 72 MMHG | HEART RATE: 63 BPM | WEIGHT: 284 LBS | BODY MASS INDEX: 38.47 KG/M2 | SYSTOLIC BLOOD PRESSURE: 176 MMHG

## 2019-12-10 DIAGNOSIS — E11.9 NON-INSULIN DEPENDENT TYPE 2 DIABETES MELLITUS: Chronic | ICD-10-CM

## 2019-12-10 DIAGNOSIS — N04.9 NEPHROTIC SYNDROME: ICD-10-CM

## 2019-12-10 DIAGNOSIS — E66.01 MORBID OBESITY: ICD-10-CM

## 2019-12-10 DIAGNOSIS — I10 ESSENTIAL HYPERTENSION: ICD-10-CM

## 2019-12-10 DIAGNOSIS — I25.10 CORONARY ARTERY DISEASE INVOLVING NATIVE CORONARY ARTERY OF NATIVE HEART WITHOUT ANGINA PECTORIS: ICD-10-CM

## 2019-12-10 DIAGNOSIS — I48.0 PAF (PAROXYSMAL ATRIAL FIBRILLATION): ICD-10-CM

## 2019-12-10 DIAGNOSIS — I50.33 HEART FAILURE, DIASTOLIC, ACUTE ON CHRONIC: ICD-10-CM

## 2019-12-10 DIAGNOSIS — G47.33 OBSTRUCTIVE SLEEP APNEA (ADULT) (PEDIATRIC): ICD-10-CM

## 2019-12-10 DIAGNOSIS — I63.9 CEREBROVASCULAR ACCIDENT (CVA), UNSPECIFIED MECHANISM: Primary | ICD-10-CM

## 2019-12-10 PROCEDURE — 99214 OFFICE O/P EST MOD 30 MIN: CPT | Mod: S$GLB,,, | Performed by: INTERNAL MEDICINE

## 2019-12-10 PROCEDURE — 99214 PR OFFICE/OUTPT VISIT, EST, LEVL IV, 30-39 MIN: ICD-10-PCS | Mod: S$GLB,,, | Performed by: INTERNAL MEDICINE

## 2019-12-10 PROCEDURE — 1159F PR MEDICATION LIST DOCUMENTED IN MEDICAL RECORD: ICD-10-PCS | Mod: S$GLB,,, | Performed by: INTERNAL MEDICINE

## 2019-12-10 PROCEDURE — 1159F MED LIST DOCD IN RCRD: CPT | Mod: S$GLB,,, | Performed by: INTERNAL MEDICINE

## 2019-12-10 RX ORDER — AMLODIPINE BESYLATE 10 MG/1
10 TABLET ORAL DAILY
COMMUNITY
End: 2019-12-10

## 2019-12-10 RX ORDER — AMLODIPINE BESYLATE 10 MG/1
10 TABLET ORAL DAILY
Qty: 90 TABLET | Refills: 3 | Status: ON HOLD | OUTPATIENT
Start: 2019-12-10 | End: 2021-08-27 | Stop reason: HOSPADM

## 2019-12-31 NOTE — PROGRESS NOTES
Subjective:    Patient ID:  eZv Castano is a 69 y.o. male     HPI  Here for follow-up of cerebrovascular accident involving the bertrand x2 in 2012, hypertension, diabetes mellitus, paroxysmal atrial fibrillation, coronary artery disease, status post drug-eluting stents x2 in the left anterior descending coronary artery in March of 2016, nephrotic syndrome, morbid obesity, anemia of chronic kidney disease, obstructive sleep apnea.     I did well on my cruise.  I have no complaints.    Current Outpatient Medications   Medication Sig    amLODIPine (NORVASC) 10 MG tablet Take 1 tablet (10 mg total) by mouth once daily.    amoxicillin-clavulanate 500-125mg (AUGMENTIN) 500-125 mg Tab Take 1 tablet (500 mg total) by mouth 2 (two) times daily.    apixaban (ELIQUIS) 2.5 mg Tab Take 1 tablet (2.5 mg total) by mouth 2 (two) times daily.    aspirin 81 MG Chew CSW ONE T  ONCE D    atorvastatin (LIPITOR) 40 MG tablet TAKE 1 TABLET BY MOUTH EVERY DAY    carvedilol (COREG) 25 MG tablet Take 1 tablet (25 mg total) by mouth 2 (two) times daily with meals.    CONTOUR NEXT STRIPS Strp Use one strip each time to check blood glucose daily as directed    dulaglutide (TRULICITY) 1.5 mg/0.5 mL PnIj Inject 1.5 mg into the skin every 7 days.    ELIQUIS 2.5 mg Tab TAKE 1 TABLET BY MOUTH TWICE DAILY    ergocalciferol (VITAMIN D2) 50,000 unit Cap Take 50,000 Units by mouth every 7 days.    fluticasone (VERAMYST) 27.5 mcg/actuation nasal spray 2 sprays by Nasal route once daily.    furosemide (LASIX) 40 MG tablet Take 1 tablet (40 mg total) by mouth once daily.    JANUMET 50-1,000 mg per tablet TAKE 1 TABLET BY MOUTH EVERY DAY    sildenafil (VIAGRA) 50 MG tablet Take 0.5 tablets (25 mg total) by mouth as needed (for blood pressure). Only for elevated /90    spironolactone (ALDACTONE) 25 MG tablet TAKE 1 TABLET(25 MG) BY MOUTH EVERY DAY    telmisartan (MICARDIS) 80 MG Tab Take 40 mg by mouth once daily.     No current  facility-administered medications for this visit.        Review of Systems   Constitution: Negative for chills, decreased appetite, fever, weight gain and weight loss.   HENT: Negative for congestion, hearing loss and sore throat.    Eyes: Negative for blurred vision, double vision and visual disturbance.   Cardiovascular: Negative for chest pain, claudication, dyspnea on exertion, leg swelling, palpitations and syncope.   Respiratory: Negative for cough, hemoptysis, shortness of breath, sputum production and wheezing.    Endocrine: Negative for cold intolerance and heat intolerance.   Hematologic/Lymphatic: Negative for bleeding problem. Does not bruise/bleed easily.   Skin: Negative for color change, dry skin, flushing and itching.   Musculoskeletal: Negative for back pain, joint pain and myalgias.   Gastrointestinal: Negative for abdominal pain, anorexia, constipation, diarrhea, dysphagia, nausea and vomiting.        No bleeding per rectum   Genitourinary: Negative for dysuria, flank pain, frequency, hematuria and nocturia.   Neurological: Negative for dizziness, headaches, light-headedness, loss of balance, seizures and tremors.   Psychiatric/Behavioral: Negative for altered mental status and depression.     Vitals:    12/10/19 1302   BP: (!) 176/72   Pulse: 63   Weight: 128.8 kg (284 lb)   Height: 6' (1.829 m)        Objective:    Physical Exam   Constitutional: He is oriented to person, place, and time. He appears well-developed and well-nourished.   HENT:   Head: Normocephalic and atraumatic.   Right Ear: External ear normal.   Left Ear: External ear normal.   Nose: Nose normal.   Eyes: Pupils are equal, round, and reactive to light. Conjunctivae and EOM are normal. No scleral icterus.   Neck: Normal range of motion. Neck supple. No JVD present. No tracheal deviation present. No thyromegaly present.   Cardiovascular: Normal rate, regular rhythm and normal heart sounds. Exam reveals no gallop and no friction  rub.   No murmur heard.  Pulmonary/Chest: Effort normal and breath sounds normal. No respiratory distress. He has no rales. He exhibits no tenderness.   Abdominal: Soft. Bowel sounds are normal. He exhibits no distension and no mass. There is no tenderness.   Musculoskeletal: Normal range of motion. He exhibits no edema or tenderness.   Lymphadenopathy:     He has no cervical adenopathy.   Neurological: He is alert and oriented to person, place, and time. He has normal reflexes. No cranial nerve deficit. Coordination normal.   Skin: Skin is warm and dry. No rash noted.   Psychiatric: He has a normal mood and affect. His behavior is normal.         Assessment:       1. Cerebrovascular accident (CVA), unspecified mechanism    2. Coronary artery disease involving native coronary artery of native heart without angina pectoris    3. PAF (paroxysmal atrial fibrillation)    4. Essential hypertension    5. Heart failure, diastolic, acute on chronic    6. Morbid obesity    7. Non-insulin dependent type 2 diabetes mellitus    8. Nephrotic syndrome    9. Obstructive sleep apnea (adult) (pediatric)         Plan:       Telmisartan 80 mg daily.  Continue remaining pharmacological regimen.

## 2020-02-03 DIAGNOSIS — E78.00 HYPERCHOLESTEREMIA: ICD-10-CM

## 2020-02-03 DIAGNOSIS — E11.9 DIABETES MELLITUS WITHOUT COMPLICATION: ICD-10-CM

## 2020-02-03 RX ORDER — SITAGLIPTIN AND METFORMIN HYDROCHLORIDE 1000; 50 MG/1; MG/1
TABLET, FILM COATED ORAL
Qty: 90 TABLET | Refills: 0 | Status: SHIPPED | OUTPATIENT
Start: 2020-02-03 | End: 2020-03-19

## 2020-02-03 RX ORDER — ATORVASTATIN CALCIUM 40 MG/1
TABLET, FILM COATED ORAL
Qty: 90 TABLET | Refills: 0 | Status: SHIPPED | OUTPATIENT
Start: 2020-02-03 | End: 2020-04-17

## 2020-02-27 ENCOUNTER — OFFICE VISIT (OUTPATIENT)
Dept: CARDIOLOGY | Facility: CLINIC | Age: 70
End: 2020-02-27
Attending: INTERNAL MEDICINE
Payer: MEDICARE

## 2020-02-27 VITALS
WEIGHT: 280 LBS | BODY MASS INDEX: 37.93 KG/M2 | HEART RATE: 50 BPM | SYSTOLIC BLOOD PRESSURE: 150 MMHG | HEIGHT: 72 IN | DIASTOLIC BLOOD PRESSURE: 71 MMHG

## 2020-02-27 DIAGNOSIS — I63.9 CEREBROVASCULAR ACCIDENT (CVA), UNSPECIFIED MECHANISM: Primary | ICD-10-CM

## 2020-02-27 DIAGNOSIS — I48.0 PAF (PAROXYSMAL ATRIAL FIBRILLATION): ICD-10-CM

## 2020-02-27 DIAGNOSIS — N04.9 NEPHROTIC SYNDROME: ICD-10-CM

## 2020-02-27 DIAGNOSIS — E11.9 NON-INSULIN DEPENDENT TYPE 2 DIABETES MELLITUS: Chronic | ICD-10-CM

## 2020-02-27 DIAGNOSIS — I10 ESSENTIAL HYPERTENSION: ICD-10-CM

## 2020-02-27 DIAGNOSIS — E11.21 TYPE 2 DIABETES MELLITUS WITH DIABETIC NEPHROPATHY, WITHOUT LONG-TERM CURRENT USE OF INSULIN: ICD-10-CM

## 2020-02-27 DIAGNOSIS — I25.10 CORONARY ARTERY DISEASE INVOLVING NATIVE CORONARY ARTERY OF NATIVE HEART WITHOUT ANGINA PECTORIS: ICD-10-CM

## 2020-02-27 DIAGNOSIS — N18.30 ANEMIA IN STAGE 3 CHRONIC KIDNEY DISEASE: ICD-10-CM

## 2020-02-27 DIAGNOSIS — E66.01 MORBID OBESITY: ICD-10-CM

## 2020-02-27 DIAGNOSIS — D63.1 ANEMIA IN STAGE 3 CHRONIC KIDNEY DISEASE: ICD-10-CM

## 2020-02-27 DIAGNOSIS — I48.3 TYPICAL ATRIAL FLUTTER: ICD-10-CM

## 2020-02-27 DIAGNOSIS — G47.33 OBSTRUCTIVE SLEEP APNEA (ADULT) (PEDIATRIC): ICD-10-CM

## 2020-02-27 DIAGNOSIS — I25.10 CORONARY ARTERY DISEASE INVOLVING NATIVE CORONARY ARTERY OF NATIVE HEART WITHOUT ANGINA PECTORIS: Primary | ICD-10-CM

## 2020-02-27 PROCEDURE — 99214 PR OFFICE/OUTPT VISIT, EST, LEVL IV, 30-39 MIN: ICD-10-PCS | Mod: S$GLB,,, | Performed by: INTERNAL MEDICINE

## 2020-02-27 PROCEDURE — 99214 OFFICE O/P EST MOD 30 MIN: CPT | Mod: S$GLB,,, | Performed by: INTERNAL MEDICINE

## 2020-02-27 RX ORDER — TELMISARTAN 80 MG/1
80 TABLET ORAL DAILY
Qty: 90 TABLET | Refills: 3 | Status: ON HOLD | OUTPATIENT
Start: 2020-02-27 | End: 2021-06-05 | Stop reason: HOSPADM

## 2020-02-27 RX ORDER — FUROSEMIDE 40 MG/1
40 TABLET ORAL DAILY
Qty: 90 TABLET | Refills: 3 | Status: SHIPPED | OUTPATIENT
Start: 2020-02-27 | End: 2020-03-16 | Stop reason: SDUPTHER

## 2020-03-02 NOTE — PROGRESS NOTES
Subjective:    Patient ID:  Zev Castano is a 70 y.o. male     HPI    Here for follow-up of cerebrovascular accident involving the bertrand x2 in 2012, hypertension, diabetes mellitus, paroxysmal atrial fibrillation, coronary artery disease, status post drug-eluting stents x2 in the left anterior descending coronary artery in March of 2016, nephrotic syndrome, morbid obesity, anemia of chronic kidney disease, obstructive sleep apnea.     Just back from a cruise.  I feel well however on bending down I get short of breath.    Current Outpatient Medications   Medication Sig    amLODIPine (NORVASC) 10 MG tablet Take 1 tablet (10 mg total) by mouth once daily.    amoxicillin-clavulanate 500-125mg (AUGMENTIN) 500-125 mg Tab Take 1 tablet (500 mg total) by mouth 2 (two) times daily.    apixaban (ELIQUIS) 2.5 mg Tab Take 1 tablet (2.5 mg total) by mouth 2 (two) times daily.    aspirin 81 MG Chew CSW ONE T  ONCE D    atorvastatin (LIPITOR) 40 MG tablet TAKE 1 TABLET BY MOUTH EVERY DAY    carvedilol (COREG) 25 MG tablet Take 1 tablet (25 mg total) by mouth 2 (two) times daily with meals.    CONTOUR NEXT STRIPS Strp Use one strip each time to check blood glucose daily as directed    dulaglutide (TRULICITY) 1.5 mg/0.5 mL PnIj Inject 1.5 mg into the skin every 7 days.    ELIQUIS 2.5 mg Tab TAKE 1 TABLET BY MOUTH TWICE DAILY    ergocalciferol (VITAMIN D2) 50,000 unit Cap Take 50,000 Units by mouth every 7 days.    fluticasone (VERAMYST) 27.5 mcg/actuation nasal spray 2 sprays by Nasal route once daily.    furosemide (LASIX) 40 MG tablet Take 1 tablet (40 mg total) by mouth once daily.    JANUMET 50-1,000 mg per tablet TAKE 1 TABLET BY MOUTH EVERY DAY    sildenafil (VIAGRA) 50 MG tablet Take 0.5 tablets (25 mg total) by mouth as needed (for blood pressure). Only for elevated /90    spironolactone (ALDACTONE) 25 MG tablet TAKE 1 TABLET(25 MG) BY MOUTH EVERY DAY    telmisartan (MICARDIS) 80 MG Tab Take 1 tablet  (80 mg total) by mouth once daily.     No current facility-administered medications for this visit.        Review of Systems   Constitution: Negative for chills, decreased appetite, fever, weight gain and weight loss.   HENT: Negative for congestion, hearing loss and sore throat.    Eyes: Negative for blurred vision, double vision and visual disturbance.   Cardiovascular: Negative for chest pain, claudication, dyspnea on exertion, leg swelling, palpitations and syncope.   Respiratory: Positive for shortness of breath. Negative for cough, hemoptysis, sputum production and wheezing.    Endocrine: Negative for cold intolerance and heat intolerance.   Hematologic/Lymphatic: Negative for bleeding problem. Does not bruise/bleed easily.   Skin: Negative for color change, dry skin, flushing and itching.   Musculoskeletal: Negative for back pain, joint pain and myalgias.   Gastrointestinal: Negative for abdominal pain, anorexia, constipation, diarrhea, dysphagia, nausea and vomiting.        No bleeding per rectum   Genitourinary: Negative for dysuria, flank pain, frequency, hematuria and nocturia.   Neurological: Negative for dizziness, headaches, light-headedness, loss of balance, seizures and tremors.   Psychiatric/Behavioral: Negative for altered mental status and depression.         Vitals:    02/27/20 1304   BP: (!) 150/71   Pulse: (!) 50   Weight: 127 kg (280 lb)   Height: 6' (1.829 m)     Objective:    Physical Exam   Constitutional: He is oriented to person, place, and time. He appears well-developed and well-nourished.   HENT:   Head: Normocephalic and atraumatic.   Right Ear: External ear normal.   Left Ear: External ear normal.   Nose: Nose normal.   Eyes: Pupils are equal, round, and reactive to light. Conjunctivae and EOM are normal. No scleral icterus.   Neck: Normal range of motion. Neck supple. No JVD present. No tracheal deviation present. No thyromegaly present.   Cardiovascular: Normal rate, regular rhythm  and normal heart sounds. Exam reveals no gallop and no friction rub.   No murmur heard.  Pulmonary/Chest: Effort normal and breath sounds normal. No respiratory distress. He has no rales. He exhibits no tenderness.   Abdominal: Soft. Bowel sounds are normal. He exhibits no distension and no mass. There is no tenderness.   Musculoskeletal: Normal range of motion. He exhibits no edema or tenderness.   Lymphadenopathy:     He has no cervical adenopathy.   Neurological: He is alert and oriented to person, place, and time. He has normal reflexes. No cranial nerve deficit. Coordination normal.   Skin: Skin is warm and dry. No rash noted.   Psychiatric: He has a normal mood and affect. His behavior is normal.         Assessment:       1. Coronary artery disease involving native coronary artery of native heart without angina pectoris    2. PAF (paroxysmal atrial fibrillation)    3. Typical atrial flutter    4. Essential hypertension    5. Morbid obesity    6. Type 2 diabetes mellitus with diabetic nephropathy, without long-term current use of insulin    7. Non-insulin dependent type 2 diabetes mellitus    8. Nephrotic syndrome    9. Anemia in stage 3 chronic kidney disease    10. Obstructive sleep apnea (adult) (pediatric)         Plan:       Check CBC CMP, BNP and hemoglobin A1c  Continue present pharmacological regimen.

## 2020-03-03 LAB
ALBUMIN SERPL-MCNC: 4 G/DL (ref 3.6–5.1)
ALBUMIN/GLOB SERPL: 1.4 (CALC) (ref 1–2.5)
ALP SERPL-CCNC: 93 U/L (ref 35–144)
ALT SERPL-CCNC: 13 U/L (ref 9–46)
AST SERPL-CCNC: 15 U/L (ref 10–35)
BASOPHILS # BLD AUTO: 57 CELLS/UL (ref 0–200)
BASOPHILS NFR BLD AUTO: 0.7 %
BILIRUB SERPL-MCNC: 0.6 MG/DL (ref 0.2–1.2)
BNP SERPL-MCNC: 63 PG/ML
BUN SERPL-MCNC: 46 MG/DL (ref 7–25)
BUN/CREAT SERPL: 22 (CALC) (ref 6–22)
CALCIUM SERPL-MCNC: 9.4 MG/DL (ref 8.6–10.3)
CHLORIDE SERPL-SCNC: 104 MMOL/L (ref 98–110)
CO2 SERPL-SCNC: 24 MMOL/L (ref 20–32)
CREAT SERPL-MCNC: 2.13 MG/DL (ref 0.7–1.18)
EOSINOPHIL # BLD AUTO: 251 CELLS/UL (ref 15–500)
EOSINOPHIL NFR BLD AUTO: 3.1 %
ERYTHROCYTE [DISTWIDTH] IN BLOOD BY AUTOMATED COUNT: 13 % (ref 11–15)
GFRSERPLBLD MDRD-ARVRAT: 30 ML/MIN/1.73M2
GLOBULIN SER CALC-MCNC: 2.8 G/DL (CALC) (ref 1.9–3.7)
GLUCOSE SERPL-MCNC: 139 MG/DL (ref 65–99)
HBA1C MFR BLD: 8.6 % OF TOTAL HGB
HCT VFR BLD AUTO: 35.7 % (ref 38.5–50)
HGB BLD-MCNC: 11.9 G/DL (ref 13.2–17.1)
LYMPHOCYTES # BLD AUTO: 1247 CELLS/UL (ref 850–3900)
LYMPHOCYTES NFR BLD AUTO: 15.4 %
MCH RBC QN AUTO: 30.7 PG (ref 27–33)
MCHC RBC AUTO-ENTMCNC: 33.3 G/DL (ref 32–36)
MCV RBC AUTO: 92 FL (ref 80–100)
MONOCYTES # BLD AUTO: 697 CELLS/UL (ref 200–950)
MONOCYTES NFR BLD AUTO: 8.6 %
NEUTROPHILS # BLD AUTO: 5848 CELLS/UL (ref 1500–7800)
NEUTROPHILS NFR BLD AUTO: 72.2 %
PLATELET # BLD AUTO: 229 THOUSAND/UL (ref 140–400)
PMV BLD REES-ECKER: 11.1 FL (ref 7.5–12.5)
POTASSIUM SERPL-SCNC: 5.4 MMOL/L (ref 3.5–5.3)
PROT SERPL-MCNC: 6.8 G/DL (ref 6.1–8.1)
RBC # BLD AUTO: 3.88 MILLION/UL (ref 4.2–5.8)
SODIUM SERPL-SCNC: 139 MMOL/L (ref 135–146)
WBC # BLD AUTO: 8.1 THOUSAND/UL (ref 3.8–10.8)

## 2020-03-04 NOTE — TELEPHONE ENCOUNTER
Please hold Spironolactone & Lasix x 3 days, then resume both on Monday, Wednesday and Fridays. Please do a BMP & BNP around March 18,2020.  Patients SO Tala notified.

## 2020-03-16 DIAGNOSIS — I10 ESSENTIAL HYPERTENSION: ICD-10-CM

## 2020-03-16 RX ORDER — FUROSEMIDE 40 MG/1
40 TABLET ORAL
Qty: 90 TABLET | Refills: 3 | Status: ON HOLD | OUTPATIENT
Start: 2020-03-16 | End: 2021-06-05 | Stop reason: HOSPADM

## 2020-03-19 DIAGNOSIS — E11.9 DIABETES MELLITUS WITHOUT COMPLICATION: ICD-10-CM

## 2020-03-19 RX ORDER — SITAGLIPTIN AND METFORMIN HYDROCHLORIDE 1000; 50 MG/1; MG/1
TABLET, FILM COATED ORAL
Qty: 90 TABLET | Refills: 0 | Status: ON HOLD | OUTPATIENT
Start: 2020-03-19 | End: 2021-06-05 | Stop reason: HOSPADM

## 2020-04-16 DIAGNOSIS — I48.0 PAF (PAROXYSMAL ATRIAL FIBRILLATION): ICD-10-CM

## 2020-04-16 DIAGNOSIS — E78.00 HYPERCHOLESTEREMIA: ICD-10-CM

## 2020-04-17 RX ORDER — ATORVASTATIN CALCIUM 40 MG/1
TABLET, FILM COATED ORAL
Qty: 90 TABLET | Refills: 0 | Status: SHIPPED | OUTPATIENT
Start: 2020-04-17

## 2020-04-17 RX ORDER — CARVEDILOL 25 MG/1
TABLET ORAL
Qty: 180 TABLET | Refills: 3 | Status: SHIPPED | OUTPATIENT
Start: 2020-04-17 | End: 2020-10-06 | Stop reason: DRUGHIGH

## 2020-08-04 LAB — BNP SERPL-MCNC: 64 PG/ML

## 2020-12-02 ENCOUNTER — HOSPITAL ENCOUNTER (OUTPATIENT)
Dept: PREADMISSION TESTING | Facility: OTHER | Age: 70
Discharge: HOME | End: 2020-12-02
Attending: INTERNAL MEDICINE
Payer: MEDICARE

## 2020-12-02 ENCOUNTER — ANESTHESIA EVENT (OUTPATIENT)
Dept: CARDIOLOGY | Facility: OTHER | Age: 70
End: 2020-12-02
Payer: MEDICARE

## 2020-12-02 VITALS
TEMPERATURE: 97 F | HEART RATE: 91 BPM | BODY MASS INDEX: 37.11 KG/M2 | HEIGHT: 73 IN | DIASTOLIC BLOOD PRESSURE: 84 MMHG | SYSTOLIC BLOOD PRESSURE: 139 MMHG | WEIGHT: 280 LBS | OXYGEN SATURATION: 97 %

## 2020-12-02 RX ORDER — SPIRONOLACTONE 25 MG/1
TABLET ORAL
Status: ON HOLD | COMMUNITY
Start: 2020-10-09 | End: 2021-06-05 | Stop reason: HOSPADM

## 2020-12-02 RX ORDER — AMIODARONE HYDROCHLORIDE 200 MG/1
TABLET ORAL
Status: ON HOLD | COMMUNITY
Start: 2020-11-18 | End: 2020-12-03 | Stop reason: HOSPADM

## 2020-12-02 NOTE — DISCHARGE INSTRUCTIONS
Information to Prepare you for your Surgery    PRE-ADMIT TESTING -  [phone]    [address]  [place] [facility]          Your surgery has been scheduled at [facility]. We are pleased to have the opportunity to serve you. For Further Information please call [phone].    On the day of surgery please report to the Information Desk on the 1st floor.    · CONTACT YOUR PHYSICIAN'S OFFICE THE DAY PRIOR TO YOUR SURGERY TO OBTAIN YOUR ARRIVAL TIME.     · The evening before surgery do not eat anything after 9 p.m. ( this includes hard candy, chewing gum and mints).  You may only have GATORADE, POWERADE AND WATER  from 9 p.m. until you leave your home.   DO NOT DRINK ANY LIQUIDS ON THE WAY TO THE HOSPITAL.      SPECIAL MEDICATION INSTRUCTIONS: TAKE medications checked off by the Anesthesiologist on your Medication List.    Angiogram Patients: Take medications as instructed by your physician, including aspirin.     Surgery Patients:    If you take ASPIRIN - Your PHYSICIAN/SURGEON will need to inform you IF/OR when you need to stop taking aspirin prior to your surgery.     Do Not take any medications containing IBUPROFEN.  Do Not Wear any make-up or dark nail polish   (especially eye make-up) to surgery. If you come to surgery with makeup on you will be required to remove the makeup or nail polish.    Do not shave your surgical area at least 5 days prior to your surgery. The surgical prep will be performed at the hospital according to Infection Control regulations.    Leave all valuables at home.   Do Not wear any jewelry or watches, including any metal in body piercings. Jewelry must be removed prior to coming to the hospital.  There is a possibility that rings that are unable to be removed may be cut off if they are on the surgical extremity.    Contact Lens must be removed before surgery. Either do not wear the contact lens or bring a case and solution for  storage.  Please bring a container for eyeglasses or dentures as required.  Bring any paperwork your physician has provided, such as consent forms,  history and physicals, doctor's orders, etc.   Bring comfortable clothes that are loose fitting to wear upon discharge. Take into consideration the type of surgery being performed.  Maintain your diet as advised per your physician the day prior to surgery.      Adequate rest the night before surgery is advised.   Park in the Parking lot behind the hospital or in the [place] [facility] across the street from the parking lot. Parking is complimentary.  If you will be discharged the same day as your procedure, please arrange for a responsible adult to drive you home or to accompany you if traveling by taxi.   YOU WILL NOT BE PERMITTED TO DRIVE OR TO LEAVE THE HOSPITAL ALONE AFTER SURGERY.   If you are being discharged the same day, it is strongly recommended that you arrange for someone to remain with you for the first 24 hrs following your surgery.    The Surgeon will speak to your family/visitor after your surgery regarding the outcome of your surgery and post op care.  The Surgeon may speak to you after your surgery, but there is a possibility you may not remember the details.  Please check with your family members regarding the conversation with the Surgeon.    We strongly recommend whoever is bringing you home be present for discharge instructions.  This will ensure a thorough understanding for your post op home care.    ALL CHILDREN MUST ALWAYS BE ACCOMPANIED BY AN ADULT.    Visitors-Refer to current Visitor policy handouts.    Thank you for your cooperation.  The Staff of [facility].                Bathing Instructions with Hibiclens     Shower the evening before and morning of your procedure with Hibiclens:   Wash your face with water and your regular face wash/soap   Apply Hibiclens directly on your skin or on a wet washcloth and wash  gently. When showering: Move away from the shower stream when applying Hibiclens to avoid rinsing off too soon.   Rinse thoroughly with warm water   Do not dilute Hibiclens         Dry off as usual, do not use any deodorant, powder, body lotions, perfume, after shave or cologne.

## 2020-12-03 ENCOUNTER — ANESTHESIA (OUTPATIENT)
Dept: CARDIOLOGY | Facility: OTHER | Age: 70
End: 2020-12-03
Payer: MEDICARE

## 2020-12-03 ENCOUNTER — HOSPITAL ENCOUNTER (OUTPATIENT)
Facility: OTHER | Age: 70
Discharge: HOME | End: 2020-12-03
Attending: INTERNAL MEDICINE | Admitting: INTERNAL MEDICINE
Payer: MEDICARE

## 2020-12-03 VITALS
DIASTOLIC BLOOD PRESSURE: 78 MMHG | OXYGEN SATURATION: 98 % | BODY MASS INDEX: 37.11 KG/M2 | SYSTOLIC BLOOD PRESSURE: 140 MMHG | TEMPERATURE: 98 F | HEART RATE: 68 BPM | WEIGHT: 280 LBS | RESPIRATION RATE: 18 BRPM | HEIGHT: 73 IN

## 2020-12-03 DIAGNOSIS — I48.3 TYPICAL ATRIAL FLUTTER: Primary | ICD-10-CM

## 2020-12-03 DIAGNOSIS — Z01.818 PREOP TESTING: ICD-10-CM

## 2020-12-03 DIAGNOSIS — I48.92 ATRIAL FLUTTER: ICD-10-CM

## 2020-12-03 DIAGNOSIS — I48.0 EPISODIC ATRIAL FIBRILLATION: ICD-10-CM

## 2020-12-03 LAB — POCT GLUCOSE: 156 MG/DL (ref 70–110)

## 2020-12-03 PROCEDURE — 93010 EKG 12-LEAD: ICD-10-PCS | Mod: 76,,, | Performed by: INTERNAL MEDICINE

## 2020-12-03 PROCEDURE — 82962 GLUCOSE BLOOD TEST: CPT | Performed by: INTERNAL MEDICINE

## 2020-12-03 PROCEDURE — 37000008 HC ANESTHESIA 1ST 15 MINUTES: Performed by: ANESTHESIOLOGY

## 2020-12-03 PROCEDURE — 25000003 PHARM REV CODE 250: Performed by: NURSE ANESTHETIST, CERTIFIED REGISTERED

## 2020-12-03 PROCEDURE — 93005 ELECTROCARDIOGRAM TRACING: CPT

## 2020-12-03 PROCEDURE — 93010 ELECTROCARDIOGRAM REPORT: CPT | Mod: ,,, | Performed by: INTERNAL MEDICINE

## 2020-12-03 PROCEDURE — 92960 CARDIOVERSION ELECTRIC EXT: CPT | Performed by: INTERNAL MEDICINE

## 2020-12-03 PROCEDURE — 63600175 PHARM REV CODE 636 W HCPCS: Performed by: NURSE ANESTHETIST, CERTIFIED REGISTERED

## 2020-12-03 RX ORDER — AMIODARONE HYDROCHLORIDE 200 MG/1
200 TABLET ORAL 2 TIMES DAILY
Qty: 60 TABLET | Refills: 11 | Status: ON HOLD | OUTPATIENT
Start: 2020-12-03 | End: 2021-06-05 | Stop reason: HOSPADM

## 2020-12-03 RX ORDER — PROPOFOL 10 MG/ML
VIAL (ML) INTRAVENOUS
Status: DISCONTINUED | OUTPATIENT
Start: 2020-12-03 | End: 2020-12-03

## 2020-12-03 RX ORDER — LIDOCAINE HYDROCHLORIDE 20 MG/ML
INJECTION INTRAVENOUS
Status: DISCONTINUED | OUTPATIENT
Start: 2020-12-03 | End: 2020-12-03

## 2020-12-03 RX ADMIN — LIDOCAINE HYDROCHLORIDE 25 MG: 20 INJECTION, SOLUTION INTRAVENOUS at 09:12

## 2020-12-03 RX ADMIN — PROPOFOL 100 MG: 10 INJECTION, EMULSION INTRAVENOUS at 09:12

## 2020-12-03 NOTE — ANESTHESIA POSTPROCEDURE EVALUATION
Anesthesia Post Evaluation    Patient: Zev Castano    Procedure(s) Performed: Procedure(s) (LRB):  CARDIOVERSION (N/A)    Final Anesthesia Type: general    Patient location during evaluation: Cath Lab  Patient participation: Yes- Able to Participate  Level of consciousness: awake and alert  Post-procedure vital signs: reviewed and stable  Pain management: adequate  Airway patency: patent    PONV status at discharge: No PONV  Anesthetic complications: no      Cardiovascular status: blood pressure returned to baseline  Respiratory status: unassisted and spontaneous ventilation  Hydration status: euvolemic  Follow-up not needed.          Vitals Value Taken Time   /90 12/03/20 0856   Temp 36.6 °C (97.9 °F) 12/03/20 0749   Pulse 77 12/03/20 0857   Resp 18 12/03/20 0850   SpO2 100 % 12/03/20 0857   Vitals shown include unvalidated device data.      No case tracking events are documented in the log.      Pain/Gerry Score: No data recorded

## 2020-12-03 NOTE — INTERVAL H&P NOTE
The patient has been examined and the H&P has been reviewed:    I concur with the findings and no changes have occurred since H&P was written.     risks, benefits and alternative options discussed and understood by patient/family.          Active Hospital Problems    Diagnosis  POA    Episodic atrial fibrillation [I48.0]  Yes      Resolved Hospital Problems   No resolved problems to display.

## 2020-12-03 NOTE — ANESTHESIA PREPROCEDURE EVALUATION
12/03/2020  Zev Castano is a 70 y.o., male.    Anesthesia Evaluation    I have reviewed the Patient Summary Reports.    I have reviewed the Nursing Notes. I have reviewed the NPO Status.   I have reviewed the Medications.     Review of Systems  Anesthesia Hx:  No problems with previous Anesthesia  Denies Family Hx of Anesthesia complications.   Denies Personal Hx of Anesthesia complications.   Social:  Non-Smoker    Hematology/Oncology:  Hematology Normal   Oncology Normal     EENT/Dental:EENT/Dental Normal   Cardiovascular:   Exercise tolerance: poor Hypertension CAD  Dysrhythmias  Angina    Pulmonary:   Shortness of breath    Renal/:   Chronic Renal Disease, CRI    Musculoskeletal:  Spine Disorders: cervical and lumbar Degenerative disease and Disc disease    Neurological:   CVA    Endocrine:   Diabetes, type 2    Dermatological:  Skin Normal    Psych:  Psychiatric Normal           Physical Exam  General:  Obesity                 Anesthesia Plan  Type of Anesthesia, risks & benefits discussed:  Anesthesia Type:  general  Patient's Preference:   Intra-op Monitoring Plan: standard ASA monitors  Intra-op Monitoring Plan Comments:   Post Op Pain Control Plan: per primary service following discharge from PACU and multimodal analgesia  Post Op Pain Control Plan Comments:   Induction:   IV  Beta Blocker:         Informed Consent: Patient understands risks and agrees with Anesthesia plan.  Questions answered. Anesthesia consent signed with patient.  ASA Score: 3     Day of Surgery Review of History & Physical:    H&P update referred to the surgeon.         Ready For Surgery From Anesthesia Perspective.

## 2020-12-03 NOTE — OR NURSING
Notified Dr. Livingston of EKG results.  MD states he will take a look as soon as he arrives.  Updated pt & family.

## 2020-12-03 NOTE — DISCHARGE SUMMARY
[facility] [facility]  Cardiology  Discharge Summary      Patient Name: Zev Castano    Admission Date: 12/3/2020    Discharge Date and Time: 12/3/20  Final Diagnoses: atrial flutter   Principal Problem: Atrial flutter    HPI:Mr Castano presented to the office with comp[laints of breathlessness upon exertion, noted to be in atrial fibrillation. Started on Amiodarone, 200 mg po tid. On follow up, noted to have AF with controlled VR, and on admission, noted to be in atrial flutter with a variable response.  Impression on Admission: Paroxysmal atrial flutter    Hospital Course:With a single 200 joule countershock, sinus rhythm was restored.  Disposition: Discharged to his home.  Follow up/Patient Instructions:    Medications:   Zev Castano   Home Medication Instructions TENNILLE:[id number]    Printed on:12/03/20 3799   Medication Information                      amiodarone (PACERONE) 200 MG Tab  Take 1 tablet (200 mg total) by mouth 2 (two) times daily.             amLODIPine (NORVASC) 10 MG tablet  Take 1 tablet (10 mg total) by mouth once daily.             apixaban (ELIQUIS) 2.5 mg Tab  Take 1 tablet (2.5 mg total) by mouth 2 (two) times daily.             aspirin 81 MG Chew  CSW ONE T  ONCE D             atorvastatin (LIPITOR) 40 MG tablet  TAKE 1 TABLET BY MOUTH EVERY DAY             carvediloL (COREG) 12.5 MG tablet  Take 1 tablet (12.5 mg total) by mouth once daily.             CONTOUR NEXT STRIPS Strp  Use one strip each time to check blood glucose daily as directed             ergocalciferol (VITAMIN D2) 50,000 unit Cap  Take 50,000 Units by mouth every 7 days.             furosemide (LASIX) 40 MG tablet  Take 1 tablet (40 mg total) by mouth every Mon, Wed, Fri.             JANUMET 50-1,000 mg per tablet  TAKE 1 TABLET BY MOUTH EVERY DAY             spironolactone (ALDACTONE) 25 MG tablet               telmisartan (MICARDIS) 80 MG Tab  Take 1 tablet (80 mg total) by mouth once daily.                  Discharge Procedure Orders   COVID-19 Routine Screening   Standing Status: Future Number of Occurrences: 1 Standing Exp. Date: 01/26/22   Order Comments: Stat for surgery 12.3     Order Specific Question Answer Comments   Is the patient symptomatic? No    Is this needed for pre-procedure or pre-op testing? Yes    Diagnosis: Preop testing [031238]      Diet general     Follow-up Information     Mehul Livingston MD In 1 week.    Specialties: Cardiology, INTERVENTIONAL CARDIOLOGY  Contact information:  [address] [place] [address] [id number] [phone]                   Condition upon Discharge: improved.          Mehul Livingston MD

## 2020-12-03 NOTE — PLAN OF CARE
Zev Castano has met all discharge criteria from Phase II. Vital Signs are stable, ambulating  without difficulty. Discharge instructions given, patient verbalized understanding. Discharged from facility via wheelchair in stable condition.

## 2021-06-01 ENCOUNTER — HOSPITAL ENCOUNTER (INPATIENT)
Facility: OTHER | Age: 71
LOS: 4 days | Discharge: HOME | DRG: 640 | End: 2021-06-05
Attending: EMERGENCY MEDICINE | Admitting: INTERNAL MEDICINE
Payer: MEDICARE

## 2021-06-01 DIAGNOSIS — N17.9 AKI (ACUTE KIDNEY INJURY): ICD-10-CM

## 2021-06-01 DIAGNOSIS — E87.5 HYPERKALEMIA: ICD-10-CM

## 2021-06-01 DIAGNOSIS — R00.1 SYMPTOMATIC BRADYCARDIA: Primary | ICD-10-CM

## 2021-06-01 DIAGNOSIS — R00.1 BRADYCARDIA: ICD-10-CM

## 2021-06-01 LAB
ALBUMIN SERPL BCP-MCNC: 3.6 G/DL (ref 3.5–5.2)
ALP SERPL-CCNC: 87 U/L (ref 55–135)
ALT SERPL W/O P-5'-P-CCNC: 15 U/L (ref 10–44)
ANION GAP SERPL CALC-SCNC: 12 MMOL/L (ref 8–16)
ANION GAP SERPL CALC-SCNC: 9 MMOL/L (ref 8–16)
AST SERPL-CCNC: 14 U/L (ref 10–40)
BILIRUB SERPL-MCNC: 0.8 MG/DL (ref 0.1–1)
BNP SERPL-MCNC: 112 PG/ML (ref 0–99)
BUN SERPL-MCNC: 58 MG/DL (ref 8–23)
BUN SERPL-MCNC: 60 MG/DL (ref 8–23)
CALCIUM SERPL-MCNC: 8.1 MG/DL (ref 8.7–10.5)
CALCIUM SERPL-MCNC: 9 MG/DL (ref 8.7–10.5)
CHLORIDE SERPL-SCNC: 105 MMOL/L (ref 95–110)
CHLORIDE SERPL-SCNC: 110 MMOL/L (ref 95–110)
CO2 SERPL-SCNC: 21 MMOL/L (ref 23–29)
CO2 SERPL-SCNC: 22 MMOL/L (ref 23–29)
CREAT SERPL-MCNC: 3.7 MG/DL (ref 0.5–1.4)
CREAT SERPL-MCNC: 4.2 MG/DL (ref 0.5–1.4)
CTP QC/QA: YES
ERYTHROCYTE [DISTWIDTH] IN BLOOD BY AUTOMATED COUNT: 13.1 % (ref 11.5–14.5)
EST. GFR  (AFRICAN AMERICAN): 15 ML/MIN/1.73 M^2
EST. GFR  (AFRICAN AMERICAN): 18 ML/MIN/1.73 M^2
EST. GFR  (NON AFRICAN AMERICAN): 13 ML/MIN/1.73 M^2
EST. GFR  (NON AFRICAN AMERICAN): 15 ML/MIN/1.73 M^2
EXTRA GOLD TOP RAINBOW: NORMAL
EXTRA GREEN TOP RAINBOW: NORMAL
EXTRA LAVENDER TOP RAINBOW: NORMAL
EXTRA RED TOP RAINBOW: NORMAL
GLUCOSE SERPL-MCNC: 142 MG/DL (ref 70–110)
GLUCOSE SERPL-MCNC: 53 MG/DL (ref 70–110)
HCT VFR BLD AUTO: 36.5 % (ref 40–54)
HCV AB SERPL QL IA: NEGATIVE
HGB BLD-MCNC: 11.8 G/DL (ref 14–18)
MAGNESIUM SERPL-MCNC: 2.4 MG/DL (ref 1.6–2.6)
MCH RBC QN AUTO: 30.8 PG (ref 27–31)
MCHC RBC AUTO-ENTMCNC: 32.3 G/DL (ref 32–36)
MCV RBC AUTO: 95 FL (ref 82–98)
PLATELET # BLD AUTO: 224 K/UL (ref 150–450)
PMV BLD AUTO: 11.3 FL (ref 9.2–12.9)
POCT GLUCOSE: 119 MG/DL (ref 70–110)
POCT GLUCOSE: 137 MG/DL (ref 70–110)
POCT GLUCOSE: 70 MG/DL (ref 70–110)
POCT GLUCOSE: 93 MG/DL (ref 70–110)
POTASSIUM SERPL-SCNC: 4.9 MMOL/L (ref 3.5–5.1)
POTASSIUM SERPL-SCNC: 6.2 MMOL/L (ref 3.5–5.1)
PROT SERPL-MCNC: 7.3 G/DL (ref 6–8.4)
RBC # BLD AUTO: 3.83 M/UL (ref 4.6–6.2)
SARS-COV-2 RDRP RESP QL NAA+PROBE: NEGATIVE
SODIUM SERPL-SCNC: 139 MMOL/L (ref 136–145)
SODIUM SERPL-SCNC: 140 MMOL/L (ref 136–145)
TROPONIN I SERPL DL<=0.01 NG/ML-MCNC: 0.02 NG/ML (ref 0–0.03)
TROPONIN I SERPL DL<=0.01 NG/ML-MCNC: 0.02 NG/ML (ref 0–0.03)
TROPONIN I SERPL DL<=0.01 NG/ML-MCNC: 0.03 NG/ML (ref 0–0.03)
WBC # BLD AUTO: 8.37 K/UL (ref 3.9–12.7)

## 2021-06-01 PROCEDURE — 86803 HEPATITIS C AB TEST: CPT | Performed by: EMERGENCY MEDICINE

## 2021-06-01 PROCEDURE — 96365 THER/PROPH/DIAG IV INF INIT: CPT

## 2021-06-01 PROCEDURE — 25000003 PHARM REV CODE 250: Performed by: INTERNAL MEDICINE

## 2021-06-01 PROCEDURE — 36415 COLL VENOUS BLD VENIPUNCTURE: CPT | Performed by: EMERGENCY MEDICINE

## 2021-06-01 PROCEDURE — 94761 N-INVAS EAR/PLS OXIMETRY MLT: CPT

## 2021-06-01 PROCEDURE — 80048 BASIC METABOLIC PNL TOTAL CA: CPT | Performed by: EMERGENCY MEDICINE

## 2021-06-01 PROCEDURE — 20000000 HC ICU ROOM

## 2021-06-01 PROCEDURE — 85027 COMPLETE CBC AUTOMATED: CPT | Performed by: EMERGENCY MEDICINE

## 2021-06-01 PROCEDURE — 63600175 PHARM REV CODE 636 W HCPCS: Performed by: EMERGENCY MEDICINE

## 2021-06-01 PROCEDURE — 96375 TX/PRO/DX INJ NEW DRUG ADDON: CPT

## 2021-06-01 PROCEDURE — 25000003 PHARM REV CODE 250: Performed by: EMERGENCY MEDICINE

## 2021-06-01 PROCEDURE — 99291 CRITICAL CARE FIRST HOUR: CPT | Mod: 25

## 2021-06-01 PROCEDURE — 84484 ASSAY OF TROPONIN QUANT: CPT | Mod: 91 | Performed by: EMERGENCY MEDICINE

## 2021-06-01 PROCEDURE — 82962 GLUCOSE BLOOD TEST: CPT

## 2021-06-01 PROCEDURE — 25000242 PHARM REV CODE 250 ALT 637 W/ HCPCS: Performed by: EMERGENCY MEDICINE

## 2021-06-01 PROCEDURE — U0002 COVID-19 LAB TEST NON-CDC: HCPCS | Performed by: EMERGENCY MEDICINE

## 2021-06-01 PROCEDURE — 80053 COMPREHEN METABOLIC PANEL: CPT | Performed by: EMERGENCY MEDICINE

## 2021-06-01 PROCEDURE — 83735 ASSAY OF MAGNESIUM: CPT | Performed by: EMERGENCY MEDICINE

## 2021-06-01 PROCEDURE — 83880 ASSAY OF NATRIURETIC PEPTIDE: CPT | Performed by: EMERGENCY MEDICINE

## 2021-06-01 PROCEDURE — 94640 AIRWAY INHALATION TREATMENT: CPT

## 2021-06-01 PROCEDURE — 94644 CONT INHLJ TX 1ST HOUR: CPT

## 2021-06-01 RX ORDER — AMIODARONE HYDROCHLORIDE 100 MG/1
TABLET ORAL DAILY
Status: ON HOLD | COMMUNITY
End: 2022-06-28 | Stop reason: HOSPADM

## 2021-06-01 RX ORDER — FAMOTIDINE 20 MG/1
20 TABLET, FILM COATED ORAL 2 TIMES DAILY
Status: DISCONTINUED | OUTPATIENT
Start: 2021-06-01 | End: 2021-06-02

## 2021-06-01 RX ORDER — HYDRALAZINE HYDROCHLORIDE 25 MG/1
25 TABLET, FILM COATED ORAL EVERY 8 HOURS
Status: DISCONTINUED | OUTPATIENT
Start: 2021-06-01 | End: 2021-06-02

## 2021-06-01 RX ORDER — SODIUM CHLORIDE 450 MG/100ML
INJECTION, SOLUTION INTRAVENOUS CONTINUOUS
Status: DISCONTINUED | OUTPATIENT
Start: 2021-06-01 | End: 2021-06-01

## 2021-06-01 RX ORDER — CARVEDILOL 6.25 MG/1
6.25 TABLET ORAL 2 TIMES DAILY WITH MEALS
Status: ON HOLD | COMMUNITY
End: 2021-06-05 | Stop reason: HOSPADM

## 2021-06-01 RX ORDER — SODIUM CHLORIDE 9 MG/ML
INJECTION, SOLUTION INTRAVENOUS CONTINUOUS
Status: ACTIVE | OUTPATIENT
Start: 2021-06-01 | End: 2021-06-02

## 2021-06-01 RX ORDER — AMLODIPINE BESYLATE 5 MG/1
10 TABLET ORAL DAILY
Status: DISCONTINUED | OUTPATIENT
Start: 2021-06-02 | End: 2021-06-03

## 2021-06-01 RX ORDER — ONDANSETRON 2 MG/ML
4 INJECTION INTRAMUSCULAR; INTRAVENOUS EVERY 8 HOURS PRN
Status: DISCONTINUED | OUTPATIENT
Start: 2021-06-01 | End: 2021-06-05 | Stop reason: HOSPADM

## 2021-06-01 RX ORDER — AMIODARONE HYDROCHLORIDE 100 MG/1
100 TABLET ORAL DAILY
Status: DISCONTINUED | OUTPATIENT
Start: 2021-06-02 | End: 2021-06-05 | Stop reason: HOSPADM

## 2021-06-01 RX ORDER — ATORVASTATIN CALCIUM 20 MG/1
40 TABLET, FILM COATED ORAL DAILY
Status: DISCONTINUED | OUTPATIENT
Start: 2021-06-02 | End: 2021-06-05 | Stop reason: HOSPADM

## 2021-06-01 RX ORDER — SODIUM CHLORIDE 0.9 % (FLUSH) 0.9 %
10 SYRINGE (ML) INJECTION
Status: DISCONTINUED | OUTPATIENT
Start: 2021-06-01 | End: 2021-06-05 | Stop reason: HOSPADM

## 2021-06-01 RX ORDER — GLUCAGON 1 MG
1 KIT INJECTION
Status: DISCONTINUED | OUTPATIENT
Start: 2021-06-01 | End: 2021-06-05 | Stop reason: HOSPADM

## 2021-06-01 RX ORDER — INSULIN ASPART 100 [IU]/ML
1-10 INJECTION, SOLUTION INTRAVENOUS; SUBCUTANEOUS
Status: DISCONTINUED | OUTPATIENT
Start: 2021-06-01 | End: 2021-06-05 | Stop reason: HOSPADM

## 2021-06-01 RX ORDER — INDOMETHACIN 25 MG/1
50 CAPSULE ORAL
Status: COMPLETED | OUTPATIENT
Start: 2021-06-01 | End: 2021-06-01

## 2021-06-01 RX ORDER — ALBUTEROL SULFATE 2.5 MG/.5ML
10 SOLUTION RESPIRATORY (INHALATION)
Status: COMPLETED | OUTPATIENT
Start: 2021-06-01 | End: 2021-06-01

## 2021-06-01 RX ORDER — IBUPROFEN 200 MG
24 TABLET ORAL
Status: DISCONTINUED | OUTPATIENT
Start: 2021-06-01 | End: 2021-06-05 | Stop reason: HOSPADM

## 2021-06-01 RX ORDER — LOSARTAN POTASSIUM 50 MG/1
100 TABLET ORAL DAILY
Status: DISCONTINUED | OUTPATIENT
Start: 2021-06-02 | End: 2021-06-02

## 2021-06-01 RX ORDER — IBUPROFEN 200 MG
16 TABLET ORAL
Status: DISCONTINUED | OUTPATIENT
Start: 2021-06-01 | End: 2021-06-05 | Stop reason: HOSPADM

## 2021-06-01 RX ADMIN — DEXTROSE MONOHYDRATE 25 G: 25 INJECTION, SOLUTION INTRAVENOUS at 05:06

## 2021-06-01 RX ADMIN — FAMOTIDINE 20 MG: 20 TABLET ORAL at 08:06

## 2021-06-01 RX ADMIN — HYDRALAZINE HYDROCHLORIDE 25 MG: 25 TABLET, FILM COATED ORAL at 10:06

## 2021-06-01 RX ADMIN — ALBUTEROL SULFATE 10 MG: 2.5 SOLUTION RESPIRATORY (INHALATION) at 06:06

## 2021-06-01 RX ADMIN — INSULIN HUMAN 10 UNITS: 100 INJECTION, SOLUTION PARENTERAL at 06:06

## 2021-06-01 RX ADMIN — APIXABAN 2.5 MG: 2.5 TABLET, FILM COATED ORAL at 08:06

## 2021-06-01 RX ADMIN — SODIUM CHLORIDE: 0.45 INJECTION, SOLUTION INTRAVENOUS at 08:06

## 2021-06-01 RX ADMIN — SODIUM CHLORIDE: 0.9 INJECTION, SOLUTION INTRAVENOUS at 10:06

## 2021-06-01 RX ADMIN — CALCIUM GLUCONATE 1 G: 98 INJECTION, SOLUTION INTRAVENOUS at 05:06

## 2021-06-01 RX ADMIN — SODIUM BICARBONATE 50 MEQ: 84 INJECTION, SOLUTION INTRAVENOUS at 06:06

## 2021-06-02 LAB
ALBUMIN SERPL BCP-MCNC: 3 G/DL (ref 3.5–5.2)
ALP SERPL-CCNC: 81 U/L (ref 55–135)
ALT SERPL W/O P-5'-P-CCNC: 13 U/L (ref 10–44)
ANION GAP SERPL CALC-SCNC: 9 MMOL/L (ref 8–16)
ASCENDING AORTA: 3.39 CM
AST SERPL-CCNC: 18 U/L (ref 10–40)
AV INDEX (PROSTH): 0.87
AV MEAN GRADIENT: 3 MMHG
AV PEAK GRADIENT: 4 MMHG
AV VALVE AREA: 2.96 CM2
AV VELOCITY RATIO: 0.83
BACTERIA #/AREA URNS HPF: ABNORMAL /HPF
BASOPHILS # BLD AUTO: 0.06 K/UL (ref 0–0.2)
BASOPHILS # BLD AUTO: 0.06 K/UL (ref 0–0.2)
BASOPHILS NFR BLD: 0.9 % (ref 0–1.9)
BASOPHILS NFR BLD: 0.9 % (ref 0–1.9)
BILIRUB SERPL-MCNC: 0.6 MG/DL (ref 0.1–1)
BILIRUB UR QL STRIP: NEGATIVE
BSA FOR ECHO PROCEDURE: 2.5 M2
BUN SERPL-MCNC: 60 MG/DL (ref 8–23)
BUN SERPL-MCNC: 61 MG/DL (ref 8–23)
BUN SERPL-MCNC: 61 MG/DL (ref 8–23)
CALCIUM SERPL-MCNC: 8.4 MG/DL (ref 8.7–10.5)
CALCIUM SERPL-MCNC: 8.4 MG/DL (ref 8.7–10.5)
CALCIUM SERPL-MCNC: 8.6 MG/DL (ref 8.7–10.5)
CHLORIDE SERPL-SCNC: 105 MMOL/L (ref 95–110)
CHLORIDE SERPL-SCNC: 106 MMOL/L (ref 95–110)
CHLORIDE SERPL-SCNC: 106 MMOL/L (ref 95–110)
CLARITY UR: CLEAR
CO2 SERPL-SCNC: 21 MMOL/L (ref 23–29)
CO2 SERPL-SCNC: 22 MMOL/L (ref 23–29)
CO2 SERPL-SCNC: 22 MMOL/L (ref 23–29)
COLOR UR: YELLOW
CREAT SERPL-MCNC: 3.9 MG/DL (ref 0.5–1.4)
CREAT SERPL-MCNC: 3.9 MG/DL (ref 0.5–1.4)
CREAT SERPL-MCNC: 4 MG/DL (ref 0.5–1.4)
CV ECHO LV RWT: 0.47 CM
DIFFERENTIAL METHOD: ABNORMAL
DIFFERENTIAL METHOD: ABNORMAL
DOP CALC AO PEAK VEL: 1.03 M/S
DOP CALC AO VTI: 30.56 CM
DOP CALC LVOT AREA: 3.4 CM2
DOP CALC LVOT DIAMETER: 2.08 CM
DOP CALC LVOT PEAK VEL: 0.85 M/S
DOP CALC LVOT STROKE VOLUME: 90.31 CM3
DOP CALCLVOT PEAK VEL VTI: 26.59 CM
E WAVE DECELERATION TIME: 296.09 MSEC
E/A RATIO: 1.6
E/E' RATIO: 14.77 M/S
ECHO LV POSTERIOR WALL: 0.96 CM (ref 0.6–1.1)
EJECTION FRACTION: 60 %
EOSINOPHIL # BLD AUTO: 0.2 K/UL (ref 0–0.5)
EOSINOPHIL # BLD AUTO: 0.2 K/UL (ref 0–0.5)
EOSINOPHIL NFR BLD: 2.9 % (ref 0–8)
EOSINOPHIL NFR BLD: 2.9 % (ref 0–8)
ERYTHROCYTE [DISTWIDTH] IN BLOOD BY AUTOMATED COUNT: 12.9 % (ref 11.5–14.5)
ERYTHROCYTE [DISTWIDTH] IN BLOOD BY AUTOMATED COUNT: 12.9 % (ref 11.5–14.5)
EST. GFR  (AFRICAN AMERICAN): 16 ML/MIN/1.73 M^2
EST. GFR  (AFRICAN AMERICAN): 17 ML/MIN/1.73 M^2
EST. GFR  (AFRICAN AMERICAN): 17 ML/MIN/1.73 M^2
EST. GFR  (NON AFRICAN AMERICAN): 14 ML/MIN/1.73 M^2
FRACTIONAL SHORTENING: 44 % (ref 28–44)
GLUCOSE SERPL-MCNC: 165 MG/DL (ref 70–110)
GLUCOSE SERPL-MCNC: 190 MG/DL (ref 70–110)
GLUCOSE SERPL-MCNC: 190 MG/DL (ref 70–110)
GLUCOSE UR QL STRIP: NEGATIVE
HCT VFR BLD AUTO: 32.8 % (ref 40–54)
HCT VFR BLD AUTO: 32.8 % (ref 40–54)
HGB BLD-MCNC: 10.6 G/DL (ref 14–18)
HGB BLD-MCNC: 10.6 G/DL (ref 14–18)
HGB UR QL STRIP: ABNORMAL
HYALINE CASTS #/AREA URNS LPF: 0 /LPF
IMM GRANULOCYTES # BLD AUTO: 0.05 K/UL (ref 0–0.04)
IMM GRANULOCYTES # BLD AUTO: 0.05 K/UL (ref 0–0.04)
IMM GRANULOCYTES NFR BLD AUTO: 0.7 % (ref 0–0.5)
IMM GRANULOCYTES NFR BLD AUTO: 0.7 % (ref 0–0.5)
INTERVENTRICULAR SEPTUM: 1.03 CM (ref 0.6–1.1)
IVRT: 49.48 MSEC
KETONES UR QL STRIP: NEGATIVE
LA MAJOR: 7.14 CM
LA MINOR: 5.75 CM
LA WIDTH: 4.78 CM
LEFT ATRIUM SIZE: 3.89 CM
LEFT ATRIUM VOLUME INDEX MOD: 40.2 ML/M2
LEFT ATRIUM VOLUME INDEX: 41.3 ML/M2
LEFT ATRIUM VOLUME MOD: 98 CM3
LEFT ATRIUM VOLUME: 100.68 CM3
LEFT INTERNAL DIMENSION IN SYSTOLE: 2.31 CM (ref 2.1–4)
LEFT VENTRICLE DIASTOLIC VOLUME INDEX: 30.82 ML/M2
LEFT VENTRICLE DIASTOLIC VOLUME: 75.19 ML
LEFT VENTRICLE MASS INDEX: 54 G/M2
LEFT VENTRICLE SYSTOLIC VOLUME INDEX: 7.5 ML/M2
LEFT VENTRICLE SYSTOLIC VOLUME: 18.32 ML
LEFT VENTRICULAR INTERNAL DIMENSION IN DIASTOLE: 4.12 CM (ref 3.5–6)
LEFT VENTRICULAR MASS: 132.19 G
LEUKOCYTE ESTERASE UR QL STRIP: NEGATIVE
LV LATERAL E/E' RATIO: 12 M/S
LV SEPTAL E/E' RATIO: 19.2 M/S
LYMPHOCYTES # BLD AUTO: 1.5 K/UL (ref 1–4.8)
LYMPHOCYTES # BLD AUTO: 1.5 K/UL (ref 1–4.8)
LYMPHOCYTES NFR BLD: 21.4 % (ref 18–48)
LYMPHOCYTES NFR BLD: 21.4 % (ref 18–48)
MCH RBC QN AUTO: 30.6 PG (ref 27–31)
MCH RBC QN AUTO: 30.6 PG (ref 27–31)
MCHC RBC AUTO-ENTMCNC: 32.3 G/DL (ref 32–36)
MCHC RBC AUTO-ENTMCNC: 32.3 G/DL (ref 32–36)
MCV RBC AUTO: 95 FL (ref 82–98)
MCV RBC AUTO: 95 FL (ref 82–98)
MICROSCOPIC COMMENT: ABNORMAL
MONOCYTES # BLD AUTO: 0.7 K/UL (ref 0.3–1)
MONOCYTES # BLD AUTO: 0.7 K/UL (ref 0.3–1)
MONOCYTES NFR BLD: 10.3 % (ref 4–15)
MONOCYTES NFR BLD: 10.3 % (ref 4–15)
MV PEAK A VEL: 0.6 M/S
MV PEAK E VEL: 0.96 M/S
MV STENOSIS PRESSURE HALF TIME: 85.87 MS
MV VALVE AREA P 1/2 METHOD: 2.56 CM2
NEUTROPHILS # BLD AUTO: 4.4 K/UL (ref 1.8–7.7)
NEUTROPHILS # BLD AUTO: 4.4 K/UL (ref 1.8–7.7)
NEUTROPHILS NFR BLD: 63.8 % (ref 38–73)
NEUTROPHILS NFR BLD: 63.8 % (ref 38–73)
NITRITE UR QL STRIP: NEGATIVE
NRBC BLD-RTO: 0 /100 WBC
NRBC BLD-RTO: 0 /100 WBC
PH UR STRIP: 7 [PH] (ref 5–8)
PHOSPHATE SERPL-MCNC: 4.3 MG/DL (ref 2.7–4.5)
PISA MRMAX VEL: 0.03 M/S
PLATELET # BLD AUTO: 202 K/UL (ref 150–450)
PLATELET # BLD AUTO: 202 K/UL (ref 150–450)
PMV BLD AUTO: 11.7 FL (ref 9.2–12.9)
PMV BLD AUTO: 11.7 FL (ref 9.2–12.9)
POCT GLUCOSE: 135 MG/DL (ref 70–110)
POCT GLUCOSE: 138 MG/DL (ref 70–110)
POTASSIUM SERPL-SCNC: 6.1 MMOL/L (ref 3.5–5.1)
POTASSIUM SERPL-SCNC: 6.1 MMOL/L (ref 3.5–5.1)
POTASSIUM SERPL-SCNC: 6.2 MMOL/L (ref 3.5–5.1)
POTASSIUM SERPL-SCNC: 6.4 MMOL/L (ref 3.5–5.1)
PROT SERPL-MCNC: 6.2 G/DL (ref 6–8.4)
PROT UR QL STRIP: ABNORMAL
RA MAJOR: 5.13 CM
RA WIDTH: 3.92 CM
RBC # BLD AUTO: 3.46 M/UL (ref 4.6–6.2)
RBC # BLD AUTO: 3.46 M/UL (ref 4.6–6.2)
RBC #/AREA URNS HPF: 12 /HPF (ref 0–4)
RIGHT VENTRICULAR END-DIASTOLIC DIMENSION: 4.11 CM
SINUS: 3.28 CM
SODIUM SERPL-SCNC: 135 MMOL/L (ref 136–145)
SODIUM SERPL-SCNC: 137 MMOL/L (ref 136–145)
SODIUM SERPL-SCNC: 137 MMOL/L (ref 136–145)
SP GR UR STRIP: 1.01 (ref 1–1.03)
SQUAMOUS #/AREA URNS HPF: 2 /HPF
STJ: 3.16 CM
TDI LATERAL: 0.08 M/S
TDI SEPTAL: 0.05 M/S
TDI: 0.07 M/S
TROPONIN I SERPL DL<=0.01 NG/ML-MCNC: 0.02 NG/ML (ref 0–0.03)
TSH SERPL DL<=0.005 MIU/L-ACNC: 2.32 UIU/ML (ref 0.4–4)
URATE SERPL-MCNC: 9.1 MG/DL (ref 3.4–7)
URN SPEC COLLECT METH UR: ABNORMAL
UROBILINOGEN UR STRIP-ACNC: 1 EU/DL
WBC # BLD AUTO: 6.86 K/UL (ref 3.9–12.7)
WBC # BLD AUTO: 6.86 K/UL (ref 3.9–12.7)
WBC #/AREA URNS HPF: 0 /HPF (ref 0–5)

## 2021-06-02 PROCEDURE — 84484 ASSAY OF TROPONIN QUANT: CPT | Performed by: EMERGENCY MEDICINE

## 2021-06-02 PROCEDURE — 25000003 PHARM REV CODE 250: Performed by: INTERNAL MEDICINE

## 2021-06-02 PROCEDURE — 99900035 HC TECH TIME PER 15 MIN (STAT)

## 2021-06-02 PROCEDURE — 81000 URINALYSIS NONAUTO W/SCOPE: CPT | Performed by: NURSE PRACTITIONER

## 2021-06-02 PROCEDURE — 63600175 PHARM REV CODE 636 W HCPCS: Performed by: INTERNAL MEDICINE

## 2021-06-02 PROCEDURE — 36415 COLL VENOUS BLD VENIPUNCTURE: CPT | Performed by: EMERGENCY MEDICINE

## 2021-06-02 PROCEDURE — 20000000 HC ICU ROOM

## 2021-06-02 PROCEDURE — 80053 COMPREHEN METABOLIC PANEL: CPT | Performed by: INTERNAL MEDICINE

## 2021-06-02 PROCEDURE — 84443 ASSAY THYROID STIM HORMONE: CPT | Performed by: INTERNAL MEDICINE

## 2021-06-02 PROCEDURE — 85025 COMPLETE CBC W/AUTO DIFF WBC: CPT | Performed by: EMERGENCY MEDICINE

## 2021-06-02 PROCEDURE — 25000003 PHARM REV CODE 250: Performed by: NURSE PRACTITIONER

## 2021-06-02 PROCEDURE — 51798 US URINE CAPACITY MEASURE: CPT

## 2021-06-02 PROCEDURE — 36415 COLL VENOUS BLD VENIPUNCTURE: CPT | Performed by: INTERNAL MEDICINE

## 2021-06-02 PROCEDURE — 94761 N-INVAS EAR/PLS OXIMETRY MLT: CPT

## 2021-06-02 PROCEDURE — 27000221 HC OXYGEN, UP TO 24 HOURS

## 2021-06-02 PROCEDURE — 36415 COLL VENOUS BLD VENIPUNCTURE: CPT | Performed by: NURSE PRACTITIONER

## 2021-06-02 PROCEDURE — 84550 ASSAY OF BLOOD/URIC ACID: CPT | Performed by: INTERNAL MEDICINE

## 2021-06-02 PROCEDURE — 25000003 PHARM REV CODE 250: Performed by: EMERGENCY MEDICINE

## 2021-06-02 PROCEDURE — 80048 BASIC METABOLIC PNL TOTAL CA: CPT | Performed by: NURSE PRACTITIONER

## 2021-06-02 PROCEDURE — 84132 ASSAY OF SERUM POTASSIUM: CPT | Performed by: INTERNAL MEDICINE

## 2021-06-02 PROCEDURE — 84100 ASSAY OF PHOSPHORUS: CPT | Performed by: SURGERY

## 2021-06-02 RX ORDER — FAMOTIDINE 20 MG/1
20 TABLET, FILM COATED ORAL DAILY
Status: DISCONTINUED | OUTPATIENT
Start: 2021-06-03 | End: 2021-06-05 | Stop reason: HOSPADM

## 2021-06-02 RX ORDER — MUPIROCIN 20 MG/G
OINTMENT TOPICAL 2 TIMES DAILY
Status: DISCONTINUED | OUTPATIENT
Start: 2021-06-02 | End: 2021-06-05 | Stop reason: HOSPADM

## 2021-06-02 RX ORDER — METOLAZONE 5 MG/1
10 TABLET ORAL ONCE
Status: COMPLETED | OUTPATIENT
Start: 2021-06-02 | End: 2021-06-02

## 2021-06-02 RX ORDER — FUROSEMIDE 10 MG/ML
60 INJECTION INTRAMUSCULAR; INTRAVENOUS ONCE
Status: COMPLETED | OUTPATIENT
Start: 2021-06-02 | End: 2021-06-02

## 2021-06-02 RX ORDER — SODIUM BICARBONATE 650 MG/1
650 TABLET ORAL 2 TIMES DAILY
Status: DISCONTINUED | OUTPATIENT
Start: 2021-06-02 | End: 2021-06-03

## 2021-06-02 RX ORDER — HYDRALAZINE HYDROCHLORIDE 25 MG/1
50 TABLET, FILM COATED ORAL EVERY 8 HOURS
Status: DISCONTINUED | OUTPATIENT
Start: 2021-06-02 | End: 2021-06-03

## 2021-06-02 RX ADMIN — AMLODIPINE BESYLATE 10 MG: 5 TABLET ORAL at 09:06

## 2021-06-02 RX ADMIN — APIXABAN 2.5 MG: 2.5 TABLET, FILM COATED ORAL at 09:06

## 2021-06-02 RX ADMIN — MUPIROCIN: 20 OINTMENT TOPICAL at 09:06

## 2021-06-02 RX ADMIN — FAMOTIDINE 20 MG: 20 TABLET ORAL at 09:06

## 2021-06-02 RX ADMIN — SODIUM ZIRCONIUM CYCLOSILICATE 10 G: 10 POWDER, FOR SUSPENSION ORAL at 03:06

## 2021-06-02 RX ADMIN — SODIUM BICARBONATE 650 MG TABLET 650 MG: at 09:06

## 2021-06-02 RX ADMIN — HYDRALAZINE HYDROCHLORIDE 25 MG: 25 TABLET, FILM COATED ORAL at 06:06

## 2021-06-02 RX ADMIN — METOLAZONE 10 MG: 5 TABLET ORAL at 05:06

## 2021-06-02 RX ADMIN — HYDRALAZINE HYDROCHLORIDE 50 MG: 25 TABLET, FILM COATED ORAL at 09:06

## 2021-06-02 RX ADMIN — SODIUM ZIRCONIUM CYCLOSILICATE 10 G: 10 POWDER, FOR SUSPENSION ORAL at 07:06

## 2021-06-02 RX ADMIN — FUROSEMIDE 60 MG: 10 INJECTION, SOLUTION INTRAMUSCULAR; INTRAVENOUS at 05:06

## 2021-06-02 RX ADMIN — ATORVASTATIN CALCIUM 40 MG: 20 TABLET, FILM COATED ORAL at 09:06

## 2021-06-02 RX ADMIN — HYDRALAZINE HYDROCHLORIDE 25 MG: 25 TABLET, FILM COATED ORAL at 01:06

## 2021-06-03 LAB
ANION GAP SERPL CALC-SCNC: 10 MMOL/L (ref 8–16)
ANION GAP SERPL CALC-SCNC: 9 MMOL/L (ref 8–16)
BASOPHILS # BLD AUTO: 0.07 K/UL (ref 0–0.2)
BASOPHILS NFR BLD: 1 % (ref 0–1.9)
BUN SERPL-MCNC: 55 MG/DL (ref 8–23)
BUN SERPL-MCNC: 62 MG/DL (ref 8–23)
CALCIUM SERPL-MCNC: 8.3 MG/DL (ref 8.7–10.5)
CALCIUM SERPL-MCNC: 8.6 MG/DL (ref 8.7–10.5)
CHLORIDE SERPL-SCNC: 103 MMOL/L (ref 95–110)
CHLORIDE SERPL-SCNC: 105 MMOL/L (ref 95–110)
CHLORIDE UR-SCNC: 70 MMOL/L (ref 25–200)
CO2 SERPL-SCNC: 21 MMOL/L (ref 23–29)
CO2 SERPL-SCNC: 22 MMOL/L (ref 23–29)
CREAT SERPL-MCNC: 3.8 MG/DL (ref 0.5–1.4)
CREAT SERPL-MCNC: 4 MG/DL (ref 0.5–1.4)
CREAT UR-MCNC: 49.3 MG/DL (ref 23–375)
DIFFERENTIAL METHOD: ABNORMAL
EOSINOPHIL # BLD AUTO: 0.3 K/UL (ref 0–0.5)
EOSINOPHIL NFR BLD: 3.6 % (ref 0–8)
EOSINOPHIL URNS QL WRIGHT STN: NORMAL
ERYTHROCYTE [DISTWIDTH] IN BLOOD BY AUTOMATED COUNT: 13.1 % (ref 11.5–14.5)
EST. GFR  (AFRICAN AMERICAN): 16 ML/MIN/1.73 M^2
EST. GFR  (AFRICAN AMERICAN): 17 ML/MIN/1.73 M^2
EST. GFR  (NON AFRICAN AMERICAN): 14 ML/MIN/1.73 M^2
EST. GFR  (NON AFRICAN AMERICAN): 15 ML/MIN/1.73 M^2
GLUCOSE SERPL-MCNC: 153 MG/DL (ref 70–110)
GLUCOSE SERPL-MCNC: 166 MG/DL (ref 70–110)
HCT VFR BLD AUTO: 32.6 % (ref 40–54)
HGB BLD-MCNC: 10.7 G/DL (ref 14–18)
IMM GRANULOCYTES # BLD AUTO: 0.04 K/UL (ref 0–0.04)
IMM GRANULOCYTES NFR BLD AUTO: 0.6 % (ref 0–0.5)
LYMPHOCYTES # BLD AUTO: 1.3 K/UL (ref 1–4.8)
LYMPHOCYTES NFR BLD: 18.1 % (ref 18–48)
MCH RBC QN AUTO: 31.1 PG (ref 27–31)
MCHC RBC AUTO-ENTMCNC: 32.8 G/DL (ref 32–36)
MCV RBC AUTO: 95 FL (ref 82–98)
MONOCYTES # BLD AUTO: 0.9 K/UL (ref 0.3–1)
MONOCYTES NFR BLD: 12.3 % (ref 4–15)
NEUTROPHILS # BLD AUTO: 4.5 K/UL (ref 1.8–7.7)
NEUTROPHILS NFR BLD: 64.4 % (ref 38–73)
NRBC BLD-RTO: 0 /100 WBC
PLATELET # BLD AUTO: 192 K/UL (ref 150–450)
PMV BLD AUTO: 11.2 FL (ref 9.2–12.9)
POCT GLUCOSE: 193 MG/DL (ref 70–110)
POTASSIUM SERPL-SCNC: 5.4 MMOL/L (ref 3.5–5.1)
POTASSIUM SERPL-SCNC: 6 MMOL/L (ref 3.5–5.1)
POTASSIUM UR-SCNC: 26 MMOL/L (ref 15–95)
RBC # BLD AUTO: 3.44 M/UL (ref 4.6–6.2)
SODIUM SERPL-SCNC: 134 MMOL/L (ref 136–145)
SODIUM SERPL-SCNC: 136 MMOL/L (ref 136–145)
SODIUM UR-SCNC: 103 MMOL/L (ref 20–250)
WBC # BLD AUTO: 7 K/UL (ref 3.9–12.7)

## 2021-06-03 PROCEDURE — 84300 ASSAY OF URINE SODIUM: CPT | Performed by: NURSE PRACTITIONER

## 2021-06-03 PROCEDURE — 36415 COLL VENOUS BLD VENIPUNCTURE: CPT | Performed by: NURSE PRACTITIONER

## 2021-06-03 PROCEDURE — 21400001 HC TELEMETRY ROOM

## 2021-06-03 PROCEDURE — 25000003 PHARM REV CODE 250: Performed by: EMERGENCY MEDICINE

## 2021-06-03 PROCEDURE — 87205 SMEAR GRAM STAIN: CPT | Performed by: NURSE PRACTITIONER

## 2021-06-03 PROCEDURE — 85025 COMPLETE CBC W/AUTO DIFF WBC: CPT | Performed by: EMERGENCY MEDICINE

## 2021-06-03 PROCEDURE — 99900035 HC TECH TIME PER 15 MIN (STAT)

## 2021-06-03 PROCEDURE — 63600175 PHARM REV CODE 636 W HCPCS: Performed by: INTERNAL MEDICINE

## 2021-06-03 PROCEDURE — 25000003 PHARM REV CODE 250: Performed by: INTERNAL MEDICINE

## 2021-06-03 PROCEDURE — 84133 ASSAY OF URINE POTASSIUM: CPT | Performed by: NURSE PRACTITIONER

## 2021-06-03 PROCEDURE — 80048 BASIC METABOLIC PNL TOTAL CA: CPT | Mod: 91 | Performed by: NURSE PRACTITIONER

## 2021-06-03 PROCEDURE — 82570 ASSAY OF URINE CREATININE: CPT | Performed by: NURSE PRACTITIONER

## 2021-06-03 PROCEDURE — 82436 ASSAY OF URINE CHLORIDE: CPT | Performed by: NURSE PRACTITIONER

## 2021-06-03 PROCEDURE — 25000003 PHARM REV CODE 250: Performed by: NURSE PRACTITIONER

## 2021-06-03 PROCEDURE — 63600175 PHARM REV CODE 636 W HCPCS: Performed by: EMERGENCY MEDICINE

## 2021-06-03 PROCEDURE — 94761 N-INVAS EAR/PLS OXIMETRY MLT: CPT

## 2021-06-03 RX ORDER — CHLORTHALIDONE 25 MG/1
25 TABLET ORAL ONCE
Status: COMPLETED | OUTPATIENT
Start: 2021-06-03 | End: 2021-06-03

## 2021-06-03 RX ORDER — SODIUM BICARBONATE 650 MG/1
1300 TABLET ORAL 2 TIMES DAILY
Status: DISCONTINUED | OUTPATIENT
Start: 2021-06-03 | End: 2021-06-05

## 2021-06-03 RX ORDER — SIMETHICONE 80 MG
1 TABLET,CHEWABLE ORAL 3 TIMES DAILY PRN
Status: DISCONTINUED | OUTPATIENT
Start: 2021-06-03 | End: 2021-06-05 | Stop reason: HOSPADM

## 2021-06-03 RX ORDER — HYDRALAZINE HYDROCHLORIDE 25 MG/1
100 TABLET, FILM COATED ORAL EVERY 12 HOURS
Status: DISCONTINUED | OUTPATIENT
Start: 2021-06-03 | End: 2021-06-05 | Stop reason: HOSPADM

## 2021-06-03 RX ORDER — ALLOPURINOL 100 MG/1
100 TABLET ORAL DAILY
Status: DISCONTINUED | OUTPATIENT
Start: 2021-06-03 | End: 2021-06-05 | Stop reason: HOSPADM

## 2021-06-03 RX ADMIN — ALLOPURINOL 100 MG: 100 TABLET ORAL at 11:06

## 2021-06-03 RX ADMIN — HYDRALAZINE HYDROCHLORIDE 50 MG: 25 TABLET, FILM COATED ORAL at 06:06

## 2021-06-03 RX ADMIN — ONDANSETRON 4 MG: 2 INJECTION INTRAMUSCULAR; INTRAVENOUS at 10:06

## 2021-06-03 RX ADMIN — SODIUM ZIRCONIUM CYCLOSILICATE 10 G: 10 POWDER, FOR SUSPENSION ORAL at 03:06

## 2021-06-03 RX ADMIN — HYDRALAZINE HYDROCHLORIDE 100 MG: 25 TABLET, FILM COATED ORAL at 09:06

## 2021-06-03 RX ADMIN — FAMOTIDINE 20 MG: 20 TABLET, FILM COATED ORAL at 09:06

## 2021-06-03 RX ADMIN — CHLORTHALIDONE 25 MG: 25 TABLET ORAL at 09:06

## 2021-06-03 RX ADMIN — SODIUM BICARBONATE 650 MG TABLET 1300 MG: at 09:06

## 2021-06-03 RX ADMIN — SIMETHICONE 80 MG: 80 TABLET, CHEWABLE ORAL at 01:06

## 2021-06-03 RX ADMIN — APIXABAN 2.5 MG: 2.5 TABLET, FILM COATED ORAL at 09:06

## 2021-06-03 RX ADMIN — ATORVASTATIN CALCIUM 40 MG: 20 TABLET, FILM COATED ORAL at 09:06

## 2021-06-03 RX ADMIN — MUPIROCIN: 20 OINTMENT TOPICAL at 09:06

## 2021-06-03 RX ADMIN — INSULIN ASPART 1 UNITS: 100 INJECTION, SOLUTION INTRAVENOUS; SUBCUTANEOUS at 11:06

## 2021-06-03 RX ADMIN — SODIUM ZIRCONIUM CYCLOSILICATE 10 G: 10 POWDER, FOR SUSPENSION ORAL at 07:06

## 2021-06-03 RX ADMIN — SODIUM ZIRCONIUM CYCLOSILICATE 10 G: 10 POWDER, FOR SUSPENSION ORAL at 11:06

## 2021-06-04 LAB
ANION GAP SERPL CALC-SCNC: 10 MMOL/L (ref 8–16)
ANION GAP SERPL CALC-SCNC: 10 MMOL/L (ref 8–16)
BASOPHILS # BLD AUTO: 0.06 K/UL (ref 0–0.2)
BASOPHILS NFR BLD: 0.8 % (ref 0–1.9)
BUN SERPL-MCNC: 55 MG/DL (ref 8–23)
BUN SERPL-MCNC: 60 MG/DL (ref 8–23)
CALCIUM SERPL-MCNC: 8.2 MG/DL (ref 8.7–10.5)
CALCIUM SERPL-MCNC: 8.5 MG/DL (ref 8.7–10.5)
CHLORIDE SERPL-SCNC: 103 MMOL/L (ref 95–110)
CHLORIDE SERPL-SCNC: 104 MMOL/L (ref 95–110)
CO2 SERPL-SCNC: 21 MMOL/L (ref 23–29)
CO2 SERPL-SCNC: 22 MMOL/L (ref 23–29)
CREAT SERPL-MCNC: 3.7 MG/DL (ref 0.5–1.4)
CREAT SERPL-MCNC: 3.7 MG/DL (ref 0.5–1.4)
DIFFERENTIAL METHOD: ABNORMAL
EOSINOPHIL # BLD AUTO: 0.2 K/UL (ref 0–0.5)
EOSINOPHIL NFR BLD: 2.9 % (ref 0–8)
ERYTHROCYTE [DISTWIDTH] IN BLOOD BY AUTOMATED COUNT: 13.1 % (ref 11.5–14.5)
EST. GFR  (AFRICAN AMERICAN): 18 ML/MIN/1.73 M^2
EST. GFR  (AFRICAN AMERICAN): 18 ML/MIN/1.73 M^2
EST. GFR  (NON AFRICAN AMERICAN): 15 ML/MIN/1.73 M^2
EST. GFR  (NON AFRICAN AMERICAN): 15 ML/MIN/1.73 M^2
GLUCOSE SERPL-MCNC: 147 MG/DL (ref 70–110)
GLUCOSE SERPL-MCNC: 153 MG/DL (ref 70–110)
HCT VFR BLD AUTO: 33.2 % (ref 40–54)
HGB BLD-MCNC: 11 G/DL (ref 14–18)
IMM GRANULOCYTES # BLD AUTO: 0.06 K/UL (ref 0–0.04)
IMM GRANULOCYTES NFR BLD AUTO: 0.8 % (ref 0–0.5)
LYMPHOCYTES # BLD AUTO: 1 K/UL (ref 1–4.8)
LYMPHOCYTES NFR BLD: 13 % (ref 18–48)
MCH RBC QN AUTO: 31 PG (ref 27–31)
MCHC RBC AUTO-ENTMCNC: 33.1 G/DL (ref 32–36)
MCV RBC AUTO: 94 FL (ref 82–98)
MONOCYTES # BLD AUTO: 1 K/UL (ref 0.3–1)
MONOCYTES NFR BLD: 13 % (ref 4–15)
NEUTROPHILS # BLD AUTO: 5.1 K/UL (ref 1.8–7.7)
NEUTROPHILS NFR BLD: 69.5 % (ref 38–73)
NRBC BLD-RTO: 0 /100 WBC
PLATELET # BLD AUTO: 205 K/UL (ref 150–450)
PMV BLD AUTO: 10.5 FL (ref 9.2–12.9)
POCT GLUCOSE: 152 MG/DL (ref 70–110)
POCT GLUCOSE: 156 MG/DL (ref 70–110)
POCT GLUCOSE: 160 MG/DL (ref 70–110)
POCT GLUCOSE: 208 MG/DL (ref 70–110)
POTASSIUM SERPL-SCNC: 4.7 MMOL/L (ref 3.5–5.1)
POTASSIUM SERPL-SCNC: 5.1 MMOL/L (ref 3.5–5.1)
RBC # BLD AUTO: 3.55 M/UL (ref 4.6–6.2)
SODIUM SERPL-SCNC: 135 MMOL/L (ref 136–145)
SODIUM SERPL-SCNC: 135 MMOL/L (ref 136–145)
WBC # BLD AUTO: 7.3 K/UL (ref 3.9–12.7)

## 2021-06-04 PROCEDURE — 93010 EKG 12-LEAD: ICD-10-PCS | Mod: ,,, | Performed by: INTERNAL MEDICINE

## 2021-06-04 PROCEDURE — 93041 RHYTHM ECG TRACING: CPT

## 2021-06-04 PROCEDURE — 93010 ELECTROCARDIOGRAM REPORT: CPT | Mod: ,,, | Performed by: INTERNAL MEDICINE

## 2021-06-04 PROCEDURE — 93005 ELECTROCARDIOGRAM TRACING: CPT

## 2021-06-04 PROCEDURE — 25000003 PHARM REV CODE 250: Performed by: INTERNAL MEDICINE

## 2021-06-04 PROCEDURE — 21400001 HC TELEMETRY ROOM

## 2021-06-04 PROCEDURE — 80048 BASIC METABOLIC PNL TOTAL CA: CPT | Performed by: INTERNAL MEDICINE

## 2021-06-04 PROCEDURE — 94761 N-INVAS EAR/PLS OXIMETRY MLT: CPT

## 2021-06-04 PROCEDURE — 36415 COLL VENOUS BLD VENIPUNCTURE: CPT | Performed by: INTERNAL MEDICINE

## 2021-06-04 PROCEDURE — 85025 COMPLETE CBC W/AUTO DIFF WBC: CPT | Performed by: INTERNAL MEDICINE

## 2021-06-04 RX ADMIN — ALLOPURINOL 100 MG: 100 TABLET ORAL at 10:06

## 2021-06-04 RX ADMIN — AMIODARONE HYDROCHLORIDE 100 MG: 100 TABLET ORAL at 10:06

## 2021-06-04 RX ADMIN — FAMOTIDINE 20 MG: 20 TABLET, FILM COATED ORAL at 10:06

## 2021-06-04 RX ADMIN — INSULIN ASPART 2 UNITS: 100 INJECTION, SOLUTION INTRAVENOUS; SUBCUTANEOUS at 09:06

## 2021-06-04 RX ADMIN — APIXABAN 2.5 MG: 2.5 TABLET, FILM COATED ORAL at 09:06

## 2021-06-04 RX ADMIN — INSULIN ASPART 2 UNITS: 100 INJECTION, SOLUTION INTRAVENOUS; SUBCUTANEOUS at 12:06

## 2021-06-04 RX ADMIN — MUPIROCIN: 20 OINTMENT TOPICAL at 09:06

## 2021-06-04 RX ADMIN — HYDRALAZINE HYDROCHLORIDE 100 MG: 25 TABLET, FILM COATED ORAL at 09:06

## 2021-06-04 RX ADMIN — HYDRALAZINE HYDROCHLORIDE 100 MG: 25 TABLET, FILM COATED ORAL at 10:06

## 2021-06-04 RX ADMIN — MUPIROCIN: 20 OINTMENT TOPICAL at 10:06

## 2021-06-04 RX ADMIN — ATORVASTATIN CALCIUM 40 MG: 20 TABLET, FILM COATED ORAL at 10:06

## 2021-06-04 RX ADMIN — SODIUM BICARBONATE 650 MG TABLET 1300 MG: at 10:06

## 2021-06-04 RX ADMIN — SODIUM BICARBONATE 650 MG TABLET 1300 MG: at 09:06

## 2021-06-04 RX ADMIN — INSULIN ASPART 2 UNITS: 100 INJECTION, SOLUTION INTRAVENOUS; SUBCUTANEOUS at 04:06

## 2021-06-05 VITALS
SYSTOLIC BLOOD PRESSURE: 130 MMHG | TEMPERATURE: 94 F | HEART RATE: 58 BPM | DIASTOLIC BLOOD PRESSURE: 61 MMHG | RESPIRATION RATE: 15 BRPM | HEIGHT: 73 IN | OXYGEN SATURATION: 95 % | BODY MASS INDEX: 35.47 KG/M2 | WEIGHT: 267.63 LBS

## 2021-06-05 LAB
ANION GAP SERPL CALC-SCNC: 10 MMOL/L (ref 8–16)
BASOPHILS # BLD AUTO: 0.05 K/UL (ref 0–0.2)
BASOPHILS NFR BLD: 0.6 % (ref 0–1.9)
BUN SERPL-MCNC: 58 MG/DL (ref 8–23)
CALCIUM SERPL-MCNC: 8.2 MG/DL (ref 8.7–10.5)
CHLORIDE SERPL-SCNC: 103 MMOL/L (ref 95–110)
CO2 SERPL-SCNC: 22 MMOL/L (ref 23–29)
CREAT SERPL-MCNC: 3.8 MG/DL (ref 0.5–1.4)
DIFFERENTIAL METHOD: ABNORMAL
EOSINOPHIL # BLD AUTO: 0.2 K/UL (ref 0–0.5)
EOSINOPHIL NFR BLD: 2.4 % (ref 0–8)
ERYTHROCYTE [DISTWIDTH] IN BLOOD BY AUTOMATED COUNT: 13.1 % (ref 11.5–14.5)
EST. GFR  (AFRICAN AMERICAN): 17 ML/MIN/1.73 M^2
EST. GFR  (NON AFRICAN AMERICAN): 15 ML/MIN/1.73 M^2
GLUCOSE SERPL-MCNC: 152 MG/DL (ref 70–110)
HCT VFR BLD AUTO: 34.2 % (ref 40–54)
HGB BLD-MCNC: 11.3 G/DL (ref 14–18)
IMM GRANULOCYTES # BLD AUTO: 0.04 K/UL (ref 0–0.04)
IMM GRANULOCYTES NFR BLD AUTO: 0.5 % (ref 0–0.5)
LYMPHOCYTES # BLD AUTO: 1.2 K/UL (ref 1–4.8)
LYMPHOCYTES NFR BLD: 15 % (ref 18–48)
MCH RBC QN AUTO: 30.8 PG (ref 27–31)
MCHC RBC AUTO-ENTMCNC: 33 G/DL (ref 32–36)
MCV RBC AUTO: 93 FL (ref 82–98)
MONOCYTES # BLD AUTO: 1 K/UL (ref 0.3–1)
MONOCYTES NFR BLD: 12.3 % (ref 4–15)
NEUTROPHILS # BLD AUTO: 5.5 K/UL (ref 1.8–7.7)
NEUTROPHILS NFR BLD: 69.2 % (ref 38–73)
NRBC BLD-RTO: 0 /100 WBC
PLATELET # BLD AUTO: 201 K/UL (ref 150–450)
PMV BLD AUTO: 10.1 FL (ref 9.2–12.9)
POCT GLUCOSE: 134 MG/DL (ref 70–110)
POCT GLUCOSE: 182 MG/DL (ref 70–110)
POTASSIUM SERPL-SCNC: 4.7 MMOL/L (ref 3.5–5.1)
RBC # BLD AUTO: 3.67 M/UL (ref 4.6–6.2)
SODIUM SERPL-SCNC: 135 MMOL/L (ref 136–145)
WBC # BLD AUTO: 7.94 K/UL (ref 3.9–12.7)

## 2021-06-05 PROCEDURE — 25000003 PHARM REV CODE 250: Performed by: INTERNAL MEDICINE

## 2021-06-05 PROCEDURE — 80048 BASIC METABOLIC PNL TOTAL CA: CPT | Performed by: INTERNAL MEDICINE

## 2021-06-05 PROCEDURE — 36415 COLL VENOUS BLD VENIPUNCTURE: CPT | Performed by: INTERNAL MEDICINE

## 2021-06-05 PROCEDURE — 93005 ELECTROCARDIOGRAM TRACING: CPT

## 2021-06-05 PROCEDURE — 25000003 PHARM REV CODE 250: Performed by: NURSE PRACTITIONER

## 2021-06-05 PROCEDURE — 85025 COMPLETE CBC W/AUTO DIFF WBC: CPT | Performed by: INTERNAL MEDICINE

## 2021-06-05 PROCEDURE — 94761 N-INVAS EAR/PLS OXIMETRY MLT: CPT

## 2021-06-05 PROCEDURE — 93010 ELECTROCARDIOGRAM REPORT: CPT | Mod: ,,, | Performed by: INTERNAL MEDICINE

## 2021-06-05 PROCEDURE — 93010 EKG 12-LEAD: ICD-10-PCS | Mod: ,,, | Performed by: INTERNAL MEDICINE

## 2021-06-05 RX ORDER — SODIUM BICARBONATE 650 MG/1
650 TABLET ORAL 2 TIMES DAILY
Status: DISCONTINUED | OUTPATIENT
Start: 2021-06-05 | End: 2021-06-05 | Stop reason: HOSPADM

## 2021-06-05 RX ORDER — SODIUM BICARBONATE 650 MG/1
650 TABLET ORAL 2 TIMES DAILY
Qty: 60 TABLET | Refills: 11 | Status: ON HOLD | OUTPATIENT
Start: 2021-06-05 | End: 2021-08-27 | Stop reason: HOSPADM

## 2021-06-05 RX ORDER — FAMOTIDINE 20 MG/1
20 TABLET, FILM COATED ORAL DAILY
Qty: 30 TABLET | Refills: 11 | Status: ON HOLD | OUTPATIENT
Start: 2021-06-06 | End: 2022-06-28

## 2021-06-05 RX ORDER — HYDRALAZINE HYDROCHLORIDE 100 MG/1
100 TABLET, FILM COATED ORAL EVERY 12 HOURS
Qty: 60 TABLET | Refills: 11 | Status: ON HOLD | OUTPATIENT
Start: 2021-06-05 | End: 2022-06-28

## 2021-06-05 RX ORDER — ALLOPURINOL 100 MG/1
100 TABLET ORAL DAILY
Qty: 30 TABLET | Refills: 6 | Status: SHIPPED | OUTPATIENT
Start: 2021-06-06

## 2021-06-05 RX ADMIN — AMIODARONE HYDROCHLORIDE 100 MG: 100 TABLET ORAL at 08:06

## 2021-06-05 RX ADMIN — ALLOPURINOL 100 MG: 100 TABLET ORAL at 08:06

## 2021-06-05 RX ADMIN — ATORVASTATIN CALCIUM 40 MG: 20 TABLET, FILM COATED ORAL at 08:06

## 2021-06-05 RX ADMIN — MUPIROCIN: 20 OINTMENT TOPICAL at 08:06

## 2021-06-05 RX ADMIN — FAMOTIDINE 20 MG: 20 TABLET, FILM COATED ORAL at 08:06

## 2021-06-05 RX ADMIN — APIXABAN 2.5 MG: 2.5 TABLET, FILM COATED ORAL at 08:06

## 2021-06-05 RX ADMIN — SODIUM BICARBONATE 650 MG TABLET 1300 MG: at 08:06

## 2021-06-05 RX ADMIN — INSULIN ASPART 2 UNITS: 100 INJECTION, SOLUTION INTRAVENOUS; SUBCUTANEOUS at 07:06

## 2021-06-05 RX ADMIN — INSULIN ASPART 2 UNITS: 100 INJECTION, SOLUTION INTRAVENOUS; SUBCUTANEOUS at 12:06

## 2021-06-05 RX ADMIN — HYDRALAZINE HYDROCHLORIDE 100 MG: 25 TABLET, FILM COATED ORAL at 08:06

## 2021-06-05 RX ADMIN — SITAGLIPTIN 25 MG: 25 TABLET, FILM COATED ORAL at 11:06

## 2021-06-08 ENCOUNTER — PATIENT MESSAGE (OUTPATIENT)
Dept: ADMINISTRATIVE | Facility: CLINIC | Age: 71
End: 2021-06-08

## 2021-06-08 ENCOUNTER — PATIENT OUTREACH (OUTPATIENT)
Dept: ADMINISTRATIVE | Facility: CLINIC | Age: 71
End: 2021-06-08

## 2021-06-09 ENCOUNTER — PATIENT MESSAGE (OUTPATIENT)
Dept: ADMINISTRATIVE | Facility: CLINIC | Age: 71
End: 2021-06-09

## 2021-08-08 NOTE — PLAN OF CARE
"Patient reports feeling "great" after sleeping. Safety measures maintained. Patient using call light for assistance. Will continue to monitor.  " Osteopenia, unspecified location

## 2021-08-25 ENCOUNTER — HOSPITAL ENCOUNTER (INPATIENT)
Facility: OTHER | Age: 71
LOS: 1 days | Discharge: HOME | DRG: 291 | End: 2021-08-27
Attending: EMERGENCY MEDICINE | Admitting: HOSPITALIST
Payer: MEDICARE

## 2021-08-25 DIAGNOSIS — R60.0 BILATERAL LOWER EXTREMITY EDEMA: ICD-10-CM

## 2021-08-25 DIAGNOSIS — R06.02 SOB (SHORTNESS OF BREATH): ICD-10-CM

## 2021-08-25 DIAGNOSIS — N04.9 NEPHROTIC SYNDROME: ICD-10-CM

## 2021-08-25 DIAGNOSIS — E11.21 TYPE 2 DIABETES MELLITUS WITH DIABETIC NEPHROPATHY, WITHOUT LONG-TERM CURRENT USE OF INSULIN: ICD-10-CM

## 2021-08-25 DIAGNOSIS — I50.33 HEART FAILURE, DIASTOLIC, ACUTE ON CHRONIC: ICD-10-CM

## 2021-08-25 DIAGNOSIS — N17.9 ACUTE RENAL FAILURE: Primary | ICD-10-CM

## 2021-08-25 DIAGNOSIS — N17.9 ACUTE RENAL FAILURE SUPERIMPOSED ON STAGE 4 CHRONIC KIDNEY DISEASE, UNSPECIFIED ACUTE RENAL FAILURE TYPE: ICD-10-CM

## 2021-08-25 DIAGNOSIS — I48.92 PAROXYSMAL ATRIAL FLUTTER: ICD-10-CM

## 2021-08-25 DIAGNOSIS — Z79.01 CURRENT USE OF LONG TERM ANTICOAGULATION: ICD-10-CM

## 2021-08-25 DIAGNOSIS — I50.9 CHF (CONGESTIVE HEART FAILURE): ICD-10-CM

## 2021-08-25 DIAGNOSIS — I10 ESSENTIAL HYPERTENSION: ICD-10-CM

## 2021-08-25 DIAGNOSIS — N18.4 ACUTE RENAL FAILURE SUPERIMPOSED ON STAGE 4 CHRONIC KIDNEY DISEASE, UNSPECIFIED ACUTE RENAL FAILURE TYPE: ICD-10-CM

## 2021-08-25 DIAGNOSIS — I48.0 PAROXYSMAL ATRIAL FIBRILLATION: ICD-10-CM

## 2021-08-25 DIAGNOSIS — N18.4 ACUTE RENAL FAILURE SUPERIMPOSED ON STAGE 4 CHRONIC KIDNEY DISEASE: ICD-10-CM

## 2021-08-25 DIAGNOSIS — N17.9 ACUTE RENAL FAILURE SUPERIMPOSED ON STAGE 4 CHRONIC KIDNEY DISEASE: ICD-10-CM

## 2021-08-25 PROBLEM — I48.3 TYPICAL ATRIAL FLUTTER: Status: ACTIVE | Noted: 2018-02-16

## 2021-08-25 LAB
ALBUMIN SERPL BCP-MCNC: 3.7 G/DL (ref 3.5–5.2)
ALP SERPL-CCNC: 109 U/L (ref 55–135)
ALT SERPL W/O P-5'-P-CCNC: 27 U/L (ref 10–44)
ANION GAP SERPL CALC-SCNC: 11 MMOL/L (ref 8–16)
AST SERPL-CCNC: 25 U/L (ref 10–40)
BASOPHILS # BLD AUTO: 0.06 K/UL (ref 0–0.2)
BASOPHILS NFR BLD: 0.9 % (ref 0–1.9)
BILIRUB SERPL-MCNC: 0.7 MG/DL (ref 0.1–1)
BNP SERPL-MCNC: 106 PG/ML (ref 0–99)
BUN SERPL-MCNC: 55 MG/DL (ref 8–23)
CALCIUM SERPL-MCNC: 9.2 MG/DL (ref 8.7–10.5)
CHLORIDE SERPL-SCNC: 102 MMOL/L (ref 95–110)
CO2 SERPL-SCNC: 26 MMOL/L (ref 23–29)
CREAT SERPL-MCNC: 4.1 MG/DL (ref 0.5–1.4)
CTP QC/QA: YES
DIFFERENTIAL METHOD: ABNORMAL
EOSINOPHIL # BLD AUTO: 0.2 K/UL (ref 0–0.5)
EOSINOPHIL NFR BLD: 3.2 % (ref 0–8)
ERYTHROCYTE [DISTWIDTH] IN BLOOD BY AUTOMATED COUNT: 14.3 % (ref 11.5–14.5)
EST. GFR  (AFRICAN AMERICAN): 16 ML/MIN/1.73 M^2
EST. GFR  (NON AFRICAN AMERICAN): 14 ML/MIN/1.73 M^2
GLUCOSE SERPL-MCNC: 162 MG/DL (ref 70–110)
HCT VFR BLD AUTO: 27.1 % (ref 40–54)
HGB BLD-MCNC: 8.8 G/DL (ref 14–18)
IMM GRANULOCYTES # BLD AUTO: 0.06 K/UL (ref 0–0.04)
IMM GRANULOCYTES NFR BLD AUTO: 0.9 % (ref 0–0.5)
LYMPHOCYTES # BLD AUTO: 0.6 K/UL (ref 1–4.8)
LYMPHOCYTES NFR BLD: 8.9 % (ref 18–48)
MCH RBC QN AUTO: 30.8 PG (ref 27–31)
MCHC RBC AUTO-ENTMCNC: 32.5 G/DL (ref 32–36)
MCV RBC AUTO: 95 FL (ref 82–98)
MONOCYTES # BLD AUTO: 0.8 K/UL (ref 0.3–1)
MONOCYTES NFR BLD: 11.9 % (ref 4–15)
NEUTROPHILS # BLD AUTO: 4.9 K/UL (ref 1.8–7.7)
NEUTROPHILS NFR BLD: 74.2 % (ref 38–73)
NRBC BLD-RTO: 0 /100 WBC
PLATELET # BLD AUTO: 192 K/UL (ref 150–450)
PMV BLD AUTO: 10.9 FL (ref 9.2–12.9)
POCT GLUCOSE: 106 MG/DL (ref 70–110)
POCT GLUCOSE: 130 MG/DL (ref 70–110)
POTASSIUM SERPL-SCNC: 4.4 MMOL/L (ref 3.5–5.1)
PROT SERPL-MCNC: 7.1 G/DL (ref 6–8.4)
RBC # BLD AUTO: 2.86 M/UL (ref 4.6–6.2)
SARS-COV-2 RDRP RESP QL NAA+PROBE: NEGATIVE
SODIUM SERPL-SCNC: 139 MMOL/L (ref 136–145)
TROPONIN I SERPL DL<=0.01 NG/ML-MCNC: 0.01 NG/ML (ref 0–0.03)
WBC # BLD AUTO: 6.55 K/UL (ref 3.9–12.7)

## 2021-08-25 PROCEDURE — 25000003 PHARM REV CODE 250: Performed by: HOSPITALIST

## 2021-08-25 PROCEDURE — 85025 COMPLETE CBC W/AUTO DIFF WBC: CPT | Performed by: EMERGENCY MEDICINE

## 2021-08-25 PROCEDURE — 93005 ELECTROCARDIOGRAM TRACING: CPT

## 2021-08-25 PROCEDURE — 82962 GLUCOSE BLOOD TEST: CPT

## 2021-08-25 PROCEDURE — G0378 HOSPITAL OBSERVATION PER HR: HCPCS

## 2021-08-25 PROCEDURE — 99220 PR INITIAL OBSERVATION CARE,LEVL III: CPT | Mod: ,,, | Performed by: HOSPITALIST

## 2021-08-25 PROCEDURE — 63600175 PHARM REV CODE 636 W HCPCS: Performed by: NURSE PRACTITIONER

## 2021-08-25 PROCEDURE — U0002 COVID-19 LAB TEST NON-CDC: HCPCS | Performed by: NURSE PRACTITIONER

## 2021-08-25 PROCEDURE — 93010 ELECTROCARDIOGRAM REPORT: CPT | Mod: ,,, | Performed by: INTERNAL MEDICINE

## 2021-08-25 PROCEDURE — 93010 EKG 12-LEAD: ICD-10-PCS | Mod: ,,, | Performed by: INTERNAL MEDICINE

## 2021-08-25 PROCEDURE — 94761 N-INVAS EAR/PLS OXIMETRY MLT: CPT

## 2021-08-25 PROCEDURE — 63600175 PHARM REV CODE 636 W HCPCS: Performed by: HOSPITALIST

## 2021-08-25 PROCEDURE — 83880 ASSAY OF NATRIURETIC PEPTIDE: CPT | Performed by: EMERGENCY MEDICINE

## 2021-08-25 PROCEDURE — 99291 CRITICAL CARE FIRST HOUR: CPT | Mod: 25

## 2021-08-25 PROCEDURE — 99220 PR INITIAL OBSERVATION CARE,LEVL III: ICD-10-PCS | Mod: ,,, | Performed by: HOSPITALIST

## 2021-08-25 PROCEDURE — 96376 TX/PRO/DX INJ SAME DRUG ADON: CPT | Performed by: EMERGENCY MEDICINE

## 2021-08-25 PROCEDURE — 80053 COMPREHEN METABOLIC PANEL: CPT | Performed by: EMERGENCY MEDICINE

## 2021-08-25 PROCEDURE — 96374 THER/PROPH/DIAG INJ IV PUSH: CPT

## 2021-08-25 PROCEDURE — 84484 ASSAY OF TROPONIN QUANT: CPT | Performed by: EMERGENCY MEDICINE

## 2021-08-25 RX ORDER — METOLAZONE 5 MG/1
5 TABLET ORAL DAILY
Status: ON HOLD | COMMUNITY
End: 2021-08-27 | Stop reason: HOSPADM

## 2021-08-25 RX ORDER — SODIUM CHLORIDE 0.9 % (FLUSH) 0.9 %
10 SYRINGE (ML) INJECTION
Status: DISCONTINUED | OUTPATIENT
Start: 2021-08-25 | End: 2021-08-27 | Stop reason: HOSPADM

## 2021-08-25 RX ORDER — FUROSEMIDE 10 MG/ML
80 INJECTION INTRAMUSCULAR; INTRAVENOUS 3 TIMES DAILY
Status: DISCONTINUED | OUTPATIENT
Start: 2021-08-25 | End: 2021-08-27

## 2021-08-25 RX ORDER — HYDRALAZINE HYDROCHLORIDE 25 MG/1
100 TABLET, FILM COATED ORAL EVERY 12 HOURS
Status: DISCONTINUED | OUTPATIENT
Start: 2021-08-25 | End: 2021-08-27 | Stop reason: HOSPADM

## 2021-08-25 RX ORDER — FUROSEMIDE 10 MG/ML
80 INJECTION INTRAMUSCULAR; INTRAVENOUS 3 TIMES DAILY
Status: DISCONTINUED | OUTPATIENT
Start: 2021-08-25 | End: 2021-08-25

## 2021-08-25 RX ORDER — ONDANSETRON 2 MG/ML
4 INJECTION INTRAMUSCULAR; INTRAVENOUS EVERY 8 HOURS PRN
Status: DISCONTINUED | OUTPATIENT
Start: 2021-08-25 | End: 2021-08-27 | Stop reason: HOSPADM

## 2021-08-25 RX ORDER — ATORVASTATIN CALCIUM 20 MG/1
40 TABLET, FILM COATED ORAL DAILY
Status: DISCONTINUED | OUTPATIENT
Start: 2021-08-26 | End: 2021-08-27 | Stop reason: HOSPADM

## 2021-08-25 RX ORDER — GLUCAGON 1 MG
1 KIT INJECTION
Status: DISCONTINUED | OUTPATIENT
Start: 2021-08-25 | End: 2021-08-27 | Stop reason: HOSPADM

## 2021-08-25 RX ORDER — INSULIN ASPART 100 [IU]/ML
0-5 INJECTION, SOLUTION INTRAVENOUS; SUBCUTANEOUS
Status: DISCONTINUED | OUTPATIENT
Start: 2021-08-25 | End: 2021-08-27 | Stop reason: HOSPADM

## 2021-08-25 RX ORDER — FAMOTIDINE 20 MG/1
20 TABLET, FILM COATED ORAL DAILY
Status: DISCONTINUED | OUTPATIENT
Start: 2021-08-26 | End: 2021-08-27 | Stop reason: HOSPADM

## 2021-08-25 RX ORDER — GLIPIZIDE 2.5 MG/1
2.5 TABLET, EXTENDED RELEASE ORAL
Status: ON HOLD | COMMUNITY
End: 2021-08-27 | Stop reason: HOSPADM

## 2021-08-25 RX ORDER — ACETAMINOPHEN 325 MG/1
650 TABLET ORAL EVERY 4 HOURS PRN
Status: DISCONTINUED | OUTPATIENT
Start: 2021-08-25 | End: 2021-08-27 | Stop reason: HOSPADM

## 2021-08-25 RX ORDER — IBUPROFEN 200 MG
24 TABLET ORAL
Status: DISCONTINUED | OUTPATIENT
Start: 2021-08-25 | End: 2021-08-27 | Stop reason: HOSPADM

## 2021-08-25 RX ORDER — CHOLECALCIFEROL (VITAMIN D3) 25 MCG
1000 TABLET ORAL DAILY
COMMUNITY
End: 2022-08-15 | Stop reason: ALTCHOICE

## 2021-08-25 RX ORDER — TAMSULOSIN HYDROCHLORIDE 0.4 MG/1
0.4 CAPSULE ORAL NIGHTLY
COMMUNITY

## 2021-08-25 RX ORDER — TALC
6 POWDER (GRAM) TOPICAL NIGHTLY PRN
Status: DISCONTINUED | OUTPATIENT
Start: 2021-08-25 | End: 2021-08-27 | Stop reason: HOSPADM

## 2021-08-25 RX ORDER — IBUPROFEN 200 MG
16 TABLET ORAL
Status: DISCONTINUED | OUTPATIENT
Start: 2021-08-25 | End: 2021-08-27 | Stop reason: HOSPADM

## 2021-08-25 RX ORDER — ALLOPURINOL 100 MG/1
100 TABLET ORAL DAILY
Status: DISCONTINUED | OUTPATIENT
Start: 2021-08-26 | End: 2021-08-27 | Stop reason: HOSPADM

## 2021-08-25 RX ORDER — FUROSEMIDE 40 MG/1
40 TABLET ORAL DAILY
Status: ON HOLD | COMMUNITY
End: 2021-08-27 | Stop reason: SDUPTHER

## 2021-08-25 RX ORDER — AMIODARONE HYDROCHLORIDE 100 MG/1
100 TABLET ORAL DAILY
Status: DISCONTINUED | OUTPATIENT
Start: 2021-08-26 | End: 2021-08-27 | Stop reason: HOSPADM

## 2021-08-25 RX ADMIN — FUROSEMIDE 80 MG: 10 INJECTION, SOLUTION INTRAMUSCULAR; INTRAVENOUS at 01:08

## 2021-08-25 RX ADMIN — APIXABAN 2.5 MG: 2.5 TABLET, FILM COATED ORAL at 09:08

## 2021-08-25 RX ADMIN — HYDRALAZINE HYDROCHLORIDE 100 MG: 25 TABLET, FILM COATED ORAL at 09:08

## 2021-08-25 RX ADMIN — FUROSEMIDE 80 MG: 10 INJECTION, SOLUTION INTRAMUSCULAR; INTRAVENOUS at 09:08

## 2021-08-26 LAB
ALBUMIN SERPL BCP-MCNC: 3.7 G/DL (ref 3.5–5.2)
ANION GAP SERPL CALC-SCNC: 11 MMOL/L (ref 8–16)
AV INDEX (PROSTH): 0.75
AV MEAN GRADIENT: 6 MMHG
AV PEAK GRADIENT: 12 MMHG
AV VALVE AREA: 2.7 CM2
AV VELOCITY RATIO: 0.79
BSA FOR ECHO PROCEDURE: 2.52 M2
BUN SERPL-MCNC: 56 MG/DL (ref 8–23)
CALCIUM SERPL-MCNC: 9.2 MG/DL (ref 8.7–10.5)
CHLORIDE SERPL-SCNC: 98 MMOL/L (ref 95–110)
CO2 SERPL-SCNC: 31 MMOL/L (ref 23–29)
CREAT SERPL-MCNC: 4.3 MG/DL (ref 0.5–1.4)
CV ECHO LV RWT: 0.39 CM
DOP CALC AO PEAK VEL: 1.72 M/S
DOP CALC AO VTI: 38.8 CM
DOP CALC LVOT AREA: 3.6 CM2
DOP CALC LVOT DIAMETER: 2.14 CM
DOP CALC LVOT PEAK VEL: 1.36 M/S
DOP CALC LVOT STROKE VOLUME: 104.79 CM3
DOP CALCLVOT PEAK VEL VTI: 29.15 CM
E WAVE DECELERATION TIME: 275.73 MSEC
E/A RATIO: 1.89
E/E' RATIO: 15.38 M/S
ECHO LV POSTERIOR WALL: 0.87 CM (ref 0.6–1.1)
EJECTION FRACTION: 65 %
EST. GFR  (AFRICAN AMERICAN): 15 ML/MIN/1.73 M^2
EST. GFR  (NON AFRICAN AMERICAN): 13 ML/MIN/1.73 M^2
ESTIMATED AVG GLUCOSE: 103 MG/DL (ref 68–131)
FRACTIONAL SHORTENING: 31 % (ref 28–44)
GLUCOSE SERPL-MCNC: 91 MG/DL (ref 70–110)
HBA1C MFR BLD: 5.2 % (ref 4–5.6)
INTERVENTRICULAR SEPTUM: 0.92 CM (ref 0.6–1.1)
IVRT: 103.51 MSEC
LA MAJOR: 5.46 CM
LA MINOR: 7.08 CM
LA WIDTH: 4.26 CM
LEFT ATRIUM SIZE: 3.09 CM
LEFT ATRIUM VOLUME INDEX MOD: 38 ML/M2
LEFT ATRIUM VOLUME INDEX: 28.2 ML/M2
LEFT ATRIUM VOLUME MOD: 93 CM3
LEFT ATRIUM VOLUME: 68.98 CM3
LEFT INTERNAL DIMENSION IN SYSTOLE: 3.05 CM (ref 2.1–4)
LEFT VENTRICLE DIASTOLIC VOLUME INDEX: 36.48 ML/M2
LEFT VENTRICLE DIASTOLIC VOLUME: 89.37 ML
LEFT VENTRICLE MASS INDEX: 53 G/M2
LEFT VENTRICLE SYSTOLIC VOLUME INDEX: 14.9 ML/M2
LEFT VENTRICLE SYSTOLIC VOLUME: 36.45 ML
LEFT VENTRICULAR INTERNAL DIMENSION IN DIASTOLE: 4.44 CM (ref 3.5–6)
LEFT VENTRICULAR MASS: 128.96 G
LV LATERAL E/E' RATIO: 12.3 M/S
LV SEPTAL E/E' RATIO: 20.5 M/S
MAGNESIUM SERPL-MCNC: 2.4 MG/DL (ref 1.6–2.6)
MV PEAK A VEL: 0.65 M/S
MV PEAK E VEL: 1.23 M/S
MV STENOSIS PRESSURE HALF TIME: 79.96 MS
MV VALVE AREA P 1/2 METHOD: 2.75 CM2
PHOSPHATE SERPL-MCNC: 4.6 MG/DL (ref 2.7–4.5)
PISA TR MAX VEL: 3.23 M/S
POCT GLUCOSE: 104 MG/DL (ref 70–110)
POCT GLUCOSE: 142 MG/DL (ref 70–110)
POCT GLUCOSE: 146 MG/DL (ref 70–110)
POCT GLUCOSE: 86 MG/DL (ref 70–110)
POTASSIUM SERPL-SCNC: 4 MMOL/L (ref 3.5–5.1)
PV PEAK VELOCITY: 1.13 CM/S
RA MAJOR: 4.47 CM
RA WIDTH: 4.89 CM
RV TISSUE DOPPLER FREE WALL SYSTOLIC VELOCITY 1 (APICAL 4 CHAMBER VIEW): 14.42 CM/S
SINUS: 3.15 CM
SODIUM SERPL-SCNC: 140 MMOL/L (ref 136–145)
STJ: 2.92 CM
TDI LATERAL: 0.1 M/S
TDI SEPTAL: 0.06 M/S
TDI: 0.08 M/S
TR MAX PG: 42 MMHG
TRICUSPID ANNULAR PLANE SYSTOLIC EXCURSION: 3.04 CM

## 2021-08-26 PROCEDURE — 63600175 PHARM REV CODE 636 W HCPCS: Performed by: HOSPITALIST

## 2021-08-26 PROCEDURE — 63600175 PHARM REV CODE 636 W HCPCS: Mod: TB | Performed by: NURSE PRACTITIONER

## 2021-08-26 PROCEDURE — 25000003 PHARM REV CODE 250: Performed by: HOSPITALIST

## 2021-08-26 PROCEDURE — 83735 ASSAY OF MAGNESIUM: CPT | Performed by: HOSPITALIST

## 2021-08-26 PROCEDURE — 11000001 HC ACUTE MED/SURG PRIVATE ROOM

## 2021-08-26 PROCEDURE — 99233 SBSQ HOSP IP/OBS HIGH 50: CPT | Mod: ,,, | Performed by: HOSPITALIST

## 2021-08-26 PROCEDURE — 80069 RENAL FUNCTION PANEL: CPT | Performed by: HOSPITALIST

## 2021-08-26 PROCEDURE — 94761 N-INVAS EAR/PLS OXIMETRY MLT: CPT

## 2021-08-26 PROCEDURE — 36415 COLL VENOUS BLD VENIPUNCTURE: CPT | Performed by: HOSPITALIST

## 2021-08-26 PROCEDURE — 25000003 PHARM REV CODE 250: Performed by: NURSE PRACTITIONER

## 2021-08-26 PROCEDURE — 83036 HEMOGLOBIN GLYCOSYLATED A1C: CPT | Performed by: HOSPITALIST

## 2021-08-26 PROCEDURE — 99233 PR SUBSEQUENT HOSPITAL CARE,LEVL III: ICD-10-PCS | Mod: ,,, | Performed by: HOSPITALIST

## 2021-08-26 RX ADMIN — FUROSEMIDE 80 MG: 10 INJECTION, SOLUTION INTRAMUSCULAR; INTRAVENOUS at 09:08

## 2021-08-26 RX ADMIN — HYDRALAZINE HYDROCHLORIDE 100 MG: 25 TABLET, FILM COATED ORAL at 09:08

## 2021-08-26 RX ADMIN — ATORVASTATIN CALCIUM 40 MG: 20 TABLET, FILM COATED ORAL at 09:08

## 2021-08-26 RX ADMIN — ALLOPURINOL 100 MG: 100 TABLET ORAL at 09:08

## 2021-08-26 RX ADMIN — FUROSEMIDE 80 MG: 10 INJECTION, SOLUTION INTRAMUSCULAR; INTRAVENOUS at 08:08

## 2021-08-26 RX ADMIN — EPOETIN ALFA-EPBX 20000 UNITS: 10000 INJECTION, SOLUTION INTRAVENOUS; SUBCUTANEOUS at 02:08

## 2021-08-26 RX ADMIN — FAMOTIDINE 20 MG: 20 TABLET ORAL at 09:08

## 2021-08-26 RX ADMIN — FUROSEMIDE 80 MG: 10 INJECTION, SOLUTION INTRAMUSCULAR; INTRAVENOUS at 02:08

## 2021-08-26 RX ADMIN — AMIODARONE HYDROCHLORIDE 100 MG: 100 TABLET ORAL at 09:08

## 2021-08-26 RX ADMIN — NEPHROCAP 1 CAPSULE: 1 CAP ORAL at 12:08

## 2021-08-27 VITALS
TEMPERATURE: 97 F | SYSTOLIC BLOOD PRESSURE: 119 MMHG | BODY MASS INDEX: 36 KG/M2 | HEIGHT: 73 IN | WEIGHT: 271.63 LBS | DIASTOLIC BLOOD PRESSURE: 65 MMHG | RESPIRATION RATE: 18 BRPM | OXYGEN SATURATION: 95 % | HEART RATE: 63 BPM

## 2021-08-27 LAB
ALBUMIN SERPL BCP-MCNC: 3.5 G/DL (ref 3.5–5.2)
ANION GAP SERPL CALC-SCNC: 14 MMOL/L (ref 8–16)
BASOPHILS # BLD AUTO: 0.05 K/UL (ref 0–0.2)
BASOPHILS NFR BLD: 0.8 % (ref 0–1.9)
BUN SERPL-MCNC: 59 MG/DL (ref 8–23)
CALCIUM SERPL-MCNC: 8.9 MG/DL (ref 8.7–10.5)
CHLORIDE SERPL-SCNC: 94 MMOL/L (ref 95–110)
CO2 SERPL-SCNC: 30 MMOL/L (ref 23–29)
CREAT SERPL-MCNC: 4.4 MG/DL (ref 0.5–1.4)
DIFFERENTIAL METHOD: ABNORMAL
EOSINOPHIL # BLD AUTO: 0.2 K/UL (ref 0–0.5)
EOSINOPHIL NFR BLD: 3.3 % (ref 0–8)
ERYTHROCYTE [DISTWIDTH] IN BLOOD BY AUTOMATED COUNT: 14.1 % (ref 11.5–14.5)
EST. GFR  (AFRICAN AMERICAN): 14 ML/MIN/1.73 M^2
EST. GFR  (NON AFRICAN AMERICAN): 12 ML/MIN/1.73 M^2
GLUCOSE SERPL-MCNC: 112 MG/DL (ref 70–110)
HCT VFR BLD AUTO: 29.6 % (ref 40–54)
HGB BLD-MCNC: 9.9 G/DL (ref 14–18)
IMM GRANULOCYTES # BLD AUTO: 0.05 K/UL (ref 0–0.04)
IMM GRANULOCYTES NFR BLD AUTO: 0.8 % (ref 0–0.5)
LYMPHOCYTES # BLD AUTO: 1 K/UL (ref 1–4.8)
LYMPHOCYTES NFR BLD: 16.5 % (ref 18–48)
MCH RBC QN AUTO: 31.1 PG (ref 27–31)
MCHC RBC AUTO-ENTMCNC: 33.4 G/DL (ref 32–36)
MCV RBC AUTO: 93 FL (ref 82–98)
MONOCYTES # BLD AUTO: 0.8 K/UL (ref 0.3–1)
MONOCYTES NFR BLD: 12.7 % (ref 4–15)
NEUTROPHILS # BLD AUTO: 4.1 K/UL (ref 1.8–7.7)
NEUTROPHILS NFR BLD: 65.9 % (ref 38–73)
NRBC BLD-RTO: 0 /100 WBC
PHOSPHATE SERPL-MCNC: 5 MG/DL (ref 2.7–4.5)
PLATELET # BLD AUTO: 220 K/UL (ref 150–450)
PMV BLD AUTO: 10.5 FL (ref 9.2–12.9)
POCT GLUCOSE: 121 MG/DL (ref 70–110)
POTASSIUM SERPL-SCNC: 3.6 MMOL/L (ref 3.5–5.1)
RBC # BLD AUTO: 3.18 M/UL (ref 4.6–6.2)
SODIUM SERPL-SCNC: 138 MMOL/L (ref 136–145)
WBC # BLD AUTO: 6.29 K/UL (ref 3.9–12.7)

## 2021-08-27 PROCEDURE — 99238 HOSP IP/OBS DSCHRG MGMT 30/<: CPT | Mod: ,,, | Performed by: HOSPITALIST

## 2021-08-27 PROCEDURE — 99238 PR HOSPITAL DISCHARGE DAY,<30 MIN: ICD-10-PCS | Mod: ,,, | Performed by: HOSPITALIST

## 2021-08-27 PROCEDURE — 85025 COMPLETE CBC W/AUTO DIFF WBC: CPT | Performed by: HOSPITALIST

## 2021-08-27 PROCEDURE — 25000003 PHARM REV CODE 250: Performed by: NURSE PRACTITIONER

## 2021-08-27 PROCEDURE — 36415 COLL VENOUS BLD VENIPUNCTURE: CPT | Performed by: HOSPITALIST

## 2021-08-27 PROCEDURE — 80069 RENAL FUNCTION PANEL: CPT | Performed by: HOSPITALIST

## 2021-08-27 PROCEDURE — 25000003 PHARM REV CODE 250: Performed by: HOSPITALIST

## 2021-08-27 RX ORDER — FUROSEMIDE 80 MG/1
80 TABLET ORAL 2 TIMES DAILY
Status: ON HOLD
Start: 2021-08-27 | End: 2022-06-28 | Stop reason: SDUPTHER

## 2021-08-27 RX ORDER — FUROSEMIDE 20 MG/1
80 TABLET ORAL 2 TIMES DAILY
Status: DISCONTINUED | OUTPATIENT
Start: 2021-08-27 | End: 2021-08-27 | Stop reason: HOSPADM

## 2021-08-27 RX ADMIN — FAMOTIDINE 20 MG: 20 TABLET ORAL at 10:08

## 2021-08-27 RX ADMIN — FUROSEMIDE 80 MG: 20 TABLET ORAL at 10:08

## 2021-08-27 RX ADMIN — ALLOPURINOL 100 MG: 100 TABLET ORAL at 10:08

## 2021-08-27 RX ADMIN — ATORVASTATIN CALCIUM 40 MG: 20 TABLET, FILM COATED ORAL at 10:08

## 2021-08-27 RX ADMIN — APIXABAN 5 MG: 2.5 TABLET, FILM COATED ORAL at 10:08

## 2021-08-27 RX ADMIN — AMIODARONE HYDROCHLORIDE 100 MG: 100 TABLET ORAL at 10:08

## 2021-08-27 RX ADMIN — NEPHROCAP 1 CAPSULE: 1 CAP ORAL at 10:08

## 2021-09-20 ENCOUNTER — OFFICE VISIT (OUTPATIENT)
Dept: NEPHROLOGY | Facility: CLINIC | Age: 71
End: 2021-09-20
Payer: MEDICARE

## 2021-09-20 ENCOUNTER — LAB VISIT (OUTPATIENT)
Dept: LAB | Facility: HOSPITAL | Age: 71
End: 2021-09-20
Attending: INTERNAL MEDICINE
Payer: MEDICARE

## 2021-09-20 VITALS
DIASTOLIC BLOOD PRESSURE: 64 MMHG | HEART RATE: 60 BPM | SYSTOLIC BLOOD PRESSURE: 150 MMHG | HEIGHT: 73 IN | BODY MASS INDEX: 34.13 KG/M2 | OXYGEN SATURATION: 98 % | WEIGHT: 257.5 LBS

## 2021-09-20 DIAGNOSIS — E11.21 TYPE 2 DIABETES MELLITUS WITH DIABETIC NEPHROPATHY, WITHOUT LONG-TERM CURRENT USE OF INSULIN: ICD-10-CM

## 2021-09-20 DIAGNOSIS — N18.5 CKD (CHRONIC KIDNEY DISEASE), STAGE V: ICD-10-CM

## 2021-09-20 DIAGNOSIS — I12.0 HYPERTENSIVE KIDNEY DISEASE WITH STAGE 5 CHRONIC KIDNEY DISEASE, NOT ON CHRONIC DIALYSIS: ICD-10-CM

## 2021-09-20 DIAGNOSIS — N18.5 HYPERTENSIVE KIDNEY DISEASE WITH STAGE 5 CHRONIC KIDNEY DISEASE, NOT ON CHRONIC DIALYSIS: ICD-10-CM

## 2021-09-20 DIAGNOSIS — R80.9 PROTEINURIA, UNSPECIFIED TYPE: ICD-10-CM

## 2021-09-20 DIAGNOSIS — N18.5 CKD (CHRONIC KIDNEY DISEASE), STAGE V: Primary | ICD-10-CM

## 2021-09-20 LAB
25(OH)D3+25(OH)D2 SERPL-MCNC: 45 NG/ML (ref 30–96)
ALBUMIN SERPL BCP-MCNC: 3.7 G/DL (ref 3.5–5.2)
ANION GAP SERPL CALC-SCNC: 12 MMOL/L (ref 8–16)
BASOPHILS # BLD AUTO: 0.03 K/UL (ref 0–0.2)
BASOPHILS NFR BLD: 0.5 % (ref 0–1.9)
BUN SERPL-MCNC: 53 MG/DL (ref 8–23)
CALCIUM SERPL-MCNC: 9.3 MG/DL (ref 8.7–10.5)
CHLORIDE SERPL-SCNC: 105 MMOL/L (ref 95–110)
CO2 SERPL-SCNC: 24 MMOL/L (ref 23–29)
CREAT SERPL-MCNC: 3.3 MG/DL (ref 0.5–1.4)
DIFFERENTIAL METHOD: ABNORMAL
EOSINOPHIL # BLD AUTO: 0.1 K/UL (ref 0–0.5)
EOSINOPHIL NFR BLD: 1.9 % (ref 0–8)
ERYTHROCYTE [DISTWIDTH] IN BLOOD BY AUTOMATED COUNT: 14.3 % (ref 11.5–14.5)
EST. GFR  (AFRICAN AMERICAN): 20.4 ML/MIN/1.73 M^2
EST. GFR  (NON AFRICAN AMERICAN): 17.7 ML/MIN/1.73 M^2
FERRITIN SERPL-MCNC: 105 NG/ML (ref 20–300)
GLUCOSE SERPL-MCNC: 98 MG/DL (ref 70–110)
HCT VFR BLD AUTO: 33.4 % (ref 40–54)
HGB BLD-MCNC: 10.8 G/DL (ref 14–18)
IMM GRANULOCYTES # BLD AUTO: 0.03 K/UL (ref 0–0.04)
IMM GRANULOCYTES NFR BLD AUTO: 0.5 % (ref 0–0.5)
IRON SERPL-MCNC: 57 UG/DL (ref 45–160)
LYMPHOCYTES # BLD AUTO: 0.8 K/UL (ref 1–4.8)
LYMPHOCYTES NFR BLD: 13.3 % (ref 18–48)
MCH RBC QN AUTO: 31.2 PG (ref 27–31)
MCHC RBC AUTO-ENTMCNC: 32.3 G/DL (ref 32–36)
MCV RBC AUTO: 97 FL (ref 82–98)
MONOCYTES # BLD AUTO: 0.7 K/UL (ref 0.3–1)
MONOCYTES NFR BLD: 11.7 % (ref 4–15)
NEUTROPHILS # BLD AUTO: 4.1 K/UL (ref 1.8–7.7)
NEUTROPHILS NFR BLD: 72.1 % (ref 38–73)
NRBC BLD-RTO: 0 /100 WBC
PHOSPHATE SERPL-MCNC: 4.3 MG/DL (ref 2.7–4.5)
PLATELET # BLD AUTO: 158 K/UL (ref 150–450)
PMV BLD AUTO: 11.1 FL (ref 9.2–12.9)
POTASSIUM SERPL-SCNC: 4.1 MMOL/L (ref 3.5–5.1)
PTH-INTACT SERPL-MCNC: 184.6 PG/ML (ref 9–77)
RBC # BLD AUTO: 3.46 M/UL (ref 4.6–6.2)
SATURATED IRON: 15 % (ref 20–50)
SODIUM SERPL-SCNC: 141 MMOL/L (ref 136–145)
TOTAL IRON BINDING CAPACITY: 385 UG/DL (ref 250–450)
TRANSFERRIN SERPL-MCNC: 260 MG/DL (ref 200–375)
URATE SERPL-MCNC: 6.6 MG/DL (ref 3.4–7)
WBC # BLD AUTO: 5.73 K/UL (ref 3.9–12.7)

## 2021-09-20 PROCEDURE — 99204 PR OFFICE/OUTPT VISIT, NEW, LEVL IV, 45-59 MIN: ICD-10-PCS | Mod: S$PBB,,, | Performed by: INTERNAL MEDICINE

## 2021-09-20 PROCEDURE — 82728 ASSAY OF FERRITIN: CPT | Performed by: INTERNAL MEDICINE

## 2021-09-20 PROCEDURE — 82306 VITAMIN D 25 HYDROXY: CPT | Performed by: INTERNAL MEDICINE

## 2021-09-20 PROCEDURE — 99215 OFFICE O/P EST HI 40 MIN: CPT | Mod: PBBFAC | Performed by: INTERNAL MEDICINE

## 2021-09-20 PROCEDURE — 99204 OFFICE O/P NEW MOD 45 MIN: CPT | Mod: S$PBB,,, | Performed by: INTERNAL MEDICINE

## 2021-09-20 PROCEDURE — 36415 COLL VENOUS BLD VENIPUNCTURE: CPT | Performed by: INTERNAL MEDICINE

## 2021-09-20 PROCEDURE — 99999 PR PBB SHADOW E&M-EST. PATIENT-LVL V: ICD-10-PCS | Mod: PBBFAC,,, | Performed by: INTERNAL MEDICINE

## 2021-09-20 PROCEDURE — 84550 ASSAY OF BLOOD/URIC ACID: CPT | Performed by: INTERNAL MEDICINE

## 2021-09-20 PROCEDURE — 85025 COMPLETE CBC W/AUTO DIFF WBC: CPT | Performed by: INTERNAL MEDICINE

## 2021-09-20 PROCEDURE — 99999 PR PBB SHADOW E&M-EST. PATIENT-LVL V: CPT | Mod: PBBFAC,,, | Performed by: INTERNAL MEDICINE

## 2021-09-20 PROCEDURE — 83970 ASSAY OF PARATHORMONE: CPT | Performed by: INTERNAL MEDICINE

## 2021-09-20 PROCEDURE — 84466 ASSAY OF TRANSFERRIN: CPT | Performed by: INTERNAL MEDICINE

## 2021-09-20 PROCEDURE — 80069 RENAL FUNCTION PANEL: CPT | Performed by: INTERNAL MEDICINE

## 2021-09-20 RX ORDER — GLIPIZIDE 2.5 MG/1
2.5 TABLET, EXTENDED RELEASE ORAL DAILY
COMMUNITY
Start: 2021-09-18 | End: 2022-10-30

## 2021-09-23 ENCOUNTER — TELEPHONE (OUTPATIENT)
Dept: TRANSPLANT | Facility: CLINIC | Age: 71
End: 2021-09-23

## 2021-10-05 ENCOUNTER — TELEPHONE (OUTPATIENT)
Dept: TRANSPLANT | Facility: CLINIC | Age: 71
End: 2021-10-05

## 2021-10-15 ENCOUNTER — TELEPHONE (OUTPATIENT)
Dept: NEPHROLOGY | Facility: CLINIC | Age: 71
End: 2021-10-15

## 2021-10-15 ENCOUNTER — IMMUNIZATION (OUTPATIENT)
Dept: PRIMARY CARE CLINIC | Facility: CLINIC | Age: 71
End: 2021-10-15
Payer: MEDICARE

## 2021-10-15 DIAGNOSIS — Z23 NEED FOR VACCINATION: Primary | ICD-10-CM

## 2021-10-15 PROCEDURE — 91300 COVID-19, MRNA, LNP-S, PF, 30 MCG/0.3 ML DOSE VACCINE: CPT | Mod: PBBFAC,PN

## 2021-10-15 PROCEDURE — 0003A COVID-19, MRNA, LNP-S, PF, 30 MCG/0.3 ML DOSE VACCINE: CPT | Mod: PBBFAC,PN

## 2021-10-18 ENCOUNTER — OFFICE VISIT (OUTPATIENT)
Dept: NEPHROLOGY | Facility: CLINIC | Age: 71
End: 2021-10-18
Payer: MEDICARE

## 2021-10-18 ENCOUNTER — NUTRITION (OUTPATIENT)
Dept: NEPHROLOGY | Facility: CLINIC | Age: 71
End: 2021-10-18
Payer: MEDICARE

## 2021-10-18 VITALS
DIASTOLIC BLOOD PRESSURE: 60 MMHG | SYSTOLIC BLOOD PRESSURE: 140 MMHG | HEART RATE: 64 BPM | OXYGEN SATURATION: 98 % | BODY MASS INDEX: 34.97 KG/M2 | HEIGHT: 73 IN | WEIGHT: 263.88 LBS

## 2021-10-18 DIAGNOSIS — E11.21 TYPE 2 DIABETES MELLITUS WITH DIABETIC NEPHROPATHY, WITHOUT LONG-TERM CURRENT USE OF INSULIN: ICD-10-CM

## 2021-10-18 DIAGNOSIS — N18.4 ANEMIA IN STAGE 4 CHRONIC KIDNEY DISEASE: ICD-10-CM

## 2021-10-18 DIAGNOSIS — N18.4 HYPERTENSIVE KIDNEY DISEASE WITH STAGE 4 CHRONIC KIDNEY DISEASE: ICD-10-CM

## 2021-10-18 DIAGNOSIS — N18.5 CKD (CHRONIC KIDNEY DISEASE), STAGE V: Primary | ICD-10-CM

## 2021-10-18 DIAGNOSIS — E11.21 TYPE 2 DIABETES MELLITUS WITH DIABETIC NEPHROPATHY, WITHOUT LONG-TERM CURRENT USE OF INSULIN: Primary | ICD-10-CM

## 2021-10-18 DIAGNOSIS — N25.81 SECONDARY HYPERPARATHYROIDISM: ICD-10-CM

## 2021-10-18 DIAGNOSIS — N18.5 CHRONIC RENAL DISEASE, STAGE V: ICD-10-CM

## 2021-10-18 DIAGNOSIS — I12.9 HYPERTENSIVE KIDNEY DISEASE WITH STAGE 4 CHRONIC KIDNEY DISEASE: ICD-10-CM

## 2021-10-18 DIAGNOSIS — E83.39 HYPERPHOSPHATEMIA: ICD-10-CM

## 2021-10-18 DIAGNOSIS — N18.5 HYPERTENSIVE KIDNEY DISEASE WITH STAGE 5 CHRONIC KIDNEY DISEASE, NOT ON CHRONIC DIALYSIS: ICD-10-CM

## 2021-10-18 DIAGNOSIS — D63.1 ANEMIA IN STAGE 4 CHRONIC KIDNEY DISEASE: ICD-10-CM

## 2021-10-18 DIAGNOSIS — D50.9 IRON DEFICIENCY ANEMIA, UNSPECIFIED IRON DEFICIENCY ANEMIA TYPE: ICD-10-CM

## 2021-10-18 DIAGNOSIS — I12.0 HYPERTENSIVE KIDNEY DISEASE WITH STAGE 5 CHRONIC KIDNEY DISEASE, NOT ON CHRONIC DIALYSIS: ICD-10-CM

## 2021-10-18 DIAGNOSIS — R80.1 PERSISTENT PROTEINURIA: ICD-10-CM

## 2021-10-18 DIAGNOSIS — Z71.3 DIETARY COUNSELING: ICD-10-CM

## 2021-10-18 PROBLEM — N18.30 ANEMIA IN STAGE 3 CHRONIC KIDNEY DISEASE: Status: RESOLVED | Noted: 2018-06-19 | Resolved: 2021-10-18

## 2021-10-18 PROBLEM — N04.9 NEPHROTIC SYNDROME: Status: RESOLVED | Noted: 2018-06-19 | Resolved: 2021-10-18

## 2021-10-18 PROCEDURE — 99215 PR OFFICE/OUTPT VISIT, EST, LEVL V, 40-54 MIN: ICD-10-PCS | Mod: S$PBB,,, | Performed by: INTERNAL MEDICINE

## 2021-10-18 PROCEDURE — 99999 PR PBB SHADOW E&M-EST. PATIENT-LVL III: CPT | Mod: PBBFAC,,, | Performed by: INTERNAL MEDICINE

## 2021-10-18 PROCEDURE — 99213 OFFICE O/P EST LOW 20 MIN: CPT | Mod: PBBFAC

## 2021-10-18 PROCEDURE — 99213 OFFICE O/P EST LOW 20 MIN: CPT | Mod: PBBFAC,27 | Performed by: INTERNAL MEDICINE

## 2021-10-18 PROCEDURE — 99999 PR PBB SHADOW E&M-EST. PATIENT-LVL III: ICD-10-PCS | Mod: PBBFAC,,, | Performed by: INTERNAL MEDICINE

## 2021-10-18 PROCEDURE — 97802 MEDICAL NUTRITION INDIV IN: CPT | Mod: PBBFAC

## 2021-10-18 PROCEDURE — 99999 PR PBB SHADOW E&M-EST. PATIENT-LVL III: CPT | Mod: PBBFAC,,,

## 2021-10-18 PROCEDURE — 99999 PR PBB SHADOW E&M-EST. PATIENT-LVL III: ICD-10-PCS | Mod: PBBFAC,,,

## 2021-10-18 PROCEDURE — 99215 OFFICE O/P EST HI 40 MIN: CPT | Mod: S$PBB,,, | Performed by: INTERNAL MEDICINE

## 2021-10-18 RX ORDER — METHYLPREDNISOLONE SOD SUCC 125 MG
125 VIAL (EA) INJECTION ONCE AS NEEDED
Status: CANCELLED | OUTPATIENT
Start: 2021-10-18

## 2021-10-18 RX ORDER — EPINEPHRINE 0.3 MG/.3ML
0.3 INJECTION SUBCUTANEOUS ONCE AS NEEDED
Status: CANCELLED | OUTPATIENT
Start: 2021-10-18

## 2021-10-18 RX ORDER — HEPARIN 100 UNIT/ML
5 SYRINGE INTRAVENOUS
Status: CANCELLED | OUTPATIENT
Start: 2021-10-18

## 2021-10-18 RX ORDER — DIPHENHYDRAMINE HYDROCHLORIDE 50 MG/ML
50 INJECTION INTRAMUSCULAR; INTRAVENOUS ONCE AS NEEDED
Status: CANCELLED | OUTPATIENT
Start: 2021-10-18

## 2021-10-18 RX ORDER — SODIUM CHLORIDE 9 MG/ML
INJECTION, SOLUTION INTRAVENOUS CONTINUOUS
Status: CANCELLED | OUTPATIENT
Start: 2021-10-18

## 2021-10-18 RX ORDER — SODIUM CHLORIDE 0.9 % (FLUSH) 0.9 %
10 SYRINGE (ML) INJECTION
Status: CANCELLED | OUTPATIENT
Start: 2021-10-18

## 2021-10-21 ENCOUNTER — TELEPHONE (OUTPATIENT)
Dept: INFECTIOUS DISEASES | Facility: HOSPITAL | Age: 71
End: 2021-10-21

## 2021-11-11 ENCOUNTER — LAB VISIT (OUTPATIENT)
Dept: LAB | Facility: HOSPITAL | Age: 71
End: 2021-11-11
Attending: INTERNAL MEDICINE
Payer: MEDICARE

## 2021-11-11 ENCOUNTER — TELEPHONE (OUTPATIENT)
Dept: NEPHROLOGY | Facility: CLINIC | Age: 71
End: 2021-11-11
Payer: MEDICARE

## 2021-11-11 DIAGNOSIS — D50.9 IRON DEFICIENCY ANEMIA, UNSPECIFIED IRON DEFICIENCY ANEMIA TYPE: ICD-10-CM

## 2021-11-11 DIAGNOSIS — D63.1 ANEMIA IN STAGE 4 CHRONIC KIDNEY DISEASE: ICD-10-CM

## 2021-11-11 DIAGNOSIS — N18.4 ANEMIA IN STAGE 4 CHRONIC KIDNEY DISEASE: ICD-10-CM

## 2021-11-11 DIAGNOSIS — E11.21 TYPE 2 DIABETES MELLITUS WITH DIABETIC NEPHROPATHY, WITHOUT LONG-TERM CURRENT USE OF INSULIN: ICD-10-CM

## 2021-11-11 DIAGNOSIS — E83.39 HYPERPHOSPHATEMIA: ICD-10-CM

## 2021-11-11 DIAGNOSIS — N18.4 HYPERTENSIVE KIDNEY DISEASE WITH STAGE 4 CHRONIC KIDNEY DISEASE: ICD-10-CM

## 2021-11-11 DIAGNOSIS — I12.9 HYPERTENSIVE KIDNEY DISEASE WITH STAGE 4 CHRONIC KIDNEY DISEASE: ICD-10-CM

## 2021-11-11 DIAGNOSIS — N25.81 SECONDARY HYPERPARATHYROIDISM: ICD-10-CM

## 2021-11-11 LAB
25(OH)D3+25(OH)D2 SERPL-MCNC: 44 NG/ML (ref 30–96)
ALBUMIN SERPL BCP-MCNC: 3.8 G/DL (ref 3.5–5.2)
ANION GAP SERPL CALC-SCNC: 8 MMOL/L (ref 8–16)
BUN SERPL-MCNC: 41 MG/DL (ref 8–23)
CALCIUM SERPL-MCNC: 9.3 MG/DL (ref 8.7–10.5)
CHLORIDE SERPL-SCNC: 102 MMOL/L (ref 95–110)
CO2 SERPL-SCNC: 30 MMOL/L (ref 23–29)
CREAT SERPL-MCNC: 3 MG/DL (ref 0.5–1.4)
EST. GFR  (AFRICAN AMERICAN): 22.9 ML/MIN/1.73 M^2
EST. GFR  (NON AFRICAN AMERICAN): 19.8 ML/MIN/1.73 M^2
FERRITIN SERPL-MCNC: 85 NG/ML (ref 20–300)
GLUCOSE SERPL-MCNC: 175 MG/DL (ref 70–110)
IRON SERPL-MCNC: 46 UG/DL (ref 45–160)
PHOSPHATE SERPL-MCNC: 3.8 MG/DL (ref 2.7–4.5)
POTASSIUM SERPL-SCNC: 4.2 MMOL/L (ref 3.5–5.1)
PTH-INTACT SERPL-MCNC: 138.9 PG/ML (ref 9–77)
SATURATED IRON: 12 % (ref 20–50)
SODIUM SERPL-SCNC: 140 MMOL/L (ref 136–145)
TOTAL IRON BINDING CAPACITY: 369 UG/DL (ref 250–450)
TRANSFERRIN SERPL-MCNC: 249 MG/DL (ref 200–375)
URATE SERPL-MCNC: 7.4 MG/DL (ref 3.4–7)

## 2021-11-11 PROCEDURE — 80069 RENAL FUNCTION PANEL: CPT | Performed by: INTERNAL MEDICINE

## 2021-11-11 PROCEDURE — 82728 ASSAY OF FERRITIN: CPT | Performed by: INTERNAL MEDICINE

## 2021-11-11 PROCEDURE — 83970 ASSAY OF PARATHORMONE: CPT | Performed by: INTERNAL MEDICINE

## 2021-11-11 PROCEDURE — 82306 VITAMIN D 25 HYDROXY: CPT | Performed by: INTERNAL MEDICINE

## 2021-11-11 PROCEDURE — 84466 ASSAY OF TRANSFERRIN: CPT | Performed by: INTERNAL MEDICINE

## 2021-11-11 PROCEDURE — 84550 ASSAY OF BLOOD/URIC ACID: CPT | Performed by: INTERNAL MEDICINE

## 2021-11-11 PROCEDURE — 36415 COLL VENOUS BLD VENIPUNCTURE: CPT | Performed by: INTERNAL MEDICINE

## 2021-12-16 DIAGNOSIS — N18.5 CKD (CHRONIC KIDNEY DISEASE), STAGE V: Primary | ICD-10-CM

## 2022-01-05 ENCOUNTER — TELEPHONE (OUTPATIENT)
Dept: NEPHROLOGY | Facility: CLINIC | Age: 72
End: 2022-01-05
Payer: MEDICARE

## 2022-01-05 NOTE — TELEPHONE ENCOUNTER
MD JEAN PIERRE Houston Staff  Cc: Haylie Squires RN  Kidney function around the same. No major change. Please keep follow up as planned. Thanks.

## 2022-01-31 ENCOUNTER — TELEPHONE (OUTPATIENT)
Dept: NEPHROLOGY | Facility: CLINIC | Age: 72
End: 2022-01-31
Payer: MEDICARE

## 2022-02-01 ENCOUNTER — TELEPHONE (OUTPATIENT)
Dept: NEPHROLOGY | Facility: CLINIC | Age: 72
End: 2022-02-01
Payer: MEDICARE

## 2022-02-01 NOTE — TELEPHONE ENCOUNTER
MD Haylie Houston, NEHAL  Cc: JEAN PIERRE Braga Staff  Kidney filtration around the same and no major change from recent baseline.   Potassium and phosphorus levels normal.   Continue surveillance labs and keep follow up in the next 1-2 months.   Thanks.

## 2022-03-03 ENCOUNTER — TELEPHONE (OUTPATIENT)
Dept: NEPHROLOGY | Facility: CLINIC | Age: 72
End: 2022-03-03
Payer: MEDICARE

## 2022-03-03 NOTE — TELEPHONE ENCOUNTER
MD Haylie Houston, NEHAL  Cc: JEAN PIERRE Braga Staff  No major change in kidney function and it remains closer to recent baseline. Continue current plan, surveillance labs as planned and tentative appointment in the next 1-2 months. Thanks.       Phoned patient, no answer. Unable to leave voicemail message as mailbox is full.   done

## 2022-03-18 DIAGNOSIS — N18.5 CKD (CHRONIC KIDNEY DISEASE), STAGE V: Primary | ICD-10-CM

## 2022-04-12 ENCOUNTER — OFFICE VISIT (OUTPATIENT)
Dept: NEPHROLOGY | Facility: CLINIC | Age: 72
End: 2022-04-12
Payer: MEDICARE

## 2022-04-12 VITALS
WEIGHT: 261.94 LBS | BODY MASS INDEX: 34.71 KG/M2 | OXYGEN SATURATION: 97 % | HEART RATE: 59 BPM | HEIGHT: 73 IN | SYSTOLIC BLOOD PRESSURE: 172 MMHG | DIASTOLIC BLOOD PRESSURE: 68 MMHG

## 2022-04-12 DIAGNOSIS — N18.4 HYPERTENSIVE KIDNEY DISEASE WITH STAGE 4 CHRONIC KIDNEY DISEASE: Primary | ICD-10-CM

## 2022-04-12 DIAGNOSIS — D50.9 IRON DEFICIENCY ANEMIA, UNSPECIFIED IRON DEFICIENCY ANEMIA TYPE: ICD-10-CM

## 2022-04-12 DIAGNOSIS — E11.21 TYPE 2 DIABETES MELLITUS WITH DIABETIC NEPHROPATHY, WITHOUT LONG-TERM CURRENT USE OF INSULIN: ICD-10-CM

## 2022-04-12 DIAGNOSIS — D63.1 ANEMIA IN STAGE 4 CHRONIC KIDNEY DISEASE: ICD-10-CM

## 2022-04-12 DIAGNOSIS — N18.4 ANEMIA IN STAGE 4 CHRONIC KIDNEY DISEASE: ICD-10-CM

## 2022-04-12 DIAGNOSIS — I12.9 HYPERTENSIVE KIDNEY DISEASE WITH STAGE 4 CHRONIC KIDNEY DISEASE: Primary | ICD-10-CM

## 2022-04-12 DIAGNOSIS — E83.39 HYPERPHOSPHATEMIA: ICD-10-CM

## 2022-04-12 DIAGNOSIS — R80.1 PERSISTENT PROTEINURIA: ICD-10-CM

## 2022-04-12 DIAGNOSIS — N25.81 SECONDARY HYPERPARATHYROIDISM: ICD-10-CM

## 2022-04-12 PROCEDURE — 99999 PR PBB SHADOW E&M-EST. PATIENT-LVL III: CPT | Mod: PBBFAC,,, | Performed by: INTERNAL MEDICINE

## 2022-04-12 PROCEDURE — 99999 PR PBB SHADOW E&M-EST. PATIENT-LVL III: ICD-10-PCS | Mod: PBBFAC,,, | Performed by: INTERNAL MEDICINE

## 2022-04-12 PROCEDURE — 99214 OFFICE O/P EST MOD 30 MIN: CPT | Mod: S$PBB,,, | Performed by: INTERNAL MEDICINE

## 2022-04-12 PROCEDURE — 99213 OFFICE O/P EST LOW 20 MIN: CPT | Mod: PBBFAC | Performed by: INTERNAL MEDICINE

## 2022-04-12 PROCEDURE — 99214 PR OFFICE/OUTPT VISIT, EST, LEVL IV, 30-39 MIN: ICD-10-PCS | Mod: S$PBB,,, | Performed by: INTERNAL MEDICINE

## 2022-04-12 RX ORDER — CALCITRIOL 0.25 UG/1
0.25 CAPSULE ORAL
Qty: 12 CAPSULE | Refills: 3 | Status: SHIPPED | OUTPATIENT
Start: 2022-04-13 | End: 2022-08-24 | Stop reason: SDUPTHER

## 2022-04-15 PROBLEM — N18.4 ACUTE RENAL FAILURE SUPERIMPOSED ON STAGE 4 CHRONIC KIDNEY DISEASE: Status: RESOLVED | Noted: 2021-08-25 | Resolved: 2022-04-15

## 2022-04-15 PROBLEM — N17.9 ACUTE RENAL FAILURE SUPERIMPOSED ON STAGE 4 CHRONIC KIDNEY DISEASE: Status: RESOLVED | Noted: 2021-08-25 | Resolved: 2022-04-15

## 2022-04-15 RX ORDER — SODIUM CHLORIDE 0.9 % (FLUSH) 0.9 %
10 SYRINGE (ML) INJECTION
Status: CANCELLED | OUTPATIENT
Start: 2022-04-15

## 2022-04-15 RX ORDER — SODIUM CHLORIDE 9 MG/ML
INJECTION, SOLUTION INTRAVENOUS CONTINUOUS
Status: CANCELLED | OUTPATIENT
Start: 2022-04-15

## 2022-04-15 RX ORDER — DIPHENHYDRAMINE HYDROCHLORIDE 50 MG/ML
50 INJECTION INTRAMUSCULAR; INTRAVENOUS ONCE AS NEEDED
Status: CANCELLED | OUTPATIENT
Start: 2022-04-15

## 2022-04-15 RX ORDER — METHYLPREDNISOLONE SOD SUCC 125 MG
125 VIAL (EA) INJECTION ONCE AS NEEDED
Status: CANCELLED | OUTPATIENT
Start: 2022-04-15

## 2022-04-15 RX ORDER — HEPARIN 100 UNIT/ML
5 SYRINGE INTRAVENOUS
Status: CANCELLED | OUTPATIENT
Start: 2022-04-15

## 2022-04-15 RX ORDER — EPINEPHRINE 0.3 MG/.3ML
0.3 INJECTION SUBCUTANEOUS ONCE AS NEEDED
Status: CANCELLED | OUTPATIENT
Start: 2022-04-15

## 2022-05-19 ENCOUNTER — INFUSION (OUTPATIENT)
Dept: INFECTIOUS DISEASES | Facility: HOSPITAL | Age: 72
End: 2022-05-19
Attending: INTERNAL MEDICINE
Payer: MEDICARE

## 2022-05-19 VITALS
TEMPERATURE: 98 F | OXYGEN SATURATION: 92 % | DIASTOLIC BLOOD PRESSURE: 73 MMHG | HEART RATE: 67 BPM | SYSTOLIC BLOOD PRESSURE: 176 MMHG | WEIGHT: 267.63 LBS | BODY MASS INDEX: 35.31 KG/M2

## 2022-05-19 DIAGNOSIS — N18.4 HYPERTENSIVE KIDNEY DISEASE WITH STAGE 4 CHRONIC KIDNEY DISEASE: ICD-10-CM

## 2022-05-19 DIAGNOSIS — N18.4 ANEMIA IN STAGE 4 CHRONIC KIDNEY DISEASE: Primary | ICD-10-CM

## 2022-05-19 DIAGNOSIS — I12.9 HYPERTENSIVE KIDNEY DISEASE WITH STAGE 4 CHRONIC KIDNEY DISEASE: ICD-10-CM

## 2022-05-19 DIAGNOSIS — D63.1 ANEMIA IN STAGE 4 CHRONIC KIDNEY DISEASE: Primary | ICD-10-CM

## 2022-05-19 DIAGNOSIS — D50.9 IRON DEFICIENCY ANEMIA, UNSPECIFIED IRON DEFICIENCY ANEMIA TYPE: ICD-10-CM

## 2022-05-19 PROCEDURE — 96365 THER/PROPH/DIAG IV INF INIT: CPT

## 2022-05-19 PROCEDURE — 25000003 PHARM REV CODE 250: Performed by: INTERNAL MEDICINE

## 2022-05-19 PROCEDURE — 63600175 PHARM REV CODE 636 W HCPCS: Mod: JW,JG | Performed by: INTERNAL MEDICINE

## 2022-05-19 RX ORDER — SODIUM CHLORIDE 9 MG/ML
INJECTION, SOLUTION INTRAVENOUS CONTINUOUS
Status: CANCELLED | OUTPATIENT
Start: 2022-05-26

## 2022-05-19 RX ORDER — SODIUM CHLORIDE 0.9 % (FLUSH) 0.9 %
10 SYRINGE (ML) INJECTION
Status: CANCELLED | OUTPATIENT
Start: 2022-05-26

## 2022-05-19 RX ORDER — DIPHENHYDRAMINE HYDROCHLORIDE 50 MG/ML
50 INJECTION INTRAMUSCULAR; INTRAVENOUS ONCE AS NEEDED
Status: DISCONTINUED | OUTPATIENT
Start: 2022-05-19 | End: 2022-05-19 | Stop reason: HOSPADM

## 2022-05-19 RX ORDER — METHYLPREDNISOLONE SOD SUCC 125 MG
125 VIAL (EA) INJECTION ONCE AS NEEDED
Status: DISCONTINUED | OUTPATIENT
Start: 2022-05-19 | End: 2022-05-19 | Stop reason: HOSPADM

## 2022-05-19 RX ORDER — HEPARIN 100 UNIT/ML
5 SYRINGE INTRAVENOUS
Status: DISCONTINUED | OUTPATIENT
Start: 2022-05-19 | End: 2022-05-19 | Stop reason: HOSPADM

## 2022-05-19 RX ORDER — SODIUM CHLORIDE 0.9 % (FLUSH) 0.9 %
10 SYRINGE (ML) INJECTION
Status: DISCONTINUED | OUTPATIENT
Start: 2022-05-19 | End: 2022-05-19 | Stop reason: HOSPADM

## 2022-05-19 RX ORDER — SODIUM CHLORIDE 9 MG/ML
INJECTION, SOLUTION INTRAVENOUS CONTINUOUS
Status: DISCONTINUED | OUTPATIENT
Start: 2022-05-19 | End: 2022-05-19 | Stop reason: HOSPADM

## 2022-05-19 RX ORDER — DIPHENHYDRAMINE HYDROCHLORIDE 50 MG/ML
50 INJECTION INTRAMUSCULAR; INTRAVENOUS ONCE AS NEEDED
Status: CANCELLED | OUTPATIENT
Start: 2022-05-26

## 2022-05-19 RX ORDER — METHYLPREDNISOLONE SOD SUCC 125 MG
125 VIAL (EA) INJECTION ONCE AS NEEDED
Status: CANCELLED | OUTPATIENT
Start: 2022-05-26

## 2022-05-19 RX ORDER — HEPARIN 100 UNIT/ML
5 SYRINGE INTRAVENOUS
Status: CANCELLED | OUTPATIENT
Start: 2022-05-26

## 2022-05-19 RX ORDER — EPINEPHRINE 0.3 MG/.3ML
0.3 INJECTION SUBCUTANEOUS ONCE AS NEEDED
Status: CANCELLED | OUTPATIENT
Start: 2022-05-26

## 2022-05-19 RX ORDER — EPINEPHRINE 0.3 MG/.3ML
0.3 INJECTION SUBCUTANEOUS ONCE AS NEEDED
Status: DISCONTINUED | OUTPATIENT
Start: 2022-05-19 | End: 2022-05-19 | Stop reason: HOSPADM

## 2022-05-19 RX ADMIN — SODIUM CHLORIDE: 0.9 INJECTION, SOLUTION INTRAVENOUS at 11:05

## 2022-05-19 RX ADMIN — FERRIC CARBOXYMALTOSE INJECTION 750 MG: 50 INJECTION, SOLUTION INTRAVENOUS at 11:05

## 2022-05-19 NOTE — PROGRESS NOTES
Arrived to clinic ambu;atory  With walker for his 1st injectefer. Pt observed for 60 minutes after infusion. Tolerated infusion and pt discharged in NAD

## 2022-05-26 ENCOUNTER — INFUSION (OUTPATIENT)
Dept: INFECTIOUS DISEASES | Facility: HOSPITAL | Age: 72
End: 2022-05-26
Attending: INTERNAL MEDICINE
Payer: MEDICARE

## 2022-05-26 VITALS
DIASTOLIC BLOOD PRESSURE: 77 MMHG | SYSTOLIC BLOOD PRESSURE: 131 MMHG | TEMPERATURE: 99 F | WEIGHT: 276.13 LBS | BODY MASS INDEX: 36.43 KG/M2 | OXYGEN SATURATION: 98 % | RESPIRATION RATE: 20 BRPM | HEART RATE: 70 BPM

## 2022-05-26 DIAGNOSIS — D50.9 IRON DEFICIENCY ANEMIA, UNSPECIFIED IRON DEFICIENCY ANEMIA TYPE: ICD-10-CM

## 2022-05-26 DIAGNOSIS — I12.9 HYPERTENSIVE KIDNEY DISEASE WITH STAGE 4 CHRONIC KIDNEY DISEASE: ICD-10-CM

## 2022-05-26 DIAGNOSIS — N18.4 HYPERTENSIVE KIDNEY DISEASE WITH STAGE 4 CHRONIC KIDNEY DISEASE: ICD-10-CM

## 2022-05-26 DIAGNOSIS — N18.4 ANEMIA IN STAGE 4 CHRONIC KIDNEY DISEASE: Primary | ICD-10-CM

## 2022-05-26 DIAGNOSIS — D63.1 ANEMIA IN STAGE 4 CHRONIC KIDNEY DISEASE: Primary | ICD-10-CM

## 2022-05-26 PROCEDURE — 96365 THER/PROPH/DIAG IV INF INIT: CPT

## 2022-05-26 PROCEDURE — 25000003 PHARM REV CODE 250: Performed by: INTERNAL MEDICINE

## 2022-05-26 PROCEDURE — 63600175 PHARM REV CODE 636 W HCPCS: Mod: JG | Performed by: INTERNAL MEDICINE

## 2022-05-26 RX ORDER — DIPHENHYDRAMINE HYDROCHLORIDE 50 MG/ML
50 INJECTION INTRAMUSCULAR; INTRAVENOUS ONCE AS NEEDED
Status: DISCONTINUED | OUTPATIENT
Start: 2022-05-26 | End: 2022-05-26 | Stop reason: HOSPADM

## 2022-05-26 RX ORDER — DIPHENHYDRAMINE HYDROCHLORIDE 50 MG/ML
50 INJECTION INTRAMUSCULAR; INTRAVENOUS ONCE AS NEEDED
OUTPATIENT
Start: 2022-05-26

## 2022-05-26 RX ORDER — SODIUM CHLORIDE 9 MG/ML
INJECTION, SOLUTION INTRAVENOUS CONTINUOUS
Status: CANCELLED | OUTPATIENT
Start: 2022-05-26

## 2022-05-26 RX ORDER — SODIUM CHLORIDE 0.9 % (FLUSH) 0.9 %
10 SYRINGE (ML) INJECTION
Status: DISCONTINUED | OUTPATIENT
Start: 2022-05-26 | End: 2022-05-26 | Stop reason: HOSPADM

## 2022-05-26 RX ORDER — HEPARIN 100 UNIT/ML
5 SYRINGE INTRAVENOUS
Status: CANCELLED | OUTPATIENT
Start: 2022-05-26

## 2022-05-26 RX ORDER — EPINEPHRINE 0.3 MG/.3ML
0.3 INJECTION SUBCUTANEOUS ONCE AS NEEDED
Status: DISCONTINUED | OUTPATIENT
Start: 2022-05-26 | End: 2022-05-26 | Stop reason: HOSPADM

## 2022-05-26 RX ORDER — METHYLPREDNISOLONE SOD SUCC 125 MG
125 VIAL (EA) INJECTION ONCE AS NEEDED
OUTPATIENT
Start: 2022-05-26

## 2022-05-26 RX ORDER — HEPARIN 100 UNIT/ML
5 SYRINGE INTRAVENOUS
Status: DISCONTINUED | OUTPATIENT
Start: 2022-05-26 | End: 2022-05-26 | Stop reason: HOSPADM

## 2022-05-26 RX ORDER — SODIUM CHLORIDE 0.9 % (FLUSH) 0.9 %
10 SYRINGE (ML) INJECTION
Status: CANCELLED | OUTPATIENT
Start: 2022-05-26

## 2022-05-26 RX ORDER — EPINEPHRINE 0.3 MG/.3ML
0.3 INJECTION SUBCUTANEOUS ONCE AS NEEDED
OUTPATIENT
Start: 2022-05-26

## 2022-05-26 RX ORDER — SODIUM CHLORIDE 9 MG/ML
INJECTION, SOLUTION INTRAVENOUS CONTINUOUS
Status: DISCONTINUED | OUTPATIENT
Start: 2022-05-26 | End: 2022-05-26 | Stop reason: HOSPADM

## 2022-05-26 RX ORDER — METHYLPREDNISOLONE SOD SUCC 125 MG
125 VIAL (EA) INJECTION ONCE AS NEEDED
Status: DISCONTINUED | OUTPATIENT
Start: 2022-05-26 | End: 2022-05-26 | Stop reason: HOSPADM

## 2022-05-26 RX ADMIN — FERRIC CARBOXYMALTOSE INJECTION 750 MG: 50 INJECTION, SOLUTION INTRAVENOUS at 11:05

## 2022-05-26 RX ADMIN — SODIUM CHLORIDE: 0.9 INJECTION, SOLUTION INTRAVENOUS at 11:05

## 2022-05-26 NOTE — PROGRESS NOTES
Arrived to clinic ambuLatory  With walker for his 2ND  injectefer infusion. NS @ 250ml/hr.  Pt observed for 30 minutes after infusion. Tolerated infusion and pt discharged in NAD

## 2022-06-10 ENCOUNTER — TELEPHONE (OUTPATIENT)
Dept: NEPHROLOGY | Facility: CLINIC | Age: 72
End: 2022-06-10
Payer: MEDICARE

## 2022-06-22 ENCOUNTER — HOSPITAL ENCOUNTER (INPATIENT)
Facility: OTHER | Age: 72
LOS: 7 days | Discharge: HOME | DRG: 177 | End: 2022-06-29
Attending: EMERGENCY MEDICINE | Admitting: EMERGENCY MEDICINE
Payer: MEDICARE

## 2022-06-22 DIAGNOSIS — R06.02 SHORTNESS OF BREATH: ICD-10-CM

## 2022-06-22 DIAGNOSIS — R07.9 CHEST PAIN: ICD-10-CM

## 2022-06-22 DIAGNOSIS — I50.33 ACUTE ON CHRONIC DIASTOLIC CONGESTIVE HEART FAILURE: ICD-10-CM

## 2022-06-22 DIAGNOSIS — I21.4 NSTEMI (NON-ST ELEVATED MYOCARDIAL INFARCTION): ICD-10-CM

## 2022-06-22 DIAGNOSIS — I48.0 PAROXYSMAL ATRIAL FIBRILLATION: ICD-10-CM

## 2022-06-22 DIAGNOSIS — I50.30 DIASTOLIC CONGESTIVE HEART FAILURE, UNSPECIFIED HF CHRONICITY: Primary | ICD-10-CM

## 2022-06-22 PROBLEM — J96.01 ACUTE HYPOXEMIC RESPIRATORY FAILURE: Status: ACTIVE | Noted: 2022-06-22

## 2022-06-22 PROBLEM — U07.1 COVID-19: Status: ACTIVE | Noted: 2022-06-22

## 2022-06-22 PROBLEM — R79.89 ELEVATED TROPONIN: Status: ACTIVE | Noted: 2022-06-22

## 2022-06-22 PROBLEM — N18.4 CKD (CHRONIC KIDNEY DISEASE), STAGE IV: Status: ACTIVE | Noted: 2022-06-22

## 2022-06-22 LAB
ALBUMIN SERPL BCP-MCNC: 3.6 G/DL (ref 3.5–5.2)
ALP SERPL-CCNC: 127 U/L (ref 55–135)
ALT SERPL W/O P-5'-P-CCNC: 20 U/L (ref 10–44)
ANION GAP SERPL CALC-SCNC: 7 MMOL/L (ref 8–16)
APTT BLDCRRT: 31.9 SEC (ref 21–32)
AST SERPL-CCNC: 32 U/L (ref 10–40)
BACTERIA #/AREA URNS HPF: NORMAL /HPF
BASOPHILS # BLD AUTO: 0.02 K/UL (ref 0–0.2)
BASOPHILS NFR BLD: 0.3 % (ref 0–1.9)
BILIRUB SERPL-MCNC: 0.7 MG/DL (ref 0.1–1)
BILIRUB UR QL STRIP: NEGATIVE
BNP SERPL-MCNC: 374 PG/ML (ref 0–99)
BUN SERPL-MCNC: 39 MG/DL (ref 8–23)
CALCIUM SERPL-MCNC: 9 MG/DL (ref 8.7–10.5)
CHLORIDE SERPL-SCNC: 108 MMOL/L (ref 95–110)
CK SERPL-CCNC: 274 U/L (ref 20–200)
CLARITY UR: CLEAR
CO2 SERPL-SCNC: 27 MMOL/L (ref 23–29)
COLOR UR: YELLOW
CREAT SERPL-MCNC: 2.7 MG/DL (ref 0.5–1.4)
CRP SERPL-MCNC: 5.3 MG/L (ref 0–8.2)
CTP QC/QA: YES
D DIMER PPP IA.FEU-MCNC: 0.56 MG/L FEU
DIFFERENTIAL METHOD: ABNORMAL
EOSINOPHIL # BLD AUTO: 0.1 K/UL (ref 0–0.5)
EOSINOPHIL NFR BLD: 0.8 % (ref 0–8)
ERYTHROCYTE [DISTWIDTH] IN BLOOD BY AUTOMATED COUNT: 15.4 % (ref 11.5–14.5)
ERYTHROCYTE [SEDIMENTATION RATE] IN BLOOD: 32 MM/HR (ref 0–10)
EST. GFR  (AFRICAN AMERICAN): 26 ML/MIN/1.73 M^2
EST. GFR  (NON AFRICAN AMERICAN): 22 ML/MIN/1.73 M^2
ESTIMATED AVG GLUCOSE: 137 MG/DL (ref 68–131)
FERRITIN SERPL-MCNC: 348 NG/ML (ref 20–300)
GLUCOSE SERPL-MCNC: 142 MG/DL (ref 70–110)
GLUCOSE UR QL STRIP: ABNORMAL
HBA1C MFR BLD: 6.4 % (ref 4–5.6)
HCT VFR BLD AUTO: 30.9 % (ref 40–54)
HGB BLD-MCNC: 10.1 G/DL (ref 14–18)
HGB UR QL STRIP: ABNORMAL
HYALINE CASTS #/AREA URNS LPF: 0 /LPF
IMM GRANULOCYTES # BLD AUTO: 0.04 K/UL (ref 0–0.04)
IMM GRANULOCYTES NFR BLD AUTO: 0.7 % (ref 0–0.5)
INR PPP: 1.1 (ref 0.8–1.2)
KETONES UR QL STRIP: NEGATIVE
LACTATE SERPL-SCNC: 0.7 MMOL/L (ref 0.5–2.2)
LDH SERPL L TO P-CCNC: 246 U/L (ref 110–260)
LEUKOCYTE ESTERASE UR QL STRIP: NEGATIVE
LYMPHOCYTES # BLD AUTO: 0.3 K/UL (ref 1–4.8)
LYMPHOCYTES NFR BLD: 4.3 % (ref 18–48)
MCH RBC QN AUTO: 32.3 PG (ref 27–31)
MCHC RBC AUTO-ENTMCNC: 32.7 G/DL (ref 32–36)
MCV RBC AUTO: 99 FL (ref 82–98)
MICROSCOPIC COMMENT: NORMAL
MONOCYTES # BLD AUTO: 0.9 K/UL (ref 0.3–1)
MONOCYTES NFR BLD: 14 % (ref 4–15)
NEUTROPHILS # BLD AUTO: 4.9 K/UL (ref 1.8–7.7)
NEUTROPHILS NFR BLD: 79.9 % (ref 38–73)
NITRITE UR QL STRIP: NEGATIVE
NRBC BLD-RTO: 0 /100 WBC
PH UR STRIP: 7 [PH] (ref 5–8)
PLATELET # BLD AUTO: 158 K/UL (ref 150–450)
PMV BLD AUTO: 10.8 FL (ref 9.2–12.9)
POCT GLUCOSE: 125 MG/DL (ref 70–110)
POTASSIUM SERPL-SCNC: 4.2 MMOL/L (ref 3.5–5.1)
PROCALCITONIN SERPL IA-MCNC: 0.07 NG/ML
PROT SERPL-MCNC: 6.9 G/DL (ref 6–8.4)
PROT UR QL STRIP: ABNORMAL
PROTHROMBIN TIME: 11.7 SEC (ref 9–12.5)
RBC # BLD AUTO: 3.13 M/UL (ref 4.6–6.2)
RBC #/AREA URNS HPF: 2 /HPF (ref 0–4)
SARS-COV-2 RDRP RESP QL NAA+PROBE: POSITIVE
SODIUM SERPL-SCNC: 142 MMOL/L (ref 136–145)
SP GR UR STRIP: 1.02 (ref 1–1.03)
TROPONIN I SERPL DL<=0.01 NG/ML-MCNC: 4.57 NG/ML (ref 0–0.03)
TROPONIN I SERPL DL<=0.01 NG/ML-MCNC: 9.41 NG/ML (ref 0–0.03)
URN SPEC COLLECT METH UR: ABNORMAL
UROBILINOGEN UR STRIP-ACNC: 1 EU/DL
WBC # BLD AUTO: 6.07 K/UL (ref 3.9–12.7)
WBC #/AREA URNS HPF: 2 /HPF (ref 0–5)

## 2022-06-22 PROCEDURE — 27000207 HC ISOLATION

## 2022-06-22 PROCEDURE — 87040 BLOOD CULTURE FOR BACTERIA: CPT | Performed by: EMERGENCY MEDICINE

## 2022-06-22 PROCEDURE — 93010 EKG 12-LEAD: ICD-10-PCS | Mod: 76,,, | Performed by: INTERNAL MEDICINE

## 2022-06-22 PROCEDURE — 84484 ASSAY OF TROPONIN QUANT: CPT | Mod: 91 | Performed by: NURSE PRACTITIONER

## 2022-06-22 PROCEDURE — 80053 COMPREHEN METABOLIC PANEL: CPT | Performed by: EMERGENCY MEDICINE

## 2022-06-22 PROCEDURE — 94640 AIRWAY INHALATION TREATMENT: CPT

## 2022-06-22 PROCEDURE — 25000242 PHARM REV CODE 250 ALT 637 W/ HCPCS: Performed by: EMERGENCY MEDICINE

## 2022-06-22 PROCEDURE — 27000221 HC OXYGEN, UP TO 24 HOURS

## 2022-06-22 PROCEDURE — 25000003 PHARM REV CODE 250: Performed by: EMERGENCY MEDICINE

## 2022-06-22 PROCEDURE — 99223 PR INITIAL HOSPITAL CARE,LEVL III: ICD-10-PCS | Mod: CR,AI,, | Performed by: NURSE PRACTITIONER

## 2022-06-22 PROCEDURE — 25000003 PHARM REV CODE 250: Performed by: NURSE PRACTITIONER

## 2022-06-22 PROCEDURE — 99223 1ST HOSP IP/OBS HIGH 75: CPT | Mod: CR,,, | Performed by: INTERNAL MEDICINE

## 2022-06-22 PROCEDURE — 86140 C-REACTIVE PROTEIN: CPT | Performed by: EMERGENCY MEDICINE

## 2022-06-22 PROCEDURE — 99291 CRITICAL CARE FIRST HOUR: CPT | Mod: 25

## 2022-06-22 PROCEDURE — 85610 PROTHROMBIN TIME: CPT | Performed by: NURSE PRACTITIONER

## 2022-06-22 PROCEDURE — 63600175 PHARM REV CODE 636 W HCPCS: Performed by: EMERGENCY MEDICINE

## 2022-06-22 PROCEDURE — 85730 THROMBOPLASTIN TIME PARTIAL: CPT | Performed by: NURSE PRACTITIONER

## 2022-06-22 PROCEDURE — 85379 FIBRIN DEGRADATION QUANT: CPT | Performed by: NURSE PRACTITIONER

## 2022-06-22 PROCEDURE — 93010 ELECTROCARDIOGRAM REPORT: CPT | Mod: 76,,, | Performed by: INTERNAL MEDICINE

## 2022-06-22 PROCEDURE — 93005 ELECTROCARDIOGRAM TRACING: CPT

## 2022-06-22 PROCEDURE — 82550 ASSAY OF CK (CPK): CPT | Performed by: NURSE PRACTITIONER

## 2022-06-22 PROCEDURE — 27100098 HC SPACER

## 2022-06-22 PROCEDURE — 83036 HEMOGLOBIN GLYCOSYLATED A1C: CPT | Performed by: NURSE PRACTITIONER

## 2022-06-22 PROCEDURE — 96374 THER/PROPH/DIAG INJ IV PUSH: CPT

## 2022-06-22 PROCEDURE — 93010 ELECTROCARDIOGRAM REPORT: CPT | Mod: ,,, | Performed by: INTERNAL MEDICINE

## 2022-06-22 PROCEDURE — 21400001 HC TELEMETRY ROOM

## 2022-06-22 PROCEDURE — 85025 COMPLETE CBC W/AUTO DIFF WBC: CPT | Performed by: EMERGENCY MEDICINE

## 2022-06-22 PROCEDURE — U0002 COVID-19 LAB TEST NON-CDC: HCPCS | Performed by: EMERGENCY MEDICINE

## 2022-06-22 PROCEDURE — 99223 1ST HOSP IP/OBS HIGH 75: CPT | Mod: CR,AI,, | Performed by: NURSE PRACTITIONER

## 2022-06-22 PROCEDURE — 83880 ASSAY OF NATRIURETIC PEPTIDE: CPT | Performed by: EMERGENCY MEDICINE

## 2022-06-22 PROCEDURE — 82728 ASSAY OF FERRITIN: CPT | Performed by: NURSE PRACTITIONER

## 2022-06-22 PROCEDURE — 84484 ASSAY OF TROPONIN QUANT: CPT | Performed by: EMERGENCY MEDICINE

## 2022-06-22 PROCEDURE — 84145 PROCALCITONIN (PCT): CPT | Performed by: EMERGENCY MEDICINE

## 2022-06-22 PROCEDURE — 99223 PR INITIAL HOSPITAL CARE,LEVL III: ICD-10-PCS | Mod: CR,,, | Performed by: INTERNAL MEDICINE

## 2022-06-22 PROCEDURE — 36415 COLL VENOUS BLD VENIPUNCTURE: CPT | Performed by: NURSE PRACTITIONER

## 2022-06-22 PROCEDURE — 25000242 PHARM REV CODE 250 ALT 637 W/ HCPCS: Performed by: NURSE PRACTITIONER

## 2022-06-22 PROCEDURE — 94761 N-INVAS EAR/PLS OXIMETRY MLT: CPT

## 2022-06-22 PROCEDURE — 81000 URINALYSIS NONAUTO W/SCOPE: CPT | Performed by: EMERGENCY MEDICINE

## 2022-06-22 PROCEDURE — 63600175 PHARM REV CODE 636 W HCPCS: Mod: TB | Performed by: NURSE PRACTITIONER

## 2022-06-22 PROCEDURE — 83605 ASSAY OF LACTIC ACID: CPT | Performed by: EMERGENCY MEDICINE

## 2022-06-22 PROCEDURE — 85651 RBC SED RATE NONAUTOMATED: CPT | Performed by: NURSE PRACTITIONER

## 2022-06-22 PROCEDURE — 83615 LACTATE (LD) (LDH) ENZYME: CPT | Performed by: EMERGENCY MEDICINE

## 2022-06-22 RX ORDER — ALBUTEROL SULFATE 90 UG/1
2 AEROSOL, METERED RESPIRATORY (INHALATION) EVERY 6 HOURS
Status: DISCONTINUED | OUTPATIENT
Start: 2022-06-22 | End: 2022-06-24

## 2022-06-22 RX ORDER — ACETAMINOPHEN 325 MG/1
650 TABLET ORAL EVERY 4 HOURS PRN
Status: DISCONTINUED | OUTPATIENT
Start: 2022-06-22 | End: 2022-06-29 | Stop reason: HOSPADM

## 2022-06-22 RX ORDER — CLOPIDOGREL BISULFATE 75 MG/1
75 TABLET ORAL DAILY
Status: DISCONTINUED | OUTPATIENT
Start: 2022-06-23 | End: 2022-06-24

## 2022-06-22 RX ORDER — BISACODYL 10 MG
10 SUPPOSITORY, RECTAL RECTAL DAILY PRN
Status: DISCONTINUED | OUTPATIENT
Start: 2022-06-22 | End: 2022-06-29 | Stop reason: HOSPADM

## 2022-06-22 RX ORDER — ATORVASTATIN CALCIUM 20 MG/1
40 TABLET, FILM COATED ORAL DAILY
Status: DISCONTINUED | OUTPATIENT
Start: 2022-06-23 | End: 2022-06-29 | Stop reason: HOSPADM

## 2022-06-22 RX ORDER — POLYETHYLENE GLYCOL 3350 17 G/17G
17 POWDER, FOR SOLUTION ORAL 2 TIMES DAILY PRN
Status: DISCONTINUED | OUTPATIENT
Start: 2022-06-22 | End: 2022-06-29 | Stop reason: HOSPADM

## 2022-06-22 RX ORDER — ONDANSETRON 8 MG/1
8 TABLET, ORALLY DISINTEGRATING ORAL EVERY 8 HOURS PRN
Status: DISCONTINUED | OUTPATIENT
Start: 2022-06-22 | End: 2022-06-29 | Stop reason: HOSPADM

## 2022-06-22 RX ORDER — FAMOTIDINE 20 MG/1
20 TABLET, FILM COATED ORAL DAILY
Status: DISCONTINUED | OUTPATIENT
Start: 2022-06-23 | End: 2022-06-29 | Stop reason: HOSPADM

## 2022-06-22 RX ORDER — TALC
6 POWDER (GRAM) TOPICAL NIGHTLY PRN
Status: DISCONTINUED | OUTPATIENT
Start: 2022-06-22 | End: 2022-06-29 | Stop reason: HOSPADM

## 2022-06-22 RX ORDER — CLOPIDOGREL BISULFATE 75 MG/1
300 TABLET ORAL ONCE
Status: COMPLETED | OUTPATIENT
Start: 2022-06-23 | End: 2022-06-22

## 2022-06-22 RX ORDER — NITROGLYCERIN 0.4 MG/1
0.4 TABLET SUBLINGUAL
Status: COMPLETED | OUTPATIENT
Start: 2022-06-22 | End: 2022-06-22

## 2022-06-22 RX ORDER — NITROGLYCERIN 80 MG/1
1 PATCH TRANSDERMAL DAILY
Status: DISCONTINUED | OUTPATIENT
Start: 2022-06-22 | End: 2022-06-25

## 2022-06-22 RX ORDER — NITROGLYCERIN 0.4 MG/1
0.4 TABLET SUBLINGUAL EVERY 5 MIN PRN
Status: DISCONTINUED | OUTPATIENT
Start: 2022-06-22 | End: 2022-06-29 | Stop reason: HOSPADM

## 2022-06-22 RX ORDER — HYDRALAZINE HYDROCHLORIDE 25 MG/1
100 TABLET, FILM COATED ORAL EVERY 12 HOURS
Status: DISCONTINUED | OUTPATIENT
Start: 2022-06-22 | End: 2022-06-29 | Stop reason: HOSPADM

## 2022-06-22 RX ORDER — ONDANSETRON 2 MG/ML
4 INJECTION INTRAMUSCULAR; INTRAVENOUS EVERY 8 HOURS PRN
Status: DISCONTINUED | OUTPATIENT
Start: 2022-06-22 | End: 2022-06-29 | Stop reason: HOSPADM

## 2022-06-22 RX ORDER — TAMSULOSIN HYDROCHLORIDE 0.4 MG/1
0.4 CAPSULE ORAL DAILY
Status: DISCONTINUED | OUTPATIENT
Start: 2022-06-23 | End: 2022-06-29 | Stop reason: HOSPADM

## 2022-06-22 RX ORDER — IBUPROFEN 200 MG
24 TABLET ORAL
Status: DISCONTINUED | OUTPATIENT
Start: 2022-06-22 | End: 2022-06-29 | Stop reason: HOSPADM

## 2022-06-22 RX ORDER — INSULIN ASPART 100 [IU]/ML
0-5 INJECTION, SOLUTION INTRAVENOUS; SUBCUTANEOUS
Status: DISCONTINUED | OUTPATIENT
Start: 2022-06-22 | End: 2022-06-23

## 2022-06-22 RX ORDER — ACETAMINOPHEN 500 MG
1000 TABLET ORAL
Status: COMPLETED | OUTPATIENT
Start: 2022-06-22 | End: 2022-06-22

## 2022-06-22 RX ORDER — ALLOPURINOL 100 MG/1
100 TABLET ORAL DAILY
Status: DISCONTINUED | OUTPATIENT
Start: 2022-06-23 | End: 2022-06-29 | Stop reason: HOSPADM

## 2022-06-22 RX ORDER — FUROSEMIDE 10 MG/ML
60 INJECTION INTRAMUSCULAR; INTRAVENOUS 2 TIMES DAILY
Status: DISCONTINUED | OUTPATIENT
Start: 2022-06-23 | End: 2022-06-27

## 2022-06-22 RX ORDER — TALC
6 POWDER (GRAM) TOPICAL NIGHTLY PRN
Status: DISCONTINUED | OUTPATIENT
Start: 2022-06-22 | End: 2022-06-22

## 2022-06-22 RX ORDER — GUAIFENESIN/DEXTROMETHORPHAN 100-10MG/5
10 SYRUP ORAL EVERY 4 HOURS PRN
Status: DISCONTINUED | OUTPATIENT
Start: 2022-06-22 | End: 2022-06-29 | Stop reason: HOSPADM

## 2022-06-22 RX ORDER — SODIUM CHLORIDE 0.9 % (FLUSH) 0.9 %
10 SYRINGE (ML) INJECTION
Status: DISCONTINUED | OUTPATIENT
Start: 2022-06-22 | End: 2022-06-22

## 2022-06-22 RX ORDER — FUROSEMIDE 10 MG/ML
80 INJECTION INTRAMUSCULAR; INTRAVENOUS
Status: COMPLETED | OUTPATIENT
Start: 2022-06-22 | End: 2022-06-22

## 2022-06-22 RX ORDER — AMIODARONE HYDROCHLORIDE 200 MG/1
200 TABLET ORAL DAILY
Status: DISCONTINUED | OUTPATIENT
Start: 2022-06-23 | End: 2022-06-29 | Stop reason: HOSPADM

## 2022-06-22 RX ORDER — CALCITRIOL 0.25 UG/1
0.25 CAPSULE ORAL
Status: DISCONTINUED | OUTPATIENT
Start: 2022-06-24 | End: 2022-06-29 | Stop reason: HOSPADM

## 2022-06-22 RX ORDER — DEXAMETHASONE SODIUM PHOSPHATE 4 MG/ML
6 INJECTION, SOLUTION INTRA-ARTICULAR; INTRALESIONAL; INTRAMUSCULAR; INTRAVENOUS; SOFT TISSUE
Status: COMPLETED | OUTPATIENT
Start: 2022-06-22 | End: 2022-06-22

## 2022-06-22 RX ORDER — ASCORBIC ACID 500 MG
500 TABLET ORAL 2 TIMES DAILY
Status: DISCONTINUED | OUTPATIENT
Start: 2022-06-22 | End: 2022-06-29 | Stop reason: HOSPADM

## 2022-06-22 RX ORDER — FUROSEMIDE 10 MG/ML
100 INJECTION INTRAMUSCULAR; INTRAVENOUS
Status: DISCONTINUED | OUTPATIENT
Start: 2022-06-22 | End: 2022-06-22

## 2022-06-22 RX ORDER — SODIUM CHLORIDE 0.9 % (FLUSH) 0.9 %
10 SYRINGE (ML) INJECTION
Status: DISCONTINUED | OUTPATIENT
Start: 2022-06-22 | End: 2022-06-29 | Stop reason: HOSPADM

## 2022-06-22 RX ORDER — IBUPROFEN 200 MG
16 TABLET ORAL
Status: DISCONTINUED | OUTPATIENT
Start: 2022-06-22 | End: 2022-06-29 | Stop reason: HOSPADM

## 2022-06-22 RX ORDER — GLUCAGON 1 MG
1 KIT INJECTION
Status: DISCONTINUED | OUTPATIENT
Start: 2022-06-22 | End: 2022-06-29 | Stop reason: HOSPADM

## 2022-06-22 RX ORDER — ASPIRIN 325 MG
325 TABLET ORAL
Status: COMPLETED | OUTPATIENT
Start: 2022-06-22 | End: 2022-06-22

## 2022-06-22 RX ORDER — AMOXICILLIN 250 MG
1 CAPSULE ORAL 2 TIMES DAILY
Status: DISCONTINUED | OUTPATIENT
Start: 2022-06-22 | End: 2022-06-29 | Stop reason: HOSPADM

## 2022-06-22 RX ORDER — ASPIRIN 81 MG/1
81 TABLET ORAL DAILY
Status: DISCONTINUED | OUTPATIENT
Start: 2022-06-23 | End: 2022-06-29 | Stop reason: HOSPADM

## 2022-06-22 RX ADMIN — NITROGLYCERIN 1 INCH: 20 OINTMENT TOPICAL at 04:06

## 2022-06-22 RX ADMIN — ACETAMINOPHEN 1000 MG: 500 TABLET ORAL at 03:06

## 2022-06-22 RX ADMIN — APIXABAN 5 MG: 2.5 TABLET, FILM COATED ORAL at 08:06

## 2022-06-22 RX ADMIN — FUROSEMIDE 80 MG: 10 INJECTION, SOLUTION INTRAMUSCULAR; INTRAVENOUS at 03:06

## 2022-06-22 RX ADMIN — OXYCODONE HYDROCHLORIDE AND ACETAMINOPHEN 500 MG: 500 TABLET ORAL at 08:06

## 2022-06-22 RX ADMIN — HYDRALAZINE HYDROCHLORIDE 100 MG: 25 TABLET, FILM COATED ORAL at 08:06

## 2022-06-22 RX ADMIN — ALBUTEROL SULFATE 2 PUFF: 90 AEROSOL, METERED RESPIRATORY (INHALATION) at 07:06

## 2022-06-22 RX ADMIN — CLOPIDOGREL 300 MG: 75 TABLET, FILM COATED ORAL at 11:06

## 2022-06-22 RX ADMIN — SENNOSIDES AND DOCUSATE SODIUM 1 TABLET: 50; 8.6 TABLET ORAL at 08:06

## 2022-06-22 RX ADMIN — DEXAMETHASONE SODIUM PHOSPHATE 6 MG: 4 INJECTION INTRA-ARTICULAR; INTRALESIONAL; INTRAMUSCULAR; INTRAVENOUS; SOFT TISSUE at 06:06

## 2022-06-22 RX ADMIN — REMDESIVIR 200 MG: 100 INJECTION, POWDER, LYOPHILIZED, FOR SOLUTION INTRAVENOUS at 08:06

## 2022-06-22 RX ADMIN — ASPIRIN 325 MG ORAL TABLET 325 MG: 325 PILL ORAL at 04:06

## 2022-06-22 RX ADMIN — NITROGLYCERIN 0.4 MG: 0.4 TABLET, ORALLY DISINTEGRATING SUBLINGUAL at 03:06

## 2022-06-22 NOTE — ED NOTES
Pt to ED via wheelchair from triage. Pt states that he was SOB with exertion yesterday. Pt states last night he was having midsternal chest pains all night and SOB. Pt states he spent the night in the recliner sitting upright. Pt states this morning that he began vomiting and was unable to take his medications for the day. Pt reports vomiting 5-6 times. Pt states his breathing was worse today. Pt states he has leg edema but it is no worse than yesterday, that this is baseline for his leg swelling.

## 2022-06-22 NOTE — ED PROVIDER NOTES
Encounter Date: 6/22/2022       History     Chief Complaint   Patient presents with    Shortness of Breath     Pt c/o SOB since last night. States he has too much fluid on him. Pt has CHF. Positive swelling to bilateral lower extremities. Report feeling weak.     Seen by physician at 3:20PM:    Patient is a 72-year-old male presents to the measurement with shortness breath for 3 days along with associated cough.  Patient states that he feels volume overloaded and does have a history of CHF.  He did have a tactile temperature earlier today and did have an episode of nausea and vomiting as well.  Patient has also had chest tightness which has been persistent since yesterday.  Patient also has increased lower extremity swelling.  He denies any COPD or history of asthma or history of smoking.  Denies any abdominal pain or diarrhea.  The patient sees Dr. Livingston.          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     Hyperlipidemia     Hypertension     Nephrotic range proteinuria     Paroxysmal atrial fibrillation     Stroke     nov 2012     Past Surgical History:   Procedure Laterality Date    BIOPSY N/A 5/29/2018    Procedure: BIOPSY;  Surgeon: Neeta Diagnostic Provider;  Location: [facility] CATH LAB;  Service: Radiology;  Laterality: N/A;  ct guided      CARDIOVERSION N/A 12/3/2020    Procedure: CARDIOVERSION;  Surgeon: Mehul Livingstno MD;  Location: [facility] CATH LAB;  Service: Cardiology;  Laterality: N/A;    KNEE SURGERY Left     SHOULDER SURGERY Right      Family History   Problem Relation Age of Onset    Hypertension Mother     Heart attack Father 59    Stroke Sister     Esophageal cancer Brother      Social History      Tobacco Use    Smoking status: Former Smoker     Types: Cigars    Smokeless tobacco: Never Used    Tobacco comment: occasional, when at the [employer]   Substance Use Topics    Alcohol use: Yes     Alcohol/week: 1.0 standard drink     Types: 1 Cans of beer per week     Comment: occasional when at the [employer]    Drug use: No     Review of Systems   Constitutional: Positive for chills and fever.   HENT: Negative for congestion and rhinorrhea.    Respiratory: Positive for cough, chest tightness and shortness of breath.    Cardiovascular: Positive for leg swelling. Negative for chest pain and palpitations.   Gastrointestinal: Positive for nausea and vomiting. Negative for abdominal pain and diarrhea.   Genitourinary: Negative for dysuria and flank pain.   Musculoskeletal: Negative for back pain and neck pain.   Skin: Negative for color change and wound.   Neurological: Negative for dizziness and headaches.       Physical Exam     Initial Vitals [06/22/22 1427]   BP Pulse Resp Temp SpO2   (S) (!) 200/86 80 19 98.4 °F (36.9 °C) (!) 93 %      MAP       --         Physical Exam    Nursing note and vitals reviewed.  Constitutional: He appears well-developed and well-nourished.   HENT:   Head: Normocephalic and atraumatic.   Eyes: Conjunctivae and EOM are normal.   Neck: Neck supple.   Normal range of motion.  Cardiovascular: Normal rate, regular rhythm and normal heart sounds.   Pulmonary/Chest: He has rhonchi.   Increased work of breathing with tachypnea.  Crackles diffusely.   Abdominal: Abdomen is soft. Bowel sounds are normal. He exhibits no distension. There is no abdominal tenderness. There is no rebound.   Musculoskeletal:         General: Edema present. No tenderness. Normal range of motion.      Cervical back: Normal range of motion and neck supple.      Comments: 2+ pitting edema to the lower extremities bilaterally.     Neurological: He is alert and oriented to person, place, and time.   Skin: Skin is warm and  dry. Capillary refill takes less than 2 seconds.         ED Course   Critical Care    Date/Time: 6/22/2022 5:10 PM  Performed by: Reema Riley MD  Authorized by: Reema Riley MD   Direct patient critical care time: 10 minutes  Additional history critical care time: 5 minutes  Ordering / reviewing critical care time: 5 minutes  Documentation critical care time: 5 minutes  Consulting other physicians critical care time: 10 minutes  Total critical care time (exclusive of procedural time) : 35 minutes  Critical care time was exclusive of separately billable procedures and treating other patients and teaching time.  Critical care was necessary to treat or prevent imminent or life-threatening deterioration of the following conditions: cardiac failure and sepsis.  Critical care was time spent personally by me on the following activities: discussions with consultants, evaluation of patient's response to treatment, obtaining history from patient or surrogate, ordering and review of laboratory studies, pulse oximetry, review of old charts, re-evaluation of patient's condition, development of treatment plan with patient or surrogate, examination of patient, ordering and performing treatments and interventions and ordering and review of radiographic studies.        Labs Reviewed   CBC W/ AUTO DIFFERENTIAL - Abnormal; Notable for the following components:       Result Value    RBC 3.13 (*)     Hemoglobin 10.1 (*)     Hematocrit 30.9 (*)     MCV 99 (*)     MCH 32.3 (*)     RDW 15.4 (*)     Immature Granulocytes 0.7 (*)     Lymph # 0.3 (*)     Gran % 79.9 (*)     Lymph % 4.3 (*)     All other components within normal limits   COMPREHENSIVE METABOLIC PANEL - Abnormal; Notable for the following components:    Glucose 142 (*)     BUN 39 (*)     Creatinine 2.7 (*)     Anion Gap 7 (*)     eGFR if  26 (*)     eGFR if non  22 (*)     All other components within normal limits   TROPONIN I - Abnormal;  Notable for the following components:    Troponin I 4.574 (*)     All other components within normal limits   B-TYPE NATRIURETIC PEPTIDE - Abnormal; Notable for the following components:     (*)     All other components within normal limits   URINALYSIS - Abnormal; Notable for the following components:    Protein, UA 2+ (*)     Glucose, UA Trace (*)     Occult Blood UA 1+ (*)     All other components within normal limits   SARS-COV-2 RDRP GENE - Abnormal; Notable for the following components:    POC Rapid COVID Positive (*)     All other components within normal limits   CULTURE, BLOOD   CULTURE, BLOOD   CULTURE, RESPIRATORY   INFLUENZA A & B BY MOLECULAR   LACTIC ACID, PLASMA   C-REACTIVE PROTEIN   LACTATE DEHYDROGENASE   PROCALCITONIN   URINALYSIS MICROSCOPIC   HEMOGLOBIN A1C   PROTIME-INR   APTT   SEDIMENTATION RATE   CK   FERRITIN   D DIMER, QUANTITATIVE   POCT GLUCOSE MONITORING CONTINUOUS     EKG Readings: (Independently Interpreted)   2:49PM:  Rate of 80.  Normal sinus rhythm.  Right bundle-branch block.  No other ST or ischemic changes.     ECG Results          EKG 12-lead (In process)  Result time 06/22/22 15:58:50    In process by Interface, Lab In [facility] (06/22/22 15:58:50)                 Narrative:    Test Reason : R06.02,    Vent. Rate : 080 BPM     Atrial Rate : 080 BPM     P-R Int : 188 ms          QRS Dur : 140 ms      QT Int : 418 ms       P-R-T Axes : 000 095 021 degrees     QTc Int : 482 ms    Normal sinus rhythm  Right bundle branch block  Abnormal ECG      Referred By: [email or web address]   SELF           Confirmed By:                             Imaging Results          X-Ray Chest AP Portable (Final result)  Result time 06/22/22 15:46:11    Final result by Angelic Lawrence MD (06/22/22 15:46:11)                 Impression:      Hazy perihilar and bibasilar opacities can be seen with edema.  No focal consolidation.      Electronically signed by: Angelic  Vitter  Date:    06/22/2022  Time:    15:46             Narrative:    EXAMINATION:  XR CHEST AP PORTABLE    CLINICAL HISTORY:  CHF;    TECHNIQUE:  Single frontal view of the chest was performed.    COMPARISON:  08/25/2021    FINDINGS:  Reduced lung volumes with elevation of the left hemidiaphragm, similar compared to prior.  Hazy perihilar and bibasilar opacities.  No significant pleural effusion.  No pneumothorax.  Cardiac silhouette is normal in size.                              X-Rays:   Independently Interpreted Readings:   Chest X-Ray: Trachea midline.  No cardiomegaly.  Elevated left diaphragm.  Bilateral perihilar and bibasilar infiltrates.     Medications   melatonin tablet 6 mg (has no administration in time range)   allopurinoL tablet 100 mg (has no administration in time range)   amiodarone tablet 200 mg (has no administration in time range)   apixaban tablet 5 mg (has no administration in time range)   atorvastatin tablet 40 mg (has no administration in time range)   calcitRIOL capsule 0.25 mcg (has no administration in time range)   famotidine tablet 20 mg (has no administration in time range)   hydrALAZINE tablet 100 mg (has no administration in time range)   tamsulosin 24 hr capsule 0.4 mg (has no administration in time range)   glucose chewable tablet 16 g (has no administration in time range)   glucose chewable tablet 24 g (has no administration in time range)   glucagon (human recombinant) injection 1 mg (has no administration in time range)   dextrose 10% bolus 125 mL (has no administration in time range)   dextrose 10% bolus 250 mL (has no administration in time range)   insulin aspart U-100 pen 0-5 Units (has no administration in time range)   sodium chloride 0.9% flush 10 mL (has no administration in time range)   remdesivir 200 mg in sodium chloride 0.9% 250 mL infusion (has no administration in time range)   remdesivir 100 mg in sodium chloride 0.9% 250 mL infusion (has no administration in  time range)   albuterol inhaler 2 puff (has no administration in time range)   ascorbic acid (vitamin C) tablet 500 mg (has no administration in time range)   multivitamin tablet (has no administration in time range)   dexAMETHasone tablet 6 mg (has no administration in time range)   acetaminophen tablet 650 mg (has no administration in time range)   senna-docusate 8.6-50 mg per tablet 1 tablet (has no administration in time range)   polyethylene glycol packet 17 g (has no administration in time range)   bisacodyL suppository 10 mg (has no administration in time range)   ondansetron disintegrating tablet 8 mg (has no administration in time range)   ondansetron injection 4 mg (has no administration in time range)   dextromethorphan-guaiFENesin  mg/5 ml liquid 10 mL (has no administration in time range)   acetaminophen tablet 1,000 mg (1,000 mg Oral Given 6/22/22 1544)   furosemide injection 80 mg (80 mg Intravenous Given 6/22/22 1545)   nitroGLYCERIN SL tablet 0.4 mg (0.4 mg Sublingual Given 6/22/22 1546)   nitroGLYCERIN 2% TD oint ointment 1 inch (1 inch Topical (Top) Given 6/22/22 1647)   aspirin tablet 325 mg (325 mg Oral Given 6/22/22 1648)   dexamethasone injection 6 mg (6 mg Intravenous Given 6/22/22 1800)     Medical Decision Making:   History:   Old Medical Records: I decided to obtain old medical records.  Old Records Summarized: other records, records from clinic visits and records from another hospital.  Initial Assessment:   3:20PM:  Patient is a 72-year-old male who presents to the emergency department with shortness of breath, chest pain, cough, fever.  Patient does have increased work of breathing and he does appear volume overloaded.  However he is febrile with a COVID positive diagnosis.  Will plan for labs, cultures, chest x-ray, will continue to follow and reassess.  Independently Interpreted Test(s):   I have ordered and independently interpreted X-rays - see prior notes.  I have ordered and  independently interpreted EKG Reading(s) - see prior notes  Clinical Tests:   Lab Tests: Ordered and Reviewed  Radiological Study: Ordered and Reviewed  Medical Tests: Ordered and Reviewed  Other:   I have discussed this case with another health care provider.    5:02PM:  I discussed with Dr. Crisostomo, cardiology, who will consult on the patient.      5:07 PM: I discussed with Dr. Dumont, who recommends decadron and agreeable to plan for admission under Dr. Cardenas.      5:46 PM:  I updated pt regarding results along with plan for admission.  Agreeable to plan and all questions answered.                        Clinical Impression:   Final diagnoses:  [R06.02] Shortness of breath  [I50.30] Diastolic congestive heart failure, unspecified HF chronicity (Primary)  [I21.4] NSTEMI (non-ST elevated myocardial infarction)          ED Disposition Condition    Admit               Reema Riley MD  06/22/22 1800

## 2022-06-22 NOTE — CONSULTS
[facility] ([place])  Cardiology  Consult Note    Patient Name: Zev Castano  MRN: [id number]  Admission Date: 6/22/2022  Hospital Length of Stay: 0 days  Code Status: Full Code   Attending Provider: KRISTIN Cardenas MD   Consulting Provider: Alex Crisostomo MD  Primary Care Physician: Mehul Livingston MD  Principal Problem:COVID-19    Patient information was obtained from patient, past medical records and ER records.     Inpatient consult to Cardiology  Consult performed by: Alex Crisostomo MD  Consult ordered by: Reema Riley MD  Reason for consult: Troponin elevation        Subjective:     Chief Complaint:  Shortness of breath and fluid overload.     HPI:     Zev Castano is a 72 y.o.male with hypertension, hypercholesterolemia and diabetes mellitus type 2. He is obese. In 2012 he had two strokes in the [place] with fairly good neurological recovery. In 2016 he had two stents placed in the left anterior descending coronary artery for angina.  He has had paroxysmal atrial fibrillation for which he underwent electrical cardioversion in 11/2020. He has been on amiodarone since with no clinical recurrence. He is anticoagulated with apixiban. He has chronic kidney disease, stage 4. In 8/2021 he was admitted to [facility] with fluid overload. He was diuresed. It was felt he was near need for dialysis at that time. Since that stay he has been followed at [facility] for his kidney disease. Beginning early 6/2022 he he began to develop more and more edema of his legs. He became increasingly short of breath. At 1 am 6/22/2022 he began feeling a chest pressure. The pressure lasted for about 5 hours. He then vomited 5-6 times and the chest pressure resolved. He felt weak. He presented top the emergency room. A COVID test was positive. There were no acute ST changes. CXR reveals pulmonary edema. He was admitted. I was asked to see him in consultation as Dr. Livingston is out  [facility].        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     Hyperlipidemia     Hypertension     Nephrotic range proteinuria     Paroxysmal atrial fibrillation     Stroke     nov 2012       Past Surgical History:   Procedure Laterality Date    BIOPSY N/A 5/29/2018    Procedure: BIOPSY;  Surgeon: Neeta Diagnostic Provider;  Location: [facility] CATH LAB;  Service: Radiology;  Laterality: N/A;  ct guided      CARDIOVERSION N/A 12/3/2020    Procedure: CARDIOVERSION;  Surgeon: Mehul Livingston MD;  Location: [facility] CATH LAB;  Service: Cardiology;  Laterality: N/A;    KNEE SURGERY Left     SHOULDER SURGERY Right        Review of patient's allergies indicates:   Allergen Reactions    Doxycycline Rash       No current facility-administered medications on file prior to encounter.     Current Outpatient Medications on File Prior to Encounter   Medication Sig    allopurinoL (ZYLOPRIM) 100 MG tablet Take 1 tablet (100 mg total) by mouth once daily.    amiodarone (PACERONE) 100 MG Tab Take by mouth once daily.    apixaban (ELIQUIS) 5 mg Tab Take 1 tablet (5 mg total) by mouth 2 (two) times daily. Hold for 3 days prior to PD catheter placement.    atorvastatin (LIPITOR) 40 MG tablet TAKE 1 TABLET BY MOUTH EVERY DAY    calcitRIOL (ROCALTROL) 0.25 MCG Cap Take 1 capsule (0.25 mcg total) by mouth every Mon, Wed, Fri.    famotidine (PEPCID) 20 MG tablet Take 1 tablet (20 mg total) by mouth once daily.    furosemide (LASIX) 80 MG tablet Take 1 tablet (80 mg total) by mouth 2 (two) times a day. (Patient taking differently: Take 40 mg by mouth 2 (two) times a day.)    glipiZIDE (GLUCOTROL) 2.5 MG TR24 Take 2.5 mg by mouth once daily.    hydrALAZINE (APRESOLINE) 100 MG tablet Take 1 tablet (100 mg total) by mouth every 12 (twelve)  hours.    SITagliptin (JANUVIA) 25 MG Tab Take 1 tablet (25 mg total) by mouth once daily. (Patient taking differently: Take 50 mg by mouth once daily.)    tamsulosin (FLOMAX) 0.4 mg Cap Take 0.4 mg by mouth once daily.    vitamin D (VITAMIN D3) 1000 units Tab Take 1,000 Units by mouth once daily.    vitamin renal formula, B-complex-vitamin c-folic acid, (NEPHROCAP) 1 mg Cap Take 1 capsule by mouth once daily.     Family History     Problem Relation (Age of Onset)    Esophageal cancer Brother    Heart attack Father (59)    Hypertension Mother    Stroke Sister        Tobacco Use    Smoking status: Former Smoker     Types: Cigars    Smokeless tobacco: Never Used    Tobacco comment: occasional, when at the [place] [employer]   Substance and Sexual Activity    Alcohol use: Yes     Alcohol/week: 1.0 standard drink     Types: 1 Cans of beer per week     Comment: occasional when at the [place]    Drug use: No    Sexual activity: Yes     Partners: Female     Birth control/protection: None     Review of Systems   Constitutional: Positive for fever and malaise/fatigue. Negative for chills.   HENT: Negative for nosebleeds and tinnitus.    Eyes: Negative for double vision, vision loss in left eye and vision loss in right eye.   Cardiovascular: Positive for leg swelling, orthopnea and paroxysmal nocturnal dyspnea. Negative for chest pain, claudication, dyspnea on exertion, irregular heartbeat, near-syncope, palpitations and syncope.   Respiratory: Negative for cough, hemoptysis, shortness of breath and wheezing.    Endocrine: Negative for cold intolerance and heat intolerance.   Hematologic/Lymphatic: Negative for bleeding problem. Does not bruise/bleed easily.   Skin: Negative for color change and rash.   Musculoskeletal: Positive for muscle weakness. Negative for back pain, falls and myalgias.   Gastrointestinal: Positive for diarrhea, heartburn, nausea and vomiting. Negative for abdominal pain, dysphagia, hematemesis,  hematochezia, hemorrhoids, jaundice and melena.   Genitourinary: Negative for dysuria and hematuria.   Neurological: Positive for weakness. Negative for dizziness, focal weakness, headaches, light-headedness, loss of balance, numbness, tremors and vertigo.   Psychiatric/Behavioral: Negative for altered mental status, depression and memory loss. The patient is not nervous/anxious.    Allergic/Immunologic: Negative for hives and persistent infections.     Objective:     Vital Signs (Most Recent):  Temp: 99 °F (37.2 °C) (06/22/22 1829)  Pulse: 63 (06/22/22 1830)  Resp: 16 (06/22/22 1829)  BP: (!) 165/72 (06/22/22 1829)  SpO2: 99 % (06/22/22 1829) Vital Signs (24h Range):  Temp:  [98.4 °F (36.9 °C)-100.4 °F (38 °C)] 99 °F (37.2 °C)  Pulse:  [61-85] 63  Resp:  [16-31] 16  SpO2:  [93 %-99 %] 99 %  BP: (165-200)/(72-90) 165/72     Weight: 117.9 kg (260 lb)  Body mass index is 34.3 kg/m².    SpO2: 99 %  O2 Device (Oxygen Therapy): nasal cannula      Intake/Output Summary (Last 24 hours) at 6/22/2022 1859  Last data filed at 6/22/2022 1700  Gross per 24 hour   Intake --   Output 900 ml   Net -900 ml       Lines/Drains/Airways     Peripheral Intravenous Line  Duration                Peripheral IV - Single Lumen 06/22/22 1526 18 G Right Forearm <1 day                Physical Exam  Constitutional:       General: He is not in acute distress.     Appearance: Normal appearance. He is well-developed. He is not toxic-appearing or diaphoretic.   HENT:      Head: Normocephalic and atraumatic.      Nose: Nose normal.   Eyes:      General:         Right eye: No discharge.         Left eye: No discharge.      Conjunctiva/sclera:      Right eye: Right conjunctiva is not injected.      Left eye: Left conjunctiva is not injected.      Pupils: Pupils are equal.      Right eye: Pupil is round.      Left eye: Pupil is round.   Neck:      Thyroid: No thyromegaly.      Vascular: JVD present. No carotid bruit.   Cardiovascular:      Rate and  Rhythm: Normal rate and regular rhythm.  No extrasystoles are present.     Chest Wall: PMI is not displaced.      Pulses:           Radial pulses are 2+ on the right side and 2+ on the left side.        Femoral pulses are 2+ on the right side and 2+ on the left side.       Dorsalis pedis pulses are 2+ on the right side and 2+ on the left side.        Posterior tibial pulses are 2+ on the right side and 2+ on the left side.      Heart sounds: S1 normal and S2 normal. Murmur heard.    Midsystolic murmur is present with a grade of 2/6.    No gallop.   Pulmonary:      Effort: Pulmonary effort is normal.      Breath sounds: Examination of the right-middle field reveals rales. Examination of the left-middle field reveals rales. Examination of the right-lower field reveals rales. Examination of the left-lower field reveals rales. Rales present.   Abdominal:      Palpations: Abdomen is soft.      Tenderness: There is no abdominal tenderness.   Musculoskeletal:      Cervical back: Neck supple.      Right lower leg: Swelling present. 4+ Edema present.      Left lower leg: Swelling present. 4+ Edema present.   Lymphadenopathy:      Head:      Right side of head: No submandibular adenopathy.      Left side of head: No submandibular adenopathy.      Cervical: No cervical adenopathy.   Skin:     General: Skin is warm and dry.      Findings: No rash.      Nails: There is no clubbing.   Neurological:      General: No focal deficit present.      Mental Status: He is alert and oriented to person, place, and time. He is not disoriented.      Cranial Nerves: No cranial nerve deficit.   Psychiatric:         Attention and Perception: Attention and perception normal.         Mood and Affect: Mood and affect normal.         Speech: Speech normal.         Behavior: Behavior normal.         Thought Content: Thought content normal.         Cognition and Memory: Cognition and memory normal.         Judgment: Judgment normal.         Current  Medications:     albuterol  2 puff Inhalation Q6H    [START ON 6/23/2022] allopurinoL  100 mg Oral Daily    [START ON 6/23/2022] amiodarone  200 mg Oral Daily    apixaban  5 mg Oral BID    ascorbic acid (vitamin C)  500 mg Oral BID    [START ON 6/23/2022] atorvastatin  40 mg Oral Daily    [START ON 6/24/2022] calcitRIOL  0.25 mcg Oral Every Mon, Wed, Fri    [START ON 6/23/2022] dexAMETHasone  6 mg Oral Daily    [START ON 6/23/2022] famotidine  20 mg Oral Daily    [START ON 6/23/2022] furosemide (LASIX) injection  60 mg Intravenous BID    hydrALAZINE  100 mg Oral Q12H    [START ON 6/23/2022] multivitamin  1 tablet Oral Daily    [START ON 6/23/2022] remdesivir infusion  100 mg Intravenous Daily    remdesivir loading dose infusion  200 mg Intravenous Q24H    senna-docusate 8.6-50 mg  1 tablet Oral BID    [START ON 6/23/2022] tamsulosin  0.4 mg Oral Daily     Current Laboratory Results:    Recent Results (from the past 24 hour(s))   POCT COVID-19 Rapid Screening    Collection Time: 06/22/22  3:06 PM   Result Value Ref Range    POC Rapid COVID Positive (A) Negative     Acceptable Yes    CBC auto differential    Collection Time: 06/22/22  3:12 PM   Result Value Ref Range    WBC 6.07 3.90 - 12.70 K/uL    RBC 3.13 (L) 4.60 - 6.20 M/uL    Hemoglobin 10.1 (L) 14.0 - 18.0 g/dL    Hematocrit 30.9 (L) 40.0 - 54.0 %    MCV 99 (H) 82 - 98 fL    MCH 32.3 (H) 27.0 - 31.0 pg    MCHC 32.7 32.0 - 36.0 g/dL    RDW 15.4 (H) 11.5 - 14.5 %    Platelets 158 150 - 450 K/uL    MPV 10.8 9.2 - 12.9 fL    Immature Granulocytes 0.7 (H) 0.0 - 0.5 %    Gran # (ANC) 4.9 1.8 - 7.7 K/uL    Immature Grans (Abs) 0.04 0.00 - 0.04 K/uL    Lymph # 0.3 (L) 1.0 - 4.8 K/uL    Mono # 0.9 0.3 - 1.0 K/uL    Eos # 0.1 0.0 - 0.5 K/uL    Baso # 0.02 0.00 - 0.20 K/uL    nRBC 0 0 /100 WBC    Gran % 79.9 (H) 38.0 - 73.0 %    Lymph % 4.3 (L) 18.0 - 48.0 %    Mono % 14.0 4.0 - 15.0 %    Eosinophil % 0.8 0.0 - 8.0 %    Basophil % 0.3 0.0 -  1.9 %    Differential Method Automated    Comprehensive metabolic panel    Collection Time: 06/22/22  3:12 PM   Result Value Ref Range    Sodium 142 136 - 145 mmol/L    Potassium 4.2 3.5 - 5.1 mmol/L    Chloride 108 95 - 110 mmol/L    CO2 27 23 - 29 mmol/L    Glucose 142 (H) 70 - 110 mg/dL    BUN 39 (H) 8 - 23 mg/dL    Creatinine 2.7 (H) 0.5 - 1.4 mg/dL    Calcium 9.0 8.7 - 10.5 mg/dL    Total Protein 6.9 6.0 - 8.4 g/dL    Albumin 3.6 3.5 - 5.2 g/dL    Total Bilirubin 0.7 0.1 - 1.0 mg/dL    Alkaline Phosphatase 127 55 - 135 U/L    AST 32 10 - 40 U/L    ALT 20 10 - 44 U/L    Anion Gap 7 (L) 8 - 16 mmol/L    eGFR if African American 26 (A) >60 mL/min/1.73 m^2    eGFR if non African American 22 (A) >60 mL/min/1.73 m^2   Troponin I    Collection Time: 06/22/22  3:12 PM   Result Value Ref Range    Troponin I 4.574 (H) 0.000 - 0.026 ng/mL   Brain natriuretic peptide    Collection Time: 06/22/22  3:12 PM   Result Value Ref Range     (H) 0 - 99 pg/mL   Urinalysis    Collection Time: 06/22/22  3:26 PM   Result Value Ref Range    Specimen UA Urine, Clean Catch     Color, UA Yellow Yellow, Straw, [place]    Appearance, UA Clear Clear    pH, UA 7.0 5.0 - 8.0    Specific Gravity, UA 1.020 1.005 - 1.030    Protein, UA 2+ (A) Negative    Glucose, UA Trace (A) Negative    Ketones, UA Negative Negative    Bilirubin (UA) Negative Negative    Occult Blood UA 1+ (A) Negative    Nitrite, UA Negative Negative    Urobilinogen, UA 1.0 <2.0 EU/dL    Leukocytes, UA Negative Negative   Urinalysis Microscopic    Collection Time: 06/22/22  3:26 PM   Result Value Ref Range    RBC, UA 2 0 - 4 /hpf    WBC, UA 2 0 - 5 /hpf    Bacteria Rare None-Occ /hpf    Hyaline Casts, UA 0 0-1/lpf /lpf    Microscopic Comment SEE COMMENT    Lactic acid, plasma    Collection Time: 06/22/22  3:36 PM   Result Value Ref Range    Lactate (Lactic Acid) 0.7 0.5 - 2.2 mmol/L   C-reactive protein    Collection Time: 06/22/22  3:36 PM   Result Value Ref Range    CRP  5.3 0.0 - 8.2 mg/L   Lactate dehydrogenase    Collection Time: 06/22/22  3:36 PM   Result Value Ref Range     110 - 260 U/L   Procalcitonin    Collection Time: 06/22/22  3:36 PM   Result Value Ref Range    Procalcitonin 0.07 <0.25 ng/mL   PT/INR    Collection Time: 06/22/22  6:27 PM   Result Value Ref Range    Prothrombin Time 11.7 9.0 - 12.5 sec    INR 1.1 0.8 - 1.2   PTT    Collection Time: 06/22/22  6:27 PM   Result Value Ref Range    aPTT 31.9 21.0 - 32.0 sec   CK    Collection Time: 06/22/22  6:27 PM   Result Value Ref Range     (H) 20 - 200 U/L   D-Dimer, Quantitative    Collection Time: 06/22/22  6:27 PM   Result Value Ref Range    D-Dimer 0.56 (H) <0.50 mg/L FEU   POCT glucose    Collection Time: 06/22/22  6:30 PM   Result Value Ref Range    POCT Glucose 125 (H) 70 - 110 mg/dL     Current Imaging Results:    X-Ray Chest AP Portable   Final Result      Hazy perihilar and bibasilar opacities can be seen with edema.  No focal consolidation.         Electronically signed by: Angelic Lawrence   Date:    06/22/2022   Time:    15:46        8/25/2021: Echo:     The left ventricle is normal in size with normal systolic function.  The estimated ejection fraction is 65%.  Grade II left ventricular diastolic dysfunction.  Normal right ventricular size with normal right ventricular systolic function.  Mild tricuspid regurgitation.  There is mild pulmonary hypertension.    6/22/2022: ECG: SR. RBBB.      Assessment and Plan:     Active Diagnoses:    Diagnosis Date Noted POA    PRINCIPAL PROBLEM:  COVID-19 [U07.1] 06/22/2022 Unknown    Acute hypoxemic respiratory failure [J96.01] 06/22/2022 Unknown    Elevated troponin [R77.8] 06/22/2022 Unknown    CKD (chronic kidney disease), stage IV [N18.4] 06/22/2022 Unknown    Anemia in stage 4 chronic kidney disease [N18.4, D63.1] 10/18/2021 Yes    Hypertensive kidney disease with stage 4 chronic kidney disease [I12.9, N18.4] 09/20/2021 Yes    Current use of long  term anticoagulation [Z79.01] 08/25/2021 Not Applicable    Restrictive lung disease secondary to obesity [J98.4, E66.9] 06/20/2018 Yes    Heart failure, diastolic, acute on chronic [I50.33] 06/19/2018 Yes    Coronary artery disease involving native coronary artery of native heart without angina pectoris [I25.10] 03/24/2016 Yes    PAF (paroxysmal atrial fibrillation) [I48.0] 06/27/2013 Yes    History of CVA (cerebrovascular accident) [Z86.73] 03/11/2013 Not Applicable    Hypercholesteremia [E78.00] 10/02/2012 Yes     Chronic    Non-insulin dependent type 2 diabetes mellitus [E11.9] 10/02/2012 Yes     Chronic      Problems Resolved During this Admission:     Assessment and Plan:    1. Coronary Artery Disease   2016: LAD stents.   6/22/2022: CP x 5 hours.   ECG without acute ST changes. Troponin 4.6.   Suspect NSTEMI but myocardial injury possible.   He is at very high risk for worsening kidney function if he undergoes coronary angiography.   In this setting appears a initial conservative approach is reasonable.   He is on apixiban.   Add aspirin.   NTG 0.4 mg/hr patch.   Do Echo to assess for WMA.    2. Heart Failure, Diastolic, Acute on Chronic   8/25/2021: Echo: Normal left ventricular size and systolic function. EF 65%. Moderate diastolic dysfunction.   6/22/2022: Presents markedly fluid overloaded. Probably mostly due to CKD.   On furosemide 60 mg iv Q12.    3. Atrial Fibrillation   Paroxysmal atrial fibrillation.   11/2022L: CV.   [id number] ([id number]).   On amiodarone 200 mg Q24.   On apixiban 5 mg Q12.    4. High Risk Medication   On amiodarone 200 mg Q24.    5. Chronic Anticoagulation   [id number] ([id number]).   On apixiban 5 mg Q12.    6. Hypertension   At home been on hydralazine 100 mg Q12 and furosmide 40 mg Q12.    7. Hypercholesterolemia   On atorvastatin 80 mg Q24.    8. Diabetes Mellitus, Type 2   Complications: CKD4, VITA & CAD. Medications: Oral agents.    9. Chronic Kidney Disease, Stage  4   Diabetic nephropathy.   6/22/2022: BUN/crea 39/2.7. CrCl 22.   Renal to see   Diuresis.    10. COVID 19   6/22/2022: Positive.   On dexamethasone and remdesivir.          VTE Risk Mitigation (From admission, onward)         Ordered     apixaban tablet 5 mg  2 times daily         06/22/22 1750     IP VTE HIGH RISK PATIENT  Once         06/22/22 1750     Place sequential compression device  Until discontinued         06/22/22 1750                Thank you for your consult.     I will follow-up with patient. Please contact us if you have any additional questions.    Alex Crisostomo MD  Cardiology   [facility] ([place])

## 2022-06-23 ENCOUNTER — PATIENT OUTREACH (OUTPATIENT)
Dept: ADMINISTRATIVE | Facility: OTHER | Age: 72
End: 2022-06-23
Payer: MEDICARE

## 2022-06-23 LAB
ALBUMIN SERPL BCP-MCNC: 3 G/DL (ref 3.5–5.2)
ALP SERPL-CCNC: 108 U/L (ref 55–135)
ALT SERPL W/O P-5'-P-CCNC: 18 U/L (ref 10–44)
ANION GAP SERPL CALC-SCNC: 16 MMOL/L (ref 8–16)
AST SERPL-CCNC: 33 U/L (ref 10–40)
AV INDEX (PROSTH): 1.23
AV MEAN GRADIENT: 3 MMHG
AV PEAK GRADIENT: 5 MMHG
AV VELOCITY RATIO: 1.07
BASOPHILS # BLD AUTO: 0.02 K/UL (ref 0–0.2)
BASOPHILS NFR BLD: 0.5 % (ref 0–1.9)
BILIRUB SERPL-MCNC: 0.7 MG/DL (ref 0.1–1)
BSA FOR ECHO PROCEDURE: 2.46 M2
BUN SERPL-MCNC: 43 MG/DL (ref 8–23)
CALCIUM SERPL-MCNC: 8.7 MG/DL (ref 8.7–10.5)
CHLORIDE SERPL-SCNC: 105 MMOL/L (ref 95–110)
CO2 SERPL-SCNC: 20 MMOL/L (ref 23–29)
CREAT SERPL-MCNC: 2.8 MG/DL (ref 0.5–1.4)
CV ECHO LV RWT: 0.34 CM
DIFFERENTIAL METHOD: ABNORMAL
DOP CALC AO PEAK VEL: 1.14 M/S
DOP CALC AO VTI: 23.62 CM
DOP CALC LVOT PEAK VEL: 1.22 M/S
DOP CALCLVOT PEAK VEL VTI: 29.09 CM
E WAVE DECELERATION TIME: 247.08 MSEC
E/A RATIO: 1.21
E/E' RATIO: 16.92 M/S
ECHO LV POSTERIOR WALL: 0.93 CM (ref 0.6–1.1)
EJECTION FRACTION: 65 %
EOSINOPHIL # BLD AUTO: 0 K/UL (ref 0–0.5)
EOSINOPHIL NFR BLD: 0 % (ref 0–8)
ERYTHROCYTE [DISTWIDTH] IN BLOOD BY AUTOMATED COUNT: 15.1 % (ref 11.5–14.5)
EST. GFR  (AFRICAN AMERICAN): 25 ML/MIN/1.73 M^2
EST. GFR  (NON AFRICAN AMERICAN): 21 ML/MIN/1.73 M^2
FRACTIONAL SHORTENING: 38 % (ref 28–44)
GLUCOSE SERPL-MCNC: 238 MG/DL (ref 70–110)
HCT VFR BLD AUTO: 29.1 % (ref 40–54)
HGB BLD-MCNC: 9.5 G/DL (ref 14–18)
IMM GRANULOCYTES # BLD AUTO: 0.07 K/UL (ref 0–0.04)
IMM GRANULOCYTES NFR BLD AUTO: 1.8 % (ref 0–0.5)
INTERVENTRICULAR SEPTUM: 0.89 CM (ref 0.6–1.1)
IVRT: 64.7 MSEC
LA MAJOR: 7.37 CM
LA WIDTH: 4.7 CM
LEFT ATRIUM SIZE: 3.68 CM
LEFT INTERNAL DIMENSION IN SYSTOLE: 3.38 CM (ref 2.1–4)
LEFT VENTRICLE DIASTOLIC VOLUME INDEX: 58.82 ML/M2
LEFT VENTRICLE DIASTOLIC VOLUME: 141.76 ML
LEFT VENTRICLE MASS INDEX: 76 G/M2
LEFT VENTRICLE SYSTOLIC VOLUME INDEX: 19.5 ML/M2
LEFT VENTRICLE SYSTOLIC VOLUME: 46.89 ML
LEFT VENTRICULAR INTERNAL DIMENSION IN DIASTOLE: 5.41 CM (ref 3.5–6)
LEFT VENTRICULAR MASS: 183.3 G
LV LATERAL E/E' RATIO: 15.71 M/S
LV SEPTAL E/E' RATIO: 18.33 M/S
LYMPHOCYTES # BLD AUTO: 0.2 K/UL (ref 1–4.8)
LYMPHOCYTES NFR BLD: 6.2 % (ref 18–48)
MAGNESIUM SERPL-MCNC: 2.1 MG/DL (ref 1.6–2.6)
MCH RBC QN AUTO: 32.3 PG (ref 27–31)
MCHC RBC AUTO-ENTMCNC: 32.6 G/DL (ref 32–36)
MCV RBC AUTO: 99 FL (ref 82–98)
MONOCYTES # BLD AUTO: 0.2 K/UL (ref 0.3–1)
MONOCYTES NFR BLD: 4.7 % (ref 4–15)
MV PEAK A VEL: 0.91 M/S
MV PEAK E VEL: 1.1 M/S
MV STENOSIS PRESSURE HALF TIME: 71.65 MS
MV VALVE AREA P 1/2 METHOD: 3.07 CM2
NEUTROPHILS # BLD AUTO: 3.4 K/UL (ref 1.8–7.7)
NEUTROPHILS NFR BLD: 86.8 % (ref 38–73)
NRBC BLD-RTO: 0 /100 WBC
PISA MRMAX VEL: 0.03 M/S
PLATELET # BLD AUTO: 135 K/UL (ref 150–450)
PMV BLD AUTO: 9.9 FL (ref 9.2–12.9)
POCT GLUCOSE: 168 MG/DL (ref 70–110)
POCT GLUCOSE: 209 MG/DL (ref 70–110)
POCT GLUCOSE: 250 MG/DL (ref 70–110)
POCT GLUCOSE: 305 MG/DL (ref 70–110)
POCT GLUCOSE: 344 MG/DL (ref 70–110)
POTASSIUM SERPL-SCNC: 4.1 MMOL/L (ref 3.5–5.1)
PROT SERPL-MCNC: 6.1 G/DL (ref 6–8.4)
RA PRESSURE: 3 MMHG
RBC # BLD AUTO: 2.94 M/UL (ref 4.6–6.2)
SODIUM SERPL-SCNC: 141 MMOL/L (ref 136–145)
TDI LATERAL: 0.07 M/S
TDI SEPTAL: 0.06 M/S
TDI: 0.07 M/S
TROPONIN I SERPL DL<=0.01 NG/ML-MCNC: 4.71 NG/ML (ref 0–0.03)
TROPONIN I SERPL DL<=0.01 NG/ML-MCNC: 6.12 NG/ML (ref 0–0.03)
WBC # BLD AUTO: 3.87 K/UL (ref 3.9–12.7)

## 2022-06-23 PROCEDURE — 99233 SBSQ HOSP IP/OBS HIGH 50: CPT | Mod: ,,, | Performed by: INTERNAL MEDICINE

## 2022-06-23 PROCEDURE — 36415 COLL VENOUS BLD VENIPUNCTURE: CPT | Performed by: NURSE PRACTITIONER

## 2022-06-23 PROCEDURE — 87205 SMEAR GRAM STAIN: CPT | Performed by: NURSE PRACTITIONER

## 2022-06-23 PROCEDURE — 63600175 PHARM REV CODE 636 W HCPCS: Performed by: INTERNAL MEDICINE

## 2022-06-23 PROCEDURE — 27000207 HC ISOLATION

## 2022-06-23 PROCEDURE — 99233 PR SUBSEQUENT HOSPITAL CARE,LEVL III: ICD-10-PCS | Mod: ,,, | Performed by: INTERNAL MEDICINE

## 2022-06-23 PROCEDURE — 63600175 PHARM REV CODE 636 W HCPCS: Performed by: NURSE PRACTITIONER

## 2022-06-23 PROCEDURE — 27000221 HC OXYGEN, UP TO 24 HOURS

## 2022-06-23 PROCEDURE — 80053 COMPREHEN METABOLIC PANEL: CPT | Performed by: NURSE PRACTITIONER

## 2022-06-23 PROCEDURE — 25000003 PHARM REV CODE 250: Performed by: NURSE PRACTITIONER

## 2022-06-23 PROCEDURE — 25000003 PHARM REV CODE 250: Performed by: INTERNAL MEDICINE

## 2022-06-23 PROCEDURE — 21400001 HC TELEMETRY ROOM

## 2022-06-23 PROCEDURE — 94761 N-INVAS EAR/PLS OXIMETRY MLT: CPT

## 2022-06-23 PROCEDURE — 87070 CULTURE OTHR SPECIMN AEROBIC: CPT | Performed by: NURSE PRACTITIONER

## 2022-06-23 PROCEDURE — 85025 COMPLETE CBC W/AUTO DIFF WBC: CPT | Performed by: NURSE PRACTITIONER

## 2022-06-23 PROCEDURE — 94640 AIRWAY INHALATION TREATMENT: CPT

## 2022-06-23 PROCEDURE — 99233 SBSQ HOSP IP/OBS HIGH 50: CPT | Mod: CR,,, | Performed by: INTERNAL MEDICINE

## 2022-06-23 PROCEDURE — 99900035 HC TECH TIME PER 15 MIN (STAT)

## 2022-06-23 PROCEDURE — 84484 ASSAY OF TROPONIN QUANT: CPT | Performed by: NURSE PRACTITIONER

## 2022-06-23 PROCEDURE — 83735 ASSAY OF MAGNESIUM: CPT | Performed by: NURSE PRACTITIONER

## 2022-06-23 PROCEDURE — 99233 PR SUBSEQUENT HOSPITAL CARE,LEVL III: ICD-10-PCS | Mod: CR,,, | Performed by: INTERNAL MEDICINE

## 2022-06-23 RX ORDER — INSULIN ASPART 100 [IU]/ML
1-10 INJECTION, SOLUTION INTRAVENOUS; SUBCUTANEOUS
Status: DISCONTINUED | OUTPATIENT
Start: 2022-06-23 | End: 2022-06-29 | Stop reason: HOSPADM

## 2022-06-23 RX ORDER — BENZONATATE 100 MG/1
200 CAPSULE ORAL 3 TIMES DAILY PRN
Status: DISCONTINUED | OUTPATIENT
Start: 2022-06-23 | End: 2022-06-29 | Stop reason: HOSPADM

## 2022-06-23 RX ADMIN — GUAIFENESIN AND DEXTROMETHORPHAN 10 ML: 100; 10 SYRUP ORAL at 05:06

## 2022-06-23 RX ADMIN — ALBUTEROL SULFATE 2 PUFF: 90 AEROSOL, METERED RESPIRATORY (INHALATION) at 07:06

## 2022-06-23 RX ADMIN — REMDESIVIR 100 MG: 100 INJECTION, POWDER, LYOPHILIZED, FOR SOLUTION INTRAVENOUS at 09:06

## 2022-06-23 RX ADMIN — THERA TABS 1 TABLET: TAB at 08:06

## 2022-06-23 RX ADMIN — ALLOPURINOL 100 MG: 100 TABLET ORAL at 08:06

## 2022-06-23 RX ADMIN — CLOPIDOGREL 75 MG: 75 TABLET, FILM COATED ORAL at 08:06

## 2022-06-23 RX ADMIN — FUROSEMIDE 60 MG: 10 INJECTION, SOLUTION INTRAMUSCULAR; INTRAVENOUS at 08:06

## 2022-06-23 RX ADMIN — ALBUTEROL SULFATE 2 PUFF: 90 AEROSOL, METERED RESPIRATORY (INHALATION) at 01:06

## 2022-06-23 RX ADMIN — OXYCODONE HYDROCHLORIDE AND ACETAMINOPHEN 500 MG: 500 TABLET ORAL at 08:06

## 2022-06-23 RX ADMIN — ATORVASTATIN CALCIUM 40 MG: 20 TABLET, FILM COATED ORAL at 08:06

## 2022-06-23 RX ADMIN — SENNOSIDES AND DOCUSATE SODIUM 1 TABLET: 50; 8.6 TABLET ORAL at 08:06

## 2022-06-23 RX ADMIN — INSULIN ASPART 2 UNITS: 100 INJECTION, SOLUTION INTRAVENOUS; SUBCUTANEOUS at 12:06

## 2022-06-23 RX ADMIN — APIXABAN 5 MG: 2.5 TABLET, FILM COATED ORAL at 08:06

## 2022-06-23 RX ADMIN — NITROGLYCERIN 1 PATCH: 0.4 PATCH TRANSDERMAL at 08:06

## 2022-06-23 RX ADMIN — AMIODARONE HYDROCHLORIDE 200 MG: 200 TABLET ORAL at 08:06

## 2022-06-23 RX ADMIN — HYDRALAZINE HYDROCHLORIDE 100 MG: 25 TABLET, FILM COATED ORAL at 08:06

## 2022-06-23 RX ADMIN — INSULIN ASPART 4 UNITS: 100 INJECTION, SOLUTION INTRAVENOUS; SUBCUTANEOUS at 08:06

## 2022-06-23 RX ADMIN — TAMSULOSIN HYDROCHLORIDE 0.4 MG: 0.4 CAPSULE ORAL at 08:06

## 2022-06-23 RX ADMIN — GUAIFENESIN AND DEXTROMETHORPHAN 10 ML: 100; 10 SYRUP ORAL at 08:06

## 2022-06-23 RX ADMIN — FUROSEMIDE 60 MG: 10 INJECTION, SOLUTION INTRAMUSCULAR; INTRAVENOUS at 05:06

## 2022-06-23 RX ADMIN — INSULIN ASPART 4 UNITS: 100 INJECTION, SOLUTION INTRAVENOUS; SUBCUTANEOUS at 05:06

## 2022-06-23 RX ADMIN — FAMOTIDINE 20 MG: 20 TABLET ORAL at 08:06

## 2022-06-23 RX ADMIN — ALBUTEROL SULFATE 2 PUFF: 90 AEROSOL, METERED RESPIRATORY (INHALATION) at 12:06

## 2022-06-23 RX ADMIN — BENZONATATE 200 MG: 100 CAPSULE ORAL at 08:06

## 2022-06-23 RX ADMIN — ASPIRIN 81 MG: 81 TABLET, COATED ORAL at 08:06

## 2022-06-23 RX ADMIN — DEXAMETHASONE 6 MG: 4 TABLET ORAL at 08:06

## 2022-06-23 NOTE — H&P
[facility] Hospital Medicine  History & Physical    Patient Name: Zev Castano  MRN: [id number]  Patient Class: IP- Inpatient  Admission Date: 6/22/2022  Attending Physician: KRISTIN Cardenas MD   Primary Care Provider: Mehul Livingston MD    Patient information was obtained from patient, past medical records and ER records.     Subjective:     Principal Problem:NSTEMI (non-ST elevated myocardial infarction)    Chief Complaint:   Chief Complaint   Patient presents with    Shortness of Breath     Pt c/o SOB since last night. States he has too much fluid on him. Pt has CHF. Positive swelling to bilateral lower extremities. Report feeling weak.        HPI: Mr. Castano is a 72 YOM with PMHx of CAD s/ p PCI (in 2016), paroxysmal AFib s/p cardioversion (on amiodarone and apixaban), HFpEF (grade II DD and mild pulmHTN), history of CVA (in 2012 in [facility] region), HTN, HLD, DM type 2 on oral therapies, CKD IV (follows with Nephrology, concerns for HD/PD need in near future), persistent proteinuria, and SABRINA.    He presented to the ED with complaints of shortness of breath for the last 3 days with associated cough, orthopnea, lower extremity swelling (worse than baseline), early satiety, and abdominal bloating. He developed mid sternal chest tightness yesterday without radiation that lasted for several hours.  He also had episodes of N/V today.  He denies fever, chills, night sweats, palpitations, abdominal pain, diarrhea, constipation, lightheadedness, syncope, or dizziness.  He reports compliance with home medications including diuretic therapies.  He reports compliance with dietary restrictions and denies increased salt intake.  He has independent ADLs, uses no ambulatory aids,  however is mostly sedentary at home and does not participate in many household chores d/t health conditions and chronic fatigue. All past medical, social, and family history reviewed with patient; no pertinent family history  identified at this time.    In the ED is hypertensive, heart rate stable, 93% on room air with improvement to 95-99% on 2 L nasal cannula, T-max 100.4° with improvement to 99.7.  CBC unremarkable.  Chemistry with known CKD with BUN and creatinine within historical baseline.  LFTs WNLs.  , slightly higher than baseline.  .  Troponin 4.574.  Lactic acid 0.7.  Procalcitonin 0.07.  COVID positive.  UA noninfectious.  Hemoglobin A1c 6.4/137.  ESR 32.  Ferritin 348.  .  PT/INR 11.7 / 1.1.  PTT 31.9.  D-dimer 0.56.  Blood cultures obtained.  Chest x-ray concerning for pulmonary edema.  EKG with normal sinus rhythm and known RBBB.  Flu swab and sputum culture pending collection.  In the ED he was given acetaminophen, aspirin, Lasix IV push, nitro paste and sublingual nitro, dexamethasone IV.  Cardiology was consulted. The patient was admitted to the Hospital Medicine Service for further evaluation and management.       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     Hyperlipidemia     Hypertension     Nephrotic range proteinuria     Paroxysmal atrial fibrillation     Stroke     nov 2012       Past Surgical History:   Procedure Laterality Date    BIOPSY N/A 5/29/2018    Procedure: BIOPSY;  Surgeon: Neeta Diagnostic Provider;  Location: [facility] CATH LAB;  Service: Radiology;  Laterality: N/A;  ct guided      CARDIOVERSION N/A 12/3/2020    Procedure: CARDIOVERSION;  Surgeon: Mehul Livingston MD;  Location: [facility] CATH LAB;  Service: Cardiology;  Laterality: N/A;    KNEE SURGERY Left     SHOULDER SURGERY Right        Review of patient's allergies indicates:   Allergen Reactions    Doxycycline Rash       No current facility-administered medications on file prior to encounter.     Current Outpatient Medications on  File Prior to Encounter   Medication Sig    allopurinoL (ZYLOPRIM) 100 MG tablet Take 1 tablet (100 mg total) by mouth once daily.    amiodarone (PACERONE) 100 MG Tab Take by mouth once daily.    apixaban (ELIQUIS) 5 mg Tab Take 1 tablet (5 mg total) by mouth 2 (two) times daily. Hold for 3 days prior to PD catheter placement.    atorvastatin (LIPITOR) 40 MG tablet TAKE 1 TABLET BY MOUTH EVERY DAY    calcitRIOL (ROCALTROL) 0.25 MCG Cap Take 1 capsule (0.25 mcg total) by mouth every Mon, Wed, Fri.    famotidine (PEPCID) 20 MG tablet Take 1 tablet (20 mg total) by mouth once daily.    furosemide (LASIX) 80 MG tablet Take 1 tablet (80 mg total) by mouth 2 (two) times a day. (Patient taking differently: Take 40 mg by mouth 2 (two) times a day.)    glipiZIDE (GLUCOTROL) 2.5 MG TR24 Take 2.5 mg by mouth once daily.    hydrALAZINE (APRESOLINE) 100 MG tablet Take 1 tablet (100 mg total) by mouth every 12 (twelve) hours.    SITagliptin (JANUVIA) 25 MG Tab Take 1 tablet (25 mg total) by mouth once daily. (Patient taking differently: Take 50 mg by mouth once daily.)    tamsulosin (FLOMAX) 0.4 mg Cap Take 0.4 mg by mouth once daily.    vitamin D (VITAMIN D3) 1000 units Tab Take 1,000 Units by mouth once daily.    vitamin renal formula, B-complex-vitamin c-folic acid, (NEPHROCAP) 1 mg Cap Take 1 capsule by mouth once daily.     Family History       Problem Relation (Age of Onset)    Esophageal cancer Brother    Heart attack Father (59)    Hypertension Mother    Stroke Sister          Tobacco Use    Smoking status: Former Smoker     Types: Cigars    Smokeless tobacco: Never Used    Tobacco comment: occasional, when at the casino   Substance and Sexual Activity    Alcohol use: Yes     Alcohol/week: 1.0 standard drink     Types: 1 Cans of beer per week     Comment: occasional when at the casino    Drug use: No    Sexual activity: Yes     Partners: Female     Birth control/protection: None     Review of Systems    Constitutional:  Positive for activity change, appetite change and fatigue. Negative for chills, diaphoresis, fever and unexpected weight change.   HENT:  Negative for congestion and sore throat.    Respiratory:  Positive for cough, chest tightness and shortness of breath. Negative for wheezing.    Cardiovascular:  Positive for chest pain and leg swelling. Negative for palpitations.   Gastrointestinal:  Positive for abdominal distention, nausea and vomiting. Negative for abdominal pain, constipation and diarrhea.   Genitourinary:  Negative for decreased urine volume and difficulty urinating.   Musculoskeletal:  Positive for gait problem.   Skin: Negative.    Neurological:  Positive for weakness. Negative for dizziness, syncope, light-headedness and headaches.   Objective:     Vital Signs (Most Recent):  Temp: 98.2 °F (36.8 °C) (06/22/22 1959)  Pulse: 84 (06/22/22 2200)  Resp: (!) 22 (06/22/22 1959)  BP: (!) 161/71 (06/22/22 1959)  SpO2: 95 % (06/22/22 1959)   Vital Signs (24h Range):  Temp:  [98.2 °F (36.8 °C)-100.4 °F (38 °C)] 98.2 °F (36.8 °C)  Pulse:  [61-85] 84  Resp:  [16-31] 22  SpO2:  [93 %-99 %] 95 %  BP: (161-200)/(71-90) 161/71     Weight: 117.9 kg (260 lb)  Body mass index is 34.3 kg/m².    Physical Exam  Vitals and nursing note reviewed.   Constitutional:       General: He is not in acute distress.     Appearance: Normal appearance. He is well-developed. He is obese.   HENT:      Head: Normocephalic and atraumatic.      Mouth/Throat:      Dentition: Normal dentition.   Eyes:      General: Lids are normal.      Extraocular Movements: Extraocular movements intact.      Conjunctiva/sclera: Conjunctivae normal.   Cardiovascular:      Rate and Rhythm: Normal rate and regular rhythm.      Heart sounds: Normal heart sounds. No murmur heard.  Pulmonary:      Effort: Pulmonary effort is normal.      Comments: Decreased breath sounds bilateral bases.  Comfortable on 2 L nasal cannula.  Slight conversational  dyspnea.  No increased work of breathing or accessory muscle use.  No cough noted during interview or exam.  Chest:      Chest wall: No tenderness.   Abdominal:      General: Bowel sounds are normal. There is distension.      Palpations: Abdomen is soft.      Tenderness: There is no abdominal tenderness.   Musculoskeletal:         General: Normal range of motion.      Cervical back: Neck supple.      Right lower leg: Edema (2+ nonpitting mid shin to foot) present.      Left lower leg: Edema (2+ nonpitting mid shin to foot) present.   Skin:     General: Skin is warm and dry.      Findings: No erythema or rash.      Comments: Evidence of chronic venous stasis changes bilateral lower extremities.   Neurological:      Mental Status: He is alert and oriented to person, place, and time.        Significant Labs: All pertinent labs within the past 24 hours have been reviewed.  CBC:   Recent Labs   Lab 06/22/22  1512   WBC 6.07   HGB 10.1*   HCT 30.9*        CMP:   Recent Labs   Lab 06/22/22  1512      K 4.2      CO2 27   *   BUN 39*   CREATININE 2.7*   CALCIUM 9.0   PROT 6.9   ALBUMIN 3.6   BILITOT 0.7   ALKPHOS 127   AST 32   ALT 20   ANIONGAP 7*   EGFRNONAA 22*     Cardiac Markers:   Recent Labs   Lab 06/22/22  1512   *     Coagulation:   Recent Labs   Lab 06/22/22  1827   INR 1.1   APTT 31.9     Lactic Acid:   Recent Labs   Lab 06/22/22  1536   LACTATE 0.7     Magnesium: No results for input(s): MG in the last 48 hours.  Troponin:   Recent Labs   Lab 06/22/22  1512 06/22/22  2035   TROPONINI 4.574* 9.410*     Urine Studies:   Recent Labs   Lab 06/22/22  1526   COLORU Yellow   APPEARANCEUA Clear   PHUR 7.0   SPECGRAV 1.020   PROTEINUA 2+*   GLUCUA Trace*   KETONESU Negative   BILIRUBINUA Negative   OCCULTUA 1+*   NITRITE Negative   UROBILINOGEN 1.0   LEUKOCYTESUR Negative   RBCUA 2   WBCUA 2   BACTERIA Rare   HYALINECASTS 0       Significant Imaging: I have reviewed all pertinent imaging  results/findings within the past 24 hours.    Assessment/Plan:     * NSTEMI (non-ST elevated myocardial infarction)  Elevated troponin   Coronary artery disease involving native coronary artery of native heart without angina pectoralis  History of percutaneous coronary intervention  -elevated troponin in setting of known CAD and multiple risk factors complicated by acute COVID-19 infection, CHF exacerbation, and hypertension  -at admission hypertensive, heart rate stable, 93% on room air with improvement to 95-99% on 2 L nasal cannula, T-max 100.4° with improvement to 99.7  -admission diagnostics: CBC unremarkable.  Chemistry with known CKD with BUN and creatinine within historical baseline.  LFTs WNLs.  , slightly higher than baseline.  .  Troponin 4.574.  Lactic acid 0.7.  Procalcitonin 0.07.  COVID positive.  UA noninfectious.  Hemoglobin A1c 6.4/137.  ESR 32.  Ferritin 348.  .  PT/INR 11.7 / 1.1.  PTT 31.9.  D-dimer 0.56.  Blood cultures obtained.  Chest x-ray concerning for pulmonary edema.  EKG with normal sinus rhythm and known RBBB.   -flu swab and sputum culture pending collection  -in the ED he was given acetaminophen, aspirin, Lasix IV push, nitro paste and sublingual nitro, and dexamethasone IV  -Cardiology consulted, appreciate assistance   -troponin trending 4.574 >>> 9.410, continue trending  -continue daily dosing of aspirin  -begin plavix due to elevating troponin >> load tonight and daily dosing in AM  -continue home apixaban for anticoagulation   -[facility] procedure high risk due to worsening renal function >>> continue medical management of NSTEMI at current  -continue IVP lasix for diuresis  -TTE in AM  -continue tele monitoring  -EKG PRN concerns  -labs in AM   -monitor     COVID-19  Acute hypoxemic respiratory failure   Restrictive lung disease secondary to obesity  -acute COVID infection in setting of NSTEMI and CHF exacerbation   -see above for admission diagnostics   -continue  COVID-19 treatment protocols with remdesivir, PO dexamethasone, vitamin-C, multivitamin, albuterol  -anticoagulation with home apixaban  -further COVID-19 treatment as clinically indicated   -continue oxygen supplementation, wean as tolerated  -CHF and NSTEMI treatment as outlined   -labs in am  -monitor     Heart failure, diastolic, acute on chronic  -acute on chronic CHF with acute exacerbation in setting of NSTEMI, hypertension, and acute COVID-19 infection  -reports compliance with home medications and diet   -see above for admission diagnostics   -continue IV diuresis as noted above  -TTE in AM   -strict I&Os and daily weights   -continue home hydralazine  -continue tele monitoring  -cardiac diet with fluid restriction  -monitor     PAF (paroxysmal atrial fibrillation)  Current use of long-term anticoagulation   -chronic   -currently rate controlled sinus rhythm with known right bundle-branch block   -continue home amiodarone and apixaban   -continue tele monitoring   -EKG PRN concerns   -monitor    Hypertensive kidney disease with stage 4 chronic kidney disease  -chronic  -hypertensive at admission with improvement over hospital course  -continue home hydralazine  -dose/medication adjustment as appropriate   -monitor     CKD (chronic kidney disease), stage IV  Anemia in stage 4 chronic kidney disease   -chronic  -follows with Nephrology and concern for dialysis need in near future  -SCr at admission 2.7, within historical baseline  -gentle diuresis as noted above  -avoid nephrotoxins and hypotension as able, renally dose medications  -H/H stable at admission, monitor periodically over hospital course   -monitor     Non-insulin dependent type 2 diabetes mellitus   -chronic; on home medication Osvaldouvia   -hemoglobin A1c 6.4/137  -hold home PO medications  -begin ow-dose sliding scale insulin  -dose/medication adjustment as appropriate   -monitor accuchecks AC/HS and PRN hypoglycemic protocol     History of CVA  (cerebrovascular accident)  -remote  -continue ASA and statin   -fall precautions     Hypercholesteremia  -chronic  -continue statin       VTE Risk Mitigation (From admission, onward)         Ordered     apixaban tablet 5 mg  2 times daily         06/22/22 1750     IP VTE HIGH RISK PATIENT  Once         06/22/22 1750     Place sequential compression device  Until discontinued         06/22/22 1750                Fide Aguilar, DNP, AG-ACNP, BC  Department of Hospital Medicine  [facility]

## 2022-06-23 NOTE — PROGRESS NOTES
[facility] Medicine  Progress Note    Patient Name: Zev Castano  MRN: [id number]  Patient Class: IP- Inpatient   Admission Date: 6/22/2022  Length of Stay: 1 days  Attending Physician: KRISTIN Cardenas MD  Primary Care Provider: Mehul Livingston MD        Subjective:     Principal Problem:NSTEMI (non-ST elevated myocardial infarction)        HPI:  Mr. Castano is a 72 YOM with PMHx of CAD s/ p PCI (in 2016), paroxysmal AFib s/p cardioversion (on amiodarone and apixaban), HFpEF (grade II DD and mild pulmHTN), history of CVA (in 2012 in [facility] region), HTN, HLD, DM type 2 on oral therapies, CKD IV (follows with Nephrology, concerns for HD/PD need in near future), persistent proteinuria, and SABRINA.    He presented to the ED with complaints of shortness of breath for the last 3 days with associated cough, orthopnea, lower extremity swelling (worse than baseline), early satiety, and abdominal bloating. He developed mid sternal chest tightness yesterday without radiation that lasted for several hours.  He also had episodes of N/V today.  He denies fever, chills, night sweats, palpitations, abdominal pain, diarrhea, constipation, lightheadedness, syncope, or dizziness.  He reports compliance with home medications including diuretic therapies.  He reports compliance with dietary restrictions and denies increased salt intake.  He has independent ADLs, uses no ambulatory aids,  however is mostly sedentary at home and does not participate in many household chores d/t health conditions and chronic fatigue. All past medical, social, and family history reviewed with patient; no pertinent family history identified at this time.    In the ED is hypertensive, heart rate stable, 93% on room air with improvement to 95-99% on 2 L nasal cannula, T-max 100.4° with improvement to 99.7.  CBC unremarkable.  Chemistry with known CKD with BUN and creatinine within historical baseline.  LFTs WNLs.  , slightly  higher than baseline.  .  Troponin 4.574.  Lactic acid 0.7.  Procalcitonin 0.07.  COVID positive.  UA noninfectious.  Hemoglobin A1c 6.4/137.  ESR 32.  Ferritin 348.  .  PT/INR 11.7 / 1.1.  PTT 31.9.  D-dimer 0.56.  Blood cultures obtained.  Chest x-ray concerning for pulmonary edema.  EKG with normal sinus rhythm and known RBBB.  Flu swab and sputum culture pending collection.  In the ED he was given acetaminophen, aspirin, Lasix IV push, nitro paste and sublingual nitro, dexamethasone IV.  Cardiology was consulted. The patient was admitted to the Hospital Medicine Service for further evaluation and management.       Overview/Hospital Course:  No notes on file    Interval History: No acute events overnight. Weaning from supplemental O2.    Review of Systems   Constitutional:  Negative for chills and fever.   Respiratory:  Positive for cough and shortness of breath.    Cardiovascular:  Negative for chest pain and palpitations.   Gastrointestinal:  Negative for abdominal pain, nausea and vomiting.   Objective:     Vital Signs (Most Recent):  Temp: 97.7 °F (36.5 °C) (06/23/22 1740)  Pulse: 63 (06/23/22 1900)  Resp: 18 (06/23/22 1740)  BP: (!) 154/70 (06/23/22 1740)  SpO2: 98 % (06/23/22 1740)   Vital Signs (24h Range):  Temp:  [97.6 °F (36.4 °C)-98.9 °F (37.2 °C)] 97.7 °F (36.5 °C)  Pulse:  [62-84] 63  Resp:  [16-22] 18  SpO2:  [92 %-98 %] 98 %  BP: (143-179)/(64-81) 154/70     Weight: 117.9 kg (260 lb)  Body mass index is 34.3 kg/m².    Intake/Output Summary (Last 24 hours) at 6/23/2022 1910  Last data filed at 6/23/2022 1200  Gross per 24 hour   Intake 600 ml   Output 2425 ml   Net -1825 ml      Physical Exam  Vitals and nursing note reviewed.   Constitutional:       General: He is not in acute distress.     Appearance: He is well-developed.   HENT:      Head: Normocephalic and atraumatic.   Eyes:      General:         Right eye: No discharge.         Left eye: No discharge.      Conjunctiva/sclera:  Conjunctivae normal.   Cardiovascular:      Rate and Rhythm: Normal rate.      Pulses: Normal pulses.   Pulmonary:      Effort: Pulmonary effort is normal. No respiratory distress.      Comments: Diminished bases.  Abdominal:      Palpations: Abdomen is soft.      Tenderness: There is no abdominal tenderness.   Musculoskeletal:         General: Normal range of motion.      Right lower leg: No edema.      Left lower leg: No edema.   Skin:     General: Skin is warm and dry.   Neurological:      Mental Status: He is alert and oriented to person, place, and time.     Significant Labs:   CBC:  Recent Labs   Lab 06/22/22  1512 06/23/22  0331   WBC 6.07 3.87*   HGB 10.1* 9.5*   HCT 30.9* 29.1*    135*   GRAN 79.9*  4.9 86.8*  3.4   LYMPH 4.3*  0.3* 6.2*  0.2*   MONO 14.0  0.9 4.7  0.2*   EOS 0.1 0.0   BASO 0.02 0.02   CMP:  Recent Labs   Lab 06/22/22  1512 06/23/22  0331    141   K 4.2 4.1    105   CO2 27 20*   BUN 39* 43*   CREATININE 2.7* 2.8*   * 238*   CALCIUM 9.0 8.7   MG  --  2.1   ALKPHOS 127 108   AST 32 33   ALT 20 18   BILITOT 0.7 0.7   PROT 6.9 6.1   ALBUMIN 3.6 3.0*   ANIONGAP 7* 16      Significant Imaging:   Imaging Results              X-Ray Chest AP Portable (Final result)  Result time 06/22/22 15:46:11      Final result by Angelic Lawrence MD (06/22/22 15:46:11)                   Impression:      Hazy perihilar and bibasilar opacities can be seen with edema.  No focal consolidation.      Electronically signed by: Angelic Lawrence  Date:    06/22/2022  Time:    15:46               Narrative:    EXAMINATION:  XR CHEST AP PORTABLE    CLINICAL HISTORY:  CHF;    TECHNIQUE:  Single frontal view of the chest was performed.    COMPARISON:  08/25/2021    FINDINGS:  Reduced lung volumes with elevation of the left hemidiaphragm, similar compared to prior.  Hazy perihilar and bibasilar opacities.  No significant pleural effusion.  No pneumothorax.  Cardiac silhouette is normal in  size.                                      Assessment/Plan:      * NSTEMI (non-ST elevated myocardial infarction)  Elevated troponin, CAD, h/o PCI, CHF  - Cardiology consulted; pursue medical management given known CAD, chronic renal disease and risk of worsening renal function with potential catheterization.  - Continue aspirin 81mg PO daily, clopidogrel 75mg PO daily.  - Continue furosemide 60mg IV BID, strict intake/output.  - Echo pending.    CKD (chronic kidney disease), stage IV  Anemia in stage 4 chronic kidney disease   - Chronic; with concern for need for dialysis in near future.  - Diuresis as above.  - Avoid nephrotoxins, renally dose medications.  - Monitor. If decompensates will consult nephrology.    COVID-19  Acute hypoxemic respiratory failure   Restrictive lung disease secondary to obesity  - Acute COVID infection in setting of NSTEMI, CHF as well.  - Continue dexamethasone 6mg PO daily, remdesivir 100mg IV q24hr.  - Wean supplemental O2 as tolerated.    Hypertensive kidney disease with stage 4 chronic kidney disease  - Continue hydralazine 100mg PO BID.    Heart failure, diastolic, acute on chronic  - As above.    PAF (paroxysmal atrial fibrillation)  Current use of long-term anticoagulation   - Continue amiodarone 200mg PO daily, apixaban 5mg PO BID.    History of CVA (cerebrovascular accident)  - Statin, aspirin as above.    Non-insulin dependent type 2 diabetes mellitus  - Continue insulin as moderate-dose sliding scale insulin aspart 1-10U subQ TIDWM PRN given steroid use.    Hypercholesteremia  - Continue atorvastatin 40mg PO daily.    VTE Risk Mitigation (From admission, onward)         Ordered     apixaban tablet 5 mg  2 times daily         06/22/22 1750     IP VTE HIGH RISK PATIENT  Once         06/22/22 1750     Place sequential compression device  Until discontinued         06/22/22 1750                Discharge Planning   ANAHY:      Code Status: Full Code   Is the patient medically ready  for discharge?:     Reason for patient still in hospital (select all that apply): Treatment                     D Hay Cardenas MD  Department of Hospital Medicine   [facility] - Med Surg ([place])

## 2022-06-23 NOTE — PLAN OF CARE
Pt remained free of falls and injuries throughout shift. AAOx4. Pt calm and cooperative. Purposeful hourly rounding performed. Pt swallows meds whole. IV Remdesivir given this shift, IV flushed and saline locked. Pt denies pain at this time. Specimen cup at bedside for sputum sample, pending collect. Patient ambulates independently to toilet, urinal at bedside. Pt SOB on exertion, educated pt on conserving energy and slow deep breathing. Pt denies CP. VSS on 2L O2 NC. Telemetry maintained. Bed low and locked, call light in reach. Side rails up x2. Will continue to monitor.

## 2022-06-23 NOTE — ASSESSMENT & PLAN NOTE
Current use of long-term anticoagulation   -chronic   -currently rate controlled sinus rhythm with known right bundle-branch block   -continue home amiodarone and apixaban   -continue tele monitoring   -EKG PRN concerns   -monitor

## 2022-06-23 NOTE — ASSESSMENT & PLAN NOTE
-acute on chronic CHF with acute exacerbation in setting of NSTEMI, hypertension, and acute COVID-19 infection  -reports compliance with home medications and diet   -see above for admission diagnostics   -continue IV diuresis as noted above  -TTE in AM   -strict I&Os and daily weights   -continue home hydralazine  -continue tele monitoring  -cardiac diet with fluid restriction  -monitor

## 2022-06-23 NOTE — ASSESSMENT & PLAN NOTE
-chronic; on home medication Osvaldouvia   -hemoglobin A1c 6.4/137  -hold home PO medications  -begin ow-dose sliding scale insulin  -dose/medication adjustment as appropriate   -monitor accuchecks AC/HS and PRN hypoglycemic protocol

## 2022-06-23 NOTE — ASSESSMENT & PLAN NOTE
Elevated troponin   Coronary artery disease involving native coronary artery of native heart without angina pectoralis  History of percutaneous coronary intervention  -elevated troponin in setting of known CAD and multiple risk factors complicated by acute COVID-19 infection, CHF exacerbation, and hypertension  -at admission hypertensive, heart rate stable, 93% on room air with improvement to 95-99% on 2 L nasal cannula, T-max 100.4° with improvement to 99.7  -admission diagnostics: CBC unremarkable.  Chemistry with known CKD with BUN and creatinine within historical baseline.  LFTs WNLs.  , slightly higher than baseline.  .  Troponin 4.574.  Lactic acid 0.7.  Procalcitonin 0.07.  COVID positive.  UA noninfectious.  Hemoglobin A1c 6.4/137.  ESR 32.  Ferritin 348.  .  PT/INR 11.7 / 1.1.  PTT 31.9.  D-dimer 0.56.  Blood cultures obtained.  Chest x-ray concerning for pulmonary edema.  EKG with normal sinus rhythm and known RBBB.   -flu swab and sputum culture pending collection  -in the ED he was given acetaminophen, aspirin, Lasix IV push, nitro paste and sublingual nitro, and dexamethasone IV  -Cardiology consulted, appreciate assistance   -troponin trending 4.574 >>> 9.410, continue trending  -continue daily dosing of aspirin  -begin plavix due to elevating troponin >> load tonight and daily dosing in AM  -continue home apixaban for anticoagulation   -[facility] procedure high risk due to worsening renal function >>> continue medical management of NSTEMI at current  -continue IVP lasix for diuresis  -TTE in AM  -continue tele monitoring  -EKG PRN concerns  -labs in AM   -monitor    Refill call for Aimovig. Patient confirmed refill to be delivered on 12/13. $3.00 copay at 004.     Alan Lovett, PharmD  Clinical Pharmacist  [facility]  P: [phone]

## 2022-06-23 NOTE — HPI
Mr. Castano is a 72 YOM with PMHx of CAD s/ p PCI (in 2016), paroxysmal AFib s/p cardioversion (on amiodarone and apixaban), HFpEF (grade II DD and mild pulmHTN), history of CVA (in 2012 in [facility] region), HTN, HLD, DM type 2 on oral therapies, CKD IV (follows with Nephrology, concerns for HD/PD need in near future), persistent proteinuria, and SABRINA.    He presented to the ED with complaints of shortness of breath for the last 3 days with associated cough, orthopnea, lower extremity swelling (worse than baseline), early satiety, and abdominal bloating. He developed mid sternal chest tightness yesterday without radiation that lasted for several hours.  He also had episodes of N/V today.  He denies fever, chills, night sweats, palpitations, abdominal pain, diarrhea, constipation, lightheadedness, syncope, or dizziness.  He reports compliance with home medications including diuretic therapies.  He reports compliance with dietary restrictions and denies increased salt intake.  He has independent ADLs, uses no ambulatory aids,  however is mostly sedentary at home and does not participate in many household chores d/t health conditions and chronic fatigue. All past medical, social, and family history reviewed with patient; no pertinent family history identified at this time.    In the ED is hypertensive, heart rate stable, 93% on room air with improvement to 95-99% on 2 L nasal cannula, T-max 100.4° with improvement to 99.7.  CBC unremarkable.  Chemistry with known CKD with BUN and creatinine within historical baseline.  LFTs WNLs.  , slightly higher than baseline.  .  Troponin 4.574.  Lactic acid 0.7.  Procalcitonin 0.07.  COVID positive.  UA noninfectious.  Hemoglobin A1c 6.4/137.  ESR 32.  Ferritin 348.  .  PT/INR 11.7 / 1.1.  PTT 31.9.  D-dimer 0.56.  Blood cultures obtained.  Chest x-ray concerning for pulmonary edema.  EKG with normal sinus rhythm and known RBBB.  Flu swab and sputum culture pending  collection.  In the ED he was given acetaminophen, aspirin, Lasix IV push, nitro paste and sublingual nitro, dexamethasone IV.  Cardiology was consulted. The patient was admitted to the Hospital Medicine Service for further evaluation and management.

## 2022-06-23 NOTE — ASSESSMENT & PLAN NOTE
Anemia in stage 4 chronic kidney disease   -chronic  -follows with Nephrology and concern for dialysis need in near future  -SCr at admission 2.7, within historical baseline  -gentle diuresis as noted above  -avoid nephrotoxins and hypotension as able, renally dose medications  -H/H stable at admission, monitor periodically over hospital course   -monitor

## 2022-06-23 NOTE — ASSESSMENT & PLAN NOTE
Acute hypoxemic respiratory failure   Restrictive lung disease secondary to obesity  -acute COVID infection in setting of NSTEMI and CHF exacerbation   -see above for admission diagnostics   -continue COVID-19 treatment protocols with remdesivir, PO dexamethasone, vitamin-C, multivitamin, albuterol  -anticoagulation with home apixaban  -further COVID-19 treatment as clinically indicated   -continue oxygen supplementation, wean as tolerated  -CHF and NSTEMI treatment as outlined   -labs in am  -monitor

## 2022-06-23 NOTE — PROGRESS NOTES
[facility] ([place])  Cardiology  Progress Note    Patient Name: Zev Castano  MRN: [id number]  Admission Date: 6/22/2022  Hospital Length of Stay: 1 days  Code Status: Full Code   Attending Physician: KRISTIN Cardenas MD   Primary Care Physician: Mehlu Livingston MD  Expected Discharge Date:   Principal Problem:NSTEMI (non-ST elevated myocardial infarction)    Subjective:     Brief HPI:    Zev Castano is a 72 y.o.male with hypertension, hypercholesterolemia and diabetes mellitus type 2. He is obese. In 2012 he had two strokes in the [place] with fairly good neurological recovery. In 2016 he had two stents placed in the left anterior descending coronary artery for angina.  He has had paroxysmal atrial fibrillation for which he underwent electrical cardioversion in 11/2020. He has been on amiodarone since with no clinical recurrence. He is anticoagulated with apixiban. He has chronic kidney disease, stage 4. In 8/2021 he was admitted to [facility] with fluid overload. He was diuresed. It was felt he was near need for dialysis at that time. Since that stay he has been followed at [facility] for his kidney disease. Beginning early 6/2022 he he began to develop more and more edema of his legs. He became increasingly short of breath. At 1 am 6/22/2022 he began feeling a chest pressure. The pressure lasted for about 5 hours. He then vomited 5-6 times and the chest pressure resolved. He felt weak. He presented top the emergency room. A COVID test was positive. There were no acute ST changes. The CXR revealed pulmonary edema. He was admitted. I was asked to see him in consultation as Dr. Livingston is out of town.       Hospital Course:     Diuresis.    NTG patch.    Aspirin was added to apixiban.    Remdesivir and dexamethason.    Interval History:     No chest pain.    Breathing easier.    Review of Systems   Constitutional: Positive for malaise/fatigue. Negative for chills  and fever.   HENT: Negative for nosebleeds.    Eyes: Negative for vision loss in left eye and vision loss in right eye.   Cardiovascular: Positive for leg swelling, orthopnea and paroxysmal nocturnal dyspnea. Negative for chest pain and palpitations.   Respiratory: Positive for shortness of breath. Negative for cough, hemoptysis, sputum production and wheezing.    Hematologic/Lymphatic: Negative for bleeding problem. Does not bruise/bleed easily.   Skin: Negative for color change and rash.   Musculoskeletal: Negative for muscle weakness and myalgias.   Gastrointestinal: Negative for abdominal pain, heartburn, hematemesis, hematochezia, melena, nausea and vomiting.   Genitourinary: Negative for hematuria.   Neurological: Negative for dizziness, focal weakness, headaches, light-headedness, vertigo and weakness.   Psychiatric/Behavioral: Negative for altered mental status. The patient is not nervous/anxious.    Allergic/Immunologic: Negative for persistent infections.       Objective:     Vital Signs (Most Recent):  Temp: 97.7 °F (36.5 °C) (06/23/22 1740)  Pulse: 70 (06/23/22 1800)  Resp: 18 (06/23/22 1740)  BP: (!) 154/70 (06/23/22 1740)  SpO2: 98 % (06/23/22 1740) Vital Signs (24h Range):  Temp:  [97.6 °F (36.4 °C)-99 °F (37.2 °C)] 97.7 °F (36.5 °C)  Pulse:  [61-84] 70  Resp:  [16-22] 18  SpO2:  [92 %-99 %] 98 %  BP: (143-179)/(64-81) 154/70     Weight: 117.9 kg (260 lb)  Body mass index is 34.3 kg/m².    SpO2: 98 %  O2 Device (Oxygen Therapy): room air      Intake/Output Summary (Last 24 hours) at 6/23/2022 1813  Last data filed at 6/23/2022 1200  Gross per 24 hour   Intake 600 ml   Output 2425 ml   Net -1825 ml       Lines/Drains/Airways     Peripheral Intravenous Line  Duration                Peripheral IV - Single Lumen 06/22/22 1526 18 G Right Forearm 1 day                Physical Exam  Constitutional:       General: He is not in acute distress.     Appearance: Normal appearance. He is well-developed. He is not  ill-appearing.   Eyes:      Conjunctiva/sclera:      Right eye: Right conjunctiva is not injected. No hemorrhage.     Left eye: Left conjunctiva is not injected. No hemorrhage.     Pupils:      Right eye: Pupil is round.      Left eye: Pupil is round.   Neck:      Vascular: No JVD.   Cardiovascular:      Rate and Rhythm: Normal rate and regular rhythm.      Heart sounds: S1 normal and S2 normal. Murmur heard.     Gallop present. S4 sounds present.   Pulmonary:      Effort: Pulmonary effort is normal.      Breath sounds: Examination of the right-lower field reveals rales. Examination of the left-lower field reveals rales. Rales present.   Chest:      Chest wall: No swelling or tenderness.   Abdominal:      General: There is no distension.      Palpations: Abdomen is soft.      Tenderness: There is no abdominal tenderness.   Musculoskeletal:      Cervical back: Neck supple.      Right lower leg: Swelling present. 3+ Edema present.      Left lower leg: Swelling present. 3+ Edema present.   Skin:     General: Skin is warm and dry.      Findings: No rash.   Neurological:      General: No focal deficit present.      Mental Status: He is alert and oriented to person, place, and time. He is not disoriented.   Psychiatric:         Attention and Perception: Attention and perception normal.         Mood and Affect: Mood and affect normal.         Speech: Speech normal.         Behavior: Behavior normal.         Thought Content: Thought content normal.         Cognition and Memory: Cognition and memory normal.         Judgment: Judgment normal.         Current Medications:     albuterol  2 puff Inhalation Q6H    allopurinoL  100 mg Oral Daily    amiodarone  200 mg Oral Daily    apixaban  5 mg Oral BID    ascorbic acid (vitamin C)  500 mg Oral BID    aspirin  81 mg Oral Daily    atorvastatin  40 mg Oral Daily    [START ON 6/24/2022] calcitRIOL  0.25 mcg Oral Every Mon, Wed, Fri    clopidogreL  75 mg Oral Daily     dexAMETHasone  6 mg Oral Daily    famotidine  20 mg Oral Daily    furosemide (LASIX) injection  60 mg Intravenous BID    hydrALAZINE  100 mg Oral Q12H    multivitamin  1 tablet Oral Daily    nitroGLYCERIN 0.4 MG/HR TD PT24  1 patch Transdermal Daily    remdesivir infusion  100 mg Intravenous Daily    senna-docusate 8.6-50 mg  1 tablet Oral BID    tamsulosin  0.4 mg Oral Daily     Current Laboratory Results:    Recent Results (from the past 24 hour(s))   Hemoglobin A1c if not done in past 3 months    Collection Time: 06/22/22  6:27 PM   Result Value Ref Range    Hemoglobin A1C 6.4 (H) 4.0 - 5.6 %    Estimated Avg Glucose 137 (H) 68 - 131 mg/dL   PT/INR    Collection Time: 06/22/22  6:27 PM   Result Value Ref Range    Prothrombin Time 11.7 9.0 - 12.5 sec    INR 1.1 0.8 - 1.2   PTT    Collection Time: 06/22/22  6:27 PM   Result Value Ref Range    aPTT 31.9 21.0 - 32.0 sec   Sedimentation rate    Collection Time: 06/22/22  6:27 PM   Result Value Ref Range    Sed Rate 32 (H) 0 - 10 mm/Hr   CK    Collection Time: 06/22/22  6:27 PM   Result Value Ref Range     (H) 20 - 200 U/L   Ferritin    Collection Time: 06/22/22  6:27 PM   Result Value Ref Range    Ferritin 348 (H) 20.0 - 300.0 ng/mL   D-Dimer, Quantitative    Collection Time: 06/22/22  6:27 PM   Result Value Ref Range    D-Dimer 0.56 (H) <0.50 mg/L FEU   POCT glucose    Collection Time: 06/22/22  6:30 PM   Result Value Ref Range    POCT Glucose 125 (H) 70 - 110 mg/dL   Troponin I    Collection Time: 06/22/22  8:35 PM   Result Value Ref Range    Troponin I 9.410 (H) 0.000 - 0.026 ng/mL   POCT glucose    Collection Time: 06/22/22  8:48 PM   Result Value Ref Range    POCT Glucose 168 (H) 70 - 110 mg/dL   Troponin I    Collection Time: 06/23/22  3:31 AM   Result Value Ref Range    Troponin I 6.120 (H) 0.000 - 0.026 ng/mL   CBC Auto Differential    Collection Time: 06/23/22  3:31 AM   Result Value Ref Range    WBC 3.87 (L) 3.90 - 12.70 K/uL    RBC 2.94 (L)  4.60 - 6.20 M/uL    Hemoglobin 9.5 (L) 14.0 - 18.0 g/dL    Hematocrit 29.1 (L) 40.0 - 54.0 %    MCV 99 (H) 82 - 98 fL    MCH 32.3 (H) 27.0 - 31.0 pg    MCHC 32.6 32.0 - 36.0 g/dL    RDW 15.1 (H) 11.5 - 14.5 %    Platelets 135 (L) 150 - 450 K/uL    MPV 9.9 9.2 - 12.9 fL    Immature Granulocytes 1.8 (H) 0.0 - 0.5 %    Gran # (ANC) 3.4 1.8 - 7.7 K/uL    Immature Grans (Abs) 0.07 (H) 0.00 - 0.04 K/uL    Lymph # 0.2 (L) 1.0 - 4.8 K/uL    Mono # 0.2 (L) 0.3 - 1.0 K/uL    Eos # 0.0 0.0 - 0.5 K/uL    Baso # 0.02 0.00 - 0.20 K/uL    nRBC 0 0 /100 WBC    Gran % 86.8 (H) 38.0 - 73.0 %    Lymph % 6.2 (L) 18.0 - 48.0 %    Mono % 4.7 4.0 - 15.0 %    Eosinophil % 0.0 0.0 - 8.0 %    Basophil % 0.5 0.0 - 1.9 %    Differential Method Automated    Comprehensive metabolic panel    Collection Time: 06/23/22  3:31 AM   Result Value Ref Range    Sodium 141 136 - 145 mmol/L    Potassium 4.1 3.5 - 5.1 mmol/L    Chloride 105 95 - 110 mmol/L    CO2 20 (L) 23 - 29 mmol/L    Glucose 238 (H) 70 - 110 mg/dL    BUN 43 (H) 8 - 23 mg/dL    Creatinine 2.8 (H) 0.5 - 1.4 mg/dL    Calcium 8.7 8.7 - 10.5 mg/dL    Total Protein 6.1 6.0 - 8.4 g/dL    Albumin 3.0 (L) 3.5 - 5.2 g/dL    Total Bilirubin 0.7 0.1 - 1.0 mg/dL    Alkaline Phosphatase 108 55 - 135 U/L    AST 33 10 - 40 U/L    ALT 18 10 - 44 U/L    Anion Gap 16 8 - 16 mmol/L    eGFR if African American 25 (A) >60 mL/min/1.73 m^2    eGFR if non African American 21 (A) >60 mL/min/1.73 m^2   Magnesium    Collection Time: 06/23/22  3:31 AM   Result Value Ref Range    Magnesium 2.1 1.6 - 2.6 mg/dL   POCT glucose    Collection Time: 06/23/22  8:51 AM   Result Value Ref Range    POCT Glucose 209 (H) 70 - 110 mg/dL   Troponin I    Collection Time: 06/23/22  8:54 AM   Result Value Ref Range    Troponin I 4.708 (H) 0.000 - 0.026 ng/mL   Echo Saline Bubble? No    Collection Time: 06/23/22 11:07 AM   Result Value Ref Range    AV mean gradient 3 mmHg    Ao peak issa 1.14 m/s    Ao VTI 23.62 cm    IVRT 64.70 msec     IVS 0.89 0.6 - 1.1 cm    LA size 3.68 cm    Left Atrium Major Axis 7.37 cm    LVIDd 5.41 3.5 - 6.0 cm    LVIDs 3.38 2.1 - 4.0 cm    LVOT peak VTI 29.09 cm    Posterior Wall 0.93 0.6 - 1.1 cm    MV Peak A Blair 0.91 m/s    E wave deceleration time 247.08 msec    MV Peak E Blair 1.10 m/s    LA WIDTH 4.70 cm    MV stenosis pressure 1/2 time 71.65 ms    LV Diastolic Volume 141.76 mL    LV Systolic Volume 46.89 mL    LVOT peak blair 1.22 m/s    TDI LATERAL 0.07 m/s    TDI SEPTAL 0.06 m/s    Mr max blair 0.03 m/s    LV LATERAL E/E' RATIO 15.71 m/s    LV SEPTAL E/E' RATIO 18.33 m/s    FS 38 %    LV mass 183.30 g    Left Ventricle Relative Wall Thickness 0.34 cm    AV Velocity Ratio 1.07     AV index (prosthetic) 1.23     MV valve area p 1/2 method 3.07 cm2    E/A ratio 1.21     Mean e' 0.07 m/s    AV peak gradient 5 mmHg    E/E' ratio 16.92 m/s    LV Systolic Volume Index 19.5 mL/m2    LV Diastolic Volume Index 58.82 mL/m2    LV Mass Index 76 g/m2    BSA 2.46 m2   POCT glucose    Collection Time: 06/23/22 12:03 PM   Result Value Ref Range    POCT Glucose 250 (H) 70 - 110 mg/dL   POCT glucose    Collection Time: 06/23/22  5:39 PM   Result Value Ref Range    POCT Glucose 305 (H) 70 - 110 mg/dL     Current Imaging Results:    X-Ray Chest AP Portable   Final Result      Hazy perihilar and bibasilar opacities can be seen with edema.  No focal consolidation.         Electronically signed by: Angelic Lawrence   Date:    06/22/2022   Time:    15:46            Assessment and Plan:     Problem List:    Active Diagnoses:    Diagnosis Date Noted POA    PRINCIPAL PROBLEM:  NSTEMI (non-ST elevated myocardial infarction) [I21.4] 06/22/2022 Unknown    COVID-19 [U07.1] 06/22/2022 Unknown    Acute hypoxemic respiratory failure [J96.01] 06/22/2022 Unknown    CKD (chronic kidney disease), stage IV [N18.4] 06/22/2022 Unknown    Anemia in stage 4 chronic kidney disease [N18.4, D63.1] 10/18/2021 Yes    Hypertensive kidney disease with stage 4  chronic kidney disease [I12.9, N18.4] 09/20/2021 Yes    Current use of long term anticoagulation [Z79.01] 08/25/2021 Not Applicable    Restrictive lung disease secondary to obesity [J98.4, E66.9] 06/20/2018 Yes    Heart failure, diastolic, acute on chronic [I50.33] 06/19/2018 Yes    Coronary artery disease involving native coronary artery of native heart without angina pectoris [I25.10] 03/24/2016 Yes    PAF (paroxysmal atrial fibrillation) [I48.0] 06/27/2013 Yes    History of CVA (cerebrovascular accident) [Z86.73] 03/11/2013 Not Applicable    Hypercholesteremia [E78.00] 10/02/2012 Yes     Chronic    Non-insulin dependent type 2 diabetes mellitus [E11.9] 10/02/2012 Yes     Chronic      Problems Resolved During this Admission:       Assessment and Plan:     1. Coronary Artery Disease              2016: LAD stents.              6/22/2022: CP x 5 hours.              ECG without acute ST changes. Troponin peak to 9.              Most likely NSTEMI              He is at very high risk for worsening kidney function if he undergoes coronary angiography.              In this setting appears a initial conservative approach appears reasonable.              On apixiban 5 mg Q12.   On aspirin 81 mg Q24.              On NTG 0.4 mg/hr patch.   ECG without any further ST T wave changes.              Review Echo to assess for WMA.     2. Heart Failure, Diastolic, Acute on Chronic              8/25/2021: Echo: Normal left ventricular size and systolic function. EF 65%. Moderate diastolic dysfunction.              6/22/2022: Presents markedly fluid overloaded. Probably mostly due to CKD.              On furosemide 60 mg iv Q12.     3. Atrial Fibrillation              Paroxysmal atrial fibrillation.              11/2022L: CV.              [id number] ([id number]).              On amiodarone 200 mg Q24.              On apixiban 5 mg Q12.     4. High Risk Medication              On amiodarone 200 mg Q24.     5. Chronic  Anticoagulation              [id number] ([id number]).              On apixiban 5 mg Q12.     6. Hypertension              At home been on hydralazine 100 mg Q12 and furosmide 40 mg Q12.     7. Hypercholesterolemia              On atorvastatin 80 mg Q24.     8. Diabetes Mellitus, Type 2              Complications: CKD4, VITA & CAD. Medications: Oral agents.     9. Chronic Kidney Disease, Stage 4              Diabetic nephropathy.              6/22/2022: BUN/crea 39/2.7. CrCl 22.   6/23/2022: BUN/crea 43/2.8. CrCl 21.              Diuresis.     10. COVID 19              6/22/2022: Positive.              On dexamethasone and remdesivir.                   VTE Risk Mitigation (From admission, onward)         Ordered     apixaban tablet 5 mg  2 times daily         06/22/22 1750     IP VTE HIGH RISK PATIENT  Once         06/22/22 1750     Place sequential compression device  Until discontinued         06/22/22 1750                Alex Crisostomo MD  Cardiology  [facility] ([place])

## 2022-06-23 NOTE — PLAN OF CARE
06/23/22 1530   Discharge Assessment   Assessment Type Discharge Planning Assessment   Confirmed/corrected address, phone number and insurance Yes   Confirmed Demographics Correct on Facesheet   Source of Information health record   Lives With significant other   Do you expect to return to your current living situation? Yes   Do you have help at home or someone to help you manage your care at home? Yes   Who are your caregiver(s) and their phone number(s)? Tala Alan-Significant other   Prior to hospitilization cognitive status: Alert/Oriented   Current cognitive status: Alert/Oriented   Walking or Climbing Stairs Difficulty none   Dressing/Bathing Difficulty none   Equipment Currently Used at Home none   Do you take prescription medications? Yes   Do you have prescription coverage? Yes   Do you have any problems affording any of your prescribed medications? No   Is the patient taking medications as prescribed? yes   Who is going to help you get home at discharge? Tala Alan-Significant Other or Mela Bautista-Daughter   How do you get to doctors appointments? car, drives self;family or friend will provide   Are you on dialysis? No   Do you take coumadin? No   Relationship/Environment   Name(s) of Who Lives With Patient Tala Parsonsd-Significant Other   CM completed initial discharge assessment on patient by chart review. Patient is Covid positive and didn't answer the telephone. Alternate contact didn't answer the telephone. Initial assessment complete.

## 2022-06-23 NOTE — SUBJECTIVE & OBJECTIVE
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     Hyperlipidemia     Hypertension     Nephrotic range proteinuria     Paroxysmal atrial fibrillation     Stroke     nov 2012       Past Surgical History:   Procedure Laterality Date    BIOPSY N/A 5/29/2018    Procedure: BIOPSY;  Surgeon: Neeta Diagnostic Provider;  Location: [facility] CATH LAB;  Service: Radiology;  Laterality: N/A;  ct guided      CARDIOVERSION N/A 12/3/2020    Procedure: CARDIOVERSION;  Surgeon: Mehul Livingston MD;  Location: [facility] CATH LAB;  Service: Cardiology;  Laterality: N/A;    KNEE SURGERY Left     SHOULDER SURGERY Right        Review of patient's allergies indicates:   Allergen Reactions    Doxycycline Rash       No current facility-administered medications on file prior to encounter.     Current Outpatient Medications on File Prior to Encounter   Medication Sig    allopurinoL (ZYLOPRIM) 100 MG tablet Take 1 tablet (100 mg total) by mouth once daily.    amiodarone (PACERONE) 100 MG Tab Take by mouth once daily.    apixaban (ELIQUIS) 5 mg Tab Take 1 tablet (5 mg total) by mouth 2 (two) times daily. Hold for 3 days prior to PD catheter placement.    atorvastatin (LIPITOR) 40 MG tablet TAKE 1 TABLET BY MOUTH EVERY DAY    calcitRIOL (ROCALTROL) 0.25 MCG Cap Take 1 capsule (0.25 mcg total) by mouth every Mon, Wed, Fri.    famotidine (PEPCID) 20 MG tablet Take 1 tablet (20 mg total) by mouth once daily.    furosemide (LASIX) 80 MG tablet Take 1 tablet (80 mg total) by mouth 2 (two) times a day. (Patient taking differently: Take 40 mg by mouth 2 (two) times a day.)    glipiZIDE (GLUCOTROL) 2.5 MG TR24 Take 2.5 mg by mouth once daily.    hydrALAZINE (APRESOLINE) 100 MG tablet Take 1 tablet (100 mg total) by mouth every 12 (twelve) hours.     SITagliptin (JANUVIA) 25 MG Tab Take 1 tablet (25 mg total) by mouth once daily. (Patient taking differently: Take 50 mg by mouth once daily.)    tamsulosin (FLOMAX) 0.4 mg Cap Take 0.4 mg by mouth once daily.    vitamin D (VITAMIN D3) 1000 units Tab Take 1,000 Units by mouth once daily.    vitamin renal formula, B-complex-vitamin c-folic acid, (NEPHROCAP) 1 mg Cap Take 1 capsule by mouth once daily.     Family History       Problem Relation (Age of Onset)    Esophageal cancer Brother    Heart attack Father (59)    Hypertension Mother    Stroke Sister          Tobacco Use    Smoking status: Former Smoker     Types: Cigars    Smokeless tobacco: Never Used    Tobacco comment: occasional, when at the [employer]   Substance and Sexual Activity    Alcohol use: Yes     Alcohol/week: 1.0 standard drink     Types: 1 Cans of beer per week     Comment: occasional when at the [employer]    Drug use: No    Sexual activity: Yes     Partners: Female     Birth control/protection: None     Review of Systems   Constitutional:  Positive for activity change, appetite change and fatigue. Negative for chills, diaphoresis, fever and unexpected weight change.   HENT:  Negative for congestion and sore throat.    Respiratory:  Positive for cough, chest tightness and shortness of breath. Negative for wheezing.    Cardiovascular:  Positive for chest pain and leg swelling. Negative for palpitations.   Gastrointestinal:  Positive for abdominal distention, nausea and vomiting. Negative for abdominal pain, constipation and diarrhea.   Genitourinary:  Negative for decreased urine volume and difficulty urinating.   Musculoskeletal:  Positive for gait problem.   Skin: Negative.    Neurological:  Positive for weakness. Negative for dizziness, syncope, light-headedness and headaches.   Objective:     Vital Signs (Most Recent):  Temp: 98.2 °F (36.8 °C) (06/22/22 1959)  Pulse: 84 (06/22/22 2200)  Resp: (!) 22 (06/22/22 1959)  BP: (!) 161/71 (06/22/22  1959)  SpO2: 95 % (06/22/22 1959)   Vital Signs (24h Range):  Temp:  [98.2 °F (36.8 °C)-100.4 °F (38 °C)] 98.2 °F (36.8 °C)  Pulse:  [61-85] 84  Resp:  [16-31] 22  SpO2:  [93 %-99 %] 95 %  BP: (161-200)/(71-90) 161/71     Weight: 117.9 kg (260 lb)  Body mass index is 34.3 kg/m².    Physical Exam  Vitals and nursing note reviewed.   Constitutional:       General: He is not in acute distress.     Appearance: Normal appearance. He is well-developed. He is obese.   HENT:      Head: Normocephalic and atraumatic.      Mouth/Throat:      Dentition: Normal dentition.   Eyes:      General: Lids are normal.      Extraocular Movements: Extraocular movements intact.      Conjunctiva/sclera: Conjunctivae normal.   Cardiovascular:      Rate and Rhythm: Normal rate and regular rhythm.      Heart sounds: Normal heart sounds. No murmur heard.  Pulmonary:      Effort: Pulmonary effort is normal.      Comments: Decreased breath sounds bilateral bases.  Comfortable on 2 L nasal cannula.  Slight conversational dyspnea.  No increased work of breathing or accessory muscle use.  No cough noted during interview or exam.  Chest:      Chest wall: No tenderness.   Abdominal:      General: Bowel sounds are normal. There is distension.      Palpations: Abdomen is soft.      Tenderness: There is no abdominal tenderness.   Musculoskeletal:         General: Normal range of motion.      Cervical back: Neck supple.      Right lower leg: Edema (2+ nonpitting mid shin to foot) present.      Left lower leg: Edema (2+ nonpitting mid shin to foot) present.   Skin:     General: Skin is warm and dry.      Findings: No erythema or rash.      Comments: Evidence of chronic venous stasis changes bilateral lower extremities.   Neurological:      Mental Status: He is alert and oriented to person, place, and time.        Significant Labs: All pertinent labs within the past 24 hours have been reviewed.  CBC:   Recent Labs   Lab 06/22/22  1512   WBC 6.07   HGB 10.1*    HCT 30.9*        CMP:   Recent Labs   Lab 06/22/22  1512      K 4.2      CO2 27   *   BUN 39*   CREATININE 2.7*   CALCIUM 9.0   PROT 6.9   ALBUMIN 3.6   BILITOT 0.7   ALKPHOS 127   AST 32   ALT 20   ANIONGAP 7*   EGFRNONAA 22*     Cardiac Markers:   Recent Labs   Lab 06/22/22  1512   *     Coagulation:   Recent Labs   Lab 06/22/22  1827   INR 1.1   APTT 31.9     Lactic Acid:   Recent Labs   Lab 06/22/22  1536   LACTATE 0.7     Magnesium: No results for input(s): MG in the last 48 hours.  Troponin:   Recent Labs   Lab 06/22/22  1512 06/22/22  2035   TROPONINI 4.574* 9.410*     Urine Studies:   Recent Labs   Lab 06/22/22  1526   COLORU Yellow   APPEARANCEUA Clear   PHUR 7.0   SPECGRAV 1.020   PROTEINUA 2+*   GLUCUA Trace*   KETONESU Negative   BILIRUBINUA Negative   OCCULTUA 1+*   NITRITE Negative   UROBILINOGEN 1.0   LEUKOCYTESUR Negative   RBCUA 2   WBCUA 2   BACTERIA Rare   HYALINECASTS 0       Significant Imaging: I have reviewed all pertinent imaging results/findings within the past 24 hours.

## 2022-06-23 NOTE — ASSESSMENT & PLAN NOTE
-chronic  -hypertensive at admission with improvement over hospital course  -continue home hydralazine  -dose/medication adjustment as appropriate   -monitor

## 2022-06-24 LAB
ALBUMIN SERPL BCP-MCNC: 3 G/DL (ref 3.5–5.2)
ALP SERPL-CCNC: 105 U/L (ref 55–135)
ALT SERPL W/O P-5'-P-CCNC: 22 U/L (ref 10–44)
ANION GAP SERPL CALC-SCNC: 11 MMOL/L (ref 8–16)
AST SERPL-CCNC: 32 U/L (ref 10–40)
BASOPHILS # BLD AUTO: 0.01 K/UL (ref 0–0.2)
BASOPHILS NFR BLD: 0.2 % (ref 0–1.9)
BILIRUB SERPL-MCNC: 0.5 MG/DL (ref 0.1–1)
BUN SERPL-MCNC: 56 MG/DL (ref 8–23)
CALCIUM SERPL-MCNC: 8.5 MG/DL (ref 8.7–10.5)
CHLORIDE SERPL-SCNC: 102 MMOL/L (ref 95–110)
CO2 SERPL-SCNC: 25 MMOL/L (ref 23–29)
CREAT SERPL-MCNC: 2.8 MG/DL (ref 0.5–1.4)
DIFFERENTIAL METHOD: ABNORMAL
EOSINOPHIL # BLD AUTO: 0 K/UL (ref 0–0.5)
EOSINOPHIL NFR BLD: 0 % (ref 0–8)
ERYTHROCYTE [DISTWIDTH] IN BLOOD BY AUTOMATED COUNT: 15.2 % (ref 11.5–14.5)
EST. GFR  (AFRICAN AMERICAN): 25 ML/MIN/1.73 M^2
EST. GFR  (NON AFRICAN AMERICAN): 21 ML/MIN/1.73 M^2
GLUCOSE SERPL-MCNC: 243 MG/DL (ref 70–110)
HCT VFR BLD AUTO: 29.6 % (ref 40–54)
HGB BLD-MCNC: 9.8 G/DL (ref 14–18)
IMM GRANULOCYTES # BLD AUTO: 0.08 K/UL (ref 0–0.04)
IMM GRANULOCYTES NFR BLD AUTO: 1.3 % (ref 0–0.5)
LDH SERPL L TO P-CCNC: 240 U/L (ref 110–260)
LYMPHOCYTES # BLD AUTO: 0.5 K/UL (ref 1–4.8)
LYMPHOCYTES NFR BLD: 7.3 % (ref 18–48)
MAGNESIUM SERPL-MCNC: 2 MG/DL (ref 1.6–2.6)
MCH RBC QN AUTO: 31.9 PG (ref 27–31)
MCHC RBC AUTO-ENTMCNC: 33.1 G/DL (ref 32–36)
MCV RBC AUTO: 96 FL (ref 82–98)
MONOCYTES # BLD AUTO: 1 K/UL (ref 0.3–1)
MONOCYTES NFR BLD: 16.3 % (ref 4–15)
NEUTROPHILS # BLD AUTO: 4.8 K/UL (ref 1.8–7.7)
NEUTROPHILS NFR BLD: 74.9 % (ref 38–73)
NRBC BLD-RTO: 0 /100 WBC
PHOSPHATE SERPL-MCNC: 3.9 MG/DL (ref 2.7–4.5)
PLATELET # BLD AUTO: 173 K/UL (ref 150–450)
PMV BLD AUTO: 10.7 FL (ref 9.2–12.9)
POCT GLUCOSE: 190 MG/DL (ref 70–110)
POCT GLUCOSE: 191 MG/DL (ref 70–110)
POCT GLUCOSE: 214 MG/DL (ref 70–110)
POCT GLUCOSE: 237 MG/DL (ref 70–110)
POCT GLUCOSE: 294 MG/DL (ref 70–110)
POTASSIUM SERPL-SCNC: 3.9 MMOL/L (ref 3.5–5.1)
PROT SERPL-MCNC: 5.9 G/DL (ref 6–8.4)
RBC # BLD AUTO: 3.07 M/UL (ref 4.6–6.2)
SODIUM SERPL-SCNC: 138 MMOL/L (ref 136–145)
WBC # BLD AUTO: 6.4 K/UL (ref 3.9–12.7)

## 2022-06-24 PROCEDURE — 99233 PR SUBSEQUENT HOSPITAL CARE,LEVL III: ICD-10-PCS | Mod: ,,, | Performed by: INTERNAL MEDICINE

## 2022-06-24 PROCEDURE — 94761 N-INVAS EAR/PLS OXIMETRY MLT: CPT

## 2022-06-24 PROCEDURE — 85025 COMPLETE CBC W/AUTO DIFF WBC: CPT | Performed by: INTERNAL MEDICINE

## 2022-06-24 PROCEDURE — 99233 PR SUBSEQUENT HOSPITAL CARE,LEVL III: ICD-10-PCS | Mod: CR,,, | Performed by: INTERNAL MEDICINE

## 2022-06-24 PROCEDURE — 99233 SBSQ HOSP IP/OBS HIGH 50: CPT | Mod: CR,,, | Performed by: INTERNAL MEDICINE

## 2022-06-24 PROCEDURE — 83735 ASSAY OF MAGNESIUM: CPT | Performed by: INTERNAL MEDICINE

## 2022-06-24 PROCEDURE — 83615 LACTATE (LD) (LDH) ENZYME: CPT | Performed by: INTERNAL MEDICINE

## 2022-06-24 PROCEDURE — 36415 COLL VENOUS BLD VENIPUNCTURE: CPT | Performed by: INTERNAL MEDICINE

## 2022-06-24 PROCEDURE — 93005 ELECTROCARDIOGRAM TRACING: CPT

## 2022-06-24 PROCEDURE — 21400001 HC TELEMETRY ROOM

## 2022-06-24 PROCEDURE — 93010 EKG 12-LEAD: ICD-10-PCS | Mod: ,,, | Performed by: INTERNAL MEDICINE

## 2022-06-24 PROCEDURE — 27000207 HC ISOLATION

## 2022-06-24 PROCEDURE — 93010 ELECTROCARDIOGRAM REPORT: CPT | Mod: ,,, | Performed by: INTERNAL MEDICINE

## 2022-06-24 PROCEDURE — 99233 SBSQ HOSP IP/OBS HIGH 50: CPT | Mod: ,,, | Performed by: INTERNAL MEDICINE

## 2022-06-24 PROCEDURE — 25000003 PHARM REV CODE 250: Performed by: NURSE PRACTITIONER

## 2022-06-24 PROCEDURE — 94640 AIRWAY INHALATION TREATMENT: CPT

## 2022-06-24 PROCEDURE — 63600175 PHARM REV CODE 636 W HCPCS: Performed by: NURSE PRACTITIONER

## 2022-06-24 PROCEDURE — 25000003 PHARM REV CODE 250: Performed by: INTERNAL MEDICINE

## 2022-06-24 PROCEDURE — 80053 COMPREHEN METABOLIC PANEL: CPT | Performed by: INTERNAL MEDICINE

## 2022-06-24 PROCEDURE — 84100 ASSAY OF PHOSPHORUS: CPT | Performed by: INTERNAL MEDICINE

## 2022-06-24 RX ORDER — ALBUTEROL SULFATE 90 UG/1
2 AEROSOL, METERED RESPIRATORY (INHALATION) EVERY 6 HOURS PRN
Status: DISCONTINUED | OUTPATIENT
Start: 2022-06-24 | End: 2022-06-29 | Stop reason: HOSPADM

## 2022-06-24 RX ORDER — ISOSORBIDE MONONITRATE 30 MG/1
60 TABLET, EXTENDED RELEASE ORAL DAILY
Status: DISCONTINUED | OUTPATIENT
Start: 2022-06-25 | End: 2022-06-29 | Stop reason: HOSPADM

## 2022-06-24 RX ADMIN — ATORVASTATIN CALCIUM 40 MG: 20 TABLET, FILM COATED ORAL at 08:06

## 2022-06-24 RX ADMIN — CALCITRIOL CAPSULES 0.25 MCG 0.25 MCG: 0.25 CAPSULE ORAL at 06:06

## 2022-06-24 RX ADMIN — APIXABAN 5 MG: 2.5 TABLET, FILM COATED ORAL at 08:06

## 2022-06-24 RX ADMIN — FUROSEMIDE 60 MG: 10 INJECTION, SOLUTION INTRAMUSCULAR; INTRAVENOUS at 09:06

## 2022-06-24 RX ADMIN — TAMSULOSIN HYDROCHLORIDE 0.4 MG: 0.4 CAPSULE ORAL at 08:06

## 2022-06-24 RX ADMIN — SENNOSIDES AND DOCUSATE SODIUM 1 TABLET: 50; 8.6 TABLET ORAL at 08:06

## 2022-06-24 RX ADMIN — OXYCODONE HYDROCHLORIDE AND ACETAMINOPHEN 500 MG: 500 TABLET ORAL at 08:06

## 2022-06-24 RX ADMIN — DEXAMETHASONE 6 MG: 4 TABLET ORAL at 08:06

## 2022-06-24 RX ADMIN — INSULIN ASPART 4 UNITS: 100 INJECTION, SOLUTION INTRAVENOUS; SUBCUTANEOUS at 10:06

## 2022-06-24 RX ADMIN — ALBUTEROL SULFATE 2 PUFF: 90 AEROSOL, METERED RESPIRATORY (INHALATION) at 12:06

## 2022-06-24 RX ADMIN — INSULIN ASPART 3 UNITS: 100 INJECTION, SOLUTION INTRAVENOUS; SUBCUTANEOUS at 09:06

## 2022-06-24 RX ADMIN — INSULIN ASPART 4 UNITS: 100 INJECTION, SOLUTION INTRAVENOUS; SUBCUTANEOUS at 06:06

## 2022-06-24 RX ADMIN — HYDRALAZINE HYDROCHLORIDE 100 MG: 25 TABLET, FILM COATED ORAL at 08:06

## 2022-06-24 RX ADMIN — AMIODARONE HYDROCHLORIDE 200 MG: 200 TABLET ORAL at 08:06

## 2022-06-24 RX ADMIN — ALLOPURINOL 100 MG: 100 TABLET ORAL at 08:06

## 2022-06-24 RX ADMIN — ASPIRIN 81 MG: 81 TABLET, COATED ORAL at 08:06

## 2022-06-24 RX ADMIN — NITROGLYCERIN 1 PATCH: 0.4 PATCH TRANSDERMAL at 08:06

## 2022-06-24 RX ADMIN — REMDESIVIR 100 MG: 100 INJECTION, POWDER, LYOPHILIZED, FOR SOLUTION INTRAVENOUS at 10:06

## 2022-06-24 RX ADMIN — INSULIN ASPART 2 UNITS: 100 INJECTION, SOLUTION INTRAVENOUS; SUBCUTANEOUS at 01:06

## 2022-06-24 RX ADMIN — CLOPIDOGREL 75 MG: 75 TABLET, FILM COATED ORAL at 08:06

## 2022-06-24 RX ADMIN — FAMOTIDINE 20 MG: 20 TABLET ORAL at 08:06

## 2022-06-24 RX ADMIN — THERA TABS 1 TABLET: TAB at 08:06

## 2022-06-24 RX ADMIN — FUROSEMIDE 60 MG: 10 INJECTION, SOLUTION INTRAMUSCULAR; INTRAVENOUS at 05:06

## 2022-06-24 NOTE — ASSESSMENT & PLAN NOTE
Acute hypoxemic respiratory failure   Restrictive lung disease secondary to obesity  - Acute COVID infection in setting of NSTEMI, CHF as well.  - Continue dexamethasone 6mg PO daily, remdesivir 100mg IV q24hr.  - Wean supplemental O2 as tolerated.

## 2022-06-24 NOTE — ASSESSMENT & PLAN NOTE
Elevated troponin, CAD, h/o PCI, CHF  - Cardiology consulted; pursue medical management given known CAD, chronic renal disease and risk of worsening renal function with potential catheterization.  - Continue aspirin 81mg PO daily, clopidogrel 75mg PO daily.  - Continue furosemide 60mg IV BID, strict intake/output.  - Echo pending.

## 2022-06-24 NOTE — ASSESSMENT & PLAN NOTE
Current use of long-term anticoagulation   - Continue amiodarone 200mg PO daily, apixaban 5mg PO BID.

## 2022-06-24 NOTE — PROGRESS NOTES
[facility], [place], operated by [facility]  Progress Note    Patient Name: Zev Castano  MRN: [id number]  Patient Class: IP- Inpatient   Admission Date: 6/22/2022  Length of Stay: 2 days  Attending Physician: KRISTIN Cardenas MD  Primary Care Provider: Mehul Livingston MD        Subjective:     Principal Problem:NSTEMI (non-ST elevated myocardial infarction)        HPI:  Mr. Castano is a 72 YOM with PMHx of CAD s/ p PCI (in 2016), paroxysmal AFib s/p cardioversion (on amiodarone and apixaban), HFpEF (grade II DD and mild pulmHTN), history of CVA (in 2012 in [facility] region), HTN, HLD, DM type 2 on oral therapies, CKD IV (follows with Nephrology, concerns for HD/PD need in near future), persistent proteinuria, and SABRINA.    He presented to the ED with complaints of shortness of breath for the last 3 days with associated cough, orthopnea, lower extremity swelling (worse than baseline), early satiety, and abdominal bloating. He developed mid sternal chest tightness yesterday without radiation that lasted for several hours.  He also had episodes of N/V today.  He denies fever, chills, night sweats, palpitations, abdominal pain, diarrhea, constipation, lightheadedness, syncope, or dizziness.  He reports compliance with home medications including diuretic therapies.  He reports compliance with dietary restrictions and denies increased salt intake.  He has independent ADLs, uses no ambulatory aids,  however is mostly sedentary at home and does not participate in many household chores d/t health conditions and chronic fatigue. All past medical, social, and family history reviewed with patient; no pertinent family history identified at this time.    In the ED is hypertensive, heart rate stable, 93% on room air with improvement to 95-99% on 2 L nasal cannula, T-max 100.4° with improvement to 99.7.  CBC unremarkable.  Chemistry with known CKD with BUN and creatinine within historical baseline.  LFTs WNLs.  , slightly  higher than baseline.  .  Troponin 4.574.  Lactic acid 0.7.  Procalcitonin 0.07.  COVID positive.  UA noninfectious.  Hemoglobin A1c 6.4/137.  ESR 32.  Ferritin 348.  .  PT/INR 11.7 / 1.1.  PTT 31.9.  D-dimer 0.56.  Blood cultures obtained.  Chest x-ray concerning for pulmonary edema.  EKG with normal sinus rhythm and known RBBB.  Flu swab and sputum culture pending collection.  In the ED he was given acetaminophen, aspirin, Lasix IV push, nitro paste and sublingual nitro, dexamethasone IV.  Cardiology was consulted. The patient was admitted to the Hospital Medicine Service for further evaluation and management.       Overview/Hospital Course:  No notes on file    Interval History: No acute events overnight. Symptoms improving. SOB with exertion but not at rest. Continued BLE swelling.    Review of Systems   Constitutional:  Negative for chills and fever.   Respiratory:  Positive for cough and shortness of breath.    Cardiovascular:  Negative for chest pain and palpitations.   Gastrointestinal:  Negative for abdominal pain, nausea and vomiting.   Objective:     Vital Signs (Most Recent):  Temp: 97.6 °F (36.4 °C) (06/24/22 1603)  Pulse: 68 (06/24/22 1800)  Resp: 18 (06/24/22 1603)  BP: (!) 159/70 (06/24/22 1603)  SpO2: (!) 94 % (06/24/22 1603)   Vital Signs (24h Range):  Temp:  [97.6 °F (36.4 °C)-98.5 °F (36.9 °C)] 97.6 °F (36.4 °C)  Pulse:  [60-90] 68  Resp:  [18-20] 18  SpO2:  [92 %-98 %] 94 %  BP: (136-166)/(63-72) 159/70     Weight: 117.9 kg (260 lb)  Body mass index is 34.3 kg/m².    Intake/Output Summary (Last 24 hours) at 6/24/2022 1813  Last data filed at 6/24/2022 1500  Gross per 24 hour   Intake 960 ml   Output 575 ml   Net 385 ml        Physical Exam  Vitals and nursing note reviewed.   Constitutional:       General: He is not in acute distress.     Appearance: He is well-developed.   HENT:      Head: Normocephalic and atraumatic.   Eyes:      General:         Right eye: No discharge.          Left eye: No discharge.      Conjunctiva/sclera: Conjunctivae normal.   Cardiovascular:      Rate and Rhythm: Normal rate.      Pulses: Normal pulses.   Pulmonary:      Effort: Pulmonary effort is normal. No respiratory distress.      Comments: Diminished bases.  Abdominal:      Palpations: Abdomen is soft.      Tenderness: There is no abdominal tenderness.   Musculoskeletal:         General: Normal range of motion.      Right lower leg: No edema.      Left lower leg: No edema.   Skin:     General: Skin is warm and dry.   Neurological:      Mental Status: He is alert and oriented to person, place, and time.     Significant Labs:   CBC:  Recent Labs   Lab 06/22/22  1512 06/23/22  0331 06/24/22  0655   WBC 6.07 3.87* 6.40   HGB 10.1* 9.5* 9.8*   HCT 30.9* 29.1* 29.6*    135* 173   GRAN 79.9*  4.9 86.8*  3.4 74.9*  4.8   LYMPH 4.3*  0.3* 6.2*  0.2* 7.3*  0.5*   MONO 14.0  0.9 4.7  0.2* 16.3*  1.0   EOS 0.1 0.0 0.0   BASO 0.02 0.02 0.01     CMP:  Recent Labs   Lab 06/22/22  1512 06/23/22  0331 06/24/22  0655    141 138   K 4.2 4.1 3.9    105 102   CO2 27 20* 25   BUN 39* 43* 56*   CREATININE 2.7* 2.8* 2.8*   * 238* 243*   CALCIUM 9.0 8.7 8.5*   MG  --  2.1 2.0   PHOS  --   --  3.9   ALKPHOS 127 108 105   AST 32 33 32   ALT 20 18 22   BILITOT 0.7 0.7 0.5   PROT 6.9 6.1 5.9*   ALBUMIN 3.6 3.0* 3.0*   ANIONGAP 7* 16 11        Significant Imaging:   No new imaging this morning.        Assessment/Plan:      * NSTEMI (non-ST elevated myocardial infarction)  Elevated troponin, CAD, h/o PCI, CHF  - Cardiology consulted; pursue medical management given known CAD, chronic renal disease and risk of worsening renal function with potential catheterization.  - Continue aspirin 81mg PO daily, clopidogrel 75mg PO daily.  - Continue furosemide 60mg IV BID, strict intake/output.  - Echo shows EF 65%, grade II diastolic dysfunction.    CKD (chronic kidney disease), stage IV  Anemia in stage 4 chronic  kidney disease   - Chronic; with concern for need for dialysis in near future.  - Diuresis as above.  - Avoid nephrotoxins, renally dose medications.  - Monitor. If decompensates will consult nephrology.    COVID-19  Acute hypoxemic respiratory failure   Restrictive lung disease secondary to obesity  - Acute COVID infection in setting of NSTEMI, CHF as well.  - Continue dexamethasone 6mg PO daily, remdesivir 100mg IV q24hr.  - Wean supplemental O2 as tolerated.    Hypertensive kidney disease with stage 4 chronic kidney disease  - Continue hydralazine 100mg PO BID.    Heart failure, diastolic, acute on chronic  - As above.    PAF (paroxysmal atrial fibrillation)  Current use of long-term anticoagulation   - Continue amiodarone 200mg PO daily, apixaban 5mg PO BID.    History of CVA (cerebrovascular accident)  - Statin, aspirin as above.    Non-insulin dependent type 2 diabetes mellitus  - Continue insulin as moderate-dose sliding scale insulin aspart 1-10U subQ TIDWM PRN given steroid use.    Hypercholesteremia  - Continue atorvastatin 40mg PO daily.    VTE Risk Mitigation (From admission, onward)         Ordered     apixaban tablet 5 mg  2 times daily         06/22/22 1750     IP VTE HIGH RISK PATIENT  Once         06/22/22 1750     Place sequential compression device  Until discontinued         06/22/22 1750                Discharge Planning   ANAHY:      Code Status: Full Code   Is the patient medically ready for discharge?:     Reason for patient still in hospital (select all that apply): Treatment                     D Hay Cardenas MD  Department of Hospital Medicine   [facility])

## 2022-06-24 NOTE — ASSESSMENT & PLAN NOTE
- Continue insulin as moderate-dose sliding scale insulin aspart 1-10U subQ TIDWM PRN given steroid use.

## 2022-06-24 NOTE — SUBJECTIVE & OBJECTIVE
Interval History: No acute events overnight. Weaning from supplemental O2.    Review of Systems   Constitutional:  Negative for chills and fever.   Respiratory:  Positive for cough and shortness of breath.    Cardiovascular:  Negative for chest pain and palpitations.   Gastrointestinal:  Negative for abdominal pain, nausea and vomiting.   Objective:     Vital Signs (Most Recent):  Temp: 97.7 °F (36.5 °C) (06/23/22 1740)  Pulse: 63 (06/23/22 1900)  Resp: 18 (06/23/22 1740)  BP: (!) 154/70 (06/23/22 1740)  SpO2: 98 % (06/23/22 1740)   Vital Signs (24h Range):  Temp:  [97.6 °F (36.4 °C)-98.9 °F (37.2 °C)] 97.7 °F (36.5 °C)  Pulse:  [62-84] 63  Resp:  [16-22] 18  SpO2:  [92 %-98 %] 98 %  BP: (143-179)/(64-81) 154/70     Weight: 117.9 kg (260 lb)  Body mass index is 34.3 kg/m².    Intake/Output Summary (Last 24 hours) at 6/23/2022 1910  Last data filed at 6/23/2022 1200  Gross per 24 hour   Intake 600 ml   Output 2425 ml   Net -1825 ml      Physical Exam  Vitals and nursing note reviewed.   Constitutional:       General: He is not in acute distress.     Appearance: He is well-developed.   HENT:      Head: Normocephalic and atraumatic.   Eyes:      General:         Right eye: No discharge.         Left eye: No discharge.      Conjunctiva/sclera: Conjunctivae normal.   Cardiovascular:      Rate and Rhythm: Normal rate.      Pulses: Normal pulses.   Pulmonary:      Effort: Pulmonary effort is normal. No respiratory distress.      Comments: Diminished bases.  Abdominal:      Palpations: Abdomen is soft.      Tenderness: There is no abdominal tenderness.   Musculoskeletal:         General: Normal range of motion.      Right lower leg: No edema.      Left lower leg: No edema.   Skin:     General: Skin is warm and dry.   Neurological:      Mental Status: He is alert and oriented to person, place, and time.     Significant Labs:   CBC:  Recent Labs   Lab 06/22/22  1512 06/23/22  0331   WBC 6.07 3.87*   HGB 10.1* 9.5*   HCT 30.9*  29.1*    135*   GRAN 79.9*  4.9 86.8*  3.4   LYMPH 4.3*  0.3* 6.2*  0.2*   MONO 14.0  0.9 4.7  0.2*   EOS 0.1 0.0   BASO 0.02 0.02   CMP:  Recent Labs   Lab 06/22/22  1512 06/23/22  0331    141   K 4.2 4.1    105   CO2 27 20*   BUN 39* 43*   CREATININE 2.7* 2.8*   * 238*   CALCIUM 9.0 8.7   MG  --  2.1   ALKPHOS 127 108   AST 32 33   ALT 20 18   BILITOT 0.7 0.7   PROT 6.9 6.1   ALBUMIN 3.6 3.0*   ANIONGAP 7* 16      Significant Imaging:   Imaging Results              X-Ray Chest AP Portable (Final result)  Result time 06/22/22 15:46:11      Final result by Angelic Lawrence MD (06/22/22 15:46:11)                   Impression:      Hazy perihilar and bibasilar opacities can be seen with edema.  No focal consolidation.      Electronically signed by: Angelic Lawrence  Date:    06/22/2022  Time:    15:46               Narrative:    EXAMINATION:  XR CHEST AP PORTABLE    CLINICAL HISTORY:  CHF;    TECHNIQUE:  Single frontal view of the chest was performed.    COMPARISON:  08/25/2021    FINDINGS:  Reduced lung volumes with elevation of the left hemidiaphragm, similar compared to prior.  Hazy perihilar and bibasilar opacities.  No significant pleural effusion.  No pneumothorax.  Cardiac silhouette is normal in size.

## 2022-06-24 NOTE — ASSESSMENT & PLAN NOTE
Elevated troponin, CAD, h/o PCI, CHF  - Cardiology consulted; pursue medical management given known CAD, chronic renal disease and risk of worsening renal function with potential catheterization.  - Continue aspirin 81mg PO daily, clopidogrel 75mg PO daily.  - Continue furosemide 60mg IV BID, strict intake/output.  - Echo shows EF 65%, grade II diastolic dysfunction.

## 2022-06-24 NOTE — PROGRESS NOTES
[facility]  Cardiology  Progress Note    Patient Name: Zev Castano  MRN: [id number]  Admission Date: 6/22/2022  Hospital Length of Stay: 2 days  Code Status: Full Code   Attending Physician: KRISTIN Cardenas MD   Primary Care Physician: Mehul Livingston MD  Expected Discharge Date:   Principal Problem:NSTEMI (non-ST elevated myocardial infarction)    Subjective:     Brief HPI:    Zev Castano is a 72 y.o.male with hypertension, hypercholesterolemia and diabetes mellitus type 2. He is obese. In 2012 he had two strokes in the [place] with fairly good neurological recovery. In 2016 he had two stents placed in the left anterior descending coronary artery for angina.  He has had paroxysmal atrial fibrillation for which he underwent electrical cardioversion in 11/2020. He has been on amiodarone since with no clinical recurrence. He is anticoagulated with apixiban. He has chronic kidney disease, stage 4. In 8/2021 he was admitted to [facility] with fluid overload. He was diuresed. It was felt he was near need for dialysis at that time. Since that stay he has been followed at [facility] for his kidney disease. Beginning early 6/2022 he he began to develop more and more edema of his legs. He became increasingly short of breath. At 1 am 6/22/2022 he began feeling a chest pressure. The pressure lasted for about 5 hours. He then vomited 5-6 times and the chest pressure resolved. He felt weak. He presented top the emergency room. A COVID test was positive. There were no acute ST changes. The CXR revealed pulmonary edema. He was admitted. I was asked to see him in consultation as Dr. Livingston is out of town.       Hospital Course:     Diuresis.    NTG patch.    Aspirin was added to apixiban.    Remdesivir and dexamethason.    6/23/2022: Echo: Normal left ventricular size and systolic function. EF 65%. Moderate diastolic dysfunction. Mildly enlarged LA. Mild aortic valve  sclerosis. Moderate MAC.    Interval History:     No chest pain.    Breathing easier.    Less edema.    Review of Systems   Constitutional: Positive for malaise/fatigue. Negative for chills and fever.   HENT: Negative for nosebleeds.    Eyes: Negative for vision loss in left eye and vision loss in right eye.   Cardiovascular: Positive for leg swelling, orthopnea and paroxysmal nocturnal dyspnea. Negative for chest pain and palpitations.   Respiratory: Negative for cough, hemoptysis, shortness of breath, sputum production and wheezing.    Hematologic/Lymphatic: Negative for bleeding problem. Does not bruise/bleed easily.   Skin: Negative for color change and rash.   Musculoskeletal: Negative for muscle weakness and myalgias.   Gastrointestinal: Negative for abdominal pain, heartburn, hematemesis, hematochezia, melena, nausea and vomiting.   Genitourinary: Negative for hematuria.   Neurological: Negative for dizziness, focal weakness, headaches, light-headedness, vertigo and weakness.   Psychiatric/Behavioral: Negative for altered mental status. The patient is not nervous/anxious.    Allergic/Immunologic: Negative for persistent infections.       Objective:     Vital Signs (Most Recent):  Temp: 97.6 °F (36.4 °C) (06/24/22 1603)  Pulse: 68 (06/24/22 1800)  Resp: 18 (06/24/22 1603)  BP: (!) 159/70 (06/24/22 1603)  SpO2: (!) 94 % (06/24/22 1603) Vital Signs (24h Range):  Temp:  [97.6 °F (36.4 °C)-98.5 °F (36.9 °C)] 97.6 °F (36.4 °C)  Pulse:  [60-90] 68  Resp:  [18-20] 18  SpO2:  [92 %-98 %] 94 %  BP: (136-166)/(63-72) 159/70     Weight: 117.9 kg (260 lb)  Body mass index is 34.3 kg/m².    SpO2: (!) 94 %  O2 Device (Oxygen Therapy): room air      Intake/Output Summary (Last 24 hours) at 6/24/2022 1807  Last data filed at 6/24/2022 1500  Gross per 24 hour   Intake 960 ml   Output 575 ml   Net 385 ml       Lines/Drains/Airways     Peripheral Intravenous Line  Duration                Peripheral IV - Single Lumen 06/22/22  1526 18 G Right Forearm 2 days                Physical Exam  Constitutional:       General: He is not in acute distress.     Appearance: Normal appearance. He is well-developed. He is not ill-appearing.   Eyes:      Conjunctiva/sclera:      Right eye: Right conjunctiva is not injected. No hemorrhage.     Left eye: Left conjunctiva is not injected. No hemorrhage.     Pupils:      Right eye: Pupil is round.      Left eye: Pupil is round.   Neck:      Vascular: No JVD.   Cardiovascular:      Rate and Rhythm: Normal rate and regular rhythm.      Heart sounds: S1 normal and S2 normal. Murmur heard.     Gallop present. S4 sounds present.   Pulmonary:      Effort: Pulmonary effort is normal.      Breath sounds: Examination of the right-lower field reveals rales. Examination of the left-lower field reveals rales. Rales present.   Chest:      Chest wall: No swelling or tenderness.   Abdominal:      General: There is no distension.      Palpations: Abdomen is soft.      Tenderness: There is no abdominal tenderness.   Musculoskeletal:      Cervical back: Neck supple.      Right lower leg: Swelling present. 3+ Edema present.      Left lower leg: Swelling present. 3+ Edema present.   Skin:     General: Skin is warm and dry.      Findings: No rash.   Neurological:      General: No focal deficit present.      Mental Status: He is alert and oriented to person, place, and time. He is not disoriented.   Psychiatric:         Attention and Perception: Attention and perception normal.         Mood and Affect: Mood and affect normal.         Speech: Speech normal.         Behavior: Behavior normal.         Thought Content: Thought content normal.         Cognition and Memory: Cognition and memory normal.         Judgment: Judgment normal.         Current Medications:     allopurinoL  100 mg Oral Daily    amiodarone  200 mg Oral Daily    apixaban  5 mg Oral BID    ascorbic acid (vitamin C)  500 mg Oral BID    aspirin  81 mg Oral  Daily    atorvastatin  40 mg Oral Daily    calcitRIOL  0.25 mcg Oral Every Mon, Wed, Fri    clopidogreL  75 mg Oral Daily    dexAMETHasone  6 mg Oral Daily    famotidine  20 mg Oral Daily    furosemide (LASIX) injection  60 mg Intravenous BID    hydrALAZINE  100 mg Oral Q12H    multivitamin  1 tablet Oral Daily    nitroGLYCERIN 0.4 MG/HR TD PT24  1 patch Transdermal Daily    remdesivir infusion  100 mg Intravenous Daily    senna-docusate 8.6-50 mg  1 tablet Oral BID    tamsulosin  0.4 mg Oral Daily     Current Laboratory Results:    Recent Results (from the past 24 hour(s))   POCT glucose    Collection Time: 06/23/22  8:41 PM   Result Value Ref Range    POCT Glucose 344 (H) 70 - 110 mg/dL   CBC Auto Differential    Collection Time: 06/24/22  6:55 AM   Result Value Ref Range    WBC 6.40 3.90 - 12.70 K/uL    RBC 3.07 (L) 4.60 - 6.20 M/uL    Hemoglobin 9.8 (L) 14.0 - 18.0 g/dL    Hematocrit 29.6 (L) 40.0 - 54.0 %    MCV 96 82 - 98 fL    MCH 31.9 (H) 27.0 - 31.0 pg    MCHC 33.1 32.0 - 36.0 g/dL    RDW 15.2 (H) 11.5 - 14.5 %    Platelets 173 150 - 450 K/uL    MPV 10.7 9.2 - 12.9 fL    Immature Granulocytes 1.3 (H) 0.0 - 0.5 %    Gran # (ANC) 4.8 1.8 - 7.7 K/uL    Immature Grans (Abs) 0.08 (H) 0.00 - 0.04 K/uL    Lymph # 0.5 (L) 1.0 - 4.8 K/uL    Mono # 1.0 0.3 - 1.0 K/uL    Eos # 0.0 0.0 - 0.5 K/uL    Baso # 0.01 0.00 - 0.20 K/uL    nRBC 0 0 /100 WBC    Gran % 74.9 (H) 38.0 - 73.0 %    Lymph % 7.3 (L) 18.0 - 48.0 %    Mono % 16.3 (H) 4.0 - 15.0 %    Eosinophil % 0.0 0.0 - 8.0 %    Basophil % 0.2 0.0 - 1.9 %    Differential Method Automated    Comprehensive Metabolic Panel    Collection Time: 06/24/22  6:55 AM   Result Value Ref Range    Sodium 138 136 - 145 mmol/L    Potassium 3.9 3.5 - 5.1 mmol/L    Chloride 102 95 - 110 mmol/L    CO2 25 23 - 29 mmol/L    Glucose 243 (H) 70 - 110 mg/dL    BUN 56 (H) 8 - 23 mg/dL    Creatinine 2.8 (H) 0.5 - 1.4 mg/dL    Calcium 8.5 (L) 8.7 - 10.5 mg/dL    Total Protein 5.9  (L) 6.0 - 8.4 g/dL    Albumin 3.0 (L) 3.5 - 5.2 g/dL    Total Bilirubin 0.5 0.1 - 1.0 mg/dL    Alkaline Phosphatase 105 55 - 135 U/L    AST 32 10 - 40 U/L    ALT 22 10 - 44 U/L    Anion Gap 11 8 - 16 mmol/L    eGFR if African American 25 (A) >60 mL/min/1.73 m^2    eGFR if non African American 21 (A) >60 mL/min/1.73 m^2   Magnesium    Collection Time: 06/24/22  6:55 AM   Result Value Ref Range    Magnesium 2.0 1.6 - 2.6 mg/dL   Phosphorus    Collection Time: 06/24/22  6:55 AM   Result Value Ref Range    Phosphorus 3.9 2.7 - 4.5 mg/dL   Lactate dehydrogenase    Collection Time: 06/24/22  6:55 AM   Result Value Ref Range     110 - 260 U/L   POCT glucose    Collection Time: 06/24/22  7:48 AM   Result Value Ref Range    POCT Glucose 214 (H) 70 - 110 mg/dL   POCT glucose    Collection Time: 06/24/22 12:03 PM   Result Value Ref Range    POCT Glucose 191 (H) 70 - 110 mg/dL   POCT glucose    Collection Time: 06/24/22  1:54 PM   Result Value Ref Range    POCT Glucose 190 (H) 70 - 110 mg/dL   POCT glucose    Collection Time: 06/24/22  4:04 PM   Result Value Ref Range    POCT Glucose 237 (H) 70 - 110 mg/dL     Current Imaging Results:    X-Ray Chest AP Portable   Final Result      Hazy perihilar and bibasilar opacities can be seen with edema.  No focal consolidation.         Electronically signed by: Angelic Lawrence   Date:    06/22/2022   Time:    15:46          6/23/2022: Echo:     The left ventricle is normal in size with normal systolic function.  The estimated ejection fraction is 65%.  Grade II left ventricular diastolic dysfunction.  Mild left atrial enlargement.  Normal right ventricular size with normal right ventricular systolic function.  Mild aortic valve sclerosis.  Moderate mitral annular calcification.  Normal central venous pressure (3 mmHg).       Assessment and Plan:     Problem List:    Active Diagnoses:    Diagnosis Date Noted POA    PRINCIPAL PROBLEM:  NSTEMI (non-ST elevated myocardial infarction)  [I21.4] 06/22/2022 Yes    COVID-19 [U07.1] 06/22/2022 Yes    Acute hypoxemic respiratory failure [J96.01] 06/22/2022 Yes    CKD (chronic kidney disease), stage IV [N18.4] 06/22/2022 Yes    Anemia in stage 4 chronic kidney disease [N18.4, D63.1] 10/18/2021 Yes    Hypertensive kidney disease with stage 4 chronic kidney disease [I12.9, N18.4] 09/20/2021 Yes    Current use of long term anticoagulation [Z79.01] 08/25/2021 Not Applicable    Restrictive lung disease secondary to obesity [J98.4, E66.9] 06/20/2018 Yes    Heart failure, diastolic, acute on chronic [I50.33] 06/19/2018 Yes    Coronary artery disease involving native coronary artery of native heart without angina pectoris [I25.10] 03/24/2016 Yes    PAF (paroxysmal atrial fibrillation) [I48.0] 06/27/2013 Yes    History of CVA (cerebrovascular accident) [Z86.73] 03/11/2013 Not Applicable    Hypercholesteremia [E78.00] 10/02/2012 Yes     Chronic    Non-insulin dependent type 2 diabetes mellitus [E11.9] 10/02/2012 Yes     Chronic      Problems Resolved During this Admission:       Assessment and Plan:     1. Coronary Artery Disease              2016: LAD stents.              6/22/2022: CP x 5 hours.              ECG without acute ST changes. Troponin peak to 9. No new WMA.              Most likely NSTEMI.    He is at very high risk for worsening kidney function if he undergoes coronary angiography.              In this setting appears a initial conservative approach is reasonable.              On apixiban 5 mg Q12.   On aspirin 81 mg Q24.              On NTG 0.4 mg/hr patch.   ECG without any further ST T wave changes.              Would not favor triple therapy for this patient - bleeding risk would be very high.   Aspirin for 1-3 months would be approach I would favor especially as he is receiving the higher option of apixiban dosing.     2. Heart Failure, Diastolic, Acute on Chronic              8/25/2021: Echo: Normal left ventricular size and  systolic function. EF 65%. Moderate diastolic dysfunction.   6/23/2022: Echo: Normal left ventricular size and systolic function. EF 65%. Moderate diastolic dysfunction. Mildly enlarged LA. Mild aortic valve sclerosis. Moderate MAC.              6/22/2022: Presents markedly fluid overloaded. Probably mostly due to CKD.              On furosemide 60 mg iv Q12.   Continue diuresis.     3. Atrial Fibrillation              Paroxysmal atrial fibrillation.              11/2022L: CV.              [id number] ([id number]).              On amiodarone 200 mg Q24.              On apixiban 5 mg Q12.     4. High Risk Medication              On amiodarone 200 mg Q24.     5. Chronic Anticoagulation              [id number] ([id number]).              On apixiban 5 mg Q12.     6. Hypertension              At home been on hydralazine 100 mg Q12 and furosmide 40 mg Q12.     7. Hypercholesterolemia              On atorvastatin 80 mg Q24.     8. Diabetes Mellitus, Type 2              Complications: CKD4, VITA & CAD. Medications: Oral agents.     9. Chronic Kidney Disease, Stage 4              Diabetic nephropathy.              6/22/2022: BUN/crea 39/2.7. CrCl 22.   6/23/2022: BUN/crea 43/2.8. CrCl 21.              Diuresis.     10. COVID 19              6/22/2022: Positive.              On dexamethasone and remdesivir.                   VTE Risk Mitigation (From admission, onward)         Ordered     apixaban tablet 5 mg  2 times daily         06/22/22 1750     IP VTE HIGH RISK PATIENT  Once         06/22/22 1750     Place sequential compression device  Until discontinued         06/22/22 1750                Alex Crisostomo MD  Cardiology  [facility])

## 2022-06-24 NOTE — NURSING
Patient is awake and lying in bed.  No distress noted.  Pt remains on room air. Unable to get accurate input and output due to pt dumping urinals.  Pt educated but forgot.  Safety maintained.  Report given to oncoming RN. Cheyanne Valencia

## 2022-06-24 NOTE — ASSESSMENT & PLAN NOTE
Anemia in stage 4 chronic kidney disease   - Chronic; with concern for need for dialysis in near future.  - Diuresis as above.  - Avoid nephrotoxins, renally dose medications.  - Monitor. If decompensates will consult nephrology.

## 2022-06-24 NOTE — PLAN OF CARE
POC reviewed w/ pt. Cardiac monitor maintained. VSS on RA. No complaints of pain. Pt voids and shifts in bed independently. No injuries, falls, or trauma occurred during shift. Purposeful rounding completed. Bed low and locked, side rails up x3, call light within reach.

## 2022-06-25 LAB
ALBUMIN SERPL BCP-MCNC: 2.9 G/DL (ref 3.5–5.2)
ALP SERPL-CCNC: 127 U/L (ref 55–135)
ALT SERPL W/O P-5'-P-CCNC: 28 U/L (ref 10–44)
ANION GAP SERPL CALC-SCNC: 11 MMOL/L (ref 8–16)
AST SERPL-CCNC: 35 U/L (ref 10–40)
BACTERIA SPEC AEROBE CULT: NORMAL
BACTERIA SPEC AEROBE CULT: NORMAL
BASOPHILS # BLD AUTO: 0.01 K/UL (ref 0–0.2)
BASOPHILS NFR BLD: 0.1 % (ref 0–1.9)
BILIRUB SERPL-MCNC: 0.5 MG/DL (ref 0.1–1)
BUN SERPL-MCNC: 67 MG/DL (ref 8–23)
CALCIUM SERPL-MCNC: 8.3 MG/DL (ref 8.7–10.5)
CHLORIDE SERPL-SCNC: 97 MMOL/L (ref 95–110)
CO2 SERPL-SCNC: 25 MMOL/L (ref 23–29)
CREAT SERPL-MCNC: 3 MG/DL (ref 0.5–1.4)
DIFFERENTIAL METHOD: ABNORMAL
EOSINOPHIL # BLD AUTO: 0 K/UL (ref 0–0.5)
EOSINOPHIL NFR BLD: 0 % (ref 0–8)
ERYTHROCYTE [DISTWIDTH] IN BLOOD BY AUTOMATED COUNT: 15 % (ref 11.5–14.5)
EST. GFR  (AFRICAN AMERICAN): 23 ML/MIN/1.73 M^2
EST. GFR  (NON AFRICAN AMERICAN): 20 ML/MIN/1.73 M^2
GLUCOSE SERPL-MCNC: 431 MG/DL (ref 70–110)
GRAM STN SPEC: NORMAL
HCT VFR BLD AUTO: 30.6 % (ref 40–54)
HGB BLD-MCNC: 10.3 G/DL (ref 14–18)
IMM GRANULOCYTES # BLD AUTO: 0.11 K/UL (ref 0–0.04)
IMM GRANULOCYTES NFR BLD AUTO: 1.5 % (ref 0–0.5)
LYMPHOCYTES # BLD AUTO: 0.5 K/UL (ref 1–4.8)
LYMPHOCYTES NFR BLD: 7.4 % (ref 18–48)
MAGNESIUM SERPL-MCNC: 1.9 MG/DL (ref 1.6–2.6)
MCH RBC QN AUTO: 32.2 PG (ref 27–31)
MCHC RBC AUTO-ENTMCNC: 33.7 G/DL (ref 32–36)
MCV RBC AUTO: 96 FL (ref 82–98)
MONOCYTES # BLD AUTO: 0.6 K/UL (ref 0.3–1)
MONOCYTES NFR BLD: 8.6 % (ref 4–15)
NEUTROPHILS # BLD AUTO: 5.9 K/UL (ref 1.8–7.7)
NEUTROPHILS NFR BLD: 82.4 % (ref 38–73)
NRBC BLD-RTO: 0 /100 WBC
PHOSPHATE SERPL-MCNC: 4.4 MG/DL (ref 2.7–4.5)
PLATELET # BLD AUTO: 202 K/UL (ref 150–450)
PMV BLD AUTO: 11.4 FL (ref 9.2–12.9)
POCT GLUCOSE: 249 MG/DL (ref 70–110)
POCT GLUCOSE: 315 MG/DL (ref 70–110)
POCT GLUCOSE: 350 MG/DL (ref 70–110)
POCT GLUCOSE: 81 MG/DL (ref 70–110)
POTASSIUM SERPL-SCNC: 4.4 MMOL/L (ref 3.5–5.1)
PROT SERPL-MCNC: 5.9 G/DL (ref 6–8.4)
RBC # BLD AUTO: 3.2 M/UL (ref 4.6–6.2)
SODIUM SERPL-SCNC: 133 MMOL/L (ref 136–145)
WBC # BLD AUTO: 7.21 K/UL (ref 3.9–12.7)

## 2022-06-25 PROCEDURE — 84100 ASSAY OF PHOSPHORUS: CPT | Performed by: INTERNAL MEDICINE

## 2022-06-25 PROCEDURE — 85025 COMPLETE CBC W/AUTO DIFF WBC: CPT | Performed by: INTERNAL MEDICINE

## 2022-06-25 PROCEDURE — 63600175 PHARM REV CODE 636 W HCPCS: Performed by: INTERNAL MEDICINE

## 2022-06-25 PROCEDURE — 25000003 PHARM REV CODE 250: Performed by: INTERNAL MEDICINE

## 2022-06-25 PROCEDURE — 99233 SBSQ HOSP IP/OBS HIGH 50: CPT | Mod: CR,,, | Performed by: INTERNAL MEDICINE

## 2022-06-25 PROCEDURE — 25000003 PHARM REV CODE 250: Performed by: NURSE PRACTITIONER

## 2022-06-25 PROCEDURE — 99233 PR SUBSEQUENT HOSPITAL CARE,LEVL III: ICD-10-PCS | Mod: CR,,, | Performed by: INTERNAL MEDICINE

## 2022-06-25 PROCEDURE — 63600175 PHARM REV CODE 636 W HCPCS: Performed by: NURSE PRACTITIONER

## 2022-06-25 PROCEDURE — 83735 ASSAY OF MAGNESIUM: CPT | Performed by: INTERNAL MEDICINE

## 2022-06-25 PROCEDURE — 99233 PR SUBSEQUENT HOSPITAL CARE,LEVL III: ICD-10-PCS | Mod: ,,, | Performed by: INTERNAL MEDICINE

## 2022-06-25 PROCEDURE — 21400001 HC TELEMETRY ROOM

## 2022-06-25 PROCEDURE — 94761 N-INVAS EAR/PLS OXIMETRY MLT: CPT

## 2022-06-25 PROCEDURE — 99233 SBSQ HOSP IP/OBS HIGH 50: CPT | Mod: ,,, | Performed by: INTERNAL MEDICINE

## 2022-06-25 PROCEDURE — C9399 UNCLASSIFIED DRUGS OR BIOLOG: HCPCS | Performed by: INTERNAL MEDICINE

## 2022-06-25 PROCEDURE — 27000207 HC ISOLATION

## 2022-06-25 PROCEDURE — 36415 COLL VENOUS BLD VENIPUNCTURE: CPT | Performed by: INTERNAL MEDICINE

## 2022-06-25 PROCEDURE — 80053 COMPREHEN METABOLIC PANEL: CPT | Performed by: INTERNAL MEDICINE

## 2022-06-25 RX ADMIN — INSULIN ASPART 10 UNITS: 100 INJECTION, SOLUTION INTRAVENOUS; SUBCUTANEOUS at 10:06

## 2022-06-25 RX ADMIN — FUROSEMIDE 60 MG: 10 INJECTION, SOLUTION INTRAMUSCULAR; INTRAVENOUS at 10:06

## 2022-06-25 RX ADMIN — FUROSEMIDE 60 MG: 10 INJECTION, SOLUTION INTRAMUSCULAR; INTRAVENOUS at 06:06

## 2022-06-25 RX ADMIN — INSULIN DETEMIR 6 UNITS: 100 INJECTION, SOLUTION SUBCUTANEOUS at 10:06

## 2022-06-25 RX ADMIN — REMDESIVIR 100 MG: 100 INJECTION, POWDER, LYOPHILIZED, FOR SOLUTION INTRAVENOUS at 10:06

## 2022-06-25 RX ADMIN — HYDRALAZINE HYDROCHLORIDE 100 MG: 25 TABLET, FILM COATED ORAL at 10:06

## 2022-06-25 RX ADMIN — DEXAMETHASONE 6 MG: 4 TABLET ORAL at 10:06

## 2022-06-25 RX ADMIN — APIXABAN 5 MG: 2.5 TABLET, FILM COATED ORAL at 08:06

## 2022-06-25 RX ADMIN — SENNOSIDES AND DOCUSATE SODIUM 1 TABLET: 50; 8.6 TABLET ORAL at 10:06

## 2022-06-25 RX ADMIN — OXYCODONE HYDROCHLORIDE AND ACETAMINOPHEN 500 MG: 500 TABLET ORAL at 08:06

## 2022-06-25 RX ADMIN — INSULIN ASPART 8 UNITS: 100 INJECTION, SOLUTION INTRAVENOUS; SUBCUTANEOUS at 02:06

## 2022-06-25 RX ADMIN — APIXABAN 5 MG: 2.5 TABLET, FILM COATED ORAL at 10:06

## 2022-06-25 RX ADMIN — ASPIRIN 81 MG: 81 TABLET, COATED ORAL at 10:06

## 2022-06-25 RX ADMIN — ALLOPURINOL 100 MG: 100 TABLET ORAL at 10:06

## 2022-06-25 RX ADMIN — SENNOSIDES AND DOCUSATE SODIUM 1 TABLET: 50; 8.6 TABLET ORAL at 08:06

## 2022-06-25 RX ADMIN — AMIODARONE HYDROCHLORIDE 200 MG: 200 TABLET ORAL at 10:06

## 2022-06-25 RX ADMIN — ATORVASTATIN CALCIUM 40 MG: 20 TABLET, FILM COATED ORAL at 10:06

## 2022-06-25 RX ADMIN — ISOSORBIDE MONONITRATE 60 MG: 30 TABLET, EXTENDED RELEASE ORAL at 10:06

## 2022-06-25 RX ADMIN — NITROGLYCERIN 1 PATCH: 0.4 PATCH TRANSDERMAL at 10:06

## 2022-06-25 RX ADMIN — FAMOTIDINE 20 MG: 20 TABLET ORAL at 10:06

## 2022-06-25 RX ADMIN — INSULIN ASPART 2 UNITS: 100 INJECTION, SOLUTION INTRAVENOUS; SUBCUTANEOUS at 08:06

## 2022-06-25 RX ADMIN — TAMSULOSIN HYDROCHLORIDE 0.4 MG: 0.4 CAPSULE ORAL at 10:06

## 2022-06-25 RX ADMIN — THERA TABS 1 TABLET: TAB at 10:06

## 2022-06-25 RX ADMIN — HYDRALAZINE HYDROCHLORIDE 100 MG: 25 TABLET, FILM COATED ORAL at 08:06

## 2022-06-25 RX ADMIN — OXYCODONE HYDROCHLORIDE AND ACETAMINOPHEN 500 MG: 500 TABLET ORAL at 10:06

## 2022-06-25 NOTE — PROGRESS NOTES
[facility] ([place])  Cardiology  Progress Note    Patient Name: Zev Castano  MRN: [id number]  Admission Date: 6/22/2022  Hospital Length of Stay: 3 days  Code Status: Full Code   Attending Physician: KRISTIN Cardenas MD   Primary Care Physician: Mehul Livingston MD  Expected Discharge Date:   Principal Problem:NSTEMI (non-ST elevated myocardial infarction)    Subjective:     Brief HPI:    eZv Castano is a 72 y.o.male with hypertension, hypercholesterolemia and diabetes mellitus type 2. He is obese. In 2012 he had two strokes in the [place] with fairly good neurological recovery. In 2016 he had two stents placed in the left anterior descending coronary artery for angina.  He has had paroxysmal atrial fibrillation for which he underwent electrical cardioversion in 11/2020. He has been on amiodarone since with no clinical recurrence. He is anticoagulated with apixiban. He has chronic kidney disease, stage 4. In 8/2021 he was admitted to [facility] with fluid overload. He was diuresed. It was felt he was near need for dialysis at that time. Since that stay he has been followed at [facility] for his kidney disease. Beginning early 6/2022 he he began to develop more and more edema of his legs. He became increasingly short of breath. At 1 am 6/22/2022 he began feeling a chest pressure. The pressure lasted for about 5 hours. He then vomited 5-6 times and the chest pressure resolved. He felt weak. He presented top the emergency room. A COVID test was positive. There were no acute ST changes. The CXR revealed pulmonary edema. He was admitted. I was asked to see him in consultation as Dr. Livingston is out of town.       Hospital Course:     Diuresis.    NTG patch.    Aspirin was added to apixiban.    Remdesivir and dexamethason.    6/23/2022: Echo: Normal left ventricular size and systolic function. EF 65%. Moderate diastolic dysfunction. Mildly enlarged LA. Mild aortic valve  sclerosis. Moderate MAC.    Interval History:     Good diuresis.    No chest pain.    Breathing easier.    Less edema.    Still markedly fluid overloaded.    Review of Systems   Constitutional: Positive for malaise/fatigue. Negative for chills and fever.   HENT: Negative for nosebleeds.    Eyes: Negative for vision loss in left eye and vision loss in right eye.   Cardiovascular: Positive for leg swelling, orthopnea and paroxysmal nocturnal dyspnea. Negative for chest pain, palpitations and syncope.   Respiratory: Negative for cough, hemoptysis, shortness of breath, sputum production and wheezing.    Hematologic/Lymphatic: Negative for bleeding problem. Does not bruise/bleed easily.   Skin: Negative for color change and rash.   Musculoskeletal: Negative for muscle weakness and myalgias.   Gastrointestinal: Negative for abdominal pain, heartburn, hematemesis, hematochezia, melena, nausea and vomiting.   Genitourinary: Negative for hematuria.   Neurological: Negative for dizziness, focal weakness, headaches, light-headedness, vertigo and weakness.   Psychiatric/Behavioral: Negative for altered mental status. The patient is not nervous/anxious.    Allergic/Immunologic: Negative for persistent infections.       Objective:     Vital Signs (Most Recent):  Temp: 98.3 °F (36.8 °C) (06/25/22 0326)  Pulse: (!) 52 (06/25/22 1000)  Resp: 20 (06/25/22 0326)  BP: 135/64 (06/25/22 0326)  SpO2: (!) 94 % (06/25/22 0326) Vital Signs (24h Range):  Temp:  [97.6 °F (36.4 °C)-98.3 °F (36.8 °C)] 98.3 °F (36.8 °C)  Pulse:  [52-73] 52  Resp:  [18-20] 20  SpO2:  [94 %-96 %] 94 %  BP: (135-159)/(63-72) 135/64     Weight: 117.9 kg (260 lb)  Body mass index is 34.3 kg/m².    SpO2: (!) 94 %  O2 Device (Oxygen Therapy): room air      Intake/Output Summary (Last 24 hours) at 6/25/2022 1107  Last data filed at 6/25/2022 0755  Gross per 24 hour   Intake 1800.37 ml   Output 2125 ml   Net -324.63 ml       Lines/Drains/Airways     Peripheral Intravenous  Line  Duration                Peripheral IV - Single Lumen 06/22/22 1526 18 G Right Forearm 2 days                Physical Exam  Constitutional:       General: He is not in acute distress.     Appearance: Normal appearance. He is well-developed. He is not ill-appearing.   Eyes:      Conjunctiva/sclera:      Right eye: Right conjunctiva is not injected. No hemorrhage.     Left eye: Left conjunctiva is not injected. No hemorrhage.     Pupils:      Right eye: Pupil is round.      Left eye: Pupil is round.   Neck:      Vascular: No JVD.   Cardiovascular:      Rate and Rhythm: Normal rate and regular rhythm.      Heart sounds: S1 normal and S2 normal. Murmur heard.     Gallop present. S4 sounds present.   Pulmonary:      Effort: Pulmonary effort is normal.      Breath sounds: Examination of the right-lower field reveals rales. Examination of the left-lower field reveals rales. Rales present.   Chest:      Chest wall: No swelling or tenderness.   Abdominal:      General: There is no distension.      Palpations: Abdomen is soft.      Tenderness: There is no abdominal tenderness.   Musculoskeletal:      Cervical back: Neck supple.      Right lower leg: Swelling present. 3+ Edema present.      Left lower leg: Swelling present. 3+ Edema present.   Skin:     General: Skin is warm and dry.      Findings: No rash.   Neurological:      General: No focal deficit present.      Mental Status: He is alert and oriented to person, place, and time. He is not disoriented.   Psychiatric:         Attention and Perception: Attention and perception normal.         Mood and Affect: Mood and affect normal.         Speech: Speech normal.         Behavior: Behavior normal.         Thought Content: Thought content normal.         Cognition and Memory: Cognition and memory normal.         Judgment: Judgment normal.         Current Medications:     allopurinoL  100 mg Oral Daily    amiodarone  200 mg Oral Daily    apixaban  5 mg Oral BID     ascorbic acid (vitamin C)  500 mg Oral BID    aspirin  81 mg Oral Daily    atorvastatin  40 mg Oral Daily    calcitRIOL  0.25 mcg Oral Every Mon, Wed, Fri    dexAMETHasone  6 mg Oral Daily    famotidine  20 mg Oral Daily    furosemide (LASIX) injection  60 mg Intravenous BID    hydrALAZINE  100 mg Oral Q12H    insulin detemir U-100  6 Units Subcutaneous Daily    isosorbide mononitrate  60 mg Oral Daily    multivitamin  1 tablet Oral Daily    nitroGLYCERIN 0.4 MG/HR TD PT24  1 patch Transdermal Daily    remdesivir infusion  100 mg Intravenous Daily    senna-docusate 8.6-50 mg  1 tablet Oral BID    tamsulosin  0.4 mg Oral Daily     Current Laboratory Results:    Recent Results (from the past 24 hour(s))   POCT glucose    Collection Time: 06/24/22 12:03 PM   Result Value Ref Range    POCT Glucose 191 (H) 70 - 110 mg/dL   POCT glucose    Collection Time: 06/24/22  1:54 PM   Result Value Ref Range    POCT Glucose 190 (H) 70 - 110 mg/dL   POCT glucose    Collection Time: 06/24/22  4:04 PM   Result Value Ref Range    POCT Glucose 237 (H) 70 - 110 mg/dL   POCT glucose    Collection Time: 06/24/22  8:52 PM   Result Value Ref Range    POCT Glucose 294 (H) 70 - 110 mg/dL   CBC Auto Differential    Collection Time: 06/25/22  4:50 AM   Result Value Ref Range    WBC 7.21 3.90 - 12.70 K/uL    RBC 3.20 (L) 4.60 - 6.20 M/uL    Hemoglobin 10.3 (L) 14.0 - 18.0 g/dL    Hematocrit 30.6 (L) 40.0 - 54.0 %    MCV 96 82 - 98 fL    MCH 32.2 (H) 27.0 - 31.0 pg    MCHC 33.7 32.0 - 36.0 g/dL    RDW 15.0 (H) 11.5 - 14.5 %    Platelets 202 150 - 450 K/uL    MPV 11.4 9.2 - 12.9 fL    Immature Granulocytes 1.5 (H) 0.0 - 0.5 %    Gran # (ANC) 5.9 1.8 - 7.7 K/uL    Immature Grans (Abs) 0.11 (H) 0.00 - 0.04 K/uL    Lymph # 0.5 (L) 1.0 - 4.8 K/uL    Mono # 0.6 0.3 - 1.0 K/uL    Eos # 0.0 0.0 - 0.5 K/uL    Baso # 0.01 0.00 - 0.20 K/uL    nRBC 0 0 /100 WBC    Gran % 82.4 (H) 38.0 - 73.0 %    Lymph % 7.4 (L) 18.0 - 48.0 %    Mono % 8.6 4.0 -  15.0 %    Eosinophil % 0.0 0.0 - 8.0 %    Basophil % 0.1 0.0 - 1.9 %    Differential Method Automated    Comprehensive Metabolic Panel    Collection Time: 06/25/22  4:50 AM   Result Value Ref Range    Sodium 133 (L) 136 - 145 mmol/L    Potassium 4.4 3.5 - 5.1 mmol/L    Chloride 97 95 - 110 mmol/L    CO2 25 23 - 29 mmol/L    Glucose 431 (H) 70 - 110 mg/dL    BUN 67 (H) 8 - 23 mg/dL    Creatinine 3.0 (H) 0.5 - 1.4 mg/dL    Calcium 8.3 (L) 8.7 - 10.5 mg/dL    Total Protein 5.9 (L) 6.0 - 8.4 g/dL    Albumin 2.9 (L) 3.5 - 5.2 g/dL    Total Bilirubin 0.5 0.1 - 1.0 mg/dL    Alkaline Phosphatase 127 55 - 135 U/L    AST 35 10 - 40 U/L    ALT 28 10 - 44 U/L    Anion Gap 11 8 - 16 mmol/L    eGFR if African American 23 (A) >60 mL/min/1.73 m^2    eGFR if non African American 20 (A) >60 mL/min/1.73 m^2   Magnesium    Collection Time: 06/25/22  4:50 AM   Result Value Ref Range    Magnesium 1.9 1.6 - 2.6 mg/dL   Phosphorus    Collection Time: 06/25/22  4:50 AM   Result Value Ref Range    Phosphorus 4.4 2.7 - 4.5 mg/dL   POCT glucose    Collection Time: 06/25/22  7:54 AM   Result Value Ref Range    POCT Glucose 350 (H) 70 - 110 mg/dL     Current Imaging Results:    X-Ray Chest AP Portable   Final Result      Hazy perihilar and bibasilar opacities can be seen with edema.  No focal consolidation.         Electronically signed by: Angelic Lawrence   Date:    06/22/2022   Time:    15:46          6/23/2022: Echo:     The left ventricle is normal in size with normal systolic function.  The estimated ejection fraction is 65%.  Grade II left ventricular diastolic dysfunction.  Mild left atrial enlargement.  Normal right ventricular size with normal right ventricular systolic function.  Mild aortic valve sclerosis.  Moderate mitral annular calcification.  Normal central venous pressure (3 mmHg).       Assessment and Plan:     Problem List:    Active Diagnoses:    Diagnosis Date Noted POA    PRINCIPAL PROBLEM:  NSTEMI (non-ST elevated  myocardial infarction) [I21.4] 06/22/2022 Yes    COVID-19 [U07.1] 06/22/2022 Yes    Acute hypoxemic respiratory failure [J96.01] 06/22/2022 Yes    CKD (chronic kidney disease), stage IV [N18.4] 06/22/2022 Yes    Anemia in stage 4 chronic kidney disease [N18.4, D63.1] 10/18/2021 Yes    Hypertensive kidney disease with stage 4 chronic kidney disease [I12.9, N18.4] 09/20/2021 Yes    Current use of long term anticoagulation [Z79.01] 08/25/2021 Not Applicable    Restrictive lung disease secondary to obesity [J98.4, E66.9] 06/20/2018 Yes    Heart failure, diastolic, acute on chronic [I50.33] 06/19/2018 Yes    Coronary artery disease involving native coronary artery of native heart without angina pectoris [I25.10] 03/24/2016 Yes    PAF (paroxysmal atrial fibrillation) [I48.0] 06/27/2013 Yes    History of CVA (cerebrovascular accident) [Z86.73] 03/11/2013 Not Applicable    Hypercholesteremia [E78.00] 10/02/2012 Yes     Chronic    Non-insulin dependent type 2 diabetes mellitus [E11.9] 10/02/2012 Yes     Chronic      Problems Resolved During this Admission:       Assessment and Plan:     1. Coronary Artery Disease              2016: LAD stents.              6/22/2022: CP x 5 hours.              ECG without acute ST changes. Troponin peak to 9. No new WMA.              Most likely NSTEMI.    He is at very high risk for worsening kidney function if he undergoes coronary angiography.              In this setting appears a initial conservative approach is reasonable.              On apixiban 5 mg Q12.   On aspirin 81 mg Q24.              On isosorbidemononitrate 60 mg Q24.   ECG without any further ST T wave changes.              Would not favor triple therapy for this patient - bleeding risk would be very high.   Aspirin for 1-3 months would be approach I would favor especially as he is receiving the higher option of apixiban dosing.     2. Heart Failure, Diastolic, Acute on Chronic              8/25/2021: Echo:  Normal left ventricular size and systolic function. EF 65%. Moderate diastolic dysfunction.   6/23/2022: Echo: Normal left ventricular size and systolic function. EF 65%. Moderate diastolic dysfunction. Mildly enlarged LA. Mild aortic valve sclerosis. Moderate MAC.              6/22/2022: Presented markedly fluid overloaded. Probably mostly due to CKD.              On furosemide 60 mg iv Q12.   Continue diuresis.     3. Atrial Fibrillation              Paroxysmal atrial fibrillation.              11/2022L: CV.              [id number] ([id number]).              On amiodarone 200 mg Q24.              On apixiban 5 mg Q12.     4. High Risk Medication              On amiodarone 200 mg Q24.     5. Chronic Anticoagulation              [id number] ([id number]).              On apixiban 5 mg Q12.     6. Hypertension              At home been on hydralazine 100 mg Q12 and furosmide 40 mg Q12.     7. Hypercholesterolemia              On atorvastatin 80 mg Q24.     8. Diabetes Mellitus, Type 2              Complications: CKD4, VITA & CAD. Medications: Oral agents.     9. Chronic Kidney Disease, Stage 4              Diabetic nephropathy.              6/22/2022: BUN/crea 39/2.7. CrCl 22.   6/23/2022: BUN/crea 43/2.8. CrCl 21.              Diuresis.     10. COVID 19              6/22/2022: Positive.              On dexamethasone and remdesivir.                  VTE Risk Mitigation (From admission, onward)         Ordered     apixaban tablet 5 mg  2 times daily         06/22/22 1750     IP VTE HIGH RISK PATIENT  Once         06/22/22 1750     Place sequential compression device  Until discontinued         06/22/22 1750                Alex Crisostomo MD  Cardiology  [facility] ([place])

## 2022-06-25 NOTE — PROGRESS NOTES
[facility]  Progress Note    Patient Name: Zev Castano  MRN: [id number]  Patient Class: IP- Inpatient   Admission Date: 6/22/2022  Length of Stay: 3 days  Attending Physician: KRISTIN Cardenas MD  Primary Care Provider: Mehul Livingston MD        Subjective:     Principal Problem:NSTEMI (non-ST elevated myocardial infarction)        HPI:  Mr. Castano is a 72 YOM with PMHx of CAD s/ p PCI (in 2016), paroxysmal AFib s/p cardioversion (on amiodarone and apixaban), HFpEF (grade II DD and mild pulmHTN), history of CVA (in 2012 in [facility] region), HTN, HLD, DM type 2 on oral therapies, CKD IV (follows with Nephrology, concerns for HD/PD need in near future), persistent proteinuria, and SABRINA.    He presented to the ED with complaints of shortness of breath for the last 3 days with associated cough, orthopnea, lower extremity swelling (worse than baseline), early satiety, and abdominal bloating. He developed mid sternal chest tightness yesterday without radiation that lasted for several hours.  He also had episodes of N/V today.  He denies fever, chills, night sweats, palpitations, abdominal pain, diarrhea, constipation, lightheadedness, syncope, or dizziness.  He reports compliance with home medications including diuretic therapies.  He reports compliance with dietary restrictions and denies increased salt intake.  He has independent ADLs, uses no ambulatory aids,  however is mostly sedentary at home and does not participate in many household chores d/t health conditions and chronic fatigue. All past medical, social, and family history reviewed with patient; no pertinent family history identified at this time.    In the ED is hypertensive, heart rate stable, 93% on room air with improvement to 95-99% on 2 L nasal cannula, T-max 100.4° with improvement to 99.7.  CBC unremarkable.  Chemistry with known CKD with BUN and creatinine within historical baseline.  LFTs WNLs.  , slightly  higher than baseline.  .  Troponin 4.574.  Lactic acid 0.7.  Procalcitonin 0.07.  COVID positive.  UA noninfectious.  Hemoglobin A1c 6.4/137.  ESR 32.  Ferritin 348.  .  PT/INR 11.7 / 1.1.  PTT 31.9.  D-dimer 0.56.  Blood cultures obtained.  Chest x-ray concerning for pulmonary edema.  EKG with normal sinus rhythm and known RBBB.  Flu swab and sputum culture pending collection.  In the ED he was given acetaminophen, aspirin, Lasix IV push, nitro paste and sublingual nitro, dexamethasone IV.  Cardiology was consulted. The patient was admitted to the Hospital Medicine Service for further evaluation and management.       Overview/Hospital Course:  No notes on file    Interval History: No acute events overnight. Symptoms improving. SOB with exertion but not at rest. Continued BLE swelling.    Review of Systems   Constitutional:  Negative for chills and fever.   Respiratory:  Positive for cough and shortness of breath.    Cardiovascular:  Negative for chest pain and palpitations.   Gastrointestinal:  Negative for abdominal pain, nausea and vomiting.   Objective:     Vital Signs (Most Recent):  Temp: 98 °F (36.7 °C) (06/25/22 1925)  Pulse: 67 (06/25/22 1925)  Resp: 20 (06/25/22 1925)  BP: (!) 159/70 (06/25/22 1925)  SpO2: (!) 93 % (06/25/22 1925)   Vital Signs (24h Range):  Temp:  [97.6 °F (36.4 °C)-98.3 °F (36.8 °C)] 98 °F (36.7 °C)  Pulse:  [50-73] 67  Resp:  [18-20] 20  SpO2:  [93 %-97 %] 93 %  BP: (127-159)/(63-72) 159/70     Weight: 117.9 kg (260 lb)  Body mass index is 34.3 kg/m².    Intake/Output Summary (Last 24 hours) at 6/25/2022 2021  Last data filed at 6/25/2022 1938  Gross per 24 hour   Intake 1680 ml   Output 1600 ml   Net 80 ml        Physical Exam  Vitals and nursing note reviewed.   Constitutional:       General: He is not in acute distress.     Appearance: He is well-developed.   HENT:      Head: Normocephalic and atraumatic.   Eyes:      General:         Right eye: No discharge.         Left  eye: No discharge.      Conjunctiva/sclera: Conjunctivae normal.   Cardiovascular:      Rate and Rhythm: Normal rate.      Pulses: Normal pulses.   Pulmonary:      Effort: Pulmonary effort is normal. No respiratory distress.      Comments: Diminished bases.  Abdominal:      Palpations: Abdomen is soft.      Tenderness: There is no abdominal tenderness.   Musculoskeletal:         General: Normal range of motion.      Right lower leg: No edema.      Left lower leg: No edema.   Skin:     General: Skin is warm and dry.   Neurological:      Mental Status: He is alert and oriented to person, place, and time.     Significant Labs:   CBC:  Recent Labs   Lab 06/23/22  0331 06/24/22  0655 06/25/22  0450   WBC 3.87* 6.40 7.21   HGB 9.5* 9.8* 10.3*   HCT 29.1* 29.6* 30.6*   * 173 202   GRAN 86.8*  3.4 74.9*  4.8 82.4*  5.9   LYMPH 6.2*  0.2* 7.3*  0.5* 7.4*  0.5*   MONO 4.7  0.2* 16.3*  1.0 8.6  0.6   EOS 0.0 0.0 0.0   BASO 0.02 0.01 0.01     CMP:  Recent Labs   Lab 06/23/22  0331 06/24/22  0655 06/25/22  0450    138 133*   K 4.1 3.9 4.4    102 97   CO2 20* 25 25   BUN 43* 56* 67*   CREATININE 2.8* 2.8* 3.0*   * 243* 431*   CALCIUM 8.7 8.5* 8.3*   MG 2.1 2.0 1.9   PHOS  --  3.9 4.4   ALKPHOS 108 105 127   AST 33 32 35   ALT 18 22 28   BILITOT 0.7 0.5 0.5   PROT 6.1 5.9* 5.9*   ALBUMIN 3.0* 3.0* 2.9*   ANIONGAP 16 11 11        Significant Imaging:   No new imaging this morning.        Assessment/Plan:      * NSTEMI (non-ST elevated myocardial infarction)  Elevated troponin, CAD, h/o PCI, CHF  - Cardiology consulted; pursue medical management given known CAD, chronic renal disease and risk of worsening renal function with potential catheterization.  - Continue aspirin 81mg PO daily, apixaban as above.  - Continue furosemide 60mg IV BID, strict intake/output. Renal function trending upward but still appears overloaded.  - Echo shows EF 65%, grade II diastolic dysfunction.    CKD (chronic kidney  disease), stage IV  Anemia in stage 4 chronic kidney disease   - Chronic; with concern for need for dialysis in near future.  - Diuresis as above.  - Avoid nephrotoxins, renally dose medications.  - Monitor. If decompensates will consult nephrology.    COVID-19  Acute hypoxemic respiratory failure   Restrictive lung disease secondary to obesity  - Acute COVID infection in setting of NSTEMI, CHF as well.  - Continue dexamethasone 6mg PO daily, remdesivir 100mg IV q24hr.  - Wean supplemental O2 as tolerated.    Hypertensive kidney disease with stage 4 chronic kidney disease  - Continue hydralazine 100mg PO BID.    Heart failure, diastolic, acute on chronic  - As above.    PAF (paroxysmal atrial fibrillation)  Current use of long-term anticoagulation   - Continue amiodarone 200mg PO daily, apixaban 5mg PO BID.    History of CVA (cerebrovascular accident)  - Statin, aspirin as above.    Non-insulin dependent type 2 diabetes mellitus  - Continue insulin as moderate-dose sliding scale insulin aspart 1-10U subQ TIDWM PRN given steroid use.    Hypercholesteremia  - Continue atorvastatin 40mg PO daily.    VTE Risk Mitigation (From admission, onward)         Ordered     apixaban tablet 5 mg  2 times daily         06/22/22 1750     IP VTE HIGH RISK PATIENT  Once         06/22/22 1750     Place sequential compression device  Until discontinued         06/22/22 1750                Discharge Planning   ANAHY:      Code Status: Full Code   Is the patient medically ready for discharge?:     Reason for patient still in hospital (select all that apply): Treatment                     D Hay Cardenas MD  Department of Hospital Medicine   [facility])

## 2022-06-26 LAB
ALBUMIN SERPL BCP-MCNC: 3.2 G/DL (ref 3.5–5.2)
ALP SERPL-CCNC: 113 U/L (ref 55–135)
ALT SERPL W/O P-5'-P-CCNC: 27 U/L (ref 10–44)
ANION GAP SERPL CALC-SCNC: 12 MMOL/L (ref 8–16)
AST SERPL-CCNC: 29 U/L (ref 10–40)
BILIRUB SERPL-MCNC: 0.6 MG/DL (ref 0.1–1)
BUN SERPL-MCNC: 76 MG/DL (ref 8–23)
CALCIUM SERPL-MCNC: 8.6 MG/DL (ref 8.7–10.5)
CHLORIDE SERPL-SCNC: 98 MMOL/L (ref 95–110)
CO2 SERPL-SCNC: 24 MMOL/L (ref 23–29)
CREAT SERPL-MCNC: 3 MG/DL (ref 0.5–1.4)
EST. GFR  (AFRICAN AMERICAN): 23 ML/MIN/1.73 M^2
EST. GFR  (NON AFRICAN AMERICAN): 20 ML/MIN/1.73 M^2
GLUCOSE SERPL-MCNC: 204 MG/DL (ref 70–110)
MAGNESIUM SERPL-MCNC: 2 MG/DL (ref 1.6–2.6)
PHOSPHATE SERPL-MCNC: 4.5 MG/DL (ref 2.7–4.5)
POCT GLUCOSE: 201 MG/DL (ref 70–110)
POCT GLUCOSE: 222 MG/DL (ref 70–110)
POCT GLUCOSE: 276 MG/DL (ref 70–110)
POCT GLUCOSE: 435 MG/DL (ref 70–110)
POTASSIUM SERPL-SCNC: 4.4 MMOL/L (ref 3.5–5.1)
PROT SERPL-MCNC: 6.1 G/DL (ref 6–8.4)
SODIUM SERPL-SCNC: 134 MMOL/L (ref 136–145)

## 2022-06-26 PROCEDURE — 99233 SBSQ HOSP IP/OBS HIGH 50: CPT | Mod: CR,,, | Performed by: INTERNAL MEDICINE

## 2022-06-26 PROCEDURE — 36415 COLL VENOUS BLD VENIPUNCTURE: CPT | Performed by: INTERNAL MEDICINE

## 2022-06-26 PROCEDURE — 99233 PR SUBSEQUENT HOSPITAL CARE,LEVL III: ICD-10-PCS | Mod: ,,, | Performed by: INTERNAL MEDICINE

## 2022-06-26 PROCEDURE — 84100 ASSAY OF PHOSPHORUS: CPT | Performed by: INTERNAL MEDICINE

## 2022-06-26 PROCEDURE — 63600175 PHARM REV CODE 636 W HCPCS: Mod: TB | Performed by: NURSE PRACTITIONER

## 2022-06-26 PROCEDURE — 80053 COMPREHEN METABOLIC PANEL: CPT | Performed by: INTERNAL MEDICINE

## 2022-06-26 PROCEDURE — 25000003 PHARM REV CODE 250: Performed by: NURSE PRACTITIONER

## 2022-06-26 PROCEDURE — 25000003 PHARM REV CODE 250: Performed by: INTERNAL MEDICINE

## 2022-06-26 PROCEDURE — 27000207 HC ISOLATION

## 2022-06-26 PROCEDURE — 21400001 HC TELEMETRY ROOM

## 2022-06-26 PROCEDURE — 83735 ASSAY OF MAGNESIUM: CPT | Performed by: INTERNAL MEDICINE

## 2022-06-26 PROCEDURE — 99233 PR SUBSEQUENT HOSPITAL CARE,LEVL III: ICD-10-PCS | Mod: CR,,, | Performed by: INTERNAL MEDICINE

## 2022-06-26 PROCEDURE — 94761 N-INVAS EAR/PLS OXIMETRY MLT: CPT

## 2022-06-26 PROCEDURE — 99233 SBSQ HOSP IP/OBS HIGH 50: CPT | Mod: ,,, | Performed by: INTERNAL MEDICINE

## 2022-06-26 RX ORDER — DOCUSATE SODIUM 100 MG/1
100 CAPSULE, LIQUID FILLED ORAL 2 TIMES DAILY PRN
Qty: 60 CAPSULE | Refills: 0 | Status: SHIPPED | OUTPATIENT
Start: 2022-06-26 | End: 2023-10-16

## 2022-06-26 RX ADMIN — ASPIRIN 81 MG: 81 TABLET, COATED ORAL at 10:06

## 2022-06-26 RX ADMIN — OXYCODONE HYDROCHLORIDE AND ACETAMINOPHEN 500 MG: 500 TABLET ORAL at 10:06

## 2022-06-26 RX ADMIN — HYDRALAZINE HYDROCHLORIDE 100 MG: 25 TABLET, FILM COATED ORAL at 10:06

## 2022-06-26 RX ADMIN — HYDRALAZINE HYDROCHLORIDE 100 MG: 25 TABLET, FILM COATED ORAL at 09:06

## 2022-06-26 RX ADMIN — INSULIN DETEMIR 6 UNITS: 100 INJECTION, SOLUTION SUBCUTANEOUS at 10:06

## 2022-06-26 RX ADMIN — ALLOPURINOL 100 MG: 100 TABLET ORAL at 10:06

## 2022-06-26 RX ADMIN — INSULIN ASPART 4 UNITS: 100 INJECTION, SOLUTION INTRAVENOUS; SUBCUTANEOUS at 12:06

## 2022-06-26 RX ADMIN — INSULIN ASPART 6 UNITS: 100 INJECTION, SOLUTION INTRAVENOUS; SUBCUTANEOUS at 05:06

## 2022-06-26 RX ADMIN — APIXABAN 5 MG: 2.5 TABLET, FILM COATED ORAL at 10:06

## 2022-06-26 RX ADMIN — OXYCODONE HYDROCHLORIDE AND ACETAMINOPHEN 500 MG: 500 TABLET ORAL at 09:06

## 2022-06-26 RX ADMIN — FAMOTIDINE 20 MG: 20 TABLET ORAL at 10:06

## 2022-06-26 RX ADMIN — ATORVASTATIN CALCIUM 40 MG: 20 TABLET, FILM COATED ORAL at 10:06

## 2022-06-26 RX ADMIN — INSULIN ASPART 5 UNITS: 100 INJECTION, SOLUTION INTRAVENOUS; SUBCUTANEOUS at 09:06

## 2022-06-26 RX ADMIN — SENNOSIDES AND DOCUSATE SODIUM 1 TABLET: 50; 8.6 TABLET ORAL at 09:06

## 2022-06-26 RX ADMIN — THERA TABS 1 TABLET: TAB at 10:06

## 2022-06-26 RX ADMIN — TAMSULOSIN HYDROCHLORIDE 0.4 MG: 0.4 CAPSULE ORAL at 10:06

## 2022-06-26 RX ADMIN — SENNOSIDES AND DOCUSATE SODIUM 1 TABLET: 50; 8.6 TABLET ORAL at 10:06

## 2022-06-26 RX ADMIN — DEXAMETHASONE 6 MG: 4 TABLET ORAL at 10:06

## 2022-06-26 RX ADMIN — FUROSEMIDE 60 MG: 10 INJECTION, SOLUTION INTRAMUSCULAR; INTRAVENOUS at 10:06

## 2022-06-26 RX ADMIN — AMIODARONE HYDROCHLORIDE 200 MG: 200 TABLET ORAL at 10:06

## 2022-06-26 RX ADMIN — FUROSEMIDE 60 MG: 10 INJECTION, SOLUTION INTRAMUSCULAR; INTRAVENOUS at 05:06

## 2022-06-26 RX ADMIN — APIXABAN 5 MG: 2.5 TABLET, FILM COATED ORAL at 09:06

## 2022-06-26 RX ADMIN — INSULIN ASPART 4 UNITS: 100 INJECTION, SOLUTION INTRAVENOUS; SUBCUTANEOUS at 10:06

## 2022-06-26 RX ADMIN — REMDESIVIR 100 MG: 100 INJECTION, POWDER, LYOPHILIZED, FOR SOLUTION INTRAVENOUS at 10:06

## 2022-06-26 RX ADMIN — ISOSORBIDE MONONITRATE 60 MG: 30 TABLET, EXTENDED RELEASE ORAL at 10:06

## 2022-06-26 NOTE — CONSULTS
REASON FOR CONSULTATION: CKD 4     HPI:72 y.o. male known to me from renal clinic presented to ED 6/22/22 with NSTEMI, COVID-19.  Dr. Crisostomo felt he very high risk for worsening kidney function if he undergoes coronary angiography and elected to follow a more conservative path.  He was markedly volume overloaded on admission and is currently undergoing aggressive diuresis.  He has Afib and is on Eliquis and amio.  He has been lost to follow up with me since his visit in 8/24/2021 at which time we discussed that he was nearing  ESRD and discussed renal transplant, PD, and HD options.  His leg swelling was not much better despite PO diuretic challenge, and his PND is worse to point where the GI doctor said there is no way he can undergo screening colonoscopy due to his SOB.  I recommended that he report to the ED for admission and discussed with his PCP Dr. Livingston.  He has since been following with nephrology at [facility] with Dr. Sloan, last seen by her in April.  He is due to see her this month.    REVIEW OF SYSTEMS:   Constitutional: Positive for malaise/fatigue. Negative for chills and fever.   HENT: Negative for nosebleeds.    Eyes: Negative for vision loss in left eye and vision loss in right eye.   Cardiovascular: Positive for leg swelling, orthopnea and paroxysmal nocturnal dyspnea. Negative for chest pain, palpitations and syncope.   Respiratory: Negative for cough, hemoptysis, shortness of breath, sputum production and wheezing.    Hematologic/Lymphatic: Negative for bleeding problem. Does not bruise/bleed easily.   Skin: Negative for color change and rash.   Musculoskeletal: Negative for muscle weakness and myalgias.   Gastrointestinal: Negative for abdominal pain, heartburn, hematemesis, hematochezia, melena, nausea and vomiting.   Genitourinary: Negative for hematuria.   Neurological: Negative for dizziness, focal weakness, headaches, light-headedness, vertigo and weakness.   Psychiatric/Behavioral:  Negative for altered mental status. The patient is not nervous/anxious.    Allergic/Immunologic: Negative for persistent infections.     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     Hyperlipidemia     Hypertension     Nephrotic range proteinuria     Paroxysmal atrial fibrillation     Stroke     nov 2012       Past Surgical History:   Procedure Laterality Date    BIOPSY N/A 5/29/2018    Procedure: BIOPSY;  Surgeon: Neeta Diagnostic Provider;  Location: [facility] CATH LAB;  Service: Radiology;  Laterality: N/A;  ct guided      CARDIOVERSION N/A 12/3/2020    Procedure: CARDIOVERSION;  Surgeon: Mehul Livingston MD;  Location: [facility] CATH LAB;  Service: Cardiology;  Laterality: N/A;    KNEE SURGERY Left     SHOULDER SURGERY Right        Review of patient's allergies indicates:   Allergen Reactions    Doxycycline Rash       Medications Prior to Admission   Medication Sig Dispense Refill Last Dose    allopurinoL (ZYLOPRIM) 100 MG tablet Take 1 tablet (100 mg total) by mouth once daily. 30 tablet 6     amiodarone (PACERONE) 100 MG Tab Take by mouth once daily.       apixaban (ELIQUIS) 5 mg Tab Take 1 tablet (5 mg total) by mouth 2 (two) times daily. Hold for 3 days prior to PD catheter placement. 180 tablet 0     atorvastatin (LIPITOR) 40 MG tablet TAKE 1 TABLET BY MOUTH EVERY DAY 90 tablet 0     calcitRIOL (ROCALTROL) 0.25 MCG Cap Take 1 capsule (0.25 mcg total) by mouth every Mon, Wed, Fri. 12 capsule 3     famotidine (PEPCID) 20 MG tablet Take 1 tablet (20 mg total) by mouth once daily. 30 tablet 11     furosemide (LASIX) 80 MG tablet Take 1 tablet (80 mg total) by mouth 2 (two) times a day. (Patient taking differently: Take 40 mg by mouth 2 (two) times a day.)       glipiZIDE (GLUCOTROL) 2.5 MG TR24 Take 2.5 mg by  mouth once daily.       hydrALAZINE (APRESOLINE) 100 MG tablet Take 1 tablet (100 mg total) by mouth every 12 (twelve) hours. 60 tablet 11     SITagliptin (JANUVIA) 25 MG Tab Take 1 tablet (25 mg total) by mouth once daily. (Patient taking differently: Take 50 mg by mouth once daily.) 30 tablet 6     tamsulosin (FLOMAX) 0.4 mg Cap Take 0.4 mg by mouth once daily.       vitamin D (VITAMIN D3) 1000 units Tab Take 1,000 Units by mouth once daily.       vitamin renal formula, B-complex-vitamin c-folic acid, (NEPHROCAP) 1 mg Cap Take 1 capsule by mouth once daily. 90 capsule 0        Current Facility-Administered Medications   Medication Dose Route Frequency Provider Last Rate Last Admin    acetaminophen tablet 650 mg  650 mg Oral Q4H PRN Fide Aguilar NP        albuterol inhaler 2 puff  2 puff Inhalation Q6H PRN KRISTIN Cardenas MD        allopurinoL tablet 100 mg  100 mg Oral Daily Fide Aguilar NP   100 mg at 06/26/22 1002    amiodarone tablet 200 mg  200 mg Oral Daily Fide Aguilar NP   200 mg at 06/26/22 1002    apixaban tablet 5 mg  5 mg Oral BID Fide Aguilar NP   5 mg at 06/26/22 1001    ascorbic acid (vitamin C) tablet 500 mg  500 mg Oral BID Fide Aguilar NP   500 mg at 06/26/22 1001    aspirin EC tablet 81 mg  81 mg Oral Daily Alex Crisostomo MD   81 mg at 06/26/22 1001    atorvastatin tablet 40 mg  40 mg Oral Daily Fide Aguilar NP   40 mg at 06/26/22 1002    benzonatate capsule 200 mg  200 mg Oral TID PRN KRISTIN Cardenas MD   200 mg at 06/23/22 2047    bisacodyL suppository 10 mg  10 mg Rectal Daily PRN Fide Aguilar NP        calcitRIOL capsule 0.25 mcg  0.25 mcg Oral Every Mon, Wed, Fri Fide Aguilar NP   0.25 mcg at 06/24/22 1818    dexAMETHasone tablet 6 mg  6 mg Oral Daily Fide Aguilar NP   6 mg at 06/26/22 1002    dextromethorphan-guaiFENesin  mg/5 ml liquid 10 mL  10 mL Oral Q4H PRN Fide Aguilar, NP   10 mL at  06/23/22 1754    dextrose 10% bolus 125 mL  12.5 g Intravenous PRN Fide Aguilar NP        dextrose 10% bolus 250 mL  25 g Intravenous PRN Fide Aguilar NP        famotidine tablet 20 mg  20 mg Oral Daily Fide Aguilar NP   20 mg at 06/26/22 1002    furosemide injection 60 mg  60 mg Intravenous BID Fide Aguilar NP   60 mg at 06/26/22 1001    glucagon (human recombinant) injection 1 mg  1 mg Intramuscular PRN Fide Aguilar NP        glucose chewable tablet 16 g  16 g Oral PRN Fide Aguilar NP        glucose chewable tablet 24 g  24 g Oral PRN Fide Aguilar NP        hydrALAZINE tablet 100 mg  100 mg Oral Q12H Fide Aguilar NP   100 mg at 06/26/22 1001    insulin aspart U-100 pen 1-10 Units  1-10 Units Subcutaneous QID (AC + HS) PRN KRISTIN Cardenas MD   4 Units at 06/26/22 1235    insulin detemir U-100 pen 6 Units  6 Units Subcutaneous Daily KRISTIN Cardenas MD   6 Units at 06/26/22 1003    isosorbide mononitrate 24 hr tablet 60 mg  60 mg Oral Daily Alex Crisostomo MD   60 mg at 06/26/22 1002    melatonin tablet 6 mg  6 mg Oral Nightly PRN Reema Riley MD        multivitamin tablet  1 tablet Oral Daily Fide Aguilar NP   1 tablet at 06/26/22 1002    nitroGLYCERIN SL tablet 0.4 mg  0.4 mg Sublingual Q5 Min PRN Alex Crisostomo MD        ondansetron disintegrating tablet 8 mg  8 mg Oral Q8H PRN Fide Aguilar NP        ondansetron injection 4 mg  4 mg Intravenous Q8H PRN Fide Aguilar NP        polyethylene glycol packet 17 g  17 g Oral BID PRN Fide Aguilar NP        senna-docusate 8.6-50 mg per tablet 1 tablet  1 tablet Oral BID Fide Aguilar NP   1 tablet at 06/26/22 1002    sodium chloride 0.9% flush 10 mL  10 mL Intravenous PRN Fide Aguilar, BLAYNE        tamsulosin 24 hr capsule 0.4 mg  0.4 mg Oral Daily Fide Aguilar NP   0.4 mg at 06/26/22 1001       Social History     Socioeconomic History    Marital  status: Single   Tobacco Use    Smoking status: Former Smoker     Types: Cigars    Smokeless tobacco: Never Used    Tobacco comment: occasional, when at the casino   Substance and Sexual Activity    Alcohol use: Yes     Alcohol/week: 1.0 standard drink     Types: 1 Cans of beer per week     Comment: occasional when at the casino    Drug use: No    Sexual activity: Yes     Partners: Female     Birth control/protection: None       Family History   Problem Relation Age of Onset    Hypertension Mother     Heart attack Father 59    Stroke Sister     Esophageal cancer Brother        Temp:  [97.6 °F (36.4 °C)-98.2 °F (36.8 °C)] 97.9 °F (36.6 °C)  Pulse:  [50-72] 58  Resp:  [16-20] 18  SpO2:  [93 %-97 %] 97 %  BP: (127-159)/(63-95) 141/95      PHYSICAL EXAM:    GEN:  Alert and Oriented x4, NAD, appropriate affect  HEENT:  NCAT, FRANCISCO JAVIER, No conjunctivitis, normal sclera, O/P clear, no oropharyngial lesions, no thrush, neck supple, No LAD, Nml JVD, no carotid bruits  CV:  RRR 2/6 LORETTA and +S4  Lungs:  Course BS no wheeze, crackles, normal excursion  Abdomen: Obese, soft, NTND, no fluid wave, no HSM, NABS  Ext:  No CC, normal DP pulses bilat 3+ edema   Neuro:  Non-Focal, EOMI, gait not assessed  Skin:  No rash, excoriation, petchiae    Labs/EKG/Radiology:  Reviewed    6/23/2022: Echo: Normal left ventricular size and systolic function. EF 65%. Moderate diastolic dysfunction. Mildly enlarged LA. Mild aortic valve sclerosis. Moderate MAC.    ASSESSMENT AND PLAN:    Mild NATALIIA on CKD 4 due to CR Syndrome  -Creat on admission 2.7 now 3.0 likely due to diuresis efforts.  -Has biopsy proven diabetic kidney disease with non-nephrotic range proteinuria  -Avoid nephrotoxins and renally dose all meds  -Continue current meds   -Suspect creat will get worse before better given need for diuresis  -He is aware that he is very close to ESRD and needs close follow up with Dr. Sloan upon discharge  -I do not recommend use of remdesivir  for GFR < 30 - will discuss with team but given his minimal symptoms and risk of IV vehicle accumulation would stop this.    Thank you for the consult.  I'm happy to follow along with you.  Sharon Ortiz M.D.  Nephrology

## 2022-06-26 NOTE — SUBJECTIVE & OBJECTIVE
Interval History: No acute events overnight. Symptoms improving. SOB with exertion but not at rest. Continued BLE swelling.    Review of Systems   Constitutional:  Negative for chills and fever.   Respiratory:  Positive for cough and shortness of breath.    Cardiovascular:  Negative for chest pain and palpitations.   Gastrointestinal:  Negative for abdominal pain, nausea and vomiting.   Objective:     Vital Signs (Most Recent):  Temp: 98 °F (36.7 °C) (06/25/22 1925)  Pulse: 67 (06/25/22 1925)  Resp: 20 (06/25/22 1925)  BP: (!) 159/70 (06/25/22 1925)  SpO2: (!) 93 % (06/25/22 1925)   Vital Signs (24h Range):  Temp:  [97.6 °F (36.4 °C)-98.3 °F (36.8 °C)] 98 °F (36.7 °C)  Pulse:  [50-73] 67  Resp:  [18-20] 20  SpO2:  [93 %-97 %] 93 %  BP: (127-159)/(63-72) 159/70     Weight: 117.9 kg (260 lb)  Body mass index is 34.3 kg/m².    Intake/Output Summary (Last 24 hours) at 6/25/2022 2021  Last data filed at 6/25/2022 1938  Gross per 24 hour   Intake 1680 ml   Output 1600 ml   Net 80 ml        Physical Exam  Vitals and nursing note reviewed.   Constitutional:       General: He is not in acute distress.     Appearance: He is well-developed.   HENT:      Head: Normocephalic and atraumatic.   Eyes:      General:         Right eye: No discharge.         Left eye: No discharge.      Conjunctiva/sclera: Conjunctivae normal.   Cardiovascular:      Rate and Rhythm: Normal rate.      Pulses: Normal pulses.   Pulmonary:      Effort: Pulmonary effort is normal. No respiratory distress.      Comments: Diminished bases.  Abdominal:      Palpations: Abdomen is soft.      Tenderness: There is no abdominal tenderness.   Musculoskeletal:         General: Normal range of motion.      Right lower leg: No edema.      Left lower leg: No edema.   Skin:     General: Skin is warm and dry.   Neurological:      Mental Status: He is alert and oriented to person, place, and time.     Significant Labs:   CBC:  Recent Labs   Lab 06/23/22  2762  06/24/22  0655 06/25/22  0450   WBC 3.87* 6.40 7.21   HGB 9.5* 9.8* 10.3*   HCT 29.1* 29.6* 30.6*   * 173 202   GRAN 86.8*  3.4 74.9*  4.8 82.4*  5.9   LYMPH 6.2*  0.2* 7.3*  0.5* 7.4*  0.5*   MONO 4.7  0.2* 16.3*  1.0 8.6  0.6   EOS 0.0 0.0 0.0   BASO 0.02 0.01 0.01     CMP:  Recent Labs   Lab 06/23/22  0331 06/24/22  0655 06/25/22  0450    138 133*   K 4.1 3.9 4.4    102 97   CO2 20* 25 25   BUN 43* 56* 67*   CREATININE 2.8* 2.8* 3.0*   * 243* 431*   CALCIUM 8.7 8.5* 8.3*   MG 2.1 2.0 1.9   PHOS  --  3.9 4.4   ALKPHOS 108 105 127   AST 33 32 35   ALT 18 22 28   BILITOT 0.7 0.5 0.5   PROT 6.1 5.9* 5.9*   ALBUMIN 3.0* 3.0* 2.9*   ANIONGAP 16 11 11        Significant Imaging:   No new imaging this morning.

## 2022-06-26 NOTE — PROGRESS NOTES
[facility] Medicine  Progress Note    Patient Name: Zev Castano  MRN: [id number]  Patient Class: IP- Inpatient   Admission Date: 6/22/2022  Length of Stay: 4 days  Attending Physician: KRISTIN Cardenas MD  Primary Care Provider: Mehul Livingston MD        Subjective:     Principal Problem:NSTEMI (non-ST elevated myocardial infarction)        HPI:  Mr. Castano is a 72 YOM with PMHx of CAD s/ p PCI (in 2016), paroxysmal AFib s/p cardioversion (on amiodarone and apixaban), HFpEF (grade II DD and mild pulmHTN), history of CVA (in 2012 in [facility] region), HTN, HLD, DM type 2 on oral therapies, CKD IV (follows with Nephrology, concerns for HD/PD need in near future), persistent proteinuria, and SABRINA.    He presented to the ED with complaints of shortness of breath for the last 3 days with associated cough, orthopnea, lower extremity swelling (worse than baseline), early satiety, and abdominal bloating. He developed mid sternal chest tightness yesterday without radiation that lasted for several hours.  He also had episodes of N/V today.  He denies fever, chills, night sweats, palpitations, abdominal pain, diarrhea, constipation, lightheadedness, syncope, or dizziness.  He reports compliance with home medications including diuretic therapies.  He reports compliance with dietary restrictions and denies increased salt intake.  He has independent ADLs, uses no ambulatory aids,  however is mostly sedentary at home and does not participate in many household chores d/t health conditions and chronic fatigue. All past medical, social, and family history reviewed with patient; no pertinent family history identified at this time.    In the ED is hypertensive, heart rate stable, 93% on room air with improvement to 95-99% on 2 L nasal cannula, T-max 100.4° with improvement to 99.7.  CBC unremarkable.  Chemistry with known CKD with BUN and creatinine within historical baseline.  LFTs WNLs.  , slightly  higher than baseline.  .  Troponin 4.574.  Lactic acid 0.7.  Procalcitonin 0.07.  COVID positive.  UA noninfectious.  Hemoglobin A1c 6.4/137.  ESR 32.  Ferritin 348.  .  PT/INR 11.7 / 1.1.  PTT 31.9.  D-dimer 0.56.  Blood cultures obtained.  Chest x-ray concerning for pulmonary edema.  EKG with normal sinus rhythm and known RBBB.  Flu swab and sputum culture pending collection.  In the ED he was given acetaminophen, aspirin, Lasix IV push, nitro paste and sublingual nitro, dexamethasone IV.  Cardiology was consulted. The patient was admitted to the Hospital Medicine Service for further evaluation and management.       Overview/Hospital Course:  No notes on file    Interval History: No acute events overnight. Symptoms minimal; leg swelling persists. Discussed plan of care.    Review of Systems   Constitutional:  Negative for chills and fever.   Respiratory:  Positive for cough. Negative for shortness of breath.    Cardiovascular:  Negative for chest pain and palpitations.   Gastrointestinal:  Negative for abdominal pain, nausea and vomiting.   Objective:     Vital Signs (Most Recent):  Temp: 98.2 °F (36.8 °C) (06/26/22 1601)  Pulse: 62 (06/26/22 1800)  Resp: 18 (06/26/22 1601)  BP: (!) 152/69 (06/26/22 1601)  SpO2: (!) 94 % (06/26/22 1601)   Vital Signs (24h Range):  Temp:  [97.7 °F (36.5 °C)-98.2 °F (36.8 °C)] 98.2 °F (36.8 °C)  Pulse:  [58-72] 62  Resp:  [16-20] 18  SpO2:  [94 %-97 %] 94 %  BP: (141-159)/(66-95) 152/69     Weight: 117.9 kg (260 lb)  Body mass index is 34.3 kg/m².    Intake/Output Summary (Last 24 hours) at 6/26/2022 1946  Last data filed at 6/26/2022 1606  Gross per 24 hour   Intake 360 ml   Output 2400 ml   Net -2040 ml        Physical Exam  Vitals and nursing note reviewed.   Constitutional:       General: He is not in acute distress.     Appearance: He is well-developed.   HENT:      Head: Normocephalic and atraumatic.   Eyes:      General:         Right eye: No discharge.          Left eye: No discharge.      Conjunctiva/sclera: Conjunctivae normal.   Cardiovascular:      Rate and Rhythm: Normal rate.      Pulses: Normal pulses.   Pulmonary:      Effort: Pulmonary effort is normal. No respiratory distress.      Comments: Diminished bases.  Abdominal:      Palpations: Abdomen is soft.      Tenderness: There is no abdominal tenderness.   Musculoskeletal:         General: Normal range of motion.      Right lower leg: No edema.      Left lower leg: No edema.   Skin:     General: Skin is warm and dry.   Neurological:      Mental Status: He is alert and oriented to person, place, and time.     Significant Labs:   CBC:  Recent Labs   Lab 06/24/22  0655 06/25/22  0450   WBC 6.40 7.21   HGB 9.8* 10.3*   HCT 29.6* 30.6*    202   GRAN 74.9*  4.8 82.4*  5.9   LYMPH 7.3*  0.5* 7.4*  0.5*   MONO 16.3*  1.0 8.6  0.6   EOS 0.0 0.0   BASO 0.01 0.01     CMP:  Recent Labs   Lab 06/24/22  0655 06/25/22  0450 06/26/22  0438    133* 134*   K 3.9 4.4 4.4    97 98   CO2 25 25 24   BUN 56* 67* 76*   CREATININE 2.8* 3.0* 3.0*   * 431* 204*   CALCIUM 8.5* 8.3* 8.6*   MG 2.0 1.9 2.0   PHOS 3.9 4.4 4.5   ALKPHOS 105 127 113   AST 32 35 29   ALT 22 28 27   BILITOT 0.5 0.5 0.6   PROT 5.9* 5.9* 6.1   ALBUMIN 3.0* 2.9* 3.2*   ANIONGAP 11 11 12        Significant Imaging:   No new imaging this morning.        Assessment/Plan:      * NSTEMI (non-ST elevated myocardial infarction)  Elevated troponin, CAD, h/o PCI, CHF  - Cardiology consulted; pursue medical management given known CAD, chronic renal disease and risk of worsening renal function with potential catheterization.  - Continue aspirin 81mg PO daily, apixaban as above.  - Continue furosemide 60mg IV BID, strict intake/output. Renal function trending upward but still appears overloaded.  - Echo shows EF 65%, grade II diastolic dysfunction.    CKD (chronic kidney disease), stage IV  Anemia in stage 4 chronic kidney disease   - Chronic; with  concern for need for dialysis in near future.  - Diuresis as above.  - Avoid nephrotoxins, renally dose medications.  - Gradual upward trending Cr during diuresis as anticipated. Given baseline CKDIV, will consult nephrology for monitoring assistance in setting of diuresis.    COVID-19  Acute hypoxemic respiratory failure   Restrictive lung disease secondary to obesity  - Acute COVID infection in setting of NSTEMI, CHF as well.  - Continue dexamethasone 6mg PO daily, completing 5 days of remdesivir.  - Weaned from supplemental O2.    Hypertensive kidney disease with stage 4 chronic kidney disease  - Continue hydralazine 100mg PO BID.    Heart failure, diastolic, acute on chronic  - As above.    PAF (paroxysmal atrial fibrillation)  Current use of long-term anticoagulation   - Continue amiodarone 200mg PO daily, apixaban 5mg PO BID.    History of CVA (cerebrovascular accident)  - Statin, aspirin as above.    Non-insulin dependent type 2 diabetes mellitus  - Continue insulin as moderate-dose sliding scale insulin aspart 1-10U subQ TIDWM PRN given steroid use.    Hypercholesteremia  - Continue atorvastatin 40mg PO daily.    VTE Risk Mitigation (From admission, onward)         Ordered     apixaban tablet 5 mg  2 times daily         06/22/22 1750     IP VTE HIGH RISK PATIENT  Once         06/22/22 1750     Place sequential compression device  Until discontinued         06/22/22 1750                Discharge Planning   ANAHY:      Code Status: Full Code   Is the patient medically ready for discharge?:     Reason for patient still in hospital (select all that apply): Treatment                     D Hay Cardenas MD  Department of Hospital Medicine   [facility])

## 2022-06-26 NOTE — ASSESSMENT & PLAN NOTE
Elevated troponin, CAD, h/o PCI, CHF  - Cardiology consulted; pursue medical management given known CAD, chronic renal disease and risk of worsening renal function with potential catheterization.  - Continue aspirin 81mg PO daily, apixaban as above.  - Continue furosemide 60mg IV BID, strict intake/output. Renal function trending upward but still appears overloaded.  - Echo shows EF 65%, grade II diastolic dysfunction.

## 2022-06-26 NOTE — PLAN OF CARE
Patient alert and oriented x 4 and able to make needs known. VS WNL. No complaints of pain. Completed course of remdesivir. Educated on short and long acting insulin while admitted. Verbalized understanding. Nephro consult completed. Continues on airborne precautions. No needs at this time.

## 2022-06-26 NOTE — PROGRESS NOTES
[facility] ([place])  Cardiology  Progress Note    Patient Name: Zev Castano  MRN: [id number]  Admission Date: 6/22/2022  Hospital Length of Stay: 4 days  Code Status: Full Code   Attending Physician: KRISTIN Cardenas MD   Primary Care Physician: Mehul Livingston MD  Expected Discharge Date:   Principal Problem:NSTEMI (non-ST elevated myocardial infarction)    Subjective:     Brief HPI:    Zev Castano is a 72 y.o.male with hypertension, hypercholesterolemia and diabetes mellitus type 2. He is obese. In 2012 he had two strokes in the [place] with fairly good neurological recovery. In 2016 he had two stents placed in the left anterior descending coronary artery for angina.  He has had paroxysmal atrial fibrillation for which he underwent electrical cardioversion in 11/2020. He has been on amiodarone since with no clinical recurrence. He is anticoagulated with apixiban. He has chronic kidney disease, stage 4. In 8/2021 he was admitted to [facility] with fluid overload. He was diuresed. It was felt he was near need for dialysis at that time. Since that stay he has been followed at [facility] for his kidney disease. Beginning early 6/2022 he he began to develop more and more edema of his legs. He became increasingly short of breath. At 1 am 6/22/2022 he began feeling a chest pressure. The pressure lasted for about 5 hours. He then vomited 5-6 times and the chest pressure resolved. He felt weak. He presented top the emergency room. A COVID test was positive. There were no acute ST changes. The CXR revealed pulmonary edema. He was admitted. I was asked to see him in consultation as Dr. Livingston is out of town.       Hospital Course:     Diuresis.    NTG patch.    Aspirin was added to apixiban.    Remdesivir and dexamethason.    6/23/2022: Echo: Normal left ventricular size and systolic function. EF 65%. Moderate diastolic dysfunction. Mildly enlarged LA. Mild aortic valve  sclerosis. Moderate MAC.    Interval History:     Good diuresis.    No chest pain.    Breathing easier.    Still markedly fluid overloaded.    Review of Systems   Constitutional: Positive for malaise/fatigue. Negative for chills and fever.   HENT: Negative for nosebleeds.    Eyes: Negative for vision loss in left eye and vision loss in right eye.   Cardiovascular: Positive for leg swelling, orthopnea and paroxysmal nocturnal dyspnea. Negative for chest pain, palpitations and syncope.   Respiratory: Negative for cough, hemoptysis, shortness of breath, sputum production and wheezing.    Hematologic/Lymphatic: Negative for bleeding problem. Does not bruise/bleed easily.   Skin: Negative for color change and rash.   Musculoskeletal: Negative for muscle weakness and myalgias.   Gastrointestinal: Negative for abdominal pain, heartburn, hematemesis, hematochezia, melena, nausea and vomiting.   Genitourinary: Negative for hematuria.   Neurological: Negative for dizziness, focal weakness, headaches, light-headedness, vertigo and weakness.   Psychiatric/Behavioral: Negative for altered mental status. The patient is not nervous/anxious.    Allergic/Immunologic: Negative for persistent infections.       Objective:     Vital Signs (Most Recent):  Temp: 97.9 °F (36.6 °C) (06/26/22 1131)  Pulse: 60 (06/26/22 1131)  Resp: 18 (06/26/22 1131)  BP: (!) 141/95 (06/26/22 1131)  SpO2: 97 % (06/26/22 1131) Vital Signs (24h Range):  Temp:  [97.6 °F (36.4 °C)-98.2 °F (36.8 °C)] 97.9 °F (36.6 °C)  Pulse:  [50-72] 60  Resp:  [16-20] 18  SpO2:  [93 %-97 %] 97 %  BP: (127-159)/(63-95) 141/95     Weight: 117.9 kg (260 lb)  Body mass index is 34.3 kg/m².    SpO2: 97 %  O2 Device (Oxygen Therapy): room air      Intake/Output Summary (Last 24 hours) at 6/26/2022 1222  Last data filed at 6/26/2022 0750  Gross per 24 hour   Intake 480 ml   Output 1850 ml   Net -1370 ml       Lines/Drains/Airways     Peripheral Intravenous Line  Duration                 Peripheral IV - Single Lumen 06/22/22 1526 18 G Right Forearm 3 days                Physical Exam  Constitutional:       General: He is not in acute distress.     Appearance: Normal appearance. He is well-developed. He is not ill-appearing.   Eyes:      Conjunctiva/sclera:      Right eye: Right conjunctiva is not injected. No hemorrhage.     Left eye: Left conjunctiva is not injected. No hemorrhage.     Pupils:      Right eye: Pupil is round.      Left eye: Pupil is round.   Neck:      Vascular: No JVD.   Cardiovascular:      Rate and Rhythm: Normal rate and regular rhythm.      Heart sounds: S1 normal and S2 normal. Murmur heard.     Gallop present. S4 sounds present.   Pulmonary:      Effort: Pulmonary effort is normal.      Breath sounds: Examination of the right-lower field reveals rales. Examination of the left-lower field reveals rales. Rales present.   Chest:      Chest wall: No swelling or tenderness.   Abdominal:      General: There is no distension.      Palpations: Abdomen is soft.      Tenderness: There is no abdominal tenderness.   Musculoskeletal:      Cervical back: Neck supple.      Right lower leg: Swelling present. 3+ Edema present.      Left lower leg: Swelling present. 3+ Edema present.   Skin:     General: Skin is warm and dry.      Findings: No rash.   Neurological:      General: No focal deficit present.      Mental Status: He is alert and oriented to person, place, and time. He is not disoriented.   Psychiatric:         Attention and Perception: Attention and perception normal.         Mood and Affect: Mood and affect normal.         Speech: Speech normal.         Behavior: Behavior normal.         Thought Content: Thought content normal.         Cognition and Memory: Cognition and memory normal.         Judgment: Judgment normal.         Current Medications:     allopurinoL  100 mg Oral Daily    amiodarone  200 mg Oral Daily    apixaban  5 mg Oral BID    ascorbic acid (vitamin C)  500  mg Oral BID    aspirin  81 mg Oral Daily    atorvastatin  40 mg Oral Daily    calcitRIOL  0.25 mcg Oral Every Mon, Wed, Fri    dexAMETHasone  6 mg Oral Daily    famotidine  20 mg Oral Daily    furosemide (LASIX) injection  60 mg Intravenous BID    hydrALAZINE  100 mg Oral Q12H    insulin detemir U-100  6 Units Subcutaneous Daily    isosorbide mononitrate  60 mg Oral Daily    multivitamin  1 tablet Oral Daily    senna-docusate 8.6-50 mg  1 tablet Oral BID    tamsulosin  0.4 mg Oral Daily     Current Laboratory Results:    Recent Results (from the past 24 hour(s))   POCT glucose    Collection Time: 06/25/22  4:19 PM   Result Value Ref Range    POCT Glucose 81 70 - 110 mg/dL   POCT glucose    Collection Time: 06/25/22  8:22 PM   Result Value Ref Range    POCT Glucose 249 (H) 70 - 110 mg/dL   Comprehensive Metabolic Panel    Collection Time: 06/26/22  4:38 AM   Result Value Ref Range    Sodium 134 (L) 136 - 145 mmol/L    Potassium 4.4 3.5 - 5.1 mmol/L    Chloride 98 95 - 110 mmol/L    CO2 24 23 - 29 mmol/L    Glucose 204 (H) 70 - 110 mg/dL    BUN 76 (H) 8 - 23 mg/dL    Creatinine 3.0 (H) 0.5 - 1.4 mg/dL    Calcium 8.6 (L) 8.7 - 10.5 mg/dL    Total Protein 6.1 6.0 - 8.4 g/dL    Albumin 3.2 (L) 3.5 - 5.2 g/dL    Total Bilirubin 0.6 0.1 - 1.0 mg/dL    Alkaline Phosphatase 113 55 - 135 U/L    AST 29 10 - 40 U/L    ALT 27 10 - 44 U/L    Anion Gap 12 8 - 16 mmol/L    eGFR if African American 23 (A) >60 mL/min/1.73 m^2    eGFR if non African American 20 (A) >60 mL/min/1.73 m^2   Magnesium    Collection Time: 06/26/22  4:38 AM   Result Value Ref Range    Magnesium 2.0 1.6 - 2.6 mg/dL   Phosphorus    Collection Time: 06/26/22  4:38 AM   Result Value Ref Range    Phosphorus 4.5 2.7 - 4.5 mg/dL   POCT glucose    Collection Time: 06/26/22  7:44 AM   Result Value Ref Range    POCT Glucose 201 (H) 70 - 110 mg/dL   POCT glucose    Collection Time: 06/26/22 11:32 AM   Result Value Ref Range    POCT Glucose 222 (H) 70 - 110  mg/dL     Current Imaging Results:    X-Ray Chest AP Portable   Final Result      Hazy perihilar and bibasilar opacities can be seen with edema.  No focal consolidation.         Electronically signed by: Angelic Lawrence   Date:    06/22/2022   Time:    15:46          6/23/2022: Echo:     The left ventricle is normal in size with normal systolic function.  The estimated ejection fraction is 65%.  Grade II left ventricular diastolic dysfunction.  Mild left atrial enlargement.  Normal right ventricular size with normal right ventricular systolic function.  Mild aortic valve sclerosis.  Moderate mitral annular calcification.  Normal central venous pressure (3 mmHg).       Assessment and Plan:     Problem List:    Active Diagnoses:    Diagnosis Date Noted POA    PRINCIPAL PROBLEM:  NSTEMI (non-ST elevated myocardial infarction) [I21.4] 06/22/2022 Yes    COVID-19 [U07.1] 06/22/2022 Yes    Acute hypoxemic respiratory failure [J96.01] 06/22/2022 Yes    CKD (chronic kidney disease), stage IV [N18.4] 06/22/2022 Yes    Anemia in stage 4 chronic kidney disease [N18.4, D63.1] 10/18/2021 Yes    Hypertensive kidney disease with stage 4 chronic kidney disease [I12.9, N18.4] 09/20/2021 Yes    Current use of long term anticoagulation [Z79.01] 08/25/2021 Not Applicable    Restrictive lung disease secondary to obesity [J98.4, E66.9] 06/20/2018 Yes    Heart failure, diastolic, acute on chronic [I50.33] 06/19/2018 Yes    Coronary artery disease involving native coronary artery of native heart without angina pectoris [I25.10] 03/24/2016 Yes    PAF (paroxysmal atrial fibrillation) [I48.0] 06/27/2013 Yes    History of CVA (cerebrovascular accident) [Z86.73] 03/11/2013 Not Applicable    Hypercholesteremia [E78.00] 10/02/2012 Yes     Chronic    Non-insulin dependent type 2 diabetes mellitus [E11.9] 10/02/2012 Yes     Chronic      Problems Resolved During this Admission:       Assessment and Plan:     1. Coronary Artery Disease               2016: LAD stents.              6/22/2022: CP x 5 hours.              ECG without acute ST changes. Troponin peak to 9. No new WMA.              Most likely NSTEMI.    He is at very high risk for worsening kidney function if he undergoes coronary angiography.              In this setting appears a initial conservative approach is reasonable.              On apixiban 5 mg Q12.   On aspirin 81 mg Q24.              On isosorbidemononitrate 60 mg Q24.   ECG without any further ST T wave changes.              Would not favor triple therapy for this patient - bleeding risk would be very high.   Aspirin for 1-3 months would be the approach I would favor especially as he is receiving the higher option of apixiban dosing.     2. Heart Failure, Diastolic, Acute on Chronic              8/25/2021: Echo: Normal left ventricular size and systolic function. EF 65%. Moderate diastolic dysfunction.   6/23/2022: Echo: Normal left ventricular size and systolic function. EF 65%. Moderate diastolic dysfunction. Mildly enlarged LA. Mild aortic valve sclerosis. Moderate MAC.              6/22/2022: Presented markedly fluid overloaded. Probably mostly due to CKD.              On furosemide 60 mg iv Q12.   Continue diuresis.     3. Atrial Fibrillation              Paroxysmal atrial fibrillation.              11/2022L: CV.              [id number] ([id number]).              On amiodarone 200 mg Q24.              On apixiban 5 mg Q12.     4. High Risk Medication              On amiodarone 200 mg Q24.     5. Chronic Anticoagulation              [id number] ([id number]).              On apixiban 5 mg Q12.     6. Hypertension              At home been on hydralazine 100 mg Q12 and furosmide 40 mg Q12.   On hydralazine 100 mg Q12 and furosmide 60 mg iv Q12.     7. Hypercholesterolemia              On atorvastatin 80 mg Q24.     8. Diabetes Mellitus, Type 2              Complications: CKD4, VITA & CAD. Medications: Oral agents.     9.  Chronic Kidney Disease, Stage 4              Diabetic nephropathy.              6/22/2022: BUN/crea 39/2.7. CrCl 22.   6/23/2022: BUN/crea 43/2.8. CrCl 21.              Diuresis.     10. COVID 19              6/22/2022: Positive.              On dexamethasone and remdesivir.                  VTE Risk Mitigation (From admission, onward)         Ordered     apixaban tablet 5 mg  2 times daily         06/22/22 1750     IP VTE HIGH RISK PATIENT  Once         06/22/22 1750     Place sequential compression device  Until discontinued         06/22/22 1750                Alex Crisostomo MD  Cardiology  [facility] ([place])

## 2022-06-27 LAB
ALBUMIN SERPL BCP-MCNC: 3 G/DL (ref 3.5–5.2)
ALP SERPL-CCNC: 156 U/L (ref 55–135)
ALT SERPL W/O P-5'-P-CCNC: 26 U/L (ref 10–44)
ANION GAP SERPL CALC-SCNC: 12 MMOL/L (ref 8–16)
AST SERPL-CCNC: 23 U/L (ref 10–40)
BACTERIA BLD CULT: NORMAL
BACTERIA BLD CULT: NORMAL
BILIRUB SERPL-MCNC: 0.5 MG/DL (ref 0.1–1)
BUN SERPL-MCNC: 89 MG/DL (ref 8–23)
CALCIUM SERPL-MCNC: 8.4 MG/DL (ref 8.7–10.5)
CHLORIDE SERPL-SCNC: 96 MMOL/L (ref 95–110)
CO2 SERPL-SCNC: 25 MMOL/L (ref 23–29)
CREAT SERPL-MCNC: 3.6 MG/DL (ref 0.5–1.4)
EST. GFR  (AFRICAN AMERICAN): 18 ML/MIN/1.73 M^2
EST. GFR  (NON AFRICAN AMERICAN): 16 ML/MIN/1.73 M^2
GLUCOSE SERPL-MCNC: 439 MG/DL (ref 70–110)
MAGNESIUM SERPL-MCNC: 2.1 MG/DL (ref 1.6–2.6)
PHOSPHATE SERPL-MCNC: 4.6 MG/DL (ref 2.7–4.5)
POCT GLUCOSE: 248 MG/DL (ref 70–110)
POCT GLUCOSE: 278 MG/DL (ref 70–110)
POCT GLUCOSE: 342 MG/DL (ref 70–110)
POCT GLUCOSE: 387 MG/DL (ref 70–110)
POTASSIUM SERPL-SCNC: 4.5 MMOL/L (ref 3.5–5.1)
PROT SERPL-MCNC: 5.8 G/DL (ref 6–8.4)
SODIUM SERPL-SCNC: 133 MMOL/L (ref 136–145)

## 2022-06-27 PROCEDURE — 99233 PR SUBSEQUENT HOSPITAL CARE,LEVL III: ICD-10-PCS | Mod: CR,,, | Performed by: INTERNAL MEDICINE

## 2022-06-27 PROCEDURE — 80053 COMPREHEN METABOLIC PANEL: CPT | Performed by: INTERNAL MEDICINE

## 2022-06-27 PROCEDURE — 99233 SBSQ HOSP IP/OBS HIGH 50: CPT | Mod: ,,, | Performed by: INTERNAL MEDICINE

## 2022-06-27 PROCEDURE — 25000003 PHARM REV CODE 250: Performed by: NURSE PRACTITIONER

## 2022-06-27 PROCEDURE — 36415 COLL VENOUS BLD VENIPUNCTURE: CPT | Performed by: INTERNAL MEDICINE

## 2022-06-27 PROCEDURE — 99233 SBSQ HOSP IP/OBS HIGH 50: CPT | Mod: CR,,, | Performed by: INTERNAL MEDICINE

## 2022-06-27 PROCEDURE — 83735 ASSAY OF MAGNESIUM: CPT | Performed by: INTERNAL MEDICINE

## 2022-06-27 PROCEDURE — 84100 ASSAY OF PHOSPHORUS: CPT | Performed by: INTERNAL MEDICINE

## 2022-06-27 PROCEDURE — 25000003 PHARM REV CODE 250: Performed by: INTERNAL MEDICINE

## 2022-06-27 PROCEDURE — 99233 PR SUBSEQUENT HOSPITAL CARE,LEVL III: ICD-10-PCS | Mod: ,,, | Performed by: INTERNAL MEDICINE

## 2022-06-27 PROCEDURE — 63600175 PHARM REV CODE 636 W HCPCS: Performed by: NURSE PRACTITIONER

## 2022-06-27 PROCEDURE — 27000207 HC ISOLATION

## 2022-06-27 PROCEDURE — 21400001 HC TELEMETRY ROOM

## 2022-06-27 RX ORDER — FUROSEMIDE 10 MG/ML
80 INJECTION INTRAMUSCULAR; INTRAVENOUS DAILY
Status: DISCONTINUED | OUTPATIENT
Start: 2022-06-28 | End: 2022-06-27

## 2022-06-27 RX ADMIN — DEXAMETHASONE 6 MG: 4 TABLET ORAL at 09:06

## 2022-06-27 RX ADMIN — INSULIN ASPART 6 UNITS: 100 INJECTION, SOLUTION INTRAVENOUS; SUBCUTANEOUS at 12:06

## 2022-06-27 RX ADMIN — INSULIN ASPART 4 UNITS: 100 INJECTION, SOLUTION INTRAVENOUS; SUBCUTANEOUS at 09:06

## 2022-06-27 RX ADMIN — ASPIRIN 81 MG: 81 TABLET, COATED ORAL at 09:06

## 2022-06-27 RX ADMIN — HYDRALAZINE HYDROCHLORIDE 100 MG: 25 TABLET, FILM COATED ORAL at 09:06

## 2022-06-27 RX ADMIN — TAMSULOSIN HYDROCHLORIDE 0.4 MG: 0.4 CAPSULE ORAL at 09:06

## 2022-06-27 RX ADMIN — INSULIN ASPART 10 UNITS: 100 INJECTION, SOLUTION INTRAVENOUS; SUBCUTANEOUS at 09:06

## 2022-06-27 RX ADMIN — AMIODARONE HYDROCHLORIDE 200 MG: 200 TABLET ORAL at 09:06

## 2022-06-27 RX ADMIN — ISOSORBIDE MONONITRATE 60 MG: 30 TABLET, EXTENDED RELEASE ORAL at 09:06

## 2022-06-27 RX ADMIN — OXYCODONE HYDROCHLORIDE AND ACETAMINOPHEN 500 MG: 500 TABLET ORAL at 09:06

## 2022-06-27 RX ADMIN — CALCITRIOL CAPSULES 0.25 MCG 0.25 MCG: 0.25 CAPSULE ORAL at 05:06

## 2022-06-27 RX ADMIN — THERA TABS 1 TABLET: TAB at 09:06

## 2022-06-27 RX ADMIN — SENNOSIDES AND DOCUSATE SODIUM 1 TABLET: 50; 8.6 TABLET ORAL at 09:06

## 2022-06-27 RX ADMIN — APIXABAN 2.5 MG: 2.5 TABLET, FILM COATED ORAL at 09:06

## 2022-06-27 RX ADMIN — ATORVASTATIN CALCIUM 40 MG: 20 TABLET, FILM COATED ORAL at 09:06

## 2022-06-27 RX ADMIN — INSULIN ASPART 4 UNITS: 100 INJECTION, SOLUTION INTRAVENOUS; SUBCUTANEOUS at 05:06

## 2022-06-27 RX ADMIN — FAMOTIDINE 20 MG: 20 TABLET ORAL at 09:06

## 2022-06-27 RX ADMIN — ALLOPURINOL 100 MG: 100 TABLET ORAL at 09:06

## 2022-06-27 RX ADMIN — APIXABAN 5 MG: 2.5 TABLET, FILM COATED ORAL at 09:06

## 2022-06-27 RX ADMIN — FUROSEMIDE 60 MG: 10 INJECTION, SOLUTION INTRAMUSCULAR; INTRAVENOUS at 09:06

## 2022-06-27 NOTE — ASSESSMENT & PLAN NOTE
Anemia in stage 4 chronic kidney disease   - Chronic; with concern for need for dialysis in near future.  - Diuresis as above.  - Avoid nephrotoxins, renally dose medications.  - Gradual upward trending Cr during diuresis as anticipated. Given baseline CKDIV, will consult nephrology for monitoring assistance in setting of diuresis.

## 2022-06-27 NOTE — SUBJECTIVE & OBJECTIVE
Interval History: No acute events overnight. Symptoms minimal; leg swelling persists. Discussed plan of care.    Review of Systems   Constitutional:  Negative for chills and fever.   Respiratory:  Positive for cough. Negative for shortness of breath.    Cardiovascular:  Negative for chest pain and palpitations.   Gastrointestinal:  Negative for abdominal pain, nausea and vomiting.   Objective:     Vital Signs (Most Recent):  Temp: 98.2 °F (36.8 °C) (06/26/22 1601)  Pulse: 62 (06/26/22 1800)  Resp: 18 (06/26/22 1601)  BP: (!) 152/69 (06/26/22 1601)  SpO2: (!) 94 % (06/26/22 1601)   Vital Signs (24h Range):  Temp:  [97.7 °F (36.5 °C)-98.2 °F (36.8 °C)] 98.2 °F (36.8 °C)  Pulse:  [58-72] 62  Resp:  [16-20] 18  SpO2:  [94 %-97 %] 94 %  BP: (141-159)/(66-95) 152/69     Weight: 117.9 kg (260 lb)  Body mass index is 34.3 kg/m².    Intake/Output Summary (Last 24 hours) at 6/26/2022 1946  Last data filed at 6/26/2022 1606  Gross per 24 hour   Intake 360 ml   Output 2400 ml   Net -2040 ml        Physical Exam  Vitals and nursing note reviewed.   Constitutional:       General: He is not in acute distress.     Appearance: He is well-developed.   HENT:      Head: Normocephalic and atraumatic.   Eyes:      General:         Right eye: No discharge.         Left eye: No discharge.      Conjunctiva/sclera: Conjunctivae normal.   Cardiovascular:      Rate and Rhythm: Normal rate.      Pulses: Normal pulses.   Pulmonary:      Effort: Pulmonary effort is normal. No respiratory distress.      Comments: Diminished bases.  Abdominal:      Palpations: Abdomen is soft.      Tenderness: There is no abdominal tenderness.   Musculoskeletal:         General: Normal range of motion.      Right lower leg: No edema.      Left lower leg: No edema.   Skin:     General: Skin is warm and dry.   Neurological:      Mental Status: He is alert and oriented to person, place, and time.     Significant Labs:   CBC:  Recent Labs   Lab 06/24/22  0655  06/25/22  0450   WBC 6.40 7.21   HGB 9.8* 10.3*   HCT 29.6* 30.6*    202   GRAN 74.9*  4.8 82.4*  5.9   LYMPH 7.3*  0.5* 7.4*  0.5*   MONO 16.3*  1.0 8.6  0.6   EOS 0.0 0.0   BASO 0.01 0.01     CMP:  Recent Labs   Lab 06/24/22  0655 06/25/22  0450 06/26/22  0438    133* 134*   K 3.9 4.4 4.4    97 98   CO2 25 25 24   BUN 56* 67* 76*   CREATININE 2.8* 3.0* 3.0*   * 431* 204*   CALCIUM 8.5* 8.3* 8.6*   MG 2.0 1.9 2.0   PHOS 3.9 4.4 4.5   ALKPHOS 105 127 113   AST 32 35 29   ALT 22 28 27   BILITOT 0.5 0.5 0.6   PROT 5.9* 5.9* 6.1   ALBUMIN 3.0* 2.9* 3.2*   ANIONGAP 11 11 12        Significant Imaging:   No new imaging this morning.

## 2022-06-27 NOTE — PROGRESS NOTES
"Nephrology  Progress Note    Admit Date: 6/22/2022   LOS: 5 days     SUBJECTIVE:     Follow-up For:  NSTEMI (non-ST elevated myocardial infarction)    Interval History:     Uneventful night.  Sitting in recliner on room air w/o complaints.  Explained worsening renal  fxn and likelihood of needing RRT very soon.  Updated team.  No CP/SOB.     Review of Systems:  Constitutional: No fever or chills  Respiratory: No cough or shortness of breath  Cardiovascular: No chest pain or palpitations, left leg swelling, mild ISSA  Gastrointestinal: No nausea or vomiting  Neurological: No confusion or weakness    OBJECTIVE:     Vital Signs Range (Last 24H):  BP (!) 185/83   Pulse 71   Temp 97.6 °F (36.4 °C)   Resp 18   Ht 6' 1" (1.854 m)   Wt 120.3 kg (265 lb 4.8 oz)   SpO2 96%   BMI 35.00 kg/m²     Temp:  [97.6 °F (36.4 °C)-98.2 °F (36.8 °C)]   Pulse:  [56-78]   Resp:  [18-20]   BP: (141-185)/(63-95)   SpO2:  [94 %-98 %]     I & O (Last 24H):    Intake/Output Summary (Last 24 hours) at 6/27/2022 1046  Last data filed at 6/27/2022 1000  Gross per 24 hour   Intake 480 ml   Output 1825 ml   Net -1345 ml       Physical Exam:  General appearance: Well developed, well nourished  Eyes:  Conjunctivae/corneas clear. PERRL.  Lungs: Normal respiratory effort,   clear to auscultation bilaterally   Heart: Regular rate and rhythm, S1, S2 normal, 3/6 murmur  Abdomen: Soft, non-tender non-distended; bowel sounds normal; no masses,  no organomegaly, obese  Extremities: No cyanosis or clubbing. Left leg 2+, right leg 1+  Skin: Skin color, texture, turgor normal. No rashes or lesions  Neurologic: Normal strength and tone. No focal numbness or weakness     Laboratory Data:  No results for input(s): WBC, RBC, HGB, HCT, PLT, MCV, MCH, MCHC in the last 24 hours.    BMP:   Recent Labs   Lab 06/27/22  0425   *   *   K 4.5   CL 96   CO2 25   BUN 89*   CREATININE 3.6*   CALCIUM 8.4*   MG 2.1     Lab Results   Component Value Date    " CALCIUM 8.4 (L) 06/27/2022    PHOS 4.6 (H) 06/27/2022       Lab Results   Component Value Date    .2 (H) 06/13/2022    CALCIUM 8.4 (L) 06/27/2022    PHOS 4.6 (H) 06/27/2022       Lab Results   Component Value Date    URICACID 7.4 (H) 11/11/2021       BNP  Recent Labs   Lab 06/22/22  1512   *       Medications:  Medication list was reviewed and changes noted under Assessment/Plan.    Diagnostic Results:    6/23/2022: Echo: Normal left ventricular size and systolic function. EF 65%. Moderate diastolic dysfunction. Mildly enlarged LA. Mild aortic valve sclerosis. Moderate MAC      ASSESSMENT/PLAN:     Multifactorial NATALIIA on CKD 4 due to Covid/NSTEMI/Cardio-renal syndrome:  -Creat on admission 2.7 and has trended up to 3.6 cole to above and diuretics  -Has biopsy proven diabetic kidney disease with non-nephrotic range proteinuria  -He is aware that he is very close to ESRD and needs close follow up with Dr. Sloan upon discharge  -hold lasix after am dose and monitor.  -BUN up with steroids/lasix.  -no uremic signs.   -follow trends.  -Renally dose meds, avoid nephrotoxins, and monitor I/O's closely.      Covid:  -s/p remdesivir.  -stop steroids.    NSTEMI:  -Dr. Crisostomo following.  -no angio with renal disease.    -consider angio once on HD per Dr. Sloan.    DM:  -uncontrolled with steroids now.   -defer.      See above  Needs very close f/up with Dr Sloan to prepare for RRT's.

## 2022-06-27 NOTE — PLAN OF CARE
Problem: Adult Inpatient Plan of Care  Goal: Plan of Care Review  Outcome: Ongoing, Progressing  Flowsheets (Taken 6/27/2022 0250)  Plan of Care Reviewed With: patient  Goal: Patient-Specific Goal (Individualized)  Outcome: Ongoing, Progressing  Flowsheets (Taken 6/27/2022 0250)  Anxieties, Fears or Concerns: none  Individualized Care Needs: none  Patient-Specific Goals (Include Timeframe): none  Goal: Absence of Hospital-Acquired Illness or Injury  Outcome: Ongoing, Progressing  Goal: Optimal Comfort and Wellbeing  Outcome: Ongoing, Progressing  Goal: Readiness for Transition of Care  Outcome: Ongoing, Progressing     Problem: Diabetes Comorbidity  Goal: Blood Glucose Level Within Targeted Range  Outcome: Ongoing, Progressing     Problem: Fall Injury Risk  Goal: Absence of Fall and Fall-Related Injury  Outcome: Ongoing, Progressing

## 2022-06-27 NOTE — PROGRESS NOTES
[facility]  Cardiology  Progress Note    Patient Name: Zev Castano  MRN: [id number]  Admission Date: 6/22/2022  Hospital Length of Stay: 5 days  Code Status: Full Code   Attending Physician: KRISTIN Cardenas MD   Primary Care Physician: Mehul Livingston MD  Expected Discharge Date:   Principal Problem:NSTEMI (non-ST elevated myocardial infarction)    Subjective:     Brief HPI:    Zev Castano is a 72 y.o.male with hypertension, hypercholesterolemia and diabetes mellitus type 2. He is obese. In 2012 he had two strokes in the [place] with fairly good neurological recovery. In 2016 he had two stents placed in the left anterior descending coronary artery for angina.  He has had paroxysmal atrial fibrillation for which he underwent electrical cardioversion in 11/2020. He has been on amiodarone since with no clinical recurrence. He is anticoagulated with apixiban. He has chronic kidney disease, stage 4. In 8/2021 he was admitted to [facility] with fluid overload. He was diuresed. It was felt he was near need for dialysis at that time. Since that stay he has been followed at [facility] for his kidney disease. Beginning early 6/2022 he he began to develop more and more edema of his legs. He became increasingly short of breath. At 1 am 6/22/2022 he began feeling a chest pressure. The pressure lasted for about 5 hours. He then vomited 5-6 times and the chest pressure resolved. He felt weak. He presented top the emergency room. A COVID test was positive. There were no acute ST changes. The CXR revealed pulmonary edema. He was admitted. I was asked to see him in consultation as Dr. Livingston is out of town.       Hospital Course:     Diuresis.    NTG patch.    Aspirin was added to apixiban.    Remdesivir and dexamethason.    6/23/2022: Echo: Normal left ventricular size and systolic function. EF 65%. Moderate diastolic dysfunction. Mildly enlarged LA. Mild aortic valve  sclerosis. Moderate MAC.    Interval History:    No chest pain. Breathing well.     Only trace edema of ankles.      Review of Systems   Constitutional: Positive for malaise/fatigue. Negative for chills and fever.   HENT: Negative for nosebleeds.    Eyes: Negative for vision loss in left eye and vision loss in right eye.   Cardiovascular: Negative for chest pain, leg swelling, orthopnea, palpitations, paroxysmal nocturnal dyspnea and syncope.   Respiratory: Negative for cough, hemoptysis, shortness of breath, sputum production and wheezing.    Hematologic/Lymphatic: Negative for bleeding problem. Does not bruise/bleed easily.   Skin: Negative for color change and rash.   Musculoskeletal: Negative for muscle weakness and myalgias.   Gastrointestinal: Negative for abdominal pain, heartburn, hematemesis, hematochezia, melena, nausea and vomiting.   Genitourinary: Negative for hematuria.   Neurological: Negative for dizziness, focal weakness, headaches, light-headedness, vertigo and weakness.   Psychiatric/Behavioral: Negative for altered mental status. The patient is not nervous/anxious.    Allergic/Immunologic: Negative for persistent infections.       Objective:     Vital Signs (Most Recent):  Temp: 97.7 °F (36.5 °C) (06/27/22 1613)  Pulse: (!) 54 (06/27/22 1613)  Resp: 19 (06/27/22 1613)  BP: (!) 146/62 (06/27/22 1613)  SpO2: 98 % (06/27/22 1613) Vital Signs (24h Range):  Temp:  [97.5 °F (36.4 °C)-97.8 °F (36.6 °C)] 97.7 °F (36.5 °C)  Pulse:  [54-78] 54  Resp:  [18-20] 19  SpO2:  [94 %-98 %] 98 %  BP: (146-185)/(62-83) 146/62     Weight: 120.3 kg (265 lb 4.8 oz)  Body mass index is 35 kg/m².    SpO2: 98 %  O2 Device (Oxygen Therapy): room air      Intake/Output Summary (Last 24 hours) at 6/27/2022 1732  Last data filed at 6/27/2022 1304  Gross per 24 hour   Intake --   Output 1350 ml   Net -1350 ml       Lines/Drains/Airways     Peripheral Intravenous Line  Duration                Peripheral IV - Single Lumen  06/22/22 1526 18 G Right Forearm 5 days                Physical Exam  Constitutional:       General: He is not in acute distress.     Appearance: Normal appearance. He is well-developed. He is not ill-appearing.   Eyes:      Conjunctiva/sclera:      Right eye: Right conjunctiva is not injected. No hemorrhage.     Left eye: Left conjunctiva is not injected. No hemorrhage.     Pupils:      Right eye: Pupil is round.      Left eye: Pupil is round.   Neck:      Vascular: No JVD.   Cardiovascular:      Rate and Rhythm: Normal rate and regular rhythm.      Heart sounds: S1 normal and S2 normal. Murmur heard.     Gallop present. S4 sounds present. No S3 sounds.   Pulmonary:      Effort: Pulmonary effort is normal.      Breath sounds: Examination of the right-lower field reveals rales. Examination of the left-lower field reveals rales. Rales present.   Chest:      Chest wall: No swelling or tenderness.   Abdominal:      General: There is no distension.      Palpations: Abdomen is soft.      Tenderness: There is no abdominal tenderness.   Musculoskeletal:      Cervical back: Neck supple.      Right lower leg: Swelling present. 1+ Edema present.      Left lower leg: Swelling present. 1+ Edema present.   Skin:     General: Skin is warm and dry.      Findings: No rash.   Neurological:      General: No focal deficit present.      Mental Status: He is alert and oriented to person, place, and time. He is not disoriented.   Psychiatric:         Attention and Perception: Attention and perception normal.         Mood and Affect: Mood and affect normal.         Speech: Speech normal.         Behavior: Behavior normal.         Thought Content: Thought content normal.         Cognition and Memory: Cognition and memory normal.         Judgment: Judgment normal.         Current Medications:     allopurinoL  100 mg Oral Daily    amiodarone  200 mg Oral Daily    apixaban  5 mg Oral BID    ascorbic acid (vitamin C)  500 mg Oral BID     aspirin  81 mg Oral Daily    atorvastatin  40 mg Oral Daily    calcitRIOL  0.25 mcg Oral Every Mon, Wed, Fri    famotidine  20 mg Oral Daily    hydrALAZINE  100 mg Oral Q12H    insulin detemir U-100  12 Units Subcutaneous Daily    isosorbide mononitrate  60 mg Oral Daily    multivitamin  1 tablet Oral Daily    senna-docusate 8.6-50 mg  1 tablet Oral BID    tamsulosin  0.4 mg Oral Daily     Current Laboratory Results:    Recent Results (from the past 24 hour(s))   POCT glucose    Collection Time: 06/26/22  8:18 PM   Result Value Ref Range    POCT Glucose 435 (H) 70 - 110 mg/dL   Comprehensive Metabolic Panel    Collection Time: 06/27/22  4:25 AM   Result Value Ref Range    Sodium 133 (L) 136 - 145 mmol/L    Potassium 4.5 3.5 - 5.1 mmol/L    Chloride 96 95 - 110 mmol/L    CO2 25 23 - 29 mmol/L    Glucose 439 (H) 70 - 110 mg/dL    BUN 89 (H) 8 - 23 mg/dL    Creatinine 3.6 (H) 0.5 - 1.4 mg/dL    Calcium 8.4 (L) 8.7 - 10.5 mg/dL    Total Protein 5.8 (L) 6.0 - 8.4 g/dL    Albumin 3.0 (L) 3.5 - 5.2 g/dL    Total Bilirubin 0.5 0.1 - 1.0 mg/dL    Alkaline Phosphatase 156 (H) 55 - 135 U/L    AST 23 10 - 40 U/L    ALT 26 10 - 44 U/L    Anion Gap 12 8 - 16 mmol/L    eGFR if African American 18 (A) >60 mL/min/1.73 m^2    eGFR if non African American 16 (A) >60 mL/min/1.73 m^2   Magnesium    Collection Time: 06/27/22  4:25 AM   Result Value Ref Range    Magnesium 2.1 1.6 - 2.6 mg/dL   Phosphorus    Collection Time: 06/27/22  4:25 AM   Result Value Ref Range    Phosphorus 4.6 (H) 2.7 - 4.5 mg/dL   POCT glucose    Collection Time: 06/27/22  7:52 AM   Result Value Ref Range    POCT Glucose 387 (H) 70 - 110 mg/dL   POCT glucose    Collection Time: 06/27/22 12:16 PM   Result Value Ref Range    POCT Glucose 278 (H) 70 - 110 mg/dL   POCT glucose    Collection Time: 06/27/22  4:08 PM   Result Value Ref Range    POCT Glucose 248 (H) 70 - 110 mg/dL     Current Imaging Results:    X-Ray Chest AP Portable   Final Result      Hazy  perihilar and bibasilar opacities can be seen with edema.  No focal consolidation.         Electronically signed by: Angelic Lawrence   Date:    06/22/2022   Time:    15:46          6/23/2022: Echo:     The left ventricle is normal in size with normal systolic function.  The estimated ejection fraction is 65%.  Grade II left ventricular diastolic dysfunction.  Mild left atrial enlargement.  Normal right ventricular size with normal right ventricular systolic function.  Mild aortic valve sclerosis.  Moderate mitral annular calcification.  Normal central venous pressure (3 mmHg).       Assessment and Plan:     Problem List:    Active Diagnoses:    Diagnosis Date Noted POA    PRINCIPAL PROBLEM:  NSTEMI (non-ST elevated myocardial infarction) [I21.4] 06/22/2022 Yes    COVID-19 [U07.1] 06/22/2022 Yes    Acute hypoxemic respiratory failure [J96.01] 06/22/2022 Yes    CKD (chronic kidney disease), stage IV [N18.4] 06/22/2022 Yes    Anemia in stage 4 chronic kidney disease [N18.4, D63.1] 10/18/2021 Yes    Hypertensive kidney disease with stage 4 chronic kidney disease [I12.9, N18.4] 09/20/2021 Yes    Current use of long term anticoagulation [Z79.01] 08/25/2021 Not Applicable    Restrictive lung disease secondary to obesity [J98.4, E66.9] 06/20/2018 Yes    Heart failure, diastolic, acute on chronic [I50.33] 06/19/2018 Yes    Coronary artery disease involving native coronary artery of native heart without angina pectoris [I25.10] 03/24/2016 Yes    PAF (paroxysmal atrial fibrillation) [I48.0] 06/27/2013 Yes    History of CVA (cerebrovascular accident) [Z86.73] 03/11/2013 Not Applicable    Hypercholesteremia [E78.00] 10/02/2012 Yes     Chronic    Non-insulin dependent type 2 diabetes mellitus [E11.9] 10/02/2012 Yes     Chronic      Problems Resolved During this Admission:       Assessment and Plan:     1. Coronary Artery Disease              2016: LAD stents.              6/22/2022: CP x 5 hours.              ECG  without acute ST changes. Troponin peak to 9. No new WMA.              Most likely NSTEMI.    He is at very high risk for worsening kidney function if he undergoes coronary angiography.              In this setting appears a initial conservative approach is reasonable.            ECG without any further ST T wave changes.              Would not favor triple therapy for this patient - bleeding risk would be very high.   Aspirin for 1-3 months would be the approach I would favor especially as he is receiving the higher option of apixiban dosing.   On apixiban 5 mg Q12.   On aspirin 81 mg Q24.              On isosorbidemononitrate 60 mg Q24.   No further chest pain.     2. Heart Failure, Diastolic, Acute on Chronic              8/25/2021: Echo: Normal left ventricular size and systolic function. EF 65%. Moderate diastolic dysfunction.   6/23/2022: Echo: Normal left ventricular size and systolic function. EF 65%. Moderate diastolic dysfunction. Mildly enlarged LA. Mild aortic valve sclerosis. Moderate MAC.              6/22/2022: Presented markedly fluid overloaded. Probably mostly due to CKD. Received furosemide 60 mg iv Q12.            Diuretics on hold due to rise in BUN/crea.     3. Atrial Fibrillation              Paroxysmal atrial fibrillation.              11/2022L: CV.              [id number] ([id number]).              On amiodarone 200 mg Q24.              On apixiban 5 mg Q12.      4. High Risk Medication              On amiodarone 200 mg Q24.     5. Chronic Anticoagulation              [id number] ([id number]).              On apixiban 5 mg Q12.   With severe CKD will reduce diose of apixiban to 2.5 mg Q12.     6. Hypertension              At home been on hydralazine 100 mg Q12 and furosmide 40 mg Q12.   On hydralazine 100 mg Q12 and furosmide 60 mg iv Q12.     7. Hypercholesterolemia              On atorvastatin 80 mg Q24.     8. Diabetes Mellitus, Type 2              Complications: CKD4, VITA & CAD.  Medications: Oral agents.     9. Chronic Kidney Disease, Stage 4              Diabetic nephropathy.              6/22/2022: BUN/crea 39/2.7. CrCl 22.   6/23/2022: BUN/crea 43/2.8. CrCl 21.   6/27/2022: BUN/crea 89/3.6. CrCl 16.   Renal following.     10. COVID 19              6/22/2022: Positive.              Received dexamethasone and remdesivir.                  VTE Risk Mitigation (From admission, onward)         Ordered     apixaban tablet 5 mg  2 times daily         06/22/22 1750     IP VTE HIGH RISK PATIENT  Once         06/22/22 1750     Place sequential compression device  Until discontinued         06/22/22 1750                Alex Crisostomo MD  Cardiology  [facility] ([place])

## 2022-06-27 NOTE — PLAN OF CARE
Patient alert and oriented x 4 and able to make needs known. VS WNL. No complaints of pain. Ambulating independently in room on room air. No distress noted. Continues with diuresis. Reinforcement provided for fluid restriction. Verbalized understanding. POC reviewed. No needs at this time.

## 2022-06-27 NOTE — ASSESSMENT & PLAN NOTE
Acute hypoxemic respiratory failure   Restrictive lung disease secondary to obesity  - Acute COVID infection in setting of NSTEMI, CHF as well.  - Continue dexamethasone 6mg PO daily, completing 5 days of remdesivir.  - Weaned from supplemental O2.

## 2022-06-27 NOTE — PROGRESS NOTES
[facility]  Progress Note    Patient Name: Zev Castano  MRN: [id number]  Patient Class: IP- Inpatient   Admission Date: 6/22/2022  Length of Stay: 5 days  Attending Physician: LIZANDRO Cardenas MD  Primary Care Provider: Mehul Livingston MD        Subjective:     Principal Problem:NSTEMI (non-ST elevated myocardial infarction)        HPI:  Mr. Castano is a 72 YOM with PMHx of CAD s/ p PCI (in 2016), paroxysmal AFib s/p cardioversion (on amiodarone and apixaban), HFpEF (grade II DD and mild pulmHTN), history of CVA (in 2012 in [facility] region), HTN, HLD, DM type 2 on oral therapies, CKD IV (follows with Nephrology, concerns for HD/PD need in near future), persistent proteinuria, and SABRINA.    He presented to the ED with complaints of shortness of breath for the last 3 days with associated cough, orthopnea, lower extremity swelling (worse than baseline), early satiety, and abdominal bloating. He developed mid sternal chest tightness yesterday without radiation that lasted for several hours.  He also had episodes of N/V today.  He denies fever, chills, night sweats, palpitations, abdominal pain, diarrhea, constipation, lightheadedness, syncope, or dizziness.  He reports compliance with home medications including diuretic therapies.  He reports compliance with dietary restrictions and denies increased salt intake.  He has independent ADLs, uses no ambulatory aids,  however is mostly sedentary at home and does not participate in many household chores d/t health conditions and chronic fatigue. All past medical, social, and family history reviewed with patient; no pertinent family history identified at this time.    In the ED is hypertensive, heart rate stable, 93% on room air with improvement to 95-99% on 2 L nasal cannula, T-max 100.4° with improvement to 99.7.  CBC unremarkable.  Chemistry with known CKD with BUN and creatinine within historical baseline.  LFTs WNLs.  , slightly  higher than baseline.  .  Troponin 4.574.  Lactic acid 0.7.  Procalcitonin 0.07.  COVID positive.  UA noninfectious.  Hemoglobin A1c 6.4/137.  ESR 32.  Ferritin 348.  .  PT/INR 11.7 / 1.1.  PTT 31.9.  D-dimer 0.56.  Blood cultures obtained.  Chest x-ray concerning for pulmonary edema.  EKG with normal sinus rhythm and known RBBB.  Flu swab and sputum culture pending collection.  In the ED he was given acetaminophen, aspirin, Lasix IV push, nitro paste and sublingual nitro, dexamethasone IV.  Cardiology was consulted. The patient was admitted to the Hospital Medicine Service for further evaluation and management.       Overview/Hospital Course:  Admitted with COVID, NSTEMI and CHF exacerbation. Started COVID treatment given high risk for severe disease with remdesivir and dexamethasone. Cardiology consulted and recommended medical therapy for NSTEMI. Diuresed with furosemide IV. Renal function trended up and volume status gradually improved. Nephrology consulted.      Interval History: No acute events overnight. Appears euvolemic, feeling well. No new concerns.    Review of Systems   Constitutional:  Negative for chills and fever.   Respiratory:  Positive for cough. Negative for shortness of breath.    Cardiovascular:  Negative for chest pain and palpitations.   Gastrointestinal:  Negative for abdominal pain, nausea and vomiting.   Objective:     Vital Signs (Most Recent):  Temp: 97.8 °F (36.6 °C) (06/27/22 2007)  Pulse: 73 (06/27/22 2200)  Resp: 18 (06/27/22 2004)  BP: (!) 171/58 (06/27/22 2004)  SpO2: 96 % (06/27/22 2004)   Vital Signs (24h Range):  Temp:  [97.5 °F (36.4 °C)-97.8 °F (36.6 °C)] 97.8 °F (36.6 °C)  Pulse:  [54-78] 73  Resp:  [18-20] 18  SpO2:  [95 %-98 %] 96 %  BP: (146-185)/(58-83) 171/58     Weight: 120.3 kg (265 lb 4.8 oz)  Body mass index is 35 kg/m².    Intake/Output Summary (Last 24 hours) at 6/27/2022 2251  Last data filed at 6/27/2022 1304  Gross per 24 hour   Intake --   Output  1150 ml   Net -1150 ml        Physical Exam  Vitals and nursing note reviewed.   Constitutional:       General: He is not in acute distress.     Appearance: He is well-developed.   HENT:      Head: Normocephalic and atraumatic.   Eyes:      General:         Right eye: No discharge.         Left eye: No discharge.      Conjunctiva/sclera: Conjunctivae normal.   Cardiovascular:      Rate and Rhythm: Normal rate.      Pulses: Normal pulses.   Pulmonary:      Effort: Pulmonary effort is normal. No respiratory distress.      Comments: Diminished bases.  Abdominal:      Palpations: Abdomen is soft.      Tenderness: There is no abdominal tenderness.   Musculoskeletal:         General: Normal range of motion.      Right lower leg: No edema.      Left lower leg: No edema.   Skin:     General: Skin is warm and dry.   Neurological:      Mental Status: He is alert and oriented to person, place, and time.     Significant Labs:   CBC:  Recent Labs   Lab 06/25/22  0450   WBC 7.21   HGB 10.3*   HCT 30.6*      GRAN 82.4*  5.9   LYMPH 7.4*  0.5*   MONO 8.6  0.6   EOS 0.0   BASO 0.01     CMP:  Recent Labs   Lab 06/25/22  0450 06/26/22  0438 06/27/22  0425   * 134* 133*   K 4.4 4.4 4.5   CL 97 98 96   CO2 25 24 25   BUN 67* 76* 89*   CREATININE 3.0* 3.0* 3.6*   * 204* 439*   CALCIUM 8.3* 8.6* 8.4*   MG 1.9 2.0 2.1   PHOS 4.4 4.5 4.6*   ALKPHOS 127 113 156*   AST 35 29 23   ALT 28 27 26   BILITOT 0.5 0.6 0.5   PROT 5.9* 6.1 5.8*   ALBUMIN 2.9* 3.2* 3.0*   ANIONGAP 11 12 12        Significant Imaging:   No new imaging this morning.        Assessment/Plan:      * NSTEMI (non-ST elevated myocardial infarction)  Elevated troponin, CAD, h/o PCI, CHF  - Cardiology consulted; pursue medical management given known CAD, chronic renal disease and risk of worsening renal function with potential catheterization.  - Continue aspirin 81mg PO daily, apixaban as above.  - Hold further diuresis as appears more euvolemic. Strict  intake/output, daily weights.  - Echo shows EF 65%, grade II diastolic dysfunction.  - Appreciate nephrology, cardiology assistance.    CKD (chronic kidney disease), stage IV  Anemia in stage 4 chronic kidney disease   - Chronic; with concern for need for dialysis in near future.  - Diuresis as above.  - Avoid nephrotoxins, renally dose medications.  - Gradual upward trending Cr during diuresis as anticipated. Nephrology consulted as above.    COVID-19  Acute hypoxemic respiratory failure   Restrictive lung disease secondary to obesity  - Acute COVID infection in setting of NSTEMI, CHF as well.  - Hold further dexamethasone, completed 5 days of remdesivir.  - Weaned from supplemental O2.    Hypertensive kidney disease with stage 4 chronic kidney disease  - Continue hydralazine 100mg PO BID.    Heart failure, diastolic, acute on chronic  - As above.    PAF (paroxysmal atrial fibrillation)  Current use of long-term anticoagulation   - Continue amiodarone 200mg PO daily, apixaban as 2.5mg PO BID.    History of CVA (cerebrovascular accident)  - Statin, aspirin as above.    Non-insulin dependent type 2 diabetes mellitus  - Continue insulin as moderate-dose sliding scale insulin aspart 1-10U subQ TIDWM PRN given steroid use.    Hypercholesteremia  - Continue atorvastatin 40mg PO daily.    VTE Risk Mitigation (From admission, onward)         Ordered     apixaban tablet 2.5 mg  2 times daily         06/27/22 1739     IP VTE HIGH RISK PATIENT  Once         06/22/22 1750     Place sequential compression device  Until discontinued         06/22/22 1750                Discharge Planning   ANAHY:      Code Status: Full Code   Is the patient medically ready for discharge?:     Reason for patient still in hospital (select all that apply): Treatment                     D Hay Cardenas MD  Department of Hospital Medicine   [facility] ([place])

## 2022-06-28 ENCOUNTER — TELEPHONE (OUTPATIENT)
Dept: NEPHROLOGY | Facility: CLINIC | Age: 72
End: 2022-06-28
Payer: MEDICARE

## 2022-06-28 LAB
ALBUMIN SERPL BCP-MCNC: 3.3 G/DL (ref 3.5–5.2)
ALP SERPL-CCNC: 141 U/L (ref 55–135)
ALT SERPL W/O P-5'-P-CCNC: 28 U/L (ref 10–44)
ANION GAP SERPL CALC-SCNC: 9 MMOL/L (ref 8–16)
AST SERPL-CCNC: 25 U/L (ref 10–40)
BILIRUB SERPL-MCNC: 0.6 MG/DL (ref 0.1–1)
BUN SERPL-MCNC: 88 MG/DL (ref 8–23)
CALCIUM SERPL-MCNC: 8.8 MG/DL (ref 8.7–10.5)
CHLORIDE SERPL-SCNC: 97 MMOL/L (ref 95–110)
CO2 SERPL-SCNC: 27 MMOL/L (ref 23–29)
CREAT SERPL-MCNC: 3.3 MG/DL (ref 0.5–1.4)
ERYTHROCYTE [DISTWIDTH] IN BLOOD BY AUTOMATED COUNT: 15.1 % (ref 11.5–14.5)
EST. GFR  (AFRICAN AMERICAN): 20 ML/MIN/1.73 M^2
EST. GFR  (NON AFRICAN AMERICAN): 18 ML/MIN/1.73 M^2
GLUCOSE SERPL-MCNC: 281 MG/DL (ref 70–110)
HCT VFR BLD AUTO: 32.4 % (ref 40–54)
HGB BLD-MCNC: 11.1 G/DL (ref 14–18)
MAGNESIUM SERPL-MCNC: 2.2 MG/DL (ref 1.6–2.6)
MCH RBC QN AUTO: 32.2 PG (ref 27–31)
MCHC RBC AUTO-ENTMCNC: 34.3 G/DL (ref 32–36)
MCV RBC AUTO: 94 FL (ref 82–98)
PHOSPHATE SERPL-MCNC: 4.2 MG/DL (ref 2.7–4.5)
PLATELET # BLD AUTO: 211 K/UL (ref 150–450)
PMV BLD AUTO: 11.2 FL (ref 9.2–12.9)
POCT GLUCOSE: 163 MG/DL (ref 70–110)
POCT GLUCOSE: 247 MG/DL (ref 70–110)
POTASSIUM SERPL-SCNC: 4.4 MMOL/L (ref 3.5–5.1)
PROT SERPL-MCNC: 6.3 G/DL (ref 6–8.4)
RBC # BLD AUTO: 3.45 M/UL (ref 4.6–6.2)
SODIUM SERPL-SCNC: 133 MMOL/L (ref 136–145)
WBC # BLD AUTO: 10.22 K/UL (ref 3.9–12.7)

## 2022-06-28 PROCEDURE — 63600175 PHARM REV CODE 636 W HCPCS: Performed by: STUDENT IN AN ORGANIZED HEALTH CARE EDUCATION/TRAINING PROGRAM

## 2022-06-28 PROCEDURE — 85027 COMPLETE CBC AUTOMATED: CPT | Performed by: INTERNAL MEDICINE

## 2022-06-28 PROCEDURE — 27000207 HC ISOLATION

## 2022-06-28 PROCEDURE — 83735 ASSAY OF MAGNESIUM: CPT | Performed by: INTERNAL MEDICINE

## 2022-06-28 PROCEDURE — 11000001 HC ACUTE MED/SURG PRIVATE ROOM

## 2022-06-28 PROCEDURE — 99233 SBSQ HOSP IP/OBS HIGH 50: CPT | Mod: CR,,, | Performed by: STUDENT IN AN ORGANIZED HEALTH CARE EDUCATION/TRAINING PROGRAM

## 2022-06-28 PROCEDURE — 25000003 PHARM REV CODE 250: Performed by: INTERNAL MEDICINE

## 2022-06-28 PROCEDURE — 99233 PR SUBSEQUENT HOSPITAL CARE,LEVL III: ICD-10-PCS | Mod: CR,,, | Performed by: STUDENT IN AN ORGANIZED HEALTH CARE EDUCATION/TRAINING PROGRAM

## 2022-06-28 PROCEDURE — 36415 COLL VENOUS BLD VENIPUNCTURE: CPT | Performed by: INTERNAL MEDICINE

## 2022-06-28 PROCEDURE — 84100 ASSAY OF PHOSPHORUS: CPT | Performed by: INTERNAL MEDICINE

## 2022-06-28 PROCEDURE — 25000003 PHARM REV CODE 250: Performed by: NURSE PRACTITIONER

## 2022-06-28 PROCEDURE — 99233 SBSQ HOSP IP/OBS HIGH 50: CPT | Mod: CR,,, | Performed by: INTERNAL MEDICINE

## 2022-06-28 PROCEDURE — 99233 PR SUBSEQUENT HOSPITAL CARE,LEVL III: ICD-10-PCS | Mod: CR,,, | Performed by: INTERNAL MEDICINE

## 2022-06-28 PROCEDURE — 80053 COMPREHEN METABOLIC PANEL: CPT | Performed by: INTERNAL MEDICINE

## 2022-06-28 RX ORDER — FUROSEMIDE 80 MG/1
40 TABLET ORAL 2 TIMES DAILY
Start: 2022-06-28 | End: 2022-10-30

## 2022-06-28 RX ORDER — AMIODARONE HYDROCHLORIDE 200 MG/1
200 TABLET ORAL DAILY
Qty: 30 TABLET | Refills: 0 | Status: SHIPPED | OUTPATIENT
Start: 2022-06-29

## 2022-06-28 RX ORDER — NITROGLYCERIN 0.4 MG/1
0.4 TABLET SUBLINGUAL EVERY 5 MIN PRN
Qty: 25 TABLET | Refills: 0 | Status: SHIPPED | OUTPATIENT
Start: 2022-06-28

## 2022-06-28 RX ORDER — ASPIRIN 81 MG/1
81 TABLET ORAL DAILY
Qty: 30 TABLET | Refills: 0 | Status: SHIPPED | OUTPATIENT
Start: 2022-06-29

## 2022-06-28 RX ORDER — INSULIN ASPART 100 [IU]/ML
3 INJECTION, SOLUTION INTRAVENOUS; SUBCUTANEOUS
Status: DISCONTINUED | OUTPATIENT
Start: 2022-06-28 | End: 2022-06-29 | Stop reason: HOSPADM

## 2022-06-28 RX ORDER — ISOSORBIDE MONONITRATE 60 MG/1
60 TABLET, EXTENDED RELEASE ORAL DAILY
Qty: 30 TABLET | Refills: 0 | Status: SHIPPED | OUTPATIENT
Start: 2022-06-29

## 2022-06-28 RX ORDER — FAMOTIDINE 20 MG/1
20 TABLET, FILM COATED ORAL DAILY
Qty: 30 TABLET | Refills: 0 | Status: SHIPPED | OUTPATIENT
Start: 2022-06-28

## 2022-06-28 RX ORDER — HYDRALAZINE HYDROCHLORIDE 100 MG/1
100 TABLET, FILM COATED ORAL EVERY 12 HOURS
Qty: 60 TABLET | Refills: 0 | Status: SHIPPED | OUTPATIENT
Start: 2022-06-28

## 2022-06-28 RX ADMIN — ASPIRIN 81 MG: 81 TABLET, COATED ORAL at 09:06

## 2022-06-28 RX ADMIN — HYDRALAZINE HYDROCHLORIDE 100 MG: 25 TABLET, FILM COATED ORAL at 09:06

## 2022-06-28 RX ADMIN — APIXABAN 2.5 MG: 2.5 TABLET, FILM COATED ORAL at 09:06

## 2022-06-28 RX ADMIN — AMIODARONE HYDROCHLORIDE 200 MG: 200 TABLET ORAL at 09:06

## 2022-06-28 RX ADMIN — THERA TABS 1 TABLET: TAB at 09:06

## 2022-06-28 RX ADMIN — INSULIN ASPART 4 UNITS: 100 INJECTION, SOLUTION INTRAVENOUS; SUBCUTANEOUS at 09:06

## 2022-06-28 RX ADMIN — INSULIN ASPART 3 UNITS: 100 INJECTION, SOLUTION INTRAVENOUS; SUBCUTANEOUS at 09:06

## 2022-06-28 RX ADMIN — ISOSORBIDE MONONITRATE 60 MG: 30 TABLET, EXTENDED RELEASE ORAL at 09:06

## 2022-06-28 RX ADMIN — INSULIN ASPART 3 UNITS: 100 INJECTION, SOLUTION INTRAVENOUS; SUBCUTANEOUS at 01:06

## 2022-06-28 RX ADMIN — TAMSULOSIN HYDROCHLORIDE 0.4 MG: 0.4 CAPSULE ORAL at 09:06

## 2022-06-28 RX ADMIN — ALLOPURINOL 100 MG: 100 TABLET ORAL at 09:06

## 2022-06-28 RX ADMIN — OXYCODONE HYDROCHLORIDE AND ACETAMINOPHEN 500 MG: 500 TABLET ORAL at 09:06

## 2022-06-28 RX ADMIN — INSULIN ASPART 2 UNITS: 100 INJECTION, SOLUTION INTRAVENOUS; SUBCUTANEOUS at 01:06

## 2022-06-28 RX ADMIN — FAMOTIDINE 20 MG: 20 TABLET ORAL at 09:06

## 2022-06-28 RX ADMIN — ATORVASTATIN CALCIUM 40 MG: 20 TABLET, FILM COATED ORAL at 09:06

## 2022-06-28 RX ADMIN — SENNOSIDES AND DOCUSATE SODIUM 1 TABLET: 50; 8.6 TABLET ORAL at 09:06

## 2022-06-28 NOTE — DISCHARGE SUMMARY
[facility] Medicine  Discharge Summary      Patient Name: Zev Castano  MRN: [id number]  Patient Class: IP- Inpatient  Admission Date: 6/22/2022  Hospital Length of Stay: 6 days  Discharge Date and Time:  06/28/2022 2:42 PM  Attending Physician: Lauren Adan, *   Discharging Provider: Lauren Adan MD  Primary Care Provider: Mehul Livingston MD      HPI:   Mr. Castano is a 72 YOM with PMHx of CAD s/ p PCI (in 2016), paroxysmal AFib s/p cardioversion (on amiodarone and apixaban), HFpEF (grade II DD and mild pulmHTN), history of CVA (in 2012 in [facility]), HTN, HLD, DM type 2 on oral therapies, CKD IV (follows with Nephrology, concerns for HD/PD need in near future), persistent proteinuria, and SABRINA.    He presented to the ED with complaints of shortness of breath for the last 3 days with associated cough, orthopnea, lower extremity swelling (worse than baseline), early satiety, and abdominal bloating. He developed mid sternal chest tightness yesterday without radiation that lasted for several hours.  He also had episodes of N/V today.  He denies fever, chills, night sweats, palpitations, abdominal pain, diarrhea, constipation, lightheadedness, syncope, or dizziness.  He reports compliance with home medications including diuretic therapies.  He reports compliance with dietary restrictions and denies increased salt intake.  He has independent ADLs, uses no ambulatory aids,  however is mostly sedentary at home and does not participate in many household chores d/t health conditions and chronic fatigue. All past medical, social, and family history reviewed with patient; no pertinent family history identified at this time.    In the ED is hypertensive, heart rate stable, 93% on room air with improvement to 95-99% on 2 L nasal cannula, T-max 100.4° with improvement to 99.7.  CBC unremarkable.  Chemistry with known CKD with BUN and creatinine within historical baseline.  LFTs  WNLs.  , slightly higher than baseline.  .  Troponin 4.574.  Lactic acid 0.7.  Procalcitonin 0.07.  COVID positive.  UA noninfectious.  Hemoglobin A1c 6.4/137.  ESR 32.  Ferritin 348.  .  PT/INR 11.7 / 1.1.  PTT 31.9.  D-dimer 0.56.  Blood cultures obtained.  Chest x-ray concerning for pulmonary edema.  EKG with normal sinus rhythm and known RBBB.  Flu swab and sputum culture pending collection.  In the ED he was given acetaminophen, aspirin, Lasix IV push, nitro paste and sublingual nitro, dexamethasone IV.  Cardiology was consulted. The patient was admitted to the Hospital Medicine Service for further evaluation and management.       * No surgery found *      Hospital Course:   Admitted with COVID, NSTEMI and CHF exacerbation. Started COVID treatment given high risk for severe disease with remdesivir and dexamethasone. Cardiology consulted and recommended medical therapy for NSTEMI. Diuresed with furosemide IV. Renal function trended up and volume status gradually improved. Nephrology consulted. He is still having intermittent chest discomfort but not asking for NTG. Cr at baseline, cardio recommends cath, but he can follow up as outpatient to schedule the cath. He was cleared for discharge by nephro and cardi. He will be discharged home with NTG.        Goals of Care Treatment Preferences:  Code Status: Full Code      Consults:   Consults (From admission, onward)        Status Ordering Provider     Inpatient consult to Nephrology[facility]  Once        Provider:  Sharon Ortiz MD    Completed KRISTIN HARRIS     Inpatient consult to Cardiology  Once        Provider:  Alex Crisostomo MD    Completed REBECCA MENDOZA          * NSTEMI (non-ST elevated myocardial infarction)  Elevated troponin, CAD, h/o PCI, CHF  - Cardiology consulted; pursue medical management given known CAD, chronic renal disease and risk of worsening renal function with potential catheterization.  - Continue aspirin 81mg PO  daily, apixaban as above.  - Hold further diuresis as appears more euvolemic. Strict intake/output, daily weights.  - Echo shows EF 65%, grade II diastolic dysfunction.  -  Nephrology and Cardiology cleared patient for discharge, he should follow up as OP and will need cardiac cath.   - He is still having intermittent chest pain and will be discharged on NTG      Final Active Diagnoses:    Diagnosis Date Noted POA    PRINCIPAL PROBLEM:  NSTEMI (non-ST elevated myocardial infarction) [I21.4] 06/22/2022 Yes    COVID-19 [U07.1] 06/22/2022 Yes    Acute hypoxemic respiratory failure [J96.01] 06/22/2022 Yes    CKD (chronic kidney disease), stage IV [N18.4] 06/22/2022 Yes    Anemia in stage 4 chronic kidney disease [N18.4, D63.1] 10/18/2021 Yes    Hypertensive kidney disease with stage 4 chronic kidney disease [I12.9, N18.4] 09/20/2021 Yes    Current use of long term anticoagulation [Z79.01] 08/25/2021 Not Applicable    Restrictive lung disease secondary to obesity [J98.4, E66.9] 06/20/2018 Yes    Heart failure, diastolic, acute on chronic [I50.33] 06/19/2018 Yes    Coronary artery disease involving native coronary artery of native heart without angina pectoris [I25.10] 03/24/2016 Yes    PAF (paroxysmal atrial fibrillation) [I48.0] 06/27/2013 Yes    History of CVA (cerebrovascular accident) [Z86.73] 03/11/2013 Not Applicable    Hypercholesteremia [E78.00] 10/02/2012 Yes     Chronic    Non-insulin dependent type 2 diabetes mellitus [E11.9] 10/02/2012 Yes     Chronic      Problems Resolved During this Admission:       Discharged Condition: fair    Disposition: Home or Self Care    Follow Up:   Follow-up Information     Mehul Livingston MD. Schedule an appointment as soon as possible for a visit.    Specialties: Cardiology, Interventional Cardiology  Contact information:  [address]  [facility] [address] [phone]             Alex Crisostomo MD. Schedule an appointment as soon as possible for a  visit.    Specialties: Cardiology, Interventional Cardiology  Contact information:  [address]  [address]  [facility] [address]  [phone]             Maria Luz Sloan MD Follow up in 1 week(s).    Specialty: Nephrology  Contact information:  [address]  [facility] [address]  [phone]                         Pending Diagnostic Studies:     None         Medications:  Reconciled Home Medications:      Medication List      START taking these medications    aspirin 81 MG EC tablet  Commonly known as: ECOTRIN  Take 1 tablet (81 mg total) by mouth once daily.  Start taking on: June 29, 2022     docusate sodium 100 MG capsule  Commonly known as: COLACE  Take 1 capsule (100 mg total) by mouth 2 (two) times daily as needed for Constipation.     isosorbide mononitrate 60 MG 24 hr tablet  Commonly known as: IMDUR  Take 1 tablet (60 mg total) by mouth once daily.  Start taking on: June 29, 2022     nitroGLYCERIN 0.4 MG SL tablet  Commonly known as: NITROSTAT  Place 1 tablet (0.4 mg total) under the tongue every 5 (five) minutes as needed for Chest pain. Go to the ED for eval if you continue to have chest pain after 3 doses     pulse oximeter device  Commonly known as: pulse oximeter  by Apply Externally route 2 (two) times a day. Use twice daily at 8 AM and 3 PM and record the value in MyChart as directed.        CHANGE how you take these medications    amiodarone 200 MG Tab  Commonly known as: PACERONE  Take 1 tablet (200 mg total) by mouth once daily.  Start taking on: June 29, 2022  What changed:   · medication strength  · how much to take     apixaban 2.5 mg Tab  Commonly known as: ELIQUIS  Take 1 tablet (2.5 mg total) by mouth 2 (two) times daily.  What changed:   · medication strength  · how much to take  · additional instructions  Notes to patient: Hold for 3 days prior to PD catheter placement.     SITagliptin 25 MG Tab  Commonly known as: JANUVIA  Take 1 tablet (25 mg total) by mouth once  daily.  What changed: how much to take        CONTINUE taking these medications    allopurinoL 100 MG tablet  Commonly known as: ZYLOPRIM  Take 1 tablet (100 mg total) by mouth once daily.     atorvastatin 40 MG tablet  Commonly known as: LIPITOR  TAKE 1 TABLET BY MOUTH EVERY DAY     calcitRIOL 0.25 MCG Cap  Commonly known as: ROCALTROL  Take 1 capsule (0.25 mcg total) by mouth every Mon, Wed, Fri.     famotidine 20 MG tablet  Commonly known as: PEPCID  Take 1 tablet (20 mg total) by mouth once daily.     furosemide 80 MG tablet  Commonly known as: LASIX  Take 0.5 tablets (40 mg total) by mouth 2 (two) times a day.     glipiZIDE 2.5 MG Tr24  Commonly known as: GLUCOTROL  Take 2.5 mg by mouth once daily.     hydrALAZINE 100 MG tablet  Commonly known as: APRESOLINE  Take 1 tablet (100 mg total) by mouth every 12 (twelve) hours.     tamsulosin 0.4 mg Cap  Commonly known as: FLOMAX  Take 0.4 mg by mouth once daily.     vitamin D 1000 units Tab  Commonly known as: VITAMIN D3  Take 1,000 Units by mouth once daily.     vitamin renal formula (B-complex-vitamin c-folic acid) 1 mg Cap  Commonly known as: NEPHROCAP  Take 1 capsule by mouth once daily.            Indwelling Lines/Drains at time of discharge:   Lines/Drains/Airways     None                 Time spent on the discharge of patient: 40  minutes         Lauren Adan MD  Department of Hospital Medicine  [facility]

## 2022-06-28 NOTE — ASSESSMENT & PLAN NOTE
Acute hypoxemic respiratory failure   Restrictive lung disease secondary to obesity  - Acute COVID infection in setting of NSTEMI, CHF as well.  - Hold further dexamethasone, completed 5 days of remdesivir.  - Weaned from supplemental O2.

## 2022-06-28 NOTE — ASSESSMENT & PLAN NOTE
Anemia in stage 4 chronic kidney disease   - Chronic; with concern for need for dialysis in near future.  - Diuresis as above.  - Avoid nephrotoxins, renally dose medications.  - Gradual upward trending Cr during diuresis as anticipated. Nephrology consulted as above.

## 2022-06-28 NOTE — HOSPITAL COURSE
Admitted with COVID, NSTEMI and CHF exacerbation. Started COVID treatment given high risk for severe disease with remdesivir and dexamethasone. Cardiology consulted and recommended medical therapy for NSTEMI. Diuresed with furosemide IV. Renal function trended up and volume status gradually improved. Nephrology consulted. He is still having intermittent chest discomfort but not asking for NTG. Cr at baseline, cardio recommends cath, but he can follow up as outpatient to schedule the cath. He was cleared for discharge by nephro and cardi. He will be discharged home with NTG.

## 2022-06-28 NOTE — PLAN OF CARE
06/28/22 1623   Discharge Assessment   Assessment Type Discharge Planning Reassessment   Confirmed/corrected address, phone number and insurance Yes   Confirmed Demographics Correct on Facesheet   Source of Information health record   Lives With significant other   Prior to hospitilization cognitive status: Alert/Oriented   Current cognitive status: Alert/Oriented   Equipment Currently Used at Home none   Do you take prescription medications? Yes   Do you have prescription coverage? Yes   Do you have any problems affording any of your prescribed medications? No   Is the patient taking medications as prescribed? yes   Who is going to help you get home at discharge? Girlfriend   How do you get to doctors appointments? car, drives self;family or friend will provide   Do you take coumadin? No   CM completed patient Reassessment from his health record. Patient was called on the telephone, but he didn't answer. Patient is Covid Positive and currently does not need any CM services at this time.

## 2022-06-28 NOTE — NURSING
Pt concerned about leaving. Dr. Adan spoke with pt and discontinued discharge orders. Pt will go home in the morning. Bed low and locked in place. Call bell and personal items in reach.

## 2022-06-28 NOTE — SUBJECTIVE & OBJECTIVE
Interval History: No acute events overnight. Appears euvolemic, feeling well. No new concerns.    Review of Systems   Constitutional:  Negative for chills and fever.   Respiratory:  Positive for cough. Negative for shortness of breath.    Cardiovascular:  Negative for chest pain and palpitations.   Gastrointestinal:  Negative for abdominal pain, nausea and vomiting.   Objective:     Vital Signs (Most Recent):  Temp: 97.8 °F (36.6 °C) (06/27/22 2007)  Pulse: 73 (06/27/22 2200)  Resp: 18 (06/27/22 2004)  BP: (!) 171/58 (06/27/22 2004)  SpO2: 96 % (06/27/22 2004)   Vital Signs (24h Range):  Temp:  [97.5 °F (36.4 °C)-97.8 °F (36.6 °C)] 97.8 °F (36.6 °C)  Pulse:  [54-78] 73  Resp:  [18-20] 18  SpO2:  [95 %-98 %] 96 %  BP: (146-185)/(58-83) 171/58     Weight: 120.3 kg (265 lb 4.8 oz)  Body mass index is 35 kg/m².    Intake/Output Summary (Last 24 hours) at 6/27/2022 2251  Last data filed at 6/27/2022 1304  Gross per 24 hour   Intake --   Output 1150 ml   Net -1150 ml        Physical Exam  Vitals and nursing note reviewed.   Constitutional:       General: He is not in acute distress.     Appearance: He is well-developed.   HENT:      Head: Normocephalic and atraumatic.   Eyes:      General:         Right eye: No discharge.         Left eye: No discharge.      Conjunctiva/sclera: Conjunctivae normal.   Cardiovascular:      Rate and Rhythm: Normal rate.      Pulses: Normal pulses.   Pulmonary:      Effort: Pulmonary effort is normal. No respiratory distress.      Comments: Diminished bases.  Abdominal:      Palpations: Abdomen is soft.      Tenderness: There is no abdominal tenderness.   Musculoskeletal:         General: Normal range of motion.      Right lower leg: No edema.      Left lower leg: No edema.   Skin:     General: Skin is warm and dry.   Neurological:      Mental Status: He is alert and oriented to person, place, and time.     Significant Labs:   CBC:  Recent Labs   Lab 06/25/22  0450   WBC 7.21   HGB 10.3*    HCT 30.6*      GRAN 82.4*  5.9   LYMPH 7.4*  0.5*   MONO 8.6  0.6   EOS 0.0   BASO 0.01     CMP:  Recent Labs   Lab 06/25/22  0450 06/26/22  0438 06/27/22  0425   * 134* 133*   K 4.4 4.4 4.5   CL 97 98 96   CO2 25 24 25   BUN 67* 76* 89*   CREATININE 3.0* 3.0* 3.6*   * 204* 439*   CALCIUM 8.3* 8.6* 8.4*   MG 1.9 2.0 2.1   PHOS 4.4 4.5 4.6*   ALKPHOS 127 113 156*   AST 35 29 23   ALT 28 27 26   BILITOT 0.5 0.6 0.5   PROT 5.9* 6.1 5.8*   ALBUMIN 2.9* 3.2* 3.0*   ANIONGAP 11 12 12        Significant Imaging:   No new imaging this morning.

## 2022-06-28 NOTE — ASSESSMENT & PLAN NOTE
Elevated troponin, CAD, h/o PCI, CHF  - Cardiology consulted; pursue medical management given known CAD, chronic renal disease and risk of worsening renal function with potential catheterization.  - Continue aspirin 81mg PO daily, apixaban as above.  - Hold further diuresis as appears more euvolemic. Strict intake/output, daily weights.  - Echo shows EF 65%, grade II diastolic dysfunction.  - Appreciate nephrology, cardiology assistance.

## 2022-06-28 NOTE — TELEPHONE ENCOUNTER
----- Message from Reema Williamson sent at 6/28/2022  4:00 PM CDT -----  Pt is calling to make a appointment I booked one it is in December  and added to the waiting list please call and advise @ [phone] he is being discharged today

## 2022-06-28 NOTE — PROGRESS NOTES
"Nephrology  Progress Note    Admit Date: 6/22/2022   LOS: 6 days     SUBJECTIVE:     Follow-up For:  NSTEMI (non-ST elevated myocardial infarction)    Interval History:     Uneventful night.  Sitting in recliner on room air w/o complaints.  Creat improving.  No c/o.  Asking to stay another day as his family has Covid (explained that he has it now).  No CP/SOB.     Review of Systems:  Constitutional: No fever or chills  Respiratory: No cough or shortness of breath  Cardiovascular: No chest pain or palpitations, left leg swelling improved, mild ISSA (chronic)  Gastrointestinal: No nausea or vomiting  Neurological: No confusion or weakness    OBJECTIVE:     Vital Signs Range (Last 24H):  BP (!) 167/73 (BP Location: Right arm, Patient Position: Lying)   Pulse (!) 51   Temp 98.3 °F (36.8 °C) (Oral)   Resp 16   Ht 6' 1" (1.854 m)   Wt 121.2 kg (267 lb 4 oz)   SpO2 (!) 94%   BMI 35.26 kg/m²     Temp:  [97.5 °F (36.4 °C)-98.3 °F (36.8 °C)]   Pulse:  [51-73]   Resp:  [16-19]   BP: (146-171)/(58-73)   SpO2:  [94 %-98 %]     I & O (Last 24H):    Intake/Output Summary (Last 24 hours) at 6/28/2022 0908  Last data filed at 6/28/2022 0525  Gross per 24 hour   Intake --   Output 1325 ml   Net -1325 ml       Physical Exam:  General appearance: Well developed, well nourished  Eyes:  Conjunctivae/corneas clear. PERRL.  Lungs: Normal respiratory effort,   clear to auscultation bilaterally   Heart: Regular rate and rhythm, S1, S2 normal, 3/6 murmur  Abdomen: Soft, non-tender non-distended; bowel sounds normal; no masses,  no organomegaly, obese  Extremities: No cyanosis or clubbing. Left leg 1+, right leg trace+  Skin: Skin color, texture, turgor normal. No rashes or lesions  Neurologic: Normal strength and tone. No focal numbness or weakness     Laboratory Data:  Recent Labs   Lab 06/28/22  0517   WBC 10.22   RBC 3.45*   HGB 11.1*   HCT 32.4*      MCV 94   MCH 32.2*   MCHC 34.3       BMP:   Recent Labs   Lab 06/28/22  0517 "   *   *   K 4.4   CL 97   CO2 27   BUN 88*   CREATININE 3.3*   CALCIUM 8.8   MG 2.2     Lab Results   Component Value Date    CALCIUM 8.8 06/28/2022    PHOS 4.2 06/28/2022       Lab Results   Component Value Date    .2 (H) 06/13/2022    CALCIUM 8.8 06/28/2022    PHOS 4.2 06/28/2022       Lab Results   Component Value Date    URICACID 7.4 (H) 11/11/2021       BNP  Recent Labs   Lab 06/22/22  1512   *       Medications:  Medication list was reviewed and changes noted under Assessment/Plan.    Diagnostic Results:    6/23/2022: Echo: Normal left ventricular size and systolic function. EF 65%. Moderate diastolic dysfunction. Mildly enlarged LA. Mild aortic valve sclerosis. Moderate MAC      ASSESSMENT/PLAN:     Multifactorial NATALIIA on CKD 4 due to Covid/NSTEMI/Cardio-renal syndrome:  -Creat on admission 2.7 and trended up to 3.6 cole to above and diuretics  -Has biopsy proven diabetic kidney disease with non-nephrotic range proteinuria  -He is aware that he is very close to ESRD and needs close follow up with Dr. Sloan upon discharge  -held lasix and creat improving.  -BUN up with steroids/lasix.  -no uremic signs.   -follow trends.  -Renally dose meds, avoid nephrotoxins, and monitor I/O's closely.      Covid:  -s/p remdesivir.  -stopped steroids.    NSTEMI:  -Dr. Crisostomo following.  -no angio with renal disease.    -consider angio once on HD per Dr. Sloan.    DM:  -uncontrolled with steroids now.   -defer.      See above  OK to DC TODAY.  Needs appt w/ Dr Sloan to prepare for RRT's in 1-2 weeks.

## 2022-06-28 NOTE — ASSESSMENT & PLAN NOTE
Current use of long-term anticoagulation   - Continue amiodarone 200mg PO daily, apixaban as 2.5mg PO BID.

## 2022-06-28 NOTE — PROGRESS NOTES
[facility] ([place])  Cardiology  Progress Note    Patient Name: Zev Castano  MRN: [id number]  Admission Date: 6/22/2022  Hospital Length of Stay: 6 days  Code Status: Full Code   Attending Physician: Lauren Adan, *   Primary Care Physician: Mehul Livingston MD  Expected Discharge Date:   Principal Problem:NSTEMI (non-ST elevated myocardial infarction)    Subjective:     Brief HPI:    Zev Castano is a 72 y.o.male with hypertension, hypercholesterolemia and diabetes mellitus type 2. He is obese. In 2012 he had two strokes in the bertrand with fairly good neurological recovery. In 2016 he had two stents placed in the left anterior descending coronary artery for angina.  He has had paroxysmal atrial fibrillation for which he underwent electrical cardioversion in 11/2020. He has been on amiodarone since with no clinical recurrence. He is anticoagulated with apixiban. He has chronic kidney disease, stage 4. In 8/2021 he was admitted to [facility] with fluid overload. He was diuresed. It was felt he was near need for dialysis at that time. Since that stay he has been followed at [facility] for his kidney disease. Beginning early 6/2022 he he began to develop more and more edema of his legs. He became increasingly short of breath. At 1 am 6/22/2022 he began feeling a chest pressure. The pressure lasted for about 5 hours. He then vomited 5-6 times and the chest pressure resolved. He felt weak. He presented top the emergency room. A COVID test was positive. There were no acute ST changes. The CXR revealed pulmonary edema. He was admitted. I was asked to see him in consultation as Dr. Livingston is out of town.       Hospital Course:     Diuresis.    NTG patch.    Aspirin was added to apixiban.    Remdesivir and dexamethason.    6/23/2022: Echo: Normal left ventricular size and systolic function. EF 65%. Moderate diastolic dysfunction. Mildly enlarged LA. Mild aortic  valve sclerosis. Moderate MAC.    Interval History:    6/28/2022: Mild chest discomfort this am. Did not ask for NTG sl.    Breathing well.     Edema of ankles has resolved.      Review of Systems   Constitutional: Positive for malaise/fatigue. Negative for chills and fever.   HENT: Negative for nosebleeds.    Eyes: Negative for vision loss in left eye and vision loss in right eye.   Cardiovascular: Positive for chest pain. Negative for leg swelling, orthopnea, palpitations, paroxysmal nocturnal dyspnea and syncope.   Respiratory: Negative for cough, hemoptysis, shortness of breath, sputum production and wheezing.    Hematologic/Lymphatic: Negative for bleeding problem. Does not bruise/bleed easily.   Skin: Negative for color change and rash.   Musculoskeletal: Negative for muscle weakness and myalgias.   Gastrointestinal: Negative for abdominal pain, heartburn, hematemesis, hematochezia, melena, nausea and vomiting.   Genitourinary: Negative for hematuria.   Neurological: Negative for dizziness, focal weakness, headaches, light-headedness, vertigo and weakness.   Psychiatric/Behavioral: Negative for altered mental status. The patient is not nervous/anxious.    Allergic/Immunologic: Negative for persistent infections.       Objective:     Vital Signs (Most Recent):  Temp: 97.8 °F (36.6 °C) (06/28/22 0934)  Pulse: (!) 58 (06/28/22 0934)  Resp: 16 (06/28/22 0934)  BP: (!) 162/83 (06/28/22 0934)  SpO2: 98 % (06/28/22 0934) Vital Signs (24h Range):  Temp:  [97.5 °F (36.4 °C)-98.3 °F (36.8 °C)] 97.8 °F (36.6 °C)  Pulse:  [51-73] 58  Resp:  [16-19] 16  SpO2:  [94 %-98 %] 98 %  BP: (146-171)/(58-83) 162/83     Weight: 121.2 kg (267 lb 4 oz)  Body mass index is 35.26 kg/m².    SpO2: 98 %  O2 Device (Oxygen Therapy): room air      Intake/Output Summary (Last 24 hours) at 6/28/2022 1028  Last data filed at 6/28/2022 0525  Gross per 24 hour   Intake --   Output 1025 ml   Net -1025 ml       Lines/Drains/Airways     Peripheral  Intravenous Line  Duration                Peripheral IV - Single Lumen 06/22/22 1526 18 G Right Forearm 5 days                Physical Exam  Constitutional:       General: He is not in acute distress.     Appearance: Normal appearance. He is well-developed. He is not ill-appearing.   Eyes:      Conjunctiva/sclera:      Right eye: Right conjunctiva is not injected. No hemorrhage.     Left eye: Left conjunctiva is not injected. No hemorrhage.     Pupils:      Right eye: Pupil is round.      Left eye: Pupil is round.   Neck:      Vascular: No JVD.   Cardiovascular:      Rate and Rhythm: Normal rate and regular rhythm.      Heart sounds: S1 normal and S2 normal. Murmur heard.     Gallop present. S4 sounds present. No S3 sounds.   Pulmonary:      Effort: Pulmonary effort is normal.      Breath sounds: Examination of the right-lower field reveals rales. Examination of the left-lower field reveals rales. Rales present.   Chest:      Chest wall: No swelling or tenderness.   Abdominal:      General: There is no distension.      Palpations: Abdomen is soft.      Tenderness: There is no abdominal tenderness.   Musculoskeletal:      Cervical back: Neck supple.      Right lower leg: No swelling. No edema.      Left lower leg: No swelling. No edema.   Skin:     General: Skin is warm and dry.      Findings: No rash.   Neurological:      General: No focal deficit present.      Mental Status: He is alert and oriented to person, place, and time. He is not disoriented.   Psychiatric:         Attention and Perception: Attention and perception normal.         Mood and Affect: Mood and affect normal.         Speech: Speech normal.         Behavior: Behavior normal.         Thought Content: Thought content normal.         Cognition and Memory: Cognition and memory normal.         Judgment: Judgment normal.         Current Medications:     allopurinoL  100 mg Oral Daily    amiodarone  200 mg Oral Daily    apixaban  2.5 mg Oral BID     ascorbic acid (vitamin C)  500 mg Oral BID    aspirin  81 mg Oral Daily    atorvastatin  40 mg Oral Daily    calcitRIOL  0.25 mcg Oral Every Mon, Wed, Fri    famotidine  20 mg Oral Daily    hydrALAZINE  100 mg Oral Q12H    insulin aspart U-100  3 Units Subcutaneous TIDWM    insulin detemir U-100  12 Units Subcutaneous Daily    isosorbide mononitrate  60 mg Oral Daily    multivitamin  1 tablet Oral Daily    senna-docusate 8.6-50 mg  1 tablet Oral BID    tamsulosin  0.4 mg Oral Daily     Current Laboratory Results:    Recent Results (from the past 24 hour(s))   POCT glucose    Collection Time: 06/27/22 12:16 PM   Result Value Ref Range    POCT Glucose 278 (H) 70 - 110 mg/dL   POCT glucose    Collection Time: 06/27/22  4:08 PM   Result Value Ref Range    POCT Glucose 248 (H) 70 - 110 mg/dL   POCT glucose    Collection Time: 06/27/22  8:30 PM   Result Value Ref Range    POCT Glucose 342 (H) 70 - 110 mg/dL   Comprehensive Metabolic Panel    Collection Time: 06/28/22  5:17 AM   Result Value Ref Range    Sodium 133 (L) 136 - 145 mmol/L    Potassium 4.4 3.5 - 5.1 mmol/L    Chloride 97 95 - 110 mmol/L    CO2 27 23 - 29 mmol/L    Glucose 281 (H) 70 - 110 mg/dL    BUN 88 (H) 8 - 23 mg/dL    Creatinine 3.3 (H) 0.5 - 1.4 mg/dL    Calcium 8.8 8.7 - 10.5 mg/dL    Total Protein 6.3 6.0 - 8.4 g/dL    Albumin 3.3 (L) 3.5 - 5.2 g/dL    Total Bilirubin 0.6 0.1 - 1.0 mg/dL    Alkaline Phosphatase 141 (H) 55 - 135 U/L    AST 25 10 - 40 U/L    ALT 28 10 - 44 U/L    Anion Gap 9 8 - 16 mmol/L    eGFR if African American 20 (A) >60 mL/min/1.73 m^2    eGFR if non African American 18 (A) >60 mL/min/1.73 m^2   Magnesium    Collection Time: 06/28/22  5:17 AM   Result Value Ref Range    Magnesium 2.2 1.6 - 2.6 mg/dL   Phosphorus    Collection Time: 06/28/22  5:17 AM   Result Value Ref Range    Phosphorus 4.2 2.7 - 4.5 mg/dL   CBC Without Differential    Collection Time: 06/28/22  5:17 AM   Result Value Ref Range    WBC 10.22 3.90 -  12.70 K/uL    RBC 3.45 (L) 4.60 - 6.20 M/uL    Hemoglobin 11.1 (L) 14.0 - 18.0 g/dL    Hematocrit 32.4 (L) 40.0 - 54.0 %    MCV 94 82 - 98 fL    MCH 32.2 (H) 27.0 - 31.0 pg    MCHC 34.3 32.0 - 36.0 g/dL    RDW 15.1 (H) 11.5 - 14.5 %    Platelets 211 150 - 450 K/uL    MPV 11.2 9.2 - 12.9 fL   POCT glucose    Collection Time: 06/28/22  7:40 AM   Result Value Ref Range    POCT Glucose 247 (H) 70 - 110 mg/dL     Current Imaging Results:    X-Ray Chest AP Portable   Final Result      Hazy perihilar and bibasilar opacities can be seen with edema.  No focal consolidation.         Electronically signed by: Angelic Lawrence   Date:    06/22/2022   Time:    15:46          6/23/2022: Echo:     The left ventricle is normal in size with normal systolic function.  The estimated ejection fraction is 65%.  Grade II left ventricular diastolic dysfunction.  Mild left atrial enlargement.  Normal right ventricular size with normal right ventricular systolic function.  Mild aortic valve sclerosis.  Moderate mitral annular calcification.  Normal central venous pressure (3 mmHg).       Assessment and Plan:     Problem List:    Active Diagnoses:    Diagnosis Date Noted POA    PRINCIPAL PROBLEM:  NSTEMI (non-ST elevated myocardial infarction) [I21.4] 06/22/2022 Yes    COVID-19 [U07.1] 06/22/2022 Yes    Acute hypoxemic respiratory failure [J96.01] 06/22/2022 Yes    CKD (chronic kidney disease), stage IV [N18.4] 06/22/2022 Yes    Anemia in stage 4 chronic kidney disease [N18.4, D63.1] 10/18/2021 Yes    Hypertensive kidney disease with stage 4 chronic kidney disease [I12.9, N18.4] 09/20/2021 Yes    Current use of long term anticoagulation [Z79.01] 08/25/2021 Not Applicable    Restrictive lung disease secondary to obesity [J98.4, E66.9] 06/20/2018 Yes    Heart failure, diastolic, acute on chronic [I50.33] 06/19/2018 Yes    Coronary artery disease involving native coronary artery of native heart without angina pectoris [I25.10]  03/24/2016 Yes    PAF (paroxysmal atrial fibrillation) [I48.0] 06/27/2013 Yes    History of CVA (cerebrovascular accident) [Z86.73] 03/11/2013 Not Applicable    Hypercholesteremia [E78.00] 10/02/2012 Yes     Chronic    Non-insulin dependent type 2 diabetes mellitus [E11.9] 10/02/2012 Yes     Chronic      Problems Resolved During this Admission:       Assessment and Plan:     1. Coronary Artery Disease              2016: LAD stents.              6/22/2022: CP x 5 hours.              ECG without acute ST changes. Troponin peak to 9. No new WMA.              Most likely NSTEMI.    He is at very high risk for worsening kidney function if he undergoes coronary angiography.              In this setting appears a initial conservative approach is reasonable.            ECG without any further ST T wave changes.              Would not favor triple therapy for this patient - bleeding risk would be very high.   Aspirin for 1-3 months would be the approach I would favor especially as he is receiving the higher option of apixiban dosing.   On apixiban 5 mg Q12.   On aspirin 81 mg Q24.              On isosorbidemononitrate 60 mg Q24.   6/28/2022: Mild chest pain.   Recurrent chest discomfort is concerning.   Would favor cath when renal replacement therapy has been initiated.     2. Heart Failure, Diastolic, Acute on Chronic              8/25/2021: Echo: Normal left ventricular size and systolic function. EF 65%. Moderate diastolic dysfunction.   6/23/2022: Echo: Normal left ventricular size and systolic function. EF 65%. Moderate diastolic dysfunction. Mildly enlarged LA. Mild aortic valve sclerosis. Moderate MAC.              6/22/2022: Presented markedly fluid overloaded. Probably mostly due to CKD. Received furosemide 60 mg iv Q12.            Diuretics on hold due to rise in BUN/crea.     3. Atrial Fibrillation              Paroxysmal atrial fibrillation.              11/2022L: CV.              [id number] ([id number]).               On amiodarone 200 mg Q24.              On apixiban 5 mg Q12.      4. High Risk Medication              On amiodarone 200 mg Q24.     5. Chronic Anticoagulation              [id number] ([id number]).              On apixiban 5 mg Q12.   With severe CKD will reduce diose of apixiban to 2.5 mg Q12.     6. Hypertension              At home been on hydralazine 100 mg Q12 and furosemide 40 mg Q12.   On hydralazine 100 mg Q12.     7. Hypercholesterolemia              On atorvastatin 80 mg Q24.     8. Diabetes Mellitus, Type 2              Complications: CKD4, VITA & CAD. Medications: Oral agents.     9. Chronic Kidney Disease, Stage 4              Diabetic nephropathy.              6/22/2022: BUN/crea 39/2.7. CrCl 22.   6/23/2022: BUN/crea 43/2.8. CrCl 21.   6/27/2022: BUN/crea 89/3.6. CrCl 16.   Renal following.     10. COVID 19              6/22/2022: Positive.              Received dexamethasone and remdesivir.                  VTE Risk Mitigation (From admission, onward)         Ordered     apixaban tablet 2.5 mg  2 times daily         06/27/22 1737     IP VTE HIGH RISK PATIENT  Once         06/22/22 1750     Place sequential compression device  Until discontinued         06/22/22 1750                Alex Crisostomo MD  Cardiology  [facility] ([place])

## 2022-06-28 NOTE — ASSESSMENT & PLAN NOTE
Elevated troponin, CAD, h/o PCI, CHF  - Cardiology consulted; pursue medical management given known CAD, chronic renal disease and risk of worsening renal function with potential catheterization.  - Continue aspirin 81mg PO daily, apixaban as above.  - Hold further diuresis as appears more euvolemic. Strict intake/output, daily weights.  - Echo shows EF 65%, grade II diastolic dysfunction.  -  Nephrology and Cardiology cleared patient for discharge, he should follow up as OP and will need cardiac cath.   - He is still having intermittent chest pain and will be discharged on NTG

## 2022-06-29 ENCOUNTER — TELEPHONE (OUTPATIENT)
Dept: CARDIOLOGY | Facility: CLINIC | Age: 72
End: 2022-06-29
Payer: MEDICARE

## 2022-06-29 VITALS
HEART RATE: 49 BPM | TEMPERATURE: 98 F | RESPIRATION RATE: 16 BRPM | OXYGEN SATURATION: 98 % | WEIGHT: 267.38 LBS | DIASTOLIC BLOOD PRESSURE: 73 MMHG | HEIGHT: 73 IN | SYSTOLIC BLOOD PRESSURE: 169 MMHG | BODY MASS INDEX: 35.44 KG/M2

## 2022-06-29 PROCEDURE — 99239 HOSP IP/OBS DSCHRG MGMT >30: CPT | Mod: CR,,, | Performed by: STUDENT IN AN ORGANIZED HEALTH CARE EDUCATION/TRAINING PROGRAM

## 2022-06-29 PROCEDURE — 99239 PR HOSPITAL DISCHARGE DAY,>30 MIN: ICD-10-PCS | Mod: CR,,, | Performed by: STUDENT IN AN ORGANIZED HEALTH CARE EDUCATION/TRAINING PROGRAM

## 2022-06-29 NOTE — PROGRESS NOTES
"Nephrology  Progress Note    Admit Date: 6/22/2022   LOS: 7 days     SUBJECTIVE:     Follow-up For:  NSTEMI (non-ST elevated myocardial infarction)    Interval History:     Stayed overnight due to fear of covid at home......  No c/o this am.  No new labs.  Voiding ok.      Review of Systems:  Constitutional: No fever or chills  Respiratory: No cough or shortness of breath  Cardiovascular: No chest pain or palpitations, left leg swelling improved, mild ISSA (chronic)  Gastrointestinal: No nausea or vomiting  Neurological: No confusion or weakness    OBJECTIVE:     Vital Signs Range (Last 24H):  BP (!) 169/73 (Patient Position: Sitting)   Pulse (!) 49   Temp 97.6 °F (36.4 °C) (Oral)   Resp 16   Ht 6' 1" (1.854 m)   Wt 121.3 kg (267 lb 6.4 oz)   SpO2 98%   BMI 35.28 kg/m²     Temp:  [97.5 °F (36.4 °C)-98.1 °F (36.7 °C)]   Pulse:  [49-64]   Resp:  [16-20]   BP: (133-169)/(61-73)   SpO2:  [96 %-98 %]     I & O (Last 24H):  No intake or output data in the 24 hours ending 06/29/22 0945    Physical Exam:  General appearance: Well developed, well nourished  Eyes:  Conjunctivae/corneas clear. PERRL.  Lungs: Normal respiratory effort,   clear to auscultation bilaterally   Heart: Regular rate and rhythm, S1, S2 normal, 3/6 murmur  Abdomen: Soft, non-tender non-distended; bowel sounds normal; no masses,  no organomegaly, obese  Extremities: No cyanosis or clubbing. Left leg 1+, right leg trace+  Skin: Skin color, texture, turgor normal. No rashes or lesions  Neurologic: Normal strength and tone. No focal numbness or weakness     Laboratory Data:  No results for input(s): WBC, RBC, HGB, HCT, PLT, MCV, MCH, MCHC in the last 24 hours.    BMP:   Recent Labs   Lab 06/28/22  0517   *   *   K 4.4   CL 97   CO2 27   BUN 88*   CREATININE 3.3*   CALCIUM 8.8   MG 2.2     Lab Results   Component Value Date    CALCIUM 8.8 06/28/2022    PHOS 4.2 06/28/2022       Lab Results   Component Value Date    .2 (H) 06/13/2022 "    CALCIUM 8.8 06/28/2022    PHOS 4.2 06/28/2022       Lab Results   Component Value Date    URICACID 7.4 (H) 11/11/2021       BNP  Recent Labs   Lab 06/22/22  1512   *       Medications:  Medication list was reviewed and changes noted under Assessment/Plan.    Diagnostic Results:    6/23/2022: Echo: Normal left ventricular size and systolic function. EF 65%. Moderate diastolic dysfunction. Mildly enlarged LA. Mild aortic valve sclerosis. Moderate MAC      ASSESSMENT/PLAN:     Multifactorial NATALIIA on CKD 4 due to Covid/NSTEMI/Cardio-renal syndrome:  -Creat on admission 2.7 and trended up to 3.6 due to above and diuretics  -Has biopsy proven diabetic kidney disease with non-nephrotic range proteinuria  -He is aware that he is very close to ESRD and needs close follow up with Dr. Sloan upon discharge  -held lasix and creat improving but needs to resume lasix at DC  -BUN up with steroids/lasix.  -no uremic signs.   -followed trends.  -Renally dose meds, avoid nephrotoxins, and monitor I/O's closely.      Covid:  -s/p remdesivir.  -stopped steroids.    NSTEMI:  -Dr. Crisostomo following.  -no angio with renal disease.    -consider angio once on HD per Dr. Sloan.    DM:  -uncontrolled with steroids now.   -defer.      See above  OK to DC TODAY.  Needs appt w/ Dr Sloan to prepare for RRT's in 1-2 weeks.

## 2022-06-29 NOTE — TELEPHONE ENCOUNTER
Reached out to patient to schedule appointment with Dr Crisostomo. Spoke with wife, she will call Dr Livingston's office first before she schedule with Dr Crisostomo.      ----- Message from Marcia Richter sent at 6/28/2022  3:50 PM CDT -----  Who called?:[facility]      What is the request in detail:PT would like to schedule a follow up appointment. Please advise         Can the clinic reply by [facility]?:No        Best Call Back Number: [phone]

## 2022-06-29 NOTE — NURSING
"Pt discharged. Ready to go and packed when came in to do vitals. Allowed vitals to be taken but did not want to take medications or his glucose checked at that time due to he had not eaten and it would make him nauseous. Pt asked if medications could be  placed in a bag and he would take them later. Pt was instructed if did not take medications now, he would not be given the medications and he could take his medications at home. Pt stated would take medications at home. Reviewed discharge papers with pt. Pt stated most of medications had been delivered. He did not want to wait for other ones as his ride was almost to the hospital. He stated "I will call my doctor and speak to him about it." IV removed. Pt transport called.  "

## 2022-06-29 NOTE — PLAN OF CARE
Patient states he lives independently at home with his wife.     Family will provide transportation home.    All discharge needs were addressed from a CM perspective.       06/29/22 1021   Final Note   Assessment Type Final Discharge Note   Anticipated Discharge Disposition Home   Hospital Resources/Appts/Education Provided Provided patient/caregiver with written discharge plan information;Appointments scheduled and added to AVS;Appointments scheduled by Navigator/Coordinator   Post-Acute Status   Discharge Delays None known at this time

## 2022-06-29 NOTE — PROGRESS NOTES
Patient discharged from hospital before RSW was able to complete initial visit due to patient positive COVID status.    NA Aguayo

## 2022-06-30 ENCOUNTER — TELEPHONE (OUTPATIENT)
Dept: NEPHROLOGY | Facility: CLINIC | Age: 72
End: 2022-06-30
Payer: MEDICARE

## 2022-06-30 NOTE — TELEPHONE ENCOUNTER
Zevrochelle Castano was referred to Advanced CKD education by Dr. Sloan    The patient attended class in an individual setting accompanied by wife.    Advanced Education (Lesson 4, 5, 6)    Lesson 4 Choices for Treating Advanced Kidney Disease (The Basics)  Lesson Objectives  By the end of each session, participants will be able to:  ? Identify the four treatment choices for kidney failure  ? State how hemodialysis and peritoneal dialysis differ in frequency of treatment   Topics & Points Covered  ? You have treatment choices, and you can change your mind.  ? No renal replacement therapy also known as conservative management  o Active medical care without RRT  o Palliative care when necessary  o Complications can be managed, but can't compensate for uremia  o End of life choices - living will - telling family  o Can change your mind  ? Hemodialysis (HD)  o Two types  ? In-center (most common)  ? Home hemo: treatments are done at home, requires assistance, you have more control, can be harder on the helpers.  o How it works  o PROs and CONs  ? The diet (in-center is most restrictive, only 3 times/week, build up between treatments)  ? Lifestyle: social aspects  ? Safety/infection  ? Peritoneal dialysis (PD)  o Two types  ? Continuous ambulatory PD  ? Continuous cycling PD  o How it works  o PROs and CONs  o Storage space for supplies (delivered monthly)  o Diet (blood is cleaned every day, diet is less restrictive)  o Safety/infection control  ? Kidney transplant  o Types  ?  or living donor  o Eligibility  o Transplant evaluation  o Waiting list  o Still MUST take medications including anti-rejection medications  o PROs and CONs  ? The diet (least restrictive)  ? Cultural aspects of different choices  o Be sensitive to cultural beliefs in regards to all of the available options  Outcome Assessment Questions  ? What happens if a person chooses not to go on dialysis? On Question #12 of patient assessment, patient  states he knows he will eventually die.  ? What are the pros and cons of kidney transplant as an option for treatment of kidney failure? On a patient reflection worksheet, the patient lists pros and cons of PD, in-center hemodialysis, home hemodialysis, kidney transplant, and active medical management without dialysis.  ? Patient lists the four treatment choices for kidney failure. On Question #1 of patient assessment, patient identifies that active medical management, hemodialysis, peritoneal dialysis, and kidney transplant are the four treatment choices for kidney failure.  ? Patient states frequency of HD and frequency of PD. On Question #2 of patient assessment, patient accurately identifies that PD is done 7 days a week (every day). On Question #3 of patient assessment, patient accurately identifies that HD is done 3 days a week (M/W/F or Tu/Th/Sat)  ? Patient indicates how food choices for different treatments may change. In HD/PD activity, the patient accurately identified that in-center hemodialysis has more dietary restrictions than PD.   Lesson 5 Getting Ready for Treatment (Beyond the Basics)  Lesson Objectives  By the end of each session, participants will be able to:  ? Express a benefit of having access surgery months before starting dialysis  ? Report two ways that the non-dominant arm should be protected for dialysis access  Topics & Points Covered  ? When does dialysis start?  ? Benefits of early access placement, including choice in type of dialysis (informed decision)  ? Hemodialysis access  o Fistula, graft, catheter  o PROS and CONS of access type  o How to prepare for surgery and care for the access  ? Peritoneal dialysis access  o Catheter  o How to prepare for surgery and care for the exit site  ? Transplant evaluation  o Referral/paperwork for transplant center  ? Surgical referral  ? Immunizations, other lab tests  ? Emotional preparation/acceptance (depression)  ? Cultural  considerations  o Be sensitive to your audience's cultural beliefs and norms  ? Transportation considerations  Outcome Assessment Questions  ? What are ways to protect the blood vessels in the nondominant arm in preparation for dialysis access placement? On Question #7 of patient assessment, the patient identifies no needlesticks, blood pressures, restrictive clothing, or sleeping on that arm.  ? True or False: A hemodialysis catheter is the choice of last resort for dialysis access due to high risk of infection and clotting. On Question #8 of patient assessment, the patient answers true.  ? True or False: A fistula is the #1 choice for hemodialysis. It takes 3-6 months to mature before it can be used. On Question #9 of patient assessment, the patient answers true.  ? What are the benefits of making a treatment choice sooner instead of later? On Question #11 of patient assessment, the patient states so that he not to sick to have a say in making decisions.  ? Name 3 symptoms to report that may indicate a need to start dialysis. On Question #13 of patient assessment, the patient states feeling fatigued, shortness of breath, n/v    Lesson 6 Living with Advanced Kidney Disease  Lesson Objectives  By the end of each session, participants will be able to:  ? Identify that diet recommendations and medications may change when dialysis treatment begins  ? Identify that Medicare has a special program that may pay for the cost of treatment      Topics & Points Covered  ? What to expect once you start dialysis  o Will take time to feel better (won't happen after first treatment)  o Medications may change  ? Basic diet changes for dialysis  o More protein is needed due to protein losses during dialysis  o Maintain low sodium and phosphorus  o May need potassium and fluid restrictions with hemodialysis  o Dietitian is part of dialysis team  ? Financial concerns  o Employment and rehabilitation  o Cost of treatments  o Cost of  medications  o Transportation  o  is part of dialysis team  ? Quality of life      Outcome Assessment Questions  ? How is your diet likely to change when you go on dialysis? On Question #6 of the patient assessment, the patient correctly recognizes that a dietitian will help manage dietary changes while on dialysis.  ? True or False: The Medicare program covers the cost of treatment for kidney failure for most people? On Question #10 of the patient assessment, the patient answers True.  ? Patient states two ways medication use will change when dialysis begins. On question #5 of the patient assessment, the patient identifies that the dose and schedule of medication may change when dialysis begins.  ? Patient states what he/she will do with information from today's session. On Question #14 of the patient assessment, the patient states he will go home and continue on the path to healthy eating habits and do everything possible to keep his kidney from getting worse.      At the end of this session, the ESRD treatment modality the patient was most interested in was: Peritoneal dialysis  Areas of information that primary nephrology provider should reinforce during follow-up visit includes strict adherence to dietary changes and blood pressure control. Both patient and wife are knowledgeable of disease process.    The content of these lessons are adapted from the Kidney Disease Education (KDE) Services benefit as defined by the Centers for Medicare & Medicaid Services.    90 minutes spent educating patient on the above content.

## 2022-07-11 ENCOUNTER — TELEPHONE (OUTPATIENT)
Dept: NEPHROLOGY | Facility: CLINIC | Age: 72
End: 2022-07-11
Payer: MEDICARE

## 2022-07-19 ENCOUNTER — TELEPHONE (OUTPATIENT)
Dept: NEPHROLOGY | Facility: CLINIC | Age: 72
End: 2022-07-19
Payer: MEDICARE

## 2022-07-19 NOTE — TELEPHONE ENCOUNTER
Returned phone call as pt's family left a message to call back.  Recent pertinent medical chart reviewed.    Noted, recurrent hospitalizations at [facility] for chest pain, NSTEMI, fluid overload, worsened renal function. He was seen by local nephrology group who were managing his diuretics.    Noted NATALIIA during hospitalization when lasix dose was reduced.    Per Dr. Crisostomo, pt had NSTEMI but due to kidney issues cardiac cath not offered.    Pt reports he has been taking lasix 40 mg twice per day on MWF only as he has been advised taking it every day can worsen kidney function.    His wife informed me he has stable oxygen level, on home pulse ox reading was 98% at rest.    But he does have fluid building up again and has been having some difficulty breathing at times.    I advised her to bring him to ER if any recurrent of SOB/ worsening of existing symptoms like worsening leg edema/ or any other acute symptoms like chest pain/ dizziness or anything else concerning. I discussed above issues with her and advised in the setting of fluid build up, he needs higher dose of lasix and on a daily basis and keeping fluid overload unaddressed could stress cardiac function further. She verbalized understanding.    Confirmed he has an appointment to see Dr. Livingston tomorrow. Will copy message to him and await his further input on diuretic regimen.  In the interim increasing lasix dose from 40 mg bid on MWF to 40 mg bid everyday, if symptoms not controlled then titrate the dose higher.  Plan to obtain renal labs in 3 days.  Impression cardiorenal syndrome, anticipate need for higher diuretic regimen to avoid fluid overload, as such recent NSTEMI per chart check, he should have closer follow up on volume status, BP, labs.     Appointment in nephrology clinic next available being set up.  Low threshold for dialysis if failure of diuretic challenge and/or worsening of uremic toxin levels.

## 2022-07-23 RX ORDER — AMIODARONE HYDROCHLORIDE 200 MG/1
200 TABLET ORAL DAILY
Qty: 30 TABLET | Refills: 0 | Status: CANCELLED | OUTPATIENT
Start: 2022-07-23

## 2022-07-23 RX ORDER — ISOSORBIDE MONONITRATE 60 MG/1
60 TABLET, EXTENDED RELEASE ORAL DAILY
Qty: 30 TABLET | Refills: 0 | Status: CANCELLED | OUTPATIENT
Start: 2022-07-23

## 2022-07-25 ENCOUNTER — TELEPHONE (OUTPATIENT)
Dept: NEPHROLOGY | Facility: CLINIC | Age: 72
End: 2022-07-25
Payer: MEDICARE

## 2022-08-08 ENCOUNTER — TELEPHONE (OUTPATIENT)
Dept: NEPHROLOGY | Facility: CLINIC | Age: 72
End: 2022-08-08
Payer: MEDICARE

## 2022-08-08 NOTE — TELEPHONE ENCOUNTER
----- Message from Maria Luz Sloan MD sent at 8/6/2022  5:36 PM CDT -----  Kidney function has slightly worsened.  Please check if patient having any new symptoms.  Please check if any decreased urine output/ nausea/ vomiting/ diarrhea/ dizziness/ low blood pressures/ more shortness of breath/ more swelling or anything else concerning.  Potassium level normal.  Phosphorus elevated, please follow low phosphorus diet.  Repeat renal panel in 1-2 days.  Copy note to cardiologist, Dr. Livingston  Thanks.

## 2022-08-11 ENCOUNTER — TELEPHONE (OUTPATIENT)
Dept: NEPHROLOGY | Facility: CLINIC | Age: 72
End: 2022-08-11
Payer: MEDICARE

## 2022-08-11 NOTE — TELEPHONE ENCOUNTER
----- Message from Vicky Henderson sent at 8/11/2022  8:44 AM CDT -----  Zev Castano wife Tala calling regarding Patient Advice (message) want to know if pt can have an Audio visit today, instead of coming in.  call back [phone]

## 2022-08-15 ENCOUNTER — OFFICE VISIT (OUTPATIENT)
Dept: NEPHROLOGY | Facility: CLINIC | Age: 72
End: 2022-08-15
Payer: MEDICARE

## 2022-08-15 VITALS
SYSTOLIC BLOOD PRESSURE: 136 MMHG | BODY MASS INDEX: 34.8 KG/M2 | HEART RATE: 69 BPM | WEIGHT: 262.56 LBS | DIASTOLIC BLOOD PRESSURE: 52 MMHG | OXYGEN SATURATION: 94 % | HEIGHT: 73 IN

## 2022-08-15 DIAGNOSIS — N18.4 CKD STAGE 4 DUE TO TYPE 2 DIABETES MELLITUS: Primary | ICD-10-CM

## 2022-08-15 DIAGNOSIS — D50.9 IRON DEFICIENCY ANEMIA, UNSPECIFIED IRON DEFICIENCY ANEMIA TYPE: ICD-10-CM

## 2022-08-15 DIAGNOSIS — N18.4 ANEMIA IN STAGE 4 CHRONIC KIDNEY DISEASE: ICD-10-CM

## 2022-08-15 DIAGNOSIS — N25.81 SECONDARY HYPERPARATHYROIDISM: ICD-10-CM

## 2022-08-15 DIAGNOSIS — E11.22 CKD STAGE 4 DUE TO TYPE 2 DIABETES MELLITUS: Primary | ICD-10-CM

## 2022-08-15 DIAGNOSIS — D63.1 ANEMIA IN STAGE 4 CHRONIC KIDNEY DISEASE: ICD-10-CM

## 2022-08-15 PROCEDURE — 99999 PR PBB SHADOW E&M-EST. PATIENT-LVL IV: CPT | Mod: PBBFAC,,, | Performed by: INTERNAL MEDICINE

## 2022-08-15 PROCEDURE — 99215 PR OFFICE/OUTPT VISIT, EST, LEVL V, 40-54 MIN: ICD-10-PCS | Mod: S$PBB,,, | Performed by: INTERNAL MEDICINE

## 2022-08-15 PROCEDURE — 99214 OFFICE O/P EST MOD 30 MIN: CPT | Mod: PBBFAC | Performed by: INTERNAL MEDICINE

## 2022-08-15 PROCEDURE — 99999 PR PBB SHADOW E&M-EST. PATIENT-LVL IV: ICD-10-PCS | Mod: PBBFAC,,, | Performed by: INTERNAL MEDICINE

## 2022-08-15 PROCEDURE — 99215 OFFICE O/P EST HI 40 MIN: CPT | Mod: S$PBB,,, | Performed by: INTERNAL MEDICINE

## 2022-08-15 RX ORDER — AMLODIPINE BESYLATE 5 MG/1
5 TABLET ORAL DAILY
COMMUNITY
Start: 2022-05-05

## 2022-08-15 RX ORDER — EMPAGLIFLOZIN 10 MG/1
10 TABLET, FILM COATED ORAL DAILY
COMMUNITY
End: 2022-11-11

## 2022-08-15 NOTE — PATIENT INSTRUCTIONS
- Please take lasix 40 mg twice a day  - Please weight yourself daily and write it down along with your blood pressure.    - Please donot take NSAID's (Motrin, Advil, Aleve, Ibuprofen, BC powder, Goody Powder, Diclofenac, Naproxen, Meloxicam/MOBIC, Celebrex etc), Ok to take baby aspirin.  - The patient was educated in practicing a low salt diet.  Avoid high salt foods (olives, pickles, smoked meats, salted potato chips, etc.).   Do not add salt to your food at the table.   Use only small amounts of salt when cooking.  - Please eat low phosphate diet (no dark colored sodas).  - Follow up in 4 weeks or early if needed

## 2022-08-23 PROBLEM — E11.22 CKD STAGE 4 DUE TO TYPE 2 DIABETES MELLITUS: Status: ACTIVE | Noted: 2022-06-22
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
## 2022-08-24 DIAGNOSIS — N25.81 SECONDARY HYPERPARATHYROIDISM: ICD-10-CM

## 2022-08-24 RX ORDER — CALCITRIOL 0.25 UG/1
0.25 CAPSULE ORAL
Qty: 12 CAPSULE | Refills: 3 | Status: SHIPPED | OUTPATIENT
Start: 2022-08-24 | End: 2023-10-16

## 2022-09-11 RX ORDER — TAMSULOSIN HYDROCHLORIDE 0.4 MG/1
CAPSULE ORAL
Qty: 90 CAPSULE | Refills: 4 | Status: SHIPPED | OUTPATIENT
Start: 2022-09-11 | End: 2022-11-11

## 2023-06-13 PROBLEM — Z91.199 NON-COMPLIANT PATIENT: Chronic | Status: ACTIVE | Noted: 2023-06-13

## 2023-06-18 ENCOUNTER — HOSPITAL ENCOUNTER (INPATIENT)
Facility: OTHER | Age: 73
LOS: 4 days | Discharge: HOME | DRG: 640 | End: 2023-06-23
Attending: EMERGENCY MEDICINE | Admitting: EMERGENCY MEDICINE
Payer: MEDICARE

## 2023-06-18 DIAGNOSIS — N18.6 ESRD (END STAGE RENAL DISEASE): ICD-10-CM

## 2023-06-18 DIAGNOSIS — R60.0 BILATERAL LOWER EXTREMITY EDEMA: ICD-10-CM

## 2023-06-18 DIAGNOSIS — N18.9 CHRONIC KIDNEY DISEASE: ICD-10-CM

## 2023-06-18 DIAGNOSIS — S30.1XXA HEMATOMA OF RIGHT FLANK, INITIAL ENCOUNTER: ICD-10-CM

## 2023-06-18 DIAGNOSIS — I50.9 CHF (CONGESTIVE HEART FAILURE): ICD-10-CM

## 2023-06-18 DIAGNOSIS — E87.70 HYPERVOLEMIA, UNSPECIFIED HYPERVOLEMIA TYPE: ICD-10-CM

## 2023-06-18 DIAGNOSIS — N18.9 CHRONIC KIDNEY DISEASE, UNSPECIFIED CKD STAGE: Primary | ICD-10-CM

## 2023-06-18 DIAGNOSIS — S30.0XXA TRAUMATIC HEMATOMA OF BUTTOCK, INITIAL ENCOUNTER: ICD-10-CM

## 2023-06-18 LAB
ALBUMIN SERPL BCP-MCNC: 3.7 G/DL (ref 3.5–5.2)
ALP SERPL-CCNC: 140 U/L (ref 55–135)
ALT SERPL W/O P-5'-P-CCNC: 22 U/L (ref 10–44)
ANION GAP SERPL CALC-SCNC: 16 MMOL/L (ref 8–16)
AST SERPL-CCNC: 27 U/L (ref 10–40)
BACTERIA #/AREA URNS HPF: NORMAL /HPF
BASOPHILS # BLD AUTO: 0.04 K/UL (ref 0–0.2)
BASOPHILS NFR BLD: 0.6 % (ref 0–1.9)
BILIRUB SERPL-MCNC: 0.7 MG/DL (ref 0.1–1)
BILIRUB UR QL STRIP: NEGATIVE
BNP SERPL-MCNC: 85 PG/ML (ref 0–99)
BUN SERPL-MCNC: 86 MG/DL (ref 8–23)
CALCIUM SERPL-MCNC: 9.3 MG/DL (ref 8.7–10.5)
CHLORIDE SERPL-SCNC: 101 MMOL/L (ref 95–110)
CLARITY UR: CLEAR
CO2 SERPL-SCNC: 22 MMOL/L (ref 23–29)
COLOR UR: YELLOW
CREAT SERPL-MCNC: 4.4 MG/DL (ref 0.5–1.4)
DIFFERENTIAL METHOD: ABNORMAL
EOSINOPHIL # BLD AUTO: 0.2 K/UL (ref 0–0.5)
EOSINOPHIL NFR BLD: 3 % (ref 0–8)
ERYTHROCYTE [DISTWIDTH] IN BLOOD BY AUTOMATED COUNT: 15.2 % (ref 11.5–14.5)
EST. GFR  (NO RACE VARIABLE): 13 ML/MIN/1.73 M^2
GLUCOSE SERPL-MCNC: 235 MG/DL (ref 70–110)
GLUCOSE UR QL STRIP: ABNORMAL
HCT VFR BLD AUTO: 32.4 % (ref 40–54)
HGB BLD-MCNC: 10.7 G/DL (ref 14–18)
HGB UR QL STRIP: NEGATIVE
HYALINE CASTS #/AREA URNS LPF: 0 /LPF
IMM GRANULOCYTES # BLD AUTO: 0.12 K/UL (ref 0–0.04)
IMM GRANULOCYTES NFR BLD AUTO: 1.8 % (ref 0–0.5)
KETONES UR QL STRIP: NEGATIVE
LEUKOCYTE ESTERASE UR QL STRIP: NEGATIVE
LYMPHOCYTES # BLD AUTO: 0.7 K/UL (ref 1–4.8)
LYMPHOCYTES NFR BLD: 9.7 % (ref 18–48)
MAGNESIUM SERPL-MCNC: 2.6 MG/DL (ref 1.6–2.6)
MCH RBC QN AUTO: 32.1 PG (ref 27–31)
MCHC RBC AUTO-ENTMCNC: 33 G/DL (ref 32–36)
MCV RBC AUTO: 97 FL (ref 82–98)
MICROSCOPIC COMMENT: NORMAL
MONOCYTES # BLD AUTO: 0.7 K/UL (ref 0.3–1)
MONOCYTES NFR BLD: 10.7 % (ref 4–15)
NEUTROPHILS # BLD AUTO: 5 K/UL (ref 1.8–7.7)
NEUTROPHILS NFR BLD: 74.2 % (ref 38–73)
NITRITE UR QL STRIP: NEGATIVE
NRBC BLD-RTO: 0 /100 WBC
PH UR STRIP: 5 [PH] (ref 5–8)
PHOSPHATE SERPL-MCNC: 6.4 MG/DL (ref 2.7–4.5)
PLATELET # BLD AUTO: 214 K/UL (ref 150–450)
PMV BLD AUTO: 9.7 FL (ref 9.2–12.9)
POTASSIUM SERPL-SCNC: 4.4 MMOL/L (ref 3.5–5.1)
PROT SERPL-MCNC: 7.6 G/DL (ref 6–8.4)
PROT UR QL STRIP: ABNORMAL
RBC # BLD AUTO: 3.33 M/UL (ref 4.6–6.2)
RBC #/AREA URNS HPF: 1 /HPF (ref 0–4)
SODIUM SERPL-SCNC: 139 MMOL/L (ref 136–145)
SP GR UR STRIP: 1.01 (ref 1–1.03)
SQUAMOUS #/AREA URNS HPF: 0 /HPF
TROPONIN I SERPL DL<=0.01 NG/ML-MCNC: 0.03 NG/ML (ref 0–0.03)
TSH SERPL DL<=0.005 MIU/L-ACNC: 1.15 UIU/ML (ref 0.4–4)
URN SPEC COLLECT METH UR: ABNORMAL
UROBILINOGEN UR STRIP-ACNC: NEGATIVE EU/DL
WBC # BLD AUTO: 6.72 K/UL (ref 3.9–12.7)
WBC #/AREA URNS HPF: 0 /HPF (ref 0–5)
YEAST URNS QL MICRO: NORMAL

## 2023-06-18 PROCEDURE — 80053 COMPREHEN METABOLIC PANEL: CPT | Performed by: EMERGENCY MEDICINE

## 2023-06-18 PROCEDURE — 96374 THER/PROPH/DIAG INJ IV PUSH: CPT

## 2023-06-18 PROCEDURE — 81000 URINALYSIS NONAUTO W/SCOPE: CPT | Performed by: EMERGENCY MEDICINE

## 2023-06-18 PROCEDURE — 25000003 PHARM REV CODE 250: Performed by: EMERGENCY MEDICINE

## 2023-06-18 PROCEDURE — 84443 ASSAY THYROID STIM HORMONE: CPT | Performed by: EMERGENCY MEDICINE

## 2023-06-18 PROCEDURE — 83735 ASSAY OF MAGNESIUM: CPT | Performed by: EMERGENCY MEDICINE

## 2023-06-18 PROCEDURE — 85025 COMPLETE CBC W/AUTO DIFF WBC: CPT | Performed by: EMERGENCY MEDICINE

## 2023-06-18 PROCEDURE — 84484 ASSAY OF TROPONIN QUANT: CPT | Performed by: EMERGENCY MEDICINE

## 2023-06-18 PROCEDURE — 83880 ASSAY OF NATRIURETIC PEPTIDE: CPT | Performed by: EMERGENCY MEDICINE

## 2023-06-18 PROCEDURE — 63600175 PHARM REV CODE 636 W HCPCS: Performed by: EMERGENCY MEDICINE

## 2023-06-18 PROCEDURE — 93010 EKG 12-LEAD: ICD-10-PCS | Mod: ,,, | Performed by: INTERNAL MEDICINE

## 2023-06-18 PROCEDURE — 84100 ASSAY OF PHOSPHORUS: CPT | Performed by: EMERGENCY MEDICINE

## 2023-06-18 PROCEDURE — 93010 ELECTROCARDIOGRAM REPORT: CPT | Mod: ,,, | Performed by: INTERNAL MEDICINE

## 2023-06-18 PROCEDURE — 93005 ELECTROCARDIOGRAM TRACING: CPT

## 2023-06-18 RX ORDER — FUROSEMIDE 10 MG/ML
60 INJECTION INTRAMUSCULAR; INTRAVENOUS
Status: COMPLETED | OUTPATIENT
Start: 2023-06-18 | End: 2023-06-18

## 2023-06-18 RX ORDER — HYDROCODONE BITARTRATE AND ACETAMINOPHEN 10; 325 MG/1; MG/1
1 TABLET ORAL
Status: COMPLETED | OUTPATIENT
Start: 2023-06-18 | End: 2023-06-18

## 2023-06-18 RX ADMIN — HYDROCODONE BITARTRATE AND ACETAMINOPHEN 1 TABLET: 10; 325 TABLET ORAL at 11:06

## 2023-06-18 RX ADMIN — FUROSEMIDE 60 MG: 10 INJECTION, SOLUTION INTRAMUSCULAR; INTRAVENOUS at 11:06

## 2023-06-18 NOTE — Clinical Note
Diagnosis: Bilateral lower extremity edema [331211]   Admitting Provider:: MARQUES MURPHY [[id number]]   Future Attending Provider: TONY GA [[id number]]   Reason for IP Medical Treatment  (Clinical interventions that can only be accomplished in the IP setting? ) :: fluid overload   I certify that Inpatient services for greater than or equal to 2 midnights are medically necessary:: Yes   Plans for Post-Acute care--if anticipated (pick the single best option):: A. No post acute care anticipated at this time   Special Needs:: Fall Risk [15]

## 2023-06-18 NOTE — Clinical Note
The site was marked. The right chest was prepped. The site was prepped with ChloraPrep. The patient was draped. The patient was positioned supine.

## 2023-06-19 LAB
AV INDEX (PROSTH): 0.64
AV MEAN GRADIENT: 9 MMHG
AV PEAK GRADIENT: 18 MMHG
AV VALVE AREA: 2.56 CM2
AV VELOCITY RATIO: 0.6
BSA FOR ECHO PROCEDURE: 2.46 M2
CV ECHO LV RWT: 0.36 CM
DOP CALC AO PEAK VEL: 2.11 M/S
DOP CALC AO VTI: 54.1 CM
DOP CALC LVOT AREA: 4 CM2
DOP CALC LVOT DIAMETER: 2.26 CM
DOP CALC LVOT PEAK VEL: 1.26 M/S
DOP CALC LVOT STROKE VOLUME: 138.73 CM3
DOP CALCLVOT PEAK VEL VTI: 34.6 CM
E WAVE DECELERATION TIME: 299.76 MSEC
E/A RATIO: 1.31
E/E' RATIO: 13.41 M/S
ECHO LV POSTERIOR WALL: 0.82 CM (ref 0.6–1.1)
EJECTION FRACTION: 55 %
FRACTIONAL SHORTENING: 29 % (ref 28–44)
HAV IGM SERPL QL IA: NORMAL
HBV CORE AB SERPL QL IA: NORMAL
HBV CORE IGM SERPL QL IA: NORMAL
HBV SURFACE AB SER-ACNC: <3 MIU/ML
HBV SURFACE AB SER-ACNC: NORMAL M[IU]/ML
HBV SURFACE AG SERPL QL IA: NORMAL
HCV AB SERPL QL IA: NORMAL
HIV 1+2 AB+HIV1 P24 AG SERPL QL IA: NEGATIVE
INTERVENTRICULAR SEPTUM: 0.79 CM (ref 0.6–1.1)
IVRT: 64.7 MSEC
LA MAJOR: 6.97 CM
LA MINOR: 6.94 CM
LA WIDTH: 4.6 CM
LEFT ATRIUM SIZE: 3.11 CM
LEFT ATRIUM VOLUME INDEX MOD: 39.4 ML/M2
LEFT ATRIUM VOLUME INDEX: 35.1 ML/M2
LEFT ATRIUM VOLUME MOD: 95 CM3
LEFT ATRIUM VOLUME: 84.57 CM3
LEFT INTERNAL DIMENSION IN SYSTOLE: 3.25 CM (ref 2.1–4)
LEFT VENTRICLE DIASTOLIC VOLUME INDEX: 39.72 ML/M2
LEFT VENTRICLE DIASTOLIC VOLUME: 95.73 ML
LEFT VENTRICLE MASS INDEX: 49 G/M2
LEFT VENTRICLE SYSTOLIC VOLUME INDEX: 17.7 ML/M2
LEFT VENTRICLE SYSTOLIC VOLUME: 42.61 ML
LEFT VENTRICULAR INTERNAL DIMENSION IN DIASTOLE: 4.57 CM (ref 3.5–6)
LEFT VENTRICULAR MASS: 117.57 G
LV LATERAL E/E' RATIO: 12.67 M/S
LV SEPTAL E/E' RATIO: 14.25 M/S
LVOT MG: 3.14 MMHG
LVOT MV: 0.82 CM/S
MV PEAK A VEL: 0.87 M/S
MV PEAK E VEL: 1.14 M/S
MV STENOSIS PRESSURE HALF TIME: 87.75 MS
MV VALVE AREA P 1/2 METHOD: 2.51 CM2
PISA MRMAX VEL: 3.13 M/S
PISA TR MAX VEL: 1.69 M/S
PULM VEIN S/D RATIO: 0.93
PV PEAK D VEL: 0.44 M/S
PV PEAK S VEL: 0.41 M/S
RA MAJOR: 5.31 CM
RA WIDTH: 4.1 CM
TDI LATERAL: 0.09 M/S
TDI SEPTAL: 0.08 M/S
TDI: 0.09 M/S
TR MAX PG: 11 MMHG
TROPONIN I SERPL DL<=0.01 NG/ML-MCNC: 0.02 NG/ML (ref 0–0.03)
TROPONIN I SERPL DL<=0.01 NG/ML-MCNC: 0.03 NG/ML (ref 0–0.03)

## 2023-06-19 PROCEDURE — 63600175 PHARM REV CODE 636 W HCPCS: Performed by: EMERGENCY MEDICINE

## 2023-06-19 PROCEDURE — 86704 HEP B CORE ANTIBODY TOTAL: CPT | Performed by: NURSE PRACTITIONER

## 2023-06-19 PROCEDURE — 25000003 PHARM REV CODE 250: Performed by: INTERNAL MEDICINE

## 2023-06-19 PROCEDURE — 63600175 PHARM REV CODE 636 W HCPCS: Performed by: NURSE PRACTITIONER

## 2023-06-19 PROCEDURE — 94761 N-INVAS EAR/PLS OXIMETRY MLT: CPT

## 2023-06-19 PROCEDURE — 86480 TB TEST CELL IMMUN MEASURE: CPT | Performed by: NURSE PRACTITIONER

## 2023-06-19 PROCEDURE — 87389 HIV-1 AG W/HIV-1&-2 AB AG IA: CPT | Performed by: NURSE PRACTITIONER

## 2023-06-19 PROCEDURE — 21400001 HC TELEMETRY ROOM

## 2023-06-19 PROCEDURE — 25000003 PHARM REV CODE 250: Performed by: EMERGENCY MEDICINE

## 2023-06-19 PROCEDURE — 80074 ACUTE HEPATITIS PANEL: CPT | Performed by: NURSE PRACTITIONER

## 2023-06-19 PROCEDURE — 86706 HEP B SURFACE ANTIBODY: CPT | Mod: 91 | Performed by: NURSE PRACTITIONER

## 2023-06-19 PROCEDURE — 84484 ASSAY OF TROPONIN QUANT: CPT | Performed by: EMERGENCY MEDICINE

## 2023-06-19 PROCEDURE — 99285 EMERGENCY DEPT VISIT HI MDM: CPT | Mod: 25

## 2023-06-19 PROCEDURE — 36415 COLL VENOUS BLD VENIPUNCTURE: CPT | Performed by: EMERGENCY MEDICINE

## 2023-06-19 PROCEDURE — 36415 COLL VENOUS BLD VENIPUNCTURE: CPT | Performed by: NURSE PRACTITIONER

## 2023-06-19 RX ORDER — TALC
6 POWDER (GRAM) TOPICAL NIGHTLY PRN
Status: DISCONTINUED | OUTPATIENT
Start: 2023-06-19 | End: 2023-06-19

## 2023-06-19 RX ORDER — OXYCODONE HYDROCHLORIDE 5 MG/1
10 TABLET ORAL EVERY 4 HOURS PRN
Status: DISCONTINUED | OUTPATIENT
Start: 2023-06-19 | End: 2023-06-24 | Stop reason: HOSPADM

## 2023-06-19 RX ORDER — AMLODIPINE BESYLATE 5 MG/1
5 TABLET ORAL DAILY
Status: DISCONTINUED | OUTPATIENT
Start: 2023-06-19 | End: 2023-06-20

## 2023-06-19 RX ORDER — CALCITRIOL 0.25 UG/1
0.25 CAPSULE ORAL
Status: DISCONTINUED | OUTPATIENT
Start: 2023-06-19 | End: 2023-06-24 | Stop reason: HOSPADM

## 2023-06-19 RX ORDER — PROCHLORPERAZINE EDISYLATE 5 MG/ML
5 INJECTION INTRAMUSCULAR; INTRAVENOUS EVERY 6 HOURS PRN
Status: DISCONTINUED | OUTPATIENT
Start: 2023-06-19 | End: 2023-06-24 | Stop reason: HOSPADM

## 2023-06-19 RX ORDER — MORPHINE SULFATE 4 MG/ML
8 INJECTION, SOLUTION INTRAMUSCULAR; INTRAVENOUS EVERY 4 HOURS PRN
Status: DISCONTINUED | OUTPATIENT
Start: 2023-06-19 | End: 2023-06-24 | Stop reason: HOSPADM

## 2023-06-19 RX ORDER — FAMOTIDINE 20 MG/1
20 TABLET, FILM COATED ORAL DAILY
Status: DISCONTINUED | OUTPATIENT
Start: 2023-06-19 | End: 2023-06-24 | Stop reason: HOSPADM

## 2023-06-19 RX ORDER — ACETAMINOPHEN 325 MG/1
650 TABLET ORAL EVERY 6 HOURS PRN
Status: DISCONTINUED | OUTPATIENT
Start: 2023-06-19 | End: 2023-06-23

## 2023-06-19 RX ORDER — TAMSULOSIN HYDROCHLORIDE 0.4 MG/1
0.4 CAPSULE ORAL DAILY
Status: DISCONTINUED | OUTPATIENT
Start: 2023-06-19 | End: 2023-06-24 | Stop reason: HOSPADM

## 2023-06-19 RX ORDER — ATORVASTATIN CALCIUM 20 MG/1
40 TABLET, FILM COATED ORAL DAILY
Status: DISCONTINUED | OUTPATIENT
Start: 2023-06-19 | End: 2023-06-24 | Stop reason: HOSPADM

## 2023-06-19 RX ORDER — HYDRALAZINE HYDROCHLORIDE 25 MG/1
100 TABLET, FILM COATED ORAL EVERY 8 HOURS
Status: DISCONTINUED | OUTPATIENT
Start: 2023-06-19 | End: 2023-06-20

## 2023-06-19 RX ORDER — SODIUM CHLORIDE 0.9 % (FLUSH) 0.9 %
10 SYRINGE (ML) INJECTION
Status: DISCONTINUED | OUTPATIENT
Start: 2023-06-19 | End: 2023-06-24 | Stop reason: HOSPADM

## 2023-06-19 RX ORDER — ISOSORBIDE MONONITRATE 30 MG/1
60 TABLET, EXTENDED RELEASE ORAL DAILY
Status: DISCONTINUED | OUTPATIENT
Start: 2023-06-19 | End: 2023-06-24 | Stop reason: HOSPADM

## 2023-06-19 RX ORDER — POLYETHYLENE GLYCOL 3350 17 G/17G
17 POWDER, FOR SOLUTION ORAL DAILY
Status: DISCONTINUED | OUTPATIENT
Start: 2023-06-19 | End: 2023-06-24 | Stop reason: HOSPADM

## 2023-06-19 RX ORDER — DOCUSATE SODIUM 100 MG/1
100 CAPSULE, LIQUID FILLED ORAL 2 TIMES DAILY PRN
Status: DISCONTINUED | OUTPATIENT
Start: 2023-06-19 | End: 2023-06-24 | Stop reason: HOSPADM

## 2023-06-19 RX ORDER — FUROSEMIDE 10 MG/ML
40 INJECTION INTRAMUSCULAR; INTRAVENOUS
Status: DISCONTINUED | OUTPATIENT
Start: 2023-06-19 | End: 2023-06-19

## 2023-06-19 RX ORDER — NITROGLYCERIN 0.4 MG/1
0.4 TABLET SUBLINGUAL EVERY 5 MIN PRN
Status: DISCONTINUED | OUTPATIENT
Start: 2023-06-19 | End: 2023-06-24 | Stop reason: HOSPADM

## 2023-06-19 RX ORDER — AMIODARONE HYDROCHLORIDE 100 MG/1
100 TABLET ORAL DAILY
Status: DISCONTINUED | OUTPATIENT
Start: 2023-06-19 | End: 2023-06-24 | Stop reason: HOSPADM

## 2023-06-19 RX ORDER — TALC
6 POWDER (GRAM) TOPICAL NIGHTLY PRN
Status: DISCONTINUED | OUTPATIENT
Start: 2023-06-19 | End: 2023-06-24 | Stop reason: HOSPADM

## 2023-06-19 RX ORDER — ASPIRIN 81 MG/1
81 TABLET ORAL DAILY
Status: DISCONTINUED | OUTPATIENT
Start: 2023-06-19 | End: 2023-06-24 | Stop reason: HOSPADM

## 2023-06-19 RX ORDER — ONDANSETRON 2 MG/ML
4 INJECTION INTRAMUSCULAR; INTRAVENOUS EVERY 4 HOURS PRN
Status: DISCONTINUED | OUTPATIENT
Start: 2023-06-19 | End: 2023-06-24 | Stop reason: HOSPADM

## 2023-06-19 RX ORDER — FUROSEMIDE 10 MG/ML
80 INJECTION INTRAMUSCULAR; INTRAVENOUS
Status: DISCONTINUED | OUTPATIENT
Start: 2023-06-19 | End: 2023-06-22

## 2023-06-19 RX ORDER — CLINDAMYCIN HYDROCHLORIDE 150 MG/1
450 CAPSULE ORAL EVERY 8 HOURS
Status: DISCONTINUED | OUTPATIENT
Start: 2023-06-19 | End: 2023-06-19

## 2023-06-19 RX ADMIN — AMLODIPINE BESYLATE 5 MG: 5 TABLET ORAL at 09:06

## 2023-06-19 RX ADMIN — Medication 6 MG: at 05:06

## 2023-06-19 RX ADMIN — ISOSORBIDE MONONITRATE 60 MG: 30 TABLET, EXTENDED RELEASE ORAL at 09:06

## 2023-06-19 RX ADMIN — FUROSEMIDE 40 MG: 10 INJECTION, SOLUTION INTRAMUSCULAR; INTRAVENOUS at 09:06

## 2023-06-19 RX ADMIN — TAMSULOSIN HYDROCHLORIDE 0.4 MG: 0.4 CAPSULE ORAL at 09:06

## 2023-06-19 RX ADMIN — POLYETHYLENE GLYCOL 3350 17 G: 17 POWDER, FOR SOLUTION ORAL at 09:06

## 2023-06-19 RX ADMIN — ATORVASTATIN CALCIUM 40 MG: 20 TABLET, FILM COATED ORAL at 09:06

## 2023-06-19 RX ADMIN — CALCITRIOL CAPSULES 0.25 MCG 0.25 MCG: 0.25 CAPSULE ORAL at 05:06

## 2023-06-19 RX ADMIN — FAMOTIDINE 20 MG: 20 TABLET, FILM COATED ORAL at 09:06

## 2023-06-19 RX ADMIN — FUROSEMIDE 80 MG: 10 INJECTION, SOLUTION INTRAMUSCULAR; INTRAVENOUS at 11:06

## 2023-06-19 RX ADMIN — Medication 6 MG: at 09:06

## 2023-06-19 RX ADMIN — HYDRALAZINE HYDROCHLORIDE 100 MG: 25 TABLET, FILM COATED ORAL at 09:06

## 2023-06-19 RX ADMIN — ASPIRIN 81 MG: 81 TABLET, COATED ORAL at 09:06

## 2023-06-19 RX ADMIN — APIXABAN 2.5 MG: 2.5 TABLET, FILM COATED ORAL at 09:06

## 2023-06-19 RX ADMIN — HYDRALAZINE HYDROCHLORIDE 100 MG: 25 TABLET, FILM COATED ORAL at 02:06

## 2023-06-19 RX ADMIN — AMIODARONE HYDROCHLORIDE 100 MG: 100 TABLET ORAL at 09:06

## 2023-06-19 RX ADMIN — OXYCODONE HYDROCHLORIDE 10 MG: 5 TABLET ORAL at 12:06

## 2023-06-19 NOTE — CONSULTS
Consult Note  Nephrology    Consult Requested By: Mehul Livingston MD  Reason for Consult: NATALIIA    SUBJECTIVE:     History of Present Illness:  Patient is a 73 y.o. male presents with worsening LE edema over past few weeks.  Pt recently was on [place] and ate lots of seafood, salty meals, drinks, etc.  Went to [place] last week with same symptoms and underwent US which was negative for DVT.  Increased lasix but still had worsening swelling.  Presented here and admitted to hospital for evaluation.  Labs noted with GFR 13.  Pt has seen multiple nephrologist in [place] (, [facility], [facility]).  Currently under Dr. Pierce at [facility].  Seen 5/8 with plans for PD when needed and is undergoing kidney classes with [facility].  Consulted for evaluation since Dr. Pierce doesn't come to this facility.  Discussed with Dr. Livingston about above.  Will offer HD while here and defer PD to Dr. Pierce.      Epic reviewed.  Pt seen and examined.  Mild SOB, worsening leg edema, but no CP/N/V/D/F/C.       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     Hyperlipidemia     Hypertension     Nephrotic range proteinuria     Paroxysmal atrial fibrillation     Stroke     nov 2012     Past Surgical History:   Procedure Laterality Date    BIOPSY N/A 5/29/2018    Procedure: BIOPSY;  Surgeon: Neeta Diagnostic Provider;  Location: [facility] CATH LAB;  Service: Radiology;  Laterality: N/A;  ct guided      CARDIOVERSION N/A 12/3/2020    Procedure: CARDIOVERSION;  Surgeon: Mehul Livingston MD;  Location: [facility] CATH LAB;  Service: Cardiology;  Laterality: N/A;    KNEE SURGERY Left     SHOULDER SURGERY Right      Family History   Problem Relation Age of Onset    Hypertension Mother     Heart attack Father 59    Stroke Sister     Esophageal cancer Brother       Social History     Tobacco Use    Smoking status: Former     Types: Cigars    Smokeless tobacco: Never    Tobacco comments:     occasional, when at the "[employer]"   Substance Use Topics    Alcohol use: Yes     Alcohol/week: 1.0 standard drink     Types: 1 Cans of beer per week     Comment: occasional when at the "[employer]"    Drug use: No       Review of patient's allergies indicates:   Allergen Reactions    Doxycycline Rash        Review of Systems:  Constitutional: No fever or chills  Respiratory:  shortness of breath  Cardiovascular: No chest pain or palpitations  Gastrointestinal: No nausea or vomiting  Neurological: No confusion or weakness    OBJECTIVE:     Vital Signs (Most Recent)  Temp: 97.7 °F (36.5 °C) (06/19/23 0859)  Pulse: 65 (06/19/23 1159)  Resp: 17 (06/19/23 0859)  BP: (!) 167/75 (06/19/23 1001)  SpO2: 96 % (06/19/23 0859)    Vital Signs Range (Last 24H):  Temp:  [97.7 °F (36.5 °C)-98.3 °F (36.8 °C)]   Pulse:  [58-74]   Resp:  [16-21]   BP: (155-175)/(68-77)   SpO2:  [96 %-97 %]       Intake/Output Summary (Last 24 hours) at 6/19/2023 1202  Last data filed at 6/19/2023 1139  Gross per 24 hour   Intake --   Output 1430 ml   Net -1430 ml       Physical Exam:  General appearance: Well developed, well nourished  Eyes:  Conjunctivae/corneas clear. PERRL.  Lungs: Normal respiratory effort,   clear to auscultation bilaterally   Heart: Regular/Irr rate and rhythm, S1, S2 normal, no murmur, rub or pino.  Abdomen: Soft, non-tender non-distended; bowel sounds normal; no masses,  no organomegaly, obese  Extremities: No cyanosis or clubbing. 3+ edema.    Skin: Skin color, texture, turgor normal. No rashes or lesions  Neurologic: Normal strength and tone. No focal numbness or weakness       Laboratory:  Recent Labs   Lab 06/18/23  2214   WBC 6.72   RBC 3.33*   HGB 10.7*   HCT 32.4*      MCV 97   MCH 32.1*   MCHC 33.0     BMP:   Recent Labs   Lab 06/18/23  2214   *      K 4.4      CO2 22*    BUN 86*   CREATININE 4.4*   CALCIUM 9.3   MG 2.6     Lab Results   Component Value Date    CALCIUM 9.3 06/18/2023    PHOS 6.4 (H) 06/18/2023     BNP  Recent Labs   Lab 06/18/23 2214   BNP 85     Lab Results   Component Value Date    URICACID 6.5 08/16/2022     Lab Results   Component Value Date    IRON 60 08/16/2022    TIBC 379 08/16/2022    FERRITIN 203 08/16/2022     Lab Results   Component Value Date    .6 (H) 08/16/2022    CALCIUM 9.3 06/18/2023    PHOS 6.4 (H) 06/18/2023       Diagnostic Results:  US Retroperitoneal Complete   Final Result      Normal size and thickness of the kidneys but elevated resistive indices suggesting medical renal disease.         Electronically signed by: Oniel Clark MD   Date:    06/19/2023   Time:    11:00      X-Ray Chest AP Portable   Final Result      Persistent elevation of the left hemidiaphragm by apparent bowel distended with gas.  Correlate for abdominal pain and need for flat erect abdomen views.      Hypoventilatory changes with no acute pulmonary infiltrates since the prior exam in January.         Electronically signed by: Jeff Palomo   Date:    06/18/2023   Time:    21:57          ASSESSMENT/PLAN:     CKD V and needing HD due to biopsy proven diabetic nephropathy and hypertensive nephrosclerosis:  -trends noted over last few years and has been seen by multiple nephrologists with recommendations to start hemodialysis.  Patient was very hesitant and wanted different opinions.  Now seeing Dr. Pierce at [facility] and in process of PD classes.    -with degree of volume overload pt needs HD for few weeks for volume removal.  Holding eliquis for now.    -consulted [facility] for LEW cath Wednesday.  Will undergo few days of HD/UF here.  Arrange outpt HD with Dr. Pierce and let him decide on CAPD options.    -hep panel.  Consult SW.  Patient now requesting placement for outpatient hemodialysis in the [facility].  -see orders.  -Renally dose meds, avoid  nephrotoxins, and monitor I/O's closely.      Uncontrolled DM/HTN due to noncompliance:  -hx noted.  -UF will help BP  -no more SGLT2/oral agents.    -GLP/basal/SSI  -defer to Dr. Livingston.      Afib/CHF:  -defer        Thanks for consult  See above  Will follow along.

## 2023-06-19 NOTE — NURSING
Nurses Note -- 4 Eyes      6/19/2023   10:38 AM      Skin assessed during: Admit      [] No Altered Skin Integrity Present    []Prevention Measures Documented      [x] Yes- Altered Skin Integrity Present or Discovered   [] LDA Added if Not in Epic (Describe Wound)   [x] New Altered Skin Integrity was Present on Admit and Documented in LDA   [] Wound Image Taken    Wound Care Consulted? No    Attending Nurse:  Mercy Junior RN     Second RN/Staff Member:  Lupe Epstein LPN     Extensive bruising to right lower flank area from previous fall. Dry scaly skin to BLE. Skin intact.

## 2023-06-19 NOTE — HPI
"The patient is a 74 yo male with CKD presents to [facility] via daughter with bilateral lower extremity swelling, worsening for several weeks, especially bad for 5 days, since Wednesday 6/14/23.  Patient denies chest pain or shortness of breath.  Swelling began when patient was on a cruise a few weeks ago.  Patient has been compliant with meds but blames "salty water" on cruise for swelling.  Patient has been taking Lasix 20mg PO qday regularly until recently when he tried taking Lasix 40mg PO qday without relief.     Patient also had a fall on the cruise a little over a week ago (Saturday 6/10/23); his motor scooter slid on a spilled drink.  He has had pain to right flank and buttocks since then.  He was seen at [facility] 6/14/23 and had negative imaging including bilateral lower extremity ultrasounds.    "

## 2023-06-19 NOTE — ED PROVIDER NOTES
"Encounter Date: 6/18/2023       History     Chief Complaint   Patient presents with    Leg Swelling     Patient to Ed with c/o bilateral leg swelling unrelieved with diuretics . Patient also reports ground level fall last week while on cruise - reports lower back pain with visible bruising to right lower back      72 yo male with CKD presents to [facility] via daughter with bilateral lower extremity swelling, worsening for several weeks, especially bad for 5 days, since Wednesday 6/14/23.  Patient denies chest pain or shortness of breath.  Swelling began when patient was on a cruise a few weeks ago.  Patient has been compliant with meds but blames "salty water" on cruise for swelling.  Patient has been taking Lasix 20mg PO qday regularly until recently when he tried taking Lasix 40mg PO qday without relief.    Patient also had a fall on the cruise a little over a week ago (Saturday 6/10/23); his motor scooter slid on a spilled drink.  He has had pain to right flank and buttocks since then.  He was seen at [facility] 6/14/23 and had negative imaging including bilateral lower extremity ultrasounds.      Patient's significant other is having knee surgery tomorrow.  His daughter Narda is here with him.    Cardiologist: Jefry Flores/[facility]  Nephrologist: Dr. Emiliano Pierce, [facility]  PMD: Dr. Paulette Arzola    PMH: CKD3, paroxysmal afib on Eliquis, CAD, DM2, HTN, DLD, CVA (2012), protieinuria  PSH: cardioversion, R shoulder, L knee, biopsy    Review of patient's allergies indicates:   Allergen Reactions    Doxycycline Rash     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     Hyperlipidemia     Hypertension     Nephrotic range proteinuria     Paroxysmal atrial fibrillation     Stroke     nov 2012 "     Past Surgical History:   Procedure Laterality Date    BIOPSY N/A 5/29/2018    Procedure: BIOPSY;  Surgeon: Neeta Diagnostic Provider;  Location: [facility] CATH LAB;  Service: Radiology;  Laterality: N/A;  ct guided      CARDIOVERSION N/A 12/3/2020    Procedure: CARDIOVERSION;  Surgeon: Mehul Livingston MD;  Location: [facility] CATH LAB;  Service: Cardiology;  Laterality: N/A;    KNEE SURGERY Left     SHOULDER SURGERY Right      Family History   Problem Relation Age of Onset    Hypertension Mother     Heart attack Father 59    Stroke Sister     Esophageal cancer Brother      Social History     Tobacco Use    Smoking status: Former     Types: Cigars    Smokeless tobacco: Never    Tobacco comments:     occasional, when at the [employer]   Substance Use Topics    Alcohol use: Yes     Alcohol/week: 1.0 standard drink     Types: 1 Cans of beer per week     Comment: occasional when at the [employer]    Drug use: No     Review of Systems   Constitutional:  Negative for fever.   HENT:  Negative for sore throat.    Eyes:  Negative for photophobia.   Respiratory:  Negative for shortness of breath.    Cardiovascular:  Negative for chest pain.   Gastrointestinal:  Negative for abdominal pain.   Genitourinary:  Negative for dysuria.   Musculoskeletal:  Positive for gait problem (uses motorized scooter at baseline).   Skin:  Positive for color change (bruising). Negative for rash.   Neurological:  Negative for syncope.   Hematological:  Bruises/bleeds easily (on Eliquis).     Physical Exam     Initial Vitals [06/18/23 2109]   BP Pulse Resp Temp SpO2   (!) 167/72 74 16 98.3 °F (36.8 °C) 97 %      MAP       --         Physical Exam    Nursing note and vitals reviewed.  Constitutional: He appears well-developed and well-nourished. He is not diaphoretic.   Awake, alert adult male, sitting up with legs over side of stretcher.   HENT:   Head: Normocephalic and atraumatic.   Mouth/Throat: Oropharynx is clear and moist.   Eyes: Conjunctivae and EOM  are normal. Pupils are equal, round, and reactive to light.   Neck: Neck supple.   Normal range of motion.  Cardiovascular:  Normal rate, regular rhythm and intact distal pulses.           Murmur heard.  Pulmonary/Chest: No respiratory distress. He has no wheezes. He has no rhonchi. He has no rales.   Diminished LLL   Abdominal: Abdomen is soft. There is no abdominal tenderness.   Musculoskeletal:         General: Edema (2 to 3+ bilat LEs) present. No tenderness. Normal range of motion.      Cervical back: Normal range of motion and neck supple.     Neurological: He is alert and oriented to person, place, and time.   Moving all extremities   Skin: Skin is warm and dry.   Large area of subacute-appearing bruising to R buttock and flank   Psychiatric: His behavior is normal.     ED Course   Procedures  Labs Reviewed   CBC W/ AUTO DIFFERENTIAL - Abnormal; Notable for the following components:       Result Value    RBC 3.33 (*)     Hemoglobin 10.7 (*)     Hematocrit 32.4 (*)     MCH 32.1 (*)     RDW 15.2 (*)     Immature Granulocytes 1.8 (*)     Immature Grans (Abs) 0.12 (*)     Lymph # 0.7 (*)     Gran % 74.2 (*)     Lymph % 9.7 (*)     All other components within normal limits   COMPREHENSIVE METABOLIC PANEL - Abnormal; Notable for the following components:    CO2 22 (*)     Glucose 235 (*)     BUN 86 (*)     Creatinine 4.4 (*)     Alkaline Phosphatase 140 (*)     eGFR 13 (*)     All other components within normal limits   TROPONIN I - Abnormal; Notable for the following components:    Troponin I 0.033 (*)     All other components within normal limits   URINALYSIS, REFLEX TO URINE CULTURE - Abnormal; Notable for the following components:    Protein, UA 1+ (*)     Glucose, UA 4+ (*)     All other components within normal limits    Narrative:     Specimen Source->Urine   PHOSPHORUS - Abnormal; Notable for the following components:    Phosphorus 6.4 (*)     All other components within normal limits   B-TYPE NATRIURETIC  PEPTIDE   TSH   MAGNESIUM   URINALYSIS MICROSCOPIC    Narrative:     Specimen Source->Urine     EKG Readings: (Independently Interpreted)   22:15: NSR, HR 72. R axis. RBBB. No ectopy. No STEMI. C/w 6/14/23.    ECG Results              EKG 12-lead (In process)  Result time 06/19/23 06:47:15      In process by Interface, Lab In [facility] (06/19/23 06:47:15)                   Narrative:    Test Reason : R60.0,    Vent. Rate : 068 BPM     Atrial Rate : 068 BPM     P-R Int : 176 ms          QRS Dur : 108 ms      QT Int : 442 ms       P-R-T Axes : 000 097 009 degrees     QTc Int : 469 ms    Normal sinus rhythm  Incomplete right bundle branch block  Possible Right ventricular hypertrophy  Nonspecific ST abnormality  Abnormal ECG      Referred By: [email or web address]   SELF           Confirmed By:                                      EKG 12-lead (In process)  Result time 06/19/23 06:47:17      In process by Interface, Lab In [facility] (06/19/23 06:47:17)                   Narrative:    Test Reason :     Vent. Rate : 072 BPM     Atrial Rate : 072 BPM     P-R Int : 182 ms          QRS Dur : 158 ms      QT Int : 438 ms       P-R-T Axes : 079 094 002 degrees     QTc Int : 479 ms    Normal sinus rhythm  Rightward axis  Nonspecific intraventricular block  Abnormal ECG      Referred By: [email or web address]   SELF           Confirmed By:                                   Imaging Results              X-Ray Chest AP Portable (Final result)  Result time 06/18/23 21:57:07      Final result by Jeff Palomo MD (06/18/23 21:57:07)                   Impression:      Persistent elevation of the left hemidiaphragm by apparent bowel distended with gas.  Correlate for abdominal pain and need for flat erect abdomen views.    Hypoventilatory changes with no acute pulmonary infiltrates since the prior exam in January.      Electronically signed by: Jeff Palomo  Date:    06/18/2023  Time:    21:57               Narrative:    EXAMINATION:  XR CHEST AP  PORTABLE    CLINICAL HISTORY:  Localized edema    TECHNIQUE:  Single frontal view of the chest was performed.    COMPARISON:  01/29/2023 chest x-ray    FINDINGS:  Persistent gas but knee the right hemidiaphragm representing distended bowel.    The lungs are again hypoinflated with crowding of the bronchovascular markings but no new infiltrate or effusion.  There is no evidence of pneumothorax.                                       Medications   melatonin tablet 6 mg (6 mg Oral Given 6/19/23 0552)   furosemide injection 60 mg (60 mg Intravenous Given 6/18/23 9445)   HYDROcodone-acetaminophen  mg per tablet 1 tablet (1 tablet Oral Given 6/18/23 3799)     Medical Decision Making:   History:   Old Medical Records: I decided to obtain old medical records.  Old Records Summarized: records from previous admission(s).  Initial Assessment:   73 y.o. male here with bilateral lower extremity swelling.  History of CKD3, paroxysmal afib on Eliquis, CAD, DM2, HTN, DLD, CVA (2012), protieinuria.    Differential Diagnosis:   Ddx includes worsening renal function, CHF, anemia, other.   Independently Interpreted Test(s):   I have ordered and independently interpreted X-rays - see prior notes.  I have ordered and independently interpreted EKG Reading(s) - see prior notes  Clinical Tests:   Lab Tests: Reviewed and Ordered  Radiological Study: Ordered and Reviewed  Medical Tests: Ordered and Reviewed  ED Management:  EKG no STEMI. Morphology c/w prior.    CXR without fluid overload.    Labs c/w CKD.  Mild anemia, baseline.  BUN 86 / creatinine 4.4, eGFR 13, mildly worse than BUN 60 / creatinine 3.7, eGFR 16.4 on 4/6/23. Troponin 0.033, c/w prior, likely related to CKD.      Patient received IV lasix 60mg for fluid overload as well as PO norco 10/325mg for pain related to recent fall.    Patient has not responded to doubling his PO lasix 20mg at home.  He has significant lower extremity edema here.  Therefore he requires hospital  stay for diuresis and further care.    I discussed patient with Dr. Mehul Livingston who has agreed to admit to his service.  I have written courtesy orders.  Other:   I have discussed this case with another health care provider.                      Clinical Impression:   Final diagnoses:  [R60.0] Bilateral lower extremity edema  [N18.9] Chronic kidney disease, unspecified CKD stage (Primary)  [E87.70] Hypervolemia, unspecified hypervolemia type  [S30.0XXA] Traumatic hematoma of buttock, initial encounter  [S30.1XXA] Hematoma of right flank, initial encounter        ED Disposition Condition    Admit Stable                Kamala Diaz MD  06/19/23 0808

## 2023-06-19 NOTE — NURSING TRANSFER
Nursing Transfer Note      6/19/2023     Reason patient is being transferred: hospital admission    Transfer From: ED    Transfer via wheelchair    Transfer with cardiac monitoring    Transported by [facility]    Telemetry: Box Number [id number]    Medicines sent: No    Any special needs or follow-up needed: Nephrology consult    Chart send with patient: No    Notified: daughter present    Upon arrival to floor: cardiac monitor applied, patient oriented to room, call bell in reach, and bed in lowest position

## 2023-06-19 NOTE — PLAN OF CARE
Problem: Adult Inpatient Plan of Care  Goal: Plan of Care Review  Outcome: Ongoing, Progressing  Goal: Patient-Specific Goal (Individualized)  Outcome: Ongoing, Progressing  Goal: Absence of Hospital-Acquired Illness or Injury  Outcome: Ongoing, Progressing  Goal: Readiness for Transition of Care  Outcome: Ongoing, Progressing     Problem: Fall Injury Risk  Goal: Absence of Fall and Fall-Related Injury  Outcome: Ongoing, Progressing     Problem: Adult Inpatient Plan of Care  Goal: Optimal Comfort and Wellbeing  Outcome: Ongoing, Not Progressing     Pt alert and oriented. Afebrile. Oxygen maintained @ room air. Telemetry monitoring. Seen by Nephrology and Cardiology. Awaiting start of HD. Diuresis with IV Lasix. 3+ BLE edema. C/O pain to legs. Tolerating diet. Safety maintained.

## 2023-06-19 NOTE — H&P
[facility] - Med Surg ([place])  History & Physical    History of Present Illness:  Mr. Castano is a 73-year-old gentleman presented to the emergency room here with progressively increasing swelling and a tight feeling in the lower legs for the last week.  Recently he was on a cruise, and was less careful about his salt intake he says, increased his furosemide from 20-40 mg daily because of increasing leg swelling.  He had a fall off his scooter which is slid on a spilled drink a week ago, sustained pain and bruising in his right flank and buttocks, was seen last week in the emergency room at [facility].  He has a history of a nephrotic syndrome, has had a renal biopsy which showed diabetic nephropathy.  He was offered dialysis here, shows to get several different opinions to try to defer dialysis.  History of hypertension, diabetes mellitus, morbid obesity, coronary artery disease, status post 2 stents in the left anterior descending coronary artery in 2016, paroxysmal atrial fibrillation, previous strokes in 2012.  Mild aortic valve diseas    PTA Medications   Medication Sig    allopurinoL (ZYLOPRIM) 100 MG tablet Take 1 tablet (100 mg total) by mouth once daily.    amiodarone (PACERONE) 200 MG Tab Take 1 tablet (200 mg total) by mouth once daily.    amLODIPine (NORVASC) 5 MG tablet Take 5 mg by mouth once daily.    apixaban (ELIQUIS) 2.5 mg Tab Take 1 tablet (2.5 mg total) by mouth 2 (two) times daily.    aspirin (ECOTRIN) 81 MG EC tablet Take 1 tablet (81 mg total) by mouth once daily.    atorvastatin (LIPITOR) 40 MG tablet TAKE 1 TABLET BY MOUTH EVERY DAY    calcitRIOL (ROCALTROL) 0.25 MCG Cap Take 1 capsule (0.25 mcg total) by mouth every Mon, Wed, Fri.    clindamycin (CLEOCIN) 150 MG capsule Take 3 capsules (450 mg total) by mouth every 8 (eight) hours. for 7 days    docusate sodium (COLACE) 100 MG capsule Take 1 capsule (100 mg total) by mouth 2 (two) times daily as needed for Constipation.     famotidine (PEPCID) 20 MG tablet Take 1 tablet (20 mg total) by mouth once daily.    furosemide (LASIX) 20 MG tablet Take 20 mg by mouth once daily.    hydrALAZINE (APRESOLINE) 100 MG tablet Take 1 tablet (100 mg total) by mouth every 12 (twelve) hours.    isosorbide mononitrate (IMDUR) 60 MG 24 hr tablet Take 1 tablet (60 mg total) by mouth once daily.    mupirocin (BACTROBAN) 2 % ointment Apply topically 2 (two) times daily.    nitroGLYCERIN (NITROSTAT) 0.4 MG SL tablet Place 1 tablet (0.4 mg total) under the tongue every 5 (five) minutes as needed for Chest pain. Go to the ED for eval if you continue to have chest pain after 3 doses    oxyCODONE-acetaminophen (PERCOCET)  mg per tablet Take 1 tablet by mouth every 4 (four) hours as needed for Pain.    SITagliptin (JANUVIA) 50 MG Tab Take 1 tablet (50 mg total) by mouth once daily.    tamsulosin (FLOMAX) 0.4 mg Cap Take 0.4 mg by mouth once daily.    traMADoL (ULTRAM) 50 mg tablet Take 1 tablet (50 mg total) by mouth every 12 (twelve) hours as needed for Pain.       Review of patient's allergies indicates:   Allergen Reactions    Doxycycline Rash       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     Hyperlipidemia     Hypertension     Nephrotic range proteinuria     Paroxysmal atrial fibrillation     Stroke     nov 2012     Past Surgical History:   Procedure Laterality Date    BIOPSY N/A 5/29/2018    Procedure: BIOPSY;  Surgeon: Neeta Diagnostic Provider;  Location: [facility] CATH LAB;  Service: Radiology;  Laterality: N/A;  ct guided      CARDIOVERSION N/A 12/3/2020    Procedure: CARDIOVERSION;  Surgeon: Mehul Livingston MD;  Location: [facility] CATH LAB;  Service: Cardiology;  Laterality: N/A;    KNEE SURGERY Left     SHOULDER SURGERY Right      Family History   Problem Relation  Age of Onset    Hypertension Mother     Heart attack Father 59    Stroke Sister     Esophageal cancer Brother      Social History     Tobacco Use    Smoking status: Former     Types: Cigars    Smokeless tobacco: Never    Tobacco comments:     occasional, when at the [place]   Substance Use Topics    Alcohol use: Yes     Alcohol/week: 1.0 standard drink     Types: 1 Cans of beer per week     Comment: occasional when at the casino    Drug use: No        Physical Exam:  Alert pleasant male in no acute distress  Obese  The carotid upstroke is brisk  The chest is clear  The heart is regular.  S1 and S2 are normal.  There is a 2/6 ejection systolic murmur at the base conducted to the carotids.  The abdomen is soft and nontender.  The extremities are warm.  There is 3+ edema of the lower legs.  The neurological exam is intact.    Impression on Admission:  Nephrotic syndrome  Stage IV chronic renal failure  Longstanding essential hypertension  Diabetes mellitus type 2  Stable coronary artery disease  Paroxysmal atrial fibrillation  History of strokes

## 2023-06-20 LAB
ANION GAP SERPL CALC-SCNC: 15 MMOL/L (ref 8–16)
BUN SERPL-MCNC: 82 MG/DL (ref 8–23)
CALCIUM SERPL-MCNC: 9.1 MG/DL (ref 8.7–10.5)
CHLORIDE SERPL-SCNC: 99 MMOL/L (ref 95–110)
CO2 SERPL-SCNC: 24 MMOL/L (ref 23–29)
CREAT SERPL-MCNC: 4.1 MG/DL (ref 0.5–1.4)
CREAT UR-MCNC: 57.8 MG/DL (ref 23–375)
EST. GFR  (NO RACE VARIABLE): 15 ML/MIN/1.73 M^2
GAMMA INTERFERON BACKGROUND BLD IA-ACNC: 0.03 IU/ML
GLUCOSE SERPL-MCNC: 135 MG/DL (ref 70–110)
M TB IFN-G CD4+ BCKGRND COR BLD-ACNC: -0 IU/ML
MITOGEN IGNF BCKGRD COR BLD-ACNC: 1.02 IU/ML
POCT GLUCOSE: 151 MG/DL (ref 70–110)
POTASSIUM SERPL-SCNC: 4.2 MMOL/L (ref 3.5–5.1)
SODIUM SERPL-SCNC: 138 MMOL/L (ref 136–145)
SODIUM UR-SCNC: 53 MMOL/L (ref 20–250)
TB GOLD PLUS: NEGATIVE
TB2 - NIL: -0.01 IU/ML

## 2023-06-20 PROCEDURE — 82570 ASSAY OF URINE CREATININE: CPT | Performed by: EMERGENCY MEDICINE

## 2023-06-20 PROCEDURE — 25000003 PHARM REV CODE 250: Performed by: EMERGENCY MEDICINE

## 2023-06-20 PROCEDURE — 63600175 PHARM REV CODE 636 W HCPCS: Performed by: NURSE PRACTITIONER

## 2023-06-20 PROCEDURE — 80048 BASIC METABOLIC PNL TOTAL CA: CPT | Performed by: EMERGENCY MEDICINE

## 2023-06-20 PROCEDURE — 84300 ASSAY OF URINE SODIUM: CPT | Performed by: EMERGENCY MEDICINE

## 2023-06-20 PROCEDURE — 25000003 PHARM REV CODE 250: Performed by: INTERNAL MEDICINE

## 2023-06-20 PROCEDURE — 36415 COLL VENOUS BLD VENIPUNCTURE: CPT | Performed by: EMERGENCY MEDICINE

## 2023-06-20 PROCEDURE — 25000003 PHARM REV CODE 250: Performed by: NURSE PRACTITIONER

## 2023-06-20 PROCEDURE — 21400001 HC TELEMETRY ROOM

## 2023-06-20 RX ORDER — INSULIN ASPART 100 [IU]/ML
0-5 INJECTION, SOLUTION INTRAVENOUS; SUBCUTANEOUS
Status: DISCONTINUED | OUTPATIENT
Start: 2023-06-20 | End: 2023-06-24 | Stop reason: HOSPADM

## 2023-06-20 RX ORDER — DEXTROSE 40 %
15 GEL (GRAM) ORAL
Status: DISCONTINUED | OUTPATIENT
Start: 2023-06-20 | End: 2023-06-24 | Stop reason: HOSPADM

## 2023-06-20 RX ORDER — MUPIROCIN 20 MG/G
OINTMENT TOPICAL 2 TIMES DAILY
Status: DISCONTINUED | OUTPATIENT
Start: 2023-06-20 | End: 2023-06-24 | Stop reason: HOSPADM

## 2023-06-20 RX ORDER — HYDRALAZINE HYDROCHLORIDE 25 MG/1
100 TABLET, FILM COATED ORAL EVERY 12 HOURS
Status: DISCONTINUED | OUTPATIENT
Start: 2023-06-20 | End: 2023-06-24 | Stop reason: HOSPADM

## 2023-06-20 RX ORDER — DEXTROSE 40 %
30 GEL (GRAM) ORAL
Status: DISCONTINUED | OUTPATIENT
Start: 2023-06-20 | End: 2023-06-24 | Stop reason: HOSPADM

## 2023-06-20 RX ORDER — SODIUM CHLORIDE 9 MG/ML
INJECTION, SOLUTION INTRAVENOUS ONCE
Status: DISCONTINUED | OUTPATIENT
Start: 2023-06-20 | End: 2023-06-24 | Stop reason: HOSPADM

## 2023-06-20 RX ORDER — HEPARIN SODIUM 5000 [USP'U]/ML
5000 INJECTION, SOLUTION INTRAVENOUS; SUBCUTANEOUS
Status: ACTIVE | OUTPATIENT
Start: 2023-06-20 | End: 2023-06-21

## 2023-06-20 RX ORDER — GLUCAGON 1 MG
1 KIT INJECTION
Status: DISCONTINUED | OUTPATIENT
Start: 2023-06-20 | End: 2023-06-24 | Stop reason: HOSPADM

## 2023-06-20 RX ADMIN — OXYCODONE HYDROCHLORIDE 10 MG: 5 TABLET ORAL at 12:06

## 2023-06-20 RX ADMIN — AMIODARONE HYDROCHLORIDE 100 MG: 100 TABLET ORAL at 08:06

## 2023-06-20 RX ADMIN — ATORVASTATIN CALCIUM 40 MG: 20 TABLET, FILM COATED ORAL at 08:06

## 2023-06-20 RX ADMIN — FAMOTIDINE 20 MG: 20 TABLET, FILM COATED ORAL at 08:06

## 2023-06-20 RX ADMIN — HYDRALAZINE HYDROCHLORIDE 100 MG: 25 TABLET, FILM COATED ORAL at 05:06

## 2023-06-20 RX ADMIN — FUROSEMIDE 80 MG: 10 INJECTION, SOLUTION INTRAMUSCULAR; INTRAVENOUS at 08:06

## 2023-06-20 RX ADMIN — POLYETHYLENE GLYCOL 3350 17 G: 17 POWDER, FOR SOLUTION ORAL at 08:06

## 2023-06-20 RX ADMIN — MUPIROCIN: 20 OINTMENT TOPICAL at 12:06

## 2023-06-20 RX ADMIN — Medication 6 MG: at 08:06

## 2023-06-20 RX ADMIN — MUPIROCIN: 20 OINTMENT TOPICAL at 10:06

## 2023-06-20 RX ADMIN — ISOSORBIDE MONONITRATE 60 MG: 30 TABLET, EXTENDED RELEASE ORAL at 08:06

## 2023-06-20 RX ADMIN — TAMSULOSIN HYDROCHLORIDE 0.4 MG: 0.4 CAPSULE ORAL at 08:06

## 2023-06-20 RX ADMIN — HYDRALAZINE HYDROCHLORIDE 100 MG: 25 TABLET, FILM COATED ORAL at 08:06

## 2023-06-20 NOTE — PLAN OF CARE
Problem: Adult Inpatient Plan of Care  Goal: Plan of Care Review  Outcome: Ongoing, Progressing  Goal: Patient-Specific Goal (Individualized)  Outcome: Ongoing, Progressing  Goal: Absence of Hospital-Acquired Illness or Injury  Outcome: Ongoing, Progressing  Goal: Optimal Comfort and Wellbeing  Outcome: Ongoing, Progressing  Goal: Readiness for Transition of Care  Outcome: Ongoing, Progressing     Problem: Diabetes Comorbidity  Goal: Blood Glucose Level Within Targeted Range  Outcome: Ongoing, Progressing     Problem: Fall Injury Risk  Goal: Absence of Fall and Fall-Related Injury  Outcome: Ongoing, Progressing     Pt alert and oriented. Afebrile. Oxygen maintained @ room air. OOB to chair with assist x 1. BLE edema. Continued diuresis with IV Lasix. Tolerating diet. NPO p mn. Pending HD. Safety maintained.

## 2023-06-20 NOTE — PROGRESS NOTES
"Nephrology  Progress Note    Admit Date: 6/18/2023   LOS: 1 day     SUBJECTIVE:     Follow-up For:  Volume overload    History of Present Illness:  Patient is a 73 y.o. male presents with worsening LE edema over past few weeks.  Pt recently was on [employer] cruise and ate lots of seafood, salty meals, drinks, etc.  Went to [place] last week with same symptoms and underwent US which was negative for DVT.  Increased lasix but still had worsening swelling.  Presented here and admitted to hospital for evaluation.  Labs noted with GFR 13.  Pt has seen multiple nephrologist in [place] (, [facility], [facility]).  Currently under Dr. Pierce at [facility].  Seen 5/8 with plans for PD when needed and is undergoing kidney classes with [facility].  Consulted for evaluation since Dr. Pierce doesn't come to this facility.  Discussed with Dr. Livingston about above.  Will offer HD while here and defer PD to Dr. Pierce.       Epic reviewed.  Pt seen and examined.  Mild SOB, worsening leg edema, but no CP/N/V/D/F/C.        Interval History:     Uneventful night.  Diuresed well.  Discussed with pt/SW.  Plans as below.  Pt acknowledges understanding.  Less SOB.    Review of Systems:  Constitutional: No fever or chills  Respiratory:  shortness of breath improving  Cardiovascular: No chest pain or palpitations  Gastrointestinal: No nausea or vomiting  Neurological: No confusion or weakness    OBJECTIVE:     Vital Signs Range (Last 24H):  BP (!) 141/64 (BP Location: Right arm, Patient Position: Sitting)   Pulse 66   Temp 97.8 °F (36.6 °C) (Oral)   Resp 20   Ht 6' 1" (1.854 m)   Wt 117.9 kg (260 lb)   SpO2 95%   BMI 34.30 kg/m²     Temp:  [97.7 °F (36.5 °C)-98.6 °F (37 °C)]   Pulse:  [51-66]   Resp:  [16-20]   BP: (127-163)/(61-72)   SpO2:  [95 %-98 %]     I & O (Last 24H):  Intake/Output Summary (Last 24 hours) at 6/20/2023 1052  Last data filed at 6/20/2023 0630  Gross per 24 hour   Intake 270 ml   Output 1875 ml   Net -1605 ml "       Physical Exam:  General appearance: Well developed, well nourished  Eyes:  Conjunctivae/corneas clear. PERRL.  Lungs: Normal respiratory effort,   clear to auscultation bilaterally   Heart: Regular/Irr rate and rhythm, S1, S2 normal, no murmur, rub or pino.  Abdomen: Soft, non-tender non-distended; bowel sounds normal; no masses,  no organomegaly  Extremities: No cyanosis or clubbing. 2-3+ edema.    Skin: Skin color, texture, turgor normal. No rashes or lesions  Neurologic: Normal strength and tone. No focal numbness or weakness     Laboratory Data:  No results for input(s): WBC, RBC, HGB, HCT, PLT, MCV, MCH, MCHC in the last 24 hours.    BMP:   Recent Labs   Lab 06/18/23  2214 06/20/23  0454   * 135*    138   K 4.4 4.2    99   CO2 22* 24   BUN 86* 82*   CREATININE 4.4* 4.1*   CALCIUM 9.3 9.1   MG 2.6  --      Lab Results   Component Value Date    CALCIUM 9.1 06/20/2023    PHOS 6.4 (H) 06/18/2023       Lab Results   Component Value Date    .6 (H) 08/16/2022    CALCIUM 9.1 06/20/2023    PHOS 6.4 (H) 06/18/2023       Lab Results   Component Value Date    URICACID 6.5 08/16/2022       BNP  Recent Labs   Lab 06/18/23  2214   BNP 85       Medications:  Medication list was reviewed and changes noted under Assessment/Plan.    Diagnostic Results:        ASSESSMENT/PLAN:       CKD V and needing HD due to biopsy proven diabetic nephropathy and hypertensive nephrosclerosis:  -trends noted over last few years and has been seen by multiple nephrologists with recommendations to start hemodialysis.  Patient was very hesitant and wanted different opinions.  Now seeing Dr. Pierce at [facility] and in process of PD classes.    -with degree of volume overload pt needs HD for few weeks for volume removal.  Holding eliquis for now.    -consulted [facility] for LEW cath Wednesday.  Will undergo few days of HD/UF here.  Arrange outpt HD with Dr. Pierce and let him decide on CAPD options.    -hep panel.  Consult  [facility].  Patient now requesting placement for outpatient hemodialysis in the [facility].  -see orders.  -6/20:  diuresing well with large lasix.  NPO for LEW tomorrow.  [facility] working on outpt HD plans.  -Renally dose meds, avoid nephrotoxins, and monitor I/O's closely.        Uncontrolled DM/HTN due to noncompliance:  -hx noted.  -UF will help BP  -no more SGLT2/oral agents.    -GLP/basal/SSI  -defer to Dr. Livingston.        Afib/CHF:  -defer      As above

## 2023-06-20 NOTE — PLAN OF CARE
Initial Discharge Planning Assessment:6/20/23    Patient admitted on:6/18/23    Chart reviewed, Care plan discussed with treatment team, attending     PCP updated in Epic: correct in E;pic    Pharmacy updated in [facility] in [facility]    DME at home:none    Current Dispo:home    Transportation:has reliable    POA/LW:none on file      Anticipated DC needs from CM perspecrive: HD arrangements   06/20/23 1018   Discharge Assessment   Assessment Type Discharge Planning Assessment   Confirmed/corrected address, phone number and insurance Yes   Confirmed Demographics Correct on Facesheet   Source of Information patient   When was your last doctors appointment?   (last month)   Communicated ANAHY with patient/caregiver Date not available/Unable to determine   People in Home significant other   Facility Arrived From: home   Do you expect to return to your current living situation? Yes   Do you have help at home or someone to help you manage your care at home? Yes   Who are your caregiver(s) and their phone number(s)? Tala Phillips, SO, [phone]   Prior to hospitilization cognitive status: Alert/Oriented;No Deficits   Current cognitive status: Alert/Oriented;No Deficits   Equipment Currently Used at Home none   Readmission within 30 days? No   Patient currently being followed by outpatient case management? No   Do you currently have service(s) that help you manage your care at home? No   Do you take prescription medications? Yes   Do you have any problems affording any of your prescribed medications? No   Is the patient taking medications as prescribed? yes   Who is going to help you get home at discharge? family   How do you get to doctors appointments? car, drives self;family or friend will provide   Are you on dialysis? No   Discharge Plan A Home   Discharge Plan B Home   DME Needed Upon Discharge  none   Discharge Plan discussed with: Patient   Transition of Care Barriers None    Physical Activity   On average, how many days per week do you engage in moderate to strenuous exercise (like a brisk walk)? 0 days   On average, how many minutes do you engage in exercise at this level? 0 min   Financial Resource Strain   How hard is it for you to pay for the very basics like food, housing, medical care, and heating? Not hard   Housing Stability   In the last 12 months, was there a time when you were not able to pay the mortgage or rent on time? N   In the last 12 months, how many places have you lived? 1   In the last 12 months, was there a time when you did not have a steady place to sleep or slept in a shelter (including now)? N   Transportation Needs   In the past 12 months, has lack of transportation kept you from medical appointments or from getting medications? no   In the past 12 months, has lack of transportation kept you from meetings, work, or from getting things needed for daily living? No   Food Insecurity   Within the past 12 months, you worried that your food would run out before you got the money to buy more. Never true   Within the past 12 months, the food you bought just didn't last and you didn't have money to get more. Never true   Stress   Do you feel stress - tense, restless, nervous, or anxious, or unable to sleep at night because your mind is troubled all the time - these days? Not at all   Social Connections   In a typical week, how many times do you talk on the phone with family, friends, or neighbors? More than 3   How often do you get together with friends or relatives? More than 3   Do you belong to any clubs or organizations such as Jain groups, unions, fraternal or athletic groups, or school groups? No   How often do you attend meetings of the clubs or organizations you belong to? Never   Are you , , , , never , or living with a partner?   (living with SO)   Alcohol Use   Q1: How often do you have a drink containing  alcohol? Never   Q2: How many drinks containing alcohol do you have on a typical day when you are drinking? None   Q3: How often do you have six or more drinks on one occasion? Never   OTHER   Name(s) of People in Home Tala Phillips     [facility] - Med Surg ([place])  Initial Discharge Assessment       Primary Care Provider: Mehul Livingston MD    Admission Diagnosis: Bilateral lower extremity edema [R60.0]  Traumatic hematoma of buttock, initial encounter [S30.0XXA]  Hematoma of right flank, initial encounter [S30.1XXA]  Hypervolemia, unspecified hypervolemia type [E87.70]  Chronic kidney disease, unspecified CKD stage [N18.9]    Admission Date: 6/18/2023  Expected Discharge Date:     Transition of Care Barriers: (P) None    Payor: MEDICARE / Plan: MEDICARE PART A & B / Product Type: Government /     Extended Emergency Contact Information  Primary Emergency Contact: Mela Bautista   [facility]  Home Phone: [phone]  Mobile Phone: [phone]  Relation: Daughter  Secondary Emergency Contact: AlanTala  Address: [address] [place] [address] [place] [address] [place]  Mobile Phone: [phone]  Relation: Significant other    Discharge Plan A: (P) Home  Discharge Plan B: (P) Home      [id number] - [place] - [address] [facility] [address]  [address] [address]  Phone: [phone] Fax: [phone]      Initial Assessment (most recent)       Adult Discharge Assessment - 06/20/23 1018          Discharge Assessment    Assessment Type Discharge Planning Assessment (P)      Confirmed/corrected address, phone number and insurance Yes (P)      Confirmed Demographics Correct on Facesheet (P)      Source of Information patient (P)      When was your last doctors appointment? -- (P)    last month    Communicated ANAHY with patient/caregiver Date not available/Unable to determine (P)      People in Home significant other (P)       Facility Arrived From: home (P)      Do you expect to return to your current living situation? Yes (P)      Do you have help at home or someone to help you manage your care at home? Yes (P)      Who are your caregiver(s) and their phone number(s)? JASON Krishna, [phone] (P)      Prior to hospitilization cognitive status: Alert/Oriented;No Deficits (P)      Current cognitive status: Alert/Oriented;No Deficits (P)      Equipment Currently Used at Home none (P)      Readmission within 30 days? No (P)      Patient currently being followed by outpatient case management? No (P)      Do you currently have service(s) that help you manage your care at home? No (P)      Do you take prescription medications? Yes (P)      Do you have any problems affording any of your prescribed medications? No (P)      Is the patient taking medications as prescribed? yes (P)      Who is going to help you get home at discharge? family (P)      How do you get to doctors appointments? car, drives self;family or friend will provide (P)      Are you on dialysis? No (P)      Discharge Plan A Home (P)      Discharge Plan B Home (P)      DME Needed Upon Discharge  none (P)      Discharge Plan discussed with: Patient (P)      Transition of Care Barriers None (P)         Physical Activity    On average, how many days per week do you engage in moderate to strenuous exercise (like a brisk walk)? 0 days (P)      On average, how many minutes do you engage in exercise at this level? 0 min (P)         Financial Resource Strain    How hard is it for you to pay for the very basics like food, housing, medical care, and heating? Not hard at all (P)         Housing Stability    In the last 12 months, was there a time when you were not able to pay the mortgage or rent on time? No (P)      In the last 12 months, how many places have you lived? 1 (P)      In the last 12 months, was there a time when you did not have a steady place to sleep or slept in a  shelter (including now)? No (P)         Transportation Needs    In the past 12 months, has lack of transportation kept you from medical appointments or from getting medications? No (P)      In the past 12 months, has lack of transportation kept you from meetings, work, or from getting things needed for daily living? No (P)         Food Insecurity    Within the past 12 months, you worried that your food would run out before you got the money to buy more. Never true (P)      Within the past 12 months, the food you bought just didn't last and you didn't have money to get more. Never true (P)         Stress    Do you feel stress - tense, restless, nervous, or anxious, or unable to sleep at night because your mind is troubled all the time - these days? Not at all (P)         Social Connections    In a typical week, how many times do you talk on the phone with family, friends, or neighbors? More than three times a week (P)      How often do you get together with friends or relatives? More than three times a week (P)      Do you belong to any clubs or organizations such as Christian groups, unions, fraternal or athletic groups, or school groups? No (P)      How often do you attend meetings of the clubs or organizations you belong to? Never (P)      Are you , , , , never , or living with a partner?  (P)    living with SO       Alcohol Use    Q1: How often do you have a drink containing alcohol? Never (P)      Q2: How many drinks containing alcohol do you have on a typical day when you are drinking? Patient does not drink (P)      Q3: How often do you have six or more drinks on one occasion? Never (P)         OTHER    Name(s) of People in Home Tala Phillips (P)                                   Case management to follow for plans and arrangements

## 2023-06-20 NOTE — PROGRESS NOTES
Cardiology  Progress Notes            Subjective:  Complains of throbbing in the right foot.  Denies shortness of breath.    Vital Signs:  Vitals:    06/20/23 0400 06/20/23 0438 06/20/23 0600 06/20/23 0733   BP:  127/61  (!) 141/64   BP Location:  Left arm  Right arm   Patient Position:  Lying  Sitting   Pulse: (!) 56 (!) 56 (!) 53 (!) 54   Resp:  20  20   Temp:  98.6 °F (37 °C)  97.8 °F (36.6 °C)   TempSrc:  Oral  Oral   SpO2:  97%  95%   Weight:       Height:           Physical Exam:  Chest clear  Heart regular.  Basal ejection systolic murmur.  Abdomen soft.  Nontender.  2+ ankle edema.  Neurologically intact.    Laboratory:  CBC:   Recent Labs   Lab 06/18/23 2214   WBC 6.72   RBC 3.33*   HGB 10.7*   HCT 32.4*      MCV 97   MCH 32.1*   MCHC 33.0     BMP:   Recent Labs   Lab 06/18/23 2214 06/20/23  0454   * 135*    138   K 4.4 4.2    99   CO2 22* 24   BUN 86* 82*   CREATININE 4.4* 4.1*   CALCIUM 9.3 9.1   MG 2.6  --      CMP:   Recent Labs   Lab 06/18/23 2214 06/20/23  0454   * 135*   CALCIUM 9.3 9.1   ALBUMIN 3.7  --    PROT 7.6  --     138   K 4.4 4.2   CO2 22* 24    99   BUN 86* 82*   CREATININE 4.4* 4.1*   ALKPHOS 140*  --    ALT 22  --    AST 27  --    BILITOT 0.7  --      LFTs:   Recent Labs   Lab 06/18/23 2214   ALT 22   AST 27   ALKPHOS 140*   BILITOT 0.7   PROT 7.6   ALBUMIN 3.7     Coagulation: No results for input(s): PT, INR, APTT in the last 168 hours.  Cardiac markers:   Recent Labs   Lab 06/19/23  1525   TROPONINI 0.030*       Imaging:  US Retroperitoneal Complete   Final Result      Normal size and thickness of the kidneys but elevated resistive indices suggesting medical renal disease.         Electronically signed by: Oniel Clark MD   Date:    06/19/2023   Time:    11:00      X-Ray Chest AP Portable   Final Result      Persistent elevation of the left hemidiaphragm by apparent bowel distended with gas.  Correlate for abdominal pain and need for  flat erect abdomen views.      Hypoventilatory changes with no acute pulmonary infiltrates since the prior exam in January.         Electronically signed by: Jeff Palomo   Date:    06/18/2023   Time:    21:57            Problems:   Nephrotic syndrome  Stage IV chronic renal failure  Longstanding essential hypertension  Diabetes mellitus type 2  Stable coronary artery disease  Paroxysmal atrial fibrillation  History of strokes  Mild aortic stenosis        Plan of Care: See Orders          Mehul Livingston

## 2023-06-20 NOTE — PLAN OF CARE
Pt remained free of falls and injuries throughout shift. AAOx4. Pt calm, pleasant, and cooperative. Purposeful hourly rounding performed. Pt swallows meds whole. Pt received IV Lasix as ordered. IV flushed and saline locked. VSS on RA.Patient ambulates with standby assist.  Pt c/o RLE pain, declined PRN medicine. Pt sitting up in chair, educated on importance of elevating BLE to help with swelling, but pt unable to tolerate due to pain. Bed low and locked, call light in reach. Side rails up x2. Report given to NEHAL Madrid.

## 2023-06-20 NOTE — PLAN OF CARE
CM sent HD packet to [facility].

## 2023-06-21 PROBLEM — N18.6 ESRD (END STAGE RENAL DISEASE): Status: ACTIVE | Noted: 2023-06-21

## 2023-06-21 LAB
POCT GLUCOSE: 124 MG/DL (ref 70–110)
POCT GLUCOSE: 125 MG/DL (ref 70–110)
POCT GLUCOSE: 167 MG/DL (ref 70–110)

## 2023-06-21 PROCEDURE — 21400001 HC TELEMETRY ROOM

## 2023-06-21 PROCEDURE — 63600175 PHARM REV CODE 636 W HCPCS: Performed by: NURSE PRACTITIONER

## 2023-06-21 PROCEDURE — 25000003 PHARM REV CODE 250: Performed by: INTERNAL MEDICINE

## 2023-06-21 PROCEDURE — 25000003 PHARM REV CODE 250: Performed by: EMERGENCY MEDICINE

## 2023-06-21 PROCEDURE — C1894 INTRO/SHEATH, NON-LASER: HCPCS | Performed by: INTERNAL MEDICINE

## 2023-06-21 PROCEDURE — 36558 INSERT TUNNELED CV CATH: CPT | Mod: RT | Performed by: INTERNAL MEDICINE

## 2023-06-21 PROCEDURE — 25000003 PHARM REV CODE 250: Performed by: NURSE PRACTITIONER

## 2023-06-21 PROCEDURE — C1750 CATH, HEMODIALYSIS,LONG-TERM: HCPCS | Performed by: INTERNAL MEDICINE

## 2023-06-21 PROCEDURE — 90935 HEMODIALYSIS ONE EVALUATION: CPT

## 2023-06-21 PROCEDURE — C1769 GUIDE WIRE: HCPCS | Performed by: INTERNAL MEDICINE

## 2023-06-21 PROCEDURE — 94761 N-INVAS EAR/PLS OXIMETRY MLT: CPT

## 2023-06-21 DEVICE — CHRONIC CATHETER KIT SYMMETRICAL TIP, TAL VENATRAC STYLET
Type: IMPLANTABLE DEVICE | Site: NECK | Status: FUNCTIONAL
Brand: PALINDROME

## 2023-06-21 RX ORDER — LIDOCAINE HYDROCHLORIDE 10 MG/ML
1 INJECTION INFILTRATION; PERINEURAL ONCE
Status: DISCONTINUED | OUTPATIENT
Start: 2023-06-21 | End: 2023-06-24 | Stop reason: HOSPADM

## 2023-06-21 RX ORDER — LIDOCAINE HYDROCHLORIDE 10 MG/ML
INJECTION, SOLUTION EPIDURAL; INFILTRATION; INTRACAUDAL; PERINEURAL
Status: DISCONTINUED | OUTPATIENT
Start: 2023-06-21 | End: 2023-06-21 | Stop reason: HOSPADM

## 2023-06-21 RX ADMIN — HYDRALAZINE HYDROCHLORIDE 100 MG: 25 TABLET, FILM COATED ORAL at 09:06

## 2023-06-21 RX ADMIN — CALCITRIOL CAPSULES 0.25 MCG 0.25 MCG: 0.25 CAPSULE ORAL at 05:06

## 2023-06-21 RX ADMIN — CEFAZOLIN 2 G: 2 INJECTION, POWDER, FOR SOLUTION INTRAMUSCULAR; INTRAVENOUS at 07:06

## 2023-06-21 RX ADMIN — VANCOMYCIN HYDROCHLORIDE 1000 MG: 1 INJECTION, POWDER, LYOPHILIZED, FOR SOLUTION INTRAVENOUS at 09:06

## 2023-06-21 RX ADMIN — FUROSEMIDE 80 MG: 10 INJECTION, SOLUTION INTRAMUSCULAR; INTRAVENOUS at 09:06

## 2023-06-21 NOTE — PROGRESS NOTES
"Nephrology  Progress Note    Admit Date: 6/18/2023   LOS: 2 days     SUBJECTIVE:     Follow-up For:  Volume overload    History of Present Illness:  Patient is a 73 y.o. male presents with worsening LE edema over past few weeks.  Pt recently was on [employer] cruise and ate lots of seafood, salty meals, drinks, etc.  Went to [place] last week with same symptoms and underwent US which was negative for DVT.  Increased lasix but still had worsening swelling.  Presented here and admitted to hospital for evaluation.  Labs noted with GFR 13.  Pt has seen multiple nephrologist in [place] (, [place], [facility]  [place] under [facility]. Pierce at [facility].  Seen 5/8 with plans for PD when needed and is undergoing kidney classes with [facility].  Consulted for evaluation since Dr. Pierce doesn't come to this facility.  Discussed with Dr. Livingston about above.  Will offer HD while here and defer PD to Dr. Pierce.       Epic reviewed.  Pt seen and examined.  Mild SOB, worsening leg edema, but no CP/N/V/D/F/C.        Interval History:     seen prior to LEW cath.  Discussed with pt/daughter at bedside.  Questions/concerns addressed.      Review of Systems:  Constitutional: No fever or chills  Respiratory:  shortness of breath improving  Cardiovascular: No chest pain or palpitations  Gastrointestinal: No nausea or vomiting  Neurological: No confusion or weakness    OBJECTIVE:     Vital Signs Range (Last 24H):  BP (!) 146/63   Pulse (!) 54   Temp 98.1 °F (36.7 °C)   Resp 18   Ht 6' 1" (1.854 m)   Wt 117.9 kg (260 lb)   SpO2 (!) 92%   BMI 34.30 kg/m²     Temp:  [97.9 °F (36.6 °C)-98.3 °F (36.8 °C)]   Pulse:  [52-79]   Resp:  [16-20]   BP: (140-156)/(63-69)   SpO2:  [92 %-98 %]     I & O (Last 24H):  Intake/Output Summary (Last 24 hours) at 6/21/2023 1015  Last data filed at 6/21/2023 0200  Gross per 24 hour   Intake --   Output 2300 ml   Net -2300 ml         Physical Exam:  General appearance: Well developed, well nourished  Eyes:  " Conjunctivae/corneas clear. PERRL.  Lungs: Normal respiratory effort,   clear to auscultation bilaterally   Heart: Regular/Irr rate and rhythm, S1, S2 normal, no murmur, rub or pino.  Abdomen: Soft, non-tender non-distended; bowel sounds normal; no masses,  no organomegaly  Extremities: No cyanosis or clubbing. 2+ edema.    Skin: Skin color, texture, turgor normal. No rashes or lesions  Neurologic: Normal strength and tone. No focal numbness or weakness     Laboratory Data:  No results for input(s): WBC, RBC, HGB, HCT, PLT, MCV, MCH, MCHC in the last 24 hours.    BMP:   Recent Labs   Lab 06/18/23  2214 06/20/23  0454   * 135*    138   K 4.4 4.2    99   CO2 22* 24   BUN 86* 82*   CREATININE 4.4* 4.1*   CALCIUM 9.3 9.1   MG 2.6  --        Lab Results   Component Value Date    CALCIUM 9.1 06/20/2023    PHOS 6.4 (H) 06/18/2023       Lab Results   Component Value Date    .6 (H) 08/16/2022    CALCIUM 9.1 06/20/2023    PHOS 6.4 (H) 06/18/2023       Lab Results   Component Value Date    URICACID 6.5 08/16/2022       BNP  Recent Labs   Lab 06/18/23  2214   BNP 85         Medications:  Medication list was reviewed and changes noted under Assessment/Plan.    Diagnostic Results:        ASSESSMENT/PLAN:       CKD V and needing HD due to biopsy proven diabetic nephropathy and hypertensive nephrosclerosis:  -trends noted over last few years and has been seen by multiple nephrologists with recommendations to start hemodialysis.  Patient was very hesitant and wanted different opinions.  Now seeing [place]. Pierce at [facility] and in process of PD classes.    -with degree of volume overload pt needs HD for few weeks for volume removal.  Holding eliquis for now.    -consulted [facility] for LWE cath Wednesday.  Will undergo few days of HD/UF here.  Arrange outpt HD with Dr. Pierce and let him decide on CAPD options.    -hep panel.  Consult SW.  Patient now requesting placement for outpatient hemodialysis in the  [place] area.  -see orders.  -6/20:  diuresing well with large lasix.  NPO for LEW tomorrow.  SW working on outpt HD plans.  -6/21:  LEW today followed by HD.  MARVIN working on outpt chair.  -Renally dose meds, avoid nephrotoxins, and monitor I/O's closely.        Uncontrolled DM/HTN due to noncompliance:  -hx noted.  -UF will help BP  -no more SGLT2/oral agents.    -GLP/basal/SSI  -defer to Dr. Livingston.        Afib/CHF:  -defer      As above

## 2023-06-21 NOTE — PLAN OF CARE
Problem: Adult Inpatient Plan of Care  Goal: Plan of Care Review  Outcome: Ongoing, Progressing  Goal: Patient-Specific Goal (Individualized)  Outcome: Ongoing, Progressing  Goal: Absence of Hospital-Acquired Illness or Injury  Outcome: Ongoing, Progressing  Goal: Optimal Comfort and Wellbeing  Outcome: Ongoing, Progressing  Goal: Readiness for Transition of Care  Outcome: Ongoing, Progressing     Problem: Diabetes Comorbidity  Goal: Blood Glucose Level Within Targeted Range  Outcome: Ongoing, Progressing     Problem: Fall Injury Risk  Goal: Absence of Fall and Fall-Related Injury  Outcome: Ongoing, Progressing     Problem: Infection (Hemodialysis)  Goal: Absence of Infection Signs and Symptoms  Outcome: Ongoing, Progressing     Pt remained free from falls or injuries this shift. Pt independent in repositioning. Bruising and scab to R elbow. Pt rested well through the night.  Strict I&O's. Pt NPO past midnight for HD catheter placement

## 2023-06-21 NOTE — PLAN OF CARE
Problem: Adult Inpatient Plan of Care  Goal: Plan of Care Review  Outcome: Ongoing, Progressing  Goal: Patient-Specific Goal (Individualized)  Outcome: Ongoing, Progressing  Goal: Absence of Hospital-Acquired Illness or Injury  Outcome: Ongoing, Progressing  Goal: Optimal Comfort and Wellbeing  Outcome: Ongoing, Progressing  Goal: Readiness for Transition of Care  Outcome: Ongoing, Progressing   Shon cath placed this shift. Site bleeding post operatively, pressure dressing applied with adequate response. Site noted to be bleeding again after dialysis. Dressing changed, additional suture placed at bedside. No new bleeding noted.     Pt achieving adequate pain relief from prescribed meds. Frequent rounds made to assess for safety and discomfort, fall precautions maintained. Bed locked in lowest position. Call bell within reach. See corresponding flowsheets for full documentation. Will continue to monitor.

## 2023-06-21 NOTE — BRIEF OP NOTE
[facility] Cath Lab ([place])  Brief Operative Note    SUMMARY     Surgery Date: 6/21/2023     Surgeon(s) and Role:     * Angelito Ashley MD - Primary    Assisting Surgeon: None    Pre-op Diagnosis:  ESRD (end stage renal disease) [N18.6]    Post-op Diagnosis:  Post-Op Diagnosis Codes:     * ESRD (end stage renal disease) [N18.6]    Procedure(s) (LRB):  Insertion, Catheter, Central Venous, Hemodialysis (N/A)    Anesthesia: 1% lidocaine    Operative Findings: Patient was prepped and draped in a sterile fashion.  This was a successful placement of a R internal jugular tunneled dialysis catheter.  Patient tolerated the procedure well and no immediate complications noted.    Estimated Blood Loss: < 10 mL    Estimated Blood Loss has been documented.         Specimens:   Specimen (24h ago, onward)      None            Ok to use the catheter for dialysis.    [id number]

## 2023-06-21 NOTE — NURSING
Pt cvc dressing changed in dialysis. Site still bleeding. Pressure dressing applied and saline bag put on top per Dr. Ashley instructions.

## 2023-06-21 NOTE — PROCEDURES
[facility] Interventional Nephrology Service     Date of Procedure:  06/21/2023 10:54 AM    Procedure: Tunneled Dialysis Central Catheter Insertion     Indication: ESRD, now requiring dialysis     Primary Surgeon: Angelito Ashley MD   Assist: none    Referring Provider: Dr. Mcknight and Sam Alejandra NP    Blood Loss: < 10 mL    Procedure in Detail:   Patient was prepped and draped in usual sterile fashion. 1% lidocaine was used for local sedation to anesthetize soft tissues. Ultrasound was used to localize the right internal jugular vein; using ultrasound guidance the right IJ was cannulated. Micropuncture wire passed easily into vein and confirmed in correct location under flouroscopy.  Microsheath passed over micropuncture wire, microwire removed, and glidewire passed into the IVC. This was done under fluoroscopic guidance. Using sequential dilator under flouroscopy, the venotomy site was dilated, then a breakaway sheath was inserted into the vein and was stabilized there while we created the tunnel.  The tunnel tract was then created; mapped in location in the right chest wall; injected prior with 1% lidocaine; then a 19 cm catheter was advanced through the tunnel with a vita, passed through the venotomy, and then was advanced into the breakaway sheath using the glidewire.  The breakaway sheath was then broken as the catheter was further advanced through the sheath.  This was done under fluoroscopy.  Both ports flushed and aspirated well.  The catheter was packed with 1000 units/mL heparin. Vicryl suture was used to close the venotomy site, and a #2 ethilon suture was used to secure the catheter at the exit site and to anchor to the patient's skin.     A total of 4 sutures were placed.    Patient tolerated the procedure well. The catheter was dressed with tegaderm sterile dressing.    Angelito Ashley MD  [phone]

## 2023-06-21 NOTE — CHAPLAIN
06/21/23 1359   Clinical Encounter Type   Visit Type Initial Visit   Visit Category General Rounding   Visited With Patient not available;Health care provider   Number of Family Visited 0   Length of Visit 10 Minutes   Continue Visiting Yes   Patient Spiritual Encounters   Comments - Patient Patient was not present. I spoke with healthcare provider.

## 2023-06-21 NOTE — H&P
[facility] - Cath Lab ([place])  History & Physical    Subjective:      Chief Complaint/Reason for Admission: Here for tunneled dialysis catheter placement    Zev Castano is a 73 y.o. male now with ESRD, requiring renal replacement.  He had discussed doing PD as an outpatient with his primary nephrologist, but needs more urgent depurative support prior to discharge.      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     Hyperlipidemia     Hypertension     Nephrotic range proteinuria     Paroxysmal atrial fibrillation     Stroke     nov 2012     Past Surgical History:   Procedure Laterality Date    BIOPSY N/A 5/29/2018    Procedure: BIOPSY;  Surgeon: Neeta Diagnostic Provider;  Location: [facility] CATH LAB;  Service: Radiology;  Laterality: N/A;  ct guided      CARDIOVERSION N/A 12/3/2020    Procedure: CARDIOVERSION;  Surgeon: Mehul Livingston MD;  Location: [facility] CATH LAB;  Service: Cardiology;  Laterality: N/A;    KNEE SURGERY Left     SHOULDER SURGERY Right      Family History   Problem Relation Age of Onset    Hypertension Mother     Heart attack Father 59    Stroke Sister     Esophageal cancer Brother      Social History     Tobacco Use    Smoking status: Former     Types: Cigars    Smokeless tobacco: Never    Tobacco comments:     occasional, [place] the [place]   Substance Use Topics    Alcohol use: Yes     Alcohol/week: 1.0 standard drink     Types: 1 Cans of beer per week     Comment: occasional when at the casino    Drug use: No       PTA Medications   Medication Sig    allopurinoL (ZYLOPRIM) 100 MG tablet Take 1 tablet (100 mg total) by mouth once daily.    amiodarone (PACERONE) 200 MG Tab Take 1 tablet (200 mg total) by mouth once daily.    amLODIPine (NORVASC) 5 MG tablet Take 5 mg by mouth once daily.    apixaban (ELIQUIS)  2.5 mg Tab Take 1 tablet (2.5 mg total) by mouth 2 (two) times daily.    aspirin (ECOTRIN) 81 MG EC tablet Take 1 tablet (81 mg total) by mouth once daily.    atorvastatin (LIPITOR) 40 MG tablet TAKE 1 TABLET BY MOUTH EVERY DAY    calcitRIOL (ROCALTROL) 0.25 MCG Cap Take 1 capsule (0.25 mcg total) by mouth every Mon, Wed, Fri.    clindamycin (CLEOCIN) 150 MG capsule Take 3 capsules (450 mg total) by mouth every 8 (eight) hours. for 7 days    docusate sodium (COLACE) 100 MG capsule Take 1 capsule (100 mg total) by mouth 2 (two) times daily as needed for Constipation.    famotidine (PEPCID) 20 MG tablet Take 1 tablet (20 mg total) by mouth once daily.    furosemide (LASIX) 20 MG tablet Take 20 mg by mouth once daily.    hydrALAZINE (APRESOLINE) 100 MG tablet Take 1 tablet (100 mg total) by mouth every 12 (twelve) hours.    isosorbide mononitrate (IMDUR) 60 MG 24 hr tablet Take 1 tablet (60 mg total) by mouth once daily.    mupirocin (BACTROBAN) 2 % ointment Apply topically 2 (two) times daily.    nitroGLYCERIN (NITROSTAT) 0.4 MG SL tablet Place 1 tablet (0.4 mg total) under the tongue every 5 (five) minutes as needed for Chest pain. Go to the ED for eval if you continue to have chest pain after 3 doses    oxyCODONE-acetaminophen (PERCOCET)  mg per tablet Take 1 tablet by mouth every 4 (four) hours as needed for Pain.    SITagliptin (JANUVIA) 50 MG Tab Take 1 tablet (50 mg total) by mouth once daily.    tamsulosin (FLOMAX) 0.4 mg Cap Take 0.4 mg by mouth once daily.    traMADoL (ULTRAM) 50 mg tablet Take 1 tablet (50 mg total) by mouth every 12 (twelve) hours as needed for Pain.     Review of patient's allergies indicates:   Allergen Reactions    Doxycycline Rash        Review of Systems   Constitutional: Negative.    Respiratory: Negative.     Cardiovascular: Negative.      Objective:      Vital Signs (Most Recent)  Temp: 98.1 °F (36.7 °C) (06/21/23 0414)  Pulse: (!) 54 (06/21/23 1005)  Resp: 18 (06/21/23  0414)  BP: (!) 146/63 (06/21/23 0414)  SpO2: (!) 92 % (06/21/23 0414)    Vital Signs Range (Last 24H):  Temp:  [97.9 °F (36.6 °C)-98.3 °F (36.8 °C)]   Pulse:  [52-79]   Resp:  [16-20]   BP: (140-156)/(63-69)   SpO2:  [92 %-98 %]     Physical Exam  Constitutional:       General: He is not in acute distress.     Appearance: He is well-developed. He is not diaphoretic.   HENT:      Head: Normocephalic and atraumatic.   Cardiovascular:      Comments: R IJ with good caliber and blood flow visualized with doppler, some collapse on inspiration  Pulmonary:      Effort: Pulmonary effort is normal. No respiratory distress.   Musculoskeletal:      Cervical back: Neck supple.   Skin:     General: Skin is warm and dry.   Neurological:      Mental Status: He is alert and oriented to person, place, and time.   Psychiatric:         Behavior: Behavior normal.       Assessment:      There are no hospital problems to display for this patient.      Plan:      TDC placement today.  Risks explained, consent signed.

## 2023-06-21 NOTE — PROGRESS NOTES
Cardiology  Progress Notes            Subjective:  Feels well.  No further pain in the right foot.  Swelling in both the lower legs persist but slightly improved.  Denies shortness of breath  Is scheduled to have the Brice catheter placed this morning.    Vital Signs:  Vitals:    06/21/23 0200 06/21/23 0400 06/21/23 0414 06/21/23 0600   BP:   (!) 146/63    BP Location:       Patient Position:       Pulse: 66 62 (!) 59 (!) 58   Resp:   18    Temp:   98.1 °F (36.7 °C)    TempSrc:       SpO2:   (!) 92%    Weight:       Height:           Physical Exam:   Chest clear  Heart regular.  Basal ejection systolic murmur.  Abdomen soft and nontender  2+ lower leg edema.  Neurologically intact.    Laboratory:  CBC:   Recent Labs   Lab 06/18/23 2214   WBC 6.72   RBC 3.33*   HGB 10.7*   HCT 32.4*      MCV 97   MCH 32.1*   MCHC 33.0     BMP:   Recent Labs   Lab 06/18/23 2214 06/20/23  0454   * 135*    138   K 4.4 4.2    99   CO2 22* 24   BUN 86* 82*   CREATININE 4.4* 4.1*   CALCIUM 9.3 9.1   MG 2.6  --      CMP:   Recent Labs   Lab 06/18/23 2214 06/20/23  0454   * 135*   CALCIUM 9.3 9.1   ALBUMIN 3.7  --    PROT 7.6  --     138   K 4.4 4.2   CO2 22* 24    99   BUN 86* 82*   CREATININE 4.4* 4.1*   ALKPHOS 140*  --    ALT 22  --    AST 27  --    BILITOT 0.7  --      LFTs:   Recent Labs   Lab 06/18/23 2214   ALT 22   AST 27   ALKPHOS 140*   BILITOT 0.7   PROT 7.6   ALBUMIN 3.7     Coagulation: No results for input(s): PT, INR, APTT in the last 168 hours.  Cardiac markers:   Recent Labs   Lab 06/19/23  1525   TROPONINI 0.030*       Imaging:  US Retroperitoneal Complete   Final Result      Normal size and thickness of the kidneys but elevated resistive indices suggesting medical renal disease.         Electronically signed by: Oniel Clark MD   Date:    06/19/2023   Time:    11:00      X-Ray Chest AP Portable   Final Result      Persistent elevation of the left hemidiaphragm by  apparent bowel distended with gas.  Correlate for abdominal pain and need for flat erect abdomen views.      Hypoventilatory changes with no acute pulmonary infiltrates since the prior exam in January.         Electronically signed by: Jeff Palomo   Date:    06/18/2023   Time:    21:57            Problems:   Nephrotic syndrome  Stage IV chronic renal failure  Longstanding essential hypertension  Diabetes mellitus type 2  Stable coronary artery disease  Paroxysmal atrial fibrillation  History of strokes  Mild aortic stenosis           Plan of Care: See Orders          Mehul Livingston

## 2023-06-22 LAB
POCT GLUCOSE: 113 MG/DL (ref 70–110)
POCT GLUCOSE: 124 MG/DL (ref 70–110)
POCT GLUCOSE: 187 MG/DL (ref 70–110)

## 2023-06-22 PROCEDURE — 25000003 PHARM REV CODE 250: Performed by: EMERGENCY MEDICINE

## 2023-06-22 PROCEDURE — 63600175 PHARM REV CODE 636 W HCPCS: Performed by: EMERGENCY MEDICINE

## 2023-06-22 PROCEDURE — 90935 HEMODIALYSIS ONE EVALUATION: CPT

## 2023-06-22 PROCEDURE — 94761 N-INVAS EAR/PLS OXIMETRY MLT: CPT

## 2023-06-22 PROCEDURE — 80100016 HC MAINTENANCE HEMODIALYSIS

## 2023-06-22 PROCEDURE — 25000003 PHARM REV CODE 250: Performed by: NURSE PRACTITIONER

## 2023-06-22 PROCEDURE — 63600175 PHARM REV CODE 636 W HCPCS: Performed by: NURSE PRACTITIONER

## 2023-06-22 PROCEDURE — 21400001 HC TELEMETRY ROOM

## 2023-06-22 RX ORDER — FUROSEMIDE 40 MG/1
80 TABLET ORAL DAILY
Status: DISCONTINUED | OUTPATIENT
Start: 2023-06-22 | End: 2023-06-24 | Stop reason: HOSPADM

## 2023-06-22 RX ORDER — HEPARIN SODIUM 5000 [USP'U]/ML
3200 INJECTION, SOLUTION INTRAVENOUS; SUBCUTANEOUS
Status: DISCONTINUED | OUTPATIENT
Start: 2023-06-22 | End: 2023-06-22

## 2023-06-22 RX ORDER — HEPARIN SODIUM 1000 [USP'U]/ML
10000 INJECTION, SOLUTION INTRAVENOUS; SUBCUTANEOUS
Status: DISCONTINUED | OUTPATIENT
Start: 2023-06-22 | End: 2023-06-24 | Stop reason: HOSPADM

## 2023-06-22 RX ADMIN — TAMSULOSIN HYDROCHLORIDE 0.4 MG: 0.4 CAPSULE ORAL at 01:06

## 2023-06-22 RX ADMIN — ATORVASTATIN CALCIUM 40 MG: 20 TABLET, FILM COATED ORAL at 01:06

## 2023-06-22 RX ADMIN — HEPARIN SODIUM 3200 UNITS: 1000 INJECTION, SOLUTION INTRAVENOUS; SUBCUTANEOUS at 12:06

## 2023-06-22 RX ADMIN — HYDRALAZINE HYDROCHLORIDE 100 MG: 25 TABLET, FILM COATED ORAL at 01:06

## 2023-06-22 RX ADMIN — POLYETHYLENE GLYCOL 3350 17 G: 17 POWDER, FOR SOLUTION ORAL at 01:06

## 2023-06-22 RX ADMIN — FUROSEMIDE 80 MG: 40 TABLET ORAL at 01:06

## 2023-06-22 RX ADMIN — ISOSORBIDE MONONITRATE 60 MG: 30 TABLET, EXTENDED RELEASE ORAL at 01:06

## 2023-06-22 RX ADMIN — AMIODARONE HYDROCHLORIDE 100 MG: 100 TABLET ORAL at 01:06

## 2023-06-22 RX ADMIN — ASPIRIN 81 MG: 81 TABLET, COATED ORAL at 01:06

## 2023-06-22 RX ADMIN — FAMOTIDINE 20 MG: 20 TABLET, FILM COATED ORAL at 01:06

## 2023-06-22 RX ADMIN — MORPHINE SULFATE 8 MG: 4 INJECTION, SOLUTION INTRAMUSCULAR; INTRAVENOUS at 10:06

## 2023-06-22 NOTE — PROGRESS NOTES
06/22/23 1206   Post-Hemodialysis Assessment   Rinseback Volume (mL) 250 mL   Blood Volume Processed (Liters) 54.9 L   Additional Fluid Intake (mL) 500 mL   Total UF (mL) 2500 mL   Net Fluid Removal 2000   Patient Response to Treatment tx completed successfully, tolerated well   Post-Hemodialysis Comments blood returned pp

## 2023-06-22 NOTE — PLAN OF CARE
POC reviewed. Purposeful rounding completed. No significant events this shift. Cardiac monitor maintained. No complaints of pain. VS and assessment per flowsheets. Received iv antibiotics according to MAR. No injuries, falls, or trauma occurred during shift. Bed low and locked, side rails up x3, call light within reach. Safety maintained.       Problem: Adult Inpatient Plan of Care  Goal: Plan of Care Review  Outcome: Ongoing, Progressing  Goal: Patient-Specific Goal (Individualized)  Outcome: Ongoing, Progressing  Goal: Absence of Hospital-Acquired Illness or Injury  Outcome: Ongoing, Progressing  Goal: Optimal Comfort and Wellbeing  Outcome: Ongoing, Progressing  Goal: Readiness for Transition of Care  Outcome: Ongoing, Progressing     Problem: Diabetes Comorbidity  Goal: Blood Glucose Level Within Targeted Range  Outcome: Ongoing, Progressing     Problem: Fall Injury Risk  Goal: Absence of Fall and Fall-Related Injury  Outcome: Ongoing, Progressing

## 2023-06-22 NOTE — PROGRESS NOTES
Cardiology  Progress Notes            Subjective:  After Brice catheter was placed, he underwent hemodialysis yesterday.  He feels well this morning.  He says the leg swelling is better.  He denies breathlessness.    Vital Signs:  Vitals:    06/22/23 0735 06/22/23 0749 06/22/23 0757 06/22/23 0804   BP:  136/63     BP Location:  Right arm     Patient Position:  Sitting     Pulse: (!) 52 (!) 49 (!) 44 (!) 45   Resp:  20     Temp:  98 °F (36.7 °C)     TempSrc:       SpO2:  98%     Weight:       Height:           Physical Exam:  Chest clear  Heart regular.  Basal ejection systolic murmur.  Abdomen soft.  2+ lower leg edema.  Neurologically intact.    Laboratory:  CBC:   Recent Labs   Lab 06/18/23 2214   WBC 6.72   RBC 3.33*   HGB 10.7*   HCT 32.4*      MCV 97   MCH 32.1*   MCHC 33.0     BMP:   Recent Labs   Lab 06/18/23 2214 06/20/23  0454   * 135*    138   K 4.4 4.2    99   CO2 22* 24   BUN 86* 82*   CREATININE 4.4* 4.1*   CALCIUM 9.3 9.1   MG 2.6  --      CMP:   Recent Labs   Lab 06/18/23 2214 06/20/23  0454   * 135*   CALCIUM 9.3 9.1   ALBUMIN 3.7  --    PROT 7.6  --     138   K 4.4 4.2   CO2 22* 24    99   BUN 86* 82*   CREATININE 4.4* 4.1*   ALKPHOS 140*  --    ALT 22  --    AST 27  --    BILITOT 0.7  --      LFTs:   Recent Labs   Lab 06/18/23 2214   ALT 22   AST 27   ALKPHOS 140*   BILITOT 0.7   PROT 7.6   ALBUMIN 3.7     Coagulation: No results for input(s): PT, INR, APTT in the last 168 hours.  Cardiac markers:   Recent Labs   Lab 06/19/23  1525   TROPONINI 0.030*       Imaging:  US Retroperitoneal Complete   Final Result      Normal size and thickness of the kidneys but elevated resistive indices suggesting medical renal disease.         Electronically signed by: Oniel Clark MD   Date:    06/19/2023   Time:    11:00      X-Ray Chest AP Portable   Final Result      Persistent elevation of the left hemidiaphragm by apparent bowel distended with gas.   Correlate for abdominal pain and need for flat erect abdomen views.      Hypoventilatory changes with no acute pulmonary infiltrates since the prior exam in January.         Electronically signed by: Jeff Palomo   Date:    06/18/2023   Time:    21:57            Problems:   Nephrotic syndrome  Stage IV chronic renal failure  Longstanding essential hypertension  Diabetes mellitus type 2  Stable coronary artery disease  Paroxysmal atrial fibrillation  History of strokes  Mild aortic stenosis           Plan of Care: See Orders          Mehul Livingston

## 2023-06-22 NOTE — PLAN OF CARE
Hospital follow up appt scheduled 7/6 11:45am with Dr Livingston (PCP) - outpatient HD schedule limited availability of appts - appt info added to AVS

## 2023-06-22 NOTE — PLAN OF CARE
"Visited patient again in HD at his request - patient reports "i've been thinking & I don't want to go to [facility] at all - i'm too scared of the crime - you think I could be treated in [place] until something opens up in [place]" - spoke with Mary Miller - she will research [place] availability   "

## 2023-06-22 NOTE — PLAN OF CARE
Medicare Message     Important Message from Medicare regarding Discharge Appeal Rights Given to patient/caregiver; Explained to patient/caregiver; Signed/date by patient/caregiver   Date IMM was signed 6/22/2023   Time IMM was signed 1159

## 2023-06-22 NOTE — PROGRESS NOTES
"Nephrology  Progress Note    Admit Date: 6/18/2023   LOS: 3 days     SUBJECTIVE:     Follow-up For:  Volume overload    History of Present Illness:  Patient is a 73 y.o. male presents with worsening LE edema over past few weeks.  Pt recently was on [employer] cruise and ate lots of seafood, salty meals, drinks, etc.  Went to [place] last week with same symptoms and underwent US which was negative for DVT.  Increased lasix but still had worsening swelling.  Presented here and admitted to hospital for evaluation.  Labs noted with GFR 13.  Pt has seen multiple nephrologist in [place] (, [place], [facility]).  Currently under Dr. Pierce at [facility].  Seen 5/8 with plans for PD when needed and is undergoing kidney classes with [facility].  Consulted for evaluation since Dr. Pierce doesn't come to this facility.  Discussed with Dr. Livingston about above.  Will offer HD while here and defer PD to Dr. Pierce.       Epic reviewed.  Pt seen and examined.  Mild SOB, worsening leg edema, but no CP/N/V/D/F/C.        Interval History:     Events noted of yesterday.  S/p LEW and HD session.  Had uncontrollable bleeding around LEW site after HD.  Failed pressure holding and new bandage.  Came to bedside with Dr. Briceño to assess.  Under sterile technique we applied new sutures and hemostasis achieved.  Gave dose of Vanc/Ancef for prophylaxis.  No events overnight. Discussed with Dr. Livingston in detail about DC plans.       Review of Systems:  Constitutional: No fever or chills  Respiratory:  shortness of breath improving  Cardiovascular: No chest pain or palpitations  Gastrointestinal: No nausea or vomiting  Neurological: No confusion or weakness    OBJECTIVE:     Vital Signs Range (Last 24H):  /63 (BP Location: Right arm, Patient Position: Sitting)   Pulse (!) 45   Temp 98 °F (36.7 °C)   Resp 20   Ht 6' 1" (1.854 m)   Wt 117.9 kg (260 lb)   SpO2 98%   BMI 34.30 kg/m²     Temp:  [97.8 °F (36.6 °C)-98.6 °F (37 °C)]   Pulse:  " [44-88]   Resp:  [16-22]   BP: (136-190)/(63-91)   SpO2:  [92 %-98 %]     I & O (Last 24H):  Intake/Output Summary (Last 24 hours) at 6/22/2023 0901  Last data filed at 6/22/2023 0615  Gross per 24 hour   Intake 740 ml   Output 3350 ml   Net -2610 ml         Physical Exam:  General appearance: Well developed, well nourished  Eyes:  Conjunctivae/corneas clear. PERRL.  Lungs: Normal respiratory effort,   clear to auscultation bilaterally   Heart: Regular/Irr rate and rhythm, S1, S2 normal, no murmur, rub or pino.  Abdomen: Soft, non-tender non-distended; bowel sounds normal; no masses,  no organomegaly  Extremities: No cyanosis or clubbing. 1+ edema.    Skin: Skin color, texture, turgor normal. No rashes or lesions  Neurologic: Normal strength and tone. No focal numbness or weakness   Right IJ LEW.  Events noted.      Laboratory Data:  No results for input(s): WBC, RBC, HGB, HCT, PLT, MCV, MCH, MCHC in the last 24 hours.    BMP:   Recent Labs   Lab 06/18/23 2214 06/20/23  0454   * 135*    138   K 4.4 4.2    99   CO2 22* 24   BUN 86* 82*   CREATININE 4.4* 4.1*   CALCIUM 9.3 9.1   MG 2.6  --        Lab Results   Component Value Date    CALCIUM 9.1 06/20/2023    PHOS 6.4 (H) 06/18/2023       Lab Results   Component Value Date    .6 (H) 08/16/2022    CALCIUM 9.1 06/20/2023    PHOS 6.4 (H) 06/18/2023       Lab Results   Component Value Date    URICACID 6.5 08/16/2022       BNP  Recent Labs   Lab 06/18/23  2214   BNP 85         Medications:  Medication list was reviewed and changes noted under Assessment/Plan.    Diagnostic Results:        ASSESSMENT/PLAN:       CKD V and needing HD due to biopsy proven diabetic nephropathy and hypertensive nephrosclerosis.  Now ESRD on HD:  -trends noted over last few years and has been seen by multiple nephrologists with recommendations to start hemodialysis.  Patient was very hesitant and wanted different opinions.  Now seeing [facility] at [facility] and in  process of PD classes.    -with degree of volume overload pt needs HD for few weeks for volume removal.  Holding eliquis for now.    -consulted [facility] for LEW cath Wednesday.  Will undergo few days of HD/UF here.  Arrange outpt HD with Dr. Pierce and let him decide on CAPD options.    -hep panel.  Consult SW.  Patient now requesting placement for outpatient hemodialysis in the [place] area.  -see orders.  -6/20:  diuresing well with large lasix.  NPO for LEW tomorrow.  SW working on outpt HD plans in [place] with Dr. Pierce.  -6/21:  LEW today followed by HD.  SW working on outpt chair.  Dr. Pierce wants him at [facility] on [address]  -6/22:  HD #2 today.    -Renally dose meds, avoid nephrotoxins, and monitor I/O's closely.        Uncontrolled DM/HTN due to noncompliance:  -hx noted.  -UF will help BP  -no more SGLT2/oral agents.    -GLP/basal/SSI  -defer to Dr. Livingston.        Afib/CHF:  -defer      As above

## 2023-06-22 NOTE — PLAN OF CARE
"Met with patient in dialysis to discuss nephrology's recommendation for pursuing outpatient HD at [facility] on [address] to continue care with same nephrology team - patient continues to voice desire for [facility] stating "[facility] is too dangerous - too many shootings" - patient understands he will be switching nephrologist    Spoke with [facility] [phone] who reports they don't currently have availability at their facility but anticipate an opening in 1-2 weeks - they will get patient set up at their [facility] facility then transfer to [place] as soon as possible - patient agrees to plan - awaiting final approval from medical director   "

## 2023-06-22 NOTE — PLAN OF CARE
B[facility] [phone] reports patient has been accepted to [facility] MWF 12pm with plan to transfer to [place] once chair time available & can begin treatment as early as tomorrow - Sam nephrology NP reports plan to dialyze here tomorrow & initiate outpatient HD Monday 6/26 - patient updated

## 2023-06-23 VITALS
SYSTOLIC BLOOD PRESSURE: 131 MMHG | HEART RATE: 66 BPM | TEMPERATURE: 98 F | HEIGHT: 73 IN | WEIGHT: 260 LBS | BODY MASS INDEX: 34.46 KG/M2 | RESPIRATION RATE: 19 BRPM | DIASTOLIC BLOOD PRESSURE: 63 MMHG | OXYGEN SATURATION: 94 %

## 2023-06-23 LAB
POCT GLUCOSE: 161 MG/DL (ref 70–110)
POCT GLUCOSE: 173 MG/DL (ref 70–110)

## 2023-06-23 PROCEDURE — 80100016 HC MAINTENANCE HEMODIALYSIS

## 2023-06-23 PROCEDURE — 63600175 PHARM REV CODE 636 W HCPCS: Performed by: EMERGENCY MEDICINE

## 2023-06-23 PROCEDURE — 94761 N-INVAS EAR/PLS OXIMETRY MLT: CPT

## 2023-06-23 PROCEDURE — 25000003 PHARM REV CODE 250: Performed by: INTERNAL MEDICINE

## 2023-06-23 PROCEDURE — 25000003 PHARM REV CODE 250: Performed by: EMERGENCY MEDICINE

## 2023-06-23 PROCEDURE — 63600175 PHARM REV CODE 636 W HCPCS: Performed by: NURSE PRACTITIONER

## 2023-06-23 RX ORDER — ACETAMINOPHEN 325 MG/1
650 TABLET ORAL EVERY 4 HOURS PRN
Status: DISCONTINUED | OUTPATIENT
Start: 2023-06-23 | End: 2023-06-24 | Stop reason: HOSPADM

## 2023-06-23 RX ORDER — CYCLOBENZAPRINE HCL 5 MG
5 TABLET ORAL 3 TIMES DAILY PRN
Status: DISCONTINUED | OUTPATIENT
Start: 2023-06-23 | End: 2023-06-24 | Stop reason: HOSPADM

## 2023-06-23 RX ADMIN — ACETAMINOPHEN 650 MG: 325 TABLET, FILM COATED ORAL at 09:06

## 2023-06-23 RX ADMIN — HEPARIN SODIUM 10000 UNITS: 1000 INJECTION, SOLUTION INTRAVENOUS; SUBCUTANEOUS at 06:06

## 2023-06-23 RX ADMIN — ACETAMINOPHEN 650 MG: 325 TABLET, FILM COATED ORAL at 04:06

## 2023-06-23 RX ADMIN — ONDANSETRON 4 MG: 2 INJECTION INTRAMUSCULAR; INTRAVENOUS at 11:06

## 2023-06-23 NOTE — NURSING
Patient alert and oriented upon shift change. On room air, Sinus Ervin on tele. Patient denied any needs or pain. POC passed along in handoff. POC discussed with patient. Patient denied any questions. Report/ handoff given to NEHAL Borja

## 2023-06-23 NOTE — PLAN OF CARE
POC reviewed with patient. AAOx4.  Purposeful rounding completed. VS and assessment per flowsheets. Complaints of throbbing in the right leg, treated with PRN pain medication. PRN pain medication was not effective per patient. Dr. Livingston notified. Received telephone order to administer PRN pain medication. RN notified MD that PRN pain med was already given. No further order was given. No injuries, falls, or trauma occurred during shift. Bed low and locked with side rails up x 3 and call light within reach. Safety maintained throughout shift.       Problem: Adult Inpatient Plan of Care  Goal: Plan of Care Review  Outcome: Ongoing, Progressing  Goal: Patient-Specific Goal (Individualized)  Outcome: Ongoing, Progressing  Goal: Absence of Hospital-Acquired Illness or Injury  Outcome: Ongoing, Progressing  Goal: Optimal Comfort and Wellbeing  Outcome: Ongoing, Progressing  Goal: Readiness for Transition of Care  Outcome: Ongoing, Progressing     Problem: Fall Injury Risk  Goal: Absence of Fall and Fall-Related Injury  Outcome: Ongoing, Progressing     Problem: Infection  Goal: Absence of Infection Signs and Symptoms  Outcome: Ongoing, Progressing

## 2023-06-23 NOTE — PLAN OF CARE
Dr Ortiz reports patient will be treated at [facility] under the care of Dr Torres per Dr Pierce - spoke with [facility] to verify but she reports patient will need to initiate HD in [place] as previously planned until chair time available in [place] - instructions placed on AVS - follow up appt with Dr Livingston on AVS - daughter will provide transportation home after HD today (around 5:30pm) - plan reviewed with patient at bedside - agrees with plan     [facility] - Med Surg ([place])  Discharge Final Note    Primary Care Provider: Mehul Livingston MD    Expected Discharge Date: 6/23/2023    Final Discharge Note (most recent)       Final Note - 06/23/23 1032          Final Note    Assessment Type Final Discharge Note     Anticipated Discharge Disposition Home or Self Care     What phone number can be called within the next 1-3 days to see how you are doing after discharge? [phone]     Hospital Resources/Appts/Education Provided Provided patient/caregiver with written discharge plan information;Dilaysis schedule provided;Appointments scheduled and added to AVS;Appointments scheduled by Navigator/Coordinator        Post-Acute Status    Post-Acute Authorization Dialysis     Diaylsis Status Set-up Complete/Auth obtained     Discharge Delays --   Dialysis                    Important Message from Medicare  Important Message from Medicare regarding Discharge Appeal Rights: Given to patient/caregiver, Explained to patient/caregiver, Signed/date by patient/caregiver     Date IMM was signed: 06/22/23  Time IMM was signed: 1156    Contact Info       Mehul Livingston MD   Specialty: Cardiology, Interventional Cardiology   Relationship: PCP - General    [address]  [place] [address]   Phone: [phone]       Next Steps: Go on 7/6/2023    Instructions: at 11:45am for hospital follow up    [facility] [address] [place] [address]   Phone:  [phone]       Next Steps: Go to    Instructions: outpatient dialysis MWF 12pm (beginning Monday 6/26)    Mehul Livingston MD   Specialty: Cardiology, Interventional Cardiology   Relationship: PCP - General    [address] [place], [place] [address]  [facility] [address]   Phone: [phone]       Next Steps: Follow up in 2 week(s)

## 2023-06-23 NOTE — PLAN OF CARE
Problem: Adult Inpatient Plan of Care  Goal: Plan of Care Review  Outcome: Ongoing, Progressing  Goal: Patient-Specific Goal (Individualized)  Outcome: Ongoing, Progressing  Goal: Absence of Hospital-Acquired Illness or Injury  Outcome: Ongoing, Progressing  Goal: Optimal Comfort and Wellbeing  Outcome: Ongoing, Progressing  Goal: Readiness for Transition of Care  Outcome: Ongoing, Progressing     Problem: Diabetes Comorbidity  Goal: Blood Glucose Level Within Targeted Range  Outcome: Ongoing, Progressing     Problem: Impaired Wound Healing  Goal: Optimal Wound Healing  Outcome: Ongoing, Progressing     Problem: Fall Injury Risk  Goal: Absence of Fall and Fall-Related Injury  Outcome: Ongoing, Progressing     Problem: Device-Related Complication Risk (Hemodialysis)  Goal: Safe, Effective Therapy Delivery  Outcome: Ongoing, Progressing     Problem: Hemodynamic Instability (Hemodialysis)  Goal: Effective Tissue Perfusion  Outcome: Ongoing, Progressing     Problem: Infection (Hemodialysis)  Goal: Absence of Infection Signs and Symptoms  Outcome: Ongoing, Progressing     Problem: Infection  Goal: Absence of Infection Signs and Symptoms  Outcome: Ongoing, Progressing

## 2023-06-23 NOTE — DISCHARGE SUMMARY
[facility]  Cardiology  Discharge Summary      Patient Name: Zev Castano    Admission Date: 6/18/2023    Discharge Date and Time: 6/23/23      Final Diagnoses: Nephrotic syndrome, chronic renal failure   Principal Problem: <principal problem not specified>    HPI:  Mr. Castano is a 73-year-old gentleman that presented with worsening edema of each lower leg after an [place] cruise.  He carries a diagnosis of diabetic nephropathy with nephrotic syndrome, chronic renal failure, hypertension, stable coronary artery disease, mild aortic stenosis, history of having had 2 previous strokes.  His GFR was estimated to be 13, he was seen in consultation by [facility], and after receiving q.12 hours IV furosemide, a Brice catheter was placed and he underwent daily dialysis for 3 days before outpatient dialysis in [place].    Impression on Admission:  Nephrotic syndrome, chronic renal failure      Disposition:  Discharged home    Follow up/Patient Instructions:    Medications:     Medication List        ASK your doctor about these medications      allopurinoL 100 MG tablet  Commonly known as: ZYLOPRIM  Take 1 tablet (100 mg total) by mouth once daily.     amiodarone 200 MG Tab  Commonly known as: PACERONE  Take 1 tablet (200 mg total) by mouth once daily.     amLODIPine 5 MG tablet  Commonly known as: NORVASC     aspirin 81 MG EC tablet  Commonly known as: ECOTRIN  Take 1 tablet (81 mg total) by mouth once daily.     atorvastatin 40 MG tablet  Commonly known as: LIPITOR  TAKE 1 TABLET BY MOUTH EVERY DAY     calcitRIOL 0.25 MCG Cap  Commonly known as: ROCALTROL  Take 1 capsule (0.25 mcg total) by mouth every Mon, Wed, Fri.     clindamycin 150 MG capsule  Commonly known as: CLEOCIN  Take 3 capsules (450 mg total) by mouth every 8 (eight) hours. for 7 days  Ask about: Should I take this medication?     docusate sodium 100 MG capsule  Commonly known as: COLACE  Take 1 capsule (100 mg total) by mouth 2  (two) times daily as needed for Constipation.     ELIQUIS 2.5 mg Tab  Generic drug: apixaban  Take 1 tablet (2.5 mg total) by mouth 2 (two) times daily.     famotidine 20 MG tablet  Commonly known as: PEPCID  Take 1 tablet (20 mg total) by mouth once daily.     furosemide 20 MG tablet  Commonly known as: LASIX     hydrALAZINE 100 MG tablet  Commonly known as: APRESOLINE  Take 1 tablet (100 mg total) by mouth every 12 (twelve) hours.     isosorbide mononitrate 60 MG 24 hr tablet  Commonly known as: IMDUR  Take 1 tablet (60 mg total) by mouth once daily.     JANUVIA 50 MG Tab  Generic drug: SITagliptin phosphate  Take 1 tablet (50 mg total) by mouth once daily.     mupirocin 2 % ointment  Commonly known as: BACTROBAN  Apply topically 2 (two) times daily.     nitroGLYCERIN 0.4 MG SL tablet  Commonly known as: NITROSTAT  Place 1 tablet (0.4 mg total) under the tongue every 5 (five) minutes as needed for Chest pain. Go to the ED for eval if you continue to have chest pain after 3 doses     oxyCODONE-acetaminophen  mg per tablet  Commonly known as: PERCOCET  Take 1 tablet by mouth every 4 (four) hours as needed for Pain.     tamsulosin 0.4 mg Cap  Commonly known as: FLOMAX     traMADoL 50 mg tablet  Commonly known as: ULTRAM  Take 1 tablet (50 mg total) by mouth every 12 (twelve) hours as needed for Pain.              No discharge procedures on file.   Follow-up Information       Mehul Livingston MD. Go on 7/6/2023.    Specialties: Cardiology, Interventional Cardiology  Why: at 11:45am for hospital follow up  Contact information:  [address]  [facility] [address]  [phone]               [facility]. Go to.    Why: outpatient dialysis MWF 12pm (beginning Monday 6/26)  Contact information:  [address]  [facility] [address] [phone]                           Condition upon Discharge:  Improved          Mehul Livingston MD

## 2023-06-23 NOTE — PROGRESS NOTES
"Nephrology  Progress Note    Admit Date: 6/18/2023   LOS: 4 days     SUBJECTIVE:     Follow-up For:  Volume overload    History of Present Illness:  Patient is a 73 y.o. male presents with worsening LE edema over past few weeks.  Pt recently was on [employer] cruise and ate lots of seafood, salty meals, drinks, etc.  Went to [place] last week with same symptoms and underwent US which was negative for DVT.  Increased lasix but still had worsening swelling.  Presented here and admitted to hospital for evaluation.  Labs noted with GFR 13.  Pt has seen multiple nephrologist in [place] (, [facility], [facility]).  Currently under Dr. Pierce at [facility].  Seen 5/8 with plans for PD when needed and is undergoing kidney classes with [facility].  Consulted for evaluation since Dr. Pierce doesn't come to this facility.  Discussed with Dr. Livingston about above.  Will offer HD while here and defer PD to Dr. Pierce.       Epic reviewed.  Pt seen and examined.  Mild SOB, worsening leg edema, but no CP/N/V/D/F/C.        Interval History:     Extensive discussion with pt/multiple team members.  Pt now wants to go to [place] for HD.  HD #3 today.  C/o mild leg cramps.  No CP/SOB.       Review of Systems:  Constitutional: No fever or chills  Respiratory:  shortness of breath improving  Cardiovascular: No chest pain or palpitations  Gastrointestinal: No nausea or vomiting  Neurological: No confusion or weakness    OBJECTIVE:     Vital Signs Range (Last 24H):  /62   Pulse (!) 52   Temp 97.7 °F (36.5 °C)   Resp 18   Ht 6' 1" (1.854 m)   Wt 117.9 kg (260 lb)   SpO2 100%   BMI 34.30 kg/m²     Temp:  [97.2 °F (36.2 °C)-98.5 °F (36.9 °C)]   Pulse:  [52-80]   Resp:  [17-20]   BP: (105-172)/(54-87)   SpO2:  [94 %-100 %]     I & O (Last 24H):  Intake/Output Summary (Last 24 hours) at 6/23/2023 0827  Last data filed at 6/23/2023 0657  Gross per 24 hour   Intake 976 ml   Output 2825 ml   Net -1849 ml         Physical Exam:  General " appearance: Well developed, well nourished  Eyes:  Conjunctivae/corneas clear. PERRL.  Lungs: Normal respiratory effort,   clear to auscultation bilaterally   Heart: Regular/Irr rate and rhythm, S1, S2 normal, no murmur, rub or pino.  Abdomen: Soft, non-tender non-distended; bowel sounds normal; no masses,  no organomegaly  Extremities: No cyanosis or clubbing. trace+ edema.    Skin: Skin color, texture, turgor normal. No rashes or lesions  Neurologic: Normal strength and tone. No focal numbness or weakness   Right IJ LEW.  Events noted.      Laboratory Data:  No results for input(s): WBC, RBC, HGB, HCT, PLT, MCV, MCH, MCHC in the last 24 hours.    BMP:   Recent Labs   Lab 06/18/23  2214 06/20/23  0454   * 135*    138   K 4.4 4.2    99   CO2 22* 24   BUN 86* 82*   CREATININE 4.4* 4.1*   CALCIUM 9.3 9.1   MG 2.6  --        Lab Results   Component Value Date    CALCIUM 9.1 06/20/2023    PHOS 6.4 (H) 06/18/2023       Lab Results   Component Value Date    .6 (H) 08/16/2022    CALCIUM 9.1 06/20/2023    PHOS 6.4 (H) 06/18/2023       Lab Results   Component Value Date    URICACID 6.5 08/16/2022       BNP  Recent Labs   Lab 06/18/23  2214   BNP 85         Medications:  Medication list was reviewed and changes noted under Assessment/Plan.    Diagnostic Results:        ASSESSMENT/PLAN:       CKD V and needing HD due to biopsy proven diabetic nephropathy and hypertensive nephrosclerosis.  Now ESRD on HD:  -trends noted over last few years and has been seen by multiple nephrologists with recommendations to start hemodialysis.  Patient was very hesitant and wanted different opinions.  Now seeing Dr. Pierce at [facility] and in process of PD classes.    -with degree of volume overload pt needs HD for few weeks for volume removal.  Holding eliquis for now.    -consulted [facility] for LEW cath Wednesday.  Will undergo few days of HD/UF here.  Arrange outpt HD with Dr. Pierce and let him decide on CAPD  options.    -hep panel.  Consult SW.  Patient now requesting placement for outpatient hemodialysis in the [place] area.  -see orders.  -6/20:  diuresing well with large lasix.  NPO for LEW tomorrow.  SW working on outpt HD plans in [place] with Dr. Pierce.  -6/21:  LEW today followed by HD.  MARVIN working on outpt chair.  Dr. Pierce wants him at [facility] on [address]  -6/22:  HD #2 today.    -6/23:  HD #3 today.  Extensive discussion with pt/team members.  Pt now wants [place] until Dr. Pierce elects to attempt PD at a later date.    -Renally dose meds, avoid nephrotoxins, and monitor I/O's closely.        Uncontrolled DM/HTN due to noncompliance:  -hx noted.  -UF will help BP  -no more SGLT2/oral agents.    -GLP/basal/SSI  -defer to Dr. Livingston.        Afib/CHF:  -defer  -resume eliquis.       As above

## 2023-06-24 NOTE — NURSING
Shift Summary:  Patient alert and oriented upon shift change. On room air, Sinus Ervin on tele. Patient denied any needs or pain. POC passed along in handoff. POC discussed with patient. Plan for discharge after last round of Dialysis per Dr. Livingston's order. Patient denied any questions. Report/ handoff given to NEHAL Borja

## 2023-06-24 NOTE — PROGRESS NOTES
06/23/23 1900   Post-Hemodialysis Assessment   Rinseback Volume (mL) 250 mL   Blood Volume Processed (Liters) 70.9 L   Dialyzer Clearance Lightly streaked   Additional Fluid Intake (mL) 500 mL   Total UF (mL) 2500 mL   Net Fluid Removal 2000   Patient Response to Treatment tx completed successfully, tolerated well,   Post-Hemodialysis Comments Blood returned per pp, lines primed with heparin to  fill lines, capped and wrapped, report given to ZORA Manuel RN

## 2023-06-24 NOTE — PROGRESS NOTES
06/23/23 1900   Post-Hemodialysis Assessment   Rinseback Volume (mL) 250 mL   Blood Volume Processed (Liters) 70.9 L   Dialyzer Clearance Lightly streaked   Additional Fluid Intake (mL) 500 mL   Total UF (mL) 2500 mL   Net Fluid Removal 2000   Patient Response to Treatment tx completed successfully, tolerated well,   Post-Hemodialysis Comments Blood returned per pp, lines primed with fill lines, capped and wrapped, report given to ZORA Manuel RN

## 2023-06-29 NOTE — PHYSICIAN QUERY
PT Name: Zev Castano  MR #: [id number]     DOCUMENTATION CLARIFICATION     CDS/: Reema Kaplan RN CDIS              Contact information: [email or web address]    This form is a permanent document in the medical record.     Query Date: June 29, 2023    By submitting this query, we are merely seeking further clarification of documentation.  Please utilize your independent clinical judgment when addressing the question(s) below.    The Medical Record contains the following   Indicators Supporting Clinical Findings Location in Medical Record   x Heart Failure documented Afib/CHF Nephrology note 6/23   x BNP  Latest Reference Range & Units 06/18/23 22:14   BNP 0 - 99 pg/mL 85    Labs    x EF/Echo Mild left atrial enlargement.  The estimated ejection fraction is 55%.  Indeterminate left ventricular diastolic function.  Normal systolic function.  Normal right ventricular size with normal right ventricular systolic function.  There is mild aortic valve stenosis.  Aortic valve area is 2.56 cm2; peak velocity is 2.11 m/s; mean gradient is 9 mmHg.  Mitral subvalvular calcification ECHO 6/19/2023   x Radiology findings Impression:   Persistent elevation of the left hemidiaphragm by apparent bowel distended with gas.  Correlate for abdominal pain and need for flat erect abdomen views.   Hypoventilatory changes with no acute pulmonary infiltrates since the prior exam in January. CXR 6/18     Subjective/Objective Respiratory Conditions      Recent/Current MI      Heart Transplant, LVAD     x Edema, JVD There is 3+ edema of the lower legs. H&P     Ascites     x Diuretics/Meds furosemide injection 60 mg  Dose: 60 mg  Freq: ED 1 Time Route: IV  Start: 06/18/23 2345 End: 06/18/23 2345    furosemide injection 80 mg  Dose: 80 mg  Freq: Every 12 hours (non-standard times) Route: IV  Start: 06/19/23 2115 End: 06/22/23 0718    furosemide tablet 80 mg  Dose: 80 mg  Freq: Daily Route: Oral  Start: 06/22/23 0900 End: 06/24/23 0133 MAR            MAR           MAR     Other Treatment      Other       Heart failure is a clinical diagnosis which includes symptomatic fluid retention, elevated intracardiac pressures, and/or the inability of the heart to deliver adequate blood flow.    Heart Failure with reduced Ejection Fraction (HFrEF) or Systolic Heart Failure (loses ability to contract normally, EF is <40%)    Heart Failure with preserved Ejection Fraction (HFpEF) or Diastolic Heart Failure (stiff ventricles, does not relax properly, EF is >50%)     Heart Failure with Combined Systolic and Diastolic Failure (stiff ventricles, does not relax properly and EF is <50%)    Mid-range or mildly reduced ejection fraction (HFmrEF) is classified as systolic heart failure.  Congestive heart failure with a recovered EF is classified as Diastolic Heart Failure.  Common clues to acute exacerbation:  Rapidly progressive symptoms (w/in 2 weeks of presentation), using IV diuretics, using supplemental O2, pulmonary edema on Xray, new or worsening pleural effusion, +JVD or other signs of volume overload, MI w/in 4 weeks, and/or BNP >500  The clinical guidelines noted are only system guidelines, and do not replace the providers clinical judgment.    Provider, please specify the diagnosis associated with the above clinical findings.    [   ]  Acute Diastolic Heart Failure (HFpEF) - new diagnosis   [   ]  Acute on Chronic Diastolic Heart Failure (HFpEF) - worsening of CHF signs/symptoms in preexisting CHF   [   x]  Heart Failure Ruled Out   [   ]  Other (please specify): ___________________________________       Please document in your progress notes daily for the duration of treatment until resolved and include in your discharge summary.    References:  American Heart Association editorial staff. (2017, May). Ejection Fraction Heart Failure Measurement. American Heart Association.  https://www.heart.org/en/health-topics/heart-failure/diagnosing-heart-failure/ejection-fraction-heart-failure-measurement#:~:text=Ejection%20fraction%20(EF)%20is%20a,pushed%20out%20with%20each%20heartbeat  [facility] [place] (2020, December 15). Heart failure with preserved ejection fraction: Clinical manifestations and diagnosis. [employer]. https://www.[employer].Raptr/contents/heart-failure-with-preserved-ejection-fraction-clinical-manifestations-and-diagnosis.  ICD-10-CM/PCS Coding Clinic Third Quarter ICD-10, Effective with discharges: September 8, 2020 [facility] § Heart failure with mid-range or mildly reduced ejection fraction (2020).  ICD-10-CM/PCS Coding Clinic Third Quarter ICD-10, Effective with discharges: September 8, 2020 [facility] § Heart failure with recovered ejection fraction (2020).  Form No. [id number]

## 2023-09-29 DIAGNOSIS — N18.6 ESRD (END STAGE RENAL DISEASE) ON DIALYSIS: Primary | ICD-10-CM

## 2023-09-29 DIAGNOSIS — Z99.2 ESRD (END STAGE RENAL DISEASE) ON DIALYSIS: Primary | ICD-10-CM

## 2023-10-16 ENCOUNTER — HOSPITAL ENCOUNTER (OUTPATIENT)
Dept: VASCULAR SURGERY | Facility: CLINIC | Age: 73
Discharge: HOME | End: 2023-10-16
Attending: SURGERY
Payer: MEDICARE

## 2023-10-16 ENCOUNTER — INITIAL CONSULT (OUTPATIENT)
Dept: VASCULAR SURGERY | Facility: CLINIC | Age: 73
End: 2023-10-16
Attending: SURGERY
Payer: MEDICARE

## 2023-10-16 VITALS
SYSTOLIC BLOOD PRESSURE: 152 MMHG | DIASTOLIC BLOOD PRESSURE: 69 MMHG | WEIGHT: 251.31 LBS | BODY MASS INDEX: 33.31 KG/M2 | HEIGHT: 73 IN | TEMPERATURE: 98 F | HEART RATE: 53 BPM

## 2023-10-16 DIAGNOSIS — Z99.2 ESRD (END STAGE RENAL DISEASE) ON DIALYSIS: Primary | ICD-10-CM

## 2023-10-16 DIAGNOSIS — N18.6 ESRD (END STAGE RENAL DISEASE) ON DIALYSIS: Primary | ICD-10-CM

## 2023-10-16 DIAGNOSIS — Z01.818 PRE-OP EVALUATION: ICD-10-CM

## 2023-10-16 DIAGNOSIS — N18.6 ESRD ON HEMODIALYSIS: Primary | ICD-10-CM

## 2023-10-16 DIAGNOSIS — Z99.2 ESRD (END STAGE RENAL DISEASE) ON DIALYSIS: ICD-10-CM

## 2023-10-16 DIAGNOSIS — Z99.2 ESRD ON HEMODIALYSIS: Primary | ICD-10-CM

## 2023-10-16 DIAGNOSIS — Z01.818 PRE-OP EXAM: Primary | ICD-10-CM

## 2023-10-16 DIAGNOSIS — N18.6 ESRD (END STAGE RENAL DISEASE) ON DIALYSIS: ICD-10-CM

## 2023-10-16 PROCEDURE — 99203 PR OFFICE/OUTPT VISIT, NEW, LEVL III, 30-44 MIN: ICD-10-PCS | Mod: S$PBB,,, | Performed by: SURGERY

## 2023-10-16 PROCEDURE — 93970 EXTREMITY STUDY: CPT | Mod: 26,S$PBB,, | Performed by: SURGERY

## 2023-10-16 PROCEDURE — 99999 PR PBB SHADOW E&M-EST. PATIENT-LVL III: CPT | Mod: PBBFAC,,, | Performed by: SURGERY

## 2023-10-16 PROCEDURE — 99213 OFFICE O/P EST LOW 20 MIN: CPT | Mod: PBBFAC,25 | Performed by: SURGERY

## 2023-10-16 PROCEDURE — 99203 OFFICE O/P NEW LOW 30 MIN: CPT | Mod: S$PBB,,, | Performed by: SURGERY

## 2023-10-16 PROCEDURE — 93970 PR US DUPLEX, UPPER OR LOWER EXT VENOUS,COMPLETE BILAT: ICD-10-PCS | Mod: 26,S$PBB,, | Performed by: SURGERY

## 2023-10-16 PROCEDURE — 93970 EXTREMITY STUDY: CPT | Mod: PBBFAC | Performed by: SURGERY

## 2023-10-16 PROCEDURE — 99999 PR PBB SHADOW E&M-EST. PATIENT-LVL III: ICD-10-PCS | Mod: PBBFAC,,, | Performed by: SURGERY

## 2023-10-16 RX ORDER — FUROSEMIDE 40 MG/1
40 TABLET ORAL 2 TIMES DAILY
COMMUNITY
Start: 2023-10-05

## 2023-10-16 RX ORDER — EMPAGLIFLOZIN 10 MG/1
10 TABLET, FILM COATED ORAL
Status: ON HOLD | COMMUNITY
Start: 2023-05-25 | End: 2023-11-06

## 2023-10-16 NOTE — H&P (VIEW-ONLY)
VASCULAR SURGERY CLINIC NOTE    Patient ID: Zev Castano is a 73 y.o. male.    I. HISTORY     Chief Complaint: AV ACCESS    HPI: This is a 73 y.o. male who is here today for a new patient initial appointment. Here to discuss permanent AV access creation. right hand dominant. Currently CKD Stage 5. Pertinent medical history includes Diabetes, HTN, HLD, A-fib on anticoagulation, three strokes (2012). Has not had permanent AV access in the past. Takes eliquis for anticoagulation. Started dialysis 4 months ago, goes T,Th,Sat. Has a tunneled HD catheter on the right side. No prior surgeries. Smoked a cigar once a month for five years prior to 2012.    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     Hyperlipidemia     Hypertension     Nephrotic range proteinuria     Paroxysmal atrial fibrillation     Stroke     nov 2012        Past Surgical History:   Procedure Laterality Date    BIOPSY N/A 5/29/2018    Procedure: BIOPSY;  Surgeon: Neeta Diagnostic Provider;  Location: [facility] CATH LAB;  Service: Radiology;  Laterality: N/A;  ct guided      CARDIOVERSION N/A 12/3/2020    Procedure: CARDIOVERSION;  Surgeon: Mehul Livingston MD;  Location: [facility] CATH LAB;  Service: Cardiology;  Laterality: N/A;    INSERTION OF TUNNELED CENTRAL VENOUS HEMODIALYSIS CATHETER N/A 6/21/2023    Procedure: Insertion, Catheter, Central Venous, Hemodialysis;  Surgeon: Angelito Ashley MD;  Location: [facility] CATH LAB;  Service: Nephrology;  Laterality: N/A;    KNEE SURGERY Left     SHOULDER SURGERY Right        Social History     Occupational History    Not on file   Tobacco Use    Smoking status: Former     Types: Cigars    Smokeless tobacco: Never    Tobacco comments:     occasional, when [place]   Substance and Sexual Activity    Alcohol use: Yes      Alcohol/week: 1.0 standard drink of alcohol     Types: 1 Cans of beer per week     Comment: occasional when at the casino    Drug use: No    Sexual activity: Yes     Partners: Female     Birth control/protection: None       Review of Systems   Constitutional: Negative for weight loss.   HENT:  Negative for ear pain and nosebleeds.    Eyes:  Negative for discharge and pain.   Cardiovascular:  Negative for chest pain and palpitations.   Respiratory:  Negative for cough, shortness of breath and wheezing.    Endocrine: Negative for cold intolerance, heat intolerance and polyphagia.   Hematologic/Lymphatic: Negative for adenopathy. Does not bruise/bleed easily.   Skin:  Negative for itching and rash.   Musculoskeletal:  Negative for joint swelling and muscle cramps.   Gastrointestinal:  Negative for abdominal pain, diarrhea, nausea and vomiting.   Genitourinary:  Negative for dysuria and flank pain.   Neurological:  Negative for numbness and seizures.       Current Medications Reviewed    II. PHYSICAL EXAM     Physical Exam  Constitutional:       General: He is not in acute distress.     Appearance: Normal appearance. He is normal weight. He is not ill-appearing or diaphoretic.   HENT:      Head: Normocephalic and atraumatic.   Eyes:      General: No scleral icterus.        Right eye: No discharge.         Left eye: No discharge.      Extraocular Movements: Extraocular movements intact.      Conjunctiva/sclera: Conjunctivae normal.   Cardiovascular:      Rate and Rhythm: Normal rate and regular rhythm.      Comments: Radial and Ulnar 2+ bilaterally  Jose test negative on the left.  Pulmonary:      Effort: Pulmonary effort is normal. No respiratory distress.   Musculoskeletal:         General: Normal range of motion.      Cervical back: Normal range of motion and neck supple.      Right lower leg: No edema.      Left lower leg: No edema.   Skin:     General: Skin is warm and dry.      Coloration: Skin is not jaundiced or  pale.      Findings: No erythema or rash.   Neurological:      General: No focal deficit present.      Mental Status: He is alert and oriented to person, place, and time.   Psychiatric:         Mood and Affect: Mood normal.         Behavior: Behavior normal.         III. ASSESSMENT & PLAN (MEDICAL DECISION MAKING)     1. ESRD on hemodialysis        Imaging Results: (I have personally reviewed the images/studies and provided my interpretation below)  Vein Mapping 10/16/2023:  LUE:  Basilic: >3mm throughout   Cephalic: >3mm throughout  RUE: Basilic: >3mm   Cephalic: >3mm       Assessment/Diagnosis and Plan:    73 y.o. male here for permanent AV access creation. Vein mapping shows adequate vein for permanent autogenous access. We discussed risks (steal syndrome, wound infection, nerve injury, failure to mature, death), benefits (permanent AV access, avoidance of catheter and associated infection), and alternatives (peritoneal dialysis, tunneled HD catheter) to the procedure and the patient expressed full understanding. The patient had no questions about the procedure at the end of our visit.     Will plan for left upper extremity radiocephalic AVF under local/MAC      G. Robbie Duran II, MD, [facility]  Vascular Surgery  [facility]

## 2023-10-16 NOTE — PROGRESS NOTES
VASCULAR SURGERY CLINIC NOTE    Patient ID: Zev Castano is a 73 y.o. male.    I. HISTORY     Chief Complaint: AV ACCESS    HPI: This is a 73 y.o. male who is here today for a new patient initial appointment. Here to discuss permanent AV access creation. right hand dominant. Currently CKD Stage 5. Pertinent medical history includes Diabetes, HTN, HLD, A-fib on anticoagulation, three strokes (2012). Has not had permanent AV access in the past. Takes eliquis for anticoagulation. Started dialysis 4 months ago, goes T,Th,Sat. Has a tunneled HD catheter on the right side. No prior surgeries. Smoked a cigar once a month for five years prior to 2012.    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     Hyperlipidemia     Hypertension     Nephrotic range proteinuria     Paroxysmal atrial fibrillation     Stroke     nov 2012        Past Surgical History:   Procedure Laterality Date    BIOPSY N/A 5/29/2018    Procedure: BIOPSY;  Surgeon: Neeta Diagnostic Provider;  Location: [facility] CATH LAB;  Service: Radiology;  Laterality: N/A;  ct guided      CARDIOVERSION N/A 12/3/2020    Procedure: CARDIOVERSION;  Surgeon: Mehul Livingston MD;  Location: [facility] CATH LAB;  Service: Cardiology;  Laterality: N/A;    INSERTION OF TUNNELED CENTRAL VENOUS HEMODIALYSIS CATHETER N/A 6/21/2023    Procedure: Insertion, Catheter, Central Venous, Hemodialysis;  Surgeon: Angelito Ashley MD;  Location: [facility] CATH LAB;  Service: Nephrology;  Laterality: N/A;    KNEE SURGERY Left     SHOULDER SURGERY Right        Social History     Occupational History    Not on file   Tobacco Use    Smoking status: Former     Types: Cigars    Smokeless tobacco: Never    Tobacco comments:     occasional, when at the [place]   Substance and Sexual Activity    Alcohol use: Yes      Alcohol/week: 1.0 standard drink of alcohol     Types: 1 Cans of beer per week     Comment: occasional when at the casino    Drug use: No    Sexual activity: Yes     Partners: Female     Birth control/protection: None       Review of Systems   Constitutional: Negative for weight loss.   HENT:  Negative for ear pain and nosebleeds.    Eyes:  Negative for discharge and pain.   Cardiovascular:  Negative for chest pain and palpitations.   Respiratory:  Negative for cough, shortness of breath and wheezing.    Endocrine: Negative for cold intolerance, heat intolerance and polyphagia.   Hematologic/Lymphatic: Negative for adenopathy. Does not bruise/bleed easily.   Skin:  Negative for itching and rash.   Musculoskeletal:  Negative for joint swelling and muscle cramps.   Gastrointestinal:  Negative for abdominal pain, diarrhea, nausea and vomiting.   Genitourinary:  Negative for dysuria and flank pain.   Neurological:  Negative for numbness and seizures.       Current Medications Reviewed    II. PHYSICAL EXAM     Physical Exam  Constitutional:       General: He is not in acute distress.     Appearance: Normal appearance. He is normal weight. He is not ill-appearing or diaphoretic.   HENT:      Head: Normocephalic and atraumatic.   Eyes:      General: No scleral icterus.        Right eye: No discharge.         Left eye: No discharge.      Extraocular Movements: Extraocular movements intact.      Conjunctiva/sclera: Conjunctivae normal.   Cardiovascular:      Rate and Rhythm: Normal rate and regular rhythm.      Comments: Radial and Ulnar 2+ bilaterally  Jose test negative on the left.  Pulmonary:      Effort: Pulmonary effort is normal. No respiratory distress.   Musculoskeletal:         General: Normal range of motion.      Cervical back: Normal range of motion and neck supple.      Right lower leg: No edema.      Left lower leg: No edema.   Skin:     General: Skin is warm and dry.      Coloration: Skin is not jaundiced or  pale.      Findings: No erythema or rash.   Neurological:      General: No focal deficit present.      Mental Status: He is alert and oriented to person, place, and time.   Psychiatric:         Mood and Affect: Mood normal.         Behavior: Behavior normal.         III. ASSESSMENT & PLAN (MEDICAL DECISION MAKING)     1. ESRD on hemodialysis        Imaging Results: (I have personally reviewed the images/studies and provided my interpretation below)  Vein Mapping 10/16/2023:  LUE:  Basilic: >3mm throughout   Cephalic: >3mm throughout  RUE: Basilic: >3mm   Cephalic: >3mm       Assessment/Diagnosis and Plan:    73 y.o. male here for permanent AV access creation. Vein mapping shows adequate vein for permanent autogenous access. We discussed risks (steal syndrome, wound infection, nerve injury, failure to mature, death), benefits (permanent AV access, avoidance of catheter and associated infection), and alternatives (peritoneal dialysis, tunneled HD catheter) to the procedure and the patient expressed full understanding. The patient had no questions about the procedure at the end of our visit.     Will plan for left upper extremity radiocephalic AVF under local/MAC      G. Robbie Duran II, MD, [facility]  Vascular Surgery  [facility]

## 2023-10-17 RX ORDER — SODIUM CHLORIDE 9 MG/ML
INJECTION, SOLUTION INTRAVENOUS CONTINUOUS
Status: CANCELLED | OUTPATIENT
Start: 2023-10-17

## 2023-10-17 RX ORDER — ONDANSETRON 2 MG/ML
4 INJECTION INTRAMUSCULAR; INTRAVENOUS EVERY 12 HOURS PRN
Status: CANCELLED | OUTPATIENT
Start: 2023-10-17

## 2023-10-17 RX ORDER — CEFAZOLIN SODIUM 2 G/50ML
2 SOLUTION INTRAVENOUS
Status: CANCELLED | OUTPATIENT
Start: 2023-10-17

## 2023-11-03 ENCOUNTER — TELEPHONE (OUTPATIENT)
Dept: VASCULAR SURGERY | Facility: CLINIC | Age: 73
End: 2023-11-03
Payer: MEDICARE

## 2023-11-03 NOTE — TELEPHONE ENCOUNTER
Spoke with Mrs Castano, time of arrival 0500am 2nd floor [facility] for Mr Castano surgery on 11/6/2023 confirmed.

## 2023-11-03 NOTE — PRE-PROCEDURE INSTRUCTIONS
PreOp Instructions given:   - Verbal medication information (what to hold and what to take)   - NPO guidelines 6972-9676 you may take your AM medications w/sips of water 1° before your arrival to [facility]  - Arrival place directions given; time to be given the day before procedure by the   Surgeon's Office [facility]  - Bathing with antibacterial soap   - Don't wear any jewelry or bring any valuables AM of surgery   - No makeup or moisturizer to face   - No perfume/cologne, powder, lotions or aftershave   Pt. verbalized understanding.   Pt denies any h/o Anesthesia/Sedation complications or side effects.  Patient does not know arrival time.  Explained that this information comes from the surgeon's office and if they haven't heard from them by 2 or 3 pm to call the office.  Patient stated an understanding.

## 2023-11-04 ENCOUNTER — ANESTHESIA EVENT (OUTPATIENT)
Dept: SURGERY | Facility: HOSPITAL | Age: 73
End: 2023-11-04
Payer: MEDICARE

## 2023-11-05 NOTE — ANESTHESIA PREPROCEDURE EVALUATION
11/05/2023  Zev Castano is a 73 y.o., male.      Pre-op Assessment    I have reviewed the Patient Summary Reports.       I have reviewed the Medications.     Review of Systems  Anesthesia Hx:  No problems with previous Anesthesia               Denies Personal Hx of Anesthesia complications.                    Cardiovascular:     Hypertension  Past MI CAD                     Shortness of Breath    Coronary Artery Disease:          Hx of Myocardial Infarction                  Hypertension     Atrial Fibrillation     Pulmonary:      Shortness of breath  Sleep Apnea     Obstructive Sleep Apnea (RENU).           Renal/:  Chronic Renal Disease        Kidney Function/Disease             Musculoskeletal:  Arthritis        Arthritis          Neurological:   CVA Neuromuscular Disease,           Arthritis              CVA - Cerebrovasular Accident               Neuromuscular Disease   Endocrine:  Diabetes    Diabetes                          Physical Exam    Airway:  No airway management difficulties anticipated  Dental:  No active dental issues noted  Chest/Lungs:  Clear to auscultation    Heart:  Rate: Normal  Rhythm: Regular Rhythm  Sounds: Normal        Anesthesia Plan  Type of Anesthesia, risks & benefits discussed:    Anesthesia Type: Regional  Informed Consent: Informed consent signed with the Patient and all parties understand the risks and agree with anesthesia plan.  All questions answered.   ASA Score: 4  Anesthesia Plan Notes: Chart reviewed. Patient seen and examined. Anesthesia plan discussed and questions answered. E-consent signed. Javi Miller MD    Ready For Surgery From Anesthesia Perspective.     .

## 2023-11-06 ENCOUNTER — DOCUMENTATION ONLY (OUTPATIENT)
Dept: CARDIOLOGY | Facility: CLINIC | Age: 73
End: 2023-11-06
Payer: MEDICARE

## 2023-11-06 ENCOUNTER — HOSPITAL ENCOUNTER (OUTPATIENT)
Facility: HOSPITAL | Age: 73
Discharge: HOME | End: 2023-11-06
Attending: SURGERY | Admitting: SURGERY
Payer: MEDICARE

## 2023-11-06 ENCOUNTER — ANESTHESIA (OUTPATIENT)
Dept: SURGERY | Facility: HOSPITAL | Age: 73
End: 2023-11-06
Payer: MEDICARE

## 2023-11-06 VITALS
WEIGHT: 280 LBS | SYSTOLIC BLOOD PRESSURE: 131 MMHG | TEMPERATURE: 98 F | HEIGHT: 73 IN | HEART RATE: 57 BPM | OXYGEN SATURATION: 97 % | DIASTOLIC BLOOD PRESSURE: 60 MMHG | RESPIRATION RATE: 18 BRPM | BODY MASS INDEX: 37.11 KG/M2

## 2023-11-06 DIAGNOSIS — N18.9 CKD (CHRONIC KIDNEY DISEASE): Primary | ICD-10-CM

## 2023-11-06 LAB — POCT GLUCOSE: 121 MG/DL (ref 70–110)

## 2023-11-06 PROCEDURE — 25000003 PHARM REV CODE 250: Performed by: NURSE ANESTHETIST, CERTIFIED REGISTERED

## 2023-11-06 PROCEDURE — D9220A PRA ANESTHESIA: Mod: ANES,,, | Performed by: ANESTHESIOLOGY

## 2023-11-06 PROCEDURE — 71000045 HC DOSC ROUTINE RECOVERY EA ADD'L HR: Performed by: SURGERY

## 2023-11-06 PROCEDURE — 71000044 HC DOSC ROUTINE RECOVERY FIRST HOUR: Performed by: SURGERY

## 2023-11-06 PROCEDURE — 37000008 HC ANESTHESIA 1ST 15 MINUTES: Performed by: SURGERY

## 2023-11-06 PROCEDURE — 36821 PR ANASTOMOSIS,AV,ANY SITE: ICD-10-PCS | Mod: LT,GC,, | Performed by: SURGERY

## 2023-11-06 PROCEDURE — 25000003 PHARM REV CODE 250

## 2023-11-06 PROCEDURE — 71000015 HC POSTOP RECOV 1ST HR: Performed by: SURGERY

## 2023-11-06 PROCEDURE — 25000003 PHARM REV CODE 250: Performed by: SURGERY

## 2023-11-06 PROCEDURE — 63600175 PHARM REV CODE 636 W HCPCS

## 2023-11-06 PROCEDURE — D9220A PRA ANESTHESIA: Mod: CRNA,,, | Performed by: NURSE ANESTHETIST, CERTIFIED REGISTERED

## 2023-11-06 PROCEDURE — 36821 AV FUSION DIRECT ANY SITE: CPT | Mod: LT,GC,, | Performed by: SURGERY

## 2023-11-06 PROCEDURE — D9220A PRA ANESTHESIA: ICD-10-PCS | Mod: ANES,,, | Performed by: ANESTHESIOLOGY

## 2023-11-06 PROCEDURE — 63600175 PHARM REV CODE 636 W HCPCS: Performed by: SURGERY

## 2023-11-06 PROCEDURE — 36000707: Performed by: SURGERY

## 2023-11-06 PROCEDURE — 36000706: Performed by: SURGERY

## 2023-11-06 PROCEDURE — 63600175 PHARM REV CODE 636 W HCPCS: Performed by: NURSE ANESTHETIST, CERTIFIED REGISTERED

## 2023-11-06 PROCEDURE — 37000009 HC ANESTHESIA EA ADD 15 MINS: Performed by: SURGERY

## 2023-11-06 PROCEDURE — D9220A PRA ANESTHESIA: ICD-10-PCS | Mod: CRNA,,, | Performed by: NURSE ANESTHETIST, CERTIFIED REGISTERED

## 2023-11-06 RX ORDER — ONDANSETRON 2 MG/ML
INJECTION INTRAMUSCULAR; INTRAVENOUS
Status: DISCONTINUED | OUTPATIENT
Start: 2023-11-06 | End: 2023-11-06

## 2023-11-06 RX ORDER — OXYCODONE HYDROCHLORIDE 5 MG/1
5 TABLET ORAL EVERY 6 HOURS PRN
Qty: 8 TABLET | Refills: 0 | Status: SHIPPED | OUTPATIENT
Start: 2023-11-06

## 2023-11-06 RX ORDER — KETAMINE HCL IN 0.9 % NACL 50 MG/5 ML
SYRINGE (ML) INTRAVENOUS
Status: DISCONTINUED | OUTPATIENT
Start: 2023-11-06 | End: 2023-11-06

## 2023-11-06 RX ORDER — HEPARIN SODIUM 1000 [USP'U]/ML
INJECTION, SOLUTION INTRAVENOUS; SUBCUTANEOUS
Status: DISCONTINUED | OUTPATIENT
Start: 2023-11-06 | End: 2023-11-06 | Stop reason: HOSPADM

## 2023-11-06 RX ORDER — FENTANYL CITRATE 50 UG/ML
25-200 INJECTION, SOLUTION INTRAMUSCULAR; INTRAVENOUS
Status: DISCONTINUED | OUTPATIENT
Start: 2023-11-06 | End: 2023-11-06 | Stop reason: HOSPADM

## 2023-11-06 RX ORDER — FENTANYL CITRATE 50 UG/ML
25 INJECTION, SOLUTION INTRAMUSCULAR; INTRAVENOUS EVERY 5 MIN PRN
Status: DISCONTINUED | OUTPATIENT
Start: 2023-11-06 | End: 2023-11-06 | Stop reason: HOSPADM

## 2023-11-06 RX ORDER — SODIUM CHLORIDE 9 MG/ML
INJECTION, SOLUTION INTRAVENOUS CONTINUOUS
Status: DISCONTINUED | OUTPATIENT
Start: 2023-11-06 | End: 2023-11-06 | Stop reason: HOSPADM

## 2023-11-06 RX ORDER — ONDANSETRON 2 MG/ML
4 INJECTION INTRAMUSCULAR; INTRAVENOUS EVERY 12 HOURS PRN
Status: DISCONTINUED | OUTPATIENT
Start: 2023-11-06 | End: 2023-11-06 | Stop reason: HOSPADM

## 2023-11-06 RX ORDER — PROPOFOL 10 MG/ML
INJECTION, EMULSION INTRAVENOUS CONTINUOUS PRN
Status: DISCONTINUED | OUTPATIENT
Start: 2023-11-06 | End: 2023-11-06

## 2023-11-06 RX ORDER — HEPARIN SODIUM 1000 [USP'U]/ML
INJECTION, SOLUTION INTRAVENOUS; SUBCUTANEOUS
Status: DISCONTINUED | OUTPATIENT
Start: 2023-11-06 | End: 2023-11-06

## 2023-11-06 RX ORDER — ACETAMINOPHEN 10 MG/ML
INJECTION, SOLUTION INTRAVENOUS
Status: DISCONTINUED | OUTPATIENT
Start: 2023-11-06 | End: 2023-11-06

## 2023-11-06 RX ORDER — DIPHENHYDRAMINE HYDROCHLORIDE 50 MG/ML
25 INJECTION INTRAMUSCULAR; INTRAVENOUS EVERY 6 HOURS PRN
Status: DISCONTINUED | OUTPATIENT
Start: 2023-11-06 | End: 2023-11-06 | Stop reason: HOSPADM

## 2023-11-06 RX ORDER — LIDOCAINE HYDROCHLORIDE 20 MG/ML
INJECTION INTRAVENOUS
Status: DISCONTINUED | OUTPATIENT
Start: 2023-11-06 | End: 2023-11-06

## 2023-11-06 RX ORDER — MIDAZOLAM HYDROCHLORIDE 1 MG/ML
.5-4 INJECTION INTRAMUSCULAR; INTRAVENOUS
Status: DISCONTINUED | OUTPATIENT
Start: 2023-11-06 | End: 2023-11-06 | Stop reason: HOSPADM

## 2023-11-06 RX ORDER — DEXMEDETOMIDINE HYDROCHLORIDE 100 UG/ML
INJECTION, SOLUTION INTRAVENOUS
Status: DISCONTINUED | OUTPATIENT
Start: 2023-11-06 | End: 2023-11-06

## 2023-11-06 RX ORDER — HYDROMORPHONE HYDROCHLORIDE 1 MG/ML
0.2 INJECTION, SOLUTION INTRAMUSCULAR; INTRAVENOUS; SUBCUTANEOUS EVERY 5 MIN PRN
Status: DISCONTINUED | OUTPATIENT
Start: 2023-11-06 | End: 2023-11-06 | Stop reason: HOSPADM

## 2023-11-06 RX ORDER — LIDOCAINE HYDROCHLORIDE 10 MG/ML
INJECTION INFILTRATION; PERINEURAL
Status: DISCONTINUED | OUTPATIENT
Start: 2023-11-06 | End: 2023-11-06 | Stop reason: HOSPADM

## 2023-11-06 RX ORDER — MIDAZOLAM HYDROCHLORIDE 1 MG/ML
INJECTION INTRAMUSCULAR; INTRAVENOUS
Status: DISCONTINUED | OUTPATIENT
Start: 2023-11-06 | End: 2023-11-06

## 2023-11-06 RX ORDER — EPHEDRINE SULFATE 50 MG/ML
INJECTION, SOLUTION INTRAVENOUS
Status: DISCONTINUED | OUTPATIENT
Start: 2023-11-06 | End: 2023-11-06

## 2023-11-06 RX ORDER — ONDANSETRON 2 MG/ML
4 INJECTION INTRAMUSCULAR; INTRAVENOUS ONCE AS NEEDED
Status: DISCONTINUED | OUTPATIENT
Start: 2023-11-06 | End: 2023-11-06 | Stop reason: HOSPADM

## 2023-11-06 RX ADMIN — LIDOCAINE HYDROCHLORIDE 100 MG: 20 INJECTION INTRAVENOUS at 07:11

## 2023-11-06 RX ADMIN — EPHEDRINE SULFATE 10 MG: 50 INJECTION INTRAVENOUS at 07:11

## 2023-11-06 RX ADMIN — SODIUM CHLORIDE: 0.9 INJECTION, SOLUTION INTRAVENOUS at 07:11

## 2023-11-06 RX ADMIN — CEFAZOLIN 2 G: 2 INJECTION, POWDER, FOR SOLUTION INTRAMUSCULAR; INTRAVENOUS at 07:11

## 2023-11-06 RX ADMIN — DEXMEDETOMIDINE 12 MCG: 100 INJECTION, SOLUTION, CONCENTRATE INTRAVENOUS at 07:11

## 2023-11-06 RX ADMIN — EPHEDRINE SULFATE 10 MG: 50 INJECTION INTRAVENOUS at 08:11

## 2023-11-06 RX ADMIN — PROPOFOL 125 MCG/KG/MIN: 10 INJECTION, EMULSION INTRAVENOUS at 08:11

## 2023-11-06 RX ADMIN — Medication 25 MG: at 07:11

## 2023-11-06 RX ADMIN — ONDANSETRON 4 MG: 2 INJECTION INTRAMUSCULAR; INTRAVENOUS at 07:11

## 2023-11-06 RX ADMIN — PROPOFOL 125 MCG/KG/MIN: 10 INJECTION, EMULSION INTRAVENOUS at 07:11

## 2023-11-06 RX ADMIN — ACETAMINOPHEN 1000 MG: 10 INJECTION, SOLUTION INTRAVENOUS at 07:11

## 2023-11-06 RX ADMIN — MIDAZOLAM HYDROCHLORIDE 1 MG: 1 INJECTION INTRAMUSCULAR; INTRAVENOUS at 07:11

## 2023-11-06 RX ADMIN — HEPARIN SODIUM 3000 UNITS: 1000 INJECTION, SOLUTION INTRAVENOUS; SUBCUTANEOUS at 07:11

## 2023-11-06 NOTE — BRIEF OP NOTE
[facility] - Surgery (2nd Fl)  Brief Operative Note    Surgery Date: 11/6/2023     Surgeon(s) and Role:     * AREN Duran II, MD - Primary     * Fernando Nelson MD - Resident - Assisting        Pre-op Diagnosis:  ESRD (end stage renal disease) on dialysis [N18.6, Z99.2]    Post-op Diagnosis:  Post-Op Diagnosis Codes:     * ESRD (end stage renal disease) on dialysis [N18.6, Z99.2]    Procedure(s) (LRB):  CREATION, AV FISTULA RADIOCEPHALIC (Left)    Anesthesia: Local MAC    Operative Findings: left radiocephalic fistula creation. Good thrill at the end of the case    Estimated Blood Loss: 2 mL         Specimens:   Specimen (24h ago, onward)      None              Discharge Note    OUTCOME: Patient tolerated treatment/procedure well without complication and is now ready for discharge.    DISPOSITION: Home or Self Care    FINAL DIAGNOSIS:  ESRD    FOLLOWUP: In clinic    DISCHARGE INSTRUCTIONS:    Discharge Procedure Orders   ACCEPT - Ambulatory referral/consult to Heart Failure Transitional Care Clinic   Standing Status: Future   Referral Priority: Routine Referral Type: Consultation   Referral Reason: Specialty Services Required   Requested Specialty: Cardiology   Number of Visits Requested: 1     Diet Adult Regular     Notify your health care provider if you experience any of the following:  temperature >100.4     Notify your health care provider if you experience any of the following:  persistent nausea and vomiting or diarrhea     Notify your health care provider if you experience any of the following:  redness, tenderness, or signs of infection (pain, swelling, redness, odor or green/yellow discharge around incision site)     Notify your health care provider if you experience any of the following:  difficulty breathing or increased cough     Notify your health care provider if you experience any of the following:  worsening rash     Notify your health care provider if you experience any of the following:   persistent dizziness, light-headedness, or visual disturbances     Notify your health care provider if you experience any of the following:  increased confusion or weakness     Activity as tolerated   Order Comments: Okay to shower the day after surgery. Please do not submerge wounds underwater (no bath, no pool, no lake, etc.) for at least 2 weeks.

## 2023-11-06 NOTE — ANESTHESIA POSTPROCEDURE EVALUATION
Anesthesia Post Evaluation    Patient: Zev Castano    Procedure(s) Performed: Procedure(s) (LRB):  CREATION, AV FISTULA RADIOCEPHALIC (Left)    Final Anesthesia Type: general      Level of consciousness: awake and alert  Post-procedure vital signs: reviewed and stable  Pain control: Pain has been treated.  Airway patency: patent    PONV status: Absent or treated.  Anesthetic complications: no      Cardiovascular status: hemodynamically stable  Respiratory status: unassisted  Hydration status: euvolemic            Vitals Value Taken Time   /63 11/06/23 1031   Temp 36.6 °C (97.8 °F) 11/06/23 0904   Pulse 55 11/06/23 1035   Resp 18 11/06/23 0904   SpO2 94 % 11/06/23 1035   Vitals shown include unvalidated device data.      No case tracking events are documented in the log.      Pain/Gerry Score: Gerry Score: 9 (11/6/2023  9:15 AM)

## 2023-11-06 NOTE — PROGRESS NOTES
Heart Failure Transitional Care Clinic (HFTCC) Team notified of pt referral via Ambulatory Referral to Heart Failure Transitional Care ([id number]).    Patient screened today 11-6-2023 by provider and RN for enrollment to program.      Pt was deemed not a candidate for enrollment at this time related to patient is currently on hemo-dialysis.    Pt will require additional follow up planning per primary team.     If pt status, diagnosis, or treatment plan changes , please place AMB referral to Heart Failure Transitional Care Clinic ([id number]) for HFTCC enrollment re-evalution.

## 2023-11-06 NOTE — TRANSFER OF CARE
"Anesthesia Transfer of Care Note    Patient: Zev Castano    Procedure(s) Performed: Procedure(s) (LRB):  CREATION, AV FISTULA RADIOCEPHALIC (Left)    Patient location: PACU    Anesthesia Type: general    Transport from OR: Transported from OR on 6-10 L/min O2 by face mask with adequate spontaneous ventilation    Post pain: adequate analgesia    Post assessment: no apparent anesthetic complications    Post vital signs: stable    Level of consciousness: sedated    Nausea/Vomiting: no nausea/vomiting    Complications: none    Transfer of care protocol was followed      Last vitals: Visit Vitals  BP (!) 154/67   Pulse (!) 56   Temp 36.6 °C (97.9 °F)   Resp 16   Ht 6' 1" (1.854 m)   Wt 127 kg (280 lb)   SpO2 96%   BMI 36.94 kg/m²     "

## 2023-11-07 NOTE — OP NOTE
Operative Report    Date of Operation: 11/6/23    Pre-operative Diagnosis: ESRD on hemodialysis    Post-operative Diagnosis: same    Attending Surgeon: AREN Duran II, MD    Resident/Fellow: Fernando Nelson MD PGY3    Operation/Procedure Performed:  left radiocephalic arteriovenous fistula creation    Anesthesia: local/MAC    Indications:  72yo M ESRD on HD via tunneled catheter in need of permanent HD access.     Procedure in Detail:  After informed consent was obtained the patient was taken to the OR in supine position. Pre-operative antibiotics were administered. Moderate sedation was administered by the anesthesia team. The left arm was prepped and draped in normal sterile fashion with chloraprep.  A time out was performed to confirm appropriate patient, site, positioning, and laterality. 1% lidocaine was injected in the subcutaneous tissues along the area of our planned incision. An longitudinal incision was made at the wrist. The incision was deepened with a combination of electrocautery, blunt dissection, and sharp dissection. The cephalic vein was identified on the lateral aspect of the incision and measured at least 3mm. It was encircled with a large vessel loop and dissected proximally and distally within the incision. Branches were ligated with 3-0 silk ties and small hand-applied clips. Next we dissected  medially within the incision and identified the radial artery. The artery was 50% calcified and measured 4mm. The artery was dissected circumferentially and controlled proximally and distally with small vessel loops. 3000 units of heparin were then administered intravenously. The vein was marked with a marker to for orientation to ensure no twisting occurred. A small bulldog clamp was then applied to the proximal cephalic vein. The distal cephalic vein was then clamped and divided and its distal end suture ligated with 3-0 silk suture. The bulldog clamp was released from the vein and the vein was  flushed with heparinized saline. The vein flushed easily and distended well. The bulldog clamp was reapplied to the proximal vein and the distal end of the vein was spatulated. Clamps were applied distally and proximally to the radial artery and a 6mm longitudinal arteriotomy was made in the radial artery. The vein was sewn to the artery using a running 6-0 prolene suture and parachute technique. The artery was back bleed proximally and distally prior to completion of the anastamosis and had brisk flow with no thrombus visualized. The lumen was flushed with heparinized saline and the anastomosis was completed. The distal arterial clamp was released followed by the vein clamp and finally the proximal arterial clamp. No repair sutures were necessary. We did dissect proximally and ligate and divided two large side branches to further mobilize the fistula and prevent loss of flow into the side branches. The branches were ligated with 2-0 silk ties on the fistula side and a medium hemoclip. There was a palpable continuous thrill over the fistula. The radial artery had a palpable pulse. no protamine was administered. The wound was thoroughly irrigated with saline irrigation. The deep dermis was closed with interrupted vicryl sutures and the skin closed with monocryl with 3 interrupted  3-0 nylon sutures placed to ensure the incision remained intact. The incisions were dressed with 4x4 gauze secured in place with burn netting. All sponge needle and instrument counts were correct.    The patient tolerated the procedure well and was transported to the post-op area for recovery.    EBL 50ml    Complications: non3        GDesire Duran II, MD, RPVI  Vascular Surgeon  [facility]

## 2023-11-27 ENCOUNTER — OFFICE VISIT (OUTPATIENT)
Dept: VASCULAR SURGERY | Facility: CLINIC | Age: 73
End: 2023-11-27
Attending: SURGERY
Payer: MEDICARE

## 2023-11-27 VITALS
TEMPERATURE: 98 F | SYSTOLIC BLOOD PRESSURE: 164 MMHG | BODY MASS INDEX: 34.47 KG/M2 | DIASTOLIC BLOOD PRESSURE: 72 MMHG | HEART RATE: 57 BPM | HEIGHT: 73 IN | WEIGHT: 260.13 LBS

## 2023-11-27 DIAGNOSIS — N18.6 ESRD (END STAGE RENAL DISEASE) ON DIALYSIS: Primary | ICD-10-CM

## 2023-11-27 DIAGNOSIS — Z99.2 ESRD (END STAGE RENAL DISEASE) ON DIALYSIS: Primary | ICD-10-CM

## 2023-11-27 PROCEDURE — 99024 POSTOP FOLLOW-UP VISIT: CPT | Mod: POP,,, | Performed by: SURGERY

## 2023-11-27 PROCEDURE — 99213 OFFICE O/P EST LOW 20 MIN: CPT | Mod: PBBFAC | Performed by: SURGERY

## 2023-11-27 PROCEDURE — 99999 PR PBB SHADOW E&M-EST. PATIENT-LVL III: CPT | Mod: PBBFAC,,, | Performed by: SURGERY

## 2023-11-27 PROCEDURE — 99999 PR PBB SHADOW E&M-EST. PATIENT-LVL III: ICD-10-PCS | Mod: PBBFAC,,, | Performed by: SURGERY

## 2023-11-27 PROCEDURE — 99024 PR POST-OP FOLLOW-UP VISIT: ICD-10-PCS | Mod: POP,,, | Performed by: SURGERY

## 2023-11-27 NOTE — PROGRESS NOTES
VASCULAR SURGERY CLINIC NOTE    Patient ID: Zev Castano is a 73 y.o. male.    I. HISTORY     Chief Complaint: AV ACCESS    HPI: This is a 73 y.o. male who is here today for a post-op appointment.   right hand dominant. Currently ESRD on HD. Pertinent medical history includes Diabetes, HTN, HLD, A-fib on anticoagulation, three strokes (2012). Has not had permanent AV access in the past. Takes eliquis for anticoagulation. Started dialysis , goes T,Th,Sat. Has a tunneled HD catheter on the right side. No prior surgeries.  Had radiocephalic AV fistula creation with me 11/06/2023.  He returns for postop incision check.  No change in appearance or feel of the left radiocephalic AVF.  He denies redness, warmth, drainage at or around the left upper extremity incision.  Overall, the patient feels well.    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 OR [address] [place] [address];  Service: Vascular;  Laterality: Left;    BIOPSY N/A 05/29/2018    Procedure: BIOPSY;  Surgeon: Neeta Diagnostic Provider;  Location: [facility] CATH LAB;  Service: Radiology;  Laterality: N/A;  ct guided      CARDIOVERSION N/A 12/03/2020    Procedure: CARDIOVERSION;  Surgeon: Mehul Livingston MD;  Location: [facility] CATH LAB;  Service: Cardiology;  Laterality: N/A;    CORONARY ANGIOPLASTY WITH STENT PLACEMENT      INSERTION OF TUNNELED CENTRAL VENOUS HEMODIALYSIS CATHETER N/A 06/21/2023    Procedure: Insertion, Catheter, Central Venous, Hemodialysis;  Surgeon:  Angelito Ashley MD;  Location: [facility] CATH LAB;  Service: Nephrology;  Laterality: N/A;    KNEE SURGERY Left     SHOULDER SURGERY Right        Social History     Occupational History    Not on file   Tobacco Use    Smoking status: Former     Types: Cigars    Smokeless tobacco: Never    Tobacco comments:     occasional, when at the casino   Substance and Sexual Activity    Alcohol use: Not Currently     Alcohol/week: 1.0 standard drink of alcohol     Types: 1 Cans of beer per week     Comment: occasional when at the casino    Drug use: No    Sexual activity: Yes     Partners: Female     Birth control/protection: None       ROS    Current Medications Reviewed    II. PHYSICAL EXAM     Physical Exam    Well-healing incision of the volar aspect of the left wrist.  Palpable continuous thrill over the left radiocephalic AVF.  WWP distally  No redness, induration, warmth, drainage or other signs of infection.    III. ASSESSMENT & PLAN (MEDICAL DECISION MAKING)     No diagnosis found.      Imaging Results: (I have personally reviewed the images/studies and provided my interpretation below)  No new imaging    Assessment/Diagnosis and Plan:    73 y.o. male here for post-op check s/p creation of his left radiocephalic AVF on 11/6/23.  Patient is doing well postoperatively.  Denies redness, warmth, induration, drainage, and/or other signs of infection.    - incision well healing.  Sutures were removed today  - we will obtain ultrasound of the left radiocephalic AVF 6 weeks postoperatively (4 weeks from now)  - will allow the patient to begin dialyzing through his left radiocephalic AVF pending results of his upcoming ultrasound.  - patient was educated on the normal feel of an AVF thrill and counseled on return precautions should he be unable to feel this.      AREN Duran II, MD, [facility]  Vascular Surgery  [facility]

## 2023-12-12 DIAGNOSIS — N18.6 ESRD (END STAGE RENAL DISEASE) ON DIALYSIS: Primary | ICD-10-CM

## 2023-12-12 DIAGNOSIS — Z99.2 ESRD (END STAGE RENAL DISEASE) ON DIALYSIS: Primary | ICD-10-CM

## 2024-01-08 ENCOUNTER — OFFICE VISIT (OUTPATIENT)
Dept: VASCULAR SURGERY | Facility: CLINIC | Age: 74
End: 2024-01-08
Attending: SURGERY
Payer: MEDICARE

## 2024-01-08 ENCOUNTER — HOSPITAL ENCOUNTER (OUTPATIENT)
Dept: VASCULAR SURGERY | Facility: CLINIC | Age: 74
Discharge: HOME | End: 2024-01-08
Attending: SURGERY
Payer: MEDICARE

## 2024-01-08 VITALS
HEIGHT: 73 IN | DIASTOLIC BLOOD PRESSURE: 65 MMHG | BODY MASS INDEX: 34.77 KG/M2 | WEIGHT: 262.38 LBS | HEART RATE: 58 BPM | TEMPERATURE: 98 F | SYSTOLIC BLOOD PRESSURE: 143 MMHG

## 2024-01-08 DIAGNOSIS — Z99.2 ARTERIOVENOUS FISTULA FOR HEMODIALYSIS IN PLACE, PRIMARY: Primary | ICD-10-CM

## 2024-01-08 DIAGNOSIS — Z99.2 ESRD (END STAGE RENAL DISEASE) ON DIALYSIS: ICD-10-CM

## 2024-01-08 DIAGNOSIS — N18.6 ESRD (END STAGE RENAL DISEASE) ON DIALYSIS: ICD-10-CM

## 2024-01-08 PROCEDURE — 99999 PR PBB SHADOW E&M-EST. PATIENT-LVL III: CPT | Mod: PBBFAC,,, | Performed by: SURGERY

## 2024-01-08 PROCEDURE — 93990 DOPPLER FLOW TESTING: CPT | Mod: PBBFAC | Performed by: SURGERY

## 2024-01-08 PROCEDURE — 99213 OFFICE O/P EST LOW 20 MIN: CPT | Mod: PBBFAC | Performed by: SURGERY

## 2024-01-08 PROCEDURE — 99024 POSTOP FOLLOW-UP VISIT: CPT | Mod: POP,,, | Performed by: SURGERY

## 2024-01-08 PROCEDURE — 93990 DOPPLER FLOW TESTING: CPT | Mod: 26,S$PBB,, | Performed by: SURGERY

## 2024-01-08 NOTE — PROGRESS NOTES
VASCULAR SURGERY CLINIC NOTE    Patient ID: Zev Castano is a 73 y.o. male.    I. HISTORY     Chief Complaint: AV ACCESS    HPI: This is a 73 y.o. male who is here today for a post-op appointment.   right hand dominant. Currently ESRD on HD. Pertinent medical history includes Diabetes, HTN, HLD, A-fib on anticoagulation, three strokes (2012). Has not had permanent AV access in the past. Takes eliquis for anticoagulation. Started dialysis , goes T,Th,Sat. Has a tunneled HD catheter on the right side. No prior surgeries.  Had LEFT radiocephalic AV fistula creation with me 11/06/2023.  He returns for final u/s before cannulation incision check.  No change in appearance or feel of the left radiocephalic AVF.  He denies redness, warmth, drainage at or around the left upper extremity incision.  Overall, the patient feels well.    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     Hyperlipidemia     Hypertension     Nephrotic range proteinuria     Paroxysmal atrial fibrillation     Stroke     nov 2012        Past Surgical History:   Procedure Laterality Date    AV FISTULA PLACEMENT Left 11/6/2023    Procedure: CREATION, AV FISTULA RADIOCEPHALIC;  Surgeon: AREN Duran II, MD;  Location: [facility] OR [address] [place] [address];  Service: Vascular;  Laterality: Left;    BIOPSY N/A 05/29/2018    Procedure: BIOPSY;  Surgeon: [facility] Diagnostic Provider;  Location: [facility] CATH LAB;  Service: Radiology;  Laterality: N/A;  ct guided      CARDIOVERSION N/A 12/03/2020    Procedure: CARDIOVERSION;  Surgeon: Mehul Livingston MD;  Location: [facility] CATH LAB;  Service: Cardiology;  Laterality: N/A;    CORONARY ANGIOPLASTY WITH STENT PLACEMENT      INSERTION OF TUNNELED CENTRAL VENOUS HEMODIALYSIS CATHETER N/A 06/21/2023    Procedure: Insertion, Catheter, Central Venous,  Hemodialysis;  Surgeon: Angelito Ashley MD;  Location: [facility] CATH LAB;  Service: Nephrology;  Laterality: N/A;    KNEE SURGERY Left     SHOULDER SURGERY Right        Social History     Occupational History    Not on file   Tobacco Use    Smoking status: Former     Types: Cigars    Smokeless tobacco: Never    Tobacco comments:     occasional, when at the casino   Substance and Sexual Activity    Alcohol use: Not Currently     Alcohol/week: 1.0 standard drink of alcohol     Types: 1 Cans of beer per week     Comment: occasional when at the casino    Drug use: No    Sexual activity: Yes     Partners: Female     Birth control/protection: None       ROS    Current Medications Reviewed    II. PHYSICAL EXAM     Physical Exam   Left wrist incision CDI, no erythema, no drainage, fistula with continuous palpable thrill    III. ASSESSMENT & PLAN (MEDICAL DECISION MAKING)     1. Arteriovenous fistula for hemodialysis in place, primary          Imaging Results: (I have personally reviewed the images/studies and provided my interpretation below)  LUE AVF u/s: Depth < 6mm, diameter 6mm throughout mid/distal, 983ml/min flow.    Assessment/Diagnosis and Plan:    73 y.o. male here for post-op check s/p creation of his left radiocephalic AVF on 11/6/23. He was supposed to return 6 weeks post op but unfortunately did not get scheduled until today. His u/s confirms appropriate maturation. I have marked his fistula for cannulation.    -provided letter to access AVF in dialysis  - marked in clinic  - RTC 3 weeks for assessment of tunneled line removal    AREN uDran II, MD, [facility]  Vascular Surgery  [facility]

## 2024-01-25 ENCOUNTER — TELEPHONE (OUTPATIENT)
Dept: VASCULAR SURGERY | Facility: CLINIC | Age: 74
End: 2024-01-25
Payer: MEDICARE

## 2024-01-25 NOTE — TELEPHONE ENCOUNTER
"Spoke with Mr Castano states, " I need to reschedule my appointment Monday to remove my catheter. I was stuck in my dialysis access arm and now it is black and blue and swollen. I was told my arm need to rest and they will have to continue using my catheter in my chest. " I replied, I will contact the dialysis center." Spoke with Gayle at [facility] in [place]. Gayle states, " Mr Castano was cannulated twice then sometime last week he infiltrated. The supervision Jory said to let his arm rest until she can look at it. Jory won't be in until Tuesday the 30th. So we have to continue using his catheter until then." I replied, Dr Duran will be notified.   "

## 2024-03-20 DIAGNOSIS — N18.6 ESRD (END STAGE RENAL DISEASE) ON DIALYSIS: Primary | ICD-10-CM

## 2024-03-20 DIAGNOSIS — Z99.2 ESRD (END STAGE RENAL DISEASE) ON DIALYSIS: Primary | ICD-10-CM

## 2024-04-01 ENCOUNTER — OFFICE VISIT (OUTPATIENT)
Dept: VASCULAR SURGERY | Facility: CLINIC | Age: 74
End: 2024-04-01
Attending: SURGERY
Payer: MEDICARE

## 2024-04-01 ENCOUNTER — HOSPITAL ENCOUNTER (OUTPATIENT)
Dept: VASCULAR SURGERY | Facility: CLINIC | Age: 74
Discharge: HOME | End: 2024-04-01
Attending: SURGERY
Payer: MEDICARE

## 2024-04-01 VITALS
TEMPERATURE: 98 F | WEIGHT: 255.75 LBS | HEART RATE: 54 BPM | SYSTOLIC BLOOD PRESSURE: 165 MMHG | BODY MASS INDEX: 33.9 KG/M2 | HEIGHT: 73 IN | DIASTOLIC BLOOD PRESSURE: 74 MMHG

## 2024-04-01 DIAGNOSIS — N18.6 ESRD (END STAGE RENAL DISEASE) ON DIALYSIS: ICD-10-CM

## 2024-04-01 DIAGNOSIS — Z99.2 ARTERIOVENOUS FISTULA FOR HEMODIALYSIS IN PLACE, PRIMARY: Primary | ICD-10-CM

## 2024-04-01 DIAGNOSIS — Z99.2 ESRD (END STAGE RENAL DISEASE) ON DIALYSIS: ICD-10-CM

## 2024-04-01 DIAGNOSIS — Z99.2 S/P HEMODIALYSIS CATHETER INSERTION: ICD-10-CM

## 2024-04-01 PROCEDURE — 99999 PR PBB SHADOW E&M-EST. PATIENT-LVL III: CPT | Mod: PBBFAC,,, | Performed by: SURGERY

## 2024-04-01 PROCEDURE — 36589 REMOVAL TUNNELED CV CATH: CPT | Mod: S$PBB,,, | Performed by: SURGERY

## 2024-04-01 PROCEDURE — 93990 DOPPLER FLOW TESTING: CPT | Mod: PBBFAC | Performed by: SURGERY

## 2024-04-01 PROCEDURE — 99213 OFFICE O/P EST LOW 20 MIN: CPT | Mod: PBBFAC,25 | Performed by: SURGERY

## 2024-04-01 PROCEDURE — 36589 REMOVAL TUNNELED CV CATH: CPT | Mod: PBBFAC | Performed by: SURGERY

## 2024-04-01 PROCEDURE — 93990 DOPPLER FLOW TESTING: CPT | Mod: 26,S$PBB,, | Performed by: SURGERY

## 2024-04-01 NOTE — PROGRESS NOTES
VASCULAR SURGERY CLINIC NOTE    Patient ID: Zev Castano is a 74 y.o. male.    I. HISTORY     Chief Complaint: AV ACCESS    HPI: This is a 74 y.o. male who is here today for a established patient appointment.   right hand dominant. Currently ESRD on HD. Pertinent medical history includes Diabetes, HTN, HLD, A-fib on anticoagulation, three strokes (2012). Has not had permanent AV access in the past. Takes eliquis for anticoagulation. Started dialysis , goes T,Th,Sat. Has a tunneled HD catheter on the right side. No prior surgeries.  Had LEFT radiocephalic AV fistula creation with me 11/06/2023.  It is functioning well. He had no issues on multiple occasions for dialysis. He is now ready for his right sided dialysis catheter removal. No other complaints.     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     Hyperlipidemia     Hypertension     Nephrotic range proteinuria     Paroxysmal atrial fibrillation     Stroke     nov 2012        Past Surgical History:   Procedure Laterality Date    AV FISTULA PLACEMENT Left 11/6/2023    Procedure: CREATION, AV FISTULA RADIOCEPHALIC;  Surgeon: AREN Duran II, MD;  Location: [facility] OR [address] [place] [address];  Service: Vascular;  Laterality: Left;    BIOPSY N/A 05/29/2018    Procedure: BIOPSY;  Surgeon: Neeta Diagnostic Provider;  Location: [facility] CATH LAB;  Service: Radiology;  Laterality: N/A;  ct guided      CARDIOVERSION N/A 12/03/2020    Procedure: CARDIOVERSION;  Surgeon: Mehul Livingston MD;  Location: [facility] CATH LAB;  Service: Cardiology;  Laterality: N/A;    CORONARY ANGIOPLASTY WITH STENT PLACEMENT      INSERTION OF TUNNELED CENTRAL VENOUS HEMODIALYSIS CATHETER N/A 06/21/2023    Procedure: Insertion, Catheter, Central Venous, Hemodialysis;  Surgeon: Angelito Ashley MD;  Location: [facility] CATH LAB;   Service: Nephrology;  Laterality: N/A;    KNEE SURGERY Left     SHOULDER SURGERY Right        Social History     Occupational History    Not on file   Tobacco Use    Smoking status: Former     Types: Cigars    Smokeless tobacco: Never    Tobacco comments:     occasional, when at the casino   Substance and Sexual Activity    Alcohol use: Not Currently     Alcohol/week: 1.0 standard drink of alcohol     Types: 1 Cans of beer per week     Comment: occasional when at the casino    Drug use: No    Sexual activity: Yes     Partners: Female     Birth control/protection: None       ROS    Current Medications Reviewed    II. PHYSICAL EXAM     Physical Exam   Left wrist incision CDI, no erythema, no drainage, fistula with continuous palpable thrill    III. ASSESSMENT & PLAN (MEDICAL DECISION MAKING)     1. Arteriovenous fistula for hemodialysis in place, primary            Imaging Results: (I have personally reviewed the images/studies and provided my interpretation below)  LUE AVF u/s: 1358 ml/min flow and no significant stenosis     Assessment/Diagnosis and Plan:    74 y.o. male here for post-op check s/p creation of his left radiocephalic AVF on 11/6/23. He was supposed to return 6 weeks post op but unfortunately did not get scheduled until today. His u/s confirms appropriate maturation. I have marked his fistula for cannulation.    -Okay to use fistula  -Will remove tunneled line in clinic today  -Okay to resume Eliquis   -Procedure note below   -RTC in 4 months with US AREN Duran II, MD, [facility]  Vascular Surgery  [facility]    Vascular Surgery Bedside Procedure        Procedure: Removal of tunneled CVC catheter          Patient consented to removal of catheter.      Right chest prepped and draped in normal sterile fashion  All 5 sterile barriers used   5 cc 1% lidocaine with epi injected locally to skin around catheter  Blunt dissection used to free catheter cuff from surrounding  attachments  Patient placed in head up position  Catheter removed and pressure held at neck for 5 minutes.  Sterile dressing applied     Patient tolerated well.        AREN Duran II, MD, [facility]  Vascular Surgery  [facility]

## 2024-04-04 DIAGNOSIS — N18.6 ESRD (END STAGE RENAL DISEASE) ON DIALYSIS: Primary | ICD-10-CM

## 2024-04-04 DIAGNOSIS — Z99.2 ESRD (END STAGE RENAL DISEASE) ON DIALYSIS: Primary | ICD-10-CM

## 2024-06-04 ENCOUNTER — HOSPITAL ENCOUNTER (EMERGENCY)
Facility: OTHER | Age: 74
Discharge: HOME | End: 2024-06-04
Attending: EMERGENCY MEDICINE
Payer: MEDICARE

## 2024-06-04 VITALS
WEIGHT: 260 LBS | OXYGEN SATURATION: 96 % | HEART RATE: 67 BPM | DIASTOLIC BLOOD PRESSURE: 58 MMHG | BODY MASS INDEX: 34.3 KG/M2 | SYSTOLIC BLOOD PRESSURE: 126 MMHG | RESPIRATION RATE: 16 BRPM | TEMPERATURE: 98 F

## 2024-06-04 DIAGNOSIS — M79.604 PAIN OF RIGHT LOWER EXTREMITY: Primary | ICD-10-CM

## 2024-06-04 DIAGNOSIS — N18.6 ESRD (END STAGE RENAL DISEASE): ICD-10-CM

## 2024-06-04 DIAGNOSIS — R06.02 SHORTNESS OF BREATH: ICD-10-CM

## 2024-06-04 DIAGNOSIS — R06.02 SOB (SHORTNESS OF BREATH): ICD-10-CM

## 2024-06-04 DIAGNOSIS — R52 PAIN: ICD-10-CM

## 2024-06-04 LAB
ALBUMIN SERPL BCP-MCNC: 3.7 G/DL (ref 3.5–5.2)
ALP SERPL-CCNC: 98 U/L (ref 55–135)
ALT SERPL W/O P-5'-P-CCNC: 19 U/L (ref 10–44)
ANION GAP SERPL CALC-SCNC: 12 MMOL/L (ref 8–16)
AST SERPL-CCNC: 21 U/L (ref 10–40)
BASOPHILS # BLD AUTO: 0.05 K/UL (ref 0–0.2)
BASOPHILS NFR BLD: 0.7 % (ref 0–1.9)
BILIRUB SERPL-MCNC: 0.6 MG/DL (ref 0.1–1)
BNP SERPL-MCNC: 194 PG/ML (ref 0–99)
BUN SERPL-MCNC: 51 MG/DL (ref 8–23)
CALCIUM SERPL-MCNC: 8.5 MG/DL (ref 8.7–10.5)
CHLORIDE SERPL-SCNC: 100 MMOL/L (ref 95–110)
CO2 SERPL-SCNC: 25 MMOL/L (ref 23–29)
CREAT SERPL-MCNC: 6 MG/DL (ref 0.5–1.4)
CTP QC/QA: YES
CTP QC/QA: YES
DIFFERENTIAL METHOD BLD: ABNORMAL
EOSINOPHIL # BLD AUTO: 0.2 K/UL (ref 0–0.5)
EOSINOPHIL NFR BLD: 2.3 % (ref 0–8)
ERYTHROCYTE [DISTWIDTH] IN BLOOD BY AUTOMATED COUNT: 14 % (ref 11.5–14.5)
EST. GFR  (NO RACE VARIABLE): 9 ML/MIN/1.73 M^2
GLUCOSE SERPL-MCNC: 135 MG/DL (ref 70–110)
HBV CORE IGM SERPL QL IA: NORMAL
HBV SURFACE AG SERPL QL IA: NORMAL
HCT VFR BLD AUTO: 32.3 % (ref 40–54)
HGB BLD-MCNC: 10.8 G/DL (ref 14–18)
IMM GRANULOCYTES # BLD AUTO: 0.12 K/UL (ref 0–0.04)
IMM GRANULOCYTES NFR BLD AUTO: 1.7 % (ref 0–0.5)
LYMPHOCYTES # BLD AUTO: 1.1 K/UL (ref 1–4.8)
LYMPHOCYTES NFR BLD: 15.1 % (ref 18–48)
MAGNESIUM SERPL-MCNC: 2 MG/DL (ref 1.6–2.6)
MCH RBC QN AUTO: 32.5 PG (ref 27–31)
MCHC RBC AUTO-ENTMCNC: 33.4 G/DL (ref 32–36)
MCV RBC AUTO: 97 FL (ref 82–98)
MONOCYTES # BLD AUTO: 0.7 K/UL (ref 0.3–1)
MONOCYTES NFR BLD: 10.3 % (ref 4–15)
NEUTROPHILS # BLD AUTO: 5 K/UL (ref 1.8–7.7)
NEUTROPHILS NFR BLD: 69.9 % (ref 38–73)
NRBC BLD-RTO: 0 /100 WBC
OHS QRS DURATION: 112 MS
OHS QTC CALCULATION: 472 MS
PLATELET # BLD AUTO: 229 K/UL (ref 150–450)
PMV BLD AUTO: 9.9 FL (ref 9.2–12.9)
POC MOLECULAR INFLUENZA A AGN: NEGATIVE
POC MOLECULAR INFLUENZA B AGN: NEGATIVE
POTASSIUM SERPL-SCNC: 3.9 MMOL/L (ref 3.5–5.1)
PROT SERPL-MCNC: 7.1 G/DL (ref 6–8.4)
RBC # BLD AUTO: 3.32 M/UL (ref 4.6–6.2)
SARS-COV-2 RDRP RESP QL NAA+PROBE: NEGATIVE
SODIUM SERPL-SCNC: 137 MMOL/L (ref 136–145)
WBC # BLD AUTO: 7.09 K/UL (ref 3.9–12.7)

## 2024-06-04 PROCEDURE — 87340 HEPATITIS B SURFACE AG IA: CPT | Performed by: NURSE PRACTITIONER

## 2024-06-04 PROCEDURE — 86705 HEP B CORE ANTIBODY IGM: CPT | Performed by: NURSE PRACTITIONER

## 2024-06-04 PROCEDURE — 96375 TX/PRO/DX INJ NEW DRUG ADDON: CPT | Mod: 59

## 2024-06-04 PROCEDURE — G0257 UNSCHED DIALYSIS ESRD PT HOS: HCPCS

## 2024-06-04 PROCEDURE — 80100014 HC HEMODIALYSIS 1:1

## 2024-06-04 PROCEDURE — 85025 COMPLETE CBC W/AUTO DIFF WBC: CPT | Performed by: NURSE PRACTITIONER

## 2024-06-04 PROCEDURE — 63600175 PHARM REV CODE 636 W HCPCS: Performed by: INTERNAL MEDICINE

## 2024-06-04 PROCEDURE — 83880 ASSAY OF NATRIURETIC PEPTIDE: CPT | Performed by: NURSE PRACTITIONER

## 2024-06-04 PROCEDURE — 63600175 PHARM REV CODE 636 W HCPCS: Performed by: EMERGENCY MEDICINE

## 2024-06-04 PROCEDURE — 25000003 PHARM REV CODE 250: Performed by: EMERGENCY MEDICINE

## 2024-06-04 PROCEDURE — 87635 SARS-COV-2 COVID-19 AMP PRB: CPT | Performed by: NURSE PRACTITIONER

## 2024-06-04 PROCEDURE — 96374 THER/PROPH/DIAG INJ IV PUSH: CPT | Mod: 59

## 2024-06-04 PROCEDURE — 93010 ELECTROCARDIOGRAM REPORT: CPT | Mod: ,,, | Performed by: INTERNAL MEDICINE

## 2024-06-04 PROCEDURE — 36415 COLL VENOUS BLD VENIPUNCTURE: CPT | Performed by: NURSE PRACTITIONER

## 2024-06-04 PROCEDURE — 96376 TX/PRO/DX INJ SAME DRUG ADON: CPT | Mod: 59

## 2024-06-04 PROCEDURE — 25000003 PHARM REV CODE 250: Performed by: INTERNAL MEDICINE

## 2024-06-04 PROCEDURE — 80053 COMPREHEN METABOLIC PANEL: CPT | Performed by: NURSE PRACTITIONER

## 2024-06-04 PROCEDURE — 99284 EMERGENCY DEPT VISIT MOD MDM: CPT | Mod: 25

## 2024-06-04 PROCEDURE — 93005 ELECTROCARDIOGRAM TRACING: CPT

## 2024-06-04 PROCEDURE — 83735 ASSAY OF MAGNESIUM: CPT | Performed by: EMERGENCY MEDICINE

## 2024-06-04 RX ORDER — MORPHINE SULFATE 4 MG/ML
4 INJECTION, SOLUTION INTRAMUSCULAR; INTRAVENOUS
Status: DISCONTINUED | OUTPATIENT
Start: 2024-06-04 | End: 2024-06-04 | Stop reason: HOSPADM

## 2024-06-04 RX ORDER — CALCITRIOL 0.25 UG/1
0.25 CAPSULE ORAL
Status: DISCONTINUED | OUTPATIENT
Start: 2024-06-04 | End: 2024-06-04 | Stop reason: HOSPADM

## 2024-06-04 RX ORDER — MORPHINE SULFATE 2 MG/ML
2 INJECTION, SOLUTION INTRAMUSCULAR; INTRAVENOUS
Status: COMPLETED | OUTPATIENT
Start: 2024-06-04 | End: 2024-06-04

## 2024-06-04 RX ORDER — SODIUM CHLORIDE 9 MG/ML
INJECTION, SOLUTION INTRAVENOUS ONCE
Status: DISCONTINUED | OUTPATIENT
Start: 2024-06-04 | End: 2024-06-04 | Stop reason: HOSPADM

## 2024-06-04 RX ORDER — OXYCODONE AND ACETAMINOPHEN 5; 325 MG/1; MG/1
1 TABLET ORAL EVERY 6 HOURS PRN
Qty: 12 TABLET | Refills: 0 | Status: ON HOLD | OUTPATIENT
Start: 2024-06-04

## 2024-06-04 RX ORDER — MUPIROCIN 20 MG/G
OINTMENT TOPICAL 2 TIMES DAILY
Status: DISCONTINUED | OUTPATIENT
Start: 2024-06-04 | End: 2024-06-04 | Stop reason: HOSPADM

## 2024-06-04 RX ORDER — OXYCODONE AND ACETAMINOPHEN 5; 325 MG/1; MG/1
2 TABLET ORAL
Status: COMPLETED | OUTPATIENT
Start: 2024-06-04 | End: 2024-06-04

## 2024-06-04 RX ADMIN — IRON SUCROSE 100 MG: 20 INJECTION, SOLUTION INTRAVENOUS at 05:06

## 2024-06-04 RX ADMIN — CALCITRIOL CAPSULES 0.25 MCG 0.25 MCG: 0.25 CAPSULE ORAL at 05:06

## 2024-06-04 RX ADMIN — OXYCODONE HYDROCHLORIDE AND ACETAMINOPHEN 2 TABLET: 5; 325 TABLET ORAL at 07:06

## 2024-06-04 RX ADMIN — MORPHINE SULFATE 2 MG: 2 INJECTION, SOLUTION INTRAMUSCULAR; INTRAVENOUS at 08:06

## 2024-06-04 RX ADMIN — MORPHINE SULFATE 4 MG: 4 INJECTION, SOLUTION INTRAMUSCULAR; INTRAVENOUS at 01:06

## 2024-06-04 NOTE — ED TRIAGE NOTES
"Pt presents to the ED w/ c/o SOB x 2 months, R leg throbbing, and dry cough. Denies chest pain. Hx of CHF. Pt's wife states pt has had 3 strokes in 2012 and is supposed to be on Eliquis twice daily but is not taking it regularly. Pt's wife concerned for PE. Pt also endorses "creamy brown discharge" from rectum intermittently x 3 weeks. Dialysis T/TH/Sat. Missed today.   "

## 2024-06-04 NOTE — FIRST PROVIDER EVALUATION
Emergency Department TeleTriage Encounter Note      CHIEF COMPLAINT    Chief Complaint   Patient presents with    Shortness of Breath     Reports SOB x2 months and unable to walk x2-3 days. Pt endorses t/th/sat dialysis. Missed tx today. Also reports brownish creamy discharge from rectum.       VITAL SIGNS   Initial Vitals   BP Pulse Resp Temp SpO2   06/04/24 1120 06/04/24 1120 06/04/24 1120 06/04/24 1121 06/04/24 1120   (!) 160/67 (!) 59 20 97.5 °F (36.4 °C) 98 %      MAP       --                   ALLERGIES    Review of patient's allergies indicates:   Allergen Reactions    Doxycycline Rash       PROVIDER TRIAGE NOTE  Verbal consent for the teletriage evaluation was given by the patient at the start of the evaluation.  All efforts will be made to maintain patient's privacy during the evaluation.      This is a teletriage evaluation of a 74 y.o. male presenting to the ED with c/o right leg pain for 2-3 days, unable to walk.  Brownish discharge from rectum.  Bad cough and SOB for 2 months; completed ABX. Did not go to dialysis today.  Limited physical exam via telehealth: The patient is awake, alert, answering questions appropriately and is not in respiratory distress.  As the Teletriage provider, I performed an initial assessment and ordered appropriate labs and imaging studies, if any, to facilitate the patient's care once placed in the ED. Once a room is available, care and a full evaluation will be completed by an alternate ED provider.  Any additional orders and the final disposition will be determined by that provider.  All imaging and labs will not be followed-up by the Teletriage Team, including myself.          ORDERS  Labs Reviewed - No data to display    ED Orders (720h ago, onward)      Start Ordered     Status Ordering Provider    06/04/24 1148 06/04/24 1147  Saline lock IV  Once         Ordered CLAUDIA NAVARRO    06/04/24 1148 06/04/24 1147  POCT COVID-19 Rapid Screening  Once         Ordered MELANIE  CLAUDIA MCNULTY    06/04/24 1148 06/04/24 1147  POCT Influenza A/B Molecular  Once         Ordered CLAUDIA NAVARRO    06/04/24 1148 06/04/24 1147  X-Ray Chest AP Portable  1 time imaging         Ordered CLAUDIA NAVARRO    06/04/24 1147 06/04/24 1147  CBC Auto Differential  STAT         Ordered CLAUDIA NAVARRO    06/04/24 1147 06/04/24 1147  Comprehensive Metabolic Panel  STAT         Ordered MELANIE, CLAUDIA MCNULTY    06/04/24 1147 06/04/24 1147  Pulse Oximetry Continuous  Continuous         Ordered MELANIE, CLAUDIA MCNULTY    06/04/24 1147 06/04/24 1147  Cardiac Monitoring - Adult  Continuous         Ordered CLAUDIA NAVARRO    06/04/24 1147 06/04/24 1147  EKG 12-lead  Once         Ordered MELANIE, CLAUDIA MCNULTY    06/04/24 1147 06/04/24 1147  POCT COVID-19 Rapid Screening  Once         Ordered CLAUDIA NAVARRO    06/04/24 1147 06/04/24 1147  Brain Natriuretic Peptide  STAT         Ordered CLAUDIA NAVARRO              Virtual Visit Note: The provider triage portion of this emergency department evaluation and documentation was performed via [employer], a HIPAA-compliant telemedicine application, in concert with a tele-presenter in the room. A face to face patient evaluation with one of my colleagues will occur once the patient is placed in an emergency department room.      DISCLAIMER: This note was prepared with [employer] voice recognition transcription software. Garbled syntax, mangled pronouns, and other bizarre constructions may be attributed to that software system.

## 2024-06-04 NOTE — ED PROVIDER NOTES
Encounter Date: 6/4/2024       History     Chief Complaint   Patient presents with    Shortness of Breath     Reports SOB x2 months and unable to walk x2-3 days. Pt endorses t/th/sat dialysis. Missed tx today. Also reports brownish creamy discharge from rectum.     74-year-old man presenting for evaluation of pain in the right lower extremity which has been present for some time but worsened over last few days making it so it is now difficult for him to walk around because of the pain. In addition he also missed his scheduled dialysis appointment today as a result of the pain.  He is intermittently compliant with Eliquis and his significant other is concerned that he may have developed a clot result of his poor compliance.   He denies trauma or injury.       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     ESRD (end stage renal disease)     Hyperlipidemia     Hypertension     Nephrotic range proteinuria     Paroxysmal atrial fibrillation     Stroke     nov 2012     Past Surgical History:   Procedure Laterality Date    AV FISTULA PLACEMENT Left 11/6/2023    Procedure: CREATION, AV FISTULA RADIOCEPHALIC;  Surgeon: AREN Duran II, MD;  Location: [facility] OR [address] [place] [address];  Service: Vascular;  Laterality: Left;    BIOPSY N/A 05/29/2018    Procedure: BIOPSY;  Surgeon: Neeta Diagnostic Provider;  Location: [facility] CATH LAB;  Service: Radiology;  Laterality: N/A;  ct guided      CARDIOVERSION N/A 12/03/2020    Procedure: CARDIOVERSION;  Surgeon: Mehul Livingston MD;  Location: [facility] CATH LAB;  Service: Cardiology;  Laterality: N/A;    CORONARY ANGIOPLASTY WITH STENT PLACEMENT      INSERTION OF TUNNELED CENTRAL VENOUS HEMODIALYSIS CATHETER N/A 06/21/2023    Procedure: Insertion, Catheter, Central  Venous, Hemodialysis;  Surgeon: Angelito Ashley MD;  Location: [facility] CATH LAB;  Service: Nephrology;  Laterality: N/A;    KNEE SURGERY Left     SHOULDER SURGERY Right      Family History   Problem Relation Name Age of Onset    Hypertension Mother      Heart attack Father  59    Stroke Sister      Esophageal cancer Brother       Social History     Tobacco Use    Smoking status: Former     Types: Cigars    Smokeless tobacco: Never    Tobacco comments:     occasional, when at the [employer]   Substance Use Topics    Alcohol use: Not Currently     Alcohol/week: 1.0 standard drink of alcohol     Types: 1 Cans of beer per week     Comment: occasional when at the [employer]    Drug use: No     Review of Systems  Constitutional-no fever  HEENT-no congestion  Eyes-no redness  Respiratory-no shortness of breath  Cardio-no chest pain  GI-no abdominal pain  Endocrine-no cold intolerance  -no difficulty urinating  MSK-positive leg pain  Skin-no rashes  Allergy-no environmental allergy  Neurologic-, no headache  Hematology-no swollen nodes  Behavioral-no confusion  Physical Exam     Initial Vitals   BP Pulse Resp Temp SpO2   06/04/24 1120 06/04/24 1120 06/04/24 1120 06/04/24 1121 06/04/24 1120   (!) 160/67 (!) 59 20 97.5 °F (36.4 °C) 98 %      MAP       --                Physical Exam  Constitutional:  Chronically ill-appearing 74-year-old man in no obvious distress  Eyes: Conjunctivae normal.  ENT       Head: Normocephalic, atraumatic.       Nose: Normal external appearance        Mouth/Throat: no strigulous respirations   Hematological/Lymphatic/Immunilogical: no visible lymphadenopathy   Cardiovascular: Normal rate,   Respiratory: Normal respiratory effort.   Gastrointestinal: non distended   Musculoskeletal: Normal range of motion in all extremities. No obvious deformities or swelling.  Palpable thrill in left upper extremity  Neurologic: Alert, oriented. Normal speech and language. No gross focal neurologic deficits are  appreciated.  Skin: Skin is warm, dry. No rash noted.  Psychiatric: Mood and affect are normal.   ED Course   Procedures  Labs Reviewed   CBC W/ AUTO DIFFERENTIAL - Abnormal; Notable for the following components:       Result Value    RBC 3.32 (*)     Hemoglobin 10.8 (*)     Hematocrit 32.3 (*)     MCH 32.5 (*)     Immature Granulocytes 1.7 (*)     Immature Grans (Abs) 0.12 (*)     Lymph % 15.1 (*)     All other components within normal limits   COMPREHENSIVE METABOLIC PANEL - Abnormal; Notable for the following components:    Glucose 135 (*)     BUN 51 (*)     Creatinine 6.0 (*)     Calcium 8.5 (*)     eGFR 9 (*)     All other components within normal limits   B-TYPE NATRIURETIC PEPTIDE - Abnormal; Notable for the following components:     (*)     All other components within normal limits   MAGNESIUM   HEPATITIS B CORE ANTIBODY, IGM   HEPATITIS B SURFACE ANTIGEN   HEPATITIS B CORE ANTIBODY, IGM   HEPATITIS B SURFACE ANTIGEN   SARS-COV-2 RDRP GENE   POCT INFLUENZA A/B MOLECULAR        ECG Results              EKG 12-lead (Final result)        Collection Time Result Time QRS Duration OHS QTC Calculation    06/04/24 12:28:50 06/04/24 16:00:47 112 472                     Final result by Interface, Lab In [facility] (06/04/24 16:00:50)                   Narrative:    Test Reason : R06.02,    Vent. Rate : 063 BPM     Atrial Rate : 063 BPM     P-R Int : 212 ms          QRS Dur : 112 ms      QT Int : 462 ms       P-R-T Axes : -02 052 014 degrees     QTc Int : 472 ms    Sinus rhythm with 1st degree A-V block  Nonspecific intraventricular conduction delay  Junctional ST depression, probably normal  Borderline Abnormal ECG    Confirmed by Alex Crisostomo MD ([id number]) on 6/4/2024 4:00:45 PM    Referred By: [email or web address]   SELF           Confirmed By:Alex Crisostomo MD                      Wet Read by Reggie Jacobson MD (06/04/24 12:36:27, [facility] - Emergency Dept, Emergency Medicine)    My EKG interpretation, sinus rhythm, 63  beats per minute, normal axis, right bundle branch block morphology, when compared to previous EKG 06/18/2023 relatively unchanged                                  Imaging Results              X-Ray Tibia Fibula 2 View Right (Final result)  Result time 06/04/24 15:29:34      Final result by Daren Cee MD (06/04/24 15:29:34)                   Impression:      1. No convincing acute displaced fracture or dislocation of the tibia or fibula noting edema about the lower leg.      Electronically signed by: Daren Cee MD  Date:    06/04/2024  Time:    15:29               Narrative:    EXAMINATION:  XR TIBIA FIBULA 2 VIEW RIGHT    CLINICAL HISTORY:  Pain, unspecified    TECHNIQUE:  AP and lateral views of the right tibia and fibula were performed.    COMPARISON:  None.    FINDINGS:  Four views right tibia fibula.    There are degenerative changes of the knee.  There is edema about the lower leg.  There is vascular calcification.  The ankle mortise is intact.  The knee appears intact.  There are degenerative changes of the calcaneus is well as the dorsal aspect of the foot.                                       US Lower Extremity Veins Right (Final result)  Result time 06/04/24 14:20:37      Final result by Luis Mcdowell MD (06/04/24 14:20:37)                   Impression:      No evidence of deep venous thrombosis in the right lower extremity.      Electronically signed by: Luis Mcdowell MD  Date:    06/04/2024  Time:    14:20               Narrative:    EXAMINATION:  US LOWER EXTREMITY VEINS RIGHT    CLINICAL HISTORY:  Pain, unspecified.    TECHNIQUE:  Duplex and color flow Doppler evaluation and graded compression of the right lower extremity veins was performed.    COMPARISON:  06/14/2023.    FINDINGS:  Right thigh veins: The common femoral, femoral, popliteal, upper greater saphenous, and deep femoral veins are patent and free of thrombus. The veins are normally compressible and have normal phasic  flow and augmentation response.    Right calf veins: The visualized calf veins are patent.    Contralateral CFV: The contralateral (left) common femoral vein is patent and free of thrombus.    Miscellaneous: No organized fluid collection.  Minimal soft tissue edema.                                       X-Ray Chest AP Portable (Final result)  Result time 06/04/24 12:24:39      Final result by Daren Cee MD (06/04/24 12:24:39)                   Impression:      1. Central hilar findings may reflect edema, no large focal consolidation.      Electronically signed by: Daren Cee MD  Date:    06/04/2024  Time:    12:24               Narrative:    EXAMINATION:  XR CHEST AP PORTABLE    CLINICAL HISTORY:  Shortness of breath    TECHNIQUE:  Single frontal view of the chest was performed.    COMPARISON:  06/18/2023    FINDINGS:  The cardiomediastinal silhouette is not enlarged..  There is no pleural effusion.  The trachea is midline.  The lungs are symmetrically expanded bilaterally with coarse interstitial attenuation, similar to the previous exam noting bilateral basilar subsegmental atelectasis..  No large focal consolidation seen.  There is no pneumothorax.  The osseous structures are remarkable for degenerative change..                                    X-Rays:   Independently Interpreted Readings:   Other Readings:  X-ray tib fib-no fracture identified, no dislocation   Chest x-ray-no pneumothorax no focal opacity    Medications   iron sucrose injection 100 mg (100 mg Intravenous Given 6/4/24 1707)   oxyCODONE-acetaminophen 5-325 mg per tablet 2 tablet (2 tablets Oral Given 6/4/24 1909)   morphine injection 2 mg (2 mg Intravenous Given 6/4/24 2035)     Medical Decision Making  Differential diagnosis-DVT, MSK strain, fracture, deconditioning, hyperkalemia, end-stage renal disease   Labs relatively baseline for this patient, potassium normal, proceeded with dialysis.  No sinister underlying etiology  identified for pain in the right lower extremity.    Plan for this patient undergo outpatient follow-up, treatment of the same, returning case of any worsening of symptoms.    Problems Addressed:  ESRD (end stage renal disease): chronic illness or injury with exacerbation, progression, or side effects of treatment  Pain: acute illness or injury  Pain of right lower extremity: acute illness or injury  Shortness of breath: chronic illness or injury  SOB (shortness of breath): chronic illness or injury    Amount and/or Complexity of Data Reviewed  Independent Historian: spouse     Details: Notes a concern related to thrombus  External Data Reviewed: labs, radiology, ECG and notes.     Details: Previous history of end-stage renal disease requiring dialysis as well as musculoskeletal pain in the right lower extremity evaluated previously with ultrasound Doppler and x-ray  Labs: ordered. Decision-making details documented in ED Course.  Radiology: ordered and independent interpretation performed. Decision-making details documented in ED Course.  ECG/medicine tests: ordered and independent interpretation performed. Decision-making details documented in ED Course.  Discussion of management or test interpretation with external provider(s): Discussed with on-call nurse practitioner for nephrology Sam Baeza patient to undergo dialysis, believes he is likely safe for home management thereafter    Risk  OTC drugs.  Prescription drug management.  Parenteral controlled substances.  Drug therapy requiring intensive monitoring for toxicity.  Decision regarding hospitalization.  Diagnosis or treatment significantly limited by social determinants of health.                                      Clinical Impression:  Final diagnoses:  [R06.02] Shortness of breath  [R06.02] SOB (shortness of breath)  [R52] Pain  [M79.604] Pain of right lower extremity (Primary)  [N18.6] ESRD (end stage renal disease)          ED Disposition  Condition    Discharge Stable          ED Prescriptions       Medication Sig Dispense Start Date End Date Auth. Provider    oxyCODONE-acetaminophen (PERCOCET) 5-325 mg per tablet Take 1 tablet by mouth every 6 (six) hours as needed for Pain. 12 tablet 6/4/2024 -- Reggie Jacobson MD          Follow-up Information       Follow up With Specialties Details Why Contact Info    Mehul Livingston MD Cardiology, Interventional Cardiology Call in 1 day If symptoms worsen, For a follow up visit about today [address]  [facility] [address]  [phone]               Reggie Jacobson MD  06/05/24 4942

## 2024-06-04 NOTE — CONSULTS
Consult Note  Nephrology    Consult Requested By: Reggie Jacobson MD  Reason for Consult: ESRD    SUBJECTIVE:     History of Present Illness:  Patient is a 74 y.o. male presents with SOB/RLE pain/rectal discharge.  Being admitted for OBS.  Pt well known to us and has significant hx of noncompliance.  Now on HD secondary to ESRD TTS with Dr. Ford.  Patient presented to the ED today for inability to walk on right leg 2-3 days.  Missed HD today and consulted for evaluation and treatment.      Pt seen and examined NAD and on dialysis subsequently.  Case discussed with team.  EPIC reviewed.      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     Hyperlipidemia     Hypertension     Nephrotic range proteinuria     Paroxysmal atrial fibrillation     Stroke     nov 2012     Past Surgical History:   Procedure Laterality Date    AV FISTULA PLACEMENT Left 11/6/2023    Procedure: CREATION, AV FISTULA RADIOCEPHALIC;  Surgeon: AREN Duran II, MD;  Location: [facility] OR [address] [place] [address];  Service: Vascular;  Laterality: Left;    BIOPSY N/A 05/29/2018    Procedure: BIOPSY;  Surgeon: Neeta Diagnostic Provider;  Location: [facility] CATH LAB;  Service: Radiology;  Laterality: N/A;  ct guided      CARDIOVERSION N/A 12/03/2020    Procedure: CARDIOVERSION;  Surgeon: Mehul Livingston MD;  Location: [facility] CATH LAB;  Service: Cardiology;  Laterality: N/A;    CORONARY ANGIOPLASTY WITH STENT PLACEMENT      INSERTION OF TUNNELED CENTRAL VENOUS HEMODIALYSIS CATHETER N/A 06/21/2023    Procedure: Insertion, Catheter, Central Venous, Hemodialysis;  Surgeon: Angelito Ashley MD;  Location: [facility] CATH LAB;  Service: Nephrology;  Laterality: N/A;    KNEE SURGERY Left     SHOULDER SURGERY Right      Family History   Problem Relation Name Age of Onset    Hypertension  Mother      Heart attack Father  59    Stroke Sister      Esophageal cancer Brother       Social History     Tobacco Use    Smoking status: Former     Types: Cigars    Smokeless tobacco: Never    Tobacco comments:     occasional, when at the [employer]   Substance Use Topics    Alcohol use: Not Currently     Alcohol/week: 1.0 standard drink of alcohol     Types: 1 Cans of beer per week     Comment: occasional when at the [employer]    Drug use: No       Review of patient's allergies indicates:   Allergen Reactions    Doxycycline Rash        Review of Systems:  Constitutional: No fever or chills  Respiratory: shortness of breath  Cardiovascular: No chest pain or palpitations  Gastrointestinal: No nausea or vomiting  Neurological: No confusion or weakness    OBJECTIVE:     Vital Signs (Most Recent)  Temp: 97.5 °F (36.4 °C) (06/04/24 1121)  Pulse: 68 (06/04/24 1418)  Resp: 20 (06/04/24 1418)  BP: (!) 153/71 (06/04/24 1402)  SpO2: 95 % (06/04/24 1418)    Vital Signs Range (Last 24H):  Temp:  [97.5 °F (36.4 °C)]   Pulse:  [59-71]   Resp:  [18-20]   BP: (149-163)/(67-72)   SpO2:  [95 %-99 %]       Intake/Output Summary (Last 24 hours) at 6/4/2024 1431  Last data filed at 6/4/2024 1355  Gross per 24 hour   Intake --   Output 200 ml   Net -200 ml       Physical Exam:  General appearance: Well developed, well nourished  Eyes:  Conjunctivae/corneas clear. PERRL.  Lungs: Normal respiratory effort,   clear to auscultation bilaterally   Heart: Regular rate and rhythm, S1, S2 normal, no murmur, rub or pino.  Abdomen: Soft, non-tender non-distended; bowel sounds normal; no masses,  no organomegaly  Extremities: No cyanosis or clubbing. No edema.  Right leg tender with decreased range of motion  Skin: Skin color, texture, turgor normal. No rashes or lesions  Neurologic: Normal strength and tone. No focal numbness or weakness   Left Arm AVF+      Laboratory:  Recent Labs   Lab 06/04/24  1242   WBC 7.09   RBC 3.32*   HGB 10.8*   HCT 32.3*       MCV 97   MCH 32.5*   MCHC 33.4     BMP:   Recent Labs   Lab 06/04/24  1242   *      K 3.9      CO2 25   BUN 51*   CREATININE 6.0*   CALCIUM 8.5*   MG 2.0     Lab Results   Component Value Date    CALCIUM 8.5 (L) 06/04/2024    PHOS 6.4 (H) 06/18/2023     BNP  Recent Labs   Lab 06/04/24  1242   *     Lab Results   Component Value Date    URICACID 6.5 08/16/2022     Lab Results   Component Value Date    IRON 60 08/16/2022    TIBC 379 08/16/2022    FERRITIN 203 08/16/2022     Lab Results   Component Value Date     (H) 05/16/2024    CALCIUM 8.5 (L) 06/04/2024    PHOS 6.4 (H) 06/18/2023       Diagnostic Results:  US Lower Extremity Veins Right   Final Result      No evidence of deep venous thrombosis in the right lower extremity.         Electronically signed by: Luis Mcdowell MD   Date:    06/04/2024   Time:    14:20      X-Ray Chest AP Portable   Final Result      1. Central hilar findings may reflect edema, no large focal consolidation.         Electronically signed by: Daren Cee MD   Date:    06/04/2024   Time:    12:24          ASSESSMENT/PLAN:     ESRD 2nd DM/HTN/Noncompliance:  -on HD TTS with Dr. Ford.  Has been with multiple nephrologist throughout the [facility].   Currently at [facility]: 4hrs 3K/2.5Ca/137 Na/ 35 HCO3 EDW 114kg  -consented for HD today  -labs/lytes/stable.   -see orders.  -Renally dose meds, avoid nephrotoxins, and monitor I/O's closely.    RLE pain:  -US neg  -defer    CVA:  -defer.    Anemia of CKD:  -stable.   -currently in the midst of a loading dose of Venofer 100 mg per treatment with 3 more doses needed we will give 1 today    MBD:  -on calcitriol 0.25 mg Tuesday Thursday Saturday  -continue      Thanks for consult  See above  Will follow along.       Outside records reviewed, chart reviewed and discussed with primary team and nursing.  Total of 65 minutes spent with the all of the above and then additional time spent  preparing for hemodialysis.        High MDM complexity necessary to treat or prevent imminent or life-threatening deterioration of the following conditions: ESRD  Time spent personally by me on the following activities 45 min: development of treatment plan with patient or surrogate, discussions with consultants, interpretation of cardiac output measurements, examination of patient, ordering and performing treatments and interventions, evaluation of patient's response to treatment, obtaining history from patient or surrogate, ordering and review of laboratory studies, ordering and review of radiographic studies, re-evaluation of patient's condition, pulse oximetry and blood draw for specimens

## 2024-06-05 NOTE — DISCHARGE INSTRUCTIONS
Mr. Castano,    Thank you for letting me care for you today! It was nice meeting you, and I hope you feel better soon.   If you would like access to your chart and what was done today please utilize the [facility].   Please come back to [facility] for all of your future medical needs.    Our goal in the emergency department is to always give you outstanding care and exceptional service. You may receive a survey by mail or e-mail in the next week regarding your experience in our ED. We would greatly appreciate you completing and returning the survey. Your feedback provides us with a way to recognize our staff who give very good care and it helps us learn how to improve when your experience was below our aspiration of excellence.     Sincerely,    Reggie Jacobson MD  Board Certified Emergency Physician

## 2024-06-17 ENCOUNTER — HOSPITAL ENCOUNTER (INPATIENT)
Facility: OTHER | Age: 74
LOS: 3 days | Discharge: NURSING, REHAB OR LONG-TERM CARE | DRG: 689 | End: 2024-06-20
Attending: EMERGENCY MEDICINE | Admitting: EMERGENCY MEDICINE
Payer: MEDICARE

## 2024-06-17 DIAGNOSIS — R53.1 WEAKNESS: ICD-10-CM

## 2024-06-17 DIAGNOSIS — M79.89 LEG SWELLING: Primary | ICD-10-CM

## 2024-06-17 DIAGNOSIS — N30.01 ACUTE CYSTITIS WITH HEMATURIA: ICD-10-CM

## 2024-06-17 LAB
ALBUMIN SERPL BCP-MCNC: 3.6 G/DL (ref 3.5–5.2)
ALP SERPL-CCNC: 103 U/L (ref 55–135)
ALT SERPL W/O P-5'-P-CCNC: 11 U/L (ref 10–44)
ANION GAP SERPL CALC-SCNC: 13 MMOL/L (ref 8–16)
AST SERPL-CCNC: 21 U/L (ref 10–40)
BACTERIA #/AREA URNS HPF: ABNORMAL /HPF
BASOPHILS # BLD AUTO: 0.04 K/UL (ref 0–0.2)
BASOPHILS NFR BLD: 0.5 % (ref 0–1.9)
BILIRUB SERPL-MCNC: 1.1 MG/DL (ref 0.1–1)
BILIRUB UR QL STRIP: NEGATIVE
BNP SERPL-MCNC: 422 PG/ML (ref 0–99)
BUN SERPL-MCNC: 73 MG/DL (ref 8–23)
CALCIUM SERPL-MCNC: 9.4 MG/DL (ref 8.7–10.5)
CHLORIDE SERPL-SCNC: 101 MMOL/L (ref 95–110)
CLARITY UR: ABNORMAL
CO2 SERPL-SCNC: 22 MMOL/L (ref 23–29)
COLOR UR: YELLOW
CREAT SERPL-MCNC: 6.5 MG/DL (ref 0.5–1.4)
DIFFERENTIAL METHOD BLD: ABNORMAL
EOSINOPHIL # BLD AUTO: 0.2 K/UL (ref 0–0.5)
EOSINOPHIL NFR BLD: 2.3 % (ref 0–8)
ERYTHROCYTE [DISTWIDTH] IN BLOOD BY AUTOMATED COUNT: 14.5 % (ref 11.5–14.5)
EST. GFR  (NO RACE VARIABLE): 8 ML/MIN/1.73 M^2
GLUCOSE SERPL-MCNC: 207 MG/DL (ref 70–110)
GLUCOSE UR QL STRIP: ABNORMAL
HCT VFR BLD AUTO: 32.8 % (ref 40–54)
HGB BLD-MCNC: 10.8 G/DL (ref 14–18)
HGB UR QL STRIP: ABNORMAL
HYALINE CASTS #/AREA URNS LPF: 0 /LPF
IMM GRANULOCYTES # BLD AUTO: 0.07 K/UL (ref 0–0.04)
IMM GRANULOCYTES NFR BLD AUTO: 1 % (ref 0–0.5)
KETONES UR QL STRIP: NEGATIVE
LEUKOCYTE ESTERASE UR QL STRIP: ABNORMAL
LYMPHOCYTES # BLD AUTO: 0.4 K/UL (ref 1–4.8)
LYMPHOCYTES NFR BLD: 5.9 % (ref 18–48)
MCH RBC QN AUTO: 33 PG (ref 27–31)
MCHC RBC AUTO-ENTMCNC: 32.9 G/DL (ref 32–36)
MCV RBC AUTO: 100 FL (ref 82–98)
MICROSCOPIC COMMENT: ABNORMAL
MONOCYTES # BLD AUTO: 1.2 K/UL (ref 0.3–1)
MONOCYTES NFR BLD: 16.3 % (ref 4–15)
NEUTROPHILS # BLD AUTO: 5.4 K/UL (ref 1.8–7.7)
NEUTROPHILS NFR BLD: 74 % (ref 38–73)
NITRITE UR QL STRIP: NEGATIVE
NRBC BLD-RTO: 0 /100 WBC
PH UR STRIP: 6 [PH] (ref 5–8)
PLATELET # BLD AUTO: 162 K/UL (ref 150–450)
PMV BLD AUTO: 10.3 FL (ref 9.2–12.9)
POTASSIUM SERPL-SCNC: 5 MMOL/L (ref 3.5–5.1)
PROT SERPL-MCNC: 7.6 G/DL (ref 6–8.4)
PROT UR QL STRIP: ABNORMAL
RBC # BLD AUTO: 3.27 M/UL (ref 4.6–6.2)
RBC #/AREA URNS HPF: 22 /HPF (ref 0–4)
SODIUM SERPL-SCNC: 136 MMOL/L (ref 136–145)
SP GR UR STRIP: 1.01 (ref 1–1.03)
URN SPEC COLLECT METH UR: ABNORMAL
UROBILINOGEN UR STRIP-ACNC: NEGATIVE EU/DL
WBC # BLD AUTO: 7.28 K/UL (ref 3.9–12.7)
WBC #/AREA URNS HPF: >100 /HPF (ref 0–5)
WBC CLUMPS URNS QL MICRO: ABNORMAL

## 2024-06-17 PROCEDURE — 63600175 PHARM REV CODE 636 W HCPCS: Performed by: EMERGENCY MEDICINE

## 2024-06-17 PROCEDURE — 96365 THER/PROPH/DIAG IV INF INIT: CPT

## 2024-06-17 PROCEDURE — 25000003 PHARM REV CODE 250: Performed by: EMERGENCY MEDICINE

## 2024-06-17 PROCEDURE — 11000001 HC ACUTE MED/SURG PRIVATE ROOM

## 2024-06-17 PROCEDURE — 5A1D70Z PERFORMANCE OF URINARY FILTRATION, INTERMITTENT, LESS THAN 6 HOURS PER DAY: ICD-10-PCS | Performed by: INTERNAL MEDICINE

## 2024-06-17 PROCEDURE — 83880 ASSAY OF NATRIURETIC PEPTIDE: CPT | Performed by: EMERGENCY MEDICINE

## 2024-06-17 PROCEDURE — 81000 URINALYSIS NONAUTO W/SCOPE: CPT | Performed by: EMERGENCY MEDICINE

## 2024-06-17 PROCEDURE — 21400001 HC TELEMETRY ROOM

## 2024-06-17 PROCEDURE — 25000003 PHARM REV CODE 250: Performed by: NURSE PRACTITIONER

## 2024-06-17 PROCEDURE — 80053 COMPREHEN METABOLIC PANEL: CPT | Performed by: EMERGENCY MEDICINE

## 2024-06-17 PROCEDURE — 85025 COMPLETE CBC W/AUTO DIFF WBC: CPT | Performed by: EMERGENCY MEDICINE

## 2024-06-17 PROCEDURE — 99285 EMERGENCY DEPT VISIT HI MDM: CPT | Mod: 25

## 2024-06-17 RX ORDER — ATORVASTATIN CALCIUM 20 MG/1
40 TABLET, FILM COATED ORAL NIGHTLY
Status: DISCONTINUED | OUTPATIENT
Start: 2024-06-17 | End: 2024-06-20 | Stop reason: HOSPADM

## 2024-06-17 RX ORDER — AMIODARONE HYDROCHLORIDE 200 MG/1
200 TABLET ORAL DAILY
Status: DISCONTINUED | OUTPATIENT
Start: 2024-06-18 | End: 2024-06-20 | Stop reason: HOSPADM

## 2024-06-17 RX ORDER — FUROSEMIDE 40 MG/1
40 TABLET ORAL 2 TIMES DAILY
Status: DISCONTINUED | OUTPATIENT
Start: 2024-06-17 | End: 2024-06-18

## 2024-06-17 RX ORDER — SODIUM CHLORIDE 9 MG/ML
INJECTION, SOLUTION INTRAVENOUS ONCE
Status: DISCONTINUED | OUTPATIENT
Start: 2024-06-17 | End: 2024-06-20 | Stop reason: HOSPADM

## 2024-06-17 RX ORDER — ASPIRIN 81 MG/1
81 TABLET ORAL NIGHTLY
Status: DISCONTINUED | OUTPATIENT
Start: 2024-06-17 | End: 2024-06-20 | Stop reason: HOSPADM

## 2024-06-17 RX ORDER — GABAPENTIN 300 MG/1
1 CAPSULE ORAL 3 TIMES DAILY
Status: ON HOLD | COMMUNITY
Start: 2024-06-13 | End: 2024-06-20

## 2024-06-17 RX ORDER — MUPIROCIN 20 MG/G
OINTMENT TOPICAL 2 TIMES DAILY
Status: DISCONTINUED | OUTPATIENT
Start: 2024-06-17 | End: 2024-06-20 | Stop reason: HOSPADM

## 2024-06-17 RX ORDER — AMLODIPINE BESYLATE 5 MG/1
5 TABLET ORAL DAILY
Status: DISCONTINUED | OUTPATIENT
Start: 2024-06-18 | End: 2024-06-18

## 2024-06-17 RX ORDER — SODIUM CHLORIDE 0.9 % (FLUSH) 0.9 %
10 SYRINGE (ML) INJECTION
Status: DISCONTINUED | OUTPATIENT
Start: 2024-06-17 | End: 2024-06-20 | Stop reason: HOSPADM

## 2024-06-17 RX ORDER — ISOSORBIDE MONONITRATE 30 MG/1
60 TABLET, EXTENDED RELEASE ORAL DAILY
Status: DISCONTINUED | OUTPATIENT
Start: 2024-06-18 | End: 2024-06-20 | Stop reason: HOSPADM

## 2024-06-17 RX ORDER — FAMOTIDINE 20 MG/1
20 TABLET, FILM COATED ORAL NIGHTLY
Status: DISCONTINUED | OUTPATIENT
Start: 2024-06-17 | End: 2024-06-20 | Stop reason: HOSPADM

## 2024-06-17 RX ORDER — TALC
6 POWDER (GRAM) TOPICAL NIGHTLY PRN
Status: DISCONTINUED | OUTPATIENT
Start: 2024-06-17 | End: 2024-06-20 | Stop reason: HOSPADM

## 2024-06-17 RX ORDER — HYDRALAZINE HYDROCHLORIDE 25 MG/1
100 TABLET, FILM COATED ORAL EVERY 12 HOURS
Status: DISCONTINUED | OUTPATIENT
Start: 2024-06-17 | End: 2024-06-19

## 2024-06-17 RX ORDER — GABAPENTIN 300 MG/1
300 CAPSULE ORAL 3 TIMES DAILY
Status: DISCONTINUED | OUTPATIENT
Start: 2024-06-17 | End: 2024-06-18

## 2024-06-17 RX ORDER — ALLOPURINOL 100 MG/1
100 TABLET ORAL DAILY
Status: DISCONTINUED | OUTPATIENT
Start: 2024-06-18 | End: 2024-06-18

## 2024-06-17 RX ADMIN — CEFTRIAXONE SODIUM 1 G: 1 INJECTION, POWDER, FOR SOLUTION INTRAMUSCULAR; INTRAVENOUS at 12:06

## 2024-06-17 RX ADMIN — FUROSEMIDE 40 MG: 40 TABLET ORAL at 08:06

## 2024-06-17 RX ADMIN — FAMOTIDINE 20 MG: 20 TABLET ORAL at 08:06

## 2024-06-17 RX ADMIN — MUPIROCIN: 20 OINTMENT TOPICAL at 08:06

## 2024-06-17 RX ADMIN — APIXABAN 2.5 MG: 2.5 TABLET, FILM COATED ORAL at 08:06

## 2024-06-17 RX ADMIN — ASPIRIN 81 MG: 81 TABLET, COATED ORAL at 08:06

## 2024-06-17 RX ADMIN — ATORVASTATIN CALCIUM 40 MG: 20 TABLET, FILM COATED ORAL at 08:06

## 2024-06-17 RX ADMIN — GABAPENTIN 300 MG: 300 CAPSULE ORAL at 08:06

## 2024-06-17 RX ADMIN — HYDRALAZINE HYDROCHLORIDE 100 MG: 25 TABLET ORAL at 08:06

## 2024-06-17 NOTE — ED TRIAGE NOTES
C/o bilateral lower extremity swelling and urinary retention since yesterday. 3+ edema noted to bilateral lower extremities. Able to void per urinal. Aaox3. Resp even and mildly labored. Skin warm and dry.

## 2024-06-17 NOTE — ED PROVIDER NOTES
Encounter Date: 6/17/2024    SCRIBE #1 NOTE: I, Aman Herrmann, orville scribing for, and in the presence of,  Reema Riley MD. I have scribed the following portions of the note - Other sections scribed: HPI, ROS< PE.       History     Chief Complaint   Patient presents with    Extremity Weakness     Reports worsening R leg weakness and urinary retention since yesterday.       Time seen by physician: 10:49 AM    This is a 74 y.o. male, with multiple CVA's and ESRD on TRS dialysis, who presents with complaint of increased urinary frequency and frequency for the past two days, now with dysuria. He reports typically urinating regularly, however since onset he is only able to void a small amount.  Patient has also noted worsening bilateral leg swelling, which he attributes to missing dialysis both today and Saturday.  He denies any chest pain or shortness breath.  He denies any weakness.  Denies any vomiting or diarrhea.  Patient denies any other complaints at this time.     The history is provided by the patient.     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     ESRD (end stage renal disease)     Hyperlipidemia     Hypertension     Nephrotic range proteinuria     Paroxysmal atrial fibrillation     Stroke     nov 2012     Past Surgical History:   Procedure Laterality Date    AV FISTULA PLACEMENT Left 11/6/2023    Procedure: CREATION, AV FISTULA RADIOCEPHALIC;  Surgeon: AREN Duran II, MD;  Location: [facility] OR [address] [place] [address];  Service: Vascular;  Laterality: Left;    BIOPSY N/A 05/29/2018    Procedure: BIOPSY;  Surgeon: Neeta Diagnostic Provider;  Location: [facility] CATH LAB;  Service: Radiology;  Laterality: N/A;  ct guided      CARDIOVERSION N/A 12/03/2020    Procedure: CARDIOVERSION;  Surgeon:  Mehul Livingston MD;  Location: [facility] CATH LAB;  Service: Cardiology;  Laterality: N/A;    CORONARY ANGIOPLASTY WITH STENT PLACEMENT      INSERTION OF TUNNELED CENTRAL VENOUS HEMODIALYSIS CATHETER N/A 06/21/2023    Procedure: Insertion, Catheter, Central Venous, Hemodialysis;  Surgeon: Angelito Ashley MD;  Location: [facility] CATH LAB;  Service: Nephrology;  Laterality: N/A;    KNEE SURGERY Left     SHOULDER SURGERY Right      Family History   Problem Relation Name Age of Onset    Hypertension Mother      Heart attack Father  59    Stroke Sister      Esophageal cancer Brother       Social History     Tobacco Use    Smoking status: Former     Types: Cigars    Smokeless tobacco: Never    Tobacco comments:     occasional, when at the [employer]   Substance Use Topics    Alcohol use: Not Currently     Alcohol/week: 1.0 standard drink of alcohol     Types: 1 Cans of beer per week     Comment: occasional when at the [employer]    Drug use: No     Review of Systems   Constitutional:  Negative for chills and fever.   HENT:  Negative for congestion, rhinorrhea and sore throat.    Respiratory:  Negative for chest tightness and shortness of breath.    Cardiovascular:  Positive for leg swelling. Negative for chest pain and palpitations.   Gastrointestinal:  Negative for abdominal pain, diarrhea, nausea and vomiting.   Genitourinary:  Positive for dysuria and frequency. Negative for flank pain.   Musculoskeletal:  Negative for back pain and neck pain.   Skin:  Negative for color change, rash and wound.   Neurological:  Negative for dizziness, weakness and headaches.   Hematological:  Does not bruise/bleed easily.       Physical Exam     Initial Vitals   BP Pulse Resp Temp SpO2   06/17/24 1023 06/17/24 1023 06/17/24 1302 06/17/24 1023 06/17/24 1023   (!) 178/84 97 20 98.8 °F (37.1 °C) (!) 89 %      MAP       --                Physical Exam    Nursing note and vitals reviewed.  Constitutional: He appears well-developed and well-nourished. He  is not diaphoretic. No distress.   HENT:   Head: Normocephalic and atraumatic.   Eyes: Conjunctivae and EOM are normal.   Neck: Neck supple.   Normal range of motion.  Cardiovascular:  Normal rate, regular rhythm and normal heart sounds.           No murmur heard.  Pulses:       Dorsalis pedis pulses are 1+ on the right side and 1+ on the left side.   Pulmonary/Chest: Breath sounds normal. No respiratory distress. He has no wheezes. He has no rales.   Musculoskeletal:      Cervical back: Normal range of motion and neck supple.      Comments: 2+ pitting edema of the bilateral lower extremities.      Neurological: He is alert and oriented to person, place, and time.   Skin: Skin is warm and dry.   Psychiatric: He has a normal mood and affect.         ED Course   Procedures  Labs Reviewed   COMPREHENSIVE METABOLIC PANEL - Abnormal; Notable for the following components:       Result Value    CO2 22 (*)     Glucose 207 (*)     BUN 73 (*)     Creatinine 6.5 (*)     Total Bilirubin 1.1 (*)     eGFR 8 (*)     All other components within normal limits   CBC W/ AUTO DIFFERENTIAL - Abnormal; Notable for the following components:    RBC 3.27 (*)     Hemoglobin 10.8 (*)     Hematocrit 32.8 (*)      (*)     MCH 33.0 (*)     Immature Granulocytes 1.0 (*)     Immature Grans (Abs) 0.07 (*)     Lymph # 0.4 (*)     Mono # 1.2 (*)     Gran % 74.0 (*)     Lymph % 5.9 (*)     Mono % 16.3 (*)     All other components within normal limits   B-TYPE NATRIURETIC PEPTIDE - Abnormal; Notable for the following components:     (*)     All other components within normal limits   URINALYSIS - Abnormal; Notable for the following components:    Appearance, UA Cloudy (*)     Protein, UA 2+ (*)     Glucose, UA 1+ (*)     Occult Blood UA 1+ (*)     Leukocytes, UA 3+ (*)     All other components within normal limits   URINALYSIS MICROSCOPIC - Abnormal; Notable for the following components:    RBC, UA 22 (*)     WBC, UA >100 (*)     WBC  Clumps, UA Few (*)     Bacteria Many (*)     All other components within normal limits     EKG Readings: (Independently Interpreted)   11:21AM:  Rate of 93. Normal sinus rhythm. Normal axis. Normal intervals. No ST or ischemic changes.        Imaging Results    None          Medications   mupirocin 2 % ointment (has no administration in time range)   0.9%  NaCl infusion (has no administration in time range)   sodium chloride 0.9% bolus 250 mL 250 mL (has no administration in time range)   cefTRIAXone (Rocephin) 1 g in dextrose 5 % in water (D5W) 100 mL IVPB (MB+) (0 g Intravenous Stopped 6/17/24 1305)     Medical Decision Making  10:49AM:  Patient is a 74-year-old male who presents to the emergency department with lower extremity swelling along with urinary symptoms.  Patient appears well, nontoxic.  Patient does seem fluid overloaded and he has missed several dialysis sessions.  Will plan to check labs, urine, will continue to follow and reassess.    Amount and/or Complexity of Data Reviewed  External Data Reviewed: notes.  Labs: ordered. Decision-making details documented in ED Course.  ECG/medicine tests: ordered and independent interpretation performed. Decision-making details documented in ED Course.     Details: See interpretation above.     Risk  Decision regarding hospitalization.    1:30 PM:  Patient does have evidence of a UTI on his UA.  IV ceftriaxone given.  His electrolytes are within normal limits but he was still very fluid overloaded and his wife is concerned that he will not be able to go to dialysis tomorrow.  I discussed the patient with Dr. Livingston, patient's cardiologist, who is agreeable to plan for admission for aggressive dialysis for fluid removal.  I discussed with Sam Alejandra NP, who is agreeable to plan and will consult on the patient.            Scribe Attestation:   Scribe #1: I performed the above scribed service and the documentation accurately describes the services I  performed. I attest to the accuracy of the note.    Physician Attestation for Scribe: I, Reema Riley, reviewed documentation as scribed in my presence, which is both accurate and complete.                               Clinical Impression:  Final diagnoses:  [R53.1] Weakness  [M79.89] Leg swelling (Primary)  [N30.01] Acute cystitis with hematuria          ED Disposition Condition    Admit Stable                Reema Riley MD  06/17/24 2027

## 2024-06-18 LAB
POCT GLUCOSE: 190 MG/DL (ref 70–110)
POCT GLUCOSE: 234 MG/DL (ref 70–110)

## 2024-06-18 PROCEDURE — 25000003 PHARM REV CODE 250: Performed by: EMERGENCY MEDICINE

## 2024-06-18 PROCEDURE — 25000003 PHARM REV CODE 250: Performed by: INTERNAL MEDICINE

## 2024-06-18 PROCEDURE — 94761 N-INVAS EAR/PLS OXIMETRY MLT: CPT

## 2024-06-18 PROCEDURE — 21400001 HC TELEMETRY ROOM

## 2024-06-18 PROCEDURE — 80100016 HC MAINTENANCE HEMODIALYSIS

## 2024-06-18 PROCEDURE — 25000003 PHARM REV CODE 250: Performed by: NURSE PRACTITIONER

## 2024-06-18 RX ORDER — TAMSULOSIN HYDROCHLORIDE 0.4 MG/1
0.4 CAPSULE ORAL DAILY
Status: DISCONTINUED | OUTPATIENT
Start: 2024-06-18 | End: 2024-06-20 | Stop reason: HOSPADM

## 2024-06-18 RX ORDER — AMLODIPINE BESYLATE 5 MG/1
5 TABLET ORAL DAILY
Status: DISCONTINUED | OUTPATIENT
Start: 2024-06-18 | End: 2024-06-18

## 2024-06-18 RX ORDER — FUROSEMIDE 40 MG/1
80 TABLET ORAL 2 TIMES DAILY
Status: DISCONTINUED | OUTPATIENT
Start: 2024-06-18 | End: 2024-06-20 | Stop reason: HOSPADM

## 2024-06-18 RX ORDER — CALCITRIOL 0.25 UG/1
0.25 CAPSULE ORAL
Status: DISCONTINUED | OUTPATIENT
Start: 2024-06-18 | End: 2024-06-20 | Stop reason: HOSPADM

## 2024-06-18 RX ORDER — AMLODIPINE BESYLATE 5 MG/1
10 TABLET ORAL DAILY
Status: DISCONTINUED | OUTPATIENT
Start: 2024-06-18 | End: 2024-06-19

## 2024-06-18 RX ORDER — BISACODYL 10 MG/1
10 SUPPOSITORY RECTAL DAILY PRN
Status: COMPLETED | OUTPATIENT
Start: 2024-06-18 | End: 2024-06-18

## 2024-06-18 RX ORDER — GABAPENTIN 100 MG/1
100 CAPSULE ORAL 3 TIMES DAILY
Status: DISCONTINUED | OUTPATIENT
Start: 2024-06-18 | End: 2024-06-20 | Stop reason: HOSPADM

## 2024-06-18 RX ORDER — CIPROFLOXACIN 250 MG/1
250 TABLET, FILM COATED ORAL EVERY 12 HOURS
Status: DISCONTINUED | OUTPATIENT
Start: 2024-06-18 | End: 2024-06-20 | Stop reason: HOSPADM

## 2024-06-18 RX ORDER — SEVELAMER CARBONATE 800 MG/1
800 TABLET, FILM COATED ORAL
Status: DISCONTINUED | OUTPATIENT
Start: 2024-06-18 | End: 2024-06-20 | Stop reason: HOSPADM

## 2024-06-18 RX ADMIN — GABAPENTIN 100 MG: 100 CAPSULE ORAL at 10:06

## 2024-06-18 RX ADMIN — BISACODYL 10 MG: 10 SUPPOSITORY RECTAL at 03:06

## 2024-06-18 RX ADMIN — AMLODIPINE BESYLATE 5 MG: 5 TABLET ORAL at 12:06

## 2024-06-18 RX ADMIN — AMLODIPINE BESYLATE 10 MG: 5 TABLET ORAL at 02:06

## 2024-06-18 RX ADMIN — ISOSORBIDE MONONITRATE 60 MG: 30 TABLET, EXTENDED RELEASE ORAL at 02:06

## 2024-06-18 RX ADMIN — SEVELAMER CARBONATE 800 MG: 800 TABLET, FILM COATED ORAL at 05:06

## 2024-06-18 RX ADMIN — SEVELAMER CARBONATE 800 MG: 800 TABLET, FILM COATED ORAL at 02:06

## 2024-06-18 RX ADMIN — CALCITRIOL CAPSULES 0.25 MCG 0.25 MCG: 0.25 CAPSULE ORAL at 03:06

## 2024-06-18 RX ADMIN — APIXABAN 2.5 MG: 2.5 TABLET, FILM COATED ORAL at 10:06

## 2024-06-18 RX ADMIN — CIPROFLOXACIN 250 MG: 250 TABLET, COATED ORAL at 10:06

## 2024-06-18 RX ADMIN — GABAPENTIN 100 MG: 100 CAPSULE ORAL at 02:06

## 2024-06-18 RX ADMIN — ASPIRIN 81 MG: 81 TABLET, COATED ORAL at 10:06

## 2024-06-18 RX ADMIN — HYDRALAZINE HYDROCHLORIDE 100 MG: 25 TABLET ORAL at 10:06

## 2024-06-18 RX ADMIN — ASCORBIC ACID, THIAMINE MONONITRATE,RIBOFLAVIN, NIACINAMIDE, PYRIDOXINE HYDROCHLORIDE, FOLIC ACID, CYANOCOBALAMIN, BIOTIN, CALCIUM PANTOTHENATE, 1 CAPSULE: 100; 1.5; 1.7; 20; 10; 1; 6000; 150000; 5 CAPSULE, LIQUID FILLED ORAL at 02:06

## 2024-06-18 RX ADMIN — MUPIROCIN: 20 OINTMENT TOPICAL at 10:06

## 2024-06-18 RX ADMIN — ATORVASTATIN CALCIUM 40 MG: 20 TABLET, FILM COATED ORAL at 10:06

## 2024-06-18 RX ADMIN — AMIODARONE HYDROCHLORIDE 200 MG: 200 TABLET ORAL at 02:06

## 2024-06-18 RX ADMIN — FUROSEMIDE 80 MG: 40 TABLET ORAL at 10:06

## 2024-06-18 RX ADMIN — TAMSULOSIN HYDROCHLORIDE 0.4 MG: 0.4 CAPSULE ORAL at 02:06

## 2024-06-18 RX ADMIN — FAMOTIDINE 20 MG: 20 TABLET ORAL at 10:06

## 2024-06-18 NOTE — CONSULTS
"Consult Note  Nephrology    Consult Requested By: Mehul Livingston MD  Reason for Consult: ESRD/Volume overload    SUBJECTIVE:     History of Present Illness:  Patient is a 74 y.o. male presents with c/o leg swelling/r leg pain/dysuria.  Given rocephin in ED and admitted for HD.  Ext med hx as outlined below and known to us from previous admissions.  Now on HD TTS in [place] with Dr. Ford.  Noncompliant with HD and has missed almost 1 week due to being "tired".  Underwent HD/UF yesterday pm with 4L UF and felt better.  Seen and examined this am and doing better overall.  Discussed with Dr. Livingston.  Still with 2+ edema.      EPIC reviewed.      Past Medical History:   Diagnosis Date    Anticoagulant long-term use     Arthritis     Coronary artery disease     Diabetes mellitus type II     DM due to underlying condition with diabetic nephropathy     ESRD (end stage renal disease)     Hyperlipidemia     Hypertension     Nephrotic range proteinuria     Paroxysmal atrial fibrillation     Seizures     Stroke     nov 2012     Past Surgical History:   Procedure Laterality Date    AV FISTULA PLACEMENT Left 11/6/2023    Procedure: CREATION, AV FISTULA RADIOCEPHALIC;  Surgeon: AREN Duran II, MD;  Location: [facility] OR [address] [place] [address];  Service: Vascular;  Laterality: Left;    BIOPSY N/A 05/29/2018    Procedure: BIOPSY;  Surgeon: Neeta Diagnostic Provider;  Location: [facility] CATH LAB;  Service: Radiology;  Laterality: N/A;  ct guided      CARDIOVERSION N/A 12/03/2020    Procedure: CARDIOVERSION;  Surgeon: Mehul Livingston MD;  Location: [facility] CATH LAB;  Service: Cardiology;  Laterality: N/A;    CORONARY ANGIOPLASTY WITH STENT PLACEMENT      INSERTION OF TUNNELED CENTRAL VENOUS HEMODIALYSIS CATHETER N/A 06/21/2023    Procedure: Insertion, Catheter, Central Venous, Hemodialysis;  Surgeon: Angelito Ashley MD;  Location: [facility] CATH LAB;  Service: Nephrology;  Laterality: N/A;    KNEE SURGERY Left     SHOULDER SURGERY Right  "     Family History   Problem Relation Name Age of Onset    Hypertension Mother      Heart attack Father  59    Stroke Sister      Esophageal cancer Brother       Social History     Tobacco Use    Smoking status: Former     Types: Cigars    Smokeless tobacco: Never    Tobacco comments:     occasional, when at the [employer]   Substance Use Topics    Alcohol use: Not Currently     Alcohol/week: 1.0 standard drink of alcohol     Types: 1 Cans of beer per week    Drug use: No       Review of patient's allergies indicates:   Allergen Reactions    Doxycycline Rash        Review of Systems:  Constitutional: No fever or chills  Respiratory: No cough or shortness of breath  Cardiovascular: No chest pain or palpitations  Gastrointestinal: No nausea or vomiting  Neurological: No confusion or weakness  Leg edema    OBJECTIVE:     Vital Signs (Most Recent)  Temp: 98.5 °F (36.9 °C) (06/18/24 0831)  Pulse: 80 (06/18/24 0831)  Resp: 18 (06/18/24 0831)  BP: 137/64 (06/18/24 0831)  SpO2: (!) 92 % (06/18/24 0831)    Vital Signs Range (Last 24H):  Temp:  [97 °F (36.1 °C)-98.8 °F (37.1 °C)]   Pulse:  []   Resp:  [16-40]   BP: (107-184)/()   SpO2:  [89 %-95 %]       Intake/Output Summary (Last 24 hours) at 6/18/2024 0833  Last data filed at 6/18/2024 0436  Gross per 24 hour   Intake 180 ml   Output 4735 ml   Net -4555 ml       Physical Exam:  General appearance: Well developed, well nourished  Eyes:  Conjunctivae/corneas clear. PERRL.  Lungs: Normal respiratory effort,   clear to auscultation bilaterally   Heart: Regular rate and rhythm, S1, S2 normal, 3/6 murmur  Abdomen: Soft, non-tender non-distended; bowel sounds normal; no masses,  no organomegaly, obese  Extremities: No cyanosis or clubbing. 2+ edema.    Skin: Skin color, texture, turgor normal. No rashes or lesions  Neurologic: Normal strength and tone. No focal numbness or weakness   Left wrist AVF+    Laboratory:  Recent Labs   Lab 06/17/24  1107   WBC 7.28   RBC 3.27*    HGB 10.8*   HCT 32.8*      *   MCH 33.0*   MCHC 32.9     BMP:   Recent Labs   Lab 06/17/24  1107   *      K 5.0      CO2 22*   BUN 73*   CREATININE 6.5*   CALCIUM 9.4     Lab Results   Component Value Date    CALCIUM 9.4 06/17/2024    PHOS 6.4 (H) 06/18/2023     BNP  Recent Labs   Lab 06/17/24  1107   *     Lab Results   Component Value Date    URICACID 6.5 08/16/2022     Lab Results   Component Value Date    IRON 60 08/16/2022    TIBC 379 08/16/2022    FERRITIN 203 08/16/2022     Lab Results   Component Value Date     (H) 05/16/2024    CALCIUM 9.4 06/17/2024    PHOS 6.4 (H) 06/18/2023       Diagnostic Results:  No orders to display       ASSESSMENT/PLAN:     ESRD [place] [facility] [place] Ford:  -hx of noncompliance and missing mult treatments.  -HD last pm with 4L Uf  -extra UF this am.  -consents signed.  -see orders.  -Renally dose meds, avoid nephrotoxins, and monitor I/O's closely.  Pt was seen during hemodialysis today, dialysis flowsheet, labs, vitals were reviewed.  On HD for removal of uremic toxins and volume.  Labs have been reviewed and the dialysate bath has been adjusted if needed. (See orders).  There are no symptoms of chest pain, dyspnea, nausea or vomiting.      Suspected UTI:  -s/p rocephin.  Cipro for now.  Unfortunately no culture was obtained    DM:  -recommend GLP  -defer.    HTN:  -elevated with noncompliance.  -UF/UF/UF  -change lasix to 80 BID    Anemia of CKD:  -at goal    HPTH/MBD:  -nephrocaps  -binders with meals  -calcitriol on HD days.     PAD:  -defer.  -worse with edema      Thanks for consult  See above  Will follow along.         High MDM complexity necessary to treat or prevent imminent or life-threatening deterioration of the following conditions: ESRD  Time spent personally by me on the following activities 75 min: development of treatment plan with patient or surrogate, discussions with consultants,  interpretation of cardiac output measurements, examination of patient, ordering and performing treatments and interventions, evaluation of patient's response to treatment, obtaining history from patient or surrogate, ordering and review of laboratory studies, ordering and review of radiographic studies, re-evaluation of patient's condition, pulse oximetry and blood draw for specimens  I evaluated the patient with the NP and was present during the history and performed the physical examination above.  I formulated all medical decision making.  Although the note was started by the NP, I have taken responsibility for the plan and have signed this note. I was present for more that 50% of total time: 75min

## 2024-06-18 NOTE — PROGRESS NOTES
06/18/24 1230   Post-Hemodialysis Assessment   Rinseback Volume (mL) 250 mL   Blood Volume Processed (Liters) 54 L   Dialyzer Clearance Lightly streaked   Duration of Treatment 180 minutes   Additional Fluid Intake (mL) 500 mL   Total UF (mL) 3500 mL   Net Fluid Removal 3000   Patient Response to Treatment tolerated   Arterial bleeding stop time (min) 10 min   Venous bleeding stop time (min) 10 min   Post-Hemodialysis Comments Treatment completed per P&P, tolerated well, vss post Hd, Pt alert without c/os. Report called to primary nurse.

## 2024-06-18 NOTE — PLAN OF CARE
Pt remained free of falls and injuries throughout shift. AAOx3, disoriented to place, reoriented as needed. Purposeful hourly rounding performed. Pt swallows meds whole. IV flushed and saline locked. Frequent weight shift encouraged, pt educated about importance of turning in bed q2h, needs reinforcement. Condom catheter in place to gravity. VSS on RA. Telemetry maintained. Bed low and locked, bed alarm on, call light in reach. Side rails up x3. Will continue to monitor.

## 2024-06-18 NOTE — PLAN OF CARE
Case Management Assessment     PCP: Correct in [facility]   Pharmacy: Bedside     Patient Arrived From: Home   Existing Help at Home: Family/Girlfriend     Barriers to Discharge: None     Discharge Plan:    A. Home with family    B. Home health    06/18/24 0817   Discharge Assessment   Assessment Type Discharge Planning Assessment   Confirmed/corrected address, phone number and insurance Yes   Confirmed Demographics Correct on Facesheet   Source of Information patient;health record   People in Home spouse   Do you expect to return to your current living situation? Yes   Do you have help at home or someone to help you manage your care at home? Yes   Prior to hospitilization cognitive status: Alert/Oriented   Current cognitive status: Alert/Oriented   Walking or Climbing Stairs Difficulty no   Dressing/Bathing Difficulty no   Equipment Currently Used at Home walker, standard   Readmission within 30 days? Yes   Patient currently being followed by outpatient case management? No   Do you currently have service(s) that help you manage your care at home? No   Do you take prescription medications? Yes   Do you have prescription coverage? Yes   Do you have any problems affording any of your prescribed medications? No   Is the patient taking medications as prescribed? yes   How do you get to doctors appointments? family or friend will provide   Are you on dialysis? No   Do you take coumadin? No   Discharge Plan A Home with family   Discharge Plan B Home Health;Home with family   DME Needed Upon Discharge  none   Discharge Plan discussed with: Patient   Transition of Care Barriers None   Physical Activity   On average, how many days per week do you engage in moderate to strenuous exercise (like a brisk walk)? 0 days   On average, how many minutes do you engage in exercise at this level? 0 min   Financial Resource Strain   How hard is it for you to pay for the very basics like food, housing, medical care, and heating? Not hard    Housing Stability   In the last 12 months, was there a time when you were not able to pay the mortgage or rent on time? N   At any time in the past 12 months, were you homeless or living in a shelter (including now)? N   Transportation Needs   Has the lack of transportation kept you from medical appointments, meetings, work or from getting things needed for daily living? No   Food Insecurity   Within the past 12 months, you worried that your food would run out before you got the money to buy more. Never true   Within the past 12 months, the food you bought just didn't last and you didn't have money to get more. Never true   Stress   Do you feel stress - tense, restless, nervous, or anxious, or unable to sleep at night because your mind is troubled all the time - these days? To some exte   Social Isolation   How often do you feel lonely or isolated from those around you?  Never   Alcohol Use   Q1: How often do you have a drink containing alcohol? Never   Q2: How many drinks containing alcohol do you have on a typical day when you are drinking? None   Q3: How often do you have six or more drinks on one occasion? Never   Utilities   In the past 12 months has the electric, gas, oil, or water company threatened to shut off services in your home? No   Health Literacy   How often do you need to have someone help you when you read instructions, pamphlets, or other written material from your doctor or pharmacy? Never

## 2024-06-18 NOTE — PLAN OF CARE
VSS on 2l of O2 via nasal cannula and afebrile this shift.  Patient has poor appetite during this shift. Condom catheter changed. Patient doesn't had any bowel movement although he feels like he needs to have one. Fall precautions maintained and call light in reach.  POC updated questions answered and comments acknowledged.      Problem: Adult Inpatient Plan of Care  Goal: Plan of Care Review  Outcome: Progressing  Goal: Patient-Specific Goal (Individualized)  Outcome: Progressing  Goal: Absence of Hospital-Acquired Illness or Injury  Outcome: Progressing  Goal: Optimal Comfort and Wellbeing  Outcome: Progressing  Goal: Readiness for Transition of Care  Outcome: Progressing     Problem: Hemodialysis  Goal: Safe, Effective Therapy Delivery  Outcome: Progressing  Goal: Effective Tissue Perfusion  Outcome: Progressing  Goal: Absence of Infection Signs and Symptoms  Outcome: Progressing     Problem: Diabetes Comorbidity  Goal: Blood Glucose Level Within Targeted Range  Outcome: Progressing     Problem: Skin Injury Risk Increased  Goal: Skin Health and Integrity  Outcome: Progressing     Problem: Wound  Goal: Optimal Coping  Outcome: Progressing  Goal: Optimal Functional Ability  Outcome: Progressing  Goal: Absence of Infection Signs and Symptoms  Outcome: Progressing  Goal: Improved Oral Intake  Outcome: Progressing  Goal: Optimal Pain Control and Function  Outcome: Progressing  Goal: Skin Health and Integrity  Outcome: Progressing  Goal: Optimal Wound Healing  Outcome: Progressing

## 2024-06-18 NOTE — CONSULTS
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     ESRD (end stage renal disease)     Hyperlipidemia     Hypertension     Nephrotic range proteinuria     Paroxysmal atrial fibrillation     Seizures     Stroke     nov 2012       Social History     Socioeconomic History    Marital status: Single   Tobacco Use    Smoking status: Former     Types: Cigars    Smokeless tobacco: Never    Tobacco comments:     occasional, when at the [place]   Substance and Sexual Activity    Alcohol use: Not Currently     Alcohol/week: 1.0 standard drink of alcohol     Types: 1 Cans of beer per week    Drug use: No    Sexual activity: Yes     Partners: Female     Birth control/protection: None     Social Determinants of Health     Financial Resource Strain: Low Risk  (6/18/2024)    Overall Financial Resource Strain (CARDIA)     Difficulty of Paying Living Expenses: Not hard at all   Food Insecurity: No Food Insecurity (6/18/2024)    Hunger Vital Sign     Worried About Running Out of Food in the Last Year: Never true     Ran Out of Food in the Last Year: Never true   Transportation Needs: No Transportation Needs (6/18/2024)    TRANSPORTATION NEEDS     Transportation : No   Physical Activity: Inactive (6/18/2024)    Exercise Vital Sign     Days of Exercise per Week: 0 days     Minutes of Exercise per Session: 0 min   Stress: Stress Concern Present (6/18/2024)    [employer] - Occupational Stress Questionnaire     Feeling of Stress : To some extent   Housing Stability: Low Risk  (6/18/2024)    Housing Stability Vital Sign     Unable to Pay for Housing in the Last Year: No     Homeless in the Last  Year: No       Precautions:     Allergies as of 06/17/2024 - Reviewed 06/17/2024   Allergen Reaction Noted    Doxycycline Rash 09/11/2012       [facility] Assessment Details/Treatment     Wound care consult received for assessment of sacrum, bilateral arms and lower legs. Patient is a 74 year old male  with c/o leg swelling/r leg pain/dysuria. Last week he went to the emergency room with the pain in the right lower leg, arterial ultrasound in the office showed a high-grade stenosis of the midportion of the right anterior tibial artery. Past medical history of diabetes mellitus and hypertension, history of previous strokes in 2012, coronary artery disease status post angioplasty and stent placement, paroxysmal atrial fibrillation, status post cardioversion, nephrotic syndrome, end-stage renal disease on hemodialysis.    Upon assessment to right buttock noted full thickness wound. Patient stated skin injury is from fall at home. Also noted multiple areas of bruising and scabbed skin tears to upper and lower extremities.     Recommendations:   - Buttock: cleanse with Vashe and apply triad paste to open wound. Cover with foam dressing. Change every 2 days or prn if soiled.   - Bilateral upper and lower legs: routine skin with moisturizing lotion     Nursing and MD team notified. Orders placed. Nursing to maintain pressure injury prevention interventions. Wound care to follow.        06/18/24 0909        Wound 06/17/24 2030 Ulceration Right medial Buttocks #1   Date First Assessed/Time First Assessed: 06/17/24 2030   Present on Original Admission: Yes  Primary Wound Type: Ulceration  Side: Right  Orientation: medial  Location: Buttocks  Wound Number: #1  Is this injury device related?: No   Wound Image   (right buttocks)   Dressing Appearance Open to air   Drainage Amount None   Drainage Characteristics/Odor No odor   Appearance Red;Slough;Moist   Tissue loss description Full thickness   Periwound Area Dry;Redness   Wound Edges  Open   Wound Length (cm) 2 cm   Wound Width (cm) 1.5 cm   Wound Depth (cm) 0.3 cm   Wound Volume (cm^3) 0.9 cm^3   Wound Surface Area (cm^2) 3 cm^2   Care Cleansed with:;Antimicrobial agent;Applied:;Skin Barrier  (triad paste)   Dressing Applied;Silicone;Foam                                 06/18/2024

## 2024-06-18 NOTE — PROGRESS NOTES
06/17/24 1849   Post-Hemodialysis Assessment   Rinseback Volume (mL) 250 mL   Blood Volume Processed (Liters) 69 L   Dialyzer Clearance Clear   Duration of Treatment 3 minutes   Additional Fluid Intake (mL) 0 mL   Total UF (mL) 4500 mL   Net Fluid Removal 4000   Patient Response to Treatment tolerated   Post-Hemodialysis Comments HD completed, blood returned, needles removed, hemostasis achieved, dressing applied, report given to RN

## 2024-06-18 NOTE — PT/OT/SLP PROGRESS
Physical Therapy  Not Seen    Patient Name:  Zev Castano   MRN:  [id number]    Patient not seen today secondary to Dialysis. Will follow-up tomorrow, 6/19.

## 2024-06-18 NOTE — NURSING
Received pt handoff report from NEHAL Alcaraz. Pt now in room 313. AAOx3-4, disoriented to place, says he is at his house but easily reoriented. BP elevated 178/75. Scheduled hydralazine 100 mg given, will reassess. Other VS stable on 2L O2 NC. Pt denies pain. Respirations even and non-labored, lungs clear bilaterally, and denies feeling SOB at this time. Pt too weak to ambulate, condom catheter in place to gravity. Skin assessment performed with POLO Pablo. Telemetry placed, box 8610. Pt's only belongings are 2 shirts he is wearing, declines taking them off to put on gown. Pt oriented to care setting and plan of care. Bed low and locked, bed alarm on, side rails up x 2 and call light within reach. Will continue to monitor.

## 2024-06-18 NOTE — H&P
[facility] S[facility] ([place])  History & Physical    History of Present Illness: Mr Castano presented to the emergency room with complaints of urinary frequency and burning while urination.  He has complained of swelling in the lower legs, and pain in the right lower leg upon standing.  Last week he went to the emergency room with the pain in the right lower leg, arterial ultrasound in the office showed a high-grade stenosis of the midportion of the right anterior tibial artery.  He missed a couple of dialysis days last week.  History of longstanding diabetes mellitus and hypertension, history of previous strokes in 2012, coronary artery disease status post angioplasty and stent placement, paroxysmal atrial fibrillation, status post cardioversion, nephrotic syndrome, end-stage renal disease on hemodialysis.    PTA Medications   Medication Sig    allopurinoL (ZYLOPRIM) 100 MG tablet Take 1 tablet (100 mg total) by mouth once daily.    amiodarone (PACERONE) 200 MG Tab Take 1 tablet (200 mg total) by mouth once daily.    amLODIPine (NORVASC) 5 MG tablet Take 5 mg by mouth once daily.    apixaban (ELIQUIS) 2.5 mg Tab Take 1 tablet (2.5 mg total) by mouth 2 (two) times daily.    aspirin (ECOTRIN) 81 MG EC tablet Take 1 tablet (81 mg total) by mouth once daily. (Patient taking differently: Take 81 mg by mouth nightly.)    atorvastatin (LIPITOR) 40 MG tablet TAKE 1 TABLET BY MOUTH EVERY DAY (Patient taking differently: Take 40 mg by mouth every evening. TAKE 1 TABLET BY MOUTH EVERY DAY)    famotidine (PEPCID) 20 MG tablet Take 1 tablet (20 mg total) by mouth once daily. (Patient taking differently: Take 20 mg by mouth every evening.)    furosemide (LASIX) 40 MG tablet Take 40 mg by mouth 2 (two) times a day.    gabapentin (NEURONTIN) 300 MG capsule Take 1 capsule by mouth 3 (three) times daily.    hydrALAZINE (APRESOLINE) 100 MG tablet Take 1 tablet (100 mg total) by mouth every 12 (twelve) hours.    isosorbide mononitrate  (IMDUR) 60 MG 24 hr tablet Take 1 tablet (60 mg total) by mouth once daily.    nitroGLYCERIN (NITROSTAT) 0.4 MG SL tablet Place 1 tablet (0.4 mg total) under the tongue every 5 (five) minutes as needed for Chest pain. Go to the ED for eval if you continue to have chest pain after 3 doses    SITagliptin (JANUVIA) 50 MG Tab Take 1 tablet (50 mg total) by mouth once daily.    tiZANidine (ZANAFLEX) 4 MG tablet Take 1 tablet (4 mg total) by mouth 3 (three) times daily as needed.    oxyCODONE (ROXICODONE) 5 MG immediate release tablet Take 1 tablet (5 mg total) by mouth every 6 (six) hours as needed for Pain.    oxyCODONE-acetaminophen (PERCOCET) 5-325 mg per tablet Take 1 tablet by mouth every 6 (six) hours as needed for Pain.    silver sulfADIAZINE 1% (SILVADENE) 1 % cream Apply to affected area daily (Patient not taking: Reported on 6/11/2024)    sulfamethoxazole-trimethoprim 800-160mg (BACTRIM DS) 800-160 mg Tab Take 1 tablet by mouth 2 (two) times daily. (Patient not taking: Reported on 6/11/2024)    sulfamethoxazole-trimethoprim 800-160mg (BACTRIM DS) 800-160 mg Tab Take 1 tablet by mouth 2 (two) times daily. (Patient not taking: Reported on 6/11/2024)    tamsulosin (FLOMAX) 0.4 mg Cap Take 0.4 mg by mouth nightly. 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     ESRD (end stage renal disease)     Hyperlipidemia     Hypertension     Nephrotic range proteinuria     Paroxysmal atrial fibrillation     Seizures     Stroke     nov 2012     Past Surgical History:   Procedure Laterality Date    AV FISTULA PLACEMENT Left 11/6/2023    Procedure: CREATION, AV FISTULA RADIOCEPHALIC;  Surgeon: AREN Duran II, MD;  Location: [facility]  OR 2ND FLR;  Service: Vascular;  Laterality: Left;    BIOPSY N/A 05/29/2018    Procedure: BIOPSY;  Surgeon: Neeta Diagnostic Provider;  Location: [facility] CATH LAB;  Service: Radiology;  Laterality: N/A;  ct guided      CARDIOVERSION N/A 12/03/2020    Procedure: CARDIOVERSION;  Surgeon: Mehul Livingston MD;  Location: [facility] CATH LAB;  Service: Cardiology;  Laterality: N/A;    CORONARY ANGIOPLASTY WITH STENT PLACEMENT      INSERTION OF TUNNELED CENTRAL VENOUS HEMODIALYSIS CATHETER N/A 06/21/2023    Procedure: Insertion, Catheter, Central Venous, Hemodialysis;  Surgeon: Angelito Ashley MD;  Location: [facility] CATH LAB;  Service: Nephrology;  Laterality: N/A;    KNEE SURGERY Left     SHOULDER SURGERY Right      Family History   Problem Relation Name Age of Onset    Hypertension Mother      Heart attack Father  59    Stroke Sister      Esophageal cancer Brother       Social History     Tobacco Use    Smoking status: Former     Types: Cigars    Smokeless tobacco: Never    Tobacco comments:     occasional, when at the casino   Substance Use Topics    Alcohol use: Not Currently     Alcohol/week: 1.0 standard drink of alcohol     Types: 1 Cans of beer per week    Drug use: No        Physical Exam:  Awake alert in no acute distress  Carotid upstroke is brisk there is no carotid bruit  Chest is clear  Heart regular.  Soft basal ejection systolic murmur.  Abdomen is soft and nontender.  Extremities are warm.  There is 2+ ankle edema.  The distal pulses are poorly felt.  The neurological exam is grossly intact.    Impression on Admission:   Urinary tract infection  End-stage renal disease on dialysis  Hypertension  Diabetes mellitus  Previous strokes  Stable coronary artery disease  High-grade stenosis of the midportion of the right anterior tibial artery

## 2024-06-18 NOTE — PROGRESS NOTES
"Pt's BP still elevated, 176/78, despite receiving hydralazine 100 mg PO and Lasix 40 mg PO @ 2048 hours. Pt also has not urinated since ~1950 hours. Pt refusing bladder scan or BP reassessment at this time. Dr. Livingston notified. Ordered for pt to receive his home dose amlodipine 5 mg now, given. Will attempt to reassess pt's BP at 5 AM vitals check. Pt asymptomatic and insists he "just needs to sleep. I haven't slept in 3 days!" Telemetry maintained, SR. All safety precautions in place. Continuing to monitor.  "

## 2024-06-19 PROCEDURE — 97116 GAIT TRAINING THERAPY: CPT

## 2024-06-19 PROCEDURE — 25000003 PHARM REV CODE 250: Performed by: EMERGENCY MEDICINE

## 2024-06-19 PROCEDURE — 97165 OT EVAL LOW COMPLEX 30 MIN: CPT

## 2024-06-19 PROCEDURE — 25000003 PHARM REV CODE 250: Performed by: NURSE PRACTITIONER

## 2024-06-19 PROCEDURE — 21400001 HC TELEMETRY ROOM

## 2024-06-19 PROCEDURE — 97162 PT EVAL MOD COMPLEX 30 MIN: CPT

## 2024-06-19 PROCEDURE — 25000003 PHARM REV CODE 250: Performed by: INTERNAL MEDICINE

## 2024-06-19 RX ORDER — ACETAMINOPHEN 325 MG/1
650 TABLET ORAL EVERY 4 HOURS PRN
Status: DISCONTINUED | OUTPATIENT
Start: 2024-06-19 | End: 2024-06-20 | Stop reason: HOSPADM

## 2024-06-19 RX ORDER — AMLODIPINE BESYLATE 5 MG/1
5 TABLET ORAL DAILY
Status: DISCONTINUED | OUTPATIENT
Start: 2024-06-20 | End: 2024-06-20

## 2024-06-19 RX ADMIN — SEVELAMER CARBONATE 800 MG: 800 TABLET, FILM COATED ORAL at 11:06

## 2024-06-19 RX ADMIN — FUROSEMIDE 80 MG: 40 TABLET ORAL at 08:06

## 2024-06-19 RX ADMIN — FUROSEMIDE 80 MG: 40 TABLET ORAL at 09:06

## 2024-06-19 RX ADMIN — ASCORBIC ACID, THIAMINE MONONITRATE,RIBOFLAVIN, NIACINAMIDE, PYRIDOXINE HYDROCHLORIDE, FOLIC ACID, CYANOCOBALAMIN, BIOTIN, CALCIUM PANTOTHENATE, 1 CAPSULE: 100; 1.5; 1.7; 20; 10; 1; 6000; 150000; 5 CAPSULE, LIQUID FILLED ORAL at 08:06

## 2024-06-19 RX ADMIN — CIPROFLOXACIN 250 MG: 250 TABLET, COATED ORAL at 08:06

## 2024-06-19 RX ADMIN — GABAPENTIN 100 MG: 100 CAPSULE ORAL at 02:06

## 2024-06-19 RX ADMIN — AMLODIPINE BESYLATE 10 MG: 5 TABLET ORAL at 08:06

## 2024-06-19 RX ADMIN — SEVELAMER CARBONATE 800 MG: 800 TABLET, FILM COATED ORAL at 08:06

## 2024-06-19 RX ADMIN — ACETAMINOPHEN 650 MG: 325 TABLET, FILM COATED ORAL at 11:06

## 2024-06-19 RX ADMIN — MUPIROCIN: 20 OINTMENT TOPICAL at 08:06

## 2024-06-19 RX ADMIN — SEVELAMER CARBONATE 800 MG: 800 TABLET, FILM COATED ORAL at 05:06

## 2024-06-19 RX ADMIN — GABAPENTIN 100 MG: 100 CAPSULE ORAL at 08:06

## 2024-06-19 RX ADMIN — ISOSORBIDE MONONITRATE 60 MG: 30 TABLET, EXTENDED RELEASE ORAL at 08:06

## 2024-06-19 RX ADMIN — ATORVASTATIN CALCIUM 40 MG: 20 TABLET, FILM COATED ORAL at 09:06

## 2024-06-19 RX ADMIN — APIXABAN 2.5 MG: 2.5 TABLET, FILM COATED ORAL at 08:06

## 2024-06-19 RX ADMIN — AMIODARONE HYDROCHLORIDE 200 MG: 200 TABLET ORAL at 08:06

## 2024-06-19 RX ADMIN — TAMSULOSIN HYDROCHLORIDE 0.4 MG: 0.4 CAPSULE ORAL at 08:06

## 2024-06-19 RX ADMIN — APIXABAN 2.5 MG: 2.5 TABLET, FILM COATED ORAL at 09:06

## 2024-06-19 RX ADMIN — CIPROFLOXACIN 250 MG: 250 TABLET, COATED ORAL at 09:06

## 2024-06-19 RX ADMIN — ASPIRIN 81 MG: 81 TABLET, COATED ORAL at 09:06

## 2024-06-19 RX ADMIN — MUPIROCIN: 20 OINTMENT TOPICAL at 09:06

## 2024-06-19 RX ADMIN — FAMOTIDINE 20 MG: 20 TABLET ORAL at 09:06

## 2024-06-19 RX ADMIN — GABAPENTIN 100 MG: 100 CAPSULE ORAL at 09:06

## 2024-06-19 NOTE — PROGRESS NOTES
"Nephrology  Progress Note    Admit Date: 6/17/2024   LOS: 2 days     SUBJECTIVE:     Follow-up For:  ESRD    History of Present Illness:  Patient is a 74 y.o. male presents with c/o leg swelling/r leg pain/dysuria.  Given rocephin in ED and admitted for HD.  Ext med hx as outlined below and known to us from previous admissions.  Now on HD TTS in [place] with Dr. Ford.  Noncompliant with HD and has missed almost 1 week due to being "tired".  Underwent HD/UF yesterday pm with 4L UF and felt better.  Seen and examined this am and doing better overall.  Discussed with Dr. Livingston.  Still with 2+ edema.        Interval History:     Tolerated 8L UF in last 2 days and no more edema and BP markedly improved.  C/o RLE pain and weakness.      Review of Systems:  Constitutional: No fever or chills  Respiratory: No cough or shortness of breath  Cardiovascular: No chest pain or palpitations  Gastrointestinal: No nausea or vomiting  Neurological: RLE pain/weak    OBJECTIVE:     Vital Signs Range (Last 24H):  /61   Pulse 75   Temp 98.3 °F (36.8 °C)   Resp 16   Ht 6' 1" (1.854 m)   Wt 115.6 kg (254 lb 13.6 oz)   SpO2 (!) 94%   BMI 33.62 kg/m²     Temp:  [97.7 °F (36.5 °C)-98.8 °F (37.1 °C)]   Pulse:  [73-94]   Resp:  [16-20]   BP: ()/(52-76)   SpO2:  [94 %-96 %]     I & O (Last 24H):  Intake/Output Summary (Last 24 hours) at 6/19/2024 0913  Last data filed at 6/19/2024 0632  Gross per 24 hour   Intake 1405 ml   Output 3630 ml   Net -2225 ml       Physical Exam:  General appearance: Well developed, well nourished  Eyes:  Conjunctivae/corneas clear. PERRL.  Lungs: Normal respiratory effort,   clear to auscultation bilaterally   Heart: Regular rate and rhythm, S1, S2 normal, 3/6 murmur  Abdomen: Soft, non-tender non-distended; bowel sounds normal; no masses,  no organomegaly, obese  Extremities: No cyanosis or clubbing. trace+ edema.    Skin: Skin color, texture, turgor normal. No rashes or " "lesions  Neurologic: LE weakness   Left wrist AVF+      Laboratory Data:  No results for input(s): "WBC", "RBC", "HGB", "HCT", "PLT", "MCV", "MCH", "MCHC" in the last 24 hours.    BMP:   Recent Labs   Lab 06/17/24  1107   *      K 5.0      CO2 22*   BUN 73*   CREATININE 6.5*   CALCIUM 9.4     Lab Results   Component Value Date    CALCIUM 9.4 06/17/2024    PHOS 6.4 (H) 06/18/2023       Lab Results   Component Value Date     (H) 05/16/2024    CALCIUM 9.4 06/17/2024    PHOS 6.4 (H) 06/18/2023       Lab Results   Component Value Date    URICACID 6.5 08/16/2022       BNP  Recent Labs   Lab 06/17/24  1107   *       Medications:  Medication list was reviewed and changes noted under Assessment/Plan.    Diagnostic Results:        ASSESSMENT/PLAN:     ESRD on HD TTS at [facility] with Dr. Ford:  -hx of noncompliance and missing mult treatments.  -HD last 2 days w/ 8L removed  -consents signed.  -see orders.  -keep on TTS for now  -Renally dose meds, avoid nephrotoxins, and monitor I/O's closely.        Suspected UTI:  -s/p rocephin.  Cipro for now.  Unfortunately no culture was obtained     DM:  -recommend GLP  -defer.     HTN:  -elevated with noncompliance.  -UF/UF/UF  -changed lasix to 80 BID  -now soft BP's so hold hydralazine and back off norvasc.       Anemia of CKD:  -at goal     HPTH/MBD:  -nephrocaps  -binders with meals  -calcitriol on HD days.      PAD:  -defer.  -worse with edema  R>L          Thanks for consult  See above  Will follow along for Renal needs  Ok to DC from Renal         High MDM complexity necessary to treat or prevent imminent or life-threatening deterioration of the following conditions: ESRD  Time spent personally by me on the following activities 75 min: development of treatment plan with patient or surrogate, discussions with consultants, interpretation of cardiac output measurements, examination of patient, ordering and performing treatments and " interventions, evaluation of patient's response to treatment, obtaining history from patient or surrogate, ordering and review of laboratory studies, ordering and review of radiographic studies, re-evaluation of patient's condition, pulse oximetry and blood draw for specimens  I evaluated the patient with the NP and was present during the history and performed the physical examination above.  I formulated all medical decision making.  Although the note was started by the NP, I have taken responsibility for the plan and have signed this note. I was present for more that 50% of total time: 75min

## 2024-06-19 NOTE — PLAN OF CARE
D/C Rec: High Intensity Therapy  DME Rec: TBD at next level of care    Pt evaluated, presents with new R LE weakness and pain as well as exacerbation of baseline L LE weakness from prior CVA due to prolonged immobility. Post acute therapy services for high intensity therapy is recommended upon D/C to further address deficits and increase functional independence. Pt would be able to participate in 3 hours of therapy/day.      Problem: Physical Therapy  Goal: Physical Therapy Goal  Description: PT Goals to be met in 1 month as follows:  1. Mod I Bed Mobility  2. Mod I T/F  3. Tolerate OOB x 3 hours  4. Gait 100' Min A with LRAD  5. I with B LE strengthening HEP   6/19/2024 1109 by Gogo Smith, PT  Outcome: Progressing

## 2024-06-19 NOTE — PLAN OF CARE
Problem: Occupational Therapy  Goal: Occupational Therapy Goal  Description: Goals to be met by: 7.6.24     Patient will increase functional independence with ADLs by performing:    UB dressing at Supervision level, including retrieval of clothing.  LB dressing at Supervision level, including retrieval of clothing.   Toileting at Supervision level.  Toilet/Chair transfers at Supervision level.  Sponge bathing at Supervision level.   Grooming standing at sink at Supervision level.    Outcome: Progressing  Recommend High Intensity Therapy.  Patient was independent at home prior to this event for ADL, ADL mobility, and IADL. There is expectation of returning to prior level of function to maintain independence avoiding readmission.  Pt needing an interdisciplinary treatment approach. Pt is at high risk of unplanned readmission due to fall risk and lack of 24 hour caregiver in prior setting.  Pt is able to tolerate 3 hours of daily therapy. Pt is motivated to return to prior level of function.

## 2024-06-19 NOTE — CONSULTS
[facility] ([facility])  Adult Nutrition  Consult Note    SUMMARY     Recommendations  1) Continue renal diet as tolerated    2) Monitor PO intake - if PO intake < 50% add Novasource Renal BID    3) Rec renal MVI if medically appropriate   4) Monitor weights/labs    5) RD to monitor and follow up    Goals:   Pt will be able to tolerate 50-75% EEN/EPN by RD follow up  Nutrition Goal Status: new  Communication of RD Recs:  (POC)    Assessment and Plan  Nutrition Problem  Increased energy - protein needs    Related to (etiology):   Physiological demands    Signs and Symptoms (as evidenced by):   ESRD on HD; CHF  Wound healing     Interventions (treatment strategy):  Mineral modified diet  Collaboration with other providers    Nutrition Diagnosis Status:   New      Malnutrition Assessment             Energy Intake (Malnutrition): less than 75% for greater than or equal to 1 month  Fluid Accumulation (Malnutrition): mild                         Reason for Assessment    Reason For Assessment: consult (decreased oral intake over past few weeks)  Diagnosis: renal disease, cardiac disease  Relevant Medical History:   Patient Active Problem List   Diagnosis    Essential hypertension    Hypercholesteremia    Non-insulin dependent type 2 diabetes mellitus    History of CVA (cerebrovascular accident)    Morbid obesity    PAF (paroxysmal atrial fibrillation)    Coronary artery disease involving native coronary artery of native heart without angina pectoris    Atypical chest pain    Osteoarthritis of cervical spine with myelopathy    Cervical radiculopathy    Cervical spondylosis    DDD (degenerative disc disease), cervical    Bilateral shoulder bursitis    Hx of repair of right rotator cuff    Acute bursitis of left shoulder    Cervical stenosis of spinal canal    Lumbar spondylosis    DDD (degenerative disc disease), lumbar    Lumbar radiculopathy    Lumbar spinal stenosis    Bulging lumbar disc    Bilateral hip bursitis     "Herniated cervical disc    Paroxysmal atrial flutter    Exertional dyspnea    Shortness of breath    Symptomatic bradycardia    Bradycardia    Obstructive sleep apnea (adult) (pediatric)    Fluid retention in legs    Type 2 diabetes mellitus with diabetic nephropathy, without long-term current use of insulin    Heart failure, diastolic, acute on chronic    Nephrotic syndrome    Restrictive lung disease secondary to obesity    Episodic atrial fibrillation    Current use of long term anticoagulation    Hypertensive kidney disease with stage 4 chronic kidney disease    Hyperphosphatemia    Secondary hyperparathyroidism    Anemia in stage 4 chronic kidney disease    Iron deficiency anemia    Persistent proteinuria    COVID-19    Acute hypoxemic respiratory failure    NSTEMI (non-ST elevated myocardial infarction)    CKD stage 4 due to type 2 diabetes mellitus    Non-compliant patient    ESRD (end stage renal disease) on dialysis    Pre-op exam     Interdisciplinary Rounds: did not attend  General Information Comments: Nutrition consult acknowledged for patient with decreased intake over the past few weeks. Patient in severe pain at time of visit and refused assessment. Receiving a renal diet, patient stated he did not want to eat anything today. Encouraged patient to try as dietary will bring tray for lunch. PIV. HDAV. Dudley 16 - L arm, R toe, L/R shin, R finger, R buttocks. FERNANDA NFPE as patient reported being in severe pain. RD to monitor for malnutrition risks at follow up.  Nutrition Discharge Planning: dc on renal diet post discharge    Nutrition Risk Screen    Nutrition Risk Screen: reduced oral intake over the last month    Nutrition/Diet History    Spiritual, Cultural Beliefs, Restoration Practices, Values that Affect Care: no  Factors Affecting Nutritional Intake: pain    Anthropometrics    Temp: 98.1 °F (36.7 °C)  Height Method: Stated  Height: 6' 1" (185.4 cm)  Height (inches): 73 in  Weight Method: Bed " Scale  Weight: 115.6 kg (254 lb 13.6 oz)  Weight (lb): 254.85 lb  Ideal Body Weight (IBW), Male: 184 lb  % Ideal Body Weight, Male (lb): 138.51 %  BMI (Calculated): 33.6  BMI Grade: 30 - 34.9- obesity - grade I  Weight Loss: unintentional  Usual Body Weight (UBW), k.7 kg (2024)  % Usual Body Weight: 98.42  % Weight Change From Usual Weight: -1.78 %       Lab/Procedures/Meds    Pertinent Labs Reviewed: reviewed  BMP:   Recent Labs   Lab 24  1107   *      K 5.0      CO2 22*   BUN 73*   CREATININE 6.5*   CALCIUM 9.4       Pertinent Medications Reviewed: reviewed  Scheduled Meds:   sodium chloride 0.9%   Intravenous Once    amiodarone  200 mg Oral Daily    [START ON 2024] amLODIPine  5 mg Oral Daily    apixaban  2.5 mg Oral BID    aspirin  81 mg Oral Nightly    atorvastatin  40 mg Oral QHS    calcitRIOL  0.25 mcg Oral Every Tues, Thurs, Sat    ciprofloxacin HCl  250 mg Oral Q12H    famotidine  20 mg Oral QHS    furosemide  80 mg Oral BID    gabapentin  100 mg Oral TID    isosorbide mononitrate  60 mg Oral Daily    mupirocin   Nasal BID    sevelamer carbonate  800 mg Oral TID WM    tamsulosin  0.4 mg Oral Daily    vitamin renal formula (B-complex-vitamin c-folic acid)  1 capsule Oral Daily     Continuous Infusions:  PRN Meds:.  Current Facility-Administered Medications:     acetaminophen, 650 mg, Oral, Q4H PRN    melatonin, 6 mg, Oral, Nightly PRN    sodium chloride 0.9%, 250 mL, Intravenous, PRN    sodium chloride 0.9%, 10 mL, Intravenous, PRN    Estimated/Assessed Needs    Weight Used For Calorie Calculations: 115.6 kg (254 lb 13.6 oz)  Energy Calorie Requirements (kcal): 2890  Energy Need Method: Kcal/kg (25)  Protein Requirements: 115-139 (1-1.2 g/kg)  Weight Used For Protein Calculations: 115.6 kg (254 lb 13.6 oz)  Fluid Requirements (mL): 1 mL/kcal  Estimated Fluid Requirement Method:  (or per MD)  RDA Method (mL): 2890  CHO Requirement: 360 g (50% total EEN)      Nutrition  Prescription Ordered    Current Diet Order: Renal    Evaluation of Received Nutrient/Fluid Intake    I/O: - 6.7 L since admit  Energy Calories Required: not meeting needs  Protein Required: not meeting needs  Fluid Required: not meeting needs  Comments: LBM: 6/18  Tolerance: not tolerating  % Intake of Estimated Energy Needs: 0 - 25 %  % Meal Intake: 0 - 25 %    Nutrition Risk    Level of Risk/Frequency of Follow-up: moderate (1-2 x/week)       Monitor and Evaluation    Food and Nutrient Intake: energy intake, food and beverage intake  Food and Nutrient Adminstration: diet order  Knowledge/Beliefs/Attitudes: food and nutrition knowledge/skill, beliefs and attitudes  Physical Activity and Function: nutrition-related ADLs and IADLs  Anthropometric Measurements: weight, weight change  Biochemical Data, Medical Tests and Procedures: electrolyte and renal panel, gastrointestinal profile, glucose/endocrine profile, inflammatory profile, lipid profile  Nutrition-Focused Physical Findings: overall appearance       Nutrition Follow-Up    RD Follow-up?: Yes    Lauren Wiseman RDN, LINDEN

## 2024-06-19 NOTE — PLAN OF CARE
06/19/24 1444   Post-Acute Status   Post-Acute Authorization Placement   Post-Acute Placement Status Referrals Sent   Discharge Delays (!) Post-Acute Set-up   Discharge Plan   Discharge Plan A Rehab   Discharge Plan B Skilled Nursing Facility      sent patient referral to [facility].

## 2024-06-19 NOTE — PROGRESS NOTES
MD Miki contacted to see if pt needed SSI orders, pt's accucheck 190 this evening. Per MD, no SSI orders at this time. Pt resting comfortably in bed, NADN and VSS. Continuing to monitor.

## 2024-06-19 NOTE — PT/OT/SLP EVAL
Occupational Therapy   Evaluation    Name: Zev Castano  MRN: [id number]  Admitting Diagnosis: <principal problem not specified>  Recent Surgery: * No surgery found *      Recommendations:     Discharge Recommendations: High Intensity Therapy  Discharge Equipment Recommendations:  to be determined by next level of care  Barriers to discharge:  Decreased caregiver support (Current functional level)    Assessment:     Zev Castano is a 74 y.o. male with a medical diagnosis of <principal problem not specified>.  He presents with the following performance deficits affecting function: weakness, impaired endurance, impaired self care skills, impaired functional mobility, gait instability, impaired balance, pain, decreased safety awareness, decreased lower extremity function, abnormal tone, decreased ROM, impaired coordination.      Patient was independent at home prior to this event for ADL, ADL mobility, and IADL. There is expectation of returning to prior level of function to maintain independence avoiding readmission.  Pt needing a total interdisciplinary treatment approach. Pt is at high risk of unplanned readmission due to fall risk and lack of 24 hour caregiver in prior setting.  Pt is able to tolerate 3 hours of daily therapy. Pt is motivated to return to prior level of function.       Rehab Prognosis: Good; patient would benefit from acute skilled OT services to address these deficits and reach maximum level of function.       Plan:     Patient to be seen 5 x/week to address the above listed problems via self-care/home management, therapeutic activities, therapeutic exercises, neuromuscular re-education  Plan of Care Expires: 07/19/24  Plan of Care Reviewed with: patient    Subjective     Chief Complaint: right LE pain  Patient/Family Comments/goals: Pt reporting he did not need any assistance for self care performance prior to admission.  Pt reporting his left side is much weaker since being in the  [facility].    Occupational Profile:  Living Environment: Lives with wife in [facility] with no steps to enter and a ramp in the back, grab bars in shower, shower chair  Previous level of function: Indep with basic self care tasks, driving  Roles and Routines: , father, goes to [employer]  Equipment Used at Home:  (Shower chair and grab bars, rollator during community mobility ([employer]ian[employer]))  Assistance upon Discharge: Wife but wife reporting to staff that she would not be able to care for pt at his current functional level.  Daughter and Son-in-law live next door.      Pain/Comfort:  Pain Rating 1:  (Pt not rating pain.)  Location - Side 1: Right  Location - Orientation 1: lower  Location 1: leg  Pain Addressed 1: Pre-medicate for activity, Reposition, Distraction, Cessation of Activity (Pt had received pain medication prior to OT evaluation.)    Patients cultural, spiritual, Judaism conflicts given the current situation: no    Objective:     Communicated with: nurse prior to session.  Patient found HOB elevated with peripheral IV (Biobeat) upon OT entry to room.  Pt needing encouragement to participate due to right LE pain.     General Precautions: Standard, fall  Orthopedic Precautions: N/A  Braces: N/A  Respiratory Status: Room air    Occupational Performance:    Bed Mobility:    Supine to sit: Min A   Sit to supine: Min A    Functional Mobility/Transfers:  Sit to stand: Mod A with RW  Functional Mobility: Min A with RW for taking about 4 side steps at side of bed  Brake to pt's bed not engaged.  Once OT noticed this, unable to lock brakes because foot pedal brake on the right side of the bed was missing (pt and OT standing on right side of bed near the window).  Nurse and charge nurse notified of bed missing brake on right side.  Pt's bed pushed against the wall on the right side for increased safety until pt's bed can be changed.    Activities of Daily Living:  Toileting: Pt using urinal in bed prior to OT arrival; Pt  incontinent of bowel in bed and pt unaware.  Pt needing Total A for toilet hygiene while standing and OT changed pads on pt's bed.  LB Dressing: Max A   UB Dressing: Min A      Cognitive/Visual Perceptual:  Cognitive/Psychosocial Skills:     -       Oriented to: Person, Place, and Situation   -       Follows Commands/attention:Follows one-step commands  -       Communication: clear/fluent  -       Memory: No Deficits noted  -       Safety awareness/insight to disability: Fair/Good   -       Mood/Affect/Coping skills/emotional control: Cooperative and Pleasant    Physical Exam:  Right UE: AROM and Coordination are WFL for self care tasks, Strength is grossly 4/5  Left UE: AROM is grossly WFL for self care tasks with limitations at shoulder, Coordination is Fair and Strength is grossly 3+/5 overall  Sitting Balance: Static - SBA, Dynamic - Min A  Standing Balance: Static - Min A, bilateral UE support; Dynamic - Min A/Mod A,bilateral UE support  Edema: Bilateral LE, right>left    AMPAC 6 Click ADL:  AMPAC Total Score: 16    Treatment & Education:  Role of OT, POC, safety with ADL and ADL mobility, use of call button, discharge recs     Patient left HOB elevated with all lines intact, call button in reach, and nurse and charge nurse notified    GOALS:   Multidisciplinary Problems       Occupational Therapy Goals          Problem: Occupational Therapy    Goal Priority Disciplines Outcome Interventions   Occupational Therapy Goal     OT, PT/OT Progressing    Description: Goals to be met by: 7.6.24     Patient will increase functional independence with ADLs by performing:    UB dressing at Supervision level, including retrieval of clothing.  LB dressing at Supervision level, including retrieval of clothing.   Toileting at Supervision level.  Toilet/Chair transfers at Supervision level.  Sponge bathing at Supervision level.   Grooming standing at sink at Supervision level.                         History:     Past Medical  History:   Diagnosis Date    Anticoagulant long-term use     Arthritis     Coronary artery disease     Diabetes mellitus type II     DM due to underlying condition with diabetic nephropathy     ESRD (end stage renal disease)     Hyperlipidemia     Hypertension     Nephrotic range proteinuria     Paroxysmal atrial fibrillation     Seizures     Stroke     nov 2012         Past Surgical History:   Procedure Laterality Date    AV FISTULA PLACEMENT Left 11/6/2023    Procedure: CREATION, AV FISTULA RADIOCEPHALIC;  Surgeon: AREN Duran II, MD;  Location: [facility] OR [address] [place] [address];  Service: Vascular;  Laterality: Left;    BIOPSY N/A 05/29/2018    Procedure: BIOPSY;  Surgeon: Neeta Diagnostic Provider;  Location: [facility] CATH LAB;  Service: Radiology;  Laterality: N/A;  ct guided      CARDIOVERSION N/A 12/03/2020    Procedure: CARDIOVERSION;  Surgeon: Mehul Livingston MD;  Location: [facility] CATH LAB;  Service: Cardiology;  Laterality: N/A;    CORONARY ANGIOPLASTY WITH STENT PLACEMENT      INSERTION OF TUNNELED CENTRAL VENOUS HEMODIALYSIS CATHETER N/A 06/21/2023    Procedure: Insertion, Catheter, Central Venous, Hemodialysis;  Surgeon: Angelito Ashley MD;  Location: [facility] CATH LAB;  Service: Nephrology;  Laterality: N/A;    KNEE SURGERY Left     SHOULDER SURGERY Right        Time Tracking:     OT Date of Treatment: 06/19/24  OT Start Time: 1355  OT Stop Time: 1413  OT Total Time (min): 18 min    Billable Minutes:Evaluation 18 6/19/2024

## 2024-06-19 NOTE — PLAN OF CARE
Pt remained free of falls and injuries throughout shift. AAOx4 this shift. Pt calm, pleasant, and cooperative. Purposeful hourly rounding performed. Pt swallows meds whole. Pt received PO Lasix as ordered. IV flushed and saline locked. Pt still with weakness to BLE but improving from yesterday and able to assist staff with pushing himself up in bed. Frequent weight shift encouraged. Condom catheter in place to gravity with minimal output, 100 mL this shift. Strict I's & O's maintained. Pt had large soft, formed BM last night and notes relief. VSS on 1L O2 NC. Telemetry in place. Bed low and locked, bed alarm on, call light in reach. Side rails up x3. Will continue to monitor.

## 2024-06-19 NOTE — NURSING
12:34 PM  Patient was complaining of severe pain (10/10) on his right leg. Tylenol was given at 11:36 am but the patient was still complaining of pain. Gave him some hot pads to help with the pain. Called Dr Livingston but he refused and said he would not order stronger pain meds because of his renal issues .

## 2024-06-19 NOTE — PROGRESS NOTES
"Cardiology  Progress Notes            Subjective:  He says he feels a little better however continues to have a little burning upon urination.  He has not yet gotten out of bed, does not know if he can bear weight and walk.  He says the right calf pain is a little better.    Vital Signs:  Vitals:    06/19/24 0427 06/19/24 0702 06/19/24 0835 06/19/24 0845   BP:   125/61    BP Location:       Patient Position:       Pulse: 78 73 75    Resp:   16    Temp:   98.3 °F (36.8 °C)    TempSrc:       SpO2:   (!) 94% (!) 94%   Weight:       Height:           Physical Exam:  Chest clear  Heart regular.  Soft basal systolic murmur.  No gallop.  The abdomen is soft and nontender the bowel sounds are normal.  Trace ankle edema.  The extremities are warm.  The neurological exam is intact.    Laboratory:  CBC:   Recent Labs   Lab 06/17/24  1107   WBC 7.28   RBC 3.27*   HGB 10.8*   HCT 32.8*      *   MCH 33.0*   MCHC 32.9     BMP:   Recent Labs   Lab 06/17/24  1107   *      K 5.0      CO2 22*   BUN 73*   CREATININE 6.5*   CALCIUM 9.4     CMP:   Recent Labs   Lab 06/17/24  1107   *   CALCIUM 9.4   ALBUMIN 3.6   PROT 7.6      K 5.0   CO2 22*      BUN 73*   CREATININE 6.5*   ALKPHOS 103   ALT 11   AST 21   BILITOT 1.1*     LFTs:   Recent Labs   Lab 06/17/24  1107   ALT 11   AST 21   ALKPHOS 103   BILITOT 1.1*   PROT 7.6   ALBUMIN 3.6     Coagulation: No results for input(s): "PT", "INR", "APTT" in the last 168 hours.  Cardiac markers: No results for input(s): "CKMB", "CPKMB", "TROPONINT", "TROPONINI", "MYOGLOBIN" in the last 168 hours.    Imaging:  No orders to display         Problems:   Urinary tract infection  End-stage renal disease on dialysis  Hypertension  Diabetes mellitus  Previous strokes  Stable coronary artery disease  High-grade stenosis of the midportion of the right anterior tibial artery  Amlodipine induced lower leg edema        Plan of Care:  Will reduce Amlodipine to " 5 mg daily.  Awaiting consultation with Physical therapy.  If he is able to tolerate ambulation, will send him home tomorrow however he may need to be transferred to skilled nursing, physical therapy.          Mehul Livingston

## 2024-06-19 NOTE — PT/OT/SLP EVAL
Physical Therapy Evaluation    Patient Name:  Zev Castano   MRN:  [id number]    Recommendations:     Discharge Recommendations: High Intensity Therapy   Discharge Equipment Recommendations: to be determined by next level of care   Barriers to discharge: Decreased caregiver support and mobility deficits    Assessment:     Zev Castano is a 74 y.o. male admitted with a medical diagnosis of weakness, Acute cystitis with hematuria.  He presents with the following impairments/functional limitations: weakness, impaired endurance, impaired functional mobility, gait instability, impaired balance, decreased lower extremity function, pain, decreased ROM.    Pt evaluated, presents with new R LE weakness and pain as well as exacerbation of baseline L LE weakness from prior CVA due to prolonged immobility. Post acute therapy services for high intensity therapy is recommended upon D/C to further address deficits and increase functional independence. Pt would be able to participate in 3 hours of therapy/day.    *Pt would benefit from OT eval and treat to assess ability to perform ADLs    Rehab Prognosis: Good; patient would benefit from acute skilled PT services to address these deficits and reach maximum level of function.    Recent Surgery: * No surgery found *      Plan:     During this hospitalization, patient to be seen 5 x/week to address the identified rehab impairments via neuromuscular re-education, therapeutic groups, therapeutic activities, gait training, therapeutic exercises and progress toward the following goals:    Plan of Care Expires:  07/19/24    Subjective     Chief Complaint: Pt c/o progressive weakness and R LE pain since Saturday.   Patient/Family Comments/goals: Pt agreeable to PT eval, eager to attempt walking in room   Pain/Comfort:  Pain Rating 1: 9/10  Location - Side 1: Right  Location - Orientation 1: lower  Location 1: leg  Pain Addressed 1: Cessation of Activity    Patients cultural, spiritual,  Nondenominational conflicts given the current situation: no    Living Environment:  [facility] with no ISA in front, ramp in back  Prior to admission, patients level of function was I until Saturday when pt became progressively weak .  Equipment used at home: none.  DME owned (not currently used): shower chair, wheelchair, and rollator .  Upon discharge, patient will have assistance from lives with wife who is able to provide 24 hour supervision but not physical assist. Pt's DTR and son in law lives next door.    Objective:     Communicated with NEHAL Rivero prior to session.  Patient found supine with peripheral IV, telemetry  upon PT entry to room.    General Precautions: Standard, fall  Orthopedic Precautions:N/A   Braces: N/A  Respiratory Status: Room air    Exams:  RLE ROM: WFL except limited R knee flexion 2/2 pain, grossly 90 deg AROM in pain-free range  RLE Strength: Hip flexion 4/5, knee extension/flexion 3-/5, ankle DF 3+/5  LLE ROM: WFL except lacks full knee extension and DF 2/2 baseline CVA weakness  LLE Strength: Hip flexion 4-/5, knee extension 3-/5, ankle DF 3-/5     Functional Mobility:  Pt t/f supine to sit with CGA and increased time required to complete. Sit to stand with RW Mod A with bed height elevated. Performed marching place x 5 with RW, Mod A to maintain standing balance. Gait 10' with RW to bedside chair Min A shuffling gait. T/F to sit in bedside chair Mod A.   Returned 10 minutes later as pt requesting assist to toilet. Sit to stand with RW Mod A. Gait 15' x 2 to and from bathroom with RW Min A. Required Mod A with toilet transfer (elevated with BSC) and Mod A for return to sit in bedside chair due to pt fatiguing quickly.       AM-PAC 6 CLICK MOBILITY  Total Score:14       Treatment & Education:  Pt and family educated on role of PT, POC, Gait and transfer training, D/C Rec     Patient left up in chair with all lines intact, RN notified, and wife present.    GOALS:   Multidisciplinary Problems        Physical Therapy Goals          Problem: Physical Therapy    Goal Priority Disciplines Outcome Goal Variances Interventions   Physical Therapy Goal     PT, PT/OT Progressing     Description: PT Goals to be met in 1 month as follows:  1. Mod I Bed Mobility  2. Mod I T/F  3. Tolerate OOB x 3 hours  4. Gait 100' Min A with LRAD  5. I with B LE strengthening HEP                        History:     Past Medical History:   Diagnosis Date    Anticoagulant long-term use     Arthritis     Coronary artery disease     Diabetes mellitus type II     DM due to underlying condition with diabetic nephropathy     ESRD (end stage renal disease)     Hyperlipidemia     Hypertension     Nephrotic range proteinuria     Paroxysmal atrial fibrillation     Seizures     Stroke     nov 2012       Past Surgical History:   Procedure Laterality Date    AV FISTULA PLACEMENT Left 11/6/2023    Procedure: CREATION, AV FISTULA RADIOCEPHALIC;  Surgeon: AREN Duran II, MD;  Location: [facility] OR [address] [place] [address];  Service: Vascular;  Laterality: Left;    BIOPSY N/A 05/29/2018    Procedure: BIOPSY;  Surgeon: Neeta Diagnostic Provider;  Location: [facility] CATH LAB;  Service: Radiology;  Laterality: N/A;  ct guided      CARDIOVERSION N/A 12/03/2020    Procedure: CARDIOVERSION;  Surgeon: Mehul Livingston MD;  Location: [facility] CATH LAB;  Service: Cardiology;  Laterality: N/A;    CORONARY ANGIOPLASTY WITH STENT PLACEMENT      INSERTION OF TUNNELED CENTRAL VENOUS HEMODIALYSIS CATHETER N/A 06/21/2023    Procedure: Insertion, Catheter, Central Venous, Hemodialysis;  Surgeon: Angelito Ashley MD;  Location: [facility] CATH LAB;  Service: Nephrology;  Laterality: N/A;    KNEE SURGERY Left     SHOULDER SURGERY Right        Time Tracking:     PT Received On: 06/19/24  PT Start/Stop Time: 2062-1444, 0438-7761  PT Total Time (min): 33 min     Billable Minutes: Evaluation 15 and Gait Training 13      06/19/2024

## 2024-06-19 NOTE — PLAN OF CARE
Recommendations  1) Continue renal diet as tolerated    2) Monitor PO intake - if PO intake < 50% add Novasource Renal BID    3) Rec renal MVI if medically appropriate   4) Monitor weights/labs    5) RD to monitor and follow up    Goals:   Pt will be able to tolerate 50-75% EEN/EPN by RD follow up  Nutrition Goal Status: new  Communication of RD Recs:  (POC)

## 2024-06-20 VITALS
HEART RATE: 76 BPM | OXYGEN SATURATION: 94 % | WEIGHT: 254.88 LBS | SYSTOLIC BLOOD PRESSURE: 130 MMHG | RESPIRATION RATE: 18 BRPM | DIASTOLIC BLOOD PRESSURE: 60 MMHG | HEIGHT: 73 IN | BODY MASS INDEX: 33.78 KG/M2 | TEMPERATURE: 98 F

## 2024-06-20 PROBLEM — I73.9 PAD (PERIPHERAL ARTERY DISEASE): Status: ACTIVE | Noted: 2024-06-20

## 2024-06-20 PROBLEM — N30.01 ACUTE CYSTITIS WITH HEMATURIA: Status: ACTIVE | Noted: 2024-06-20

## 2024-06-20 PROCEDURE — 25000003 PHARM REV CODE 250: Performed by: NURSE PRACTITIONER

## 2024-06-20 PROCEDURE — 97535 SELF CARE MNGMENT TRAINING: CPT

## 2024-06-20 PROCEDURE — 97530 THERAPEUTIC ACTIVITIES: CPT | Mod: CQ

## 2024-06-20 PROCEDURE — 25000003 PHARM REV CODE 250: Performed by: EMERGENCY MEDICINE

## 2024-06-20 PROCEDURE — 80100016 HC MAINTENANCE HEMODIALYSIS

## 2024-06-20 PROCEDURE — 97116 GAIT TRAINING THERAPY: CPT | Mod: CQ

## 2024-06-20 PROCEDURE — 25000003 PHARM REV CODE 250: Performed by: INTERNAL MEDICINE

## 2024-06-20 RX ORDER — GABAPENTIN 300 MG/1
300 CAPSULE ORAL NIGHTLY
Qty: 30 CAPSULE | Refills: 0 | Status: SHIPPED | OUTPATIENT
Start: 2024-06-20

## 2024-06-20 RX ORDER — TRAMADOL HYDROCHLORIDE 50 MG/1
50 TABLET ORAL EVERY 8 HOURS
Status: DISCONTINUED | OUTPATIENT
Start: 2024-06-20 | End: 2024-06-20 | Stop reason: HOSPADM

## 2024-06-20 RX ORDER — FUROSEMIDE 40 MG/1
40 TABLET ORAL 2 TIMES DAILY
Qty: 60 TABLET | Refills: 0 | Status: SHIPPED | OUTPATIENT
Start: 2024-06-20

## 2024-06-20 RX ORDER — AMLODIPINE BESYLATE 2.5 MG/1
2.5 TABLET ORAL DAILY
Status: DISCONTINUED | OUTPATIENT
Start: 2024-06-20 | End: 2024-06-20 | Stop reason: HOSPADM

## 2024-06-20 RX ORDER — CIPROFLOXACIN 250 MG/1
250 TABLET, FILM COATED ORAL 2 TIMES DAILY
Qty: 8 TABLET | Refills: 0 | Status: SHIPPED | OUTPATIENT
Start: 2024-06-20 | End: 2024-06-24

## 2024-06-20 RX ORDER — TRAMADOL HYDROCHLORIDE 50 MG/1
50 TABLET ORAL EVERY 8 HOURS
Qty: 15 TABLET | Refills: 0 | Status: SHIPPED | OUTPATIENT
Start: 2024-06-20

## 2024-06-20 RX ORDER — FUROSEMIDE 40 MG/1
40 TABLET ORAL
Qty: 90 TABLET | Refills: 3 | OUTPATIENT
Start: 2024-06-20

## 2024-06-20 RX ORDER — CIPROFLOXACIN 250 MG/1
250 TABLET, FILM COATED ORAL 2 TIMES DAILY
Qty: 8 TABLET | Refills: 0 | Status: SHIPPED | OUTPATIENT
Start: 2024-06-20 | End: 2024-06-20

## 2024-06-20 RX ADMIN — SITAGLIPTIN 25 MG: 25 TABLET, FILM COATED ORAL at 02:06

## 2024-06-20 RX ADMIN — CALCITRIOL CAPSULES 0.25 MCG 0.25 MCG: 0.25 CAPSULE ORAL at 02:06

## 2024-06-20 RX ADMIN — ISOSORBIDE MONONITRATE 60 MG: 30 TABLET, EXTENDED RELEASE ORAL at 02:06

## 2024-06-20 RX ADMIN — CIPROFLOXACIN 250 MG: 250 TABLET, COATED ORAL at 02:06

## 2024-06-20 RX ADMIN — TRAMADOL HYDROCHLORIDE 50 MG: 50 TABLET, COATED ORAL at 02:06

## 2024-06-20 RX ADMIN — SEVELAMER CARBONATE 800 MG: 800 TABLET, FILM COATED ORAL at 09:06

## 2024-06-20 RX ADMIN — AMLODIPINE BESYLATE 2.5 MG: 5 TABLET ORAL at 02:06

## 2024-06-20 RX ADMIN — AMIODARONE HYDROCHLORIDE 200 MG: 200 TABLET ORAL at 02:06

## 2024-06-20 RX ADMIN — GABAPENTIN 100 MG: 100 CAPSULE ORAL at 02:06

## 2024-06-20 RX ADMIN — TAMSULOSIN HYDROCHLORIDE 0.4 MG: 0.4 CAPSULE ORAL at 02:06

## 2024-06-20 RX ADMIN — ASCORBIC ACID, THIAMINE MONONITRATE,RIBOFLAVIN, NIACINAMIDE, PYRIDOXINE HYDROCHLORIDE, FOLIC ACID, CYANOCOBALAMIN, BIOTIN, CALCIUM PANTOTHENATE, 1 CAPSULE: 100; 1.5; 1.7; 20; 10; 1; 6000; 150000; 5 CAPSULE, LIQUID FILLED ORAL at 02:06

## 2024-06-20 RX ADMIN — SEVELAMER CARBONATE 800 MG: 800 TABLET, FILM COATED ORAL at 02:06

## 2024-06-20 NOTE — PT/OT/SLP PROGRESS
Physical Therapy Treatment    Patient Name:  Zev Castano   MRN:  [id number]    Recommendations:     Discharge Recommendations: High Intensity Therapy  Discharge Equipment Recommendations: to be determined by next level of care  Barriers to discharge: Decreased caregiver support and mobility deficits    Assessment:     Zev Castano is a 74 y.o. male admitted with a medical diagnosis of Acute cystitis with hematuria.  He presents with the following impairments/functional limitations: weakness, gait instability, pain, abnormal tone, impaired balance, impaired coordination, impaired endurance, impaired functional mobility, impaired self care skills, decreased lower extremity function, decreased ROM, decreased safety awareness.    Supine>sit with CGA  Sit>stand from EOB, BSC with RW and modA  Toilet transfer with RW and modA, stand /pivot  Amb 12' with RW and modA, decreased stability and coordination, R knee buckling intermittently, chair follow for safety  Pt agreeable to therapy, mobility limited by RLE pain  Post acute therapy services for high intensity therapy is recommended upon D/C to further address deficits and increase functional independence. Pt would be able to participate in 3 hours of therapy/day       Rehab Prognosis: Good; patient would benefit from acute skilled PT services to address these deficits and reach maximum level of function.    Recent Surgery: * No surgery found *      Plan:     During this hospitalization, patient to be seen 5 x/week to address the identified rehab impairments via gait training, therapeutic activities, therapeutic exercises, neuromuscular re-education and progress toward the following goals:    Plan of Care Expires:  07/19/24    Subjective     Chief Complaint: pain, inexplicable  Patient/Family Comments/goals: This started 2 weeks ago after dialysis, when they took too much fluid off and I got a charley horse  Pain/Comfort:  Pain Rating 1: 5/10  Location - Side 1:  Right  Location - Orientation 1: lower  Location 1: leg  Pain Addressed 1: Reposition, Distraction, Cessation of Activity, Nurse notified  Pain Rating Post-Intervention 1: 5/10      Objective:     Communicated with nurse Verduczo prior to session.  Patient found HOB elevated with peripheral IV upon PT entry to room.     General Precautions: Standard, fall  Orthopedic Precautions: N/A  Braces: N/A  Respiratory Status: Room air     Functional Mobility:  Bed Mobility:     Supine to Sit: contact guard assistance  Transfers:     Sit to Stand:  moderate assistance with rolling walker  Toilet Transfer: moderate assistance with  rolling walker  using  Stand Pivot  Gait: 12' with RW and modA, decreased stability and coordination, R knee buckling intermittently, chair follow for safety      AM-PAC 6 CLICK MOBILITY  Turning over in bed (including adjusting bedclothes, sheets and blankets)?: 3  Sitting down on and standing up from a chair with arms (e.g., wheelchair, bedside commode, etc.): 2  Moving from lying on back to sitting on the side of the bed?: 3  Moving to and from a bed to a chair (including a wheelchair)?: 2  Need to walk in hospital room?: 2  Climbing 3-5 steps with a railing?: 2  Basic Mobility Total Score: 14       Treatment & Education:  Gait and transfer training as noted    Patient left up in chair with all lines intact, call button in reach, nurse Verduzco notified, and wife present..    GOALS:   Multidisciplinary Problems       Physical Therapy Goals          Problem: Physical Therapy    Goal Priority Disciplines Outcome Goal Variances Interventions   Physical Therapy Goal     PT, PT/OT Progressing     Description: PT Goals to be met in 1 month as follows:  1. Mod I Bed Mobility  2. Mod I T/F  3. Tolerate OOB x 3 hours  4. Gait 100' Min A with LRAD  5. I with B LE strengthening HEP                        Time Tracking:     PT Received On: 06/20/24  PT Start Time: 1357     PT Stop Time: 1425  PT Total Time (min):  28 min     Billable Minutes: Gait Training 14 and Therapeutic Activity 14    Treatment Type: Treatment  PT/PTA: PTA     Number of PTA visits since last PT visit: 1 06/20/2024

## 2024-06-20 NOTE — PT/OT/SLP PROGRESS
Occupational Therapy      Patient Name:  Zev Castano   MRN:  [id number]    Patient not seen today secondary to Dialysis. Will follow-up later today as schedule permits.    6/20/2024

## 2024-06-20 NOTE — PLAN OF CARE
[facility]  [address]  [place] [address]  [phone] after hours  [phone]  Fax    Facility Transfer Orders                        06/20/2024    Admit to: Inpatient Rehab    Diagnoses:  There are no hospital problems to display for this patient.    Allergies:  Review of patient's allergies indicates:   Allergen Reactions    Doxycycline Rash     Vitals:    Every shift     Diet:  Diabetic    Activity:   - Up in a chair each morning as tolerated   - Ambulate with assistance to bathroom   - Weight bearing:  no restrictions    Nursing Precautions:             -    Upright 90 degrees befor during and after meals             -       - Fall precautions     CONSULTS:    PT to evaluate and treat   OT to evaluate and treat     Renal for HD needs     LABS:  Per IRF MD    Medications: Discontinue all previous medication orders, if any. See new list below.        Medication List        START taking these medications      ciprofloxacin HCl 250 MG tablet  Commonly known as: CIPRO  Take 1 tablet (250 mg total) by mouth 2 (two) times daily. for 4 days     traMADoL 50 mg tablet  Commonly known as: ULTRAM  Take 1 tablet (50 mg total) by mouth every 8 (eight) hours.            CHANGE how you take these medications      aspirin 81 MG EC tablet  Commonly known as: ECOTRIN  Take 1 tablet (81 mg total) by mouth once daily.  What changed: when to take this     atorvastatin 40 MG tablet  Commonly known as: LIPITOR  TAKE 1 TABLET BY MOUTH EVERY DAY  What changed:   when to take this  additional instructions     famotidine 20 MG tablet  Commonly known as: PEPCID  Take 1 tablet (20 mg total) by mouth once daily.  What changed: when to take this     gabapentin 300 MG capsule  Commonly known as: NEURONTIN  Take 1 capsule (300 mg total) by mouth every evening.  What changed: when to take this            CONTINUE taking these medications      allopurinoL 100 MG tablet  Commonly known as:  ZYLOPRIM  Take 1 tablet (100 mg total) by mouth once daily.     amiodarone 200 MG Tab  Commonly known as: PACERONE  Take 1 tablet (200 mg total) by mouth once daily.     ELIQUIS 2.5 mg Tab  Generic drug: apixaban  Take 1 tablet (2.5 mg total) by mouth 2 (two) times daily.     furosemide 40 MG tablet  Commonly known as: LASIX  Take 1 tablet (40 mg total) by mouth 2 (two) times a day.     hydrALAZINE 100 MG tablet  Commonly known as: APRESOLINE  Take 1 tablet (100 mg total) by mouth every 12 (twelve) hours.     isosorbide mononitrate 60 MG 24 hr tablet  Commonly known as: IMDUR  Take 1 tablet (60 mg total) by mouth once daily.     JANUVIA 50 mg Tab  Generic drug: SITagliptin phosphate  Take 1 tablet (50 mg total) by mouth once daily.     nitroGLYCERIN 0.4 MG SL tablet  Commonly known as: NITROSTAT  Place 1 tablet (0.4 mg total) under the tongue every 5 (five) minutes as needed for Chest pain. Go to the ED for eval if you continue to have chest pain after 3 doses     tiZANidine 4 MG tablet  Commonly known as: ZANAFLEX  Take 1 tablet (4 mg total) by mouth 3 (three) times daily as needed.            STOP taking these medications      amLODIPine 5 MG tablet  Commonly known as: NORVASC     oxyCODONE 5 MG immediate release tablet  Commonly known as: ROXICODONE     oxyCODONE-acetaminophen 5-325 mg per tablet  Commonly known as: PERCOCET     silver sulfADIAZINE 1% 1 % cream  Commonly known as: SILVADENE     sulfamethoxazole-trimethoprim 800-160mg 800-160 mg Tab  Commonly known as: BACTRIM DS     tamsulosin 0.4 mg Cap  Commonly known as: FLOMAX               Where to Get Your Medications        These medications were sent to [facility]  [address] [address]      Hours: Mon-Fri, 8a-5:30p Phone: [phone]   ciprofloxacin HCl 250 MG tablet  furosemide 40 MG tablet  gabapentin 300 MG capsule       You can get these medications from any pharmacy    Bring a paper prescription for each of  these medications  traMADoL 50 mg tablet           _________________________________  Mehul Livingston  06/20/2024   Myself

## 2024-06-20 NOTE — PT/OT/SLP PROGRESS
Occupational Therapy   Treatment    Name: Zev Castano  MRN: [id number]  Admitting Diagnosis:  Acute cystitis with hematuria       Recommendations:     Discharge Recommendations: High Intensity Therapy  Discharge Equipment Recommendations:  to be determined by next level of care  Barriers to discharge:  Decreased caregiver support (Current functional level)    Assessment:     Zev Castano is a 74 y.o. male with a medical diagnosis of Acute cystitis with hematuria.  He presents with the following  performance deficits affecting function: weakness, impaired endurance, impaired sensation, impaired functional mobility, gait instability, impaired balance, impaired cardiopulmonary response to activity, pain, decreased safety awareness, decreased lower extremity function, decreased upper extremity function, impaired self care skills, impaired fine motor, impaired coordination, decreased ROM, abnormal tone.     Patient was independent at home prior to this event for ADL, ADL mobility, and IADL. There is expectation of returning to prior level of function to maintain independence avoiding readmission.  Pt needing a total interdisciplinary treatment approach. Pt is at high risk of unplanned readmission due to fall risk and lack of 24 hour caregiver in prior setting.  Pt is able to tolerate 3 hours of daily therapy. Pt is motivated to return to prior level of function.       Rehab Prognosis:  Good; patient would benefit from acute skilled OT services to address these deficits and reach maximum level of function.       Plan:     Patient to be seen 5 x/week to address the above listed problems via therapeutic activities, self-care/home management, therapeutic exercises, neuromuscular re-education, sensory integration  Plan of Care Expires: 07/19/24  Plan of Care Reviewed with: patient, spouse    Subjective     Chief Complaint: Pt's wife stating concerns about pt hitting his head during his fall on Saturday and that he did not  get a scan of his head since being in the hospital.  Patient/Family Comments/goals: Pt and wife wanting pt to go to [facility].  Pain/Comfort:  Pain Rating 1: 5/10  Location - Side 1: Right  Location - Orientation 1: lower  Location 1: leg  Pain Addressed 1: Reposition, Distraction, Cessation of Activity, Pre-medicate for activity  Pain Rating Post-Intervention 1: 5/10    Objective:     Communicated with: nurse prior to session.  Patient found sitting edge of bed, PTA and wife in room, with peripheral IV upon OT entry to room.    General Precautions: Standard, fall, aspiration    Orthopedic Precautions:N/A  Braces: N/A  Respiratory Status: Room air     Occupational Performance:     Bed Mobility:    Sit to supine: Mod A      Functional Mobility/Transfers:  Sit to stand: Mod A   Standing Balance: Poor  Standing Tolerance: 1-2 minutes with RW and Mod A   Functional Mobility: Limited to ~12 feet, PTA assisting pt with RW at Mod A and OT providing SBA and following next to pt with chair, cues for looking up and standing upright, knee buckling  Limited standing tolerance for mobility. Pt had hemodialysis earlier today and fatigued.    Activities of Daily Living:  UB Dressing: Min A  LB Dressing: Max A for donning elastic waist pants, Total A for socks, Adult brief donned with Total A (pt discharging to [facility] soon after OT visit)    [facility] 6 Click ADL: 15    Treatment & Education:  Role of OT, POC, ADL and ADL mobility training, safety strategies, standing tolerance and balance for LB self care tasks, discharge recs     Patient left HOB elevated with all lines intact, call button in reach, bed alarm on, nurse notified, and wife present    GOALS:   Multidisciplinary Problems       Occupational Therapy Goals          Problem: Occupational Therapy    Goal Priority Disciplines Outcome Interventions   Occupational Therapy Goal     OT, PT/OT Progressing    Description: Goals to be met by: 7.6.24     Patient will increase functional  independence with ADLs by performing:    UB dressing at Supervision level, including retrieval of clothing.  LB dressing at Supervision level, including retrieval of clothing.   Toileting at Supervision level.  Toilet/Chair transfers at Supervision level.  Sponge bathing at Supervision level.   Grooming standing at sink at Supervision level.                         Time Tracking:     OT Date of Treatment: 06/20/24  OT Start Time: 1412  OT Stop Time: 1520  OT Total Time (min): 68 min    Billable Minutes:Self Care/Home Management 55    OT/MARY: OT          6/20/2024

## 2024-06-20 NOTE — PROGRESS NOTES
"Cardiology  Progress Notes            Subjective: Could not stand on feet, was weak and had pain in right lower leg.  No dysuria.  PT recommends transfer in in patient rehab.    Vital Signs:  Vitals:    06/20/24 0308 06/20/24 0431 06/20/24 0700 06/20/24 0744   BP:  (!) 117/58  (!) 116/58   BP Location:    Right arm   Patient Position:    Lying   Pulse: (!) 57 (!) 58 61 61   Resp:  18  20   Temp:  98.2 °F (36.8 °C)  97.9 °F (36.6 °C)   TempSrc:    Oral   SpO2:  (!) 93%  (!) 92%   Weight:       Height:           Physical Exam: chest clear  Cor: Reg. Soft systolic murmur, no gallop  Ext: warm.  Neuro: intact.    Laboratory:  CBC:   Recent Labs   Lab 06/17/24  1107   WBC 7.28   RBC 3.27*   HGB 10.8*   HCT 32.8*      *   MCH 33.0*   MCHC 32.9     BMP:   Recent Labs   Lab 06/17/24  1107   *      K 5.0      CO2 22*   BUN 73*   CREATININE 6.5*   CALCIUM 9.4     CMP:   Recent Labs   Lab 06/17/24  1107   *   CALCIUM 9.4   ALBUMIN 3.6   PROT 7.6      K 5.0   CO2 22*      BUN 73*   CREATININE 6.5*   ALKPHOS 103   ALT 11   AST 21   BILITOT 1.1*     LFTs:   Recent Labs   Lab 06/17/24  1107   ALT 11   AST 21   ALKPHOS 103   BILITOT 1.1*   PROT 7.6   ALBUMIN 3.6     Coagulation: No results for input(s): "PT", "INR", "APTT" in the last 168 hours.  Cardiac markers: No results for input(s): "CKMB", "CPKMB", "TROPONINT", "TROPONINI", "MYOGLOBIN" in the last 168 hours.    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

## 2024-06-20 NOTE — PT/OT/SLP PROGRESS
Physical Therapy      Patient Name:  Zev Castano   MRN:  [id number]    Patient not seen today secondary to Dialysis. Will follow-up later today as scheduling permits.

## 2024-06-20 NOTE — DISCHARGE SUMMARY
[facility]  Cardiology  Discharge Summary      Patient Name: Zev Castano    Admission Date: 6/17/2024    Discharge Date and Time: 6/20/24    Final Diagnoses: UTI, ERSD on HD   Principal Problem: Acute cystitis with hematuria    HPI: Mr Castano Presented to the emergency room with complaints of urinary frequency and burning.  He has complained of swelling in his lower legs and pain in his right lower leg upon standing.  He missed 2 days of hemodialysis earlier in the week.    Impression on Admission:   Urinary tract infection, end-stage renal disease on dialysis, hypertension, diabetes mellitus, previous strokes, stable coronary artery disease, high-grade stenosis of the right anterior tibial artery.    Hospital Course:  He was given IV Rocephin in the emergency room, and then started on Cipro 250 mg twice a day.  He underwent dialysis for the first 2 consecutive days of hospitalization, and had a dramatic improvement in his signs and symptoms.  He continued to complain of pain in the right calf on bearing weight.  He continued to complain of profound weakness.  Physical therapy recommended inpatient physical therapy, and he was then transferred to inpatient physical therapy for further care.    Disposition:   Charged to inpatient rehab    Follow up/Patient Instructions:    Medications:     Medication List        START taking these medications      ciprofloxacin HCl 250 MG tablet  Commonly known as: CIPRO  Take 1 tablet (250 mg total) by mouth 2 (two) times daily. for 4 days     traMADoL 50 mg tablet  Commonly known as: ULTRAM  Take 1 tablet (50 mg total) by mouth every 8 (eight) hours.            CHANGE how you take these medications      aspirin 81 MG EC tablet  Commonly known as: ECOTRIN  Take 1 tablet (81 mg total) by mouth once daily.  What changed: when to take this     atorvastatin 40 MG tablet  Commonly known as: LIPITOR  TAKE 1 TABLET BY MOUTH EVERY DAY  What changed:   when to take  this  additional instructions     famotidine 20 MG tablet  Commonly known as: PEPCID  Take 1 tablet (20 mg total) by mouth once daily.  What changed: when to take this     gabapentin 300 MG capsule  Commonly known as: NEURONTIN  Take 1 capsule (300 mg total) by mouth every evening.  What changed: when to take this            CONTINUE taking these medications      allopurinoL 100 MG tablet  Commonly known as: ZYLOPRIM  Take 1 tablet (100 mg total) by mouth once daily.     amiodarone 200 MG Tab  Commonly known as: PACERONE  Take 1 tablet (200 mg total) by mouth once daily.     ELIQUIS 2.5 mg Tab  Generic drug: apixaban  Take 1 tablet (2.5 mg total) by mouth 2 (two) times daily.     furosemide 40 MG tablet  Commonly known as: LASIX  Take 1 tablet (40 mg total) by mouth 2 (two) times a day.     hydrALAZINE 100 MG tablet  Commonly known as: APRESOLINE  Take 1 tablet (100 mg total) by mouth every 12 (twelve) hours.     isosorbide mononitrate 60 MG 24 hr tablet  Commonly known as: IMDUR  Take 1 tablet (60 mg total) by mouth once daily.     JANUVIA 50 mg Tab  Generic drug: SITagliptin phosphate  Take 1 tablet (50 mg total) by mouth once daily.     nitroGLYCERIN 0.4 MG SL tablet  Commonly known as: NITROSTAT  Place 1 tablet (0.4 mg total) under the tongue every 5 (five) minutes as needed for Chest pain. Go to the ED for eval if you continue to have chest pain after 3 doses     tiZANidine 4 MG tablet  Commonly known as: ZANAFLEX  Take 1 tablet (4 mg total) by mouth 3 (three) times daily as needed.            STOP taking these medications      amLODIPine 5 MG tablet  Commonly known as: NORVASC     oxyCODONE 5 MG immediate release tablet  Commonly known as: ROXICODONE     oxyCODONE-acetaminophen 5-325 mg per tablet  Commonly known as: PERCOCET     silver sulfADIAZINE 1% 1 % cream  Commonly known as: SILVADENE     sulfamethoxazole-trimethoprim 800-160mg 800-160 mg Tab  Commonly known as: BACTRIM DS     tamsulosin 0.4 mg  Cap  Commonly known as: FLOMAX               Where to Get Your Medications        These medications were sent to [facility]  [address], [place] [address]      Hours: Mon-Fri, 8a-5:30p Phone: [phone]   ciprofloxacin HCl 250 MG tablet  furosemide 40 MG tablet  gabapentin 300 MG capsule       You can get these medications from any pharmacy    Bring a paper prescription for each of these medications  traMADoL 50 mg tablet        No discharge procedures on file.      Condition upon Discharge:  improved          Mehul Livingston MD

## 2024-06-20 NOTE — PLAN OF CARE
POC reviewed with patient. AAOx4. VSS on room air.  Assessment per flowsheets. Cardiac monitor maintained. Complaints of pain treated with PRN pain medication according to MAR. No other complaints verbalized during shift. No injuries, falls, or trauma occurred during shift. Purposeful rounding completed. Bed low and locked with side rails up x 3 and call light  placed within easy reach. Safety maintained throughout shift.     Problem: Adult Inpatient Plan of Care  Goal: Plan of Care Review  Outcome: Progressing  Goal: Patient-Specific Goal (Individualized)  Outcome: Progressing  Goal: Absence of Hospital-Acquired Illness or Injury  Outcome: Progressing  Goal: Optimal Comfort and Wellbeing  Outcome: Progressing  Goal: Readiness for Transition of Care  Outcome: Progressing     Problem: Hemodialysis  Goal: Safe, Effective Therapy Delivery  Outcome: Progressing  Goal: Effective Tissue Perfusion  Outcome: Progressing  Goal: Absence of Infection Signs and Symptoms  Outcome: Progressing     Problem: Wound  Goal: Optimal Coping  Outcome: Progressing

## 2024-06-20 NOTE — PLAN OF CARE
Pt is ready for discharge to [facility] today.    Pt and spouse are in agreement with plan    Transportation arranged thru [employer] for WC van for 1530.     Pt's nurse informed to call report to [phone] or [phone].    Pt is ready for discharge from  perspective.   06/20/24 1429   Final Note   Assessment Type Final Discharge Note   Anticipated Discharge Disposition Rehab   What phone number can be called within the next 1-3 days to see how you are doing after discharge? [phone]   Hospital Resources/Appts/Education Provided Provided patient/caregiver with written discharge plan information;Provided education on problems/symptoms using teachback;Appointments scheduled and added to AVS   Post-Acute Status   Post-Acute Authorization Placement   Post-Acute Placement Status Set-up Complete/Auth obtained   Patient choice form signed by patient/caregiver List with quality metrics by geographic area provided;List from CMS Compare;List from System Post-Acute Care   Discharge Delays None known at this time     [facility]  Discharge Final Note    Primary Care Provider: Mehul Livingston MD    Expected Discharge Date: 6/20/2024    Final Discharge Note (most recent)       Final Note - 06/20/24 1429          Final Note    Assessment Type Final Discharge Note (P)      Anticipated Discharge Disposition Rehab Facility (P)      What phone number can be called within the next 1-3 days to see how you are doing after discharge? [phone] (P)      Hospital Resources/Appts/Education Provided Provided patient/caregiver with written discharge plan information;Provided education on problems/symptoms using teachback;Appointments scheduled and added to AVS (P)         Post-Acute Status    Post-Acute Authorization Placement (P)      Post-Acute Placement Status Set-up Complete/Auth obtained (P)      Patient choice form signed by patient/caregiver List with quality metrics by geographic area provided;List from  CMS Compare;List from System Post-Acute Care (P)      Discharge Delays None known at this time (P)                      Important Message from Medicare

## 2024-06-20 NOTE — PROGRESS NOTES
"Nephrology  Progress Note    Admit Date: 6/17/2024   LOS: 3 days     SUBJECTIVE:     Follow-up For:  ESRD    History of Present Illness:  Patient is a 74 y.o. male presents with c/o leg swelling/r leg pain/dysuria.  Given rocephin in ED and admitted for HD.  Ext med hx as outlined below and known to us from previous admissions.  Now on HD TTS in [place] with Dr. Ford.  Noncompliant with HD and has missed almost 1 week due to being "tired".  Underwent HD/UF yesterday pm with 4L UF and felt better.  Seen and examined this am and doing better overall.  Discussed with Dr. Livingston.  Still with 2+ edema.        Interval History:     still with RLE weakness/pains.  Mostly calf but negative homans sign.  Holding BP meds with soft pressures.  HD this am.  Anticipating SNF/Rehab but will see how he does with therapy.        Review of Systems:  Constitutional: No fever or chills  Respiratory: No cough or shortness of breath  Cardiovascular: No chest pain or palpitations  Gastrointestinal: No nausea or vomiting  Neurological: RLE pain/weak    OBJECTIVE:     Vital Signs Range (Last 24H):  BP (!) 116/58 (BP Location: Right arm, Patient Position: Lying)   Pulse 61   Temp 97.9 °F (36.6 °C) (Oral)   Resp 20   Ht 6' 1" (1.854 m)   Wt 115.6 kg (254 lb 13.6 oz)   SpO2 (!) 92%   BMI 33.62 kg/m²     Temp:  [97.5 °F (36.4 °C)-98.3 °F (36.8 °C)]   Pulse:  [57-86]   Resp:  [16-20]   BP: (112-125)/(53-61)   SpO2:  [92 %-96 %]     I & O (Last 24H):  Intake/Output Summary (Last 24 hours) at 6/20/2024 0830  Last data filed at 6/19/2024 1409  Gross per 24 hour   Intake 420 ml   Output 50 ml   Net 370 ml       Physical Exam:  General appearance: Well developed, well nourished  Eyes:  Conjunctivae/corneas clear. PERRL.  Lungs: Normal respiratory effort,   clear to auscultation bilaterally   Heart: Regular rate and rhythm, S1, S2 normal, 3/6 murmur  Abdomen: Soft, non-tender non-distended; bowel sounds normal; no masses,  no " "organomegaly, obese  Extremities: No cyanosis or clubbing. trace+ edema.    Skin: Skin color, texture, turgor normal. No rashes or lesions  Neurologic: LE weakness L>R  Left wrist AVF+      Laboratory Data:  No results for input(s): "WBC", "RBC", "HGB", "HCT", "PLT", "MCV", "MCH", "MCHC" in the last 24 hours.    BMP:   Recent Labs   Lab 06/17/24  1107   *      K 5.0      CO2 22*   BUN 73*   CREATININE 6.5*   CALCIUM 9.4     Lab Results   Component Value Date    CALCIUM 9.4 06/17/2024    PHOS 6.4 (H) 06/18/2023       Lab Results   Component Value Date     (H) 05/16/2024    CALCIUM 9.4 06/17/2024    PHOS 6.4 (H) 06/18/2023       Lab Results   Component Value Date    URICACID 6.5 08/16/2022       BNP  Recent Labs   Lab 06/17/24  1107   *       Medications:  Medication list was reviewed and changes noted under Assessment/Plan.    Diagnostic Results:        ASSESSMENT/PLAN:     ESRD on HD TTS at [facility] with Dr. Ford:  -hx of noncompliance and missing mult treatments.  -HD last 2 days w/ 8L removed  -consents signed.  -see orders.  -keep on TTS for now  -Renally dose meds, avoid nephrotoxins, and monitor I/O's closely.        Suspected UTI:  -s/p rocephin.  Cipro for now.  Unfortunately no culture was obtained     DM:  -recommend GLP but resume low dose DDP4 for now.   -defer.     HTN:  -elevated with noncompliance.  -UF/UF/UF  -changed lasix to 80 BID  -now soft BP's so hold hydralazine and backed off norvasc.       Anemia of CKD:  -at goal     HPTH/MBD:  -nephrocaps  -binders with meals  -calcitriol on HD days.      PAD and severe weakness:  -defer.  -worse with edema  -R>L          Thanks for consult  See above  Will follow along for Renal needs  Likely SNF/Rehab         High MDM complexity necessary to treat or prevent imminent or life-threatening deterioration of the following conditions: ESRD  Time spent personally by me on the following activities 45 min: development of " treatment plan with patient or surrogate, discussions with consultants, interpretation of cardiac output measurements, examination of patient, ordering and performing treatments and interventions, evaluation of patient's response to treatment, obtaining history from patient or surrogate, ordering and review of laboratory studies, ordering and review of radiographic studies, re-evaluation of patient's condition, pulse oximetry and blood draw for specimens  I evaluated the patient with the NP and was present during the history and performed the physical examination above.  I formulated all medical decision making.  Although the note was started by the NP, I have taken responsibility for the plan and have signed this note. I was present for more that 50% of total time: 75min

## 2024-06-21 ENCOUNTER — PATIENT OUTREACH (OUTPATIENT)
Dept: ADMINISTRATIVE | Facility: CLINIC | Age: 74
End: 2024-06-21
Payer: MEDICARE

## 2024-07-02 ENCOUNTER — HOSPITAL ENCOUNTER (OUTPATIENT)
Dept: RADIOLOGY | Facility: HOSPITAL | Age: 74
Discharge: HOME | End: 2024-07-02
Attending: NURSE PRACTITIONER
Payer: MEDICARE

## 2024-07-02 ENCOUNTER — HOSPITAL ENCOUNTER (OUTPATIENT)
Dept: RADIOLOGY | Facility: HOSPITAL | Age: 74
Discharge: HOME | End: 2024-07-02
Attending: FAMILY MEDICINE
Payer: MEDICARE

## 2024-07-02 PROCEDURE — 71045 X-RAY EXAM CHEST 1 VIEW: CPT | Mod: 26,,, | Performed by: RADIOLOGY

## 2024-07-08 ENCOUNTER — TELEPHONE (OUTPATIENT)
Dept: VASCULAR SURGERY | Facility: CLINIC | Age: 74
End: 2024-07-08
Payer: MEDICARE

## 2024-07-08 NOTE — TELEPHONE ENCOUNTER
"Spoke with Ms Tala to schedule Mr Castano a follow up appointment with Dr Duran.Ms Edgar states, "  he  ."  "

## 2024-08-23 NOTE — ASSESSMENT & PLAN NOTE
- Severe edema.  Diurese.  Hold calcium channel blocker just to see if that helps.     I discussed this dx with the family at length. In order to restore the patient's ability to ambulate with minimal pain, a Left robotic assisted total hip replacement was recommended. The risks of the procedure were discussed at length which included but not limited to infection, bleeding, and damage to neurovascular structures. Informed consent was obtained.

## 2024-09-18 NOTE — SUBJECTIVE & OBJECTIVE
Interval History: No acute events overnight. Symptoms improving. SOB with exertion but not at rest. Continued BLE swelling.    Review of Systems   Constitutional:  Negative for chills and fever.   Respiratory:  Positive for cough and shortness of breath.    Cardiovascular:  Negative for chest pain and palpitations.   Gastrointestinal:  Negative for abdominal pain, nausea and vomiting.   Objective:     Vital Signs (Most Recent):  Temp: 97.6 °F (36.4 °C) (06/24/22 1603)  Pulse: 68 (06/24/22 1800)  Resp: 18 (06/24/22 1603)  BP: (!) 159/70 (06/24/22 1603)  SpO2: (!) 94 % (06/24/22 1603)   Vital Signs (24h Range):  Temp:  [97.6 °F (36.4 °C)-98.5 °F (36.9 °C)] 97.6 °F (36.4 °C)  Pulse:  [60-90] 68  Resp:  [18-20] 18  SpO2:  [92 %-98 %] 94 %  BP: (136-166)/(63-72) 159/70     Weight: 117.9 kg (260 lb)  Body mass index is 34.3 kg/m².    Intake/Output Summary (Last 24 hours) at 6/24/2022 1813  Last data filed at 6/24/2022 1500  Gross per 24 hour   Intake 960 ml   Output 575 ml   Net 385 ml        Physical Exam  Vitals and nursing note reviewed.   Constitutional:       General: He is not in acute distress.     Appearance: He is well-developed.   HENT:      Head: Normocephalic and atraumatic.   Eyes:      General:         Right eye: No discharge.         Left eye: No discharge.      Conjunctiva/sclera: Conjunctivae normal.   Cardiovascular:      Rate and Rhythm: Normal rate.      Pulses: Normal pulses.   Pulmonary:      Effort: Pulmonary effort is normal. No respiratory distress.      Comments: Diminished bases.  Abdominal:      Palpations: Abdomen is soft.      Tenderness: There is no abdominal tenderness.   Musculoskeletal:         General: Normal range of motion.      Right lower leg: No edema.      Left lower leg: No edema.   Skin:     General: Skin is warm and dry.   Neurological:      Mental Status: He is alert and oriented to person, place, and time.     Significant Labs:   CBC:  Recent Labs   Lab 06/22/22  1512  06/23/22  0331 06/24/22  0655   WBC 6.07 3.87* 6.40   HGB 10.1* 9.5* 9.8*   HCT 30.9* 29.1* 29.6*    135* 173   GRAN 79.9*  4.9 86.8*  3.4 74.9*  4.8   LYMPH 4.3*  0.3* 6.2*  0.2* 7.3*  0.5*   MONO 14.0  0.9 4.7  0.2* 16.3*  1.0   EOS 0.1 0.0 0.0   BASO 0.02 0.02 0.01     CMP:  Recent Labs   Lab 06/22/22  1512 06/23/22  0331 06/24/22  0655    141 138   K 4.2 4.1 3.9    105 102   CO2 27 20* 25   BUN 39* 43* 56*   CREATININE 2.7* 2.8* 2.8*   * 238* 243*   CALCIUM 9.0 8.7 8.5*   MG  --  2.1 2.0   PHOS  --   --  3.9   ALKPHOS 127 108 105   AST 32 33 32   ALT 20 18 22   BILITOT 0.7 0.7 0.5   PROT 6.9 6.1 5.9*   ALBUMIN 3.6 3.0* 3.0*   ANIONGAP 7* 16 11        Significant Imaging:   No new imaging this morning.     Detail Level: Detailed

## 2025-01-07 NOTE — TELEPHONE ENCOUNTER
FW: Appointment Request   Niharika Lin LPN   Sent: Sat 2022 10:46 AM   To: NEHLA Scott   MRN: [id number] : 1950   Pt Home:      Entered:           Message       ----- Message -----   From: Zev Castano   Sent: 2022   9:18 PM CST   To: [facility] Nephro Clinical Staff   Subject: Appointment Request                                Appointment Request From: Zev Castano      With Provider: Maria Luz Sloan MD [[facility] - Nephrology [facility]]      Preferred Date Range: 2/3/2022 - 2/10/2022      Preferred Times: Any Time      Reason for visit: Kidney checkup      Comments:   Kidney levels too high           Returned phone call and spoke with Mrs Castano, patient's spouse, informed that when Dr Sloan schedule is available will call to schedule and appointment with labs, verbalized understanding   Returned patient's call.  No answer. Left message for patient to call back.

## (undated) DEVICE — LOOP VESSEL YELLOW MAXI

## (undated) DEVICE — DRESSING TEGADERM CHG 3.5X4.5

## (undated) DEVICE — GUIDEWIRE LAUREATE 035IN 180CM

## (undated) DEVICE — COVER LIGHT HANDLE 80/CA

## (undated) DEVICE — SYR ONLY LUER LOCK 20CC

## (undated) DEVICE — SUT 2-0 12-18IN SILK

## (undated) DEVICE — TRAY ANGIO BAPTIST

## (undated) DEVICE — ELECTRODE REM PLYHSV RETURN 9

## (undated) DEVICE — SUT MCRYL PLUS 4-0 PS2 27IN

## (undated) DEVICE — TOWEL OR DISP STRL BLUE 4/PK

## (undated) DEVICE — SUT 4-0 12-18IN SILK BLACK

## (undated) DEVICE — SUT MONOCRYL 3-0 SH U/D

## (undated) DEVICE — SUT 3-0 12-18IN SILK

## (undated) DEVICE — SUT SILK 2-0 SH 18IN BLACK

## (undated) DEVICE — KIT PROBE COVER WITH GEL

## (undated) DEVICE — PAD HEARTSTART DEFIB ADULT

## (undated) DEVICE — DRESSING SURGILAST RET SM/MED

## (undated) DEVICE — PACK AV VASCULAR ACCESS OMC

## (undated) DEVICE — SUT SILK 2.0 BLK 18

## (undated) DEVICE — COVERS PROBE NR-48 STERILE

## (undated) DEVICE — KIT INTRO MICRO NIT VSI 4FR

## (undated) DEVICE — DECANTER FLUID TRNSF WHITE 9IN

## (undated) DEVICE — SPONGE GAUZE 16PLY 4X4

## (undated) DEVICE — APPLICATOR CHLORAPREP ORN 26ML

## (undated) DEVICE — GOWN SURGICAL X-LARGE